# Patient Record
Sex: MALE | Race: WHITE | Employment: OTHER | URBAN - METROPOLITAN AREA
[De-identification: names, ages, dates, MRNs, and addresses within clinical notes are randomized per-mention and may not be internally consistent; named-entity substitution may affect disease eponyms.]

---

## 2017-02-28 RX ORDER — ATORVASTATIN CALCIUM 20 MG/1
TABLET, FILM COATED ORAL
Qty: 30 TABLET | Refills: 3 | Status: SHIPPED | OUTPATIENT
Start: 2017-02-28 | End: 2017-07-18 | Stop reason: SDUPTHER

## 2017-03-01 DIAGNOSIS — I10 UNSPECIFIED ESSENTIAL HYPERTENSION: ICD-10-CM

## 2017-03-01 RX ORDER — QUINAPRIL 5 1/1
TABLET ORAL
Qty: 90 TABLET | Refills: 1 | Status: SHIPPED | OUTPATIENT
Start: 2017-03-01 | End: 2017-08-05 | Stop reason: SDUPTHER

## 2017-03-01 RX ORDER — TAMSULOSIN HYDROCHLORIDE 0.4 MG/1
CAPSULE ORAL
Qty: 90 CAPSULE | Refills: 1 | Status: SHIPPED | OUTPATIENT
Start: 2017-03-01 | End: 2017-08-05 | Stop reason: SDUPTHER

## 2017-03-02 ENCOUNTER — PATIENT MESSAGE (OUTPATIENT)
Dept: INTERNAL MEDICINE CLINIC | Age: 72
End: 2017-03-02

## 2017-03-02 DIAGNOSIS — I10 ESSENTIAL HYPERTENSION: ICD-10-CM

## 2017-03-02 DIAGNOSIS — E11.9 TYPE 2 DIABETES MELLITUS WITHOUT COMPLICATION, WITHOUT LONG-TERM CURRENT USE OF INSULIN (HCC): Primary | ICD-10-CM

## 2017-03-02 DIAGNOSIS — E78.5 HYPERLIPIDEMIA, UNSPECIFIED HYPERLIPIDEMIA TYPE: ICD-10-CM

## 2017-03-06 ENCOUNTER — TELEPHONE (OUTPATIENT)
Dept: INTERNAL MEDICINE CLINIC | Age: 72
End: 2017-03-06

## 2017-03-07 DIAGNOSIS — E78.5 HYPERLIPIDEMIA, UNSPECIFIED HYPERLIPIDEMIA TYPE: ICD-10-CM

## 2017-03-07 DIAGNOSIS — E11.9 TYPE 2 DIABETES MELLITUS WITHOUT COMPLICATION, WITHOUT LONG-TERM CURRENT USE OF INSULIN (HCC): ICD-10-CM

## 2017-03-07 DIAGNOSIS — I10 ESSENTIAL HYPERTENSION: ICD-10-CM

## 2017-03-07 LAB
A/G RATIO: 2.2 (ref 1.1–2.2)
ALBUMIN SERPL-MCNC: 4.7 G/DL (ref 3.4–5)
ALP BLD-CCNC: 40 U/L (ref 40–129)
ALT SERPL-CCNC: 38 U/L (ref 10–40)
ANION GAP SERPL CALCULATED.3IONS-SCNC: 13 MMOL/L (ref 3–16)
AST SERPL-CCNC: 25 U/L (ref 15–37)
BILIRUB SERPL-MCNC: 0.6 MG/DL (ref 0–1)
BUN BLDV-MCNC: 18 MG/DL (ref 7–20)
CALCIUM SERPL-MCNC: 10.6 MG/DL (ref 8.3–10.6)
CHLORIDE BLD-SCNC: 103 MMOL/L (ref 99–110)
CHOLESTEROL, TOTAL: 122 MG/DL (ref 0–199)
CO2: 27 MMOL/L (ref 21–32)
CREAT SERPL-MCNC: 0.7 MG/DL (ref 0.8–1.3)
CREATININE URINE: 115 MG/DL (ref 39–259)
GFR AFRICAN AMERICAN: >60
GFR NON-AFRICAN AMERICAN: >60
GLOBULIN: 2.1 G/DL
GLUCOSE BLD-MCNC: 167 MG/DL (ref 70–99)
HDLC SERPL-MCNC: 42 MG/DL (ref 40–60)
LDL CHOLESTEROL CALCULATED: 39 MG/DL
MICROALBUMIN UR-MCNC: 4.2 MG/DL
MICROALBUMIN/CREAT UR-RTO: 36.5 MG/G (ref 0–30)
POTASSIUM SERPL-SCNC: 4.7 MMOL/L (ref 3.5–5.1)
SODIUM BLD-SCNC: 143 MMOL/L (ref 136–145)
TOTAL PROTEIN: 6.8 G/DL (ref 6.4–8.2)
TRIGL SERPL-MCNC: 204 MG/DL (ref 0–150)
VLDLC SERPL CALC-MCNC: 41 MG/DL

## 2017-03-08 LAB
ESTIMATED AVERAGE GLUCOSE: 142.7 MG/DL
HBA1C MFR BLD: 6.6 %

## 2017-03-10 ENCOUNTER — OFFICE VISIT (OUTPATIENT)
Dept: INTERNAL MEDICINE CLINIC | Age: 72
End: 2017-03-10

## 2017-03-10 VITALS
OXYGEN SATURATION: 96 % | SYSTOLIC BLOOD PRESSURE: 122 MMHG | BODY MASS INDEX: 23.21 KG/M2 | TEMPERATURE: 98.1 F | HEART RATE: 78 BPM | DIASTOLIC BLOOD PRESSURE: 74 MMHG | WEIGHT: 144.4 LBS | HEIGHT: 66 IN

## 2017-03-10 DIAGNOSIS — E78.5 HYPERLIPIDEMIA, UNSPECIFIED HYPERLIPIDEMIA TYPE: ICD-10-CM

## 2017-03-10 DIAGNOSIS — I10 ESSENTIAL HYPERTENSION: ICD-10-CM

## 2017-03-10 DIAGNOSIS — E11.9 TYPE 2 DIABETES MELLITUS WITHOUT COMPLICATION, WITHOUT LONG-TERM CURRENT USE OF INSULIN (HCC): Primary | ICD-10-CM

## 2017-03-10 DIAGNOSIS — N40.1 BENIGN PROSTATIC HYPERTROPHY (BPH) WITH WEAK URINARY STREAM: ICD-10-CM

## 2017-03-10 DIAGNOSIS — R39.12 BENIGN PROSTATIC HYPERTROPHY (BPH) WITH WEAK URINARY STREAM: ICD-10-CM

## 2017-03-10 DIAGNOSIS — E55.9 VITAMIN D DEFICIENCY: ICD-10-CM

## 2017-03-10 PROCEDURE — 99214 OFFICE O/P EST MOD 30 MIN: CPT | Performed by: INTERNAL MEDICINE

## 2017-03-10 ASSESSMENT — ENCOUNTER SYMPTOMS
SORE THROAT: 0
BLOOD IN STOOL: 0
ABDOMINAL DISTENTION: 0
ABDOMINAL PAIN: 0
VOMITING: 0
SHORTNESS OF BREATH: 0
WHEEZING: 0
CHEST TIGHTNESS: 0
NAUSEA: 0
RHINORRHEA: 0
CONSTIPATION: 0
COUGH: 0
SINUS PRESSURE: 0
DIARRHEA: 0
TROUBLE SWALLOWING: 0

## 2017-03-14 ENCOUNTER — NURSE ONLY (OUTPATIENT)
Dept: INTERNAL MEDICINE CLINIC | Age: 72
End: 2017-03-14

## 2017-03-14 DIAGNOSIS — Z23 NEED FOR PROPHYLACTIC VACCINATION AGAINST STREPTOCOCCUS PNEUMONIAE (PNEUMOCOCCUS): Primary | ICD-10-CM

## 2017-03-14 PROCEDURE — G0009 ADMIN PNEUMOCOCCAL VACCINE: HCPCS | Performed by: INTERNAL MEDICINE

## 2017-03-14 PROCEDURE — 90732 PPSV23 VACC 2 YRS+ SUBQ/IM: CPT | Performed by: INTERNAL MEDICINE

## 2017-03-22 ENCOUNTER — OFFICE VISIT (OUTPATIENT)
Dept: DERMATOLOGY | Age: 72
End: 2017-03-22

## 2017-03-22 DIAGNOSIS — L82.1 SEBORRHEIC KERATOSES: ICD-10-CM

## 2017-03-22 DIAGNOSIS — L57.0 AK (ACTINIC KERATOSIS): Primary | ICD-10-CM

## 2017-03-22 DIAGNOSIS — L82.0 SEBORRHEIC KERATOSES, INFLAMED: ICD-10-CM

## 2017-03-22 PROCEDURE — 99201 PR OFFICE OUTPATIENT NEW 10 MINUTES: CPT | Performed by: DERMATOLOGY

## 2017-03-22 PROCEDURE — 17110 DESTRUCTION B9 LES UP TO 14: CPT | Performed by: DERMATOLOGY

## 2017-03-22 PROCEDURE — 17000 DESTRUCT PREMALG LESION: CPT | Performed by: DERMATOLOGY

## 2017-06-02 ENCOUNTER — PATIENT MESSAGE (OUTPATIENT)
Dept: INTERNAL MEDICINE CLINIC | Age: 72
End: 2017-06-02

## 2017-06-02 DIAGNOSIS — N40.1 BENIGN PROSTATIC HYPERTROPHY (BPH) WITH WEAK URINARY STREAM: ICD-10-CM

## 2017-06-02 DIAGNOSIS — E11.9 TYPE 2 DIABETES MELLITUS WITHOUT COMPLICATION, WITHOUT LONG-TERM CURRENT USE OF INSULIN (HCC): Primary | ICD-10-CM

## 2017-06-02 DIAGNOSIS — E78.5 HYPERLIPIDEMIA, UNSPECIFIED HYPERLIPIDEMIA TYPE: ICD-10-CM

## 2017-06-02 DIAGNOSIS — R39.12 BENIGN PROSTATIC HYPERTROPHY (BPH) WITH WEAK URINARY STREAM: ICD-10-CM

## 2017-06-02 DIAGNOSIS — I10 ESSENTIAL HYPERTENSION: ICD-10-CM

## 2017-06-07 DIAGNOSIS — N40.1 BENIGN PROSTATIC HYPERTROPHY (BPH) WITH WEAK URINARY STREAM: ICD-10-CM

## 2017-06-07 DIAGNOSIS — I10 ESSENTIAL HYPERTENSION: ICD-10-CM

## 2017-06-07 DIAGNOSIS — R39.12 BENIGN PROSTATIC HYPERTROPHY (BPH) WITH WEAK URINARY STREAM: ICD-10-CM

## 2017-06-07 DIAGNOSIS — E11.9 TYPE 2 DIABETES MELLITUS WITHOUT COMPLICATION, WITHOUT LONG-TERM CURRENT USE OF INSULIN (HCC): ICD-10-CM

## 2017-06-07 DIAGNOSIS — E78.5 HYPERLIPIDEMIA, UNSPECIFIED HYPERLIPIDEMIA TYPE: ICD-10-CM

## 2017-06-07 LAB
A/G RATIO: 2.3 (ref 1.1–2.2)
ALBUMIN SERPL-MCNC: 4.9 G/DL (ref 3.4–5)
ALP BLD-CCNC: 42 U/L (ref 40–129)
ALT SERPL-CCNC: 30 U/L (ref 10–40)
ANION GAP SERPL CALCULATED.3IONS-SCNC: 18 MMOL/L (ref 3–16)
AST SERPL-CCNC: 26 U/L (ref 15–37)
BILIRUB SERPL-MCNC: 0.6 MG/DL (ref 0–1)
BUN BLDV-MCNC: 18 MG/DL (ref 7–20)
CALCIUM SERPL-MCNC: 10.1 MG/DL (ref 8.3–10.6)
CHLORIDE BLD-SCNC: 98 MMOL/L (ref 99–110)
CHOLESTEROL, TOTAL: 117 MG/DL (ref 0–199)
CO2: 26 MMOL/L (ref 21–32)
CREAT SERPL-MCNC: 0.7 MG/DL (ref 0.8–1.3)
GFR AFRICAN AMERICAN: >60
GFR NON-AFRICAN AMERICAN: >60
GLOBULIN: 2.1 G/DL
GLUCOSE BLD-MCNC: 129 MG/DL (ref 70–99)
HDLC SERPL-MCNC: 41 MG/DL (ref 40–60)
LDL CHOLESTEROL CALCULATED: 44 MG/DL
POTASSIUM SERPL-SCNC: 5 MMOL/L (ref 3.5–5.1)
SODIUM BLD-SCNC: 142 MMOL/L (ref 136–145)
TOTAL PROTEIN: 7 G/DL (ref 6.4–8.2)
TRIGL SERPL-MCNC: 160 MG/DL (ref 0–150)
VLDLC SERPL CALC-MCNC: 32 MG/DL

## 2017-06-08 LAB
ESTIMATED AVERAGE GLUCOSE: 139.9 MG/DL
HBA1C MFR BLD: 6.5 %
PROSTATE SPECIFIC ANTIGEN: 0.63 NG/ML (ref 0–4)

## 2017-06-09 ENCOUNTER — OFFICE VISIT (OUTPATIENT)
Dept: INTERNAL MEDICINE CLINIC | Age: 72
End: 2017-06-09

## 2017-06-09 VITALS
WEIGHT: 144.2 LBS | DIASTOLIC BLOOD PRESSURE: 62 MMHG | RESPIRATION RATE: 12 BRPM | OXYGEN SATURATION: 95 % | SYSTOLIC BLOOD PRESSURE: 110 MMHG | HEIGHT: 66 IN | HEART RATE: 86 BPM | BODY MASS INDEX: 23.18 KG/M2 | TEMPERATURE: 98 F

## 2017-06-09 DIAGNOSIS — E55.9 VITAMIN D DEFICIENCY: ICD-10-CM

## 2017-06-09 DIAGNOSIS — E78.5 HYPERLIPIDEMIA, UNSPECIFIED HYPERLIPIDEMIA TYPE: ICD-10-CM

## 2017-06-09 DIAGNOSIS — I10 ESSENTIAL HYPERTENSION: ICD-10-CM

## 2017-06-09 DIAGNOSIS — R39.12 BENIGN PROSTATIC HYPERTROPHY (BPH) WITH WEAK URINARY STREAM: ICD-10-CM

## 2017-06-09 DIAGNOSIS — E11.9 TYPE 2 DIABETES MELLITUS WITHOUT COMPLICATION, WITHOUT LONG-TERM CURRENT USE OF INSULIN (HCC): Primary | ICD-10-CM

## 2017-06-09 DIAGNOSIS — N40.1 BENIGN PROSTATIC HYPERTROPHY (BPH) WITH WEAK URINARY STREAM: ICD-10-CM

## 2017-06-09 PROCEDURE — 99214 OFFICE O/P EST MOD 30 MIN: CPT | Performed by: INTERNAL MEDICINE

## 2017-06-11 ASSESSMENT — ENCOUNTER SYMPTOMS
RHINORRHEA: 0
DIARRHEA: 0
WHEEZING: 0
ABDOMINAL PAIN: 0
VOMITING: 0
SHORTNESS OF BREATH: 0
COUGH: 0
CHEST TIGHTNESS: 0
CONSTIPATION: 0
NAUSEA: 0
SORE THROAT: 0

## 2017-07-18 RX ORDER — ATORVASTATIN CALCIUM 20 MG/1
20 TABLET, FILM COATED ORAL DAILY
Qty: 90 TABLET | Refills: 1 | Status: SHIPPED | OUTPATIENT
Start: 2017-07-18 | End: 2018-01-12 | Stop reason: SDUPTHER

## 2017-08-05 DIAGNOSIS — I10 UNSPECIFIED ESSENTIAL HYPERTENSION: ICD-10-CM

## 2017-08-07 RX ORDER — TAMSULOSIN HYDROCHLORIDE 0.4 MG/1
CAPSULE ORAL
Qty: 90 CAPSULE | Refills: 1 | Status: SHIPPED | OUTPATIENT
Start: 2017-08-07 | End: 2018-01-17 | Stop reason: SDUPTHER

## 2017-08-07 RX ORDER — QUINAPRIL 5 1/1
TABLET ORAL
Qty: 90 TABLET | Refills: 1 | Status: SHIPPED | OUTPATIENT
Start: 2017-08-07 | End: 2018-01-17 | Stop reason: SDUPTHER

## 2017-08-17 LAB — DIABETIC RETINOPATHY: NORMAL

## 2017-08-27 DIAGNOSIS — E11.9 TYPE 2 DIABETES MELLITUS, CONTROLLED (HCC): ICD-10-CM

## 2017-08-28 RX ORDER — CANAGLIFLOZIN 300 MG/1
TABLET, FILM COATED ORAL
Qty: 90 TABLET | Refills: 0 | Status: SHIPPED | OUTPATIENT
Start: 2017-08-28 | End: 2017-11-10 | Stop reason: SDUPTHER

## 2017-09-13 DIAGNOSIS — E11.9 TYPE 2 DIABETES MELLITUS, CONTROLLED (HCC): ICD-10-CM

## 2017-09-13 RX ORDER — SITAGLIPTIN AND METFORMIN HYDROCHLORIDE 1000; 50 MG/1; MG/1
TABLET, FILM COATED, EXTENDED RELEASE ORAL
Qty: 180 TABLET | Refills: 2 | Status: SHIPPED | OUTPATIENT
Start: 2017-09-13 | End: 2018-05-05 | Stop reason: SDUPTHER

## 2017-10-26 ENCOUNTER — OFFICE VISIT (OUTPATIENT)
Dept: INTERNAL MEDICINE CLINIC | Age: 72
End: 2017-10-26

## 2017-10-26 VITALS
HEART RATE: 82 BPM | HEIGHT: 66 IN | RESPIRATION RATE: 16 BRPM | WEIGHT: 140 LBS | BODY MASS INDEX: 22.5 KG/M2 | SYSTOLIC BLOOD PRESSURE: 118 MMHG | TEMPERATURE: 98.5 F | OXYGEN SATURATION: 96 % | DIASTOLIC BLOOD PRESSURE: 62 MMHG

## 2017-10-26 DIAGNOSIS — E11.9 TYPE 2 DIABETES MELLITUS WITHOUT COMPLICATION, WITHOUT LONG-TERM CURRENT USE OF INSULIN (HCC): Primary | ICD-10-CM

## 2017-10-26 DIAGNOSIS — R39.12 BENIGN PROSTATIC HYPERPLASIA WITH WEAK URINARY STREAM: ICD-10-CM

## 2017-10-26 DIAGNOSIS — I10 ESSENTIAL HYPERTENSION: ICD-10-CM

## 2017-10-26 DIAGNOSIS — E78.2 MIXED HYPERLIPIDEMIA: ICD-10-CM

## 2017-10-26 DIAGNOSIS — E11.9 TYPE 2 DIABETES MELLITUS WITHOUT COMPLICATION, WITHOUT LONG-TERM CURRENT USE OF INSULIN (HCC): ICD-10-CM

## 2017-10-26 DIAGNOSIS — E55.9 VITAMIN D DEFICIENCY: ICD-10-CM

## 2017-10-26 DIAGNOSIS — N40.1 BENIGN PROSTATIC HYPERPLASIA WITH WEAK URINARY STREAM: ICD-10-CM

## 2017-10-26 LAB — HBA1C MFR BLD: 6.4 %

## 2017-10-26 PROCEDURE — 83036 HEMOGLOBIN GLYCOSYLATED A1C: CPT | Performed by: INTERNAL MEDICINE

## 2017-10-26 PROCEDURE — 99214 OFFICE O/P EST MOD 30 MIN: CPT | Performed by: INTERNAL MEDICINE

## 2017-10-26 RX ORDER — PHENOL 1.4 %
1 AEROSOL, SPRAY (ML) MUCOUS MEMBRANE DAILY
COMMUNITY
Start: 2017-10-26 | End: 2022-01-01 | Stop reason: ALTCHOICE

## 2017-10-26 ASSESSMENT — ENCOUNTER SYMPTOMS
CHEST TIGHTNESS: 0
VOMITING: 0
RHINORRHEA: 0
SHORTNESS OF BREATH: 0
NAUSEA: 0
CONSTIPATION: 0
ABDOMINAL PAIN: 0
WHEEZING: 0
COUGH: 0
DIARRHEA: 0
SORE THROAT: 0

## 2017-10-26 NOTE — PROGRESS NOTES
Shanon Howard   YOB: 1945    Date of Visit:  10/26/2017    Chief Complaint   Patient presents with    Diabetes    Hypertension    Hyperlipidemia       HPI  Pt has Type 2 Diabetes. Pt monitors his blood sugars fasting and pre-dinner. Fasting blood sugar averages 145 and pre-dinner averages 108. Pt decreases carbohydrates. Pt walks 2 miles 3-4 times per week.      Pt has hypertension. Pt doesn't monitor his BP. Pt states he doesn't add salt.      Pt has Hyperlipidemia. Pt takes Atorvastatin. Pt denies side effects. Pt decreases fat and cholesterol in his diet.     Pt has vitamin D deficiency. Pt takes vitamin D 2000 IU daily.     Pt has BPH. Pt takes Flomax. Pt denies side effects. Pt states he has wakes up once a night to urinate. Pt states his stream is not as strong as it used to be. Pt denies dribbling. Pt states he had a flu vaccine done on 9/12/17. Review of Systems   Constitutional: Negative for activity change, chills, fatigue and fever. HENT: Negative for congestion, postnasal drip, rhinorrhea and sore throat. Eyes: Negative for visual disturbance. Respiratory: Negative for cough, chest tightness, shortness of breath and wheezing. Cardiovascular: Negative for chest pain, palpitations and leg swelling. Gastrointestinal: Negative for abdominal pain, constipation, diarrhea, nausea and vomiting. Genitourinary: Negative for decreased urine volume, difficulty urinating, dysuria, frequency, hematuria and urgency. Musculoskeletal: Negative for arthralgias and myalgias. Skin: Negative for wound. Neurological: Negative for dizziness, syncope, light-headedness, numbness and headaches. Psychiatric/Behavioral: Negative for dysphoric mood and sleep disturbance. The patient is not nervous/anxious.         No Known Allergies  Outpatient Prescriptions Marked as Taking for the 10/26/17 encounter (Office Visit) with Ken Chilsd MD   Medication Sig Dispense Refill   TouchIN2 TechnologiesRUST XR  MG TB24 per extended release tablet TAKE 1 TABLET TWICE A  tablet 2    INVOKANA 300 MG TABS tablet TAKE 1 TABLET BY MOUTH EVERY MORNING BEFORE BREAKFAST 90 tablet 0    tamsulosin (FLOMAX) 0.4 MG capsule TAKE 1 CAPSULE DAILY 90 capsule 1    quinapril (ACCUPRIL) 5 MG tablet TAKE 1 TABLET DAILY 90 tablet 1    atorvastatin (LIPITOR) 20 MG tablet Take 1 tablet by mouth daily 90 tablet 1    ONE TOUCH ULTRASOFT LANCETS MISC 1 each by Does not apply route daily 100 each 3    glucose blood VI test strips (ASCENSIA AUTODISC VI;ONE TOUCH ULTRA TEST VI) strip 1 each by In Vitro route 4 times daily As needed. Dx Code: ICD-10-CM: E11.9 100 each 3    glucose blood VI test strips (ONE TOUCH ULTRA TEST) strip 1 each by In Vitro route daily 100 each 3    vitamin B-12 (CYANOCOBALAMIN) 1000 MCG tablet Take 1,000 mcg by mouth 2 times daily      Cholecalciferol (VITAMIN D) 2000 UNITS CAPS capsule Take by mouth daily      Omega-3 Fatty Acids (OMEGA 3 PO) Take 2 capsules by mouth daily              Vitals:    10/26/17 0908 10/26/17 0916   BP: (!) 100/56 118/62   Site: Left Arm Right Arm   Position: Sitting Sitting   Cuff Size: Medium Adult Medium Adult   Pulse: 82    Resp: 16    Temp: 98.5 °F (36.9 °C)    TempSrc: Oral    SpO2: 96%    Weight: 140 lb (63.5 kg)    Height: 5' 6\" (1.676 m)      Body mass index is 22.6 kg/m². Physical Exam   Constitutional: He appears well-developed and well-nourished. No distress. Elderly WM   HENT:   Mouth/Throat: Oropharynx is clear and moist and mucous membranes are normal.   Eyes: Conjunctivae, EOM and lids are normal. Pupils are equal, round, and reactive to light. Neck: Neck supple. Carotid bruit is not present. No thyromegaly present. Cardiovascular: Normal rate, regular rhythm, S1 normal, S2 normal, normal heart sounds and normal pulses. Exam reveals no gallop and no friction rub. No murmur heard.   Pulmonary/Chest: Effort normal and breath sounds normal. No Metabolic Panel; Future    3. Mixed hyperlipidemia  -LDL at goal  -Continue same medications  -Low fat, low cholesterol diet  -Regular aerobic exercise  - Lipid Panel; Future fasting  - Comprehensive Metabolic Panel; Future    4. Vitamin D deficiency  -stable  -Continue same medications    5.  Benign prostatic hyperplasia with weak urinary stream  -stable  -Continue same medications       Return in about 6 months (around 4/26/2018) for annual physical exam.

## 2017-10-26 NOTE — TELEPHONE ENCOUNTER
Call [facility] Pharmacist. Dispense test strips for qty 100 for a 90 day supply with 3 refills as prescribed. Pt doesn't test his blood sugar 3 times daily any more.

## 2017-10-31 DIAGNOSIS — E11.9 TYPE 2 DIABETES MELLITUS WITHOUT COMPLICATION, WITHOUT LONG-TERM CURRENT USE OF INSULIN (HCC): ICD-10-CM

## 2017-10-31 DIAGNOSIS — E78.2 MIXED HYPERLIPIDEMIA: ICD-10-CM

## 2017-10-31 DIAGNOSIS — I10 ESSENTIAL HYPERTENSION: ICD-10-CM

## 2017-10-31 LAB
A/G RATIO: 2.2 (ref 1.1–2.2)
ALBUMIN SERPL-MCNC: 5 G/DL (ref 3.4–5)
ALP BLD-CCNC: 40 U/L (ref 40–129)
ALT SERPL-CCNC: 37 U/L (ref 10–40)
ANION GAP SERPL CALCULATED.3IONS-SCNC: 15 MMOL/L (ref 3–16)
AST SERPL-CCNC: 24 U/L (ref 15–37)
BILIRUB SERPL-MCNC: 0.7 MG/DL (ref 0–1)
BUN BLDV-MCNC: 20 MG/DL (ref 7–20)
CALCIUM SERPL-MCNC: 10.2 MG/DL (ref 8.3–10.6)
CHLORIDE BLD-SCNC: 98 MMOL/L (ref 99–110)
CHOLESTEROL, TOTAL: 125 MG/DL (ref 0–199)
CO2: 28 MMOL/L (ref 21–32)
CREAT SERPL-MCNC: 0.7 MG/DL (ref 0.8–1.3)
GFR AFRICAN AMERICAN: >60
GFR NON-AFRICAN AMERICAN: >60
GLOBULIN: 2.3 G/DL
GLUCOSE BLD-MCNC: 143 MG/DL (ref 70–99)
HDLC SERPL-MCNC: 41 MG/DL (ref 40–60)
LDL CHOLESTEROL CALCULATED: 46 MG/DL
POTASSIUM SERPL-SCNC: 4.7 MMOL/L (ref 3.5–5.1)
SODIUM BLD-SCNC: 141 MMOL/L (ref 136–145)
TOTAL PROTEIN: 7.3 G/DL (ref 6.4–8.2)
TRIGL SERPL-MCNC: 188 MG/DL (ref 0–150)
VLDLC SERPL CALC-MCNC: 38 MG/DL

## 2017-11-10 DIAGNOSIS — E11.9 TYPE 2 DIABETES MELLITUS, CONTROLLED (HCC): ICD-10-CM

## 2017-11-10 NOTE — TELEPHONE ENCOUNTER
From: Marjory Boast  Sent: 11/9/2017 7:30 PM EST  Subject: Medication Renewal Request    Mena Branch.  Lidia Orr would like a refill of the following medications:  INVOKANA 300 MG TABS tablet Yohan Rangel MD]    Preferred pharmacy: [facility] home delivery fax [phone]    Comment:  90 days with refill

## 2018-01-15 RX ORDER — ATORVASTATIN CALCIUM 20 MG/1
TABLET, FILM COATED ORAL
Qty: 90 TABLET | Refills: 1 | Status: SHIPPED | OUTPATIENT
Start: 2018-01-15 | End: 2018-07-12 | Stop reason: SDUPTHER

## 2018-01-17 DIAGNOSIS — I10 ESSENTIAL HYPERTENSION: ICD-10-CM

## 2018-01-17 RX ORDER — QUINAPRIL 5 1/1
TABLET ORAL
Qty: 90 TABLET | Refills: 1 | Status: SHIPPED | OUTPATIENT
Start: 2018-01-17 | End: 2018-08-03 | Stop reason: SDUPTHER

## 2018-01-17 RX ORDER — TAMSULOSIN HYDROCHLORIDE 0.4 MG/1
CAPSULE ORAL
Qty: 90 CAPSULE | Refills: 1 | Status: SHIPPED | OUTPATIENT
Start: 2018-01-17 | End: 2018-08-03 | Stop reason: SDUPTHER

## 2018-04-24 ENCOUNTER — OFFICE VISIT (OUTPATIENT)
Dept: INTERNAL MEDICINE CLINIC | Age: 73
End: 2018-04-24

## 2018-04-24 VITALS
HEART RATE: 81 BPM | DIASTOLIC BLOOD PRESSURE: 70 MMHG | SYSTOLIC BLOOD PRESSURE: 110 MMHG | HEIGHT: 66 IN | RESPIRATION RATE: 14 BRPM | OXYGEN SATURATION: 96 % | BODY MASS INDEX: 22.69 KG/M2 | TEMPERATURE: 98.2 F | WEIGHT: 141.2 LBS

## 2018-04-24 DIAGNOSIS — R39.12 BENIGN PROSTATIC HYPERPLASIA WITH WEAK URINARY STREAM: ICD-10-CM

## 2018-04-24 DIAGNOSIS — N40.1 BENIGN PROSTATIC HYPERPLASIA WITH WEAK URINARY STREAM: ICD-10-CM

## 2018-04-24 DIAGNOSIS — Z00.00 ANNUAL PHYSICAL EXAM: ICD-10-CM

## 2018-04-24 DIAGNOSIS — Z23 NEED FOR SHINGLES VACCINE: ICD-10-CM

## 2018-04-24 DIAGNOSIS — I10 ESSENTIAL HYPERTENSION: ICD-10-CM

## 2018-04-24 DIAGNOSIS — E11.9 TYPE 2 DIABETES MELLITUS WITHOUT COMPLICATION, WITHOUT LONG-TERM CURRENT USE OF INSULIN (HCC): ICD-10-CM

## 2018-04-24 DIAGNOSIS — Z13.6 SCREENING FOR ISCHEMIC HEART DISEASE: ICD-10-CM

## 2018-04-24 DIAGNOSIS — E11.638: ICD-10-CM

## 2018-04-24 DIAGNOSIS — E55.9 VITAMIN D DEFICIENCY: ICD-10-CM

## 2018-04-24 DIAGNOSIS — Z00.00 ANNUAL PHYSICAL EXAM: Primary | ICD-10-CM

## 2018-04-24 DIAGNOSIS — E78.2 MIXED HYPERLIPIDEMIA: ICD-10-CM

## 2018-04-24 DIAGNOSIS — Z12.11 COLON CANCER SCREENING: ICD-10-CM

## 2018-04-24 LAB
A/G RATIO: 2.4 (ref 1.1–2.2)
ALBUMIN SERPL-MCNC: 5.3 G/DL (ref 3.4–5)
ALP BLD-CCNC: 43 U/L (ref 40–129)
ALT SERPL-CCNC: 38 U/L (ref 10–40)
ANION GAP SERPL CALCULATED.3IONS-SCNC: 18 MMOL/L (ref 3–16)
AST SERPL-CCNC: 30 U/L (ref 15–37)
BASOPHILS ABSOLUTE: 0 K/UL (ref 0–0.2)
BASOPHILS RELATIVE PERCENT: 0.3 %
BILIRUB SERPL-MCNC: 0.9 MG/DL (ref 0–1)
BILIRUBIN, POC: ABNORMAL
BLOOD URINE, POC: ABNORMAL
BUN BLDV-MCNC: 20 MG/DL (ref 7–20)
CALCIUM SERPL-MCNC: 10.1 MG/DL (ref 8.3–10.6)
CHLORIDE BLD-SCNC: 99 MMOL/L (ref 99–110)
CHOLESTEROL, TOTAL: 145 MG/DL (ref 0–199)
CLARITY, POC: CLEAR
CO2: 25 MMOL/L (ref 21–32)
COLOR, POC: YELLOW
CREAT SERPL-MCNC: 0.6 MG/DL (ref 0.8–1.3)
CREATININE URINE: 62.1 MG/DL (ref 39–259)
EOSINOPHILS ABSOLUTE: 0.3 K/UL (ref 0–0.6)
EOSINOPHILS RELATIVE PERCENT: 3.3 %
GFR AFRICAN AMERICAN: >60
GFR NON-AFRICAN AMERICAN: >60
GLOBULIN: 2.2 G/DL
GLUCOSE BLD-MCNC: 158 MG/DL (ref 70–99)
GLUCOSE URINE, POC: 500
HBA1C MFR BLD: 6.6 %
HCT VFR BLD CALC: 48.1 % (ref 40.5–52.5)
HDLC SERPL-MCNC: 47 MG/DL (ref 40–60)
HEMOGLOBIN: 16.3 G/DL (ref 13.5–17.5)
KETONES, POC: 15
LDL CHOLESTEROL CALCULATED: 52 MG/DL
LEUKOCYTE EST, POC: ABNORMAL
LYMPHOCYTES ABSOLUTE: 1.7 K/UL (ref 1–5.1)
LYMPHOCYTES RELATIVE PERCENT: 20.9 %
MCH RBC QN AUTO: 32.8 PG (ref 26–34)
MCHC RBC AUTO-ENTMCNC: 33.9 G/DL (ref 31–36)
MCV RBC AUTO: 96.8 FL (ref 80–100)
MICROALBUMIN UR-MCNC: <1.2 MG/DL
MICROALBUMIN/CREAT UR-RTO: NORMAL MG/G (ref 0–30)
MONOCYTES ABSOLUTE: 0.6 K/UL (ref 0–1.3)
MONOCYTES RELATIVE PERCENT: 7 %
NEUTROPHILS ABSOLUTE: 5.5 K/UL (ref 1.7–7.7)
NEUTROPHILS RELATIVE PERCENT: 68.5 %
NITRITE, POC: ABNORMAL
PDW BLD-RTO: 14.2 % (ref 12.4–15.4)
PH, POC: 5.5
PLATELET # BLD: 162 K/UL (ref 135–450)
PMV BLD AUTO: 8.3 FL (ref 5–10.5)
POTASSIUM SERPL-SCNC: 4.6 MMOL/L (ref 3.5–5.1)
PROSTATE SPECIFIC ANTIGEN: 0.77 NG/ML (ref 0–4)
PROTEIN, POC: ABNORMAL
RBC # BLD: 4.97 M/UL (ref 4.2–5.9)
SODIUM BLD-SCNC: 142 MMOL/L (ref 136–145)
SPECIFIC GRAVITY, POC: 1.01
TOTAL PROTEIN: 7.5 G/DL (ref 6.4–8.2)
TRIGL SERPL-MCNC: 229 MG/DL (ref 0–150)
UROBILINOGEN, POC: 0.2
VITAMIN D 25-HYDROXY: 40.6 NG/ML
VLDLC SERPL CALC-MCNC: 46 MG/DL
WBC # BLD: 8 K/UL (ref 4–11)

## 2018-04-24 PROCEDURE — 82270 OCCULT BLOOD FECES: CPT | Performed by: INTERNAL MEDICINE

## 2018-04-24 PROCEDURE — 83036 HEMOGLOBIN GLYCOSYLATED A1C: CPT | Performed by: INTERNAL MEDICINE

## 2018-04-24 PROCEDURE — 81002 URINALYSIS NONAUTO W/O SCOPE: CPT | Performed by: INTERNAL MEDICINE

## 2018-04-24 PROCEDURE — 99397 PER PM REEVAL EST PAT 65+ YR: CPT | Performed by: INTERNAL MEDICINE

## 2018-04-24 PROCEDURE — 93000 ELECTROCARDIOGRAM COMPLETE: CPT | Performed by: INTERNAL MEDICINE

## 2018-04-24 ASSESSMENT — PATIENT HEALTH QUESTIONNAIRE - PHQ9
SUM OF ALL RESPONSES TO PHQ9 QUESTIONS 1 & 2: 0
2. FEELING DOWN, DEPRESSED OR HOPELESS: 0
SUM OF ALL RESPONSES TO PHQ QUESTIONS 1-9: 0
1. LITTLE INTEREST OR PLEASURE IN DOING THINGS: 0

## 2018-04-24 ASSESSMENT — ENCOUNTER SYMPTOMS
WHEEZING: 0
ABDOMINAL DISTENTION: 0
ANAL BLEEDING: 0
VOICE CHANGE: 0
SORE THROAT: 0
PHOTOPHOBIA: 0
BLOOD IN STOOL: 0
FACIAL SWELLING: 0
EYE DISCHARGE: 0
DIARRHEA: 0
NAUSEA: 0
COUGH: 0
CHEST TIGHTNESS: 0
VOMITING: 0
APNEA: 0
TROUBLE SWALLOWING: 0
SINUS PRESSURE: 0
RHINORRHEA: 0
CONSTIPATION: 0
BACK PAIN: 0
CHOKING: 0
EYE REDNESS: 0
EYE ITCHING: 0
ABDOMINAL PAIN: 0
EYE PAIN: 0
RECTAL PAIN: 0
SHORTNESS OF BREATH: 0

## 2018-05-05 DIAGNOSIS — E11.9 TYPE 2 DIABETES MELLITUS, CONTROLLED (HCC): ICD-10-CM

## 2018-05-07 RX ORDER — SITAGLIPTIN AND METFORMIN HYDROCHLORIDE 1000; 50 MG/1; MG/1
TABLET, FILM COATED, EXTENDED RELEASE ORAL
Qty: 180 TABLET | Refills: 2 | Status: SHIPPED | OUTPATIENT
Start: 2018-05-07 | End: 2019-02-18 | Stop reason: SDUPTHER

## 2018-07-12 DIAGNOSIS — E78.2 MIXED HYPERLIPIDEMIA: Primary | ICD-10-CM

## 2018-07-13 RX ORDER — ATORVASTATIN CALCIUM 20 MG/1
TABLET, FILM COATED ORAL
Qty: 90 TABLET | Refills: 0 | Status: SHIPPED | OUTPATIENT
Start: 2018-07-13 | End: 2018-10-11 | Stop reason: SDUPTHER

## 2018-08-03 DIAGNOSIS — I10 ESSENTIAL HYPERTENSION: ICD-10-CM

## 2018-08-03 RX ORDER — QUINAPRIL 5 1/1
TABLET ORAL
Qty: 90 TABLET | Refills: 0 | Status: SHIPPED | OUTPATIENT
Start: 2018-08-03 | End: 2018-11-03 | Stop reason: SDUPTHER

## 2018-08-03 RX ORDER — TAMSULOSIN HYDROCHLORIDE 0.4 MG/1
CAPSULE ORAL
Qty: 90 CAPSULE | Refills: 0 | Status: SHIPPED | OUTPATIENT
Start: 2018-08-03 | End: 2018-11-03 | Stop reason: SDUPTHER

## 2018-08-09 DIAGNOSIS — E11.9 TYPE 2 DIABETES MELLITUS, CONTROLLED (HCC): ICD-10-CM

## 2018-08-09 RX ORDER — CANAGLIFLOZIN 300 MG/1
TABLET, FILM COATED ORAL
Qty: 90 TABLET | Refills: 1 | Status: SHIPPED | OUTPATIENT
Start: 2018-08-09 | End: 2019-01-07 | Stop reason: CLARIF

## 2018-08-09 NOTE — TELEPHONE ENCOUNTER
Medication:   Requested Prescriptions     Pending Prescriptions Disp Refills    INVOKANA 300 MG TABS tablet [Pharmacy Med Name: Jose Daniel Lee TAB 300MG] 90 tablet 2     Sig: TAKE 1 TABLET EVERY MORNINGBEFORE BREAKFAST       Last Filled:      Patient Phone Number: [phone] (home) [phone] (work)    Last appt: 4/24/2018   Next appt: 10/29/2018    Last Labs DM:   Lab Results   Component Value Date    LABA1C 6.6 04/24/2018       Last OARRS: No flowsheet data found.     Preferred Pharmacy:       [address], [address] [place] [address] [address] [facility]  Phone: [phone] Fax: [phone]

## 2018-08-15 ENCOUNTER — TELEPHONE (OUTPATIENT)
Dept: INTERNAL MEDICINE CLINIC | Age: 73
End: 2018-08-15

## 2018-08-15 NOTE — TELEPHONE ENCOUNTER
Pt called in stating that he is going to be traveling on a cruise next month for a few weeks to [place], [place], & [place]. He was uncertain on if he needed to get the Hep B vaccination before his trip.   Please contact pt back at home number, [phone]

## 2018-10-11 DIAGNOSIS — E78.2 MIXED HYPERLIPIDEMIA: ICD-10-CM

## 2018-10-11 RX ORDER — ATORVASTATIN CALCIUM 20 MG/1
TABLET, FILM COATED ORAL
Qty: 90 TABLET | Refills: 0 | Status: SHIPPED | OUTPATIENT
Start: 2018-10-11 | End: 2019-01-08 | Stop reason: SDUPTHER

## 2018-11-02 ENCOUNTER — CLINICAL DOCUMENTATION (OUTPATIENT)
Dept: INTERNAL MEDICINE CLINIC | Age: 73
End: 2018-11-02

## 2018-11-02 ENCOUNTER — OFFICE VISIT (OUTPATIENT)
Dept: INTERNAL MEDICINE CLINIC | Age: 73
End: 2018-11-02
Payer: MEDICARE

## 2018-11-02 VITALS
HEIGHT: 66 IN | WEIGHT: 139.4 LBS | DIASTOLIC BLOOD PRESSURE: 64 MMHG | SYSTOLIC BLOOD PRESSURE: 134 MMHG | RESPIRATION RATE: 20 BRPM | TEMPERATURE: 97.9 F | BODY MASS INDEX: 22.4 KG/M2

## 2018-11-02 DIAGNOSIS — E11.9 TYPE 2 DIABETES MELLITUS WITHOUT COMPLICATION, WITHOUT LONG-TERM CURRENT USE OF INSULIN (HCC): Primary | ICD-10-CM

## 2018-11-02 DIAGNOSIS — E78.2 MIXED HYPERLIPIDEMIA: ICD-10-CM

## 2018-11-02 DIAGNOSIS — R39.12 BENIGN PROSTATIC HYPERPLASIA WITH WEAK URINARY STREAM: ICD-10-CM

## 2018-11-02 DIAGNOSIS — H61.22 IMPACTED CERUMEN OF LEFT EAR: ICD-10-CM

## 2018-11-02 DIAGNOSIS — E55.9 VITAMIN D DEFICIENCY: ICD-10-CM

## 2018-11-02 DIAGNOSIS — I10 ESSENTIAL HYPERTENSION: ICD-10-CM

## 2018-11-02 DIAGNOSIS — N40.1 BENIGN PROSTATIC HYPERPLASIA WITH WEAK URINARY STREAM: ICD-10-CM

## 2018-11-02 LAB — HBA1C MFR BLD: 6.8 %

## 2018-11-02 PROCEDURE — 83036 HEMOGLOBIN GLYCOSYLATED A1C: CPT | Performed by: INTERNAL MEDICINE

## 2018-11-02 PROCEDURE — 99214 OFFICE O/P EST MOD 30 MIN: CPT | Performed by: INTERNAL MEDICINE

## 2018-11-02 ASSESSMENT — ENCOUNTER SYMPTOMS
DIARRHEA: 0
COUGH: 0
CONSTIPATION: 0
CHEST TIGHTNESS: 0
ABDOMINAL PAIN: 0
SHORTNESS OF BREATH: 0
SORE THROAT: 0
WHEEZING: 0
NAUSEA: 0
VOMITING: 0
RHINORRHEA: 0

## 2018-11-02 NOTE — PROGRESS NOTES
Genitourinary: Negative for dysuria, frequency and hematuria. Musculoskeletal: Negative for arthralgias and myalgias. Skin: Negative for wound. Neurological: Negative for dizziness, syncope, light-headedness, numbness and headaches. Psychiatric/Behavioral: Negative for dysphoric mood and sleep disturbance. The patient is not nervous/anxious. No Known Allergies  Outpatient Prescriptions Marked as Taking for the 11/2/18 encounter (Office Visit) with Ricki Vicente MD   Medication Sig Dispense Refill    atorvastatin (LIPITOR) 20 MG tablet TAKE 1 TABLET BY MOUTH ONE TIME A DAY  90 tablet 0    INVOKANA 300 MG TABS tablet TAKE 1 TABLET EVERY MORNINGBEFORE BREAKFAST 90 tablet 1    quinapril (ACCUPRIL) 5 MG tablet TAKE 1 TABLET DAILY 90 tablet 0    tamsulosin (FLOMAX) 0.4 MG capsule TAKE 1 CAPSULE DAILY 90 capsule 0    JANUMET XR  MG TB24 per extended release tablet TAKE 1 TABLET TWICE A  tablet 2    glucose blood VI test strips (ONE TOUCH ULTRA TEST) strip 1 each by In Vitro route daily 100 each 3    Multiple Vitamins-Minerals (MULTIVITAMIN ADULTS 50+) TABS Take 1 tablet by mouth daily      ONE TOUCH ULTRASOFT LANCETS MISC 1 each by Does not apply route daily 100 each 3    glucose blood VI test strips (ASCENSIA AUTODISC VI;ONE TOUCH ULTRA TEST VI) strip 1 each by In Vitro route 4 times daily As needed. Dx Code: ICD-10-CM: E11.9 100 each 3    vitamin B-12 (CYANOCOBALAMIN) 1000 MCG tablet Take 1,000 mcg by mouth 2 times daily      Cholecalciferol (VITAMIN D) 2000 UNITS CAPS capsule Take by mouth daily      Omega-3 Fatty Acids (OMEGA 3 PO) Take 2 capsules by mouth daily            Vitals:    11/02/18 0913   BP: 134/64   Site: Right Upper Arm   Position: Sitting   Cuff Size: Medium Adult   Resp: 20   Temp: 97.9 °F (36.6 °C)   TempSrc: Oral   Weight: 139 lb 6.4 oz (63.2 kg)   Height: 5' 6\" (1.676 m)     Body mass index is 22.5 kg/m².      Physical Exam   Constitutional: He appears stream  -stable  -Continue same medication    6.  Impacted cerumen of left ear  -ear wax removed from left ear      Return in about 6 months (around 4/24/2019) for annual physical exam.

## 2018-11-03 DIAGNOSIS — I10 ESSENTIAL HYPERTENSION: ICD-10-CM

## 2018-11-06 DIAGNOSIS — I10 ESSENTIAL HYPERTENSION: ICD-10-CM

## 2018-11-06 DIAGNOSIS — E55.9 VITAMIN D DEFICIENCY: ICD-10-CM

## 2018-11-06 DIAGNOSIS — E11.9 TYPE 2 DIABETES MELLITUS WITHOUT COMPLICATION, WITHOUT LONG-TERM CURRENT USE OF INSULIN (HCC): ICD-10-CM

## 2018-11-06 DIAGNOSIS — E78.2 MIXED HYPERLIPIDEMIA: ICD-10-CM

## 2018-11-06 LAB
A/G RATIO: 2.3 (ref 1.1–2.2)
ALBUMIN SERPL-MCNC: 5 G/DL (ref 3.4–5)
ALP BLD-CCNC: 54 U/L (ref 40–129)
ALT SERPL-CCNC: 47 U/L (ref 10–40)
ANION GAP SERPL CALCULATED.3IONS-SCNC: 19 MMOL/L (ref 3–16)
AST SERPL-CCNC: 31 U/L (ref 15–37)
BILIRUB SERPL-MCNC: 0.7 MG/DL (ref 0–1)
BUN BLDV-MCNC: 22 MG/DL (ref 7–20)
CALCIUM SERPL-MCNC: 10.2 MG/DL (ref 8.3–10.6)
CHLORIDE BLD-SCNC: 98 MMOL/L (ref 99–110)
CHOLESTEROL, TOTAL: 174 MG/DL (ref 0–199)
CO2: 24 MMOL/L (ref 21–32)
CREAT SERPL-MCNC: 0.7 MG/DL (ref 0.8–1.3)
GFR AFRICAN AMERICAN: >60
GFR NON-AFRICAN AMERICAN: >60
GLOBULIN: 2.2 G/DL
GLUCOSE BLD-MCNC: 144 MG/DL (ref 70–99)
HDLC SERPL-MCNC: 37 MG/DL (ref 40–60)
LDL CHOLESTEROL CALCULATED: ABNORMAL MG/DL
LDL CHOLESTEROL DIRECT: 99 MG/DL
POTASSIUM SERPL-SCNC: 4.6 MMOL/L (ref 3.5–5.1)
SODIUM BLD-SCNC: 141 MMOL/L (ref 136–145)
TOTAL PROTEIN: 7.2 G/DL (ref 6.4–8.2)
TRIGL SERPL-MCNC: 354 MG/DL (ref 0–150)
VITAMIN D 25-HYDROXY: 40 NG/ML
VLDLC SERPL CALC-MCNC: ABNORMAL MG/DL

## 2018-11-06 RX ORDER — QUINAPRIL 5 1/1
TABLET ORAL
Qty: 90 TABLET | Refills: 1 | Status: SHIPPED | OUTPATIENT
Start: 2018-11-06 | End: 2019-09-12 | Stop reason: SDUPTHER

## 2018-11-06 RX ORDER — TAMSULOSIN HYDROCHLORIDE 0.4 MG/1
CAPSULE ORAL
Qty: 90 CAPSULE | Refills: 1 | Status: SHIPPED | OUTPATIENT
Start: 2018-11-06 | End: 2019-09-12

## 2019-01-02 ENCOUNTER — TELEPHONE (OUTPATIENT)
Dept: INTERNAL MEDICINE CLINIC | Age: 74
End: 2019-01-02

## 2019-01-08 DIAGNOSIS — E78.2 MIXED HYPERLIPIDEMIA: ICD-10-CM

## 2019-01-09 RX ORDER — ATORVASTATIN CALCIUM 20 MG/1
TABLET, FILM COATED ORAL
Qty: 90 TABLET | Refills: 0 | Status: SHIPPED | OUTPATIENT
Start: 2019-01-09 | End: 2019-04-18 | Stop reason: SDUPTHER

## 2019-02-18 DIAGNOSIS — E11.9 TYPE 2 DIABETES MELLITUS, CONTROLLED (HCC): ICD-10-CM

## 2019-02-19 RX ORDER — SITAGLIPTIN AND METFORMIN HYDROCHLORIDE 1000; 50 MG/1; MG/1
TABLET, FILM COATED, EXTENDED RELEASE ORAL
Qty: 180 TABLET | Refills: 1 | Status: SHIPPED | OUTPATIENT
Start: 2019-02-19 | End: 2019-09-12 | Stop reason: SDUPTHER

## 2019-04-04 ENCOUNTER — OFFICE VISIT (OUTPATIENT)
Dept: ENT CLINIC | Age: 74
End: 2019-04-04
Payer: MEDICARE

## 2019-04-04 VITALS — SYSTOLIC BLOOD PRESSURE: 120 MMHG | DIASTOLIC BLOOD PRESSURE: 68 MMHG | OXYGEN SATURATION: 96 % | HEART RATE: 93 BPM

## 2019-04-04 DIAGNOSIS — J30.9 ALLERGIC SINUSITIS: ICD-10-CM

## 2019-04-04 DIAGNOSIS — H90.5 HEARING LOSS, SENSORINEURAL, UNILATERAL: ICD-10-CM

## 2019-04-04 DIAGNOSIS — J30.9 ALLERGIC SINUSITIS: Primary | ICD-10-CM

## 2019-04-04 PROCEDURE — 99203 OFFICE O/P NEW LOW 30 MIN: CPT | Performed by: OTOLARYNGOLOGY

## 2019-04-04 RX ORDER — MONTELUKAST SODIUM 10 MG/1
TABLET ORAL
Qty: 90 TABLET | Refills: 1 | Status: SHIPPED | OUTPATIENT
Start: 2019-04-04 | End: 2019-09-18 | Stop reason: SDUPTHER

## 2019-04-04 RX ORDER — MONTELUKAST SODIUM 10 MG/1
10 TABLET ORAL NIGHTLY
Qty: 15 TABLET | Refills: 1 | Status: SHIPPED | OUTPATIENT
Start: 2019-04-04 | End: 2019-04-04 | Stop reason: SDUPTHER

## 2019-04-04 ASSESSMENT — ENCOUNTER SYMPTOMS
FACIAL SWELLING: 0
SINUS PAIN: 0
VOICE CHANGE: 0
TROUBLE SWALLOWING: 0
ALLERGIC/IMMUNOLOGIC NEGATIVE: 1
SORE THROAT: 0
EYES NEGATIVE: 1
RHINORRHEA: 0
RESPIRATORY NEGATIVE: 1
SINUS PRESSURE: 0

## 2019-04-04 NOTE — PROGRESS NOTES
SUBJECTIVE:    Chief Complaint   Patient presents with    Ear Problem     4 to 5 months ago went to PCP had left ear flushed. Since then has had a lot of problems with popping and noises in the ear. Sim Rivera is a 75 y.o. male    4 months ago, the patient noted stuffiness in his left ear. It is now persisted and cerumen had been removed. He denies any pain. He's had some tinnitus in the left ear associated with some decrease in hearing. Some of his symptoms of been rather progressive. He notices no vertigo. He is on no medications likely to produce similar problems he does have a son who has Ménière's disease. He denies noise exposure or trauma. He is a diabetic. Occasion orally and seems to be under reasonable good control. No fevers been noted.       Past Medical History:   Diagnosis Date    Allergic rhinitis     History of gout 9/19/2015    History of thrombocytopenia 9/19/2015    Hypercalcemia     Hyperlipidemia     Hypertension     Lung nodule     Proteinuria     Type II or unspecified type diabetes mellitus without mention of complication, not stated as uncontrolled     Urinary disorder     Vitamin D deficiency 9/19/2015      Past Surgical History:   Procedure Laterality Date    CATARACT REMOVAL WITH IMPLANT Bilateral 2011    COLONOSCOPY  3/29/2011    repeat in 5 yrs: Jaylene Sellers MD    COLONOSCOPY  03/14/2016    repeat in 7-8 years: Jaylene Sellers MD    ELBOW FRACTURE SURGERY Left age 5   [facility] FINGER TRIGGER RELEASE Right 2007    index    TONSILLECTOMY AND ADENOIDECTOMY  age 2   [facility] VASECTOMY  25      Family History   Problem Relation Age of Onset    Diabetes Mother     High Blood Pressure Mother     Dementia Mother     Cancer Neg Hx     Hearing Loss Neg Hx     Stroke Neg Hx     Heart Disease Neg Hx       Social History     Tobacco Use    Smoking status: Never Smoker    Smokeless tobacco: Never Used    Tobacco comment: never used tobbaco   Substance Use Topics    Alcohol use: Yes     Types: 2 Glasses of wine per week     Comment: drink alcohol very occasionally. Review of Systems:  Review of Systems   Constitutional: Negative. Negative for fever. HENT: Positive for ear pain, hearing loss and tinnitus. Negative for congestion, dental problem, drooling, ear discharge, facial swelling, mouth sores, nosebleeds, postnasal drip, rhinorrhea, sinus pressure, sinus pain, sneezing, sore throat, trouble swallowing and voice change. Eyes: Negative. Respiratory: Negative. Cardiovascular: Negative. Endocrine: Negative. Skin: Negative. Allergic/Immunologic: Negative. Neurological: Negative. Negative for dizziness and light-headedness. Hematological: Negative. Psychiatric/Behavioral: Negative. OBJECTIVE:  /68   Pulse 93   SpO2 96%   Physical Exam   Constitutional: He is oriented to person, place, and time. He appears well-developed and well-nourished. HENT:   Head: Normocephalic and atraumatic. Mouth/Throat: Oropharynx is clear and moist. No oropharyngeal exudate. Indirect laryngoscopy is unremarkable. Ear examination reveals essentially normal-appearing tympanic membranes. The nasal evaluation does show some edema consistent with allergy. No evidence of purulence is noted and there is no evidence of obstructing lesion. 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 Conjunctivae and EOM are normal.   Neck: Normal range of motion. Neck supple. No tracheal deviation present. No thyromegaly present. Cardiovascular: Normal rate and regular rhythm. Pulmonary/Chest: Effort normal.   Lymphadenopathy:     He has no cervical adenopathy. Neurological: He is alert and oriented to person, place, and time. Skin: Skin is warm and dry. Psychiatric: He has a normal mood and affect.  His behavior is normal. Judgment and thought content normal.        ASSESSMENT:    Findings seemed most consistent with eustachian tube dysfunction. PLAN:     Since he is diabetic, we will delay the use of steroid. We will try a course of Singulair. I will have an audiogram performed to further evaluate the problem.     Paul Yang MD

## 2019-04-15 DIAGNOSIS — E11.9 TYPE 2 DIABETES MELLITUS WITHOUT COMPLICATION, WITHOUT LONG-TERM CURRENT USE OF INSULIN (HCC): Primary | ICD-10-CM

## 2019-04-15 NOTE — TELEPHONE ENCOUNTER
Patient he has lost some of his medication dapagliflozin (FARXIGA) 10 MG tablet   Will need 9 pills called into pharm patient needing these called  in today  -  Pharm  Number [phone] -patient cell phone  [phone]

## 2019-04-15 NOTE — TELEPHONE ENCOUNTER
Medication:   Requested Prescriptions     Pending Prescriptions Disp Refills    dapagliflozin (FARXIGA) 10 MG tablet 30 tablet 3     Sig: Take 1 tablet by mouth every morning       Last Filled:  1/7/2019    Patient Phone Number: [phone] (home)     Last appt: 11/2/2018   Next appt: 5/6/2019    Last BMP:   Lab Results   Component Value Date     11/06/2018    K 4.6 11/06/2018    CL 98 11/06/2018    CO2 24 11/06/2018    ANIONGAP 19 11/06/2018    GLUCOSE 144 11/06/2018    BUN 22 11/06/2018    CREATININE 0.7 11/06/2018    LABGLOM >60 11/06/2018    LABGLOM 79 05/31/2016    GFRAA >60 11/06/2018    CALCIUM 10.2 11/06/2018      Last CMP:   Lab Results   Component Value Date     11/06/2018    K 4.6 11/06/2018    CL 98 11/06/2018    CO2 24 11/06/2018    ANIONGAP 19 11/06/2018    GLUCOSE 144 11/06/2018    BUN 22 11/06/2018    CREATININE 0.7 11/06/2018    LABGLOM >60 11/06/2018    LABGLOM 79 05/31/2016    GFRAA >60 11/06/2018    PROT 7.2 11/06/2018    LABALBU 5.0 11/06/2018    AGRATIO 2.3 11/06/2018    BILITOT 0.7 11/06/2018    ALKPHOS 54 11/06/2018    ALT 47 11/06/2018    AST 31 11/06/2018    GLOB 2.2 11/06/2018     Last Renal Function:   Lab Results   Component Value Date     11/06/2018    K 4.6 11/06/2018    CL 98 11/06/2018    CO2 24 11/06/2018    GLUCOSE 144 11/06/2018    BUN 22 11/06/2018    CREATININE 0.7 11/06/2018    PHOS 2.8 12/23/2016    LABALBU 5.0 11/06/2018    CALCIUM 10.2 11/06/2018    GFRAA >60 11/06/2018       Last OARRS: No flowsheet data found.     Preferred Pharmacy:   [facility] [address] [place] [address], [facility] [address] - P [phone] - F [phone]  [address] [place], [place] [address] [address]  Phone: [phone] Fax: [phone] [address], [address] [phone] Sally Phoenix [phone]  [address] [facility]  [address] [facility]  Phone: [phone] Fax: [phone]    [address] [place] [address] [address] [facility] - [address] [facility]  [address]  Phone: [phone] Fax: [phone]

## 2019-04-18 DIAGNOSIS — E78.2 MIXED HYPERLIPIDEMIA: ICD-10-CM

## 2019-04-18 RX ORDER — ATORVASTATIN CALCIUM 20 MG/1
TABLET, FILM COATED ORAL
Qty: 90 TABLET | Refills: 0 | Status: SHIPPED | OUTPATIENT
Start: 2019-04-18 | End: 2019-09-12 | Stop reason: SDUPTHER

## 2019-04-22 ENCOUNTER — TELEPHONE (OUTPATIENT)
Dept: PHARMACY | Facility: CLINIC | Age: 74
End: 2019-04-22

## 2019-04-22 DIAGNOSIS — E78.2 MIXED HYPERLIPIDEMIA: ICD-10-CM

## 2019-04-22 RX ORDER — ATORVASTATIN CALCIUM 20 MG/1
TABLET, FILM COATED ORAL
Qty: 90 TABLET | Refills: 0 | Status: SHIPPED | OUTPATIENT
Start: 2019-04-22 | End: 2019-05-06 | Stop reason: SDUPTHER

## 2019-04-22 NOTE — TELEPHONE ENCOUNTER
Medication:   Requested Prescriptions     Pending Prescriptions Disp Refills    atorvastatin (LIPITOR) 20 MG tablet [Pharmacy Med Name: Atorvastatin Calcium Oral Tablet 20 MG] 90 tablet 0     Sig: TAKE 1 TABLET BY MOUTH ONE TIME A DAY       Last Filled:      Patient Phone Number: [phone] (home)     Last appt: 4/24/2018 11-  Next appt: Visit date not found   05-    Last Lipid:   Lab Results   Component Value Date    CHOL 174 11/06/2018    TRIG 354 11/06/2018    HDL 37 11/06/2018    LDLCALC see below 11/06/2018       Last OARRS: No flowsheet data found.     Preferred Pharmacy:   [facility] [address] [place] [address], [facility] [address] - P [phone] - F [phone]  [address] [place] [address]  Phone: [phone] Fax: [phone] [address] [facility], [address] [place] [phone] [facility] [phone]  [facility] [address]  Phone: [phone] Fax: [phone]    [facility] [address] [place] [address] [phone] [facility] [phone]  [address] [place] [address]  Phone: [phone] Fax: [phone]

## 2019-04-23 ENCOUNTER — PROCEDURE VISIT (OUTPATIENT)
Dept: AUDIOLOGY | Age: 74
End: 2019-04-23
Payer: MEDICARE

## 2019-04-23 DIAGNOSIS — H90.3 SENSORINEURAL HEARING LOSS (SNHL) OF BOTH EARS: Primary | ICD-10-CM

## 2019-04-23 PROCEDURE — 92550 TYMPANOMETRY & REFLEX THRESH: CPT | Performed by: AUDIOLOGIST

## 2019-04-23 PROCEDURE — 92557 COMPREHENSIVE HEARING TEST: CPT | Performed by: AUDIOLOGIST

## 2019-04-24 ENCOUNTER — TELEPHONE (OUTPATIENT)
Dept: ENT CLINIC | Age: 74
End: 2019-04-24

## 2019-05-06 ENCOUNTER — OFFICE VISIT (OUTPATIENT)
Dept: INTERNAL MEDICINE CLINIC | Age: 74
End: 2019-05-06
Payer: MEDICARE

## 2019-05-06 VITALS
DIASTOLIC BLOOD PRESSURE: 60 MMHG | TEMPERATURE: 97.9 F | WEIGHT: 141.6 LBS | SYSTOLIC BLOOD PRESSURE: 114 MMHG | HEART RATE: 81 BPM | RESPIRATION RATE: 14 BRPM | OXYGEN SATURATION: 96 % | BODY MASS INDEX: 22.76 KG/M2 | HEIGHT: 66 IN

## 2019-05-06 DIAGNOSIS — E11.9 TYPE 2 DIABETES MELLITUS WITHOUT COMPLICATION, WITHOUT LONG-TERM CURRENT USE OF INSULIN (HCC): ICD-10-CM

## 2019-05-06 DIAGNOSIS — E78.2 MIXED HYPERLIPIDEMIA: ICD-10-CM

## 2019-05-06 DIAGNOSIS — E55.9 VITAMIN D DEFICIENCY: ICD-10-CM

## 2019-05-06 DIAGNOSIS — R39.12 BENIGN PROSTATIC HYPERPLASIA WITH WEAK URINARY STREAM: ICD-10-CM

## 2019-05-06 DIAGNOSIS — N40.1 BENIGN PROSTATIC HYPERPLASIA WITH WEAK URINARY STREAM: ICD-10-CM

## 2019-05-06 DIAGNOSIS — I10 ESSENTIAL HYPERTENSION: ICD-10-CM

## 2019-05-06 DIAGNOSIS — Z00.00 ANNUAL PHYSICAL EXAM: ICD-10-CM

## 2019-05-06 DIAGNOSIS — Z00.00 ANNUAL PHYSICAL EXAM: Primary | ICD-10-CM

## 2019-05-06 LAB
A/G RATIO: 2.1 (ref 1.1–2.2)
ALBUMIN SERPL-MCNC: 4.8 G/DL (ref 3.4–5)
ALP BLD-CCNC: 51 U/L (ref 40–129)
ALT SERPL-CCNC: 41 U/L (ref 10–40)
ANION GAP SERPL CALCULATED.3IONS-SCNC: 15 MMOL/L (ref 3–16)
AST SERPL-CCNC: 30 U/L (ref 15–37)
BASOPHILS ABSOLUTE: 0 K/UL (ref 0–0.2)
BASOPHILS RELATIVE PERCENT: 0.6 %
BILIRUB SERPL-MCNC: 0.4 MG/DL (ref 0–1)
BILIRUBIN URINE: NEGATIVE
BLOOD, URINE: NEGATIVE
BUN BLDV-MCNC: 17 MG/DL (ref 7–20)
CALCIUM SERPL-MCNC: 10.6 MG/DL (ref 8.3–10.6)
CHLORIDE BLD-SCNC: 100 MMOL/L (ref 99–110)
CHOLESTEROL, TOTAL: 119 MG/DL (ref 0–199)
CLARITY: CLEAR
CO2: 27 MMOL/L (ref 21–32)
COLOR: YELLOW
CREAT SERPL-MCNC: 0.9 MG/DL (ref 0.8–1.3)
EOSINOPHILS ABSOLUTE: 0.2 K/UL (ref 0–0.6)
EOSINOPHILS RELATIVE PERCENT: 3.9 %
GFR AFRICAN AMERICAN: >60
GFR NON-AFRICAN AMERICAN: >60
GLOBULIN: 2.3 G/DL
GLUCOSE BLD-MCNC: 161 MG/DL (ref 70–99)
GLUCOSE URINE: >=1000 MG/DL
HBA1C MFR BLD: 6.8 %
HCT VFR BLD CALC: 47.1 % (ref 40.5–52.5)
HDLC SERPL-MCNC: 37 MG/DL (ref 40–60)
HEMOGLOBIN: 15.5 G/DL (ref 13.5–17.5)
KETONES, URINE: NEGATIVE MG/DL
LDL CHOLESTEROL CALCULATED: 41 MG/DL
LEUKOCYTE ESTERASE, URINE: NEGATIVE
LYMPHOCYTES ABSOLUTE: 1.3 K/UL (ref 1–5.1)
LYMPHOCYTES RELATIVE PERCENT: 23.4 %
MCH RBC QN AUTO: 32.6 PG (ref 26–34)
MCHC RBC AUTO-ENTMCNC: 32.8 G/DL (ref 31–36)
MCV RBC AUTO: 99.2 FL (ref 80–100)
MICROSCOPIC EXAMINATION: ABNORMAL
MONOCYTES ABSOLUTE: 0.4 K/UL (ref 0–1.3)
MONOCYTES RELATIVE PERCENT: 6.7 %
NEUTROPHILS ABSOLUTE: 3.7 K/UL (ref 1.7–7.7)
NEUTROPHILS RELATIVE PERCENT: 65.4 %
NITRITE, URINE: NEGATIVE
PDW BLD-RTO: 14.5 % (ref 12.4–15.4)
PH UA: 6 (ref 5–8)
PLATELET # BLD: 142 K/UL (ref 135–450)
PMV BLD AUTO: 9 FL (ref 5–10.5)
POTASSIUM SERPL-SCNC: 4.8 MMOL/L (ref 3.5–5.1)
PROSTATE SPECIFIC ANTIGEN: 0.65 NG/ML (ref 0–4)
PROTEIN UA: NEGATIVE MG/DL
RBC # BLD: 4.75 M/UL (ref 4.2–5.9)
SODIUM BLD-SCNC: 142 MMOL/L (ref 136–145)
SPECIFIC GRAVITY UA: >1.03 (ref 1–1.03)
TOTAL PROTEIN: 7.1 G/DL (ref 6.4–8.2)
TRIGL SERPL-MCNC: 207 MG/DL (ref 0–150)
URINE TYPE: ABNORMAL
UROBILINOGEN, URINE: 0.2 E.U./DL
VITAMIN D 25-HYDROXY: 46.9 NG/ML
VLDLC SERPL CALC-MCNC: 41 MG/DL
WBC # BLD: 5.6 K/UL (ref 4–11)

## 2019-05-06 PROCEDURE — 81003 URINALYSIS AUTO W/O SCOPE: CPT | Performed by: INTERNAL MEDICINE

## 2019-05-06 PROCEDURE — 99397 PER PM REEVAL EST PAT 65+ YR: CPT | Performed by: INTERNAL MEDICINE

## 2019-05-06 PROCEDURE — 83036 HEMOGLOBIN GLYCOSYLATED A1C: CPT | Performed by: INTERNAL MEDICINE

## 2019-05-06 ASSESSMENT — ENCOUNTER SYMPTOMS
RECTAL PAIN: 0
CHOKING: 0
BACK PAIN: 1
DIARRHEA: 0
NAUSEA: 0
APNEA: 0
CONSTIPATION: 0
PHOTOPHOBIA: 0
EYE DISCHARGE: 0
TROUBLE SWALLOWING: 0
ABDOMINAL DISTENTION: 0
SHORTNESS OF BREATH: 0
SINUS PRESSURE: 0
CHEST TIGHTNESS: 0
VOMITING: 0
EYE PAIN: 0
ABDOMINAL PAIN: 0
EYE REDNESS: 0
RHINORRHEA: 0
COUGH: 0
EYE ITCHING: 0
BLOOD IN STOOL: 0
WHEEZING: 0
ANAL BLEEDING: 0
FACIAL SWELLING: 0
VOICE CHANGE: 0
SORE THROAT: 0

## 2019-05-06 ASSESSMENT — PATIENT HEALTH QUESTIONNAIRE - PHQ9
SUM OF ALL RESPONSES TO PHQ9 QUESTIONS 1 & 2: 0
1. LITTLE INTEREST OR PLEASURE IN DOING THINGS: 0
SUM OF ALL RESPONSES TO PHQ QUESTIONS 1-9: 0
2. FEELING DOWN, DEPRESSED OR HOPELESS: 0
SUM OF ALL RESPONSES TO PHQ QUESTIONS 1-9: 0

## 2019-05-06 NOTE — PATIENT INSTRUCTIONS
diabetes, heart disease, and high blood pressure. · Get at least 30 minutes of exercise on most days of the week. Walking is a good choice. You also may want to do other activities, such as running, swimming, cycling, or playing tennis or team sports. · Do not smoke. Smoking can make health problems worse. If you need help quitting, talk to your doctor about stop-smoking programs and medicines. These can increase your chances of quitting for good. · Protect your skin from too much sun. When you're outdoors from 10 a.m. to 4 p.m., stay in the shade or cover up with clothing and a hat with a wide brim. Wear sunglasses that block UV rays. Even when it's cloudy, put broad-spectrum sunscreen (SPF 30 or higher) on any exposed skin. · See a dentist one or two times a year for checkups and to have your teeth cleaned. · Wear a seat belt in the car. · Limit alcohol to 2 drinks a day for men and 1 drink a day for women. Too much alcohol can cause health problems. Follow your doctor's advice about when to have certain tests. These tests can spot problems early. For men and women  · Cholesterol. Your doctor will tell you how often to have this done based on your overall health and other things that can increase your risk for heart attack and stroke. · Blood pressure. Have your blood pressure checked during a routine doctor visit. Your doctor will tell you how often to check your blood pressure based on your age, your blood pressure results, and other factors. · Diabetes. Ask your doctor whether you should have tests for diabetes. · Vision. Experts recommend that you have yearly exams for glaucoma and other age-related eye problems. · Hearing. Tell your doctor if you notice any change in your hearing. You can have tests to find out how well you hear. · Colon cancer tests. Keep having colon cancer tests as your doctor recommends. You can have one of several types of tests. · Heart attack and stroke risk.  At least every 4 to 6 years, you should have your risk for heart attack and stroke assessed. Your doctor uses factors such as your age, blood pressure, cholesterol, and whether you smoke or have diabetes to show what your risk for a heart attack or stroke is over the next 10 years. · Osteoporosis. Talk to your doctor about whether you should have a bone density test to find out whether you have thinning bones. Also ask your doctor about whether you should take calcium and vitamin D supplements. For women  · Pap test and pelvic exam. You may no longer need a Pap test. Talk with your doctor about whether to stop or continue to have Pap tests. · Breast exam and mammogram. Ask how often you should have a mammogram, which is an X-ray of your breasts. A mammogram can spot breast cancer before it can be felt and when it is easiest to treat. · Thyroid disease. Talk to your doctor about whether to have your thyroid checked as part of a regular physical exam. Women have an increased chance of a thyroid problem. For men  · Prostate exam. Talk to your doctor about whether you should have a blood test (called a PSA test) for prostate cancer. Experts disagree on whether men should have this test. Some experts recommend that you discuss the benefits and risks of the test with your doctor. · Abdominal aortic aneurysm. Ask your doctor whether you should have a test to check for an aneurysm. You may need a test if you ever smoked or if your parent, brother, sister, or child has had an aneurysm. When should you call for help? Watch closely for changes in your health, and be sure to contact your doctor if you have any problems or symptoms that concern you. Where can you learn more? Go to https://[employer]. org and sign in to your [employer] account. Enter D811 in the [facility] box to learn more about \"Well Visit, Over 65: Care Instructions. \"     If you do not have an account, please click on the \"Sign Up Now\" link. Current as of: March 28, 2018  Content Version: 11.9  © 6026-6146 [employer]. Care instructions adapted under license by [facility] ([facility]). If you have questions about a medical condition or this instruction, always ask your healthcare professional. [address] any warranty or liability for your use of this information.

## 2019-05-06 NOTE — PROGRESS NOTES
[facility]   YOB: 1945    Date of Visit:  5/6/2019    Chief Complaint   Patient presents with    Annual Exam    Diabetes    Hypertension    Hyperlipidemia    Benign Prostatic Hypertrophy       HPI  Pt presents for annual physical exam.    Diabetes Mellitus Type 2:   Current Medications: Farxiga 10mg po q day and Janumet XR 5-1000mg po bid  Diet: Patient decreases carbohydrates  Home blood sugar records: patient tests 1 time(s) per week fasting and it averages 155  Any episodes of hypoglycemia? no  Eye exam current (within one year): yes on 8/23/18  Daily Aspirin? No per advice of Hematologist      Hypertension:    CurrentMedications: Quinapril 5mg po q day  Home blood pressure monitoring: No.    Patient decreases salt   Antihypertensive medication side effects: no medication side effects noted.       Hyperlipidemia:  Current medication: Atorvastatin 20mg nightly  Patient denies side effects  Diet: Patient states he doesn't eat a lot of junk foods or fatty foods     Current exercise: walks and plays golf for exercise     BPH:  Pt takes Flomax 0.4mg po q day  Pt states his urinary stream is not what is used to be. Pt denies dribbling. Pt wakes up once a night to urinate.     Vitamin D deficiency:  Pt takes Vitamin D 2,000 IU once daily    Review of Systems   Constitutional: Negative for activity change, appetite change, chills, diaphoresis, fatigue, fever and unexpected weight change. HENT: Positive for hearing loss (both ears, pt has seen ENT recently). Negative for congestion, ear discharge, ear pain, facial swelling, mouth sores, nosebleeds, postnasal drip, rhinorrhea, sinus pressure, sneezing, sore throat, tinnitus, trouble swallowing and voice change. Eyes: Negative for photophobia, pain, discharge, redness, itching and visual disturbance. Respiratory: Negative for apnea, cough, choking, chest tightness, shortness of breath and wheezing.     Cardiovascular: Negative for chest pain, palpitations and leg swelling. Gastrointestinal: Negative for abdominal distention, abdominal pain, anal bleeding, blood in stool, constipation, diarrhea, nausea, rectal pain and vomiting. Endocrine: Negative for cold intolerance and heat intolerance. Genitourinary: Negative for decreased urine volume, difficulty urinating, discharge, dysuria, flank pain, frequency, genital sores, hematuria, penile pain, penile swelling, scrotal swelling, testicular pain and urgency. Weak stream   Musculoskeletal: Positive for back pain (lower back pain with playing golf but pt states is tolerable and he doesn't take anything). Negative for arthralgias, gait problem, joint swelling, myalgias, neck pain and neck stiffness. Skin: Negative for rash and wound. Neurological: Negative for dizziness, tremors, seizures, syncope, facial asymmetry, speech difficulty, weakness, light-headedness, numbness and headaches. Hematological: Negative for adenopathy. Bruises/bleeds easily (bruises easily). Psychiatric/Behavioral: Negative for decreased concentration, dysphoric mood and sleep disturbance. The patient is not nervous/anxious.         Past Medical History:   Diagnosis Date    Allergic rhinitis     Benign prostatic hyperplasia with weak urinary stream 12/10/2015     Updating Deprecated Diagnoses    History of gout 9/19/2015    History of thrombocytopenia 9/19/2015    Hypercalcemia     Hyperlipidemia     Hypertension     Lung nodule     Proteinuria     Type II or unspecified type diabetes mellitus without mention of complication, not stated as uncontrolled     Urinary disorder     Vitamin D deficiency 9/19/2015       Past Surgical History:   Procedure Laterality Date    CATARACT REMOVAL WITH IMPLANT Bilateral 2011    COLONOSCOPY  3/29/2011    repeat in 5 yrs: Samara Dalton MD    COLONOSCOPY  03/14/2016    repeat in 7-8 years: Samara Dalton MD    ELBOW FRACTURE SURGERY Left age 5   Gavin Veliz FINGER TRIGGER RELEASE Right submandibular adenopathy present. He has no cervical adenopathy. Right: No inguinal adenopathy present. Left: No inguinal adenopathy present. Neurological: He is alert and oriented to person, place, and time. He has normal strength and normal reflexes. No cranial nerve deficit. Gait normal. He displays no Babinski's sign on the right side. He displays no Babinski's sign on the left side. Bilateral foot monofilament exam normal   Skin: Skin is warm, dry and intact. No bruising and no rash noted. No erythema. No foot ulcers     Psychiatric: He has a normal mood and affect. His speech is normal.   Nursing note reviewed. Lab Review   No visits with results within 6 Month(s) from this visit. Latest known visit with results is:   Orders Only on 11/06/2018   Component Date Value    Sodium 11/06/2018 141     Potassium 11/06/2018 4.6     Chloride 11/06/2018 98*    CO2 11/06/2018 24     Anion Gap 11/06/2018 19*    Glucose 11/06/2018 144*    BUN 11/06/2018 22*    CREATININE 11/06/2018 0.7*    GFR Non- 11/06/2018 >60     GFR  11/06/2018 >60     Calcium 11/06/2018 10.2     Total Protein 11/06/2018 7.2     Alb 11/06/2018 5.0     Albumin/Globulin Ratio 11/06/2018 2.3*    Total Bilirubin 11/06/2018 0.7     Alkaline Phosphatase 11/06/2018 54     ALT 11/06/2018 47*    AST 11/06/2018 31     Globulin 11/06/2018 2.2     Cholesterol, Total 11/06/2018 174     Triglycerides 11/06/2018 354*    HDL 11/06/2018 37*    LDL Calculated 11/06/2018 see below     VLDL Cholesterol Calcula* 11/06/2018 see below     Vit D, 25-Hydroxy 11/06/2018 40.0     LDL Direct 11/06/2018 99        Results for POC orders placed in visit on 05/06/19   POCT glycosylated hemoglobin (Hb A1C)   Result Value Ref Range    Hemoglobin A1C 6.8 %         Assessment/Plan     1. Annual physical exam  - Comprehensive Metabolic Panel; Future  - Lipid Panel;  Future  - CBC Auto Differential; Future  - immunizations up to date  - Regular aerobic exercise  - Colonoscopy done on 3/14/16    2. Type 2 diabetes mellitus without complication, without long-term current use of insulin (HCC)  - Hemoglobin A1c of 6.8% shows diabetes is controlled  -Continue same medications  -Continue dapagliflozin (FARXIGA) 10 MG tablet; Take 1 tablet by mouth every morning  Dispense: 90 tablet; Refill: 1  -Limit carbohydrates to 45 grams with meals and 15 grams with snacks  -monitor blood sugars  -Regular aerobic exercise  - POCT glycosylated hemoglobin (Hb A1C)  -  DIABETES FOOT EXAM    3. Essential hypertension  -stable  -Continue same medications  -Low sodium diet  -Regular aerobic exercise  -URINALYSIS    4. Mixed hyperlipidemia  -Continue same medications  -Low fat, low cholesterol diet  -Regular aerobic exercise  -Lipid Panel; Future    5. Vitamin D deficiency  -Continue same medications  - Vitamin D 25 Hydroxy; Future    6. Benign prostatic hyperplasia with weak urinary stream  -stable  -Continue same medications  -PSA, Prostatic Specific Antigen; Future      Discussed medications with patient, who voiced understanding of their use and indications. All questions answered. Quality & Risk Score Accuracy    Visit Dx:  E11.9 - Type 2 diabetes mellitus without complication, without long-term current use of insulin (HCC)  Assessment and plan:  Stable based upon symptoms and exam. Continue current treatment plan and follow up at least yearly. Visit Dx:  E11.9 - Type 2 diabetes mellitus without complication, without long-term current use of insulin (HCC)  Assessment and plan:  Stable based upon symptoms and exam. Continue current treatment plan and follow up at least yearly. Last edited 05/06/19 08:40 EDT by Angeline Guadarrama MD           Return in about 6 months (around 11/6/2019) for diabetes, hypertension, hyperlipidemia, BPH, and vitamin D deficiency.

## 2019-05-25 DIAGNOSIS — R39.12 BENIGN PROSTATIC HYPERPLASIA WITH WEAK URINARY STREAM: Primary | ICD-10-CM

## 2019-05-25 DIAGNOSIS — I10 ESSENTIAL HYPERTENSION: ICD-10-CM

## 2019-05-25 DIAGNOSIS — N40.1 BENIGN PROSTATIC HYPERPLASIA WITH WEAK URINARY STREAM: Primary | ICD-10-CM

## 2019-05-28 RX ORDER — QUINAPRIL 5 1/1
TABLET ORAL
Qty: 90 TABLET | Refills: 1 | Status: SHIPPED | OUTPATIENT
Start: 2019-05-28 | End: 2019-11-25 | Stop reason: SDUPTHER

## 2019-05-28 RX ORDER — TAMSULOSIN HYDROCHLORIDE 0.4 MG/1
CAPSULE ORAL
Qty: 90 CAPSULE | Refills: 1 | Status: SHIPPED | OUTPATIENT
Start: 2019-05-28 | End: 2019-11-25 | Stop reason: SDUPTHER

## 2019-09-06 DIAGNOSIS — E11.9 TYPE 2 DIABETES MELLITUS, CONTROLLED (HCC): ICD-10-CM

## 2019-09-06 DIAGNOSIS — E78.2 MIXED HYPERLIPIDEMIA: ICD-10-CM

## 2019-09-06 NOTE — TELEPHONE ENCOUNTER
Identified care gap per [employer]: Quinapril Tab 5mg, Janumet XR Tab 50-1000mg, Atorvastatin Tab 20mg   Per records, appears 90-day supply last filled 2019   Per records, appears 90-day supply last filled 2019   Per records, appears 90-day supply last filled 2019     Per Cindy Harada: [address]: [phone]    Medication: Quinapril Tab 5mg  Last 3 fill dates: 2019, 2019, 2019  Day supply: 90, 90, 90  Refills remainin  Directions: TAKE 1 TABLET DAILY  Insurance billed: [employer]  Prescribing Provider: Hannah Coe MD  Pharmacy:     Medication: Janumet XR Tab 50-1000mg  Last 3 fill dates: 2018, 2009, 2019  Day supply: 90, 90, 90  Refills remainin  Directions: TAKE 1 TABLET TWICE A DAY  Insurance billed: [employer]  Prescribing Provider: Hannah Coe MD    Per [facility]: [phone]    Medication: Atorvastatin Tab 20mg  Last 3 fill dates: 2019, 2019, 2019  Day supply: 90, 90, 90  Refills remainin  Directions: TAKE 1 TABLET TWICE A DAY  Insurance billed:   Prescribing Provider: Hannah Coe MD    Patient does not have any refills remaining     Attempting to reach patient to see patient would like to fill at [address] [place] [address] Drive with the rest of his medication.  Left message asking for return call to toll free [phone] option 7.    patient has an upcoming appointment on 2019    [address] [place] [address], toll free: [phone], option 7

## 2019-09-14 DIAGNOSIS — E11.9 TYPE 2 DIABETES MELLITUS, CONTROLLED (HCC): ICD-10-CM

## 2019-09-14 RX ORDER — ATORVASTATIN CALCIUM 20 MG/1
20 TABLET, FILM COATED ORAL DAILY
Qty: 90 TABLET | Refills: 1 | Status: SHIPPED | OUTPATIENT
Start: 2019-09-14 | End: 2020-03-04 | Stop reason: SDUPTHER

## 2019-09-17 RX ORDER — SITAGLIPTIN AND METFORMIN HYDROCHLORIDE 1000; 50 MG/1; MG/1
TABLET, FILM COATED, EXTENDED RELEASE ORAL
Qty: 180 TABLET | Refills: 1 | Status: SHIPPED | OUTPATIENT
Start: 2019-09-17 | End: 2020-06-22

## 2019-09-18 DIAGNOSIS — J30.9 ALLERGIC SINUSITIS: ICD-10-CM

## 2019-09-18 RX ORDER — MONTELUKAST SODIUM 10 MG/1
TABLET ORAL
Qty: 90 TABLET | Refills: 0 | Status: SHIPPED | OUTPATIENT
Start: 2019-09-18 | End: 2019-12-15 | Stop reason: SDUPTHER

## 2019-10-15 DIAGNOSIS — E78.2 MIXED HYPERLIPIDEMIA: ICD-10-CM

## 2019-10-15 RX ORDER — ATORVASTATIN CALCIUM 20 MG/1
20 TABLET, FILM COATED ORAL NIGHTLY
Qty: 90 TABLET | Refills: 0 | Status: SHIPPED | OUTPATIENT
Start: 2019-10-15 | End: 2019-12-30 | Stop reason: SDUPTHER

## 2019-11-07 ENCOUNTER — OFFICE VISIT (OUTPATIENT)
Dept: PRIMARY CARE CLINIC | Age: 74
End: 2019-11-07
Payer: MEDICARE

## 2019-11-07 VITALS
SYSTOLIC BLOOD PRESSURE: 100 MMHG | HEIGHT: 66 IN | HEART RATE: 80 BPM | WEIGHT: 141 LBS | BODY MASS INDEX: 22.66 KG/M2 | OXYGEN SATURATION: 98 % | DIASTOLIC BLOOD PRESSURE: 60 MMHG

## 2019-11-07 DIAGNOSIS — I10 ESSENTIAL HYPERTENSION: ICD-10-CM

## 2019-11-07 DIAGNOSIS — R39.12 BENIGN PROSTATIC HYPERPLASIA WITH WEAK URINARY STREAM: ICD-10-CM

## 2019-11-07 DIAGNOSIS — E55.9 VITAMIN D DEFICIENCY: ICD-10-CM

## 2019-11-07 DIAGNOSIS — E11.9 TYPE 2 DIABETES MELLITUS WITHOUT COMPLICATION, WITHOUT LONG-TERM CURRENT USE OF INSULIN (HCC): ICD-10-CM

## 2019-11-07 DIAGNOSIS — N40.1 BENIGN PROSTATIC HYPERPLASIA WITH WEAK URINARY STREAM: ICD-10-CM

## 2019-11-07 DIAGNOSIS — E11.9 TYPE 2 DIABETES MELLITUS WITHOUT COMPLICATION, WITHOUT LONG-TERM CURRENT USE OF INSULIN (HCC): Primary | ICD-10-CM

## 2019-11-07 DIAGNOSIS — E78.2 MIXED HYPERLIPIDEMIA: ICD-10-CM

## 2019-11-07 LAB
A/G RATIO: 2.3 (ref 1.1–2.2)
ALBUMIN SERPL-MCNC: 5 G/DL (ref 3.4–5)
ALP BLD-CCNC: 47 U/L (ref 40–129)
ALT SERPL-CCNC: 41 U/L (ref 10–40)
ANION GAP SERPL CALCULATED.3IONS-SCNC: 19 MMOL/L (ref 3–16)
AST SERPL-CCNC: 30 U/L (ref 15–37)
BILIRUB SERPL-MCNC: 0.7 MG/DL (ref 0–1)
BUN BLDV-MCNC: 17 MG/DL (ref 7–20)
CALCIUM SERPL-MCNC: 10.7 MG/DL (ref 8.3–10.6)
CHLORIDE BLD-SCNC: 98 MMOL/L (ref 99–110)
CHOLESTEROL, TOTAL: 119 MG/DL (ref 0–199)
CO2: 25 MMOL/L (ref 21–32)
CREAT SERPL-MCNC: 0.7 MG/DL (ref 0.8–1.3)
GFR AFRICAN AMERICAN: >60
GFR NON-AFRICAN AMERICAN: >60
GLOBULIN: 2.2 G/DL
GLUCOSE BLD-MCNC: 131 MG/DL (ref 70–99)
HBA1C MFR BLD: 7 %
HDLC SERPL-MCNC: 40 MG/DL (ref 40–60)
LDL CHOLESTEROL CALCULATED: 43 MG/DL
POTASSIUM SERPL-SCNC: 4.4 MMOL/L (ref 3.5–5.1)
SODIUM BLD-SCNC: 142 MMOL/L (ref 136–145)
TOTAL PROTEIN: 7.2 G/DL (ref 6.4–8.2)
TRIGL SERPL-MCNC: 182 MG/DL (ref 0–150)
VLDLC SERPL CALC-MCNC: 36 MG/DL

## 2019-11-07 PROCEDURE — 83036 HEMOGLOBIN GLYCOSYLATED A1C: CPT | Performed by: INTERNAL MEDICINE

## 2019-11-07 PROCEDURE — 99214 OFFICE O/P EST MOD 30 MIN: CPT | Performed by: INTERNAL MEDICINE

## 2019-11-07 ASSESSMENT — ENCOUNTER SYMPTOMS
NAUSEA: 0
SORE THROAT: 0
SHORTNESS OF BREATH: 0
CHEST TIGHTNESS: 0
WHEEZING: 0
VOMITING: 0
CONSTIPATION: 0
ABDOMINAL PAIN: 0
RHINORRHEA: 0
COUGH: 0
DIARRHEA: 0

## 2019-11-15 DIAGNOSIS — E11.9 TYPE 2 DIABETES MELLITUS WITHOUT COMPLICATION, WITHOUT LONG-TERM CURRENT USE OF INSULIN (HCC): ICD-10-CM

## 2019-11-17 RX ORDER — DAPAGLIFLOZIN 10 MG/1
TABLET, FILM COATED ORAL
Qty: 90 TABLET | Refills: 1 | Status: SHIPPED | OUTPATIENT
Start: 2019-11-17 | End: 2020-05-04

## 2019-11-25 DIAGNOSIS — I10 ESSENTIAL HYPERTENSION: ICD-10-CM

## 2019-11-25 DIAGNOSIS — R39.12 BENIGN PROSTATIC HYPERPLASIA WITH WEAK URINARY STREAM: ICD-10-CM

## 2019-11-25 DIAGNOSIS — N40.1 BENIGN PROSTATIC HYPERPLASIA WITH WEAK URINARY STREAM: ICD-10-CM

## 2019-11-25 RX ORDER — TAMSULOSIN HYDROCHLORIDE 0.4 MG/1
CAPSULE ORAL
Qty: 90 CAPSULE | Refills: 1 | Status: SHIPPED | OUTPATIENT
Start: 2019-11-25 | End: 2020-04-23

## 2019-11-25 RX ORDER — QUINAPRIL 5 1/1
TABLET ORAL
Qty: 90 TABLET | Refills: 1 | Status: SHIPPED | OUTPATIENT
Start: 2019-11-25 | End: 2020-04-23

## 2019-12-15 DIAGNOSIS — J30.9 ALLERGIC SINUSITIS: ICD-10-CM

## 2019-12-16 RX ORDER — MONTELUKAST SODIUM 10 MG/1
TABLET ORAL
Qty: 90 TABLET | Refills: 0 | Status: SHIPPED | OUTPATIENT
Start: 2019-12-16 | End: 2020-03-13

## 2019-12-30 ENCOUNTER — OFFICE VISIT (OUTPATIENT)
Dept: PRIMARY CARE CLINIC | Age: 74
End: 2019-12-30
Payer: MEDICARE

## 2019-12-30 VITALS
BODY MASS INDEX: 22.85 KG/M2 | TEMPERATURE: 98.5 F | DIASTOLIC BLOOD PRESSURE: 60 MMHG | HEART RATE: 75 BPM | OXYGEN SATURATION: 97 % | RESPIRATION RATE: 14 BRPM | SYSTOLIC BLOOD PRESSURE: 126 MMHG | WEIGHT: 142.2 LBS | HEIGHT: 66 IN

## 2019-12-30 DIAGNOSIS — Z00.00 ROUTINE GENERAL MEDICAL EXAMINATION AT A HEALTH CARE FACILITY: Primary | ICD-10-CM

## 2019-12-30 PROCEDURE — G0438 PPPS, INITIAL VISIT: HCPCS | Performed by: INTERNAL MEDICINE

## 2019-12-30 ASSESSMENT — LIFESTYLE VARIABLES
HAS A RELATIVE, FRIEND, DOCTOR, OR ANOTHER HEALTH PROFESSIONAL EXPRESSED CONCERN ABOUT YOUR DRINKING OR SUGGESTED YOU CUT DOWN: 0
AUDIT-C TOTAL SCORE: 2
HOW OFTEN DO YOU HAVE A DRINK CONTAINING ALCOHOL: 2
HOW OFTEN DURING THE LAST YEAR HAVE YOU FOUND THAT YOU WERE NOT ABLE TO STOP DRINKING ONCE YOU HAD STARTED: 0
HOW OFTEN DURING THE LAST YEAR HAVE YOU BEEN UNABLE TO REMEMBER WHAT HAPPENED THE NIGHT BEFORE BECAUSE YOU HAD BEEN DRINKING: 0
HOW MANY STANDARD DRINKS CONTAINING ALCOHOL DO YOU HAVE ON A TYPICAL DAY: 0
HOW OFTEN DO YOU HAVE SIX OR MORE DRINKS ON ONE OCCASION: 0
HOW OFTEN DURING THE LAST YEAR HAVE YOU NEEDED AN ALCOHOLIC DRINK FIRST THING IN THE MORNING TO GET YOURSELF GOING AFTER A NIGHT OF HEAVY DRINKING: 0
HOW OFTEN DURING THE LAST YEAR HAVE YOU FAILED TO DO WHAT WAS NORMALLY EXPECTED FROM YOU BECAUSE OF DRINKING: 0
HAVE YOU OR SOMEONE ELSE BEEN INJURED AS A RESULT OF YOUR DRINKING: 0
AUDIT TOTAL SCORE: 2
HOW OFTEN DURING THE LAST YEAR HAVE YOU HAD A FEELING OF GUILT OR REMORSE AFTER DRINKING: 0

## 2019-12-30 ASSESSMENT — PATIENT HEALTH QUESTIONNAIRE - PHQ9
SUM OF ALL RESPONSES TO PHQ QUESTIONS 1-9: 0
SUM OF ALL RESPONSES TO PHQ QUESTIONS 1-9: 0

## 2020-03-04 RX ORDER — ATORVASTATIN CALCIUM 20 MG/1
20 TABLET, FILM COATED ORAL DAILY
Qty: 90 TABLET | Refills: 1 | Status: SHIPPED | OUTPATIENT
Start: 2020-03-04 | End: 2020-11-03

## 2020-03-04 NOTE — TELEPHONE ENCOUNTER
Medication:   Requested Prescriptions     Pending Prescriptions Disp Refills    atorvastatin (LIPITOR) 20 MG tablet 90 tablet 1     Sig: Take 1 tablet by mouth daily       Last Filled:      Patient Phone Number: [phone] (home)     Last appt: 12/30/2019   Next appt: 5/7/2020    Last Lipid:   Lab Results   Component Value Date    CHOL 119 11/07/2019    TRIG 182 11/07/2019    HDL 40 11/07/2019    LDLCALC 43 11/07/2019       Last OARRS: No flowsheet data found.     Preferred Pharmacy:   Love Rubinstein [address] [place] [address] 468 - P [phone] - F [phone]  [facility] [facility] [address]  Phone: [phone] Fax: [phone] [address] , [address] [phone] Ney Caller [phone]  [address] [address]  Phone: [phone] Fax: [phone]    [facility] [address] [facility], [address] [address] [phone] Ney Caller [phone]  [address] [place] [address]  Phone: [phone] Fax: [phone]    [facility] [address], [address] [facility] [phone] - F [phone]  [address] [place] [address]  Phone: [phone] Fax: [phone]

## 2020-03-06 ENCOUNTER — TELEPHONE (OUTPATIENT)
Dept: PRIMARY CARE CLINIC | Age: 75
End: 2020-03-06

## 2020-03-12 RX ORDER — ATORVASTATIN CALCIUM 20 MG/1
TABLET, FILM COATED ORAL
Qty: 90 TABLET | Refills: 0 | OUTPATIENT
Start: 2020-03-12

## 2020-03-12 NOTE — TELEPHONE ENCOUNTER
Medication:   Requested Prescriptions     Pending Prescriptions Disp Refills    atorvastatin (LIPITOR) 20 MG tablet [Pharmacy Med Name: Atorvastatin Calcium Oral Tablet 20 MG] 90 tablet 0     Sig: TAKE 1 TABLET BY MOUTH AT BEDTIME     Last Filled:    Last appt: 4/24/2018   Next appt: Visit date not found    Last Lipid:   Lab Results   Component Value Date    CHOL 119 11/07/2019    TRIG 182 11/07/2019    HDL 40 11/07/2019    LDLCALC 43 11/07/2019

## 2020-03-13 RX ORDER — MONTELUKAST SODIUM 10 MG/1
TABLET ORAL
Qty: 90 TABLET | Refills: 0 | Status: SHIPPED | OUTPATIENT
Start: 2020-03-13 | End: 2020-06-08

## 2020-04-23 RX ORDER — QUINAPRIL 5 1/1
TABLET ORAL
Qty: 90 TABLET | Refills: 0 | Status: SHIPPED | OUTPATIENT
Start: 2020-04-23 | End: 2020-08-10

## 2020-04-23 RX ORDER — TAMSULOSIN HYDROCHLORIDE 0.4 MG/1
CAPSULE ORAL
Qty: 90 CAPSULE | Refills: 0 | Status: SHIPPED | OUTPATIENT
Start: 2020-04-23 | End: 2020-08-10

## 2020-04-23 NOTE — TELEPHONE ENCOUNTER
[phone] Maia Meigs [phone]  [address]  Phone: [phone] Fax: [phone]    [address] Luke Arreola, [address] [facility] [address]  [address] [address] [place] [address]  Phone: [phone] Fax: [phone]    [facility] [address], [address] [phone] - F [phone]  [address]  [facility] [address]  Phone: [phone] Fax: [phone]

## 2020-05-04 RX ORDER — DAPAGLIFLOZIN 10 MG/1
TABLET, FILM COATED ORAL
Qty: 90 TABLET | Refills: 0 | Status: SHIPPED | OUTPATIENT
Start: 2020-05-04 | End: 2020-06-22 | Stop reason: ALTCHOICE

## 2020-06-06 RX ORDER — MONTELUKAST SODIUM 10 MG/1
TABLET ORAL
Qty: 90 TABLET | Refills: 0 | Status: CANCELLED | OUTPATIENT
Start: 2020-06-06

## 2020-06-08 RX ORDER — MONTELUKAST SODIUM 10 MG/1
TABLET ORAL
Qty: 90 TABLET | Refills: 0 | Status: SHIPPED | OUTPATIENT
Start: 2020-06-08 | End: 2020-09-11 | Stop reason: SDUPTHER

## 2020-06-16 ENCOUNTER — TELEPHONE (OUTPATIENT)
Dept: PRIMARY CARE CLINIC | Age: 75
End: 2020-06-16

## 2020-06-16 NOTE — TELEPHONE ENCOUNTER
Pt is wanting to have an order for his routine blood work. He wants to get his blood work done before his VV appointment on 6/22/20.     LOV 12/30/19

## 2020-06-17 NOTE — TELEPHONE ENCOUNTER
Call patient. His lab orders are ready and will be at the . He needs to fast 8-10 hours for the labs.

## 2020-06-18 DIAGNOSIS — N40.1 BENIGN PROSTATIC HYPERPLASIA WITH WEAK URINARY STREAM: ICD-10-CM

## 2020-06-18 DIAGNOSIS — I10 ESSENTIAL HYPERTENSION: ICD-10-CM

## 2020-06-18 DIAGNOSIS — E78.2 MIXED HYPERLIPIDEMIA: ICD-10-CM

## 2020-06-18 DIAGNOSIS — E11.9 TYPE 2 DIABETES MELLITUS WITHOUT COMPLICATION, WITHOUT LONG-TERM CURRENT USE OF INSULIN (HCC): ICD-10-CM

## 2020-06-18 DIAGNOSIS — R39.12 BENIGN PROSTATIC HYPERPLASIA WITH WEAK URINARY STREAM: ICD-10-CM

## 2020-06-18 DIAGNOSIS — E55.9 VITAMIN D DEFICIENCY: ICD-10-CM

## 2020-06-19 LAB
A/G RATIO: 2.3 (ref 1.1–2.2)
ALBUMIN SERPL-MCNC: 4.8 G/DL (ref 3.4–5)
ALP BLD-CCNC: 48 U/L (ref 40–129)
ALT SERPL-CCNC: 36 U/L (ref 10–40)
ANION GAP SERPL CALCULATED.3IONS-SCNC: 15 MMOL/L (ref 3–16)
AST SERPL-CCNC: 33 U/L (ref 15–37)
BILIRUB SERPL-MCNC: 0.7 MG/DL (ref 0–1)
BUN BLDV-MCNC: 15 MG/DL (ref 7–20)
CALCIUM SERPL-MCNC: 10.3 MG/DL (ref 8.3–10.6)
CHLORIDE BLD-SCNC: 99 MMOL/L (ref 99–110)
CHOLESTEROL, TOTAL: 119 MG/DL (ref 0–199)
CO2: 26 MMOL/L (ref 21–32)
CREAT SERPL-MCNC: 0.8 MG/DL (ref 0.8–1.3)
ESTIMATED AVERAGE GLUCOSE: 171.4 MG/DL
GFR AFRICAN AMERICAN: >60
GFR NON-AFRICAN AMERICAN: >60
GLOBULIN: 2.1 G/DL
GLUCOSE BLD-MCNC: 157 MG/DL (ref 70–99)
HBA1C MFR BLD: 7.6 %
HDLC SERPL-MCNC: 40 MG/DL (ref 40–60)
LDL CHOLESTEROL CALCULATED: 45 MG/DL
POTASSIUM SERPL-SCNC: 4.8 MMOL/L (ref 3.5–5.1)
PROSTATE SPECIFIC ANTIGEN: 1.2 NG/ML (ref 0–4)
SODIUM BLD-SCNC: 140 MMOL/L (ref 136–145)
TOTAL PROTEIN: 6.9 G/DL (ref 6.4–8.2)
TRIGL SERPL-MCNC: 169 MG/DL (ref 0–150)
VITAMIN D 25-HYDROXY: 52.6 NG/ML
VLDLC SERPL CALC-MCNC: 34 MG/DL

## 2020-06-22 ENCOUNTER — OFFICE VISIT (OUTPATIENT)
Dept: PRIMARY CARE CLINIC | Age: 75
End: 2020-06-22
Payer: MEDICARE

## 2020-06-22 VITALS
TEMPERATURE: 97.4 F | OXYGEN SATURATION: 96 % | BODY MASS INDEX: 22.79 KG/M2 | DIASTOLIC BLOOD PRESSURE: 82 MMHG | WEIGHT: 141.2 LBS | SYSTOLIC BLOOD PRESSURE: 122 MMHG | HEART RATE: 78 BPM

## 2020-06-22 PROCEDURE — 99214 OFFICE O/P EST MOD 30 MIN: CPT | Performed by: INTERNAL MEDICINE

## 2020-06-22 PROCEDURE — 3051F HG A1C>EQUAL 7.0%<8.0%: CPT | Performed by: INTERNAL MEDICINE

## 2020-06-22 RX ORDER — BLOOD SUGAR DIAGNOSTIC
1 STRIP MISCELLANEOUS DAILY
Qty: 100 STRIP | Refills: 2 | Status: SHIPPED | OUTPATIENT
Start: 2020-06-22

## 2020-06-22 RX ORDER — CANAGLIFLOZIN 300 MG/1
300 TABLET, FILM COATED ORAL
Qty: 30 TABLET | Refills: 5 | Status: SHIPPED | OUTPATIENT
Start: 2020-06-22 | End: 2020-12-31 | Stop reason: ALTCHOICE

## 2020-06-22 RX ORDER — SITAGLIPTIN AND METFORMIN HYDROCHLORIDE 1000; 50 MG/1; MG/1
TABLET, FILM COATED, EXTENDED RELEASE ORAL
Qty: 180 TABLET | Refills: 1 | Status: SHIPPED | OUTPATIENT
Start: 2020-06-22 | End: 2020-11-03

## 2020-06-22 ASSESSMENT — ENCOUNTER SYMPTOMS
RHINORRHEA: 0
NAUSEA: 0
SINUS PRESSURE: 0
DIARRHEA: 0
SINUS PAIN: 0
ABDOMINAL PAIN: 0
CONSTIPATION: 0
CHEST TIGHTNESS: 0
SHORTNESS OF BREATH: 0
COUGH: 0
VOMITING: 0
SORE THROAT: 0
WHEEZING: 0

## 2020-06-22 NOTE — PROGRESS NOTES
06/18/2020 0.7     Alkaline Phosphatase 06/18/2020 48     ALT 06/18/2020 36     AST 06/18/2020 33     Globulin 06/18/2020 2.1          Assessment/Plan     1. Type 2 diabetes mellitus without complication, without long-term current use of insulin (HCC)  - Hemoglobin A1c of 7.6% shows diabetes needs a little better control  -Continue same medications  -Discontinue Farxiga  -start canagliflozin (INVOKANA) 300 MG TABS tablet; Take 1 tablet by mouth every morning (before breakfast)  Dispense: 30 tablet; Refill: 5  -Limit carbohydrates to 45 grams with meals and 15 grams with snacks  -monitor blood sugars   -Regular aerobic exercise  -  DIABETES FOOT EXAM  - blood glucose test strips (ONE TOUCH ULTRA TEST) strip; 1 each by In Vitro route daily  Dispense: 100 each; Refill: 2    2. Essential hypertension  -stable  -Continue same medications  -Low sodium diet  -Regular aerobic exercise    3. Mixed hyperlipidemia  -Continue same medications  -Low fat, low cholesterol diet  -Regular aerobic exercise    4. Benign prostatic hyperplasia with weak urinary stream  -stable  -Continue same medications    5. Vitamin D deficiency  -stable  -Continue same medications      Discussed medications with patient, who voiced understanding of their use and indications. All questions answered. Return in about 3 months (around 9/22/2020) for diabetes.

## 2020-06-22 NOTE — TELEPHONE ENCOUNTER
Medication:   Requested Prescriptions     Pending Prescriptions Disp Refills    ONETOUCH ULTRA strip [Pharmacy Med Name: [address]77 John Serrato TESTST(NEW)100] 300 strip      Sig: TEST ONE TIME DAILY AS DIRECTED       Last Filled:      Patient Phone Number: [phone] (home)     Last appt: 6/22/2020   Next appt: 9/24/2020    Last Labs DM:   Lab Results   Component Value Date    LABA1C 7.6 06/18/2020       Last OARRS: No flowsheet data found.     Preferred Pharmacy:   Leslie Mason [address] [place] [address] 468 - P [phone] - F [phone]  [facility] [facility] [address]  Phone: [phone] Fax: [phone] [address] , [address] [phone] Ivan Craw [phone]  [address] [address]  Phone: [phone] Fax: [phone]    [facility] [address] [facility], [address] [address] [phone] Ivan Craw [phone]  [address] [address]  Phone: [phone] Fax: [phone]    [facility] [address], [address] [facility] [phone]  F [phone]  [address] [place] [address]  Phone: [phone] Fax: [phone]

## 2020-06-29 ENCOUNTER — TELEPHONE (OUTPATIENT)
Dept: PRIMARY CARE CLINIC | Age: 75
End: 2020-06-29

## 2020-06-29 NOTE — TELEPHONE ENCOUNTER
[facility] received a call from:    Name of Caller: Ky Casper    Relationship to patient:self    Organization name: na    Best contact number: [phone]    Reason for call: pt states his ins company will not cover canagliflozin (INVOKANA) 300 MG TABS tablet please advise.

## 2020-07-01 NOTE — TELEPHONE ENCOUNTER
Left a message on pt's voicemail to continue [facility] Care for now. Pt to work on diet and exercise.

## 2020-07-21 ENCOUNTER — TELEPHONE (OUTPATIENT)
Dept: PHARMACY | Facility: CLINIC | Age: 75
End: 2020-07-21

## 2020-07-21 NOTE — TELEPHONE ENCOUNTER
CLINICAL PHARMACY: ADHERENCE REVIEW  Identified care gap per [employer]; fills at [facility]: ACE/ARB, Diabetes and Statin adherence    Last Office Visit: 6/22/20    Patient also appears to be taking: Janumet XR ,  Quinapril 5mg, Atorvastatin 20mg    ASSESSMENT  ACE/ARB ADHERENCE    Per Reconciled Dispense Report   Quinapril last filled on 4/23/20 for a 90 day supply. 0 refills remaining. Billed through unknown    BP Readings from Last 3 Encounters:   06/22/20 122/82   12/30/19 126/60   11/07/19 100/60     CrCl cannot be calculated (Unknown ideal weight.). DIABETES ADHERENCE  PDC: 73%     Janumet last filled on 6/22/20 for a 90 day supply. 1 refills remaining. Billed through [employer]   Component Value Date    LABA1C 7.6 06/18/2020    LABA1C 7.0 11/07/2019    LABA1C 6.8 05/06/2019       STATIN ADHERENCE     Atorvastatin last filled on 7/16/20 for a 90 day supply. 0 refills remaining. Billed through [employer]   Component Value Date    CHOL 119 06/18/2020    TRIG 169 (H) 06/18/2020    HDL 40 06/18/2020    LDLCALC 45 06/18/2020    LDLDIRECT 99 11/06/2018     ALT   Date Value Ref Range Status   06/18/2020 36 10 - 40 U/L Final     AST   Date Value Ref Range Status   06/18/2020 33 15 - 37 U/L Final     The ASCVD Risk score (Milan Castillo, et al., 2013) failed to calculate for the following reasons: The valid total cholesterol range is 130 to 320 mg/dL     PLAN  Reached patient to review. Patient stated that he is not having any issues with his medication and just filled his prescription last Thursday for a 90ds. CLINICAL PHARMACY CONSULT: MED RECONCILIATION/REVIEW ADDENDUM    For Pharmacy Admin Tracking Only    PHSO: Yes  Total # of Interventions Recommended: 3  - New Order #: 0 New Medication Order Reason(s):  Adherence  - Maintenance Safety Lab Monitoring #: 1  - New Therapy Lab Monitoring #: 0  Recommended intervention potential cost savings: 0  Total Interventions Accepted: 0  Time Spent (min): Davin Spencer

## 2020-08-10 RX ORDER — TAMSULOSIN HYDROCHLORIDE 0.4 MG/1
CAPSULE ORAL
Qty: 90 CAPSULE | Refills: 1 | Status: SHIPPED | OUTPATIENT
Start: 2020-08-10 | End: 2021-02-01

## 2020-08-10 RX ORDER — QUINAPRIL 5 1/1
TABLET ORAL
Qty: 90 TABLET | Refills: 1 | Status: SHIPPED | OUTPATIENT
Start: 2020-08-10 | End: 2021-02-01

## 2020-09-11 RX ORDER — MONTELUKAST SODIUM 10 MG/1
TABLET ORAL
Qty: 90 TABLET | Refills: 3 | Status: SHIPPED | OUTPATIENT
Start: 2020-09-11 | End: 2021-01-01

## 2020-09-24 ENCOUNTER — OFFICE VISIT (OUTPATIENT)
Dept: PRIMARY CARE CLINIC | Age: 75
End: 2020-09-24
Payer: MEDICARE

## 2020-09-24 VITALS
TEMPERATURE: 97.3 F | RESPIRATION RATE: 16 BRPM | HEART RATE: 64 BPM | OXYGEN SATURATION: 96 % | SYSTOLIC BLOOD PRESSURE: 122 MMHG | DIASTOLIC BLOOD PRESSURE: 78 MMHG | WEIGHT: 136.6 LBS | BODY MASS INDEX: 22.05 KG/M2

## 2020-09-24 DIAGNOSIS — E11.9 TYPE 2 DIABETES MELLITUS WITHOUT COMPLICATION, WITHOUT LONG-TERM CURRENT USE OF INSULIN (HCC): ICD-10-CM

## 2020-09-24 LAB
ANION GAP SERPL CALCULATED.3IONS-SCNC: 14 MMOL/L (ref 3–16)
BUN BLDV-MCNC: 19 MG/DL (ref 7–20)
CALCIUM SERPL-MCNC: 10.2 MG/DL (ref 8.3–10.6)
CHLORIDE BLD-SCNC: 101 MMOL/L (ref 99–110)
CO2: 26 MMOL/L (ref 21–32)
CREAT SERPL-MCNC: 0.6 MG/DL (ref 0.8–1.3)
GFR AFRICAN AMERICAN: >60
GFR NON-AFRICAN AMERICAN: >60
GLUCOSE BLD-MCNC: 154 MG/DL (ref 70–99)
HBA1C MFR BLD: 7 %
POTASSIUM SERPL-SCNC: 5.1 MMOL/L (ref 3.5–5.1)
SODIUM BLD-SCNC: 141 MMOL/L (ref 136–145)

## 2020-09-24 PROCEDURE — 3051F HG A1C>EQUAL 7.0%<8.0%: CPT | Performed by: INTERNAL MEDICINE

## 2020-09-24 PROCEDURE — 99213 OFFICE O/P EST LOW 20 MIN: CPT | Performed by: INTERNAL MEDICINE

## 2020-09-24 PROCEDURE — 83036 HEMOGLOBIN GLYCOSYLATED A1C: CPT | Performed by: INTERNAL MEDICINE

## 2020-09-24 RX ORDER — EMPAGLIFLOZIN 10 MG/1
1 TABLET, FILM COATED ORAL DAILY
Qty: 30 TABLET | Refills: 1 | Status: SHIPPED | OUTPATIENT
Start: 2020-09-24 | End: 2020-11-14

## 2020-09-24 ASSESSMENT — ENCOUNTER SYMPTOMS
ABDOMINAL PAIN: 0
NAUSEA: 0
SHORTNESS OF BREATH: 0
VOMITING: 0

## 2020-09-24 NOTE — PATIENT INSTRUCTIONS
1. Type 2 diabetes mellitus without complication, without long-term current use of insulin (HCC)  - Hemoglobin A1c of 7.0% shows diabetes is controlled and has improved  -Continue same medications  -Discontinue Invokana due to cost  -Start empagliflozin (JARDIANCE) 10 MG tablet; Take 1 tablet by mouth daily  Dispense: 30 tablet; Refill: 1  -Limit carbohydrates to 45 grams with meals and 15 grams with snacks  -monitor blood sugars  -Regular aerobic exercise  -POCT glycosylated hemoglobin (Hb K6U)  -Basic Metabolic Panel;  Future

## 2020-09-24 NOTE — PROGRESS NOTES
Isaak Haque   Date ofBirth:  1945    Date of Visit:  9/24/2020    Chief Complaint   Patient presents with    Diabetes       HPI  Diabetes Mellitus Type 2:   Current Medications: Invokana 300mg po q day and Janumet XR 50-1000mg po bid. Pt states Invokana cost $390 per a 90 day supply and it used to cost $90 per 90 day. Brennen Spring works and is covered but is expensive. Pt states he spoke with Pharmacist at [facility] and the Pharmacist recommended [employer] [place] for a lower cost for patient. Diet: low carbohydrate  Home blood sugar records: patient tests 1 time(s) per week fasting and it averages 150  Any episodes of hypoglycemia? no  Eye exam current (within one year): yes on 9/18/20  Daily Aspirin? No per advice of Hematologist   Current exercise: plays golf 3 days per week, walks in his neighborhood 5-6 days per week          Review of Systems   Constitutional: Negative for fatigue. Eyes: Negative for visual disturbance. Respiratory: Negative for shortness of breath. Cardiovascular: Negative for chest pain and leg swelling. Gastrointestinal: Negative for abdominal pain, nausea and vomiting. Genitourinary: Negative for frequency. Skin: Negative for wound. Neurological: Negative for dizziness, light-headedness, numbness and headaches.        No Known Allergies  Outpatient Medications Marked as Taking for the 9/24/20 encounter (Office Visit) with Yamileth Lopez MD   Medication Sig Dispense Refill    montelukast (SINGULAIR) 10 MG tablet TAKE 1 TABLET BY MOUTH EVERY NIGHT 90 tablet 3    tamsulosin (FLOMAX) 0.4 MG capsule TAKE 1 CAPSULE DAILY 90 capsule 1    quinapril (ACCUPRIL) 5 MG tablet TAKE 1 TABLET DAILY 90 tablet 1    canagliflozin (INVOKANA) 300 MG TABS tablet Take 1 tablet by mouth every morning (before breakfast) 30 tablet 5    JANUMET XR  MG TB24 per extended release tablet TAKE 1 TABLET TWICE A  tablet 1    blood glucose test strips (ONETOUCH ULTRA) strip 1 each by 09/18/2020   Component Date Value    Visual Acuity Distance R* 09/18/2020 20/20     Visual Acuity Distance L* 09/18/2020 20/20     Intraocular Pressure Eye 09/18/2020                      Value:13  13      Diabetic Retinopathy 09/18/2020 NEGATIVE     Cataracts 09/18/2020 NEGATIVE     Glaucoma 09/18/2020 NEGATIVE    Orders Only on 06/18/2020   Component Date Value    Cholesterol, Total 06/18/2020 119     Triglycerides 06/18/2020 169*    HDL 06/18/2020 40     LDL Calculated 06/18/2020 45     VLDL Cholesterol Calcula* 06/18/2020 34     Hemoglobin A1C 06/18/2020 7.6     eAG 06/18/2020 171.4     PSA 06/18/2020 1.20     Vit D, 25-Hydroxy 06/18/2020 52.6     Sodium 06/18/2020 140     Potassium 06/18/2020 4.8     Chloride 06/18/2020 99     CO2 06/18/2020 26     Anion Gap 06/18/2020 15     Glucose 06/18/2020 157*    BUN 06/18/2020 15     CREATININE 06/18/2020 0.8     GFR Non- 06/18/2020 >60     GFR  06/18/2020 >60     Calcium 06/18/2020 10.3     Total Protein 06/18/2020 6.9     Alb 06/18/2020 4.8     Albumin/Globulin Ratio 06/18/2020 2.3*    Total Bilirubin 06/18/2020 0.7     Alkaline Phosphatase 06/18/2020 48     ALT 06/18/2020 36     AST 06/18/2020 33     Globulin 06/18/2020 2.1          Assessment/Plan     1. Type 2 diabetes mellitus without complication, without long-term current use of insulin (HCC)  - Hemoglobin A1c of 7.0% shows diabetes is controlled and has improved  -Continue same medications  -Discontinue Invokana due to cost  -Start empagliflozin (JARDIANCE) 10 MG tablet; Take 1 tablet by mouth daily  Dispense: 30 tablet; Refill: 1  -Limit carbohydrates to 45 grams with meals and 15 grams with snacks  -monitor blood sugars  -Regular aerobic exercise  -POCT glycosylated hemoglobin (Hb Y5H)  -Basic Metabolic Panel; Future      Discussed medications with patient, who voiced understanding of their use and indications.  All questions answered. Return in about 3 months (around 12/30/2020) for Medicare AWV.

## 2020-10-22 ENCOUNTER — TELEPHONE (OUTPATIENT)
Dept: PHARMACY | Facility: CLINIC | Age: 75
End: 2020-10-22

## 2020-10-22 NOTE — TELEPHONE ENCOUNTER
CLINICAL PHARMACY: ADHERENCE REVIEW  Identified care gap per [employer]; fills at [address]: ACE/ARB, Diabetes and Statin adherence    Last Office Visit: 9/24/20    Patient also appears to be taking: Atorvastatin 20mg, Quinapril 5mg, Janumet XR 50-1000MG,     ASSESSMENT  ACE/ARB ADHERENCE    Per Reconciled Dispense Report   Quinapril last filled on 8/10/20 for a 90 day supply. 1 refills remaining. Billed through [employer]     BP Readings from Last 3 Encounters:   09/24/20 122/78   06/22/20 122/82   12/30/19 126/60     CrCl cannot be calculated (Unknown ideal weight.). DIABETES ADHERENCE     Janumet last filled on 9/13/20 for a 90 day supply. 1 refills remaining. Billed through [employer]   Component Value Date    LABA1C 7.0 09/24/2020    LABA1C 7.6 06/18/2020    LABA1C 7.0 11/07/2019       STATIN ADHERENCE  PDC: 79%     Atorvastatin last filled on 7/17/20 for a 90 day supply. 1 refills remaining. Billed through [employer]   Component Value Date    CHOL 119 06/18/2020    TRIG 169 (H) 06/18/2020    HDL 40 06/18/2020    LDLCALC 45 06/18/2020    LDLDIRECT 99 11/06/2018     ALT   Date Value Ref Range Status   06/18/2020 36 10 - 40 U/L Final     AST   Date Value Ref Range Status   06/18/2020 33 15 - 37 U/L Final     The ASCVD Risk score (Kyle Rockwell, et al., 2013) failed to calculate for the following reasons: The valid total cholesterol range is 130 to 320 mg/dL     PLAN  Attempting to reach patient to review.  Left message asking for return call. Patient appears to be adherent and filling a 90ds for each medication. If no updates in the dispense report in 1 week, I will carly to see if he received Atorvastatin      CLINICAL PHARMACY CONSULT: MED RECONCILIATION/REVIEW ADDENDUM    For Pharmacy Admin Tracking Only    PHSO: Yes  Total # of Interventions Recommended: 3  - New Order #: 0 New Medication Order Reason(s):  Adherence  - Maintenance Safety Lab Monitoring #: 1  - New Therapy Lab Monitoring #: 0  Recommended intervention potential cost savings: 0  Total Interventions Accepted: 0  Time Spent (min): Davin Spencer

## 2020-11-03 RX ORDER — SITAGLIPTIN AND METFORMIN HYDROCHLORIDE 1000; 50 MG/1; MG/1
TABLET, FILM COATED, EXTENDED RELEASE ORAL
Qty: 180 TABLET | Refills: 1 | Status: SHIPPED | OUTPATIENT
Start: 2020-11-03 | End: 2021-06-04

## 2020-11-03 RX ORDER — ATORVASTATIN CALCIUM 20 MG/1
TABLET, FILM COATED ORAL
Qty: 90 TABLET | Refills: 1 | Status: SHIPPED | OUTPATIENT
Start: 2020-11-03 | End: 2021-04-01

## 2020-12-24 ASSESSMENT — LIFESTYLE VARIABLES
HOW OFTEN DURING THE LAST YEAR HAVE YOU FOUND THAT YOU WERE NOT ABLE TO STOP DRINKING ONCE YOU HAD STARTED: 0
AUDIT TOTAL SCORE: 1
HOW OFTEN DO YOU HAVE SIX OR MORE DRINKS ON ONE OCCASION: NEVER
HOW OFTEN DURING THE LAST YEAR HAVE YOU HAD A FEELING OF GUILT OR REMORSE AFTER DRINKING: NEVER
HAVE YOU OR SOMEONE ELSE BEEN INJURED AS A RESULT OF YOUR DRINKING: 0
HOW OFTEN DURING THE LAST YEAR HAVE YOU FOUND THAT YOU WERE NOT ABLE TO STOP DRINKING ONCE YOU HAD STARTED: NEVER
AUDIT TOTAL SCORE: 0
HOW OFTEN DO YOU HAVE A DRINK CONTAINING ALCOHOL: 1
HOW OFTEN DO YOU HAVE A DRINK CONTAINING ALCOHOL: MONTHLY OR LESS
HOW MANY STANDARD DRINKS CONTAINING ALCOHOL DO YOU HAVE ON A TYPICAL DAY: ONE OR TWO
HOW OFTEN DURING THE LAST YEAR HAVE YOU NEEDED AN ALCOHOLIC DRINK FIRST THING IN THE MORNING TO GET YOURSELF GOING AFTER A NIGHT OF HEAVY DRINKING: NEVER
HAVE YOU OR SOMEONE ELSE BEEN INJURED AS A RESULT OF YOUR DRINKING: NO
HOW OFTEN DURING THE LAST YEAR HAVE YOU NEEDED AN ALCOHOLIC DRINK FIRST THING IN THE MORNING TO GET YOURSELF GOING AFTER A NIGHT OF HEAVY DRINKING: 0
HOW MANY STANDARD DRINKS CONTAINING ALCOHOL DO YOU HAVE ON A TYPICAL DAY: 0
HOW OFTEN DO YOU HAVE SIX OR MORE DRINKS ON ONE OCCASION: 0
HOW OFTEN DURING THE LAST YEAR HAVE YOU BEEN UNABLE TO REMEMBER WHAT HAPPENED THE NIGHT BEFORE BECAUSE YOU HAD BEEN DRINKING: 0
AUDIT-C TOTAL SCORE: 0
HAS A RELATIVE, FRIEND, DOCTOR, OR ANOTHER HEALTH PROFESSIONAL EXPRESSED CONCERN ABOUT YOUR DRINKING OR SUGGESTED YOU CUT DOWN: 0
HOW OFTEN DURING THE LAST YEAR HAVE YOU BEEN UNABLE TO REMEMBER WHAT HAPPENED THE NIGHT BEFORE BECAUSE YOU HAD BEEN DRINKING: NEVER
HOW OFTEN DURING THE LAST YEAR HAVE YOU FAILED TO DO WHAT WAS NORMALLY EXPECTED FROM YOU BECAUSE OF DRINKING: 0
HOW OFTEN DURING THE LAST YEAR HAVE YOU HAD A FEELING OF GUILT OR REMORSE AFTER DRINKING: 0
AUDIT-C TOTAL SCORE: 1
HOW OFTEN DURING THE LAST YEAR HAVE YOU FAILED TO DO WHAT WAS NORMALLY EXPECTED FROM YOU BECAUSE OF DRINKING: NEVER
HAS A RELATIVE, FRIEND, DOCTOR, OR ANOTHER HEALTH PROFESSIONAL EXPRESSED CONCERN ABOUT YOUR DRINKING OR SUGGESTED YOU CUT DOWN: NO

## 2020-12-24 ASSESSMENT — PATIENT HEALTH QUESTIONNAIRE - PHQ9
SUM OF ALL RESPONSES TO PHQ9 QUESTIONS 1 & 2: 0
SUM OF ALL RESPONSES TO PHQ QUESTIONS 1-9: 0
2. FEELING DOWN, DEPRESSED OR HOPELESS: 0
1. LITTLE INTEREST OR PLEASURE IN DOING THINGS: 0
SUM OF ALL RESPONSES TO PHQ QUESTIONS 1-9: 0
SUM OF ALL RESPONSES TO PHQ QUESTIONS 1-9: 0

## 2020-12-31 ENCOUNTER — OFFICE VISIT (OUTPATIENT)
Dept: PRIMARY CARE CLINIC | Age: 75
End: 2020-12-31
Payer: MEDICARE

## 2020-12-31 VITALS
OXYGEN SATURATION: 97 % | TEMPERATURE: 97.9 F | HEIGHT: 66 IN | HEART RATE: 91 BPM | BODY MASS INDEX: 22.11 KG/M2 | WEIGHT: 137.6 LBS | RESPIRATION RATE: 16 BRPM | DIASTOLIC BLOOD PRESSURE: 64 MMHG | SYSTOLIC BLOOD PRESSURE: 112 MMHG

## 2020-12-31 LAB — HBA1C MFR BLD: 6.6 %

## 2020-12-31 PROCEDURE — G0439 PPPS, SUBSEQ VISIT: HCPCS | Performed by: INTERNAL MEDICINE

## 2020-12-31 PROCEDURE — 83036 HEMOGLOBIN GLYCOSYLATED A1C: CPT | Performed by: INTERNAL MEDICINE

## 2020-12-31 NOTE — PROGRESS NOTES
 Stroke Neg Hx     Heart Disease Neg Hx        CareTeam (Including outside providers/suppliers regularly involved in providing care):   Patient Care Team:  Bigg Benitez MD as PCP - General (Internal Medicine)  Bigg Benitez MD as PCP - [facility] Provider  Talita Lyle MD as Consulting Physician (Gastroenterology)  Adele Santiago MD as Consulting Physician (Ophthalmology)  Juan Diego Carrasquillo MD as Consulting Physician (Orthopedic Surgery)  Irene Fajardo MD as Surgeon (Podiatry)  Trung Diaz MD as Consulting Physician (Internal Medicine)  Kayley Pavno MD as Consulting Physician (Hematology and Oncology)  Aydin Rene MD as Consulting Physician (Otolaryngology)    Wt Readings from Last 3 Encounters:   12/31/20 137 lb 9.6 oz (62.4 kg)   09/24/20 136 lb 9.6 oz (62 kg)   06/22/20 141 lb 3.2 oz (64 kg)     Vitals:    12/31/20 1437   BP: 112/64   Pulse: 91   Resp: 16   Temp: 97.9 °F (36.6 °C)   SpO2: 97%   Weight: 137 lb 9.6 oz (62.4 kg)   Height: 5' 6\" (1.676 m)     Body mass index is 22.21 kg/m². Based upon direct observation of the patient, evaluation of cognition reveals recent and remote memory intact.     General Appearance: alert and oriented to person, place and time, well-developed and well-nourished, in no acute distress  Head: normocephalic and atraumatic  Eyes: pupils equal, round, and reactive to light, extraocular eye movements intact, conjunctivae normal  ENT: Left ear with excessive wax, right tympanic membrane, external ear and ear canal normal  Neck: neck supple and non tender without mass, no thyromegaly or thyroid nodules, no cervical lymphadenopathy   Pulmonary/Chest: clear to auscultation bilaterally- no wheezes, rales or rhonchi, normal air movement, no respiratory distress  Cardiovascular: normal rate, regular rhythm, normal S1 and S2, no murmurs and no carotid bruits  Abdomen: soft, non-tender, non-distended, normal bowel sounds, no masses or organomegaly  Extremities: no edema  Musculoskeletal: normal range of motion, no joint swelling, deformity or tenderness  Neurologic: no cranial nerve deficit, gait and coordination normal and speech normal    Patient's complete Health Risk Assessment and screening values have been reviewed and are found in Flowsheets. The following problems were reviewed today and where indicated follow up appointments were made and/or referrals ordered. Positive Risk Factor Screenings with Interventions:            General Health and ACP:  General  In general, how would you say your health is?: Very Good  In the past 7 days, have you experienced any of the following? New or Increased Pain, New or Increased Fatigue, Loneliness, Social Isolation, Stress or Anger?: None of These  Do you get the social and emotional support that you need?: Yes  Do you have a Living Will?: Yes  Advance Directives     Power of  Living Will ACP-Advance Directive ACP-Power of     Not on File [place] on 12/09/16 Filed [address] [place] [address] Risk Interventions:  · no health risk identified     Hearing/Vision:  No exam data present  Hearing/Vision  Do you or your family notice any trouble with your hearing?: (!) Yes  Do you have difficulty driving, watching TV, or doing any of your daily activities because of your eyesight?: No  Have you had an eye exam within the past year?: Yes  Hearing/Vision Interventions:  · Hearing concerns:  patient declines any further evaluation/treatment for hearing issues Patient has been seen by ENT.  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      Personalized Preventive Plan   Current Health Maintenance Status  Immunization History   Administered Date(s) Administered    Influenza Vaccine, unspecified formulation 09/30/2016    Influenza, High Dose (Fluzone 65 yrs and older) 09/14/2015, 09/12/2017, 10/12/2017, 09/10/2018, 09/27/2019, 08/25/2020    Pneumococcal Conjugate 13-valent ([id number]) 03/03/2016    Pneumococcal Polysaccharide ([id number]) 03/14/2017    Tdap (Boostrix, Adacel) 03/03/2016    Zoster Recombinant (Shingrix) 04/24/2018, 07/19/2018        Health Maintenance   Topic Date Due    Hepatitis C screen  1945    Annual Wellness Visit (AWV)  05/29/2019    Lipid screen  06/18/2021    Potassium monitoring  09/24/2021    Creatinine monitoring  09/24/2021    DTaP/Tdap/Td vaccine (2 - Td) 03/03/2026    Flu vaccine  Completed    Shingles Vaccine  Completed    Pneumococcal 65+ years Vaccine  Completed    Hepatitis A vaccine  Aged Out    Hib vaccine  Aged Out    Meningococcal (ACWY) vaccine  Aged Out     Recommendations for [employer] Due: see orders and patient instructions/AVS.  . Recommended screening schedule for the next 5-10 years is provided to the patient in written form: see Patient Instructions/AVS.    Lab Review     Results for POC orders placed in visit on 12/31/20   POCT glycosylated hemoglobin (Hb A1C)   Result Value Ref Range    Hemoglobin A1C 6.6 %       No visits with results within 2 Month(s) from this visit. Latest known visit with results is:   Orders Only on 09/24/2020   Component Date Value    Sodium 09/24/2020 141     Potassium 09/24/2020 5.1     Chloride 09/24/2020 101     CO2 09/24/2020 26     Anion Gap 09/24/2020 14     Glucose 09/24/2020 154*    BUN 09/24/2020 19     CREATININE 09/24/2020 0.6*    GFR Non- 09/24/2020 >60     GFR  09/24/2020 >60     Calcium 09/24/2020 10.2          Dave Douglas was seen today for [employer] [place]. Diagnoses and all orders for this visit:    1. Routine general medical examination at a health care facility  -Medicare AWV done    2.  Type 2 diabetes mellitus without complication, without long-term current use of insulin ([facility])  -Hemoglobin A1c of 6.6% shows diabetes is well controlled  -Continue same medications  -Limit carbohydrates to 45 grams with meals and 15 grams with snacks  -monitor blood sugars  -Regular aerobic exercise  - Comprehensive Metabolic Panel; Future  - POCT glycosylated hemoglobin (Hb A1C)    3. Essential hypertension  -stable  -Continue same medications  -Low sodium diet  -Regular aerobic exercise  -Comprehensive Metabolic Panel; Future    4. Mixed hyperlipidemia  -Low fat, low cholesterol diet  -Regular aerobic exercise  - Comprehensive Metabolic Panel; Future  - Lipid Panel; Future    5. Benign prostatic hyperplasia with weak urinary stream  -stable  -Continue same medications  -[facility]    6. Vitamin D deficiency  -stable  -Continue same medications    7. Excessive cerumen in left ear canal  -Ear wax removed      Return in 6 months (on 6/30/2021) for diabetes, hypertension, hyperlipidemia, BPH, and vitamin D deficiency.

## 2021-01-01 ENCOUNTER — PATIENT MESSAGE (OUTPATIENT)
Dept: PRIMARY CARE CLINIC | Age: 76
End: 2021-01-01

## 2021-01-01 ENCOUNTER — OFFICE VISIT (OUTPATIENT)
Dept: ENT CLINIC | Age: 76
End: 2021-01-01
Payer: MEDICARE

## 2021-01-01 ENCOUNTER — TELEPHONE (OUTPATIENT)
Dept: PRIMARY CARE CLINIC | Age: 76
End: 2021-01-01

## 2021-01-01 ENCOUNTER — OFFICE VISIT (OUTPATIENT)
Dept: PRIMARY CARE CLINIC | Age: 76
End: 2021-01-01
Payer: MEDICARE

## 2021-01-01 ENCOUNTER — HOSPITAL ENCOUNTER (OUTPATIENT)
Dept: GENERAL RADIOLOGY | Age: 76
Discharge: HOME | End: 2021-12-21
Payer: MEDICARE

## 2021-01-01 VITALS
DIASTOLIC BLOOD PRESSURE: 70 MMHG | HEART RATE: 75 BPM | WEIGHT: 134.4 LBS | BODY MASS INDEX: 21.09 KG/M2 | TEMPERATURE: 98.5 F | RESPIRATION RATE: 16 BRPM | HEIGHT: 67 IN | OXYGEN SATURATION: 97 % | SYSTOLIC BLOOD PRESSURE: 128 MMHG

## 2021-01-01 VITALS
TEMPERATURE: 96.9 F | OXYGEN SATURATION: 97 % | RESPIRATION RATE: 16 BRPM | BODY MASS INDEX: 20.86 KG/M2 | SYSTOLIC BLOOD PRESSURE: 120 MMHG | WEIGHT: 134 LBS | HEART RATE: 69 BPM | DIASTOLIC BLOOD PRESSURE: 66 MMHG

## 2021-01-01 VITALS
DIASTOLIC BLOOD PRESSURE: 66 MMHG | HEART RATE: 101 BPM | OXYGEN SATURATION: 99 % | BODY MASS INDEX: 20.15 KG/M2 | RESPIRATION RATE: 18 BRPM | WEIGHT: 129.4 LBS | TEMPERATURE: 97.4 F | SYSTOLIC BLOOD PRESSURE: 114 MMHG

## 2021-01-01 VITALS — HEART RATE: 106 BPM | SYSTOLIC BLOOD PRESSURE: 122 MMHG | TEMPERATURE: 97.3 F | DIASTOLIC BLOOD PRESSURE: 75 MMHG

## 2021-01-01 DIAGNOSIS — E78.2 MIXED HYPERLIPIDEMIA: ICD-10-CM

## 2021-01-01 DIAGNOSIS — H69.83 DYSFUNCTION OF BOTH EUSTACHIAN TUBES: ICD-10-CM

## 2021-01-01 DIAGNOSIS — R79.0 LOW MAGNESIUM LEVEL: ICD-10-CM

## 2021-01-01 DIAGNOSIS — E11.9 TYPE 2 DIABETES MELLITUS WITHOUT COMPLICATION, WITHOUT LONG-TERM CURRENT USE OF INSULIN (HCC): Primary | ICD-10-CM

## 2021-01-01 DIAGNOSIS — R53.83 FATIGUE, UNSPECIFIED TYPE: ICD-10-CM

## 2021-01-01 DIAGNOSIS — I10 ESSENTIAL HYPERTENSION: ICD-10-CM

## 2021-01-01 DIAGNOSIS — R16.0 LIVER MASS: Primary | ICD-10-CM
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
## 2021-01-01 DIAGNOSIS — R53.1 GENERALIZED WEAKNESS: ICD-10-CM

## 2021-01-01 DIAGNOSIS — R06.02 SHORTNESS OF BREATH: ICD-10-CM

## 2021-01-01 DIAGNOSIS — E55.9 VITAMIN D DEFICIENCY: ICD-10-CM

## 2021-01-01 DIAGNOSIS — R26.89 BALANCE PROBLEMS: ICD-10-CM

## 2021-01-01 DIAGNOSIS — E11.9 TYPE 2 DIABETES MELLITUS WITHOUT COMPLICATION, WITHOUT LONG-TERM CURRENT USE OF INSULIN (HCC): ICD-10-CM

## 2021-01-01 DIAGNOSIS — R22.1 MASS OF RIGHT SIDE OF NECK: Primary | ICD-10-CM

## 2021-01-01 DIAGNOSIS — Z11.59 NEED FOR HEPATITIS C SCREENING TEST: ICD-10-CM

## 2021-01-01 DIAGNOSIS — K11.8 PAROTID MASS: ICD-10-CM

## 2021-01-01 DIAGNOSIS — R59.0 ENLARGED LYMPH NODE IN NECK: ICD-10-CM

## 2021-01-01 DIAGNOSIS — R63.4 WEIGHT LOSS: ICD-10-CM

## 2021-01-01 DIAGNOSIS — E83.52 HYPERCALCEMIA: ICD-10-CM

## 2021-01-01 DIAGNOSIS — R35.0 URINARY FREQUENCY: ICD-10-CM

## 2021-01-01 DIAGNOSIS — R63.0 APPETITE LOSS: ICD-10-CM

## 2021-01-01 DIAGNOSIS — R80.9 MICROALBUMINURIA: ICD-10-CM

## 2021-01-01 DIAGNOSIS — R19.7 DIARRHEA, UNSPECIFIED TYPE: ICD-10-CM

## 2021-01-01 DIAGNOSIS — N40.1 BENIGN PROSTATIC HYPERPLASIA WITH WEAK URINARY STREAM: ICD-10-CM

## 2021-01-01 DIAGNOSIS — L85.3 DRY SKIN: Primary | ICD-10-CM

## 2021-01-01 DIAGNOSIS — R06.02 SHORTNESS OF BREATH: Primary | ICD-10-CM

## 2021-01-01 DIAGNOSIS — Z91.81 AT HIGH RISK FOR FALLS: ICD-10-CM

## 2021-01-01 DIAGNOSIS — R39.12 BENIGN PROSTATIC HYPERPLASIA WITH WEAK URINARY STREAM: ICD-10-CM

## 2021-01-01 LAB
A/G RATIO: 1.7 (ref 1.1–2.2)
ALBUMIN SERPL-MCNC: 4.5 G/DL (ref 3.4–5)
ALP BLD-CCNC: 100 U/L (ref 40–129)
ALT SERPL-CCNC: 24 U/L (ref 10–40)
ANION GAP SERPL CALCULATED.3IONS-SCNC: 18 MMOL/L (ref 3–16)
AST SERPL-CCNC: 50 U/L (ref 15–37)
BASOPHILS ABSOLUTE: 0 K/UL (ref 0–0.2)
BASOPHILS RELATIVE PERCENT: 0.2 %
BILIRUB SERPL-MCNC: 0.9 MG/DL (ref 0–1)
BILIRUBIN, POC: NORMAL
BLOOD URINE, POC: NORMAL
BUN BLDV-MCNC: 25 MG/DL (ref 7–20)
C-REACTIVE PROTEIN: 60.6 MG/L (ref 0–5.1)
CALCIUM SERPL-MCNC: 12.9 MG/DL (ref 8.3–10.6)
CHLORIDE BLD-SCNC: 97 MMOL/L (ref 99–110)
CHOLESTEROL, TOTAL: 120 MG/DL (ref 0–199)
CLARITY, POC: CLEAR
CO2: 27 MMOL/L (ref 21–32)
COLOR, POC: YELLOW
CREAT SERPL-MCNC: 0.8 MG/DL (ref 0.8–1.3)
CREATININE URINE: 77.4 MG/DL (ref 39–259)
EOSINOPHILS ABSOLUTE: 0.1 K/UL (ref 0–0.6)
EOSINOPHILS RELATIVE PERCENT: 1.4 %
ESTIMATED AVERAGE GLUCOSE: NORMAL
GFR AFRICAN AMERICAN: >60
GFR NON-AFRICAN AMERICAN: >60
GLUCOSE BLD-MCNC: 162 MG/DL (ref 70–99)
GLUCOSE URINE, POC: 500
HBA1C MFR BLD: 7 %
HBA1C MFR BLD: 7.1 %
HCT VFR BLD CALC: 45.1 % (ref 40.5–52.5)
HDLC SERPL-MCNC: 34 MG/DL (ref 40–60)
HEMOGLOBIN: 14.2 G/DL (ref 13.5–17.5)
HEPATITIS C ANTIBODY INTERPRETATION: NORMAL
KETONES, POC: 15
LDL CHOLESTEROL CALCULATED: 41 MG/DL
LEUKOCYTE EST, POC: NORMAL
LYMPHOCYTES ABSOLUTE: 1.1 K/UL (ref 1–5.1)
LYMPHOCYTES RELATIVE PERCENT: 14.7 %
MAGNESIUM: 1.2 MG/DL (ref 1.8–2.4)
MAGNESIUM: 1.4 MG/DL (ref 1.8–2.4)
MCH RBC QN AUTO: 30.2 PG (ref 26–34)
MCHC RBC AUTO-ENTMCNC: 31.4 G/DL (ref 31–36)
MCV RBC AUTO: 96.2 FL (ref 80–100)
MICROALBUMIN UR-MCNC: 3 MG/DL
MICROALBUMIN/CREAT UR-RTO: 38.8 MG/G (ref 0–30)
MONOCYTES ABSOLUTE: 0.5 K/UL (ref 0–1.3)
MONOCYTES RELATIVE PERCENT: 6.4 %
NEUTROPHILS ABSOLUTE: 6 K/UL (ref 1.7–7.7)
NEUTROPHILS RELATIVE PERCENT: 77.3 %
NITRITE, POC: NORMAL
PDW BLD-RTO: 17.2 % (ref 12.4–15.4)
PH, POC: 5
PLATELET # BLD: 159 K/UL (ref 135–450)
PMV BLD AUTO: 8.4 FL (ref 5–10.5)
POTASSIUM SERPL-SCNC: 4.8 MMOL/L (ref 3.5–5.1)
PROTEIN, POC: NORMAL
RBC # BLD: 4.68 M/UL (ref 4.2–5.9)
SEDIMENTATION RATE, ERYTHROCYTE: 23 MM/HR (ref 0–20)
SODIUM BLD-SCNC: 142 MMOL/L (ref 136–145)
SPECIFIC GRAVITY, POC: >=1.03
TOTAL CK: 31 U/L (ref 39–308)
TOTAL PROTEIN: 7.1 G/DL (ref 6.4–8.2)
TRIGL SERPL-MCNC: 225 MG/DL (ref 0–150)
TSH SERPL DL<=0.05 MIU/L-ACNC: 3.8 UIU/ML (ref 0.27–4.2)
UROBILINOGEN, POC: 0.2
VITAMIN B-12: >2000 PG/ML (ref 211–911)
VITAMIN D 25-HYDROXY: 39.8 NG/ML
VLDLC SERPL CALC-MCNC: 45 MG/DL
WBC # BLD: 7.8 K/UL (ref 4–11)

## 2021-01-01 PROCEDURE — 81002 URINALYSIS NONAUTO W/O SCOPE: CPT | Performed by: INTERNAL MEDICINE

## 2021-01-01 PROCEDURE — 71046 X-RAY EXAM CHEST 2 VIEWS: CPT

## 2021-01-01 PROCEDURE — 83036 HEMOGLOBIN GLYCOSYLATED A1C: CPT | Performed by: INTERNAL MEDICINE

## 2021-01-01 PROCEDURE — 99214 OFFICE O/P EST MOD 30 MIN: CPT | Performed by: INTERNAL MEDICINE

## 2021-01-01 PROCEDURE — 99214 OFFICE O/P EST MOD 30 MIN: CPT | Performed by: STUDENT IN AN ORGANIZED HEALTH CARE EDUCATION/TRAINING PROGRAM

## 2021-01-01 PROCEDURE — 3051F HG A1C>EQUAL 7.0%<8.0%: CPT | Performed by: INTERNAL MEDICINE

## 2021-01-01 RX ORDER — MAGNESIUM OXIDE 400 MG/1
400 TABLET ORAL DAILY
COMMUNITY
Start: 2021-01-01 | End: 2022-01-01

## 2021-01-01 RX ORDER — ATORVASTATIN CALCIUM 20 MG/1
TABLET, FILM COATED ORAL
Qty: 90 TABLET | Refills: 1 | Status: SHIPPED | OUTPATIENT
Start: 2021-01-01 | End: 2022-01-01 | Stop reason: ALTCHOICE

## 2021-01-01 RX ORDER — EMPAGLIFLOZIN 10 MG/1
1 TABLET, FILM COATED ORAL DAILY
Qty: 90 TABLET | Refills: 1 | Status: SHIPPED | OUTPATIENT
Start: 2021-01-01 | End: 2022-01-01

## 2021-01-01 RX ORDER — AMOXICILLIN AND CLAVULANATE POTASSIUM 875; 125 MG/1; MG/1
1 TABLET, FILM COATED ORAL 2 TIMES DAILY
Qty: 20 TABLET | Refills: 0 | Status: SHIPPED | OUTPATIENT
Start: 2021-01-01 | End: 2021-01-01

## 2021-01-01 ASSESSMENT — LIFESTYLE VARIABLES
AUDIT-C TOTAL SCORE: 0
HOW OFTEN DURING THE LAST YEAR HAVE YOU BEEN UNABLE TO REMEMBER WHAT HAPPENED THE NIGHT BEFORE BECAUSE YOU HAD BEEN DRINKING: NEVER
HOW MANY STANDARD DRINKS CONTAINING ALCOHOL DO YOU HAVE ON A TYPICAL DAY: ONE OR TWO
HAVE YOU OR SOMEONE ELSE BEEN INJURED AS A RESULT OF YOUR DRINKING: 0
HOW OFTEN DURING THE LAST YEAR HAVE YOU FOUND THAT YOU WERE NOT ABLE TO STOP DRINKING ONCE YOU HAD STARTED: NEVER
HAVE YOU OR SOMEONE ELSE BEEN INJURED AS A RESULT OF YOUR DRINKING: NO
HOW OFTEN DURING THE LAST YEAR HAVE YOU BEEN UNABLE TO REMEMBER WHAT HAPPENED THE NIGHT BEFORE BECAUSE YOU HAD BEEN DRINKING: 0
HOW OFTEN DURING THE LAST YEAR HAVE YOU NEEDED AN ALCOHOLIC DRINK FIRST THING IN THE MORNING TO GET YOURSELF GOING AFTER A NIGHT OF HEAVY DRINKING: 0
HOW OFTEN DURING THE LAST YEAR HAVE YOU NEEDED AN ALCOHOLIC DRINK FIRST THING IN THE MORNING TO GET YOURSELF GOING AFTER A NIGHT OF HEAVY DRINKING: NEVER
HAS A RELATIVE, FRIEND, DOCTOR, OR ANOTHER HEALTH PROFESSIONAL EXPRESSED CONCERN ABOUT YOUR DRINKING OR SUGGESTED YOU CUT DOWN: 0
HOW OFTEN DURING THE LAST YEAR HAVE YOU FAILED TO DO WHAT WAS NORMALLY EXPECTED FROM YOU BECAUSE OF DRINKING: 0
HOW OFTEN DO YOU HAVE A DRINK CONTAINING ALCOHOL: MONTHLY OR LESS
HOW OFTEN DO YOU HAVE SIX OR MORE DRINKS ON ONE OCCASION: NEVER
HOW OFTEN DURING THE LAST YEAR HAVE YOU HAD A FEELING OF GUILT OR REMORSE AFTER DRINKING: NEVER
HOW OFTEN DO YOU HAVE SIX OR MORE DRINKS ON ONE OCCASION: 0
HOW MANY STANDARD DRINKS CONTAINING ALCOHOL DO YOU HAVE ON A TYPICAL DAY: 0
HOW OFTEN DURING THE LAST YEAR HAVE YOU HAD A FEELING OF GUILT OR REMORSE AFTER DRINKING: 0
AUDIT-C TOTAL SCORE: 1
HOW OFTEN DURING THE LAST YEAR HAVE YOU FAILED TO DO WHAT WAS NORMALLY EXPECTED FROM YOU BECAUSE OF DRINKING: NEVER
AUDIT TOTAL SCORE: 1
HAS A RELATIVE, FRIEND, DOCTOR, OR ANOTHER HEALTH PROFESSIONAL EXPRESSED CONCERN ABOUT YOUR DRINKING OR SUGGESTED YOU CUT DOWN: NO
HOW OFTEN DURING THE LAST YEAR HAVE YOU FOUND THAT YOU WERE NOT ABLE TO STOP DRINKING ONCE YOU HAD STARTED: 0
AUDIT TOTAL SCORE: 0
HOW OFTEN DO YOU HAVE A DRINK CONTAINING ALCOHOL: 1

## 2021-01-01 ASSESSMENT — ENCOUNTER SYMPTOMS
SHORTNESS OF BREATH: 0
CONSTIPATION: 0
CHEST TIGHTNESS: 0
NAUSEA: 0
DIARRHEA: 0
BLOOD IN STOOL: 0
VOMITING: 0
ABDOMINAL DISTENTION: 0
SORE THROAT: 0
CHEST TIGHTNESS: 0
ABDOMINAL PAIN: 0
SORE THROAT: 0
RHINORRHEA: 0
SHORTNESS OF BREATH: 1
CONSTIPATION: 0
COUGH: 0
WHEEZING: 0
NAUSEA: 0
ABDOMINAL PAIN: 0
WHEEZING: 0
DIARRHEA: 1
RHINORRHEA: 0
COUGH: 0
VOMITING: 0

## 2021-01-01 ASSESSMENT — PATIENT HEALTH QUESTIONNAIRE - PHQ9
1. LITTLE INTEREST OR PLEASURE IN DOING THINGS: 0
2. FEELING DOWN, DEPRESSED OR HOPELESS: 0
SUM OF ALL RESPONSES TO PHQ QUESTIONS 1-9: 0
SUM OF ALL RESPONSES TO PHQ9 QUESTIONS 1 & 2: 0

## 2021-01-04 DIAGNOSIS — I10 ESSENTIAL HYPERTENSION: ICD-10-CM

## 2021-01-04 DIAGNOSIS — E11.9 TYPE 2 DIABETES MELLITUS WITHOUT COMPLICATION, WITHOUT LONG-TERM CURRENT USE OF INSULIN (HCC): ICD-10-CM

## 2021-01-04 DIAGNOSIS — E78.2 MIXED HYPERLIPIDEMIA: ICD-10-CM

## 2021-01-04 LAB
A/G RATIO: 2.2 (ref 1.1–2.2)
ALBUMIN SERPL-MCNC: 5.1 G/DL (ref 3.4–5)
ALP BLD-CCNC: 50 U/L (ref 40–129)
ALT SERPL-CCNC: 45 U/L (ref 10–40)
ANION GAP SERPL CALCULATED.3IONS-SCNC: 12 MMOL/L (ref 3–16)
AST SERPL-CCNC: 31 U/L (ref 15–37)
BILIRUB SERPL-MCNC: 0.6 MG/DL (ref 0–1)
BUN BLDV-MCNC: 19 MG/DL (ref 7–20)
CALCIUM SERPL-MCNC: 11 MG/DL (ref 8.3–10.6)
CHLORIDE BLD-SCNC: 99 MMOL/L (ref 99–110)
CHOLESTEROL, TOTAL: 135 MG/DL (ref 0–199)
CO2: 29 MMOL/L (ref 21–32)
CREAT SERPL-MCNC: 0.6 MG/DL (ref 0.8–1.3)
GFR AFRICAN AMERICAN: >60
GFR NON-AFRICAN AMERICAN: >60
GLOBULIN: 2.3 G/DL
GLUCOSE BLD-MCNC: 129 MG/DL (ref 70–99)
HDLC SERPL-MCNC: 42 MG/DL (ref 40–60)
LDL CHOLESTEROL CALCULATED: 47 MG/DL
POTASSIUM SERPL-SCNC: 4.2 MMOL/L (ref 3.5–5.1)
SODIUM BLD-SCNC: 140 MMOL/L (ref 136–145)
TOTAL PROTEIN: 7.4 G/DL (ref 6.4–8.2)
TRIGL SERPL-MCNC: 232 MG/DL (ref 0–150)
VLDLC SERPL CALC-MCNC: 46 MG/DL

## 2021-01-11 DIAGNOSIS — E11.9 TYPE 2 DIABETES MELLITUS WITHOUT COMPLICATION, WITHOUT LONG-TERM CURRENT USE OF INSULIN (HCC): Primary | ICD-10-CM

## 2021-01-11 NOTE — TELEPHONE ENCOUNTER
Medication:   Requested Prescriptions     Pending Prescriptions Disp Refills    Blood Glucose Monitoring Suppl (ONE TOUCH ULTRA 2) w/Device KIT 1 kit 0     Si kit by Does not apply route daily     Last Filled:     Last appt: 2020   Next appt: 2021    Last OARRS: No flowsheet data found.

## 2021-01-12 RX ORDER — LANCETS 30 GAUGE
EACH MISCELLANEOUS
Qty: 100 EACH | Refills: 3 | Status: SHIPPED | OUTPATIENT
Start: 2021-01-12

## 2021-01-12 RX ORDER — BLOOD-GLUCOSE METER
1 EACH MISCELLANEOUS DAILY
Qty: 1 KIT | Refills: 0 | Status: SHIPPED | OUTPATIENT
Start: 2021-01-12

## 2021-01-12 RX ORDER — GLUCOSAMINE HCL/CHONDROITIN SU 500-400 MG
CAPSULE ORAL
Qty: 100 STRIP | Refills: 3 | Status: SHIPPED | OUTPATIENT
Start: 2021-01-12 | End: 2022-01-01

## 2021-01-29 DIAGNOSIS — R39.12 BENIGN PROSTATIC HYPERPLASIA WITH WEAK URINARY STREAM: ICD-10-CM

## 2021-01-29 DIAGNOSIS — I10 ESSENTIAL HYPERTENSION: ICD-10-CM

## 2021-01-29 DIAGNOSIS — N40.1 BENIGN PROSTATIC HYPERPLASIA WITH WEAK URINARY STREAM: ICD-10-CM

## 2021-02-01 RX ORDER — TAMSULOSIN HYDROCHLORIDE 0.4 MG/1
CAPSULE ORAL
Qty: 90 CAPSULE | Refills: 1 | Status: SHIPPED | OUTPATIENT
Start: 2021-02-01 | End: 2021-07-23

## 2021-02-01 RX ORDER — QUINAPRIL 5 1/1
TABLET ORAL
Qty: 90 TABLET | Refills: 1 | Status: SHIPPED | OUTPATIENT
Start: 2021-02-01 | End: 2021-07-23

## 2021-02-01 NOTE — TELEPHONE ENCOUNTER
[address], [address] [phone] - F [phone]  [address]  [address]  Phone: [phone] Fax: [phone]    [facility] [address] Linda Cifuentes, [address] [address] 3 - P [phone] [facility] [phone]  [address] [place] [address]  Phone: [phone] Fax: [phone]     Mame Ferreira, [address] [facility] [phone] - f [phone]  [address] [place] [address]  Phone: [phone] Fax: [phone]

## 2021-02-07 DIAGNOSIS — E11.9 TYPE 2 DIABETES MELLITUS WITHOUT COMPLICATION, WITHOUT LONG-TERM CURRENT USE OF INSULIN (HCC): ICD-10-CM

## 2021-02-08 RX ORDER — EMPAGLIFLOZIN 10 MG/1
1 TABLET, FILM COATED ORAL DAILY
Qty: 90 TABLET | Refills: 1 | Status: SHIPPED | OUTPATIENT
Start: 2021-02-08 | End: 2021-07-20

## 2021-02-08 NOTE — TELEPHONE ENCOUNTER
Medication:   Requested Prescriptions     Pending Prescriptions Disp Refills    JARDIANCE 10 MG tablet [Pharmacy Med Name: Ayad Benavidez 10MG TABLETS] 90 tablet 0     Sig: TAKE 1 TABLET BY MOUTH DAILY     Last Filled:  11.14.20  Last appt: 12/31/2020   Next appt: 6/28/2021    Last Labs DM:   Lab Results   Component Value Date    LABA1C 6.6 12/31/2020

## 2021-04-01 DIAGNOSIS — E78.2 MIXED HYPERLIPIDEMIA: ICD-10-CM

## 2021-04-01 RX ORDER — ATORVASTATIN CALCIUM 20 MG/1
TABLET, FILM COATED ORAL
Qty: 90 TABLET | Refills: 1 | Status: SHIPPED | OUTPATIENT
Start: 2021-04-01 | End: 2021-01-01

## 2021-06-28 ENCOUNTER — OFFICE VISIT (OUTPATIENT)
Dept: PRIMARY CARE CLINIC | Age: 76
End: 2021-06-28
Payer: MEDICARE

## 2021-06-28 VITALS
DIASTOLIC BLOOD PRESSURE: 66 MMHG | WEIGHT: 138 LBS | RESPIRATION RATE: 16 BRPM | BODY MASS INDEX: 22.27 KG/M2 | TEMPERATURE: 97 F | OXYGEN SATURATION: 98 % | SYSTOLIC BLOOD PRESSURE: 118 MMHG | HEART RATE: 101 BPM

## 2021-06-28 DIAGNOSIS — N40.1 BENIGN PROSTATIC HYPERPLASIA WITH WEAK URINARY STREAM: ICD-10-CM

## 2021-06-28 DIAGNOSIS — R39.12 BENIGN PROSTATIC HYPERPLASIA WITH WEAK URINARY STREAM: ICD-10-CM

## 2021-06-28 DIAGNOSIS — E83.52 HYPERCALCEMIA: ICD-10-CM

## 2021-06-28 DIAGNOSIS — E55.9 VITAMIN D DEFICIENCY: ICD-10-CM

## 2021-06-28 DIAGNOSIS — I10 ESSENTIAL HYPERTENSION: ICD-10-CM

## 2021-06-28 DIAGNOSIS — E78.2 MIXED HYPERLIPIDEMIA: ICD-10-CM

## 2021-06-28 DIAGNOSIS — E11.9 TYPE 2 DIABETES MELLITUS WITHOUT COMPLICATION, WITHOUT LONG-TERM CURRENT USE OF INSULIN (HCC): ICD-10-CM

## 2021-06-28 DIAGNOSIS — Z86.2 HISTORY OF THROMBOCYTOPENIA: ICD-10-CM

## 2021-06-28 DIAGNOSIS — R07.81 RIB PAIN ON RIGHT SIDE: ICD-10-CM

## 2021-06-28 DIAGNOSIS — E11.9 TYPE 2 DIABETES MELLITUS WITHOUT COMPLICATION, WITHOUT LONG-TERM CURRENT USE OF INSULIN (HCC): Primary | ICD-10-CM

## 2021-06-28 DIAGNOSIS — R06.02 SHORTNESS OF BREATH: ICD-10-CM

## 2021-06-28 PROBLEM — D68.9 COAGULATION DEFECT (HCC): Status: RESOLVED | Noted: 2021-06-28 | Resolved: 2021-06-28

## 2021-06-28 PROBLEM — D68.9 COAGULATION DEFECT (HCC): Status: ACTIVE | Noted: 2021-06-28

## 2021-06-28 LAB
A/G RATIO: 2.2 (ref 1.1–2.2)
ALBUMIN SERPL-MCNC: 4.9 G/DL (ref 3.4–5)
ALP BLD-CCNC: 57 U/L (ref 40–129)
ALT SERPL-CCNC: 32 U/L (ref 10–40)
ANION GAP SERPL CALCULATED.3IONS-SCNC: 14 MMOL/L (ref 3–16)
AST SERPL-CCNC: 27 U/L (ref 15–37)
BASOPHILS ABSOLUTE: 0 K/UL (ref 0–0.2)
BASOPHILS RELATIVE PERCENT: 0.4 %
BILIRUB SERPL-MCNC: 0.5 MG/DL (ref 0–1)
BILIRUBIN, POC: NORMAL
BLOOD URINE, POC: NORMAL
BUN BLDV-MCNC: 17 MG/DL (ref 7–20)
CALCIUM SERPL-MCNC: 10.1 MG/DL (ref 8.3–10.6)
CHLORIDE BLD-SCNC: 98 MMOL/L (ref 99–110)
CHOLESTEROL, TOTAL: 111 MG/DL (ref 0–199)
CLARITY, POC: CLEAR
CO2: 26 MMOL/L (ref 21–32)
COLOR, POC: YELLOW
CREAT SERPL-MCNC: 0.8 MG/DL (ref 0.8–1.3)
CREATININE URINE: 48.2 MG/DL (ref 39–259)
EOSINOPHILS ABSOLUTE: 0.2 K/UL (ref 0–0.6)
EOSINOPHILS RELATIVE PERCENT: 3.5 %
GFR AFRICAN AMERICAN: >60
GFR NON-AFRICAN AMERICAN: >60
GLOBULIN: 2.2 G/DL
GLUCOSE BLD-MCNC: 141 MG/DL (ref 70–99)
GLUCOSE URINE, POC: NORMAL
HBA1C MFR BLD: 7.1 %
HCT VFR BLD CALC: 45.7 % (ref 40.5–52.5)
HDLC SERPL-MCNC: 36 MG/DL (ref 40–60)
HEMOGLOBIN: 15.5 G/DL (ref 13.5–17.5)
KETONES, POC: NORMAL
LDL CHOLESTEROL CALCULATED: 40 MG/DL
LEUKOCYTE EST, POC: NORMAL
LYMPHOCYTES ABSOLUTE: 1.6 K/UL (ref 1–5.1)
LYMPHOCYTES RELATIVE PERCENT: 23.8 %
MCH RBC QN AUTO: 32.6 PG (ref 26–34)
MCHC RBC AUTO-ENTMCNC: 33.9 G/DL (ref 31–36)
MCV RBC AUTO: 96.2 FL (ref 80–100)
MICROALBUMIN UR-MCNC: 2.4 MG/DL
MICROALBUMIN/CREAT UR-RTO: 49.8 MG/G (ref 0–30)
MONOCYTES ABSOLUTE: 0.3 K/UL (ref 0–1.3)
MONOCYTES RELATIVE PERCENT: 4.8 %
NEUTROPHILS ABSOLUTE: 4.5 K/UL (ref 1.7–7.7)
NEUTROPHILS RELATIVE PERCENT: 67.5 %
NITRITE, POC: NORMAL
PDW BLD-RTO: 14.4 % (ref 12.4–15.4)
PH, POC: 7
PLATELET # BLD: 177 K/UL (ref 135–450)
PMV BLD AUTO: 8.1 FL (ref 5–10.5)
POTASSIUM SERPL-SCNC: 4.6 MMOL/L (ref 3.5–5.1)
PROSTATE SPECIFIC ANTIGEN: 1.07 NG/ML (ref 0–4)
PROTEIN, POC: NORMAL
RBC # BLD: 4.75 M/UL (ref 4.2–5.9)
SODIUM BLD-SCNC: 138 MMOL/L (ref 136–145)
SPECIFIC GRAVITY, POC: 1.01
TOTAL PROTEIN: 7.1 G/DL (ref 6.4–8.2)
TRIGL SERPL-MCNC: 176 MG/DL (ref 0–150)
UROBILINOGEN, POC: 0.2
VITAMIN D 25-HYDROXY: 52.2 NG/ML
VLDLC SERPL CALC-MCNC: 35 MG/DL
WBC # BLD: 6.6 K/UL (ref 4–11)

## 2021-06-28 PROCEDURE — 3051F HG A1C>EQUAL 7.0%<8.0%: CPT | Performed by: INTERNAL MEDICINE

## 2021-06-28 PROCEDURE — 99214 OFFICE O/P EST MOD 30 MIN: CPT | Performed by: INTERNAL MEDICINE

## 2021-06-28 PROCEDURE — 81002 URINALYSIS NONAUTO W/O SCOPE: CPT | Performed by: INTERNAL MEDICINE

## 2021-06-28 PROCEDURE — 83036 HEMOGLOBIN GLYCOSYLATED A1C: CPT | Performed by: INTERNAL MEDICINE

## 2021-06-28 SDOH — ECONOMIC STABILITY: FOOD INSECURITY: WITHIN THE PAST 12 MONTHS, THE FOOD YOU BOUGHT JUST DIDN'T LAST AND YOU DIDN'T HAVE MONEY TO GET MORE.: NEVER TRUE

## 2021-06-28 SDOH — ECONOMIC STABILITY: FOOD INSECURITY: WITHIN THE PAST 12 MONTHS, YOU WORRIED THAT YOUR FOOD WOULD RUN OUT BEFORE YOU GOT MONEY TO BUY MORE.: NEVER TRUE

## 2021-06-28 ASSESSMENT — PATIENT HEALTH QUESTIONNAIRE - PHQ9
SUM OF ALL RESPONSES TO PHQ QUESTIONS 1-9: 0
SUM OF ALL RESPONSES TO PHQ QUESTIONS 1-9: 0
1. LITTLE INTEREST OR PLEASURE IN DOING THINGS: 0
SUM OF ALL RESPONSES TO PHQ QUESTIONS 1-9: 0
2. FEELING DOWN, DEPRESSED OR HOPELESS: 0
SUM OF ALL RESPONSES TO PHQ9 QUESTIONS 1 & 2: 0

## 2021-06-28 ASSESSMENT — ENCOUNTER SYMPTOMS
VOMITING: 0
SHORTNESS OF BREATH: 1
COUGH: 0
CONSTIPATION: 0
SORE THROAT: 0
WHEEZING: 0
CHEST TIGHTNESS: 0
DIARRHEA: 0
NAUSEA: 0
ABDOMINAL PAIN: 0
RHINORRHEA: 0

## 2021-06-28 ASSESSMENT — SOCIAL DETERMINANTS OF HEALTH (SDOH): HOW HARD IS IT FOR YOU TO PAY FOR THE VERY BASICS LIKE FOOD, HOUSING, MEDICAL CARE, AND HEATING?: NOT HARD AT ALL

## 2021-06-28 NOTE — PATIENT INSTRUCTIONS
-Continue same medications  -Limit carbohydrates to 45 grams with meals and 15 grams with snacks  -Low sodium diet  -Low fat, low cholesterol diet  -monitor blood sugars  -goal for blood sugar fasting or pre-meal  is   -goal for blood sugar 2 hours after a meal is less than 180  -goal for blood sugar at bedtime is less than 150  -Regular aerobic exercise

## 2021-06-28 NOTE — PROGRESS NOTES
for abdominal pain, constipation, diarrhea, nausea and vomiting. Genitourinary: Negative for decreased urine volume, difficulty urinating, dysuria, frequency, hematuria and urgency. Weak stream   Musculoskeletal: Negative for arthralgias and myalgias. Skin: Negative for wound. Neurological: Negative for dizziness, syncope, light-headedness, numbness and headaches. Psychiatric/Behavioral: Negative for dysphoric mood and sleep disturbance. The patient is not nervous/anxious.         No Known Allergies  Outpatient Medications Marked as Taking for the 6/28/21 encounter (Office Visit) with Tanja Adams MD   Medication Sig Dispense Refill    JANUMET XR  MG TB24 per extended release tablet TAKE 1 TABLET TWICE A  tablet 0    atorvastatin (LIPITOR) 20 MG tablet TAKE 1 TABLET DAILY 90 tablet 1    empagliflozin (JARDIANCE) 10 MG tablet Take 1 tablet by mouth daily 90 tablet 1    quinapril (ACCUPRIL) 5 MG tablet TAKE 1 TABLET DAILY 90 tablet 1    tamsulosin (FLOMAX) 0.4 MG capsule TAKE 1 CAPSULE DAILY 90 capsule 1    Blood Glucose Monitoring Suppl (ONE TOUCH ULTRA 2) w/Device KIT 1 kit by Does not apply route daily 1 kit 0    blood glucose monitor strips Test once daily 100 strip 3    Lancets MISC Use to test blood sugar once daily 100 each 3    montelukast (SINGULAIR) 10 MG tablet TAKE 1 TABLET BY MOUTH EVERY NIGHT 90 tablet 3    blood glucose test strips (ONETOUCH ULTRA) strip 1 each by Does not apply route daily 100 strip 2    SITagliptin-metFORMIN (JANUMET XR)  MG TB24 per extended release tablet Take 1 tablet by mouth 2 times daily 180 tablet 1    Multiple Vitamins-Minerals (MULTIVITAMIN ADULTS 50+) TABS Take 1 tablet by mouth daily      ONE TOUCH ULTRASOFT LANCETS MISC 1 each by Does not apply route daily 100 each 3    vitamin B-12 (CYANOCOBALAMIN) 1000 MCG tablet Take 1,000 mcg by mouth 2 times daily      Cholecalciferol (VITAMIN D) 2000 UNITS CAPS capsule Take by mouth daily      Omega-3 Fatty Acids (OMEGA 3 PO) Take 2 capsules by mouth daily            Vitals:    06/28/21 0759   BP: 118/66   Pulse: 101   Resp: 16   Temp: 97 °F (36.1 °C)   SpO2: 98%   Weight: 138 lb (62.6 kg)     Body mass index is 22.27 kg/m². Physical Exam  Nursing note reviewed. Constitutional:       General: He is not in acute distress. Appearance: Normal appearance. He is well-developed. Eyes:      General: Lids are normal.      Extraocular Movements: Extraocular movements intact. Conjunctiva/sclera: Conjunctivae normal.      Pupils: Pupils are equal, round, and reactive to light. Neck:      Thyroid: No thyromegaly. Vascular: No carotid bruit. Cardiovascular:      Rate and Rhythm: Normal rate and regular rhythm. Pulses: Normal pulses. Heart sounds: Normal heart sounds, S1 normal and S2 normal. No murmur heard. No friction rub. No gallop. Pulmonary:      Effort: Pulmonary effort is normal. No respiratory distress. Breath sounds: Normal breath sounds. No wheezing, rhonchi or rales. Chest:      Chest wall: No tenderness. Abdominal:      General: Bowel sounds are normal. There is no distension. Palpations: Abdomen is soft. Tenderness: There is no abdominal tenderness. Musculoskeletal:      Cervical back: Neck supple. Right lower leg: No edema. Left lower leg: No edema. Lymphadenopathy:      Head:      Right side of head: No submandibular adenopathy. Left side of head: No submandibular adenopathy. Skin:     Comments: No foot ulcers, left great toenail partially absent     Neurological:      Mental Status: He is alert.       Comments: Bilateral foot monofilament exam normal   Psychiatric:         Mood and Affect: Mood normal.           Results for POC orders placed in visit on 06/28/21   POCT glycosylated hemoglobin (Hb A1C)   Result Value Ref Range    Hemoglobin A1C 7.1 %   POCT Urinalysis no Micro   Result Value Ref Range Color, UA yellow     Clarity, UA clear     Glucose, UA POC neg     Bilirubin, UA neg     Ketones, UA neg     Spec Grav, UA 1.015     Blood, UA POC neg     pH, UA 7.0     Protein, UA POC neg     Urobilinogen, UA 0.2     Leukocytes, UA neg     Nitrite, UA neg      Lab Review   Orders Only on 01/04/2021   Component Date Value    Cholesterol, Total 01/04/2021 135     Triglycerides 01/04/2021 232*    HDL 01/04/2021 42     LDL Calculated 01/04/2021 47     VLDL Cholesterol Calcula* 01/04/2021 46     Sodium 01/04/2021 140     Potassium 01/04/2021 4.2     Chloride 01/04/2021 99     CO2 01/04/2021 29     Anion Gap 01/04/2021 12     Glucose 01/04/2021 129*    BUN 01/04/2021 19     CREATININE 01/04/2021 0.6*    GFR Non- 01/04/2021 >60     GFR  01/04/2021 >60     Calcium 01/04/2021 11.0*    Total Protein 01/04/2021 7.4     Albumin 01/04/2021 5.1*    Albumin/Globulin Ratio 01/04/2021 2.2     Total Bilirubin 01/04/2021 0.6     Alkaline Phosphatase 01/04/2021 50     ALT 01/04/2021 45*    AST 01/04/2021 31     Globulin 01/04/2021 2.3    Office Visit on 12/31/2020   Component Date Value    Hemoglobin A1C 12/31/2020 6.6          Assessment/Plan     1. Type 2 diabetes mellitus without complication, without long-term current use of insulin ([facility])  - Hemoglobin A1c of 7.1% shows diabetes is controlled  -Continue same medications  -Limit carbohydrates to 45 grams with meals and 15 grams with snacks  -monitor blood sugars  -goal for blood sugar fasting or pre-meal  is   -goal for blood sugar 2 hours after a meal is less than 180  -goal for blood sugar at bedtime is less than 150  -Regular aerobic exercise  - POCT glycosylated hemoglobin (Hb A1C)  - Comprehensive Metabolic Panel; Future  -  DIABETES FOOT EXAM  - Microalbumin / Creatinine Urine Ratio    2.  Essential hypertension  -stable  -Continue same medications  -Low sodium diet  -Regular aerobic exercise  - Comprehensive Metabolic Panel; Future  - POCT Urinalysis no Micro    3. Mixed hyperlipidemia  -most recent triglycerides are high  -Continue same medications  -Low fat, low cholesterol diet  -Regular aerobic exercise  - Comprehensive Metabolic Panel; Future  - Lipid Panel; Future    4. Benign prostatic hyperplasia with weak urinary stream  -stable  -Continue same medications  - PSA, Prostatic Specific Antigen; Future    5. Vitamin D deficiency  -stable  -Continue same medications  - Vitamin D 25 Hydroxy; Future    6. History of thrombocytopenia  - CBC Auto Differential; Future    7. Hypercalcemia  - history of hypercalcemia  - Comprehensive Metabolic Panel; Future    8. Rib pain on right side  - episode for 2 days and better  - XR RIBS RIGHT INCLUDE CHEST (MIN 3 VIEWS); Future    9. Shortness of breath  - episode for 2 days and better  - XR RIBS RIGHT INCLUDE CHEST (MIN 3 VIEWS); Future      Discussed medications with patient, who voiced understanding of their use and indications. All questions answered. Return in about 3 months (around 9/28/2021) for diabetes, hypertension, hyperlipidemia, BPH, vitamin D deficiency.

## 2021-06-29 ENCOUNTER — HOSPITAL ENCOUNTER (OUTPATIENT)
Age: 76
Discharge: HOME | End: 2021-06-29
Payer: MEDICARE

## 2021-06-29 ENCOUNTER — HOSPITAL ENCOUNTER (OUTPATIENT)
Dept: GENERAL RADIOLOGY | Age: 76
Discharge: HOME | End: 2021-06-29
Payer: MEDICARE

## 2021-06-29 DIAGNOSIS — R07.81 RIB PAIN ON RIGHT SIDE: ICD-10-CM

## 2021-06-29 DIAGNOSIS — R06.02 SHORTNESS OF BREATH: ICD-10-CM

## 2021-06-29 PROCEDURE — 71101 X-RAY EXAM UNILAT RIBS/CHEST: CPT

## 2021-07-20 DIAGNOSIS — E11.9 TYPE 2 DIABETES MELLITUS WITHOUT COMPLICATION, WITHOUT LONG-TERM CURRENT USE OF INSULIN (HCC): ICD-10-CM

## 2021-07-20 RX ORDER — EMPAGLIFLOZIN 10 MG/1
1 TABLET, FILM COATED ORAL DAILY
Qty: 90 TABLET | Refills: 1 | Status: SHIPPED | OUTPATIENT
Start: 2021-07-20 | End: 2021-01-01 | Stop reason: SDUPTHER

## 2021-07-20 NOTE — TELEPHONE ENCOUNTER
Medication:   Requested Prescriptions     Pending Prescriptions Disp Refills    JARDIANCE 10 MG tablet [Pharmacy Med Name: Shannon Ayala 10MG TABLETS] 90 tablet 1     Sig: TAKE 1 TABLET BY MOUTH DAILY     Last Filled:  02/08/21    Last appt: 6/28/2021   Next appt: 9/28/2021    Last OARRS: No flowsheet data found.

## 2021-07-23 DIAGNOSIS — R39.12 BENIGN PROSTATIC HYPERPLASIA WITH WEAK URINARY STREAM: ICD-10-CM

## 2021-07-23 DIAGNOSIS — N40.1 BENIGN PROSTATIC HYPERPLASIA WITH WEAK URINARY STREAM: ICD-10-CM

## 2021-07-23 DIAGNOSIS — I10 ESSENTIAL HYPERTENSION: ICD-10-CM

## 2021-07-23 RX ORDER — TAMSULOSIN HYDROCHLORIDE 0.4 MG/1
CAPSULE ORAL
Qty: 90 CAPSULE | Refills: 1 | Status: SHIPPED | OUTPATIENT
Start: 2021-07-23 | End: 2022-01-01 | Stop reason: SDUPTHER

## 2021-07-23 RX ORDER — QUINAPRIL 5 1/1
TABLET ORAL
Qty: 90 TABLET | Refills: 1 | Status: SHIPPED | OUTPATIENT
Start: 2021-07-23 | End: 2022-01-01 | Stop reason: SDUPTHER

## 2021-07-23 NOTE — TELEPHONE ENCOUNTER
Medication:   Requested Prescriptions     Pending Prescriptions Disp Refills    quinapril (ACCUPRIL) 5 MG tablet [Pharmacy Med Name: QUINAPRIL TAB 5MG] 90 tablet 1     Sig: TAKE 1 TABLET DAILY    tamsulosin (FLOMAX) 0.4 MG capsule [Pharmacy Med Name: TAMSULOSIN CAP 0.4MG] 90 capsule 1     Sig: TAKE 1 CAPSULE DAILY     Last Filled:  02/01/21     Last appt: Visit date not found   Next appt: Visit date not found    Last OARRS: No flowsheet data found.

## 2021-09-10 NOTE — TELEPHONE ENCOUNTER
From: Kunal   To: Bigg Benitez MD  Sent: 9/10/2021 10:26 AM EDT  Subject: Non-Urgent Medical Question    Received high dose Flu vaccine today 9-. 18 [facility]

## 2021-09-28 PROBLEM — R80.9 MICROALBUMINURIA: Status: ACTIVE | Noted: 2021-01-01

## 2021-09-28 PROBLEM — Z11.59 NEED FOR HEPATITIS C SCREENING TEST: Status: ACTIVE | Noted: 2021-01-01

## 2021-09-28 NOTE — PROGRESS NOTES
Nicci Mckeon   Date ofBirth:  1945    Date of Visit:  9/28/2021    Chief Complaint   Patient presents with    Hypertension    Diabetes    Hyperlipidemia    Benign Prostatic Hypertrophy    Other     Vitamin D def. HPI  Diabetes Mellitus Type 2:   Current Medications: Jardiance 10mg po q day and Janumet XR 50-1000mg po bid. Diet: low carbohydrate  Home blood sugar records: patient tests 3 times per week fasting and it averages 165. Patient states on his machine his blood sugar can be 20 points difference in 5 minutes. Patient states it reads higher at home. Any episodes of hypoglycemia? no  Eye exam current (within one year): no patient states diabetic eye exam is scheduled for 9/30/21  Current exercise: walks 10 miles per week and plays golf 2 times per week     Hypertension:    CurrentMedications: Quinapril 5mg po q day  Home blood pressure monitoring: no  Diet: low salt  Current exercise: walks 10 miles per week and plays golf 2 times per week     Hyperlipidemia:  Current medication: Atorvastatin 20mg po qhs  Patient denies side effects  Diet: low fat, low cholesterol     BPH:  Current Medication: Flomax 0.4mg po q day  Patient has weak stream. Patient has nocturia once nightly. Patient denies dribbling.     Vitamin D deficiency:  Current Medication:  Vitamin D 2,000 IU po q day    Review of Systems   Constitutional: Negative for activity change, chills, fatigue and fever. HENT: Negative for congestion, postnasal drip, rhinorrhea and sore throat. Eyes: Negative for visual disturbance. Respiratory: Negative for cough, chest tightness, shortness of breath and wheezing. Cardiovascular: Negative for chest pain, palpitations and leg swelling. Gastrointestinal: Negative for abdominal pain, constipation, diarrhea, nausea and vomiting. Genitourinary: Negative for dysuria, frequency and hematuria. Musculoskeletal: Negative for arthralgias and myalgias.    Skin: Negative for wound.   Neurological: Negative for dizziness, syncope, light-headedness, numbness and headaches. Psychiatric/Behavioral: Negative for dysphoric mood and sleep disturbance. The patient is not nervous/anxious. No Known Allergies  Outpatient Medications Marked as Taking for the 9/28/21 encounter (Office Visit) with Clarisa Santiago MD   Medication Sig Dispense Refill    JANUMET XR  MG TB24 per extended release tablet TAKE 1 TABLET TWICE A  tablet 1    quinapril (ACCUPRIL) 5 MG tablet TAKE 1 TABLET DAILY 90 tablet 1    tamsulosin (FLOMAX) 0.4 MG capsule TAKE 1 CAPSULE DAILY 90 capsule 1    JARDIANCE 10 MG tablet TAKE 1 TABLET BY MOUTH DAILY 90 tablet 1    atorvastatin (LIPITOR) 20 MG tablet TAKE 1 TABLET DAILY 90 tablet 1    Blood Glucose Monitoring Suppl (ONE TOUCH ULTRA 2) w/Device KIT 1 kit by Does not apply route daily 1 kit 0    blood glucose monitor strips Test once daily 100 strip 3    Lancets MISC Use to test blood sugar once daily 100 each 3    blood glucose test strips (ONETOUCH ULTRA) strip 1 each by Does not apply route daily 100 strip 2    SITagliptin-metFORMIN (JANUMET XR)  MG TB24 per extended release tablet Take 1 tablet by mouth 2 times daily 180 tablet 1    Multiple Vitamins-Minerals (MULTIVITAMIN ADULTS 50+) TABS Take 1 tablet by mouth daily      ONE TOUCH ULTRASOFT LANCETS MISC 1 each by Does not apply route daily 100 each 3    vitamin B-12 (CYANOCOBALAMIN) 1000 MCG tablet Take 1,000 mcg by mouth 2 times daily      Cholecalciferol (VITAMIN D) 2000 UNITS CAPS capsule Take by mouth daily      Omega-3 Fatty Acids (OMEGA 3 PO) Take 2 capsules by mouth daily            Vitals:    09/28/21 0819   BP: 128/70   Site: Right Upper Arm   Position: Sitting   Cuff Size: Medium Adult   Pulse: 75   Resp: 16   Temp: 98.5 °F (36.9 °C)   TempSrc: Oral   SpO2: 97%   Weight: 134 lb 6.4 oz (61 kg)   Height: 5' 7.2\" (1.707 m)     Body mass index is 20.92 kg/m².      Physical  GFR Non- 06/28/2021 >60     GFR  06/28/2021 >60     Calcium 06/28/2021 10.1     Total Protein 06/28/2021 7.1     Albumin 06/28/2021 4.9     Albumin/Globulin Ratio 06/28/2021 2.2     Total Bilirubin 06/28/2021 0.5     Alkaline Phosphatase 06/28/2021 57     ALT 06/28/2021 32     AST 06/28/2021 27     Globulin 06/28/2021 2.2     WBC 06/28/2021 6.6     RBC 06/28/2021 4.75     Hemoglobin 06/28/2021 15.5     Hematocrit 06/28/2021 45.7     MCV 06/28/2021 96.2     MCH 06/28/2021 32.6     MCHC 06/28/2021 33.9     RDW 06/28/2021 14.4     Platelets 30/25/6767 177     MPV 06/28/2021 8.1     Neutrophils % 06/28/2021 67.5     Lymphocytes % 06/28/2021 23.8     Monocytes % 06/28/2021 4.8     Eosinophils % 06/28/2021 3.5     Basophils % 06/28/2021 0.4     Neutrophils Absolute 06/28/2021 4.5     Lymphocytes Absolute 06/28/2021 1.6     Monocytes Absolute 06/28/2021 0.3     Eosinophils Absolute 06/28/2021 0.2     Basophils Absolute 06/28/2021 0.0    Office Visit on 06/28/2021   Component Date Value    Hemoglobin A1C 06/28/2021 7.1     Microalbumin, Random Uri* 06/28/2021 2.40*    Creatinine, Ur 06/28/2021 48.2     Microalbumin Creatinine * 06/28/2021 49.8*    Color, UA 06/28/2021 yellow     Clarity, UA 06/28/2021 clear     Glucose, UA POC 06/28/2021 neg     Bilirubin, UA 06/28/2021 neg     Ketones, UA 06/28/2021 neg     Spec Grav, UA 06/28/2021 1.015     Blood, UA POC 06/28/2021 neg     pH, UA 06/28/2021 7.0     Protein, UA POC 06/28/2021 neg     Urobilinogen, UA 06/28/2021 0.2     Leukocytes, UA 06/28/2021 neg     Nitrite, UA 06/28/2021 neg          Assessment/Plan     1.  Type 2 diabetes mellitus without complication, without long-term current use of insulin ([facility])  -Hemoglobin A1c of 7.1% shows diabetes is controlled  - POCT glycosylated hemoglobin (Hb A1C)  -Continue same medications  -Limit carbohydrates to 45 grams with meals and 15 grams with snacks  -monitor blood sugars  -goal for blood sugar fasting or pre-meal  is   -goal for blood sugar 2 hours after a meal is less than 180  -goal for blood sugar at bedtime is less than 150  -Regular aerobic exercise  -Diabetic eye exam is scheduled for 9/30/21    2. Essential hypertension  -stable  -Continue same medications  -Low sodium diet  -Regular aerobic exercise    3. Mixed hyperlipidemia  -stable  -Continue same medications  -Low fat, low cholesterol diet  -Regular aerobic exercise    4. Benign prostatic hyperplasia with weak urinary stream  -stable  -Continue same medications  -continue care per Urology    5. Vitamin D deficiency  -stable  -Continue same medications    6. Microalbuminuria  -Urine microalbumin is a little elevated  -Continue Quinapril for blood pressure which also treats microalbuminuria  -continue diabetes control        Discussed medications with patient, who voiced understanding of their use and indications. All questions answered. Return in about 14 weeks (around 1/4/2022) for Medicare AW.

## 2021-09-28 NOTE — PATIENT INSTRUCTIONS
-Continue same medications  -Low sodium diet  -Low fat, low cholesterol diet  -Limit carbohydrates to 45 grams with meals and 15 grams with snacks  -monitor blood sugars  -goal for blood sugar fasting or pre-meal  is   -goal for blood sugar 2 hours after a meal is less than 180  -goal for blood sugar at bedtime is less than 150  -Regular aerobic exercise

## 2021-10-18 NOTE — TELEPHONE ENCOUNTER
Medication:   Requested Prescriptions     Pending Prescriptions Disp Refills    atorvastatin (LIPITOR) 20 MG tablet [Pharmacy Med Name: ATORVASTATIN TAB 20MG] 90 tablet 1     Sig: TAKE 1 TABLET DAILY     Last Filled: 4.1.21    Last appt: 9/28/2021   Next appt: 1/3/2022    Last OARRS: No flowsheet data found.

## 2021-11-22 NOTE — TELEPHONE ENCOUNTER
Pt says that he has dry skin rash on the side of his face. He has tried lotions but they are not making his skin any better.     LOV 9/28/21

## 2021-12-21 PROBLEM — R22.1 NECK MASS: Status: ACTIVE | Noted: 2021-01-01

## 2021-12-21 PROBLEM — R63.4 WEIGHT LOSS: Status: ACTIVE | Noted: 2021-01-01

## 2021-12-21 PROBLEM — R26.89 BALANCE PROBLEMS: Status: ACTIVE | Noted: 2021-01-01
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
## 2021-12-21 PROBLEM — R53.83 FATIGUE: Status: ACTIVE | Noted: 2021-01-01

## 2021-12-21 PROBLEM — R53.1 GENERALIZED WEAKNESS: Status: ACTIVE | Noted: 2021-01-01

## 2021-12-21 PROBLEM — R63.0 APPETITE LOSS: Status: ACTIVE | Noted: 2021-01-01

## 2021-12-21 NOTE — PATIENT INSTRUCTIONS
1. Shortness of breath  - XR CHEST (2 VW); Future  - CBC Auto Differential; Future    2. Generalized weakness  - CBC Auto Differential; Future  - Magnesium; Future  - Sedimentation Rate; Future  - C-Reactive Protein; Future  - CK; Future  - TSH without Reflex; Future    3. Fatigue, unspecified type  - CBC Auto Differential; Future  - TSH without Reflex; Future  - Vitamin B12; Future    4. Weight loss  - CBC Auto Differential; Future  - TSH without Reflex; Future    5. Appetite loss  - CBC Auto Differential; Future    6. Balance problems  - Vitamin B12; Future    7. Fall, initial encounter  - 2 recent falls    8. Enlarged lymph node in neck  - CT SOFT TISSUE NECK W CONTRAST; Future  - [address], Nicki Linares DO, Otolaryngology, [place]    9. Urinary frequency  - POCT Urinalysis no Micro    10.  Diarrhea, unspecified type  - [facility] - Vanna Kingston MD, Gastroenterology, [address] [place] [address]

## 2021-12-21 NOTE — PROGRESS NOTES
Julio César Romo   Date ofBirth:  1945    Date of Visit:  12/21/2021    Chief Complaint   Patient presents with    Weight Loss     since last visit noticed he is losing weight     Fatigue     been going on about 2 months        HPI  Patient states for 2 months of things progressively getting worse. Patient states he started with weakness, shortness of breath, lack of pep, balance off and has fallen 2 times     Patient states he had his 3rd Booster shot in October and feels like the symptoms started after the Booster but haven't gone away but have accumulated more problems. 1st fall 3 weeks ago he got up getting ready to put on clothes and started falling backwards and hit back back against his dresser. Patient states 2nd fall 1 weeks ago up early and went to his kitchen to get orange juice and went to sit on couch and wasn't lined up with couch and fell onto end table. Patient denies chest pain. Patient states it takes energy just to talk    Patient states this morning he woke up with a lump on his neck. Patient states his appetite is no good. Patient states he has lost weight. Patient denies cough. Patient states he has had diarrhea off and on with watery stools for 2 months    Patient states he urinates often. Patient states he urinates 1-2 times at night and quite often during the day;    Patient had labs done for comprehensive metabolic panel, lipid panel, vitamin D, and hepatitis c antibody this morning. Patient drinks a lot of water. Blood sugar 144 this morning and has been 168-200    Wt Readings from Last 3 Encounters:   12/28/21 134 lb (60.8 kg)   12/21/21 129 lb 6.4 oz (58.7 kg)   09/28/21 134 lb 6.4 oz (61 kg)         Review of Systems   Constitutional: Positive for appetite change, fatigue and unexpected weight change. Negative for chills and fever. HENT: Negative for congestion, postnasal drip, rhinorrhea and sore throat.     Eyes: Negative for visual disturbance. Respiratory: Positive for shortness of breath. Negative for cough, chest tightness and wheezing. Cardiovascular: Negative for chest pain, palpitations and leg swelling. Gastrointestinal: Positive for diarrhea. Negative for abdominal distention, abdominal pain, blood in stool, constipation, nausea and vomiting. Genitourinary: Positive for frequency. Negative for dysuria and hematuria. Musculoskeletal: Negative for arthralgias and myalgias. Skin: Negative for wound. Neurological: Negative for dizziness, syncope, light-headedness, numbness and headaches. Hematological: Positive for adenopathy. Psychiatric/Behavioral: Negative for dysphoric mood and sleep disturbance. The patient is not nervous/anxious.         No Known Allergies  Outpatient Medications Marked as Taking for the 12/21/21 encounter (Office Visit) with Brittanie Corado MD   Medication Sig Dispense Refill    atorvastatin (LIPITOR) 20 MG tablet TAKE 1 TABLET DAILY 90 tablet 1    JANUMET XR  MG TB24 per extended release tablet TAKE 1 TABLET TWICE A  tablet 1    quinapril (ACCUPRIL) 5 MG tablet TAKE 1 TABLET DAILY 90 tablet 1    tamsulosin (FLOMAX) 0.4 MG capsule TAKE 1 CAPSULE DAILY 90 capsule 1    [DISCONTINUED] JARDIANCE 10 MG tablet TAKE 1 TABLET BY MOUTH DAILY 90 tablet 1    Blood Glucose Monitoring Suppl (ONE TOUCH ULTRA 2) w/Device KIT 1 kit by Does not apply route daily 1 kit 0    blood glucose monitor strips Test once daily 100 strip 3    Lancets MISC Use to test blood sugar once daily 100 each 3    blood glucose test strips (ONETOUCH ULTRA) strip 1 each by Does not apply route daily 100 strip 2    Multiple Vitamins-Minerals (MULTIVITAMIN ADULTS 50+) TABS Take 1 tablet by mouth daily      ONE TOUCH ULTRASOFT LANCETS MISC 1 each by Does not apply route daily 100 each 3    vitamin B-12 (CYANOCOBALAMIN) 1000 MCG tablet Take 1,000 mcg by mouth 2 times daily      Cholecalciferol (VITAMIN D) 2000 UNITS CAPS capsule Take by mouth daily      Omega-3 Fatty Acids (OMEGA 3 PO) Take 2 capsules by mouth daily            Vitals:    12/21/21 1119   BP: 114/66   Site: Right Upper Arm   Position: Sitting   Cuff Size: Medium Adult   Pulse: 101   Resp: 18   Temp: 97.4 °F (36.3 °C)   TempSrc: Infrared   SpO2: 99%   Weight: 129 lb 6.4 oz (58.7 kg)     Body mass index is 20.15 kg/m². Physical Exam  Nursing note reviewed. Constitutional:       General: He is not in acute distress. Appearance: Normal appearance. He is well-developed. HENT:      Mouth/Throat:      Pharynx: Oropharynx is clear. Eyes:      General: Lids are normal.      Extraocular Movements: Extraocular movements intact. Conjunctiva/sclera: Conjunctivae normal.      Pupils: Pupils are equal, round, and reactive to light. Neck:      Thyroid: No thyromegaly. Vascular: No carotid bruit. Cardiovascular:      Rate and Rhythm: Normal rate and regular rhythm. Heart sounds: Normal heart sounds, S1 normal and S2 normal. No murmur heard. No friction rub. No gallop. Pulmonary:      Effort: Pulmonary effort is normal. No respiratory distress. Breath sounds: Normal breath sounds. No wheezing, rhonchi or rales. Abdominal:      General: Bowel sounds are normal. There is no distension. Palpations: Abdomen is soft. Tenderness: There is no abdominal tenderness. There is no guarding or rebound. Musculoskeletal:      Cervical back: Neck supple. Right lower leg: No edema. Left lower leg: No edema. Lymphadenopathy:      Head:      Right side of head: No submandibular adenopathy. Left side of head: No submandibular adenopathy. Cervical: Cervical adenopathy (enlarged lymph nodes superior right neck below ear, tender) present. Neurological:      Mental Status: He is alert.    Psychiatric:         Mood and Affect: Mood normal.           Results for POC orders placed in visit on 12/21/21   POCT Urinalysis no Micro   Result Value Ref Range    Color, UA yellow     Clarity, UA clear     Glucose, UA      Bilirubin, UA neg     Ketones, UA 15     Spec Grav, UA >=1.030     Blood, UA POC trace     pH, UA 5.0     Protein, UA POC neg     Urobilinogen, UA 0.2     Leukocytes, UA neg     Nitrite, UA neg      Lab Review   Orders Only on 12/21/2021   Component Date Value    Hep C Ab Interp 12/21/2021 Non-reactive     Vit D, 25-Hydroxy 12/21/2021 39.8     Microalbumin, Random Uri* 12/21/2021 3.00*    Creatinine, Ur 12/21/2021 77.4     Microalbumin Creatinine * 12/21/2021 38.8*    Cholesterol, Total 12/21/2021 120     Triglycerides 12/21/2021 225*    HDL 12/21/2021 34*    LDL Calculated 12/21/2021 41     VLDL Cholesterol Calcula* 12/21/2021 45     Sodium 12/21/2021 142     Potassium 12/21/2021 4.8     Chloride 12/21/2021 97*    CO2 12/21/2021 27     Anion Gap 12/21/2021 18*    Glucose 12/21/2021 162*    BUN 12/21/2021 25*    CREATININE 12/21/2021 0.8     GFR Non- 12/21/2021 >60     GFR  12/21/2021 >60     Calcium 12/21/2021 12.9*    Total Protein 12/21/2021 7.1     Albumin 12/21/2021 4.5     Albumin/Globulin Ratio 12/21/2021 1.7     Total Bilirubin 12/21/2021 0.9     Alkaline Phosphatase 12/21/2021 100     ALT 12/21/2021 24     AST 12/21/2021 50*   Orders Only on 10/07/2021   Component Date Value    Visual Acuity Distance R* 10/07/2021 20/20     Visual Acuity Distance L* 10/07/2021 20/20     Intraocular Pressure Eye 10/07/2021                      Value:21  17      Diabetic Retinopathy 10/07/2021 NEGATIVE     Cataracts 10/07/2021 NEGATIVE     Glaucoma 10/07/2021 NEGATIVE    Office Visit on 09/28/2021   Component Date Value    Hemoglobin A1C 09/28/2021 7.1    Orders Only on 06/28/2021   Component Date Value    PSA 06/28/2021 1.07     Vit D, 25-Hydroxy 06/28/2021 52.2     Cholesterol, Total 06/28/2021 111     Triglycerides 06/28/2021 176*    HDL 06/28/2021 36* Assessment/Plan     1. Shortness of breath  - XR CHEST (2 VW); Future  - CBC Auto Differential; Future    2. Generalized weakness  - CBC Auto Differential; Future  - Magnesium; Future  - Sedimentation Rate; Future  - C-Reactive Protein; Future  - CK; Future  - TSH without Reflex; Future    3. Fatigue, unspecified type  - CBC Auto Differential; Future  - TSH without Reflex; Future  - Vitamin B12; Future  -multivitamin once daily    4. Weight loss  - CBC Auto Differential; Future  - TSH without Reflex; Future  - Boost or Ensure 2 times daily    5. Appetite loss  - CBC Auto Differential; Future  -Boost or Ensure 2 times daily    6. Balance problems  - Vitamin B12; Future    7. Fall, initial encounter  - 2 recent falls    8. Enlarged lymph node in neck  - CT SOFT TISSUE NECK W CONTRAST; Future  - [facility]    9. Urinary frequency  - POCT Urinalysis no Micro    10. Diarrhea, unspecified type  -off and on for 2 months  - Referral to  Bassam Carrasco MD, Gastroenterology, [facility]      Discussed medications with patient, who voiced understanding of their use and indications. All questions answered. Return in about 1 week (around 12/28/2021) for results.

## 2021-12-21 NOTE — PROGRESS NOTES
[address] VISIT      Patient Name: [address]ene [address] Record Number:  [id number]  Primary Care Physician:  Margareth Miranda MD    ChiefComplaint     Chief Complaint   Patient presents with    Other     patient states he has a swollen area the right side of his jawline near right ear, it is tender to touch, feels like his right ear clogged       History of Present Illness     Mila Rivera is an 67 y.o. male previously seen for eustachian tube dysfunction by Dr. Jalyn Alvarado, last seen April 2019. Interval History:   Swelling near area of right angle of jaw, started this morning. Tender to touch. Both ears feel plugged up for past past few hours. Hx of T2 DM. Have blood work today and noted to have hypercalcemia. He has had issues with hypercalcemia in the past but today was his highest reading so far. Has appointment with dermatology in February.   No history of head or neck skin cancer    Past Medical History     Past Medical History:   Diagnosis Date    Allergic rhinitis     Benign prostatic hyperplasia with weak urinary stream 12/10/2015     Updating Deprecated Diagnoses    History of gout 9/19/2015    History of thrombocytopenia 9/19/2015    Hypercalcemia     Hyperlipidemia     Hypertension     Lung nodule     Proteinuria     Type II or unspecified type diabetes mellitus without mention of complication, not stated as uncontrolled     Urinary disorder     Vitamin D deficiency 9/19/2015       Past Surgical History     Past Surgical History:   Procedure Laterality Date    CATARACT REMOVAL WITH IMPLANT Bilateral 2011    COLONOSCOPY  3/29/2011    repeat in 5 yrs: Katheryn Santiago MD    COLONOSCOPY  03/14/2016    repeat in 7-8 years: Katheryn Santiago MD    ELBOW FRACTURE SURGERY Left age 5   [facility] FINGER TRIGGER RELEASE Right 2007    index    TONSILLECTOMY AND ADENOIDECTOMY  age 1    [address]       Family History     Family History Problem Relation Age of Onset    Diabetes Mother     High Blood Pressure Mother     Dementia Mother     Cancer Neg Hx     Hearing Loss Neg Hx     Stroke Neg Hx     Heart Disease Neg Hx        Social History     Social History     Tobacco Use    Smoking status: Never Smoker    Smokeless tobacco: Never Used    Tobacco comment: never used tobbaco   Vaping Use    Vaping Use: Never used   Substance Use Topics    Alcohol use: Yes     Types: 2 Glasses of wine per week     Comment: drink alcohol very occasionally.     Drug use: No        Allergies     No Known Allergies    Medications     Current Outpatient Medications   Medication Sig Dispense Refill    amoxicillin-clavulanate (AUGMENTIN) 875-125 MG per tablet Take 1 tablet by mouth 2 times daily for 10 days 20 tablet 0    atorvastatin (LIPITOR) 20 MG tablet TAKE 1 TABLET DAILY 90 tablet 1    JANUMET XR  MG TB24 per extended release tablet TAKE 1 TABLET TWICE A  tablet 1    quinapril (ACCUPRIL) 5 MG tablet TAKE 1 TABLET DAILY 90 tablet 1    tamsulosin (FLOMAX) 0.4 MG capsule TAKE 1 CAPSULE DAILY 90 capsule 1    JARDIANCE 10 MG tablet TAKE 1 TABLET BY MOUTH DAILY 90 tablet 1    Blood Glucose Monitoring Suppl (ONE TOUCH ULTRA 2) w/Device KIT 1 kit by Does not apply route daily 1 kit 0    blood glucose monitor strips Test once daily 100 strip 3    Lancets MISC Use to test blood sugar once daily 100 each 3    blood glucose test strips (ONETOUCH ULTRA) strip 1 each by Does not apply route daily 100 strip 2    Multiple Vitamins-Minerals (MULTIVITAMIN ADULTS 50+) TABS Take 1 tablet by mouth daily      ONE TOUCH ULTRASOFT LANCETS MISC 1 each by Does not apply route daily 100 each 3    vitamin B-12 (CYANOCOBALAMIN) 1000 MCG tablet Take 1,000 mcg by mouth 2 times daily      Cholecalciferol (VITAMIN D) 2000 UNITS CAPS capsule Take by mouth daily      Omega-3 Fatty Acids (OMEGA 3 PO) Take 2 capsules by mouth daily        No current facility-administered medications for this visit. Review of Systems     REVIEW OF SYSTEMS  The following systems were reviewed and revealed the following in addition to any already discussed in the HPI:    CONSTITUTIONAL: no weight loss, no fever, no night sweats, no chills  EYES: no vision changes, no blurry vision  EARS: no hearing loss, no otalgia  NOSE: no epistaxis, no rhinorrhea  THROAT: No voice changes, no sore throat, no dysphagia    PhysicalExam     Vitals:    12/21/21 1540   BP: 122/75   Site: Left Upper Arm   Position: Sitting   Cuff Size: Medium Adult   Pulse: 106   Temp: 97.3 °F (36.3 °C)   TempSrc: Temporal       PHYSICAL EXAM  /75 (Site: Left Upper Arm, Position: Sitting, Cuff Size: Medium Adult)   Pulse 106   Temp 97.3 °F (36.3 °C) (Temporal)     GENERAL: No acute distress, alert and oriented, no hoarseness  EYES: EOMI, Anti-icteric  NOSE: On anterior rhinoscopy there is no epistaxis, nasal mucosa moist and normal appearing, no purulent drainage. EARS: Normal external appearance; on portable otomicroscopy:     -Ad: External auditory canal without stenosis, tympanic membrane clear, no middle ear effusions or retractions     -As: External auditory canal without stenosis, tympanic membrane clear, no middle ear effusions or retractions  FACE: HB 1/6 bilaterally, symmetric appearing, sensation equal bilaterally  ORAL CAVITY: No masses or lesions visualized or palpated, uvula is midline, moist mucous membranes, absent tonsils, dentition without evidence of major decay  NECK: 2 cm soft neck mass just posterior to the angle of the jaw on the right. Moderately tender to palpation.   Normal range of motion, no thyromegaly, trachea is midline, no palpable lymphadenopathy or neck masses, no crepitus  CHEST: Normal respiratory effort, breathing comfortably, no retractions  SKIN: No rashes, normal appearing skin, no evidence of skin lesions/tumors  NEURO: Cranial Nerves 2, 3, 4, 5, 6, 7, 11, 12 intact bilaterally     I have performed a head and neck physical exam personally or was physically present during the key or critical portions of the service. Data/Imaging Review     Component Ref Range & Units 12/21/21 0807 6/28/21 0858 1/4/21 0820 9/24/20 1333 6/18/20 0738 11/7/19 1015 5/6/19 0854   Sodium 136 - 145 mmol/L 142  138  140  141  140  142  142    Potassium 3.5 - 5.1 mmol/L 4.8  4.6  4.2  5.1  4.8  4.4  4.8    Chloride 99 - 110 mmol/L 97 Low   98 Low   99  101  99  98 Low   100    CO2 21 - 32 mmol/L 27  26  29  26  26  25  27    Anion Gap 3 - 16 18 High   14  12  14  15  19 High   15    Glucose 70 - 99 mg/dL 162 High   141 High   129 High   154 High   157 High   131 High   161 High     BUN 7 - 20 mg/dL 25 High   17  19  19  15  17  17    CREATININE 0.8 - 1.3 mg/dL 0.8  0.8  0.6 Low   0.6 Low   0.8  0.7 Low   0.9    GFR Non-African American >60 >60  >60 CM  >60 CM  >60 CM  >60 CM  >60 CM  >60 CM    Comment: >60 mL/min/1.73m2 EGFR, calc. for ages 25 and older using the   MDRD formula (not corrected for weight), is valid for stable   renal function. GFR  >60 >60  >60 CM  >60 CM  >60 CM  >60 CM  >60 CM  >60 CM    Comment: Chronic Kidney Disease: less than 60 ml/min/1.73 sq. m.         Kidney Failure: less than 15 ml/min/1.73 sq.m. Results valid for patients 18 years and older.     Calcium 8.3 - 10.6 mg/dL 12.9 High Panic   10.1  11.0 High   10.2  10.3  10.7 High   10.6    Total Protein 6.4 - 8.2 g/dL 7.1  7.1  7.4   6.9  7.2  7.1    Albumin 3.4 - 5.0 g/dL 4.5  4.9  5.1 High    4.8  5.0  4.8    Albumin/Globulin Ratio 1.1 - 2.2 1.7  2.2  2.2   2.3 High   2.3 High   2.1    Total Bilirubin 0.0 - 1.0 mg/dL 0.9  0.5  0.6   0.7  0.7  0.4    Alkaline Phosphatase 40 - 129 U/L 100  57  50   48  47  51    ALT 10 - 40 U/L 24  32  45 High    36  41 High   41 High     AST 15 - 37 U/L 50 High             Component Ref Range & Units 12/21/21 2103 6/28/21 5832 6/18/20 9403 5/6/19 0854 11/6/18 6338 4/24/18 0928 12/23/16 0843   Vit D, 25-Hydroxy >=30 ng/mL 39.8  52.2 CM  52.6 CM  46.9 CM  40.0 CM  40.6 CM  37.0 CM    Comment: <=20 ng/mL. ........... Reydon Crumble Deficient   21-29 ng/mL. ......... Reydon Crumble Insufficient   >=30 ng/mL         Assessment and Plan     1. Mass of right side of neck  -Mass seems localized to the tail of the right parotid gland. May be developing parotid abscess versus parotid neoplasm. Favor infectious etiology given acuity. -CT scan neck with contrast  -We will treat empirically with Augmentin  -Sialagogues, warm compresses, posterior anterior parotid massage    2. Hypercalcemia  - PTH, INTACT; Future    3. Dysfunction of both eustachian tubes  -We will consider starting on Flonase at follow-up visit if not improved      Follow-Up     Return in about 2 weeks (around 1/4/2022) for after imaging. Dede Dsouza 45  Department of Otolaryngology/Head and Neck Surgery  12/21/21    Medical Decision Making: The following items were considered in medical decision making:  Independent review of images  Review / order clinical lab tests  Review / order radiology tests  Decision to obtain old records      This note was generated completely or in part utilizing Dragon dictation speech recognition software. Occasionally, words are mistranscribed and despite editing, the text may contain inaccuracies due to incorrect word recognition. If further clarification is needed please contact the office at [phone].

## 2021-12-28 NOTE — PROGRESS NOTES
Colt Samuel   Date ofBirth:  1945    Date of Visit:  12/28/2021    Chief Complaint   Patient presents with    Results       HPI  Low magnesium. Patient is taking magnesium 400mg once daily. Calcium 8.9 on 12/25/21 patient was given magnesium and fluids. Hypercalcemia  Hypomagnesemia  Parotid mass  Liver mass  Glucerna 1 shake per day  Eating some meals. Wt Readings from Last 3 Encounters:   12/28/21 134 lb (60.8 kg)   12/21/21 129 lb 6.4 oz (58.7 kg)   09/28/21 134 lb 6.4 oz (61 kg)       Review of Systems   Constitutional: Positive for appetite change, fatigue and unexpected weight change. Negative for chills and fever. HENT: Negative for congestion, postnasal drip, rhinorrhea and sore throat. Eyes: Negative for visual disturbance. Respiratory: Positive for shortness of breath. Negative for cough, chest tightness and wheezing. Cardiovascular: Negative for chest pain, palpitations and leg swelling. Gastrointestinal: Positive for diarrhea. Negative for abdominal distention, abdominal pain, blood in stool, constipation, nausea and vomiting. Genitourinary: Positive for frequency. Negative for dysuria and hematuria. Musculoskeletal: Negative for arthralgias and myalgias. Skin: Negative for wound. Neurological: Negative for dizziness, syncope, light-headedness, numbness and headaches. Hematological: Positive for adenopathy. Psychiatric/Behavioral: Negative for dysphoric mood and sleep disturbance. The patient is not nervous/anxious.         No Known Allergies  Outpatient Medications Marked as Taking for the 12/28/21 encounter (Office Visit) with Juan Luis Haynes MD   Medication Sig Dispense Refill    magnesium oxide (MAG-OX) 400 MG tablet Take 400 mg by mouth daily      empagliflozin (JARDIANCE) 10 MG tablet Take 1 tablet by mouth daily 90 tablet 1    amoxicillin-clavulanate (AUGMENTIN) 875-125 MG per tablet Take 1 tablet by mouth 2 times daily for 10 days 20 tablet 0    atorvastatin (LIPITOR) 20 MG tablet TAKE 1 TABLET DAILY 90 tablet 1    JANUMET XR  MG TB24 per extended release tablet TAKE 1 TABLET TWICE A  tablet 1    quinapril (ACCUPRIL) 5 MG tablet TAKE 1 TABLET DAILY 90 tablet 1    tamsulosin (FLOMAX) 0.4 MG capsule TAKE 1 CAPSULE DAILY 90 capsule 1    Blood Glucose Monitoring Suppl (ONE TOUCH ULTRA 2) w/Device KIT 1 kit by Does not apply route daily 1 kit 0    blood glucose monitor strips Test once daily 100 strip 3    Lancets MISC Use to test blood sugar once daily 100 each 3    blood glucose test strips (ONETOUCH ULTRA) strip 1 each by Does not apply route daily 100 strip 2    Multiple Vitamins-Minerals (MULTIVITAMIN ADULTS 50+) TABS Take 1 tablet by mouth daily      ONE TOUCH ULTRASOFT LANCETS MISC 1 each by Does not apply route daily 100 each 3    vitamin B-12 (CYANOCOBALAMIN) 1000 MCG tablet Take 1,000 mcg by mouth 2 times daily      Cholecalciferol (VITAMIN D) 2000 UNITS CAPS capsule Take by mouth daily      Omega-3 Fatty Acids (OMEGA 3 PO) Take 2 capsules by mouth daily            Vitals:    12/28/21 1128   BP: 120/66   Pulse: 69   Resp: 16   Temp: 96.9 °F (36.1 °C)   SpO2: 97%   Weight: 134 lb (60.8 kg)     Body mass index is 20.86 kg/m². Physical Exam  Constitutional:       General: He is not in acute distress. Appearance: Normal appearance. He is well-developed. Eyes:      General: Lids are normal.      Extraocular Movements: Extraocular movements intact. Conjunctiva/sclera: Conjunctivae normal.      Pupils: Pupils are equal, round, and reactive to light. Neck:      Thyroid: No thyromegaly. Vascular: No carotid bruit. Cardiovascular:      Rate and Rhythm: Normal rate and regular rhythm. Heart sounds: Normal heart sounds, S1 normal and S2 normal. No murmur heard. No friction rub. No gallop. Pulmonary:      Effort: Pulmonary effort is normal.      Breath sounds: Normal breath sounds.  No wheezing, rhonchi or rales. Abdominal:      General: Bowel sounds are normal. There is no distension. Palpations: Abdomen is soft. Tenderness: There is no abdominal tenderness. Musculoskeletal:      Cervical back: Neck supple. Right lower leg: No edema. Left lower leg: No edema. Lymphadenopathy:      Head:      Right side of head: No submandibular adenopathy. Left side of head: No submandibular adenopathy. Psychiatric:         Mood and Affect: Mood normal.           No results found for this visit on 12/28/21.   Lab Review   Orders Only on 12/21/2021   Component Date Value    Vitamin B-12 12/21/2021 >2000*    WBC 12/21/2021 7.8     RBC 12/21/2021 4.68     Hemoglobin 12/21/2021 14.2     Hematocrit 12/21/2021 45.1     MCV 12/21/2021 96.2     MCH 12/21/2021 30.2     MCHC 12/21/2021 31.4     RDW 12/21/2021 17.2*    Platelets 94/12/9797 159     MPV 12/21/2021 8.4     Neutrophils % 12/21/2021 77.3     Lymphocytes % 12/21/2021 14.7     Monocytes % 12/21/2021 6.4     Eosinophils % 12/21/2021 1.4     Basophils % 12/21/2021 0.2     Neutrophils Absolute 12/21/2021 6.0     Lymphocytes Absolute 12/21/2021 1.1     Monocytes Absolute 12/21/2021 0.5     Eosinophils Absolute 12/21/2021 0.1     Basophils Absolute 12/21/2021 0.0     Magnesium 12/21/2021 1.20*    Sed Rate 12/21/2021 23*    CRP 12/21/2021 60.6*    Total CK 12/21/2021 31*    TSH 12/21/2021 3.80    Office Visit on 12/21/2021   Component Date Value    Color, UA 12/21/2021 yellow     Clarity, UA 12/21/2021 clear     Glucose, UA POC 12/21/2021 500     Bilirubin, UA 12/21/2021 neg     Ketones, UA 12/21/2021 15     Spec Grav, UA 12/21/2021 >=1.030     Blood, UA POC 12/21/2021 trace     pH, UA 12/21/2021 5.0     Protein, UA POC 12/21/2021 neg     Urobilinogen, UA 12/21/2021 0.2     Leukocytes, UA 12/21/2021 neg     Nitrite, UA 12/21/2021 neg    Orders Only on 12/21/2021   Component Date Value    Hep C Ab Interp 12/21/2021 Non-reactive     Vit D, 25-Hydroxy 12/21/2021 39.8     Microalbumin, Random Uri* 12/21/2021 3.00*    Creatinine, Ur 12/21/2021 77.4     Microalbumin Creatinine * 12/21/2021 38.8*    Cholesterol, Total 12/21/2021 120     Triglycerides 12/21/2021 225*    HDL 12/21/2021 34*    LDL Calculated 12/21/2021 41     VLDL Cholesterol Calcula* 12/21/2021 45     Sodium 12/21/2021 142     Potassium 12/21/2021 4.8     Chloride 12/21/2021 97*    CO2 12/21/2021 27     Anion Gap 12/21/2021 18*    Glucose 12/21/2021 162*    BUN 12/21/2021 25*    CREATININE 12/21/2021 0.8     GFR Non- 12/21/2021 >60     GFR  12/21/2021 >60     Calcium 12/21/2021 12.9*    Total Protein 12/21/2021 7.1     Albumin 12/21/2021 4.5     Albumin/Globulin Ratio 12/21/2021 1.7     Total Bilirubin 12/21/2021 0.9     Alkaline Phosphatase 12/21/2021 100     ALT 12/21/2021 24     AST 12/21/2021 50*   Orders Only on 10/07/2021   Component Date Value    Visual Acuity Distance R* 10/07/2021 20/20     Visual Acuity Distance L* 10/07/2021 20/20     Intraocular Pressure Eye 10/07/2021                      Value:21  17      Diabetic Retinopathy 10/07/2021 NEGATIVE     Cataracts 10/07/2021 NEGATIVE     Glaucoma 10/07/2021 NEGATIVE    Office Visit on 09/28/2021   Component Date Value    Hemoglobin A1C 09/28/2021 7.1    Orders Only on 06/28/2021   Component Date Value    PSA 06/28/2021 1.07     Vit D, 25-Hydroxy 06/28/2021 52.2     Cholesterol, Total 06/28/2021 111     Triglycerides 06/28/2021 176*    HDL 06/28/2021 36*    LDL Calculated 06/28/2021 40     VLDL Cholesterol Calcula* 06/28/2021 35     Sodium 06/28/2021 138     Potassium 06/28/2021 4.6     Chloride 06/28/2021 98*    CO2 06/28/2021 26     Anion Gap 06/28/2021 14     Glucose 06/28/2021 141*    BUN 06/28/2021 17     CREATININE 06/28/2021 0.8     GFR Non- 06/28/2021 >60     GFR  06/28/2021 >60  Calcium 06/28/2021 10.1     Total Protein 06/28/2021 7.1     Albumin 06/28/2021 4.9     Albumin/Globulin Ratio 06/28/2021 2.2     Total Bilirubin 06/28/2021 0.5     Alkaline Phosphatase 06/28/2021 57     ALT 06/28/2021 32     AST 06/28/2021 27     Globulin 06/28/2021 2.2     WBC 06/28/2021 6.6     RBC 06/28/2021 4.75     Hemoglobin 06/28/2021 15.5     Hematocrit 06/28/2021 45.7     MCV 06/28/2021 96.2     MCH 06/28/2021 32.6     MCHC 06/28/2021 33.9     RDW 06/28/2021 14.4     Platelets 61/06/1888 177     MPV 06/28/2021 8.1     Neutrophils % 06/28/2021 67.5     Lymphocytes % 06/28/2021 23.8     Monocytes % 06/28/2021 4.8     Eosinophils % 06/28/2021 3.5     Basophils % 06/28/2021 0.4     Neutrophils Absolute 06/28/2021 4.5     Lymphocytes Absolute 06/28/2021 1.6     Monocytes Absolute 06/28/2021 0.3     Eosinophils Absolute 06/28/2021 0.2     Basophils Absolute 06/28/2021 0.0    Office Visit on 06/28/2021   Component Date Value    Hemoglobin A1C 06/28/2021 7.1     Microalbumin, Random Uri* 06/28/2021 2.40*    Creatinine, Ur 06/28/2021 48.2     Microalbumin Creatinine * 06/28/2021 49.8*    Color, UA 06/28/2021 yellow     Clarity, UA 06/28/2021 clear     Glucose, UA POC 06/28/2021 neg     Bilirubin, UA 06/28/2021 neg     Ketones, UA 06/28/2021 neg     Spec Grav, UA 06/28/2021 1.015     Blood, UA POC 06/28/2021 neg     pH, UA 06/28/2021 7.0     Protein, UA POC 06/28/2021 neg     Urobilinogen, UA 06/28/2021 0.2     Leukocytes, UA 06/28/2021 neg     Nitrite, UA 06/28/2021 neg          Assessment/Plan     1. Liver mass  - AFL - Jag Palumbo MD, Oncology, [place]    2. Low magnesium level  - Magnesium; Future    3. Hypercalcemia  -improved    4. Parotid mass  -follow up with ENT    5. Type 2 diabetes mellitus without complication, without long-term current use of insulin (HCC)    - empagliflozin (JARDIANCE) 10 MG tablet;  Take 1 tablet by mouth daily  Dispense: 90 tablet; Refill: 1    6. At high risk for falls  On the basis of positive falls risk screening, assessment and plan is as follows: patient declines any further evaluation/treatment for increased falls risk. Discussed medications with patient, who voiced understanding of their use and indications. All questions answered. Return in about 1 week (around 1/4/2022) for Medicare AWV as previously scheduled .

## 2021-12-30 PROBLEM — R19.7 DIARRHEA: Status: ACTIVE | Noted: 2021-01-01

## 2021-12-30 PROBLEM — R35.0 URINARY FREQUENCY: Status: ACTIVE | Noted: 2021-01-01

## 2021-12-30 PROBLEM — R59.0 ENLARGED LYMPH NODE IN NECK: Status: ACTIVE | Noted: 2021-01-01

## 2022-01-01 ENCOUNTER — HOSPITAL ENCOUNTER (OUTPATIENT)
Dept: CARDIAC REHAB | Age: 77
Setting detail: THERAPIES SERIES
Discharge: HOME | End: 2022-05-20
Payer: MEDICARE

## 2022-01-01 ENCOUNTER — ANESTHESIA EVENT (OUTPATIENT)
Dept: OPERATING ROOM | Age: 77
DRG: 820 | End: 2022-01-01
Payer: MEDICARE

## 2022-01-01 ENCOUNTER — OFFICE VISIT (OUTPATIENT)
Dept: PRIMARY CARE CLINIC | Age: 77
End: 2022-01-01
Payer: MEDICARE

## 2022-01-01 ENCOUNTER — HOSPITAL ENCOUNTER (OUTPATIENT)
Dept: CARDIAC REHAB | Age: 77
Setting detail: THERAPIES SERIES
Discharge: HOME | End: 2022-05-27
Payer: MEDICARE

## 2022-01-01 ENCOUNTER — APPOINTMENT (OUTPATIENT)
Dept: CT IMAGING | Age: 77
DRG: 820 | End: 2022-01-01
Payer: MEDICARE

## 2022-01-01 ENCOUNTER — TELEPHONE (OUTPATIENT)
Dept: PRIMARY CARE CLINIC | Age: 77
End: 2022-01-01

## 2022-01-01 ENCOUNTER — HOSPITAL ENCOUNTER (OUTPATIENT)
Dept: CARDIAC REHAB | Age: 77
Setting detail: THERAPIES SERIES
Discharge: HOME | End: 2022-05-25
Payer: MEDICARE

## 2022-01-01 ENCOUNTER — TELEPHONE (OUTPATIENT)
Dept: PULMONOLOGY | Age: 77
End: 2022-01-01

## 2022-01-01 ENCOUNTER — HOSPITAL ENCOUNTER (OUTPATIENT)
Dept: PULMONOLOGY | Age: 77
Discharge: HOME | End: 2022-04-01
Payer: MEDICARE

## 2022-01-01 ENCOUNTER — APPOINTMENT (OUTPATIENT)
Dept: GENERAL RADIOLOGY | Age: 77
DRG: 177 | End: 2022-01-01
Attending: INTERNAL MEDICINE
Payer: MEDICARE

## 2022-01-01 ENCOUNTER — OFFICE VISIT (OUTPATIENT)
Dept: ENDOCRINOLOGY | Age: 77
End: 2022-01-01
Payer: MEDICARE

## 2022-01-01 ENCOUNTER — HOSPITAL ENCOUNTER (OUTPATIENT)
Dept: CARDIAC REHAB | Age: 77
Setting detail: THERAPIES SERIES
Discharge: HOME | End: 2022-06-22
Payer: MEDICARE

## 2022-01-01 ENCOUNTER — CARE COORDINATION (OUTPATIENT)
Dept: CASE MANAGEMENT | Age: 77
End: 2022-01-01

## 2022-01-01 ENCOUNTER — PATIENT MESSAGE (OUTPATIENT)
Dept: PRIMARY CARE CLINIC | Age: 77
End: 2022-01-01

## 2022-01-01 ENCOUNTER — ANESTHESIA (OUTPATIENT)
Dept: OPERATING ROOM | Age: 77
DRG: 820 | End: 2022-01-01
Payer: MEDICARE

## 2022-01-01 ENCOUNTER — OFFICE VISIT (OUTPATIENT)
Dept: PULMONOLOGY | Age: 77
End: 2022-01-01
Payer: MEDICARE

## 2022-01-01 ENCOUNTER — HOSPITAL ENCOUNTER (OUTPATIENT)
Dept: NON INVASIVE DIAGNOSTICS | Age: 77
Discharge: HOME | End: 2022-01-11
Payer: MEDICARE

## 2022-01-01 ENCOUNTER — TELEPHONE (OUTPATIENT)
Dept: ENDOCRINOLOGY | Age: 77
End: 2022-01-01

## 2022-01-01 ENCOUNTER — OFFICE VISIT (OUTPATIENT)
Dept: ENT CLINIC | Age: 77
End: 2022-01-01
Payer: MEDICARE

## 2022-01-01 ENCOUNTER — APPOINTMENT (OUTPATIENT)
Dept: GENERAL RADIOLOGY | Age: 77
DRG: 820 | End: 2022-01-01
Payer: MEDICARE

## 2022-01-01 ENCOUNTER — PATIENT MESSAGE (OUTPATIENT)
Dept: ENDOCRINOLOGY | Age: 77
End: 2022-01-01

## 2022-01-01 ENCOUNTER — HOSPITAL ENCOUNTER (OUTPATIENT)
Dept: CARDIAC REHAB | Age: 77
Setting detail: THERAPIES SERIES
Discharge: HOME | End: 2022-06-29
Payer: MEDICARE

## 2022-01-01 ENCOUNTER — HOSPITAL ENCOUNTER (OUTPATIENT)
Dept: CARDIAC REHAB | Age: 77
Discharge: HOME | End: 2022-07-06

## 2022-01-01 ENCOUNTER — HOSPITAL ENCOUNTER (OUTPATIENT)
Dept: CARDIAC REHAB | Age: 77
Setting detail: THERAPIES SERIES
Discharge: HOME | End: 2022-06-24
Payer: MEDICARE

## 2022-01-01 ENCOUNTER — HOSPITAL ENCOUNTER (OUTPATIENT)
Dept: CT IMAGING | Age: 77
Discharge: HOME | End: 2022-03-25
Payer: MEDICARE

## 2022-01-01 ENCOUNTER — HOSPITAL ENCOUNTER (OUTPATIENT)
Dept: CARDIAC REHAB | Age: 77
Setting detail: THERAPIES SERIES
Discharge: HOME | End: 2022-05-18
Payer: MEDICARE

## 2022-01-01 ENCOUNTER — HOSPITAL ENCOUNTER (OUTPATIENT)
Dept: CARDIAC REHAB | Age: 77
Setting detail: THERAPIES SERIES
Discharge: HOME | End: 2022-06-03
Payer: MEDICARE

## 2022-01-01 ENCOUNTER — HOSPITAL ENCOUNTER (OUTPATIENT)
Dept: CARDIAC REHAB | Age: 77
Setting detail: THERAPIES SERIES
Discharge: HOME | End: 2022-05-23
Payer: MEDICARE

## 2022-01-01 ENCOUNTER — HOSPITAL ENCOUNTER (OUTPATIENT)
Dept: CARDIAC REHAB | Age: 77
Setting detail: THERAPIES SERIES
Discharge: HOME | End: 2022-06-08
Payer: MEDICARE

## 2022-01-01 ENCOUNTER — HOSPITAL ENCOUNTER (INPATIENT)
Age: 77
LOS: 22 days | Discharge: HOME HEALTH | DRG: 820 | End: 2022-07-26
Attending: EMERGENCY MEDICINE | Admitting: INTERNAL MEDICINE
Payer: MEDICARE

## 2022-01-01 ENCOUNTER — HOSPITAL ENCOUNTER (OUTPATIENT)
Dept: CT IMAGING | Age: 77
Discharge: HOME | End: 2022-03-02
Payer: MEDICARE

## 2022-01-01 ENCOUNTER — HOSPITAL ENCOUNTER (OUTPATIENT)
Dept: CARDIAC REHAB | Age: 77
Setting detail: THERAPIES SERIES
Discharge: HOME | End: 2022-06-01
Payer: MEDICARE

## 2022-01-01 ENCOUNTER — HOSPITAL ENCOUNTER (OUTPATIENT)
Dept: CARDIAC REHAB | Age: 77
Discharge: HOME | End: 2022-06-20

## 2022-01-01 ENCOUNTER — HOSPITAL ENCOUNTER (OUTPATIENT)
Dept: CARDIAC REHAB | Age: 77
Setting detail: THERAPIES SERIES
Discharge: HOME | End: 2022-06-27
Payer: MEDICARE

## 2022-01-01 ENCOUNTER — HOSPITAL ENCOUNTER (OUTPATIENT)
Dept: INTERVENTIONAL RADIOLOGY/VASCULAR | Age: 77
Discharge: HOME | End: 2022-01-11
Payer: MEDICARE

## 2022-01-01 ENCOUNTER — APPOINTMENT (OUTPATIENT)
Dept: CT IMAGING | Age: 77
DRG: 177 | End: 2022-01-01
Attending: INTERNAL MEDICINE
Payer: MEDICARE

## 2022-01-01 ENCOUNTER — HOSPITAL ENCOUNTER (OUTPATIENT)
Dept: CARDIAC REHAB | Age: 77
Setting detail: THERAPIES SERIES
Discharge: HOME | End: 2022-06-06
Payer: MEDICARE

## 2022-01-01 ENCOUNTER — APPOINTMENT (OUTPATIENT)
Dept: MRI IMAGING | Age: 77
DRG: 820 | End: 2022-01-01
Payer: MEDICARE

## 2022-01-01 ENCOUNTER — HOSPITAL ENCOUNTER (OUTPATIENT)
Dept: CARDIAC REHAB | Age: 77
Setting detail: THERAPIES SERIES
Discharge: HOME | End: 2022-06-13
Payer: MEDICARE

## 2022-01-01 ENCOUNTER — HOSPITAL ENCOUNTER (OUTPATIENT)
Dept: CARDIAC REHAB | Age: 77
Setting detail: THERAPIES SERIES
Discharge: HOME | End: 2022-06-10
Payer: MEDICARE

## 2022-01-01 ENCOUNTER — HOSPITAL ENCOUNTER (OUTPATIENT)
Age: 77
Setting detail: OUTPATIENT SURGERY
Discharge: HOME | End: 2022-03-25
Attending: INTERNAL MEDICINE | Admitting: INTERNAL MEDICINE
Payer: MEDICARE

## 2022-01-01 ENCOUNTER — HOSPITAL ENCOUNTER (INPATIENT)
Age: 77
LOS: 19 days | DRG: 177 | End: 2022-08-28
Attending: INTERNAL MEDICINE | Admitting: INTERNAL MEDICINE
Payer: MEDICARE

## 2022-01-01 ENCOUNTER — HOSPITAL ENCOUNTER (OUTPATIENT)
Dept: CARDIAC REHAB | Age: 77
Setting detail: THERAPIES SERIES
Discharge: HOME | End: 2022-05-12
Payer: MEDICARE

## 2022-01-01 ENCOUNTER — OFFICE VISIT (OUTPATIENT)
Dept: RHEUMATOLOGY | Age: 77
End: 2022-01-01
Payer: MEDICARE

## 2022-01-01 ENCOUNTER — APPOINTMENT (OUTPATIENT)
Dept: VASCULAR LAB | Age: 77
DRG: 177 | End: 2022-01-01
Attending: INTERNAL MEDICINE
Payer: MEDICARE

## 2022-01-01 ENCOUNTER — HOSPITAL ENCOUNTER (OUTPATIENT)
Dept: CARDIAC REHAB | Age: 77
Setting detail: THERAPIES SERIES
Discharge: HOME | End: 2022-06-17
Payer: MEDICARE

## 2022-01-01 VITALS
TEMPERATURE: 98 F | RESPIRATION RATE: 14 BRPM | HEIGHT: 67 IN | DIASTOLIC BLOOD PRESSURE: 76 MMHG | OXYGEN SATURATION: 90 % | WEIGHT: 119 LBS | HEART RATE: 106 BPM | BODY MASS INDEX: 18.68 KG/M2 | SYSTOLIC BLOOD PRESSURE: 125 MMHG

## 2022-01-01 VITALS
HEART RATE: 97 BPM | TEMPERATURE: 97.3 F | BODY MASS INDEX: 18.62 KG/M2 | HEIGHT: 67 IN | WEIGHT: 118.6 LBS | DIASTOLIC BLOOD PRESSURE: 74 MMHG | SYSTOLIC BLOOD PRESSURE: 104 MMHG | OXYGEN SATURATION: 93 % | RESPIRATION RATE: 14 BRPM

## 2022-01-01 VITALS
OXYGEN SATURATION: 96 % | HEIGHT: 67 IN | WEIGHT: 128.6 LBS | SYSTOLIC BLOOD PRESSURE: 110 MMHG | HEART RATE: 90 BPM | BODY MASS INDEX: 20.18 KG/M2 | RESPIRATION RATE: 16 BRPM | TEMPERATURE: 95.6 F | DIASTOLIC BLOOD PRESSURE: 68 MMHG

## 2022-01-01 VITALS
DIASTOLIC BLOOD PRESSURE: 46 MMHG | HEART RATE: 80 BPM | WEIGHT: 115.96 LBS | BODY MASS INDEX: 18.64 KG/M2 | TEMPERATURE: 95.8 F | OXYGEN SATURATION: 100 % | HEIGHT: 66 IN | SYSTOLIC BLOOD PRESSURE: 82 MMHG | RESPIRATION RATE: 14 BRPM

## 2022-01-01 VITALS
OXYGEN SATURATION: 100 % | HEART RATE: 86 BPM | HEIGHT: 67 IN | SYSTOLIC BLOOD PRESSURE: 114 MMHG | WEIGHT: 119 LBS | DIASTOLIC BLOOD PRESSURE: 59 MMHG | TEMPERATURE: 98.1 F | RESPIRATION RATE: 20 BRPM | BODY MASS INDEX: 18.68 KG/M2

## 2022-01-01 VITALS
DIASTOLIC BLOOD PRESSURE: 70 MMHG | SYSTOLIC BLOOD PRESSURE: 117 MMHG | HEART RATE: 110 BPM | WEIGHT: 117 LBS | HEIGHT: 67 IN | RESPIRATION RATE: 18 BRPM | TEMPERATURE: 97.9 F | OXYGEN SATURATION: 83 % | BODY MASS INDEX: 18.36 KG/M2

## 2022-01-01 VITALS
OXYGEN SATURATION: 98 % | SYSTOLIC BLOOD PRESSURE: 117 MMHG | WEIGHT: 121.2 LBS | RESPIRATION RATE: 16 BRPM | DIASTOLIC BLOOD PRESSURE: 72 MMHG | HEART RATE: 60 BPM | TEMPERATURE: 97.5 F | BODY MASS INDEX: 18.98 KG/M2

## 2022-01-01 VITALS
WEIGHT: 121.6 LBS | OXYGEN SATURATION: 98 % | DIASTOLIC BLOOD PRESSURE: 68 MMHG | SYSTOLIC BLOOD PRESSURE: 110 MMHG | TEMPERATURE: 94.4 F | BODY MASS INDEX: 19.05 KG/M2 | RESPIRATION RATE: 16 BRPM | HEART RATE: 58 BPM

## 2022-01-01 VITALS — DIASTOLIC BLOOD PRESSURE: 66 MMHG | SYSTOLIC BLOOD PRESSURE: 117 MMHG | HEART RATE: 128 BPM

## 2022-01-01 VITALS
OXYGEN SATURATION: 91 % | RESPIRATION RATE: 16 BRPM | HEIGHT: 67 IN | WEIGHT: 124 LBS | DIASTOLIC BLOOD PRESSURE: 53 MMHG | TEMPERATURE: 96.5 F | HEART RATE: 98 BPM | SYSTOLIC BLOOD PRESSURE: 108 MMHG | BODY MASS INDEX: 19.46 KG/M2

## 2022-01-01 VITALS — OXYGEN SATURATION: 91 %

## 2022-01-01 VITALS
DIASTOLIC BLOOD PRESSURE: 76 MMHG | SYSTOLIC BLOOD PRESSURE: 110 MMHG | BODY MASS INDEX: 18.52 KG/M2 | WEIGHT: 118 LBS | HEIGHT: 67 IN

## 2022-01-01 VITALS
RESPIRATION RATE: 18 BRPM | BODY MASS INDEX: 18.74 KG/M2 | HEIGHT: 66 IN | HEART RATE: 92 BPM | TEMPERATURE: 97.7 F | WEIGHT: 116.62 LBS | SYSTOLIC BLOOD PRESSURE: 108 MMHG | OXYGEN SATURATION: 95 % | DIASTOLIC BLOOD PRESSURE: 76 MMHG

## 2022-01-01 VITALS — HEART RATE: 100 BPM | DIASTOLIC BLOOD PRESSURE: 65 MMHG | SYSTOLIC BLOOD PRESSURE: 122 MMHG | TEMPERATURE: 97.6 F

## 2022-01-01 VITALS
DIASTOLIC BLOOD PRESSURE: 65 MMHG | SYSTOLIC BLOOD PRESSURE: 128 MMHG | WEIGHT: 122 LBS | TEMPERATURE: 96.5 F | HEIGHT: 67 IN | OXYGEN SATURATION: 95 % | HEART RATE: 133 BPM | BODY MASS INDEX: 19.15 KG/M2

## 2022-01-01 DIAGNOSIS — R16.0 LIVER MASS: ICD-10-CM

## 2022-01-01 DIAGNOSIS — C85.19 B-CELL LYMPHOMA OF SOLID ORGAN EXCLUDING SPLEEN, UNSPECIFIED B-CELL LYMPHOMA TYPE (HCC): ICD-10-CM

## 2022-01-01 DIAGNOSIS — K11.3 PAROTID ABSCESS: ICD-10-CM

## 2022-01-01 DIAGNOSIS — R79.0 LOW MAGNESIUM LEVEL: ICD-10-CM

## 2022-01-01 DIAGNOSIS — E78.2 MIXED HYPERLIPIDEMIA: ICD-10-CM

## 2022-01-01 DIAGNOSIS — E78.49 OTHER HYPERLIPIDEMIA: ICD-10-CM

## 2022-01-01 DIAGNOSIS — E55.9 VITAMIN D DEFICIENCY: ICD-10-CM

## 2022-01-01 DIAGNOSIS — R89.4 SEROLOGIC ABNORMALITY: Primary | ICD-10-CM

## 2022-01-01 DIAGNOSIS — J84.9 ILD (INTERSTITIAL LUNG DISEASE) (HCC): ICD-10-CM

## 2022-01-01 DIAGNOSIS — E11.9 TYPE 2 DIABETES MELLITUS WITHOUT COMPLICATION, WITHOUT LONG-TERM CURRENT USE OF INSULIN (HCC): ICD-10-CM

## 2022-01-01 DIAGNOSIS — Z51.11 ENCOUNTER FOR ANTINEOPLASTIC CHEMOTHERAPY: ICD-10-CM

## 2022-01-01 DIAGNOSIS — I10 ESSENTIAL HYPERTENSION: ICD-10-CM

## 2022-01-01 DIAGNOSIS — H91.90 PERCEIVED HEARING LOSS: ICD-10-CM

## 2022-01-01 DIAGNOSIS — K11.8 PAROTID MASS: ICD-10-CM

## 2022-01-01 DIAGNOSIS — R39.12 BENIGN PROSTATIC HYPERPLASIA WITH WEAK URINARY STREAM: Primary | ICD-10-CM

## 2022-01-01 DIAGNOSIS — Z91.81 AT HIGH RISK FOR FALLS: ICD-10-CM

## 2022-01-01 DIAGNOSIS — R39.12 BENIGN PROSTATIC HYPERPLASIA WITH WEAK URINARY STREAM: ICD-10-CM

## 2022-01-01 DIAGNOSIS — H53.2 DOUBLE VISION: ICD-10-CM

## 2022-01-01 DIAGNOSIS — J84.89 NSIP (NONSPECIFIC INTERSTITIAL PNEUMONIA) (HCC): Primary | ICD-10-CM

## 2022-01-01 DIAGNOSIS — R04.0 EPISTAXIS: ICD-10-CM

## 2022-01-01 DIAGNOSIS — N40.1 BENIGN PROSTATIC HYPERPLASIA WITH WEAK URINARY STREAM: Primary | ICD-10-CM

## 2022-01-01 DIAGNOSIS — C85.90 NON-HODGKIN'S LYMPHOMA, UNSPECIFIED BODY REGION, UNSPECIFIED NON-HODGKIN LYMPHOMA TYPE (HCC): ICD-10-CM

## 2022-01-01 DIAGNOSIS — R30.9 PAIN WITH URINATION: ICD-10-CM

## 2022-01-01 DIAGNOSIS — E11.9 TYPE 2 DIABETES MELLITUS WITHOUT COMPLICATION, WITHOUT LONG-TERM CURRENT USE OF INSULIN (HCC): Primary | ICD-10-CM

## 2022-01-01 DIAGNOSIS — J84.9 ILD (INTERSTITIAL LUNG DISEASE) (HCC): Primary | ICD-10-CM

## 2022-01-01 DIAGNOSIS — N40.1 BENIGN PROSTATIC HYPERPLASIA WITH WEAK URINARY STREAM: ICD-10-CM

## 2022-01-01 DIAGNOSIS — I10 ESSENTIAL HYPERTENSION: Primary | ICD-10-CM

## 2022-01-01 DIAGNOSIS — J30.9 ALLERGIC RHINITIS, UNSPECIFIED SEASONALITY, UNSPECIFIED TRIGGER: ICD-10-CM

## 2022-01-01 DIAGNOSIS — C83.30 DIFFUSE LARGE B-CELL LYMPHOMA, UNSPECIFIED BODY REGION (HCC): Primary | ICD-10-CM

## 2022-01-01 DIAGNOSIS — C83.30 DIFFUSE LARGE B-CELL LYMPHOMA, UNSPECIFIED BODY REGION (HCC): ICD-10-CM

## 2022-01-01 DIAGNOSIS — R06.02 SHORTNESS OF BREATH: ICD-10-CM

## 2022-01-01 DIAGNOSIS — T14.8XXA SKIN ABRASION: ICD-10-CM

## 2022-01-01 DIAGNOSIS — R06.09 DYSPNEA ON EXERTION: ICD-10-CM

## 2022-01-01 DIAGNOSIS — J34.89 NASAL MUCOSA DRY: ICD-10-CM

## 2022-01-01 DIAGNOSIS — R47.01 APHASIA: ICD-10-CM

## 2022-01-01 DIAGNOSIS — R35.0 URINARY FREQUENCY: ICD-10-CM

## 2022-01-01 DIAGNOSIS — C85.10 B-CELL LYMPHOMA, UNSPECIFIED B-CELL LYMPHOMA TYPE, UNSPECIFIED BODY REGION (HCC): ICD-10-CM

## 2022-01-01 DIAGNOSIS — K59.00 CONSTIPATION, UNSPECIFIED CONSTIPATION TYPE: ICD-10-CM

## 2022-01-01 DIAGNOSIS — H69.83 DYSFUNCTION OF BOTH EUSTACHIAN TUBES: ICD-10-CM

## 2022-01-01 DIAGNOSIS — R90.0 INTRACRANIAL MASS: ICD-10-CM

## 2022-01-01 DIAGNOSIS — R09.02 HYPOXEMIA: ICD-10-CM

## 2022-01-01 DIAGNOSIS — R76.8 POSITIVE SM/RNP ANTIBODY: Primary | ICD-10-CM

## 2022-01-01 DIAGNOSIS — Z91.048 OTHER NONMEDICINAL SUBSTANCE ALLERGY STATUS: ICD-10-CM

## 2022-01-01 DIAGNOSIS — H69.83 DYSFUNCTION OF BOTH EUSTACHIAN TUBES: Primary | ICD-10-CM

## 2022-01-01 DIAGNOSIS — R39.15 URINARY URGENCY: ICD-10-CM

## 2022-01-01 DIAGNOSIS — G93.89 BRAIN MASS: Primary | ICD-10-CM

## 2022-01-01 DIAGNOSIS — R42 DIZZINESS: ICD-10-CM

## 2022-01-01 DIAGNOSIS — J30.0 VASOMOTOR RHINITIS: Primary | ICD-10-CM

## 2022-01-01 DIAGNOSIS — Z00.00 ROUTINE GENERAL MEDICAL EXAMINATION AT A HEALTH CARE FACILITY: Primary | ICD-10-CM

## 2022-01-01 LAB
(1,3)-BETA-D-GLUCAN (FUNGITELL) INTERPRETATION: POSITIVE
(1,3)-BETA-D-GLUCAN (FUNGITELL): >500 PG/ML
A/G RATIO: 1.7 (ref 1.1–2.2)
A/G RATIO: 2.1 (ref 1.1–2.2)
ABO/RH: NORMAL
ACANTHOCYTES: ABNORMAL
ALBUMIN SERPL-MCNC: 2.8 G/DL (ref 3.4–5)
ALBUMIN SERPL-MCNC: 2.9 G/DL (ref 3.4–5)
ALBUMIN SERPL-MCNC: 3 G/DL (ref 3.4–5)
ALBUMIN SERPL-MCNC: 3.1 G/DL (ref 3.4–5)
ALBUMIN SERPL-MCNC: 3.2 G/DL (ref 3.4–5)
ALBUMIN SERPL-MCNC: 3.4 G/DL (ref 3.4–5)
ALBUMIN SERPL-MCNC: 3.5 G/DL (ref 3.4–5)
ALBUMIN SERPL-MCNC: 3.6 G/DL (ref 3.4–5)
ALBUMIN SERPL-MCNC: 3.7 G/DL (ref 3.4–5)
ALBUMIN SERPL-MCNC: 3.8 G/DL (ref 3.4–5)
ALBUMIN SERPL-MCNC: 3.9 G/DL (ref 3.4–5)
ALBUMIN SERPL-MCNC: 4.1 G/DL (ref 3.4–5)
ALLERGEN BEEF: <0.1 KU/L
ALLERGEN PHOMA BETAE: <0.1 KU/L
ALLERGEN PORK: <0.1 KU/L
ALLERGEN SEE NOTE: NORMAL
ALLERGEN, FEATHER MIX: NEGATIVE KU/L
ALP BLD-CCNC: 100 U/L (ref 40–129)
ALP BLD-CCNC: 105 U/L (ref 40–129)
ALP BLD-CCNC: 106 U/L (ref 40–129)
ALP BLD-CCNC: 114 U/L (ref 40–129)
ALP BLD-CCNC: 125 U/L (ref 40–129)
ALP BLD-CCNC: 131 U/L (ref 40–129)
ALP BLD-CCNC: 133 U/L (ref 40–129)
ALP BLD-CCNC: 137 U/L (ref 40–129)
ALP BLD-CCNC: 137 U/L (ref 40–129)
ALP BLD-CCNC: 140 U/L (ref 40–129)
ALP BLD-CCNC: 146 U/L (ref 40–129)
ALP BLD-CCNC: 147 U/L (ref 40–129)
ALP BLD-CCNC: 151 U/L (ref 40–129)
ALP BLD-CCNC: 166 U/L (ref 40–129)
ALP BLD-CCNC: 218 U/L (ref 40–129)
ALP BLD-CCNC: 246 U/L (ref 40–129)
ALP BLD-CCNC: 267 U/L (ref 40–129)
ALP BLD-CCNC: 86 U/L (ref 40–129)
ALT SERPL-CCNC: 123 U/L (ref 10–40)
ALT SERPL-CCNC: 131 U/L (ref 10–40)
ALT SERPL-CCNC: 134 U/L (ref 10–40)
ALT SERPL-CCNC: 154 U/L (ref 10–40)
ALT SERPL-CCNC: 17 U/L (ref 10–40)
ALT SERPL-CCNC: 20 U/L (ref 10–40)
ALT SERPL-CCNC: 20 U/L (ref 10–40)
ALT SERPL-CCNC: 23 U/L (ref 10–40)
ALT SERPL-CCNC: 24 U/L (ref 10–40)
ALT SERPL-CCNC: 25 U/L (ref 10–40)
ALT SERPL-CCNC: 26 U/L (ref 10–40)
ALT SERPL-CCNC: 30 U/L (ref 10–40)
ALT SERPL-CCNC: 33 U/L (ref 10–40)
ALT SERPL-CCNC: 33 U/L (ref 10–40)
ALT SERPL-CCNC: 42 U/L (ref 10–40)
ALT SERPL-CCNC: 44 U/L (ref 10–40)
ALT SERPL-CCNC: 84 U/L (ref 10–40)
ALT SERPL-CCNC: 93 U/L (ref 10–40)
ANION GAP SERPL CALCULATED.3IONS-SCNC: 10 MMOL/L (ref 3–16)
ANION GAP SERPL CALCULATED.3IONS-SCNC: 11 MMOL/L (ref 3–16)
ANION GAP SERPL CALCULATED.3IONS-SCNC: 12 MMOL/L (ref 3–16)
ANION GAP SERPL CALCULATED.3IONS-SCNC: 14 MMOL/L (ref 3–16)
ANION GAP SERPL CALCULATED.3IONS-SCNC: 15 MMOL/L (ref 3–16)
ANION GAP SERPL CALCULATED.3IONS-SCNC: 15 MMOL/L (ref 3–16)
ANION GAP SERPL CALCULATED.3IONS-SCNC: 16 MMOL/L (ref 3–16)
ANION GAP SERPL CALCULATED.3IONS-SCNC: 19 MMOL/L (ref 3–16)
ANION GAP SERPL CALCULATED.3IONS-SCNC: 5 MMOL/L (ref 3–16)
ANION GAP SERPL CALCULATED.3IONS-SCNC: 6 MMOL/L (ref 3–16)
ANION GAP SERPL CALCULATED.3IONS-SCNC: 7 MMOL/L (ref 3–16)
ANION GAP SERPL CALCULATED.3IONS-SCNC: 7 MMOL/L (ref 3–16)
ANION GAP SERPL CALCULATED.3IONS-SCNC: 8 MMOL/L (ref 3–16)
ANION GAP SERPL CALCULATED.3IONS-SCNC: 9 MMOL/L (ref 3–16)
ANISOCYTOSIS: ABNORMAL
ANISOCYTOSIS: ABNORMAL
ANTI-CENTROMERE B IGG: <0.2 AI (ref 0–0.9)
ANTI-CHROMATIN IGG: <0.2 AI (ref 0–0.9)
ANTI-DSDNA IGG: <1 IU/ML (ref 0–9)
ANTI-JO1 IGG: <0.2 AI (ref 0–0.9)
ANTI-NUCLEAR ANTIBODY (ANA): POSITIVE
ANTI-RIBOSOMAL P IGG: <0.2 AI (ref 0–0.9)
ANTI-RNP IGG: 5.2 AI (ref 0–0.9)
ANTI-SCL70 IGG: <0.2 AI (ref 0–0.9)
ANTI-SMITH IGG: <0.2 AI (ref 0–0.9)
ANTI-SMRNP IGG: <0.2 AI (ref 0–0.9)
ANTI-SS-A IGG: <0.2 AI (ref 0–0.9)
ANTI-SS-B IGG: <0.2 AI (ref 0–0.9)
ANTIBODY SCREEN: NORMAL
ANTINUCLEAR AB INTERPRETIVE COMMENT: NORMAL
ANTINUCLEAR ANTIBODY, HEP-2, IGG: NORMAL
APPEARANCE BAL (LAVAGE): ABNORMAL
APPEARANCE BAL (LAVAGE): CLEAR
APTT: 22.1 SEC (ref 23–34.3)
APTT: 22.3 SEC (ref 23–34.3)
APTT: 22.5 SEC (ref 23–34.3)
APTT: 23 SEC (ref 23–34.3)
APTT: 23.1 SEC (ref 23–34.3)
APTT: 23.2 SEC (ref 23–34.3)
APTT: 24.3 SEC (ref 23–34.3)
APTT: 24.8 SEC (ref 23–34.3)
APTT: 27.8 SEC (ref 23–34.3)
APTT: 28 SEC (ref 23–34.3)
APTT: 30.1 SEC (ref 23–34.3)
APTT: 33.8 SEC (ref 23–34.3)
APTT: 36 SEC (ref 23–34.3)
APTT: 38.8 SEC (ref 23–34.3)
APTT: 40.2 SEC (ref 23–34.3)
ASPERGILLUS FLAVUS ANTIBODIES: NORMAL
ASPERGILLUS FUMIGATUS #1: NORMAL
ASPERGILLUS FUMIGATUS #2: NORMAL
ASPERGILLUS FUMIGATUS #3: NORMAL
ASPERGILLUS FUMIGATUS #6: NORMAL
ASPERGILLUS GALACTO AG: NEGATIVE
ASPERGILLUS GALACTO AG: POSITIVE
ASPERGILLUS GALACTO INDEX: 0.09
ASPERGILLUS GALACTO INDEX: 1.33
AST SERPL-CCNC: 23 U/L (ref 15–37)
AST SERPL-CCNC: 24 U/L (ref 15–37)
AST SERPL-CCNC: 24 U/L (ref 15–37)
AST SERPL-CCNC: 26 U/L (ref 15–37)
AST SERPL-CCNC: 27 U/L (ref 15–37)
AST SERPL-CCNC: 28 U/L (ref 15–37)
AST SERPL-CCNC: 33 U/L (ref 15–37)
AST SERPL-CCNC: 40 U/L (ref 15–37)
AST SERPL-CCNC: 42 U/L (ref 15–37)
AST SERPL-CCNC: 45 U/L (ref 15–37)
AST SERPL-CCNC: 47 U/L (ref 15–37)
AST SERPL-CCNC: 50 U/L (ref 15–37)
AST SERPL-CCNC: 51 U/L (ref 15–37)
AST SERPL-CCNC: 52 U/L (ref 15–37)
AST SERPL-CCNC: 52 U/L (ref 15–37)
AST SERPL-CCNC: 82 U/L (ref 15–37)
AUREOBASIDIUM PULLULANS: NORMAL
BACTERIA: ABNORMAL /HPF
BACTERIA: ABNORMAL /HPF
BASE EXCESS ARTERIAL: 1.5 MMOL/L (ref -3–3)
BASE EXCESS ARTERIAL: 9 (ref -3–3)
BASE EXCESS VENOUS: 11 (ref -3–3)
BASOPHILS ABSOLUTE: 0 K/UL (ref 0–0.2)
BASOPHILS ABSOLUTE: 0.1 K/UL (ref 0–0.2)
BASOPHILS RELATIVE PERCENT: 0 %
BASOPHILS RELATIVE PERCENT: 0.1 %
BASOPHILS RELATIVE PERCENT: 0.2 %
BASOPHILS RELATIVE PERCENT: 0.3 %
BASOPHILS RELATIVE PERCENT: 0.4 %
BASOPHILS RELATIVE PERCENT: 0.4 %
BASOPHILS RELATIVE PERCENT: 0.5 %
BASOPHILS RELATIVE PERCENT: 0.5 %
BASOPHILS RELATIVE PERCENT: 0.6 %
BASOPHILS RELATIVE PERCENT: 0.7 %
BASOPHILS RELATIVE PERCENT: 0.9 %
BASOPHILS RELATIVE PERCENT: 1.1 %
BILIRUB SERPL-MCNC: 0.3 MG/DL (ref 0–1)
BILIRUB SERPL-MCNC: 0.4 MG/DL (ref 0–1)
BILIRUB SERPL-MCNC: 0.4 MG/DL (ref 0–1)
BILIRUB SERPL-MCNC: 0.5 MG/DL (ref 0–1)
BILIRUB SERPL-MCNC: 0.5 MG/DL (ref 0–1)
BILIRUB SERPL-MCNC: <0.2 MG/DL (ref 0–1)
BILIRUB SERPL-MCNC: <0.2 MG/DL (ref 0–1)
BILIRUBIN DIRECT: <0.2 MG/DL (ref 0–0.3)
BILIRUBIN URINE: NEGATIVE
BILIRUBIN, INDIRECT: ABNORMAL MG/DL (ref 0–1)
BILIRUBIN, POC: NORMAL
BLOOD BANK DISPENSE STATUS: NORMAL
BLOOD BANK PRODUCT CODE: NORMAL
BLOOD URINE, POC: NORMAL
BLOOD, URINE: NEGATIVE
BPU ID: NORMAL
BUN BLDV-MCNC: 10 MG/DL (ref 7–20)
BUN BLDV-MCNC: 10 MG/DL (ref 7–20)
BUN BLDV-MCNC: 11 MG/DL (ref 7–20)
BUN BLDV-MCNC: 12 MG/DL (ref 7–20)
BUN BLDV-MCNC: 13 MG/DL (ref 7–20)
BUN BLDV-MCNC: 14 MG/DL (ref 7–20)
BUN BLDV-MCNC: 14 MG/DL (ref 7–20)
BUN BLDV-MCNC: 15 MG/DL (ref 7–20)
BUN BLDV-MCNC: 15 MG/DL (ref 7–20)
BUN BLDV-MCNC: 16 MG/DL (ref 7–20)
BUN BLDV-MCNC: 17 MG/DL (ref 7–20)
BUN BLDV-MCNC: 17 MG/DL (ref 7–20)
BUN BLDV-MCNC: 18 MG/DL (ref 7–20)
BUN BLDV-MCNC: 19 MG/DL (ref 7–20)
BUN BLDV-MCNC: 20 MG/DL (ref 7–20)
BUN BLDV-MCNC: 21 MG/DL (ref 7–20)
BUN BLDV-MCNC: 22 MG/DL (ref 7–20)
BUN BLDV-MCNC: 22 MG/DL (ref 7–20)
BUN BLDV-MCNC: 23 MG/DL (ref 7–20)
BUN BLDV-MCNC: 24 MG/DL (ref 7–20)
BUN BLDV-MCNC: 25 MG/DL (ref 7–20)
BUN BLDV-MCNC: 26 MG/DL (ref 7–20)
BUN BLDV-MCNC: 26 MG/DL (ref 7–20)
BUN BLDV-MCNC: 27 MG/DL (ref 7–20)
BUN BLDV-MCNC: 27 MG/DL (ref 7–20)
BUN BLDV-MCNC: 28 MG/DL (ref 7–20)
BUN BLDV-MCNC: 30 MG/DL (ref 7–20)
BUN BLDV-MCNC: 40 MG/DL (ref 7–20)
C-PEPTIDE: 1.6 NG/ML (ref 1.1–4.4)
CALCIUM IONIZED: 1.23 MMOL/L (ref 1.12–1.32)
CALCIUM SERPL-MCNC: 10.1 MG/DL (ref 8.3–10.6)
CALCIUM SERPL-MCNC: 10.6 MG/DL (ref 8.3–10.6)
CALCIUM SERPL-MCNC: 8.1 MG/DL (ref 8.3–10.6)
CALCIUM SERPL-MCNC: 8.1 MG/DL (ref 8.3–10.6)
CALCIUM SERPL-MCNC: 8.2 MG/DL (ref 8.3–10.6)
CALCIUM SERPL-MCNC: 8.3 MG/DL (ref 8.3–10.6)
CALCIUM SERPL-MCNC: 8.5 MG/DL (ref 8.3–10.6)
CALCIUM SERPL-MCNC: 8.6 MG/DL (ref 8.3–10.6)
CALCIUM SERPL-MCNC: 8.6 MG/DL (ref 8.3–10.6)
CALCIUM SERPL-MCNC: 8.7 MG/DL (ref 8.3–10.6)
CALCIUM SERPL-MCNC: 8.7 MG/DL (ref 8.3–10.6)
CALCIUM SERPL-MCNC: 8.8 MG/DL (ref 8.3–10.6)
CALCIUM SERPL-MCNC: 8.9 MG/DL (ref 8.3–10.6)
CALCIUM SERPL-MCNC: 9 MG/DL (ref 8.3–10.6)
CALCIUM SERPL-MCNC: 9.1 MG/DL (ref 8.3–10.6)
CALCIUM SERPL-MCNC: 9.2 MG/DL (ref 8.3–10.6)
CALCIUM SERPL-MCNC: 9.3 MG/DL (ref 8.3–10.6)
CALCIUM SERPL-MCNC: 9.4 MG/DL (ref 8.3–10.6)
CALCIUM SERPL-MCNC: 9.5 MG/DL (ref 8.3–10.6)
CALCIUM SERPL-MCNC: 9.5 MG/DL (ref 8.3–10.6)
CALCIUM SERPL-MCNC: 9.6 MG/DL (ref 8.3–10.6)
CALCIUM SERPL-MCNC: 9.6 MG/DL (ref 8.3–10.6)
CALCIUM SERPL-MCNC: 9.7 MG/DL (ref 8.3–10.6)
CALCIUM SERPL-MCNC: 9.9 MG/DL (ref 8.3–10.6)
CARBOXYHEMOGLOBIN ARTERIAL: 1.7 % (ref 0–1.5)
CHLORIDE BLD-SCNC: 100 MMOL/L (ref 99–110)
CHLORIDE BLD-SCNC: 100 MMOL/L (ref 99–110)
CHLORIDE BLD-SCNC: 101 MMOL/L (ref 99–110)
CHLORIDE BLD-SCNC: 102 MMOL/L (ref 99–110)
CHLORIDE BLD-SCNC: 103 MMOL/L (ref 99–110)
CHLORIDE BLD-SCNC: 103 MMOL/L (ref 99–110)
CHLORIDE BLD-SCNC: 104 MMOL/L (ref 99–110)
CHLORIDE BLD-SCNC: 108 MMOL/L (ref 99–110)
CHLORIDE BLD-SCNC: 92 MMOL/L (ref 99–110)
CHLORIDE BLD-SCNC: 93 MMOL/L (ref 99–110)
CHLORIDE BLD-SCNC: 93 MMOL/L (ref 99–110)
CHLORIDE BLD-SCNC: 94 MMOL/L (ref 99–110)
CHLORIDE BLD-SCNC: 95 MMOL/L (ref 99–110)
CHLORIDE BLD-SCNC: 96 MMOL/L (ref 99–110)
CHLORIDE BLD-SCNC: 96 MMOL/L (ref 99–110)
CHLORIDE BLD-SCNC: 97 MMOL/L (ref 99–110)
CHLORIDE BLD-SCNC: 98 MMOL/L (ref 99–110)
CHLORIDE BLD-SCNC: 99 MMOL/L (ref 99–110)
CHOLESTEROL, TOTAL: 164 MG/DL (ref 0–199)
CHOLESTEROL, TOTAL: 178 MG/DL (ref 0–199)
CLARITY, POC: CLEAR
CLARITY: CLEAR
CLOT EVALUATION BAL: ABNORMAL
CLOT EVALUATION BAL: ABNORMAL
CO2: 23 MMOL/L (ref 21–32)
CO2: 25 MMOL/L (ref 21–32)
CO2: 25 MMOL/L (ref 21–32)
CO2: 26 MMOL/L (ref 21–32)
CO2: 27 MMOL/L (ref 21–32)
CO2: 28 MMOL/L (ref 21–32)
CO2: 29 MMOL/L (ref 21–32)
CO2: 30 MMOL/L (ref 21–32)
CO2: 31 MMOL/L (ref 21–32)
CO2: 31 MMOL/L (ref 21–32)
CO2: 32 MMOL/L (ref 21–32)
CO2: 32 MMOL/L (ref 21–32)
CO2: 33 MMOL/L (ref 21–32)
CO2: 35 MMOL/L (ref 21–32)
CO2: 36 MMOL/L (ref 21–32)
CO2: 37 MMOL/L (ref 21–32)
CO2: 38 MMOL/L (ref 21–32)
CO2: 38 MMOL/L (ref 21–32)
CO2: 39 MMOL/L (ref 21–32)
COLOR LAVAGE: COLORLESS
COLOR LAVAGE: COLORLESS
COLOR, POC: YELLOW
COLOR: YELLOW
CREAT SERPL-MCNC: 0.5 MG/DL (ref 0.8–1.3)
CREAT SERPL-MCNC: 0.6 MG/DL (ref 0.8–1.3)
CREAT SERPL-MCNC: 0.8 MG/DL (ref 0.8–1.3)
CREAT SERPL-MCNC: <0.5 MG/DL (ref 0.8–1.3)
CREATININE URINE: 29.6 MG/DL (ref 39–259)
CULTURE, RESPIRATORY: NORMAL
CULTURE, RESPIRATORY: NORMAL
DESCRIPTION BLOOD BANK: NORMAL
EKG ATRIAL RATE: 113 BPM
EKG ATRIAL RATE: 118 BPM
EKG ATRIAL RATE: 119 BPM
EKG ATRIAL RATE: 129 BPM
EKG ATRIAL RATE: 132 BPM
EKG ATRIAL RATE: 140 BPM
EKG ATRIAL RATE: 82 BPM
EKG ATRIAL RATE: 82 BPM
EKG DIAGNOSIS: NORMAL
EKG P AXIS: -1 DEGREES
EKG P AXIS: -4 DEGREES
EKG P AXIS: 13 DEGREES
EKG P AXIS: 16 DEGREES
EKG P AXIS: 17 DEGREES
EKG P AXIS: 21 DEGREES
EKG P AXIS: 21 DEGREES
EKG P AXIS: 25 DEGREES
EKG P-R INTERVAL: 128 MS
EKG P-R INTERVAL: 132 MS
EKG P-R INTERVAL: 134 MS
EKG P-R INTERVAL: 138 MS
EKG P-R INTERVAL: 138 MS
EKG P-R INTERVAL: 140 MS
EKG P-R INTERVAL: 142 MS
EKG P-R INTERVAL: 150 MS
EKG Q-T INTERVAL: 266 MS
EKG Q-T INTERVAL: 280 MS
EKG Q-T INTERVAL: 302 MS
EKG Q-T INTERVAL: 306 MS
EKG Q-T INTERVAL: 330 MS
EKG Q-T INTERVAL: 332 MS
EKG Q-T INTERVAL: 358 MS
EKG Q-T INTERVAL: 372 MS
EKG QRS DURATION: 68 MS
EKG QRS DURATION: 76 MS
EKG QRS DURATION: 78 MS
EKG QRS DURATION: 84 MS
EKG QRS DURATION: 84 MS
EKG QTC CALCULATION (BAZETT): 406 MS
EKG QTC CALCULATION (BAZETT): 414 MS
EKG QTC CALCULATION (BAZETT): 418 MS
EKG QTC CALCULATION (BAZETT): 424 MS
EKG QTC CALCULATION (BAZETT): 434 MS
EKG QTC CALCULATION (BAZETT): 448 MS
EKG QTC CALCULATION (BAZETT): 455 MS
EKG QTC CALCULATION (BAZETT): 462 MS
EKG R AXIS: -12 DEGREES
EKG R AXIS: -2 DEGREES
EKG R AXIS: -3 DEGREES
EKG R AXIS: -7 DEGREES
EKG R AXIS: -7 DEGREES
EKG R AXIS: -9 DEGREES
EKG R AXIS: 12 DEGREES
EKG R AXIS: 2 DEGREES
EKG T AXIS: -14 DEGREES
EKG T AXIS: -16 DEGREES
EKG T AXIS: -6 DEGREES
EKG T AXIS: 31 DEGREES
EKG T AXIS: 5 DEGREES
EKG T AXIS: 71 DEGREES
EKG T AXIS: 79 DEGREES
EKG T AXIS: 81 DEGREES
EKG VENTRICULAR RATE: 113 BPM
EKG VENTRICULAR RATE: 118 BPM
EKG VENTRICULAR RATE: 119 BPM
EKG VENTRICULAR RATE: 129 BPM
EKG VENTRICULAR RATE: 132 BPM
EKG VENTRICULAR RATE: 140 BPM
EKG VENTRICULAR RATE: 82 BPM
EKG VENTRICULAR RATE: 82 BPM
EOSINOPHILS ABSOLUTE: 0 K/UL (ref 0–0.6)
EOSINOPHILS ABSOLUTE: 0.1 K/UL (ref 0–0.6)
EOSINOPHILS RELATIVE PERCENT: 0 %
EOSINOPHILS RELATIVE PERCENT: 0.1 %
EOSINOPHILS RELATIVE PERCENT: 0.2 %
EOSINOPHILS RELATIVE PERCENT: 0.4 %
EOSINOPHILS RELATIVE PERCENT: 0.4 %
EOSINOPHILS RELATIVE PERCENT: 1 %
EOSINOPHILS RELATIVE PERCENT: 1.1 %
EOSINOPHILS RELATIVE PERCENT: 1.1 %
EOSINOPHILS RELATIVE PERCENT: 1.2 %
EOSINOPHILS RELATIVE PERCENT: 1.3 %
EOSINOPHILS RELATIVE PERCENT: 1.3 %
EOSINOPHILS RELATIVE PERCENT: 1.4 %
EOSINOPHILS RELATIVE PERCENT: 1.4 %
EOSINOPHILS RELATIVE PERCENT: 1.7 %
EOSINOPHILS RELATIVE PERCENT: 1.9 %
EOSINOPHILS RELATIVE PERCENT: 2.2 %
ESTIMATED AVERAGE GLUCOSE: 185.8 MG/DL
FINAL REPORT: NORMAL
GFR AFRICAN AMERICAN: >60
GFR NON-AFRICAN AMERICAN: >60
GLUCOSE BLD-MCNC: 106 MG/DL (ref 70–99)
GLUCOSE BLD-MCNC: 110 MG/DL (ref 70–99)
GLUCOSE BLD-MCNC: 110 MG/DL (ref 70–99)
GLUCOSE BLD-MCNC: 115 MG/DL (ref 70–99)
GLUCOSE BLD-MCNC: 119 MG/DL (ref 70–99)
GLUCOSE BLD-MCNC: 121 MG/DL (ref 70–99)
GLUCOSE BLD-MCNC: 121 MG/DL (ref 70–99)
GLUCOSE BLD-MCNC: 129 MG/DL (ref 70–99)
GLUCOSE BLD-MCNC: 130 MG/DL (ref 70–99)
GLUCOSE BLD-MCNC: 130 MG/DL (ref 70–99)
GLUCOSE BLD-MCNC: 133 MG/DL (ref 70–99)
GLUCOSE BLD-MCNC: 133 MG/DL (ref 70–99)
GLUCOSE BLD-MCNC: 136 MG/DL (ref 70–99)
GLUCOSE BLD-MCNC: 138 MG/DL (ref 70–99)
GLUCOSE BLD-MCNC: 139 MG/DL (ref 70–99)
GLUCOSE BLD-MCNC: 141 MG/DL (ref 70–99)
GLUCOSE BLD-MCNC: 142 MG/DL (ref 70–99)
GLUCOSE BLD-MCNC: 144 MG/DL (ref 70–99)
GLUCOSE BLD-MCNC: 145 MG/DL (ref 70–99)
GLUCOSE BLD-MCNC: 146 MG/DL (ref 70–99)
GLUCOSE BLD-MCNC: 148 MG/DL (ref 70–99)
GLUCOSE BLD-MCNC: 150 MG/DL (ref 70–99)
GLUCOSE BLD-MCNC: 153 MG/DL (ref 70–99)
GLUCOSE BLD-MCNC: 159 MG/DL (ref 70–99)
GLUCOSE BLD-MCNC: 161 MG/DL (ref 70–99)
GLUCOSE BLD-MCNC: 162 MG/DL (ref 70–99)
GLUCOSE BLD-MCNC: 165 MG/DL (ref 70–99)
GLUCOSE BLD-MCNC: 167 MG/DL (ref 70–99)
GLUCOSE BLD-MCNC: 169 MG/DL (ref 70–99)
GLUCOSE BLD-MCNC: 170 MG/DL (ref 70–99)
GLUCOSE BLD-MCNC: 170 MG/DL (ref 70–99)
GLUCOSE BLD-MCNC: 171 MG/DL (ref 70–99)
GLUCOSE BLD-MCNC: 171 MG/DL (ref 70–99)
GLUCOSE BLD-MCNC: 173 MG/DL (ref 70–99)
GLUCOSE BLD-MCNC: 173 MG/DL (ref 70–99)
GLUCOSE BLD-MCNC: 174 MG/DL (ref 70–99)
GLUCOSE BLD-MCNC: 174 MG/DL (ref 70–99)
GLUCOSE BLD-MCNC: 175 MG/DL (ref 70–99)
GLUCOSE BLD-MCNC: 176 MG/DL (ref 70–99)
GLUCOSE BLD-MCNC: 176 MG/DL (ref 70–99)
GLUCOSE BLD-MCNC: 177 MG/DL (ref 70–99)
GLUCOSE BLD-MCNC: 177 MG/DL (ref 70–99)
GLUCOSE BLD-MCNC: 179 MG/DL (ref 70–99)
GLUCOSE BLD-MCNC: 182 MG/DL (ref 70–99)
GLUCOSE BLD-MCNC: 187 MG/DL (ref 70–99)
GLUCOSE BLD-MCNC: 187 MG/DL (ref 70–99)
GLUCOSE BLD-MCNC: 188 MG/DL (ref 70–99)
GLUCOSE BLD-MCNC: 190 MG/DL (ref 70–99)
GLUCOSE BLD-MCNC: 191 MG/DL (ref 70–99)
GLUCOSE BLD-MCNC: 193 MG/DL (ref 70–99)
GLUCOSE BLD-MCNC: 194 MG/DL (ref 70–99)
GLUCOSE BLD-MCNC: 197 MG/DL (ref 70–99)
GLUCOSE BLD-MCNC: 197 MG/DL (ref 70–99)
GLUCOSE BLD-MCNC: 198 MG/DL (ref 70–99)
GLUCOSE BLD-MCNC: 198 MG/DL (ref 70–99)
GLUCOSE BLD-MCNC: 199 MG/DL (ref 70–99)
GLUCOSE BLD-MCNC: 199 MG/DL (ref 70–99)
GLUCOSE BLD-MCNC: 200 MG/DL (ref 70–99)
GLUCOSE BLD-MCNC: 202 MG/DL (ref 70–99)
GLUCOSE BLD-MCNC: 204 MG/DL (ref 70–99)
GLUCOSE BLD-MCNC: 205 MG/DL (ref 70–99)
GLUCOSE BLD-MCNC: 206 MG/DL (ref 70–99)
GLUCOSE BLD-MCNC: 208 MG/DL (ref 70–99)
GLUCOSE BLD-MCNC: 209 MG/DL (ref 70–99)
GLUCOSE BLD-MCNC: 209 MG/DL (ref 70–99)
GLUCOSE BLD-MCNC: 211 MG/DL (ref 70–99)
GLUCOSE BLD-MCNC: 213 MG/DL (ref 70–99)
GLUCOSE BLD-MCNC: 213 MG/DL (ref 70–99)
GLUCOSE BLD-MCNC: 216 MG/DL (ref 70–99)
GLUCOSE BLD-MCNC: 217 MG/DL (ref 70–99)
GLUCOSE BLD-MCNC: 218 MG/DL (ref 70–99)
GLUCOSE BLD-MCNC: 222 MG/DL (ref 70–99)
GLUCOSE BLD-MCNC: 224 MG/DL (ref 70–99)
GLUCOSE BLD-MCNC: 225 MG/DL (ref 70–99)
GLUCOSE BLD-MCNC: 225 MG/DL (ref 70–99)
GLUCOSE BLD-MCNC: 228 MG/DL (ref 70–99)
GLUCOSE BLD-MCNC: 230 MG/DL (ref 70–99)
GLUCOSE BLD-MCNC: 231 MG/DL (ref 70–99)
GLUCOSE BLD-MCNC: 232 MG/DL (ref 70–99)
GLUCOSE BLD-MCNC: 233 MG/DL (ref 70–99)
GLUCOSE BLD-MCNC: 233 MG/DL (ref 70–99)
GLUCOSE BLD-MCNC: 239 MG/DL (ref 70–99)
GLUCOSE BLD-MCNC: 241 MG/DL (ref 70–99)
GLUCOSE BLD-MCNC: 242 MG/DL (ref 70–99)
GLUCOSE BLD-MCNC: 242 MG/DL (ref 70–99)
GLUCOSE BLD-MCNC: 244 MG/DL (ref 70–99)
GLUCOSE BLD-MCNC: 246 MG/DL (ref 70–99)
GLUCOSE BLD-MCNC: 248 MG/DL (ref 70–99)
GLUCOSE BLD-MCNC: 250 MG/DL (ref 70–99)
GLUCOSE BLD-MCNC: 251 MG/DL (ref 70–99)
GLUCOSE BLD-MCNC: 252 MG/DL (ref 70–99)
GLUCOSE BLD-MCNC: 253 MG/DL (ref 70–99)
GLUCOSE BLD-MCNC: 254 MG/DL (ref 70–99)
GLUCOSE BLD-MCNC: 255 MG/DL (ref 70–99)
GLUCOSE BLD-MCNC: 255 MG/DL (ref 70–99)
GLUCOSE BLD-MCNC: 257 MG/DL (ref 70–99)
GLUCOSE BLD-MCNC: 259 MG/DL (ref 70–99)
GLUCOSE BLD-MCNC: 261 MG/DL (ref 70–99)
GLUCOSE BLD-MCNC: 263 MG/DL (ref 70–99)
GLUCOSE BLD-MCNC: 266 MG/DL (ref 70–99)
GLUCOSE BLD-MCNC: 272 MG/DL (ref 70–99)
GLUCOSE BLD-MCNC: 273 MG/DL (ref 70–99)
GLUCOSE BLD-MCNC: 274 MG/DL (ref 70–99)
GLUCOSE BLD-MCNC: 275 MG/DL (ref 70–99)
GLUCOSE BLD-MCNC: 277 MG/DL (ref 70–99)
GLUCOSE BLD-MCNC: 277 MG/DL (ref 70–99)
GLUCOSE BLD-MCNC: 279 MG/DL (ref 70–99)
GLUCOSE BLD-MCNC: 279 MG/DL (ref 70–99)
GLUCOSE BLD-MCNC: 280 MG/DL (ref 70–99)
GLUCOSE BLD-MCNC: 281 MG/DL (ref 70–99)
GLUCOSE BLD-MCNC: 281 MG/DL (ref 70–99)
GLUCOSE BLD-MCNC: 282 MG/DL (ref 70–99)
GLUCOSE BLD-MCNC: 283 MG/DL (ref 70–99)
GLUCOSE BLD-MCNC: 285 MG/DL (ref 70–99)
GLUCOSE BLD-MCNC: 288 MG/DL (ref 70–99)
GLUCOSE BLD-MCNC: 289 MG/DL (ref 70–99)
GLUCOSE BLD-MCNC: 289 MG/DL (ref 70–99)
GLUCOSE BLD-MCNC: 290 MG/DL (ref 70–99)
GLUCOSE BLD-MCNC: 290 MG/DL (ref 70–99)
GLUCOSE BLD-MCNC: 291 MG/DL (ref 70–99)
GLUCOSE BLD-MCNC: 292 MG/DL (ref 70–99)
GLUCOSE BLD-MCNC: 293 MG/DL (ref 70–99)
GLUCOSE BLD-MCNC: 293 MG/DL (ref 70–99)
GLUCOSE BLD-MCNC: 294 MG/DL (ref 70–99)
GLUCOSE BLD-MCNC: 294 MG/DL (ref 70–99)
GLUCOSE BLD-MCNC: 295 MG/DL (ref 70–99)
GLUCOSE BLD-MCNC: 295 MG/DL (ref 70–99)
GLUCOSE BLD-MCNC: 296 MG/DL (ref 70–99)
GLUCOSE BLD-MCNC: 297 MG/DL (ref 70–99)
GLUCOSE BLD-MCNC: 298 MG/DL (ref 70–99)
GLUCOSE BLD-MCNC: 301 MG/DL (ref 70–99)
GLUCOSE BLD-MCNC: 302 MG/DL (ref 70–99)
GLUCOSE BLD-MCNC: 304 MG/DL (ref 70–99)
GLUCOSE BLD-MCNC: 304 MG/DL (ref 70–99)
GLUCOSE BLD-MCNC: 305 MG/DL (ref 70–99)
GLUCOSE BLD-MCNC: 305 MG/DL (ref 70–99)
GLUCOSE BLD-MCNC: 308 MG/DL (ref 70–99)
GLUCOSE BLD-MCNC: 314 MG/DL (ref 70–99)
GLUCOSE BLD-MCNC: 314 MG/DL (ref 70–99)
GLUCOSE BLD-MCNC: 318 MG/DL (ref 70–99)
GLUCOSE BLD-MCNC: 318 MG/DL (ref 70–99)
GLUCOSE BLD-MCNC: 324 MG/DL (ref 70–99)
GLUCOSE BLD-MCNC: 325 MG/DL (ref 70–99)
GLUCOSE BLD-MCNC: 325 MG/DL (ref 70–99)
GLUCOSE BLD-MCNC: 328 MG/DL (ref 70–99)
GLUCOSE BLD-MCNC: 332 MG/DL (ref 70–99)
GLUCOSE BLD-MCNC: 333 MG/DL (ref 70–99)
GLUCOSE BLD-MCNC: 336 MG/DL (ref 70–99)
GLUCOSE BLD-MCNC: 337 MG/DL (ref 70–99)
GLUCOSE BLD-MCNC: 338 MG/DL (ref 70–99)
GLUCOSE BLD-MCNC: 339 MG/DL (ref 70–99)
GLUCOSE BLD-MCNC: 341 MG/DL (ref 70–99)
GLUCOSE BLD-MCNC: 341 MG/DL (ref 70–99)
GLUCOSE BLD-MCNC: 344 MG/DL (ref 70–99)
GLUCOSE BLD-MCNC: 346 MG/DL (ref 70–99)
GLUCOSE BLD-MCNC: 347 MG/DL (ref 70–99)
GLUCOSE BLD-MCNC: 352 MG/DL (ref 70–99)
GLUCOSE BLD-MCNC: 356 MG/DL (ref 70–99)
GLUCOSE BLD-MCNC: 358 MG/DL (ref 70–99)
GLUCOSE BLD-MCNC: 361 MG/DL (ref 70–99)
GLUCOSE BLD-MCNC: 365 MG/DL (ref 70–99)
GLUCOSE BLD-MCNC: 365 MG/DL (ref 70–99)
GLUCOSE BLD-MCNC: 366 MG/DL (ref 70–99)
GLUCOSE BLD-MCNC: 367 MG/DL (ref 70–99)
GLUCOSE BLD-MCNC: 379 MG/DL (ref 70–99)
GLUCOSE BLD-MCNC: 380 MG/DL (ref 70–99)
GLUCOSE BLD-MCNC: 381 MG/DL (ref 70–99)
GLUCOSE BLD-MCNC: 381 MG/DL (ref 70–99)
GLUCOSE BLD-MCNC: 396 MG/DL (ref 70–99)
GLUCOSE BLD-MCNC: 399 MG/DL (ref 70–99)
GLUCOSE BLD-MCNC: 402 MG/DL (ref 70–99)
GLUCOSE BLD-MCNC: 403 MG/DL (ref 70–99)
GLUCOSE BLD-MCNC: 407 MG/DL (ref 70–99)
GLUCOSE BLD-MCNC: 408 MG/DL (ref 70–99)
GLUCOSE BLD-MCNC: 408 MG/DL (ref 70–99)
GLUCOSE BLD-MCNC: 418 MG/DL (ref 70–99)
GLUCOSE BLD-MCNC: 421 MG/DL (ref 70–99)
GLUCOSE BLD-MCNC: 421 MG/DL (ref 70–99)
GLUCOSE BLD-MCNC: 430 MG/DL (ref 70–99)
GLUCOSE BLD-MCNC: 432 MG/DL (ref 70–99)
GLUCOSE BLD-MCNC: 453 MG/DL (ref 70–99)
GLUCOSE BLD-MCNC: 483 MG/DL (ref 70–99)
GLUCOSE BLD-MCNC: 485 MG/DL (ref 70–99)
GLUCOSE BLD-MCNC: 486 MG/DL (ref 70–99)
GLUCOSE BLD-MCNC: 55 MG/DL (ref 70–99)
GLUCOSE BLD-MCNC: 56 MG/DL (ref 70–99)
GLUCOSE BLD-MCNC: 63 MG/DL (ref 70–99)
GLUCOSE BLD-MCNC: 67 MG/DL (ref 70–99)
GLUCOSE BLD-MCNC: 70 MG/DL (ref 70–99)
GLUCOSE BLD-MCNC: 74 MG/DL (ref 70–99)
GLUCOSE BLD-MCNC: 75 MG/DL (ref 70–99)
GLUCOSE BLD-MCNC: 77 MG/DL (ref 70–99)
GLUCOSE BLD-MCNC: 79 MG/DL (ref 70–99)
GLUCOSE BLD-MCNC: 80 MG/DL (ref 70–99)
GLUCOSE BLD-MCNC: 83 MG/DL (ref 70–99)
GLUCOSE BLD-MCNC: 86 MG/DL (ref 70–99)
GLUCOSE BLD-MCNC: 94 MG/DL (ref 70–99)
GLUCOSE BLD-MCNC: 97 MG/DL (ref 70–99)
GLUCOSE BLD-MCNC: 97 MG/DL (ref 70–99)
GLUCOSE URINE, POC: NORMAL
GLUCOSE URINE: 250 MG/DL
GLUCOSE URINE: 500 MG/DL
GLUCOSE URINE: >=1000 MG/DL
GLUCOSE URINE: NEGATIVE MG/DL
GRAM STAIN RESULT: NORMAL
GRAM STAIN RESULT: NORMAL
HBA1C MFR BLD: 6.5 %
HBA1C MFR BLD: 8.1 %
HCO3 ARTERIAL: 27 MMOL/L (ref 21–29)
HCO3 ARTERIAL: 33.9 MMOL/L (ref 21–29)
HCO3 VENOUS: 35.4 MMOL/L (ref 23–29)
HCT VFR BLD CALC: 23.3 % (ref 40.5–52.5)
HCT VFR BLD CALC: 24.6 % (ref 40.5–52.5)
HCT VFR BLD CALC: 25.5 % (ref 40.5–52.5)
HCT VFR BLD CALC: 25.5 % (ref 40.5–52.5)
HCT VFR BLD CALC: 26.2 % (ref 40.5–52.5)
HCT VFR BLD CALC: 26.4 % (ref 40.5–52.5)
HCT VFR BLD CALC: 26.5 % (ref 40.5–52.5)
HCT VFR BLD CALC: 26.8 % (ref 40.5–52.5)
HCT VFR BLD CALC: 26.8 % (ref 40.5–52.5)
HCT VFR BLD CALC: 27 % (ref 40.5–52.5)
HCT VFR BLD CALC: 27.2 % (ref 40.5–52.5)
HCT VFR BLD CALC: 27.5 % (ref 40.5–52.5)
HCT VFR BLD CALC: 27.7 % (ref 40.5–52.5)
HCT VFR BLD CALC: 28.4 % (ref 40.5–52.5)
HCT VFR BLD CALC: 28.8 % (ref 40.5–52.5)
HCT VFR BLD CALC: 28.8 % (ref 40.5–52.5)
HCT VFR BLD CALC: 28.9 % (ref 40.5–52.5)
HCT VFR BLD CALC: 29.5 % (ref 40.5–52.5)
HCT VFR BLD CALC: 29.8 % (ref 40.5–52.5)
HCT VFR BLD CALC: 30 % (ref 40.5–52.5)
HCT VFR BLD CALC: 30.5 % (ref 40.5–52.5)
HCT VFR BLD CALC: 31.2 % (ref 40.5–52.5)
HCT VFR BLD CALC: 31.3 % (ref 40.5–52.5)
HCT VFR BLD CALC: 32.1 % (ref 40.5–52.5)
HCT VFR BLD CALC: 32.3 % (ref 40.5–52.5)
HCT VFR BLD CALC: 32.6 % (ref 40.5–52.5)
HCT VFR BLD CALC: 32.8 % (ref 40.5–52.5)
HCT VFR BLD CALC: 32.8 % (ref 40.5–52.5)
HCT VFR BLD CALC: 33 % (ref 40.5–52.5)
HCT VFR BLD CALC: 33.1 % (ref 40.5–52.5)
HCT VFR BLD CALC: 33.1 % (ref 40.5–52.5)
HCT VFR BLD CALC: 33.6 % (ref 40.5–52.5)
HCT VFR BLD CALC: 34 % (ref 40.5–52.5)
HCT VFR BLD CALC: 34.3 % (ref 40.5–52.5)
HCT VFR BLD CALC: 34.6 % (ref 40.5–52.5)
HCT VFR BLD CALC: 34.9 % (ref 40.5–52.5)
HCT VFR BLD CALC: 35.7 % (ref 40.5–52.5)
HCT VFR BLD CALC: 35.8 % (ref 40.5–52.5)
HCT VFR BLD CALC: 35.9 % (ref 40.5–52.5)
HCT VFR BLD CALC: 37.5 % (ref 40.5–52.5)
HCT VFR BLD CALC: 39.3 % (ref 40.5–52.5)
HCT VFR BLD CALC: 41.2 % (ref 40.5–52.5)
HCT VFR BLD CALC: 44.4 % (ref 40.5–52.5)
HDLC SERPL-MCNC: 50 MG/DL (ref 40–60)
HDLC SERPL-MCNC: 60 MG/DL (ref 40–60)
HEMOGLOBIN, ART, EXTENDED: 10.2 G/DL
HEMOGLOBIN: 10 G/DL (ref 13.5–17.5)
HEMOGLOBIN: 10.1 G/DL (ref 13.5–17.5)
HEMOGLOBIN: 10.2 G/DL (ref 13.5–17.5)
HEMOGLOBIN: 10.3 G/DL (ref 13.5–17.5)
HEMOGLOBIN: 10.4 G/DL (ref 13.5–17.5)
HEMOGLOBIN: 10.4 G/DL (ref 13.5–17.5)
HEMOGLOBIN: 10.7 G/DL (ref 13.5–17.5)
HEMOGLOBIN: 10.8 G/DL (ref 13.5–17.5)
HEMOGLOBIN: 10.8 G/DL (ref 13.5–17.5)
HEMOGLOBIN: 10.9 G/DL (ref 13.5–17.5)
HEMOGLOBIN: 10.9 G/DL (ref 13.5–17.5)
HEMOGLOBIN: 11 G/DL (ref 13.5–17.5)
HEMOGLOBIN: 11.1 G/DL (ref 13.5–17.5)
HEMOGLOBIN: 11.1 G/DL (ref 13.5–17.5)
HEMOGLOBIN: 11.2 G/DL (ref 13.5–17.5)
HEMOGLOBIN: 11.3 G/DL (ref 13.5–17.5)
HEMOGLOBIN: 11.4 G/DL (ref 13.5–17.5)
HEMOGLOBIN: 11.5 G/DL (ref 13.5–17.5)
HEMOGLOBIN: 11.6 G/DL (ref 13.5–17.5)
HEMOGLOBIN: 11.7 G/DL (ref 13.5–17.5)
HEMOGLOBIN: 11.8 G/DL (ref 13.5–17.5)
HEMOGLOBIN: 11.9 G/DL (ref 13.5–17.5)
HEMOGLOBIN: 12.3 G/DL (ref 13.5–17.5)
HEMOGLOBIN: 12.7 G/DL (ref 13.5–17.5)
HEMOGLOBIN: 13.4 G/DL (ref 13.5–17.5)
HEMOGLOBIN: 14.7 G/DL (ref 13.5–17.5)
HEMOGLOBIN: 7.5 G/DL (ref 13.5–17.5)
HEMOGLOBIN: 7.9 G/DL (ref 13.5–17.5)
HEMOGLOBIN: 7.9 G/DL (ref 13.5–17.5)
HEMOGLOBIN: 8 G/DL (ref 13.5–17.5)
HEMOGLOBIN: 8.1 G/DL (ref 13.5–17.5)
HEMOGLOBIN: 8.5 G/DL (ref 13.5–17.5)
HEMOGLOBIN: 8.6 G/DL (ref 13.5–17.5)
HEMOGLOBIN: 8.7 G/DL (ref 13.5–17.5)
HEMOGLOBIN: 8.8 G/DL (ref 13.5–17.5)
HEMOGLOBIN: 8.8 G/DL (ref 13.5–17.5)
HEMOGLOBIN: 9 G/DL (ref 13.5–17.5)
HEMOGLOBIN: 9.1 G/DL (ref 13.5–17.5)
HEMOGLOBIN: 9.1 G/DL (ref 13.5–17.5)
HEMOGLOBIN: 9.3 G/DL (ref 13.5–17.5)
HEMOGLOBIN: 9.4 G/DL (ref 13.5–17.5)
HEMOGLOBIN: 9.5 G/DL (ref 13.5–17.5)
HEMOGLOBIN: 9.6 G/DL (ref 13.5–17.5)
HYPOCHROMIA: ABNORMAL
INR BLD: 1.02 (ref 0.87–1.14)
INR BLD: 1.02 (ref 0.87–1.14)
INR BLD: 1.03 (ref 0.87–1.14)
INR BLD: 1.04 (ref 0.87–1.14)
INR BLD: 1.05 (ref 0.87–1.14)
INR BLD: 1.05 (ref 0.87–1.14)
INR BLD: 1.07 (ref 0.87–1.14)
INR BLD: 1.08 (ref 0.87–1.14)
INR BLD: 1.09 (ref 0.87–1.14)
INR BLD: 1.1 (ref 0.87–1.14)
INR BLD: 1.13 (ref 0.87–1.14)
INR BLD: 1.13 (ref 0.87–1.14)
INR BLD: 1.14 (ref 0.87–1.14)
INR BLD: 1.19 (ref 0.87–1.14)
INR BLD: 1.5 (ref 0.86–1.14)
KETONES, POC: NORMAL
KETONES, URINE: 15 MG/DL
KETONES, URINE: NEGATIVE MG/DL
LACTATE DEHYDROGENASE: 231 U/L (ref 100–190)
LACTATE DEHYDROGENASE: 243 U/L (ref 100–190)
LACTATE DEHYDROGENASE: 245 U/L (ref 100–190)
LACTATE DEHYDROGENASE: 254 U/L (ref 100–190)
LACTATE DEHYDROGENASE: 262 U/L (ref 100–190)
LACTATE DEHYDROGENASE: 264 U/L (ref 100–190)
LACTATE DEHYDROGENASE: 335 U/L (ref 100–190)
LACTATE DEHYDROGENASE: 384 U/L (ref 100–190)
LACTATE DEHYDROGENASE: 390 U/L (ref 100–190)
LACTATE DEHYDROGENASE: 410 U/L (ref 100–190)
LACTATE DEHYDROGENASE: 421 U/L (ref 100–190)
LACTATE DEHYDROGENASE: 433 U/L (ref 100–190)
LACTATE DEHYDROGENASE: 446 U/L (ref 100–190)
LACTATE DEHYDROGENASE: 451 U/L (ref 100–190)
LACTATE DEHYDROGENASE: 484 U/L (ref 100–190)
LACTATE DEHYDROGENASE: 490 U/L (ref 100–190)
LACTATE: 1.38 MMOL/L (ref 0.4–2)
LACTIC ACID: 1.1 MMOL/L (ref 0.4–2)
LDL CHOLESTEROL CALCULATED: 66 MG/DL
LDL CHOLESTEROL CALCULATED: 91 MG/DL
LEUKOCYTE EST, POC: NORMAL
LEUKOCYTE ESTERASE, URINE: NEGATIVE
LV EF: 50 %
LV EF: 63 %
LVEF MODALITY: NORMAL
LVEF MODALITY: NORMAL
LYMPHOCYTES ABSOLUTE: 0 K/UL (ref 1–5.1)
LYMPHOCYTES ABSOLUTE: 0.1 K/UL (ref 1–5.1)
LYMPHOCYTES ABSOLUTE: 0.2 K/UL (ref 1–5.1)
LYMPHOCYTES ABSOLUTE: 0.3 K/UL (ref 1–5.1)
LYMPHOCYTES ABSOLUTE: 0.4 K/UL (ref 1–5.1)
LYMPHOCYTES ABSOLUTE: 0.5 K/UL (ref 1–5.1)
LYMPHOCYTES ABSOLUTE: 0.7 K/UL (ref 1–5.1)
LYMPHOCYTES RELATIVE PERCENT: 0.4 %
LYMPHOCYTES RELATIVE PERCENT: 0.5 %
LYMPHOCYTES RELATIVE PERCENT: 0.5 %
LYMPHOCYTES RELATIVE PERCENT: 0.7 %
LYMPHOCYTES RELATIVE PERCENT: 0.9 %
LYMPHOCYTES RELATIVE PERCENT: 1.1 %
LYMPHOCYTES RELATIVE PERCENT: 1.1 %
LYMPHOCYTES RELATIVE PERCENT: 1.6 %
LYMPHOCYTES RELATIVE PERCENT: 1.7 %
LYMPHOCYTES RELATIVE PERCENT: 1.7 %
LYMPHOCYTES RELATIVE PERCENT: 1.8 %
LYMPHOCYTES RELATIVE PERCENT: 1.8 %
LYMPHOCYTES RELATIVE PERCENT: 1.9 %
LYMPHOCYTES RELATIVE PERCENT: 1.9 %
LYMPHOCYTES RELATIVE PERCENT: 12.3 %
LYMPHOCYTES RELATIVE PERCENT: 2 %
LYMPHOCYTES RELATIVE PERCENT: 2 %
LYMPHOCYTES RELATIVE PERCENT: 2.1 %
LYMPHOCYTES RELATIVE PERCENT: 2.2 %
LYMPHOCYTES RELATIVE PERCENT: 2.2 %
LYMPHOCYTES RELATIVE PERCENT: 2.7 %
LYMPHOCYTES RELATIVE PERCENT: 3 %
LYMPHOCYTES RELATIVE PERCENT: 3 %
LYMPHOCYTES RELATIVE PERCENT: 3.8 %
LYMPHOCYTES RELATIVE PERCENT: 3.8 %
LYMPHOCYTES RELATIVE PERCENT: 4.1 %
LYMPHOCYTES RELATIVE PERCENT: 4.3 %
LYMPHOCYTES RELATIVE PERCENT: 4.3 %
LYMPHOCYTES RELATIVE PERCENT: 4.6 %
LYMPHOCYTES RELATIVE PERCENT: 4.9 %
LYMPHOCYTES RELATIVE PERCENT: 4.9 %
LYMPHOCYTES RELATIVE PERCENT: 5.2 %
LYMPHOCYTES RELATIVE PERCENT: 6.3 %
LYMPHOCYTES RELATIVE PERCENT: 6.6 %
LYMPHOCYTES RELATIVE PERCENT: 7 %
LYMPHOCYTES RELATIVE PERCENT: 7.6 %
LYMPHOCYTES RELATIVE PERCENT: 8 %
LYMPHOCYTES RELATIVE PERCENT: 8.1 %
LYMPHOCYTES, BAL: 11 % (ref 5–10)
LYMPHOCYTES, BAL: 15 % (ref 5–10)
Lab: NORMAL
Lab: NORMAL
MACROCYTES: ABNORMAL
MACROPHAGES, BAL: 36 % (ref 90–95)
MAGNESIUM: 1.4 MG/DL (ref 1.8–2.4)
MAGNESIUM: 1.5 MG/DL (ref 1.8–2.4)
MAGNESIUM: 1.6 MG/DL (ref 1.8–2.4)
MAGNESIUM: 1.7 MG/DL (ref 1.8–2.4)
MAGNESIUM: 1.8 MG/DL (ref 1.8–2.4)
MAGNESIUM: 1.8 MG/DL (ref 1.8–2.4)
MAGNESIUM: 1.9 MG/DL (ref 1.8–2.4)
MAGNESIUM: 1.9 MG/DL (ref 1.8–2.4)
MCH RBC QN AUTO: 29.6 PG (ref 26–34)
MCH RBC QN AUTO: 29.7 PG (ref 26–34)
MCH RBC QN AUTO: 30.3 PG (ref 26–34)
MCH RBC QN AUTO: 30.5 PG (ref 26–34)
MCH RBC QN AUTO: 30.6 PG (ref 26–34)
MCH RBC QN AUTO: 30.8 PG (ref 26–34)
MCH RBC QN AUTO: 30.8 PG (ref 26–34)
MCH RBC QN AUTO: 30.9 PG (ref 26–34)
MCH RBC QN AUTO: 31 PG (ref 26–34)
MCH RBC QN AUTO: 31.1 PG (ref 26–34)
MCH RBC QN AUTO: 31.2 PG (ref 26–34)
MCH RBC QN AUTO: 31.3 PG (ref 26–34)
MCH RBC QN AUTO: 31.5 PG (ref 26–34)
MCH RBC QN AUTO: 31.6 PG (ref 26–34)
MCH RBC QN AUTO: 31.9 PG (ref 26–34)
MCH RBC QN AUTO: 32 PG (ref 26–34)
MCH RBC QN AUTO: 32 PG (ref 26–34)
MCH RBC QN AUTO: 32.3 PG (ref 26–34)
MCH RBC QN AUTO: 32.4 PG (ref 26–34)
MCH RBC QN AUTO: 32.6 PG (ref 26–34)
MCH RBC QN AUTO: 32.7 PG (ref 26–34)
MCH RBC QN AUTO: 32.8 PG (ref 26–34)
MCH RBC QN AUTO: 32.9 PG (ref 26–34)
MCH RBC QN AUTO: 32.9 PG (ref 26–34)
MCH RBC QN AUTO: 33 PG (ref 26–34)
MCH RBC QN AUTO: 33 PG (ref 26–34)
MCH RBC QN AUTO: 33.1 PG (ref 26–34)
MCH RBC QN AUTO: 33.2 PG (ref 26–34)
MCH RBC QN AUTO: 33.4 PG (ref 26–34)
MCH RBC QN AUTO: 33.5 PG (ref 26–34)
MCH RBC QN AUTO: 33.9 PG (ref 26–34)
MCHC RBC AUTO-ENTMCNC: 29.7 G/DL (ref 31–36)
MCHC RBC AUTO-ENTMCNC: 31 G/DL (ref 31–36)
MCHC RBC AUTO-ENTMCNC: 31.8 G/DL (ref 31–36)
MCHC RBC AUTO-ENTMCNC: 31.9 G/DL (ref 31–36)
MCHC RBC AUTO-ENTMCNC: 31.9 G/DL (ref 31–36)
MCHC RBC AUTO-ENTMCNC: 32.2 G/DL (ref 31–36)
MCHC RBC AUTO-ENTMCNC: 32.2 G/DL (ref 31–36)
MCHC RBC AUTO-ENTMCNC: 32.3 G/DL (ref 31–36)
MCHC RBC AUTO-ENTMCNC: 32.4 G/DL (ref 31–36)
MCHC RBC AUTO-ENTMCNC: 32.4 G/DL (ref 31–36)
MCHC RBC AUTO-ENTMCNC: 32.5 G/DL (ref 31–36)
MCHC RBC AUTO-ENTMCNC: 32.6 G/DL (ref 31–36)
MCHC RBC AUTO-ENTMCNC: 32.6 G/DL (ref 31–36)
MCHC RBC AUTO-ENTMCNC: 32.7 G/DL (ref 31–36)
MCHC RBC AUTO-ENTMCNC: 32.9 G/DL (ref 31–36)
MCHC RBC AUTO-ENTMCNC: 33 G/DL (ref 31–36)
MCHC RBC AUTO-ENTMCNC: 33.1 G/DL (ref 31–36)
MCHC RBC AUTO-ENTMCNC: 33.2 G/DL (ref 31–36)
MCHC RBC AUTO-ENTMCNC: 33.3 G/DL (ref 31–36)
MCHC RBC AUTO-ENTMCNC: 33.3 G/DL (ref 31–36)
MCHC RBC AUTO-ENTMCNC: 33.4 G/DL (ref 31–36)
MCHC RBC AUTO-ENTMCNC: 33.5 G/DL (ref 31–36)
MCHC RBC AUTO-ENTMCNC: 33.5 G/DL (ref 31–36)
MCHC RBC AUTO-ENTMCNC: 33.6 G/DL (ref 31–36)
MCHC RBC AUTO-ENTMCNC: 33.6 G/DL (ref 31–36)
MCHC RBC AUTO-ENTMCNC: 33.7 G/DL (ref 31–36)
MCHC RBC AUTO-ENTMCNC: 33.8 G/DL (ref 31–36)
MCHC RBC AUTO-ENTMCNC: 34 G/DL (ref 31–36)
MCHC RBC AUTO-ENTMCNC: 34.3 G/DL (ref 31–36)
MCHC RBC AUTO-ENTMCNC: 34.3 G/DL (ref 31–36)
MCHC RBC AUTO-ENTMCNC: 34.6 G/DL (ref 31–36)
MCV RBC AUTO: 100 FL (ref 80–100)
MCV RBC AUTO: 100.2 FL (ref 80–100)
MCV RBC AUTO: 101 FL (ref 80–100)
MCV RBC AUTO: 101.1 FL (ref 80–100)
MCV RBC AUTO: 101.1 FL (ref 80–100)
MCV RBC AUTO: 101.2 FL (ref 80–100)
MCV RBC AUTO: 94.1 FL (ref 80–100)
MCV RBC AUTO: 94.7 FL (ref 80–100)
MCV RBC AUTO: 94.8 FL (ref 80–100)
MCV RBC AUTO: 95 FL (ref 80–100)
MCV RBC AUTO: 95.1 FL (ref 80–100)
MCV RBC AUTO: 95.1 FL (ref 80–100)
MCV RBC AUTO: 95.4 FL (ref 80–100)
MCV RBC AUTO: 95.8 FL (ref 80–100)
MCV RBC AUTO: 95.8 FL (ref 80–100)
MCV RBC AUTO: 95.9 FL (ref 80–100)
MCV RBC AUTO: 95.9 FL (ref 80–100)
MCV RBC AUTO: 96.3 FL (ref 80–100)
MCV RBC AUTO: 96.4 FL (ref 80–100)
MCV RBC AUTO: 96.6 FL (ref 80–100)
MCV RBC AUTO: 96.7 FL (ref 80–100)
MCV RBC AUTO: 96.8 FL (ref 80–100)
MCV RBC AUTO: 96.8 FL (ref 80–100)
MCV RBC AUTO: 97 FL (ref 80–100)
MCV RBC AUTO: 97 FL (ref 80–100)
MCV RBC AUTO: 97.2 FL (ref 80–100)
MCV RBC AUTO: 97.2 FL (ref 80–100)
MCV RBC AUTO: 97.8 FL (ref 80–100)
MCV RBC AUTO: 97.8 FL (ref 80–100)
MCV RBC AUTO: 97.9 FL (ref 80–100)
MCV RBC AUTO: 98 FL (ref 80–100)
MCV RBC AUTO: 98 FL (ref 80–100)
MCV RBC AUTO: 98.3 FL (ref 80–100)
MCV RBC AUTO: 98.3 FL (ref 80–100)
MCV RBC AUTO: 98.5 FL (ref 80–100)
MCV RBC AUTO: 98.7 FL (ref 80–100)
MCV RBC AUTO: 98.9 FL (ref 80–100)
MCV RBC AUTO: 99.2 FL (ref 80–100)
MCV RBC AUTO: 99.2 FL (ref 80–100)
MCV RBC AUTO: 99.6 FL (ref 80–100)
MCV RBC AUTO: 99.7 FL (ref 80–100)
MCV RBC AUTO: 99.7 FL (ref 80–100)
MCV RBC AUTO: 99.9 FL (ref 80–100)
METHEMOGLOBIN ARTERIAL: 0.4 % (ref 0–1.4)
MICROALBUMIN UR-MCNC: 4.7 MG/DL
MICROALBUMIN/CREAT UR-RTO: 158.8 MG/G (ref 0–30)
MICROPOLYSPORA FAENI: NORMAL
MICROSCOPIC EXAMINATION: YES
MONOCYTES ABSOLUTE: 0 K/UL (ref 0–1.3)
MONOCYTES ABSOLUTE: 0.1 K/UL (ref 0–1.3)
MONOCYTES ABSOLUTE: 0.2 K/UL (ref 0–1.3)
MONOCYTES ABSOLUTE: 0.3 K/UL (ref 0–1.3)
MONOCYTES ABSOLUTE: 0.4 K/UL (ref 0–1.3)
MONOCYTES ABSOLUTE: 0.5 K/UL (ref 0–1.3)
MONOCYTES ABSOLUTE: 0.6 K/UL (ref 0–1.3)
MONOCYTES RELATIVE PERCENT: 0.4 %
MONOCYTES RELATIVE PERCENT: 1.7 %
MONOCYTES RELATIVE PERCENT: 2 %
MONOCYTES RELATIVE PERCENT: 2.2 %
MONOCYTES RELATIVE PERCENT: 3 %
MONOCYTES RELATIVE PERCENT: 3.3 %
MONOCYTES RELATIVE PERCENT: 3.5 %
MONOCYTES RELATIVE PERCENT: 3.6 %
MONOCYTES RELATIVE PERCENT: 3.7 %
MONOCYTES RELATIVE PERCENT: 3.7 %
MONOCYTES RELATIVE PERCENT: 3.9 %
MONOCYTES RELATIVE PERCENT: 4 %
MONOCYTES RELATIVE PERCENT: 4 %
MONOCYTES RELATIVE PERCENT: 4.4 %
MONOCYTES RELATIVE PERCENT: 4.4 %
MONOCYTES RELATIVE PERCENT: 4.5 %
MONOCYTES RELATIVE PERCENT: 4.7 %
MONOCYTES RELATIVE PERCENT: 4.8 %
MONOCYTES RELATIVE PERCENT: 4.8 %
MONOCYTES RELATIVE PERCENT: 4.9 %
MONOCYTES RELATIVE PERCENT: 5 %
MONOCYTES RELATIVE PERCENT: 5 %
MONOCYTES RELATIVE PERCENT: 5.4 %
MONOCYTES RELATIVE PERCENT: 5.7 %
MONOCYTES RELATIVE PERCENT: 5.7 %
MONOCYTES RELATIVE PERCENT: 6.3 %
MONOCYTES RELATIVE PERCENT: 6.4 %
MONOCYTES RELATIVE PERCENT: 6.4 %
MONOCYTES RELATIVE PERCENT: 6.8 %
MONOCYTES RELATIVE PERCENT: 6.9 %
MONOCYTES RELATIVE PERCENT: 7.8 %
MONOCYTES RELATIVE PERCENT: 8 %
MONOCYTES, BAL: 24 %
MONONUCLEAR UNIDENTIFIED CELLS FLUID BAL: 5 %
MRSA SCREEN RT-PCR: NORMAL
MUCUS: ABNORMAL /LPF
MYELOCYTE PERCENT: 3 %
NEUTROPHILS ABSOLUTE: 10.5 K/UL (ref 1.7–7.7)
NEUTROPHILS ABSOLUTE: 11.4 K/UL (ref 1.7–7.7)
NEUTROPHILS ABSOLUTE: 4.1 K/UL (ref 1.7–7.7)
NEUTROPHILS ABSOLUTE: 4.2 K/UL (ref 1.7–7.7)
NEUTROPHILS ABSOLUTE: 4.2 K/UL (ref 1.7–7.7)
NEUTROPHILS ABSOLUTE: 4.4 K/UL (ref 1.7–7.7)
NEUTROPHILS ABSOLUTE: 4.5 K/UL (ref 1.7–7.7)
NEUTROPHILS ABSOLUTE: 4.6 K/UL (ref 1.7–7.7)
NEUTROPHILS ABSOLUTE: 4.6 K/UL (ref 1.7–7.7)
NEUTROPHILS ABSOLUTE: 4.7 K/UL (ref 1.7–7.7)
NEUTROPHILS ABSOLUTE: 4.8 K/UL (ref 1.7–7.7)
NEUTROPHILS ABSOLUTE: 4.9 K/UL (ref 1.7–7.7)
NEUTROPHILS ABSOLUTE: 4.9 K/UL (ref 1.7–7.7)
NEUTROPHILS ABSOLUTE: 5 K/UL (ref 1.7–7.7)
NEUTROPHILS ABSOLUTE: 5.4 K/UL (ref 1.7–7.7)
NEUTROPHILS ABSOLUTE: 5.5 K/UL (ref 1.7–7.7)
NEUTROPHILS ABSOLUTE: 5.6 K/UL (ref 1.7–7.7)
NEUTROPHILS ABSOLUTE: 5.6 K/UL (ref 1.7–7.7)
NEUTROPHILS ABSOLUTE: 5.7 K/UL (ref 1.7–7.7)
NEUTROPHILS ABSOLUTE: 6.2 K/UL (ref 1.7–7.7)
NEUTROPHILS ABSOLUTE: 6.5 K/UL (ref 1.7–7.7)
NEUTROPHILS ABSOLUTE: 6.6 K/UL (ref 1.7–7.7)
NEUTROPHILS ABSOLUTE: 6.8 K/UL (ref 1.7–7.7)
NEUTROPHILS ABSOLUTE: 6.9 K/UL (ref 1.7–7.7)
NEUTROPHILS ABSOLUTE: 7 K/UL (ref 1.7–7.7)
NEUTROPHILS ABSOLUTE: 7.2 K/UL (ref 1.7–7.7)
NEUTROPHILS ABSOLUTE: 7.5 K/UL (ref 1.7–7.7)
NEUTROPHILS ABSOLUTE: 8 K/UL (ref 1.7–7.7)
NEUTROPHILS ABSOLUTE: 8.3 K/UL (ref 1.7–7.7)
NEUTROPHILS ABSOLUTE: 8.5 K/UL (ref 1.7–7.7)
NEUTROPHILS ABSOLUTE: 8.5 K/UL (ref 1.7–7.7)
NEUTROPHILS RELATIVE PERCENT: 79.1 %
NEUTROPHILS RELATIVE PERCENT: 82.3 %
NEUTROPHILS RELATIVE PERCENT: 83.5 %
NEUTROPHILS RELATIVE PERCENT: 83.5 %
NEUTROPHILS RELATIVE PERCENT: 84 %
NEUTROPHILS RELATIVE PERCENT: 85.5 %
NEUTROPHILS RELATIVE PERCENT: 85.6 %
NEUTROPHILS RELATIVE PERCENT: 85.7 %
NEUTROPHILS RELATIVE PERCENT: 86.5 %
NEUTROPHILS RELATIVE PERCENT: 88.1 %
NEUTROPHILS RELATIVE PERCENT: 89.3 %
NEUTROPHILS RELATIVE PERCENT: 89.4 %
NEUTROPHILS RELATIVE PERCENT: 89.8 %
NEUTROPHILS RELATIVE PERCENT: 90 %
NEUTROPHILS RELATIVE PERCENT: 90 %
NEUTROPHILS RELATIVE PERCENT: 90.2 %
NEUTROPHILS RELATIVE PERCENT: 91.6 %
NEUTROPHILS RELATIVE PERCENT: 92.3 %
NEUTROPHILS RELATIVE PERCENT: 92.7 %
NEUTROPHILS RELATIVE PERCENT: 93.1 %
NEUTROPHILS RELATIVE PERCENT: 93.1 %
NEUTROPHILS RELATIVE PERCENT: 93.2 %
NEUTROPHILS RELATIVE PERCENT: 93.5 %
NEUTROPHILS RELATIVE PERCENT: 93.5 %
NEUTROPHILS RELATIVE PERCENT: 93.6 %
NEUTROPHILS RELATIVE PERCENT: 93.7 %
NEUTROPHILS RELATIVE PERCENT: 94.1 %
NEUTROPHILS RELATIVE PERCENT: 94.2 %
NEUTROPHILS RELATIVE PERCENT: 94.3 %
NEUTROPHILS RELATIVE PERCENT: 94.5 %
NEUTROPHILS RELATIVE PERCENT: 94.7 %
NEUTROPHILS RELATIVE PERCENT: 95.3 %
NEUTROPHILS RELATIVE PERCENT: 96.5 %
NEUTROPHILS RELATIVE PERCENT: 97 %
NEUTROPHILS RELATIVE PERCENT: 97.1 %
NEUTROPHILS RELATIVE PERCENT: 98.2 %
NITRITE, POC: NORMAL
NITRITE, URINE: NEGATIVE
NITRITE, URINE: POSITIVE
NUMBER OF CELLS COUNTED BAL (LAVAGE): 100
O2 SAT, ARTERIAL: 98 % (ref 93–100)
O2 SAT, ARTERIAL: 98 % (ref 93–100)
O2 SAT, VEN: 65 %
OVALOCYTES: ABNORMAL
OVALOCYTES: ABNORMAL
PATH REVIEW BAL (LAVAGE): YES
PCO2 ARTERIAL: 42.7 MMHG (ref 35–45)
PCO2 ARTERIAL: 53 MM HG (ref 35–45)
PCO2, VEN: 56 MM HG (ref 40–50)
PDW BLD-RTO: 16 % (ref 12.4–15.4)
PDW BLD-RTO: 16.2 % (ref 12.4–15.4)
PDW BLD-RTO: 16.2 % (ref 12.4–15.4)
PDW BLD-RTO: 16.3 % (ref 12.4–15.4)
PDW BLD-RTO: 16.3 % (ref 12.4–15.4)
PDW BLD-RTO: 16.4 % (ref 12.4–15.4)
PDW BLD-RTO: 16.5 % (ref 12.4–15.4)
PDW BLD-RTO: 16.6 % (ref 12.4–15.4)
PDW BLD-RTO: 16.7 % (ref 12.4–15.4)
PDW BLD-RTO: 16.8 % (ref 12.4–15.4)
PDW BLD-RTO: 16.9 % (ref 12.4–15.4)
PDW BLD-RTO: 17 % (ref 12.4–15.4)
PDW BLD-RTO: 17 % (ref 12.4–15.4)
PDW BLD-RTO: 17.1 % (ref 12.4–15.4)
PDW BLD-RTO: 17.2 % (ref 12.4–15.4)
PDW BLD-RTO: 17.3 % (ref 12.4–15.4)
PDW BLD-RTO: 17.6 % (ref 12.4–15.4)
PDW BLD-RTO: 17.8 % (ref 12.4–15.4)
PDW BLD-RTO: 17.9 % (ref 12.4–15.4)
PDW BLD-RTO: 17.9 % (ref 12.4–15.4)
PDW BLD-RTO: 18 % (ref 12.4–15.4)
PDW BLD-RTO: 18 % (ref 12.4–15.4)
PDW BLD-RTO: 18.4 % (ref 12.4–15.4)
PERFORMED ON: ABNORMAL
PERFORMED ON: NORMAL
PH ARTERIAL: 7.4 (ref 7.35–7.45)
PH ARTERIAL: 7.41 (ref 7.35–7.45)
PH UA: 6 (ref 5–8)
PH UA: 6 (ref 5–8)
PH UA: 6.5 (ref 5–8)
PH UA: 6.5 (ref 5–8)
PH VENOUS: 7.41 (ref 7.35–7.45)
PH VENOUS: 7.42 (ref 7.35–7.45)
PH, POC: 6
PHOSPHORUS: 1.2 MG/DL (ref 2.5–4.9)
PHOSPHORUS: 1.3 MG/DL (ref 2.5–4.9)
PHOSPHORUS: 1.4 MG/DL (ref 2.5–4.9)
PHOSPHORUS: 1.5 MG/DL (ref 2.5–4.9)
PHOSPHORUS: 1.6 MG/DL (ref 2.5–4.9)
PHOSPHORUS: 1.7 MG/DL (ref 2.5–4.9)
PHOSPHORUS: 1.9 MG/DL (ref 2.5–4.9)
PHOSPHORUS: 2 MG/DL (ref 2.5–4.9)
PHOSPHORUS: 2 MG/DL (ref 2.5–4.9)
PHOSPHORUS: 2.1 MG/DL (ref 2.5–4.9)
PHOSPHORUS: 2.2 MG/DL (ref 2.5–4.9)
PHOSPHORUS: 2.3 MG/DL (ref 2.5–4.9)
PHOSPHORUS: 2.4 MG/DL (ref 2.5–4.9)
PHOSPHORUS: 2.4 MG/DL (ref 2.5–4.9)
PHOSPHORUS: 2.5 MG/DL (ref 2.5–4.9)
PHOSPHORUS: 2.5 MG/DL (ref 2.5–4.9)
PHOSPHORUS: 2.6 MG/DL (ref 2.5–4.9)
PHOSPHORUS: 2.7 MG/DL (ref 2.5–4.9)
PHOSPHORUS: 2.7 MG/DL (ref 2.5–4.9)
PHOSPHORUS: 2.8 MG/DL (ref 2.5–4.9)
PHOSPHORUS: 2.8 MG/DL (ref 2.5–4.9)
PHOSPHORUS: 2.9 MG/DL (ref 2.5–4.9)
PHOSPHORUS: 2.9 MG/DL (ref 2.5–4.9)
PHOSPHORUS: 3 MG/DL (ref 2.5–4.9)
PHOSPHORUS: 3 MG/DL (ref 2.5–4.9)
PHOSPHORUS: 3.3 MG/DL (ref 2.5–4.9)
PIGEON SERUM ABS: NORMAL
PLATELET # BLD: 100 K/UL (ref 135–450)
PLATELET # BLD: 101 K/UL (ref 135–450)
PLATELET # BLD: 102 K/UL (ref 135–450)
PLATELET # BLD: 105 K/UL (ref 135–450)
PLATELET # BLD: 106 K/UL (ref 135–450)
PLATELET # BLD: 109 K/UL (ref 135–450)
PLATELET # BLD: 110 K/UL (ref 135–450)
PLATELET # BLD: 117 K/UL (ref 135–450)
PLATELET # BLD: 118 K/UL (ref 135–450)
PLATELET # BLD: 119 K/UL (ref 135–450)
PLATELET # BLD: 122 K/UL (ref 135–450)
PLATELET # BLD: 128 K/UL (ref 135–450)
PLATELET # BLD: 129 K/UL (ref 135–450)
PLATELET # BLD: 138 K/UL (ref 135–450)
PLATELET # BLD: 140 K/UL (ref 135–450)
PLATELET # BLD: 140 K/UL (ref 135–450)
PLATELET # BLD: 145 K/UL (ref 135–450)
PLATELET # BLD: 181 K/UL (ref 135–450)
PLATELET # BLD: 203 K/UL (ref 135–450)
PLATELET # BLD: 69 K/UL (ref 135–450)
PLATELET # BLD: 71 K/UL (ref 135–450)
PLATELET # BLD: 73 K/UL (ref 135–450)
PLATELET # BLD: 74 K/UL (ref 135–450)
PLATELET # BLD: 75 K/UL (ref 135–450)
PLATELET # BLD: 79 K/UL (ref 135–450)
PLATELET # BLD: 79 K/UL (ref 135–450)
PLATELET # BLD: 80 K/UL (ref 135–450)
PLATELET # BLD: 81 K/UL (ref 135–450)
PLATELET # BLD: 84 K/UL (ref 135–450)
PLATELET # BLD: 84 K/UL (ref 135–450)
PLATELET # BLD: 86 K/UL (ref 135–450)
PLATELET # BLD: 86 K/UL (ref 135–450)
PLATELET # BLD: 87 K/UL (ref 135–450)
PLATELET # BLD: 88 K/UL (ref 135–450)
PLATELET # BLD: 88 K/UL (ref 135–450)
PLATELET # BLD: 91 K/UL (ref 135–450)
PLATELET # BLD: 92 K/UL (ref 135–450)
PLATELET # BLD: 92 K/UL (ref 135–450)
PLATELET # BLD: 94 K/UL (ref 135–450)
PLATELET # BLD: 95 K/UL (ref 135–450)
PLATELET # BLD: 98 K/UL (ref 135–450)
PLATELET SLIDE REVIEW: ABNORMAL
PMV BLD AUTO: 7.1 FL (ref 5–10.5)
PMV BLD AUTO: 7.5 FL (ref 5–10.5)
PMV BLD AUTO: 7.6 FL (ref 5–10.5)
PMV BLD AUTO: 7.7 FL (ref 5–10.5)
PMV BLD AUTO: 7.8 FL (ref 5–10.5)
PMV BLD AUTO: 7.9 FL (ref 5–10.5)
PMV BLD AUTO: 8 FL (ref 5–10.5)
PMV BLD AUTO: 8.1 FL (ref 5–10.5)
PMV BLD AUTO: 8.3 FL (ref 5–10.5)
PMV BLD AUTO: 8.6 FL (ref 5–10.5)
PO2 ARTERIAL: 108 MM HG (ref 75–108)
PO2 ARTERIAL: 91.4 MMHG (ref 75–108)
PO2, VEN: 35 MM HG
POC CREATININE: 0.5 MG/DL (ref 0.8–1.3)
POC SAMPLE TYPE: ABNORMAL
POIKILOCYTES: ABNORMAL
POLYCHROMASIA: ABNORMAL
POLYCHROMASIA: ABNORMAL
POTASSIUM REFLEX MAGNESIUM: 4 MMOL/L (ref 3.5–5.1)
POTASSIUM SERPL-SCNC: 3.5 MMOL/L (ref 3.5–5.1)
POTASSIUM SERPL-SCNC: 3.5 MMOL/L (ref 3.5–5.1)
POTASSIUM SERPL-SCNC: 3.6 MMOL/L (ref 3.5–5.1)
POTASSIUM SERPL-SCNC: 3.7 MMOL/L (ref 3.5–5.1)
POTASSIUM SERPL-SCNC: 3.8 MMOL/L (ref 3.5–5.1)
POTASSIUM SERPL-SCNC: 3.9 MMOL/L (ref 3.5–5.1)
POTASSIUM SERPL-SCNC: 4 MMOL/L (ref 3.5–5.1)
POTASSIUM SERPL-SCNC: 4.1 MMOL/L (ref 3.5–5.1)
POTASSIUM SERPL-SCNC: 4.2 MMOL/L (ref 3.5–5.1)
POTASSIUM SERPL-SCNC: 4.3 MMOL/L (ref 3.5–5.1)
POTASSIUM SERPL-SCNC: 4.4 MMOL/L (ref 3.5–5.1)
POTASSIUM SERPL-SCNC: 4.5 MMOL/L (ref 3.5–5.1)
POTASSIUM SERPL-SCNC: 4.5 MMOL/L (ref 3.5–5.1)
POTASSIUM SERPL-SCNC: 4.6 MMOL/L (ref 3.5–5.1)
POTASSIUM SERPL-SCNC: 4.6 MMOL/L (ref 3.5–5.1)
POTASSIUM SERPL-SCNC: 4.7 MMOL/L (ref 3.5–5.1)
POTASSIUM SERPL-SCNC: 5.2 MMOL/L (ref 3.5–5.1)
PRELIMINARY: NORMAL
PRO-BNP: 196 PG/ML (ref 0–449)
PRO-BNP: 960 PG/ML (ref 0–449)
PROCALCITONIN: 0.21 NG/ML (ref 0–0.15)
PROCALCITONIN: 0.8 NG/ML (ref 0–0.15)
PROTEIN UA: 30 MG/DL
PROTEIN UA: 30 MG/DL
PROTEIN UA: ABNORMAL MG/DL
PROTEIN UA: NEGATIVE MG/DL
PROTEIN, POC: NORMAL
PROTHROMBIN TIME: 13.3 SEC (ref 11.7–14.5)
PROTHROMBIN TIME: 13.4 SEC (ref 11.7–14.5)
PROTHROMBIN TIME: 13.4 SEC (ref 11.7–14.5)
PROTHROMBIN TIME: 13.5 SEC (ref 11.7–14.5)
PROTHROMBIN TIME: 13.6 SEC (ref 11.7–14.5)
PROTHROMBIN TIME: 13.6 SEC (ref 11.7–14.5)
PROTHROMBIN TIME: 13.8 SEC (ref 11.7–14.5)
PROTHROMBIN TIME: 13.9 SEC (ref 11.7–14.5)
PROTHROMBIN TIME: 14 SEC (ref 11.7–14.5)
PROTHROMBIN TIME: 14.2 SEC (ref 11.7–14.5)
PROTHROMBIN TIME: 14.4 SEC (ref 11.7–14.5)
PROTHROMBIN TIME: 14.5 SEC (ref 11.7–14.5)
PROTHROMBIN TIME: 14.5 SEC (ref 11.7–14.5)
PROTHROMBIN TIME: 15 SEC (ref 11.7–14.5)
RBC # BLD: 2.42 M/UL (ref 4.2–5.9)
RBC # BLD: 2.59 M/UL (ref 4.2–5.9)
RBC # BLD: 2.65 M/UL (ref 4.2–5.9)
RBC # BLD: 2.66 M/UL (ref 4.2–5.9)
RBC # BLD: 2.66 M/UL (ref 4.2–5.9)
RBC # BLD: 2.76 M/UL (ref 4.2–5.9)
RBC # BLD: 2.79 M/UL (ref 4.2–5.9)
RBC # BLD: 2.8 M/UL (ref 4.2–5.9)
RBC # BLD: 2.81 M/UL (ref 4.2–5.9)
RBC # BLD: 2.81 M/UL (ref 4.2–5.9)
RBC # BLD: 2.82 M/UL (ref 4.2–5.9)
RBC # BLD: 2.85 M/UL (ref 4.2–5.9)
RBC # BLD: 2.86 M/UL (ref 4.2–5.9)
RBC # BLD: 2.96 M/UL (ref 4.2–5.9)
RBC # BLD: 2.99 M/UL (ref 4.2–5.9)
RBC # BLD: 3 M/UL (ref 4.2–5.9)
RBC # BLD: 3.01 M/UL (ref 4.2–5.9)
RBC # BLD: 3.05 M/UL (ref 4.2–5.9)
RBC # BLD: 3.05 M/UL (ref 4.2–5.9)
RBC # BLD: 3.09 M/UL (ref 4.2–5.9)
RBC # BLD: 3.11 M/UL (ref 4.2–5.9)
RBC # BLD: 3.17 M/UL (ref 4.2–5.9)
RBC # BLD: 3.2 M/UL (ref 4.2–5.9)
RBC # BLD: 3.25 M/UL (ref 4.2–5.9)
RBC # BLD: 3.29 M/UL (ref 4.2–5.9)
RBC # BLD: 3.31 M/UL (ref 4.2–5.9)
RBC # BLD: 3.31 M/UL (ref 4.2–5.9)
RBC # BLD: 3.32 M/UL (ref 4.2–5.9)
RBC # BLD: 3.34 M/UL (ref 4.2–5.9)
RBC # BLD: 3.38 M/UL (ref 4.2–5.9)
RBC # BLD: 3.39 M/UL (ref 4.2–5.9)
RBC # BLD: 3.4 M/UL (ref 4.2–5.9)
RBC # BLD: 3.41 M/UL (ref 4.2–5.9)
RBC # BLD: 3.44 M/UL (ref 4.2–5.9)
RBC # BLD: 3.46 M/UL (ref 4.2–5.9)
RBC # BLD: 3.47 M/UL (ref 4.2–5.9)
RBC # BLD: 3.55 M/UL (ref 4.2–5.9)
RBC # BLD: 3.58 M/UL (ref 4.2–5.9)
RBC # BLD: 3.64 M/UL (ref 4.2–5.9)
RBC # BLD: 3.7 M/UL (ref 4.2–5.9)
RBC # BLD: 4.07 M/UL (ref 4.2–5.9)
RBC # BLD: 4.18 M/UL (ref 4.2–5.9)
RBC # BLD: 4.51 M/UL (ref 4.2–5.9)
RBC UA: ABNORMAL /HPF (ref 0–4)
RBC UA: ABNORMAL /HPF (ref 0–4)
RBC UA: NORMAL /HPF (ref 0–4)
RBC UA: NORMAL /HPF (ref 0–4)
RBC, BAL: 543 /CUMM
RBC, BAL: <1000 /CUMM
REPORT: NORMAL
RESPIRATORY PANEL PCR: NORMAL
RHEUMATOID FACTOR: <10 IU/ML
SACCHAROMONOSPORA VIRIDIS: NORMAL
SCHISTOCYTES: ABNORMAL
SEDIMENTATION RATE, ERYTHROCYTE: 13 MM/HR (ref 0–20)
SEGMENTED NEUTROPHILS, BAL: 48 % (ref 5–10)
SEGMENTED NEUTROPHILS, BAL: 61 % (ref 5–10)
SLIDE REVIEW: ABNORMAL
SLIDE REVIEW: ABNORMAL
SMUDGE CELLS: PRESENT
SODIUM BLD-SCNC: 129 MMOL/L (ref 136–145)
SODIUM BLD-SCNC: 130 MMOL/L (ref 136–145)
SODIUM BLD-SCNC: 131 MMOL/L (ref 136–145)
SODIUM BLD-SCNC: 132 MMOL/L (ref 136–145)
SODIUM BLD-SCNC: 133 MMOL/L (ref 136–145)
SODIUM BLD-SCNC: 134 MMOL/L (ref 136–145)
SODIUM BLD-SCNC: 135 MMOL/L (ref 136–145)
SODIUM BLD-SCNC: 136 MMOL/L (ref 136–145)
SODIUM BLD-SCNC: 137 MMOL/L (ref 136–145)
SODIUM BLD-SCNC: 138 MMOL/L (ref 136–145)
SODIUM BLD-SCNC: 139 MMOL/L (ref 136–145)
SODIUM BLD-SCNC: 139 MMOL/L (ref 136–145)
SODIUM BLD-SCNC: 140 MMOL/L (ref 136–145)
SODIUM BLD-SCNC: 141 MMOL/L (ref 136–145)
SODIUM BLD-SCNC: 141 MMOL/L (ref 136–145)
SODIUM BLD-SCNC: 142 MMOL/L (ref 136–145)
SODIUM BLD-SCNC: 143 MMOL/L (ref 136–145)
SODIUM BLD-SCNC: 144 MMOL/L (ref 136–145)
SODIUM BLD-SCNC: 145 MMOL/L (ref 136–145)
SODIUM BLD-SCNC: 145 MMOL/L (ref 136–145)
SODIUM BLD-SCNC: 147 MMOL/L (ref 136–145)
SODIUM BLD-SCNC: 150 MMOL/L (ref 136–145)
SPECIFIC GRAVITY UA: 1.02 (ref 1–1.03)
SPECIFIC GRAVITY, POC: 1.01
TCO2 ARTERIAL: 28 MMOL/L
TCO2 ARTERIAL: 36 MMOL/L
TCO2 CALC VENOUS: 37 MMOL/L
TEAR DROP CELLS: ABNORMAL
THERMOACTINOMYCES CANDIDUS AB: NORMAL
THERMOACTINOMYCES VULGARIS #1: NORMAL
THIS TEST SENT TO: NORMAL
THIS TEST SENT TO: NORMAL
TOTAL PROTEIN: 4.5 G/DL (ref 6.4–8.2)
TOTAL PROTEIN: 4.5 G/DL (ref 6.4–8.2)
TOTAL PROTEIN: 4.7 G/DL (ref 6.4–8.2)
TOTAL PROTEIN: 4.9 G/DL (ref 6.4–8.2)
TOTAL PROTEIN: 5 G/DL (ref 6.4–8.2)
TOTAL PROTEIN: 5.2 G/DL (ref 6.4–8.2)
TOTAL PROTEIN: 5.3 G/DL (ref 6.4–8.2)
TOTAL PROTEIN: 5.3 G/DL (ref 6.4–8.2)
TOTAL PROTEIN: 5.5 G/DL (ref 6.4–8.2)
TOTAL PROTEIN: 5.7 G/DL (ref 6.4–8.2)
TOTAL PROTEIN: 6.1 G/DL (ref 6.4–8.2)
TOTAL PROTEIN: 6.2 G/DL (ref 6.4–8.2)
TRIGL SERPL-MCNC: 133 MG/DL (ref 0–150)
TRIGL SERPL-MCNC: 238 MG/DL (ref 0–150)
TROPONIN: 0.02 NG/ML
URIC ACID, SERUM: 1.5 MG/DL (ref 3.5–7.2)
URIC ACID, SERUM: 1.7 MG/DL (ref 3.5–7.2)
URIC ACID, SERUM: 1.9 MG/DL (ref 3.5–7.2)
URIC ACID, SERUM: 2 MG/DL (ref 3.5–7.2)
URIC ACID, SERUM: 2.1 MG/DL (ref 3.5–7.2)
URIC ACID, SERUM: 2.4 MG/DL (ref 3.5–7.2)
URIC ACID, SERUM: 3.3 MG/DL (ref 3.5–7.2)
URIC ACID, SERUM: 3.3 MG/DL (ref 3.5–7.2)
URIC ACID, SERUM: 3.4 MG/DL (ref 3.5–7.2)
URIC ACID, SERUM: 3.5 MG/DL (ref 3.5–7.2)
URIC ACID, SERUM: 3.6 MG/DL (ref 3.5–7.2)
URIC ACID, SERUM: 3.7 MG/DL (ref 3.5–7.2)
URIC ACID, SERUM: 3.8 MG/DL (ref 3.5–7.2)
URIC ACID, SERUM: 3.9 MG/DL (ref 3.5–7.2)
URIC ACID, SERUM: 5.5 MG/DL (ref 3.5–7.2)
URIC ACID, SERUM: 5.7 MG/DL (ref 3.5–7.2)
URINE CULTURE, ROUTINE: NORMAL
URINE CULTURE, ROUTINE: NORMAL
URINE TYPE: ABNORMAL
UROBILINOGEN, POC: 0.2
UROBILINOGEN, URINE: 0.2 E.U./DL
UROBILINOGEN, URINE: >=8 E.U./DL
VITAMIN D 25-HYDROXY: 43.1 NG/ML
VLDLC SERPL CALC-MCNC: 27 MG/DL
VLDLC SERPL CALC-MCNC: 48 MG/DL
WBC # BLD: 11.3 K/UL (ref 4–11)
WBC # BLD: 12.1 K/UL (ref 4–11)
WBC # BLD: 4.5 K/UL (ref 4–11)
WBC # BLD: 4.6 K/UL (ref 4–11)
WBC # BLD: 4.6 K/UL (ref 4–11)
WBC # BLD: 4.7 K/UL (ref 4–11)
WBC # BLD: 4.7 K/UL (ref 4–11)
WBC # BLD: 4.8 K/UL (ref 4–11)
WBC # BLD: 4.8 K/UL (ref 4–11)
WBC # BLD: 5 K/UL (ref 4–11)
WBC # BLD: 5 K/UL (ref 4–11)
WBC # BLD: 5.1 K/UL (ref 4–11)
WBC # BLD: 5.3 K/UL (ref 4–11)
WBC # BLD: 5.4 K/UL (ref 4–11)
WBC # BLD: 5.5 K/UL (ref 4–11)
WBC # BLD: 5.7 K/UL (ref 4–11)
WBC # BLD: 5.8 K/UL (ref 4–11)
WBC # BLD: 5.9 K/UL (ref 4–11)
WBC # BLD: 6 K/UL (ref 4–11)
WBC # BLD: 6 K/UL (ref 4–11)
WBC # BLD: 6.1 K/UL (ref 4–11)
WBC # BLD: 6.1 K/UL (ref 4–11)
WBC # BLD: 6.4 K/UL (ref 4–11)
WBC # BLD: 6.7 K/UL (ref 4–11)
WBC # BLD: 6.8 K/UL (ref 4–11)
WBC # BLD: 6.9 K/UL (ref 4–11)
WBC # BLD: 7 K/UL (ref 4–11)
WBC # BLD: 7.1 K/UL (ref 4–11)
WBC # BLD: 7.2 K/UL (ref 4–11)
WBC # BLD: 7.3 K/UL (ref 4–11)
WBC # BLD: 7.5 K/UL (ref 4–11)
WBC # BLD: 7.7 K/UL (ref 4–11)
WBC # BLD: 8 K/UL (ref 4–11)
WBC # BLD: 8.3 K/UL (ref 4–11)
WBC # BLD: 8.8 K/UL (ref 4–11)
WBC # BLD: 9 K/UL (ref 4–11)
WBC # BLD: 9.5 K/UL (ref 4–11)
WBC UA: ABNORMAL /HPF (ref 0–5)
WBC UA: ABNORMAL /HPF (ref 0–5)
WBC UA: NORMAL /HPF (ref 0–5)
WBC UA: NORMAL /HPF (ref 0–5)
WBC/EPI CELLS BAL: 105 /CUMM
WBC/EPI CELLS BAL: 283 /CUMM

## 2022-01-01 PROCEDURE — 2580000003 HC RX 258: Performed by: NURSE PRACTITIONER

## 2022-01-01 PROCEDURE — 97110 THERAPEUTIC EXERCISES: CPT

## 2022-01-01 PROCEDURE — 77412 RADIATION TX DELIVERY LVL 3: CPT

## 2022-01-01 PROCEDURE — 6370000000 HC RX 637 (ALT 250 FOR IP): Performed by: EMERGENCY MEDICINE

## 2022-01-01 PROCEDURE — 84145 PROCALCITONIN (PCT): CPT

## 2022-01-01 PROCEDURE — 84484 ASSAY OF TROPONIN QUANT: CPT

## 2022-01-01 PROCEDURE — 87252 VIRUS INOCULATION TISSUE: CPT

## 2022-01-01 PROCEDURE — 94761 N-INVAS EAR/PLS OXIMETRY MLT: CPT

## 2022-01-01 PROCEDURE — 6360000002 HC RX W HCPCS: Performed by: NURSE PRACTITIONER

## 2022-01-01 PROCEDURE — 6360000002 HC RX W HCPCS: Performed by: STUDENT IN AN ORGANIZED HEALTH CARE EDUCATION/TRAINING PROGRAM

## 2022-01-01 PROCEDURE — 93010 ELECTROCARDIOGRAM REPORT: CPT | Performed by: INTERNAL MEDICINE

## 2022-01-01 PROCEDURE — 88360 TUMOR IMMUNOHISTOCHEM/MANUAL: CPT

## 2022-01-01 PROCEDURE — G0239 OTH RESP PROC, GROUP: HCPCS

## 2022-01-01 PROCEDURE — 6370000000 HC RX 637 (ALT 250 FOR IP): Performed by: STUDENT IN AN ORGANIZED HEALTH CARE EDUCATION/TRAINING PROGRAM

## 2022-01-01 PROCEDURE — 2000000000 HC ICU R&B

## 2022-01-01 PROCEDURE — 84100 ASSAY OF PHOSPHORUS: CPT

## 2022-01-01 PROCEDURE — 97530 THERAPEUTIC ACTIVITIES: CPT

## 2022-01-01 PROCEDURE — C1894 INTRO/SHEATH, NON-LASER: HCPCS

## 2022-01-01 PROCEDURE — 6370000000 HC RX 637 (ALT 250 FOR IP): Performed by: NURSE PRACTITIONER

## 2022-01-01 PROCEDURE — C9113 INJ PANTOPRAZOLE SODIUM, VIA: HCPCS

## 2022-01-01 PROCEDURE — 6360000002 HC RX W HCPCS

## 2022-01-01 PROCEDURE — 83735 ASSAY OF MAGNESIUM: CPT

## 2022-01-01 PROCEDURE — 31624 DX BRONCHOSCOPE/LAVAGE: CPT | Performed by: INTERNAL MEDICINE

## 2022-01-01 PROCEDURE — 6360000002 HC RX W HCPCS: Performed by: INTERNAL MEDICINE

## 2022-01-01 PROCEDURE — 36415 COLL VENOUS BLD VENIPUNCTURE: CPT

## 2022-01-01 PROCEDURE — 36591 DRAW BLOOD OFF VENOUS DEVICE: CPT

## 2022-01-01 PROCEDURE — 6360000002 HC RX W HCPCS: Performed by: EMERGENCY MEDICINE

## 2022-01-01 PROCEDURE — 85027 COMPLETE CBC AUTOMATED: CPT

## 2022-01-01 PROCEDURE — 97535 SELF CARE MNGMENT TRAINING: CPT

## 2022-01-01 PROCEDURE — 80048 BASIC METABOLIC PNL TOTAL CA: CPT

## 2022-01-01 PROCEDURE — G0439 PPPS, SUBSEQ VISIT: HCPCS | Performed by: INTERNAL MEDICINE

## 2022-01-01 PROCEDURE — 6370000000 HC RX 637 (ALT 250 FOR IP): Performed by: PHYSICIAN ASSISTANT

## 2022-01-01 PROCEDURE — 80076 HEPATIC FUNCTION PANEL: CPT

## 2022-01-01 PROCEDURE — 2580000003 HC RX 258: Performed by: PHYSICIAN ASSISTANT

## 2022-01-01 PROCEDURE — 99233 SBSQ HOSP IP/OBS HIGH 50: CPT | Performed by: INTERNAL MEDICINE

## 2022-01-01 PROCEDURE — C9113 INJ PANTOPRAZOLE SODIUM, VIA: HCPCS | Performed by: NURSE PRACTITIONER

## 2022-01-01 PROCEDURE — 99232 SBSQ HOSP IP/OBS MODERATE 35: CPT | Performed by: NURSE PRACTITIONER

## 2022-01-01 PROCEDURE — 85025 COMPLETE CBC W/AUTO DIFF WBC: CPT

## 2022-01-01 PROCEDURE — 00B73ZX EXCISION OF CEREBRAL HEMISPHERE, PERCUTANEOUS APPROACH, DIAGNOSTIC: ICD-10-PCS | Performed by: NEUROLOGICAL SURGERY

## 2022-01-01 PROCEDURE — 83036 HEMOGLOBIN GLYCOSYLATED A1C: CPT | Performed by: INTERNAL MEDICINE

## 2022-01-01 PROCEDURE — 2060000000 HC ICU INTERMEDIATE R&B

## 2022-01-01 PROCEDURE — 92611 MOTION FLUOROSCOPY/SWALLOW: CPT

## 2022-01-01 PROCEDURE — 71260 CT THORAX DX C+: CPT

## 2022-01-01 PROCEDURE — 85610 PROTHROMBIN TIME: CPT

## 2022-01-01 PROCEDURE — 84550 ASSAY OF BLOOD/URIC ACID: CPT

## 2022-01-01 PROCEDURE — 87651 STREP A DNA AMP PROBE: CPT

## 2022-01-01 PROCEDURE — 6360000002 HC RX W HCPCS: Performed by: PHYSICIAN ASSISTANT

## 2022-01-01 PROCEDURE — 83880 ASSAY OF NATRIURETIC PEPTIDE: CPT

## 2022-01-01 PROCEDURE — 87486 CHLMYD PNEUM DNA AMP PROBE: CPT

## 2022-01-01 PROCEDURE — 89050 BODY FLUID CELL COUNT: CPT

## 2022-01-01 PROCEDURE — 71046 X-RAY EXAM CHEST 2 VIEWS: CPT

## 2022-01-01 PROCEDURE — 70491 CT SOFT TISSUE NECK W/DYE: CPT

## 2022-01-01 PROCEDURE — 2500000003 HC RX 250 WO HCPCS: Performed by: NURSE PRACTITIONER

## 2022-01-01 PROCEDURE — 88342 IMHCHEM/IMCYTCHM 1ST ANTB: CPT

## 2022-01-01 PROCEDURE — 1111F DSCHRG MED/CURRENT MED MERGE: CPT | Performed by: INTERNAL MEDICINE

## 2022-01-01 PROCEDURE — 71045 X-RAY EXAM CHEST 1 VIEW: CPT

## 2022-01-01 PROCEDURE — 77386 HC NTSTY MODUL RAD TX DLVR CPLX: CPT

## 2022-01-01 PROCEDURE — 87102 FUNGUS ISOLATION CULTURE: CPT

## 2022-01-01 PROCEDURE — 97116 GAIT TRAINING THERAPY: CPT

## 2022-01-01 PROCEDURE — 2700000000 HC OXYGEN THERAPY PER DAY

## 2022-01-01 PROCEDURE — 85730 THROMBOPLASTIN TIME PARTIAL: CPT

## 2022-01-01 PROCEDURE — 87633 RESP VIRUS 12-25 TARGETS: CPT

## 2022-01-01 PROCEDURE — 88341 IMHCHEM/IMCYTCHM EA ADD ANTB: CPT

## 2022-01-01 PROCEDURE — 3044F HG A1C LEVEL LT 7.0%: CPT | Performed by: INTERNAL MEDICINE

## 2022-01-01 PROCEDURE — 97112 NEUROMUSCULAR REEDUCATION: CPT

## 2022-01-01 PROCEDURE — 2580000003 HC RX 258: Performed by: INTERNAL MEDICINE

## 2022-01-01 PROCEDURE — 81001 URINALYSIS AUTO W/SCOPE: CPT

## 2022-01-01 PROCEDURE — 2709999900 HC NON-CHARGEABLE SUPPLY: Performed by: NEUROLOGICAL SURGERY

## 2022-01-01 PROCEDURE — 6360000004 HC RX CONTRAST MEDICATION: Performed by: PHYSICIAN ASSISTANT

## 2022-01-01 PROCEDURE — 93306 TTE W/DOPPLER COMPLETE: CPT

## 2022-01-01 PROCEDURE — 92526 ORAL FUNCTION THERAPY: CPT

## 2022-01-01 PROCEDURE — 94760 N-INVAS EAR/PLS OXIMETRY 1: CPT

## 2022-01-01 PROCEDURE — 86850 RBC ANTIBODY SCREEN: CPT

## 2022-01-01 PROCEDURE — 6370000000 HC RX 637 (ALT 250 FOR IP): Performed by: INTERNAL MEDICINE

## 2022-01-01 PROCEDURE — 3700000001 HC ADD 15 MINUTES (ANESTHESIA): Performed by: NEUROLOGICAL SURGERY

## 2022-01-01 PROCEDURE — 99205 OFFICE O/P NEW HI 60 MIN: CPT | Performed by: INTERNAL MEDICINE

## 2022-01-01 PROCEDURE — 2580000003 HC RX 258: Performed by: STUDENT IN AN ORGANIZED HEALTH CARE EDUCATION/TRAINING PROGRAM

## 2022-01-01 PROCEDURE — 83605 ASSAY OF LACTIC ACID: CPT

## 2022-01-01 PROCEDURE — 77336 RADIATION PHYSICS CONSULT: CPT

## 2022-01-01 PROCEDURE — 87641 MR-STAPH DNA AMP PROBE: CPT

## 2022-01-01 PROCEDURE — C1788 PORT, INDWELLING, IMP: HCPCS

## 2022-01-01 PROCEDURE — 99223 1ST HOSP IP/OBS HIGH 75: CPT | Performed by: INTERNAL MEDICINE

## 2022-01-01 PROCEDURE — 83615 LACTATE (LD) (LDH) ENZYME: CPT

## 2022-01-01 PROCEDURE — 77301 RADIOTHERAPY DOSE PLAN IMRT: CPT

## 2022-01-01 PROCEDURE — 99213 OFFICE O/P EST LOW 20 MIN: CPT | Performed by: INTERNAL MEDICINE

## 2022-01-01 PROCEDURE — 77338 DESIGN MLC DEVICE FOR IMRT: CPT

## 2022-01-01 PROCEDURE — 99232 SBSQ HOSP IP/OBS MODERATE 35: CPT | Performed by: INTERNAL MEDICINE

## 2022-01-01 PROCEDURE — 6360000002 HC RX W HCPCS: Performed by: RADIOLOGY

## 2022-01-01 PROCEDURE — 99204 OFFICE O/P NEW MOD 45 MIN: CPT | Performed by: INTERNAL MEDICINE

## 2022-01-01 PROCEDURE — 87040 BLOOD CULTURE FOR BACTERIA: CPT

## 2022-01-01 PROCEDURE — 36592 COLLECT BLOOD FROM PICC: CPT

## 2022-01-01 PROCEDURE — 94729 DIFFUSING CAPACITY: CPT

## 2022-01-01 PROCEDURE — 76937 US GUIDE VASCULAR ACCESS: CPT

## 2022-01-01 PROCEDURE — 92610 EVALUATE SWALLOWING FUNCTION: CPT

## 2022-01-01 PROCEDURE — 6360000004 HC RX CONTRAST MEDICATION: Performed by: NURSE PRACTITIONER

## 2022-01-01 PROCEDURE — 87070 CULTURE OTHR SPECIMN AEROBIC: CPT

## 2022-01-01 PROCEDURE — 99214 OFFICE O/P EST MOD 30 MIN: CPT | Performed by: INTERNAL MEDICINE

## 2022-01-01 PROCEDURE — C1713 ANCHOR/SCREW BN/BN,TIS/BN: HCPCS | Performed by: NEUROLOGICAL SURGERY

## 2022-01-01 PROCEDURE — 97166 OT EVAL MOD COMPLEX 45 MIN: CPT

## 2022-01-01 PROCEDURE — 87081 CULTURE SCREEN ONLY: CPT

## 2022-01-01 PROCEDURE — 97162 PT EVAL MOD COMPLEX 30 MIN: CPT

## 2022-01-01 PROCEDURE — 94150 VITAL CAPACITY TEST: CPT

## 2022-01-01 PROCEDURE — 3600000004 HC SURGERY LEVEL 4 BASE: Performed by: NEUROLOGICAL SURGERY

## 2022-01-01 PROCEDURE — 87015 SPECIMEN INFECT AGNT CONCNTJ: CPT

## 2022-01-01 PROCEDURE — 70450 CT HEAD/BRAIN W/O DYE: CPT

## 2022-01-01 PROCEDURE — 87449 NOS EACH ORGANISM AG IA: CPT

## 2022-01-01 PROCEDURE — 87254 VIRUS INOCULATION SHELL VIA: CPT

## 2022-01-01 PROCEDURE — 87086 URINE CULTURE/COLONY COUNT: CPT

## 2022-01-01 PROCEDURE — 82330 ASSAY OF CALCIUM: CPT

## 2022-01-01 PROCEDURE — 7100000001 HC PACU RECOVERY - ADDTL 15 MIN: Performed by: INTERNAL MEDICINE

## 2022-01-01 PROCEDURE — 95819 EEG AWAKE AND ASLEEP: CPT

## 2022-01-01 PROCEDURE — 99221 1ST HOSP IP/OBS SF/LOW 40: CPT | Performed by: NURSE PRACTITIONER

## 2022-01-01 PROCEDURE — 87541 LEGION PNEUMO DNA AMP PROB: CPT

## 2022-01-01 PROCEDURE — 7100000000 HC PACU RECOVERY - FIRST 15 MIN: Performed by: INTERNAL MEDICINE

## 2022-01-01 PROCEDURE — 86901 BLOOD TYPING SEROLOGIC RH(D): CPT

## 2022-01-01 PROCEDURE — 93005 ELECTROCARDIOGRAM TRACING: CPT | Performed by: INTERNAL MEDICINE

## 2022-01-01 PROCEDURE — 87798 DETECT AGENT NOS DNA AMP: CPT

## 2022-01-01 PROCEDURE — A4217 STERILE WATER/SALINE, 500 ML: HCPCS | Performed by: NEUROLOGICAL SURGERY

## 2022-01-01 PROCEDURE — 82803 BLOOD GASES ANY COMBINATION: CPT

## 2022-01-01 PROCEDURE — 7100000010 HC PHASE II RECOVERY - FIRST 15 MIN: Performed by: INTERNAL MEDICINE

## 2022-01-01 PROCEDURE — 7100000001 HC PACU RECOVERY - ADDTL 15 MIN: Performed by: NEUROLOGICAL SURGERY

## 2022-01-01 PROCEDURE — 87116 MYCOBACTERIA CULTURE: CPT

## 2022-01-01 PROCEDURE — D0001ZZ BEAM RADIATION OF BRAIN USING PHOTONS 1 - 10 MEV: ICD-10-PCS | Performed by: STUDENT IN AN ORGANIZED HEALTH CARE EDUCATION/TRAINING PROGRAM

## 2022-01-01 PROCEDURE — 93005 ELECTROCARDIOGRAM TRACING: CPT | Performed by: PHYSICIAN ASSISTANT

## 2022-01-01 PROCEDURE — 31622 DX BRONCHOSCOPE/WASH: CPT

## 2022-01-01 PROCEDURE — 0B9C8ZX DRAINAGE OF RIGHT UPPER LUNG LOBE, VIA NATURAL OR ARTIFICIAL OPENING ENDOSCOPIC, DIAGNOSTIC: ICD-10-PCS | Performed by: INTERNAL MEDICINE

## 2022-01-01 PROCEDURE — 2580000003 HC RX 258: Performed by: EMERGENCY MEDICINE

## 2022-01-01 PROCEDURE — 2580000003 HC RX 258

## 2022-01-01 PROCEDURE — 3609010800 HC BRONCHOSCOPY ALVEOLAR LAVAGE: Performed by: INTERNAL MEDICINE

## 2022-01-01 PROCEDURE — 1200000000 HC SEMI PRIVATE

## 2022-01-01 PROCEDURE — 93356 MYOCRD STRAIN IMG SPCKL TRCK: CPT

## 2022-01-01 PROCEDURE — 93971 EXTREMITY STUDY: CPT

## 2022-01-01 PROCEDURE — 36561 INSERT TUNNELED CV CATH: CPT

## 2022-01-01 PROCEDURE — 77001 FLUOROGUIDE FOR VEIN DEVICE: CPT

## 2022-01-01 PROCEDURE — 87206 SMEAR FLUORESCENT/ACID STAI: CPT

## 2022-01-01 PROCEDURE — 2580000003 HC RX 258: Performed by: NURSE ANESTHETIST, CERTIFIED REGISTERED

## 2022-01-01 PROCEDURE — 6360000004 HC RX CONTRAST MEDICATION: Performed by: INTERNAL MEDICINE

## 2022-01-01 PROCEDURE — 87581 M.PNEUMON DNA AMP PROBE: CPT

## 2022-01-01 PROCEDURE — 93005 ELECTROCARDIOGRAM TRACING: CPT | Performed by: NURSE PRACTITIONER

## 2022-01-01 PROCEDURE — A9576 INJ PROHANCE MULTIPACK: HCPCS | Performed by: EMERGENCY MEDICINE

## 2022-01-01 PROCEDURE — 99285 EMERGENCY DEPT VISIT HI MDM: CPT

## 2022-01-01 PROCEDURE — 88312 SPECIAL STAINS GROUP 1: CPT

## 2022-01-01 PROCEDURE — 99214 OFFICE O/P EST MOD 30 MIN: CPT | Performed by: STUDENT IN AN ORGANIZED HEALTH CARE EDUCATION/TRAINING PROGRAM

## 2022-01-01 PROCEDURE — 81002 URINALYSIS NONAUTO W/O SCOPE: CPT | Performed by: INTERNAL MEDICINE

## 2022-01-01 PROCEDURE — 87103 BLOOD FUNGUS CULTURE: CPT

## 2022-01-01 PROCEDURE — 88305 TISSUE EXAM BY PATHOLOGIST: CPT

## 2022-01-01 PROCEDURE — 6360000002 HC RX W HCPCS: Performed by: NURSE ANESTHETIST, CERTIFIED REGISTERED

## 2022-01-01 PROCEDURE — 1123F ACP DISCUSS/DSCN MKR DOCD: CPT | Performed by: STUDENT IN AN ORGANIZED HEALTH CARE EDUCATION/TRAINING PROGRAM

## 2022-01-01 PROCEDURE — 2500000003 HC RX 250 WO HCPCS: Performed by: NURSE ANESTHETIST, CERTIFIED REGISTERED

## 2022-01-01 PROCEDURE — 2720000010 HC SURG SUPPLY STERILE: Performed by: NEUROLOGICAL SURGERY

## 2022-01-01 PROCEDURE — 6370000000 HC RX 637 (ALT 250 FOR IP)

## 2022-01-01 PROCEDURE — 3600000014 HC SURGERY LEVEL 4 ADDTL 15MIN: Performed by: NEUROLOGICAL SURGERY

## 2022-01-01 PROCEDURE — 7100000000 HC PACU RECOVERY - FIRST 15 MIN: Performed by: NEUROLOGICAL SURGERY

## 2022-01-01 PROCEDURE — 99152 MOD SED SAME PHYS/QHP 5/>YRS: CPT

## 2022-01-01 PROCEDURE — 87305 ASPERGILLUS AG IA: CPT

## 2022-01-01 PROCEDURE — 73030 X-RAY EXAM OF SHOULDER: CPT

## 2022-01-01 PROCEDURE — 70553 MRI BRAIN STEM W/O & W/DYE: CPT

## 2022-01-01 PROCEDURE — 88307 TISSUE EXAM BY PATHOLOGIST: CPT

## 2022-01-01 PROCEDURE — 7100000011 HC PHASE II RECOVERY - ADDTL 15 MIN: Performed by: INTERNAL MEDICINE

## 2022-01-01 PROCEDURE — A4217 STERILE WATER/SALINE, 500 ML: HCPCS | Performed by: PHYSICIAN ASSISTANT

## 2022-01-01 PROCEDURE — 93005 ELECTROCARDIOGRAM TRACING: CPT | Performed by: EMERGENCY MEDICINE

## 2022-01-01 PROCEDURE — 36430 TRANSFUSION BLD/BLD COMPNT: CPT

## 2022-01-01 PROCEDURE — 77300 RADIATION THERAPY DOSE PLAN: CPT

## 2022-01-01 PROCEDURE — 87106 FUNGI IDENTIFICATION YEAST: CPT

## 2022-01-01 PROCEDURE — 82565 ASSAY OF CREATININE: CPT

## 2022-01-01 PROCEDURE — 87205 SMEAR GRAM STAIN: CPT

## 2022-01-01 PROCEDURE — 99222 1ST HOSP IP/OBS MODERATE 55: CPT | Performed by: INTERNAL MEDICINE

## 2022-01-01 PROCEDURE — 94060 EVALUATION OF WHEEZING: CPT

## 2022-01-01 PROCEDURE — 99152 MOD SED SAME PHYS/QHP 5/>YRS: CPT | Performed by: INTERNAL MEDICINE

## 2022-01-01 PROCEDURE — 2580000003 HC RX 258: Performed by: NEUROLOGICAL SURGERY

## 2022-01-01 PROCEDURE — 96375 TX/PRO/DX INJ NEW DRUG ADDON: CPT

## 2022-01-01 PROCEDURE — 88112 CYTOPATH CELL ENHANCE TECH: CPT

## 2022-01-01 PROCEDURE — P9035 PLATELET PHERES LEUKOREDUCED: HCPCS

## 2022-01-01 PROCEDURE — 96365 THER/PROPH/DIAG IV INF INIT: CPT

## 2022-01-01 PROCEDURE — 87253 VIRUS INOCULATE TISSUE ADDL: CPT

## 2022-01-01 PROCEDURE — 86900 BLOOD TYPING SEROLOGIC ABO: CPT

## 2022-01-01 PROCEDURE — 94726 PLETHYSMOGRAPHY LUNG VOLUMES: CPT

## 2022-01-01 PROCEDURE — 6370000000 HC RX 637 (ALT 250 FOR IP): Performed by: NURSE ANESTHETIST, CERTIFIED REGISTERED

## 2022-01-01 PROCEDURE — 1123F ACP DISCUSS/DSCN MKR DOCD: CPT | Performed by: INTERNAL MEDICINE

## 2022-01-01 PROCEDURE — 0202U NFCT DS 22 TRGT SARS-COV-2: CPT

## 2022-01-01 PROCEDURE — 94625 PHY/QHP OP PULM RHB W/O MNTR: CPT

## 2022-01-01 PROCEDURE — 6360000002 HC RX W HCPCS: Performed by: ANESTHESIOLOGY

## 2022-01-01 PROCEDURE — 74230 X-RAY XM SWLNG FUNCJ C+: CPT

## 2022-01-01 PROCEDURE — 94664 DEMO&/EVAL PT USE INHALER: CPT

## 2022-01-01 PROCEDURE — 89051 BODY FLUID CELL COUNT: CPT

## 2022-01-01 PROCEDURE — 2709999900 HC NON-CHARGEABLE SUPPLY: Performed by: INTERNAL MEDICINE

## 2022-01-01 PROCEDURE — 3700000000 HC ANESTHESIA ATTENDED CARE: Performed by: NEUROLOGICAL SURGERY

## 2022-01-01 PROCEDURE — 2500000003 HC RX 250 WO HCPCS: Performed by: INTERNAL MEDICINE

## 2022-01-01 PROCEDURE — 77334 RADIATION TREATMENT AID(S): CPT

## 2022-01-01 PROCEDURE — 88331 PATH CONSLTJ SURG 1 BLK 1SPC: CPT

## 2022-01-01 PROCEDURE — 6360000004 HC RX CONTRAST MEDICATION: Performed by: EMERGENCY MEDICINE

## 2022-01-01 PROCEDURE — 2500000003 HC RX 250 WO HCPCS: Performed by: NEUROLOGICAL SURGERY

## 2022-01-01 PROCEDURE — 82947 ASSAY GLUCOSE BLOOD QUANT: CPT

## 2022-01-01 PROCEDURE — 2500000003 HC RX 250 WO HCPCS

## 2022-01-01 DEVICE — LOW PROFILE BURR HOLE COVER, W/TAB, 14MM
Type: IMPLANTABLE DEVICE | Site: BRAIN | Status: FUNCTIONAL
Brand: UNIVERSAL NEURO 2

## 2022-01-01 DEVICE — SCREW UN3 SLFTP 1.5X4MM: Type: IMPLANTABLE DEVICE | Site: BRAIN | Status: FUNCTIONAL

## 2022-01-01 RX ORDER — POLYETHYLENE GLYCOL 3350 17 G/17G
17 POWDER, FOR SOLUTION ORAL DAILY
COMMUNITY
Start: 2022-01-01 | End: 2022-01-01

## 2022-01-01 RX ORDER — BLOOD-GLUCOSE TRANSMITTER
EACH MISCELLANEOUS
Qty: 1 EACH | Refills: 3 | Status: SHIPPED | OUTPATIENT
Start: 2022-01-01

## 2022-01-01 RX ORDER — DEXAMETHASONE 4 MG/1
4 TABLET ORAL EVERY 8 HOURS SCHEDULED
Status: DISCONTINUED | OUTPATIENT
Start: 2022-01-01 | End: 2022-01-01

## 2022-01-01 RX ORDER — SODIUM CHLORIDE 9 MG/ML
INJECTION, SOLUTION INTRAVENOUS PRN
Status: DISCONTINUED | OUTPATIENT
Start: 2022-01-01 | End: 2022-01-01 | Stop reason: HOSPADM

## 2022-01-01 RX ORDER — PANTOPRAZOLE SODIUM 40 MG/1
40 TABLET, DELAYED RELEASE ORAL
Status: DISCONTINUED | OUTPATIENT
Start: 2022-01-01 | End: 2022-01-01

## 2022-01-01 RX ORDER — ONDANSETRON 2 MG/ML
4 INJECTION INTRAMUSCULAR; INTRAVENOUS
Status: DISCONTINUED | OUTPATIENT
Start: 2022-01-01 | End: 2022-01-01 | Stop reason: HOSPADM

## 2022-01-01 RX ORDER — LANOLIN ALCOHOL/MO/W.PET/CERES
1000 CREAM (GRAM) TOPICAL 2 TIMES DAILY
Status: DISCONTINUED | OUTPATIENT
Start: 2022-01-01 | End: 2022-01-01

## 2022-01-01 RX ORDER — VALACYCLOVIR HYDROCHLORIDE 500 MG/1
500 TABLET, FILM COATED ORAL 2 TIMES DAILY
Status: DISCONTINUED | OUTPATIENT
Start: 2022-01-01 | End: 2022-01-01

## 2022-01-01 RX ORDER — INSULIN LISPRO 100 [IU]/ML
0-12 INJECTION, SOLUTION INTRAVENOUS; SUBCUTANEOUS
Status: DISCONTINUED | OUTPATIENT
Start: 2022-01-01 | End: 2022-01-01

## 2022-01-01 RX ORDER — DOCUSATE SODIUM 100 MG/1
100 CAPSULE, LIQUID FILLED ORAL 2 TIMES DAILY
Status: DISCONTINUED | OUTPATIENT
Start: 2022-01-01 | End: 2022-01-01

## 2022-01-01 RX ORDER — ENOXAPARIN SODIUM 100 MG/ML
30 INJECTION SUBCUTANEOUS DAILY
Status: DISCONTINUED | OUTPATIENT
Start: 2022-01-01 | End: 2022-01-01

## 2022-01-01 RX ORDER — MAGNESIUM SULFATE IN WATER 40 MG/ML
4000 INJECTION, SOLUTION INTRAVENOUS PRN
Status: DISCONTINUED | OUTPATIENT
Start: 2022-01-01 | End: 2022-01-01 | Stop reason: HOSPADM

## 2022-01-01 RX ORDER — MIDAZOLAM HYDROCHLORIDE 1 MG/ML
INJECTION INTRAMUSCULAR; INTRAVENOUS PRN
Status: DISCONTINUED | OUTPATIENT
Start: 2022-01-01 | End: 2022-01-01 | Stop reason: ALTCHOICE

## 2022-01-01 RX ORDER — 0.9 % SODIUM CHLORIDE 0.9 %
1000 INTRAVENOUS SOLUTION INTRAVENOUS ONCE
Status: COMPLETED | OUTPATIENT
Start: 2022-01-01 | End: 2022-01-01

## 2022-01-01 RX ORDER — DEXTROSE MONOHYDRATE 100 MG/ML
INJECTION, SOLUTION INTRAVENOUS CONTINUOUS PRN
Status: DISCONTINUED | OUTPATIENT
Start: 2022-01-01 | End: 2022-01-01 | Stop reason: HOSPADM

## 2022-01-01 RX ORDER — TAMSULOSIN HYDROCHLORIDE 0.4 MG/1
CAPSULE ORAL
Qty: 90 CAPSULE | Refills: 1 | Status: SHIPPED | OUTPATIENT
Start: 2022-01-01 | End: 2022-01-01 | Stop reason: SDUPTHER

## 2022-01-01 RX ORDER — DEXAMETHASONE SODIUM PHOSPHATE 4 MG/ML
4 INJECTION, SOLUTION INTRA-ARTICULAR; INTRALESIONAL; INTRAMUSCULAR; INTRAVENOUS; SOFT TISSUE EVERY 6 HOURS
Status: DISCONTINUED | OUTPATIENT
Start: 2022-01-01 | End: 2022-01-01 | Stop reason: SDUPTHER

## 2022-01-01 RX ORDER — CASTOR OIL AND BALSAM, PERU 788; 87 MG/G; MG/G
OINTMENT TOPICAL 2 TIMES DAILY
Status: DISCONTINUED | OUTPATIENT
Start: 2022-01-01 | End: 2022-01-01

## 2022-01-01 RX ORDER — POTASSIUM CHLORIDE 29.8 MG/ML
20 INJECTION INTRAVENOUS PRN
Status: DISCONTINUED | OUTPATIENT
Start: 2022-01-01 | End: 2022-01-01 | Stop reason: HOSPADM

## 2022-01-01 RX ORDER — EMPAGLIFLOZIN 25 MG/1
1 TABLET, FILM COATED ORAL DAILY
Qty: 30 TABLET | Refills: 2 | Status: SHIPPED | OUTPATIENT
Start: 2022-01-01 | End: 2022-01-01 | Stop reason: SDUPTHER

## 2022-01-01 RX ORDER — FENTANYL CITRATE 50 UG/ML
50 INJECTION, SOLUTION INTRAMUSCULAR; INTRAVENOUS ONCE
Status: COMPLETED | OUTPATIENT
Start: 2022-01-01 | End: 2022-01-01

## 2022-01-01 RX ORDER — OXYCODONE HYDROCHLORIDE 5 MG/1
5 TABLET ORAL EVERY 4 HOURS PRN
Status: DISCONTINUED | OUTPATIENT
Start: 2022-01-01 | End: 2022-01-01 | Stop reason: HOSPADM

## 2022-01-01 RX ORDER — SODIUM CHLORIDE 9 MG/ML
INJECTION, SOLUTION INTRAVENOUS CONTINUOUS
Status: DISCONTINUED | OUTPATIENT
Start: 2022-01-01 | End: 2022-01-01

## 2022-01-01 RX ORDER — DOCUSATE SODIUM 100 MG/1
100 CAPSULE, LIQUID FILLED ORAL DAILY
Status: DISCONTINUED | OUTPATIENT
Start: 2022-01-01 | End: 2022-01-01 | Stop reason: HOSPADM

## 2022-01-01 RX ORDER — TAMSULOSIN HYDROCHLORIDE 0.4 MG/1
1 CAPSULE ORAL DAILY
Status: DISCONTINUED | OUTPATIENT
Start: 2022-01-01 | End: 2022-01-01 | Stop reason: SDUPTHER

## 2022-01-01 RX ORDER — TAMSULOSIN HYDROCHLORIDE 0.4 MG/1
CAPSULE ORAL
Qty: 90 CAPSULE | Refills: 1 | Status: SHIPPED | OUTPATIENT
Start: 2022-01-01

## 2022-01-01 RX ORDER — MIDAZOLAM HYDROCHLORIDE 1 MG/ML
INJECTION INTRAMUSCULAR; INTRAVENOUS
Status: DISPENSED
Start: 2022-01-01 | End: 2022-01-01

## 2022-01-01 RX ORDER — MORPHINE SULFATE 2 MG/ML
2 INJECTION, SOLUTION INTRAMUSCULAR; INTRAVENOUS
Status: DISCONTINUED | OUTPATIENT
Start: 2022-01-01 | End: 2022-01-01 | Stop reason: HOSPADM

## 2022-01-01 RX ORDER — VORICONAZOLE 200 MG/1
200 TABLET, FILM COATED ORAL EVERY 12 HOURS SCHEDULED
Status: DISCONTINUED | OUTPATIENT
Start: 2022-01-01 | End: 2022-01-01

## 2022-01-01 RX ORDER — SODIUM CHLORIDE AND POTASSIUM CHLORIDE .9; .15 G/100ML; G/100ML
SOLUTION INTRAVENOUS CONTINUOUS
Status: DISCONTINUED | OUTPATIENT
Start: 2022-01-01 | End: 2022-01-01 | Stop reason: HOSPADM

## 2022-01-01 RX ORDER — INSULIN LISPRO 100 [IU]/ML
0-9 INJECTION, SOLUTION INTRAVENOUS; SUBCUTANEOUS NIGHTLY
Status: DISCONTINUED | OUTPATIENT
Start: 2022-01-01 | End: 2022-01-01 | Stop reason: HOSPADM

## 2022-01-01 RX ORDER — ATORVASTATIN CALCIUM 20 MG/1
TABLET, FILM COATED ORAL
Qty: 90 TABLET | Refills: 1 | Status: SHIPPED | OUTPATIENT
Start: 2022-01-01 | End: 2022-01-01 | Stop reason: ALTCHOICE

## 2022-01-01 RX ORDER — LEVETIRACETAM 5 MG/ML
500 INJECTION INTRAVASCULAR EVERY 12 HOURS
Status: DISCONTINUED | OUTPATIENT
Start: 2022-01-01 | End: 2022-01-01

## 2022-01-01 RX ORDER — ENOXAPARIN SODIUM 100 MG/ML
40 INJECTION SUBCUTANEOUS EVERY 24 HOURS
Status: DISCONTINUED | OUTPATIENT
Start: 2022-01-01 | End: 2022-01-01 | Stop reason: HOSPADM

## 2022-01-01 RX ORDER — METHYLPREDNISOLONE SODIUM SUCCINATE 125 MG/2ML
60 INJECTION, POWDER, LYOPHILIZED, FOR SOLUTION INTRAMUSCULAR; INTRAVENOUS DAILY
Status: DISCONTINUED | OUTPATIENT
Start: 2022-01-01 | End: 2022-01-01

## 2022-01-01 RX ORDER — IPRATROPIUM BROMIDE 42 UG/1
2 SPRAY, METERED NASAL 4 TIMES DAILY
Qty: 1 EACH | Refills: 3 | Status: SHIPPED | OUTPATIENT
Start: 2022-01-01

## 2022-01-01 RX ORDER — LABETALOL HYDROCHLORIDE 5 MG/ML
10 INJECTION, SOLUTION INTRAVENOUS
Status: DISCONTINUED | OUTPATIENT
Start: 2022-01-01 | End: 2022-01-01 | Stop reason: HOSPADM

## 2022-01-01 RX ORDER — DEXAMETHASONE 2 MG/1
2 TABLET ORAL EVERY 12 HOURS SCHEDULED
Qty: 20 TABLET | Refills: 0 | Status: SHIPPED | OUTPATIENT
Start: 2022-01-01 | End: 2022-01-01

## 2022-01-01 RX ORDER — INSULIN LISPRO 100 [IU]/ML
0-6 INJECTION, SOLUTION INTRAVENOUS; SUBCUTANEOUS NIGHTLY
Status: DISCONTINUED | OUTPATIENT
Start: 2022-01-01 | End: 2022-01-01

## 2022-01-01 RX ORDER — LEVETIRACETAM 500 MG/1
500 TABLET ORAL 2 TIMES DAILY
Status: DISCONTINUED | OUTPATIENT
Start: 2022-01-01 | End: 2022-01-01 | Stop reason: HOSPADM

## 2022-01-01 RX ORDER — LEVETIRACETAM 500 MG/1
500 TABLET ORAL 2 TIMES DAILY
Status: DISCONTINUED | OUTPATIENT
Start: 2022-01-01 | End: 2022-01-01

## 2022-01-01 RX ORDER — INSULIN LISPRO 100 [IU]/ML
0-8 INJECTION, SOLUTION INTRAVENOUS; SUBCUTANEOUS
Status: DISCONTINUED | OUTPATIENT
Start: 2022-01-01 | End: 2022-01-01

## 2022-01-01 RX ORDER — SITAGLIPTIN AND METFORMIN HYDROCHLORIDE 1000; 50 MG/1; MG/1
TABLET, FILM COATED, EXTENDED RELEASE ORAL
Qty: 180 TABLET | Refills: 1 | Status: ON HOLD
Start: 2022-01-01 | End: 2022-01-01

## 2022-01-01 RX ORDER — ONDANSETRON HYDROCHLORIDE 8 MG/1
TABLET, FILM COATED ORAL
COMMUNITY
Start: 2022-01-01

## 2022-01-01 RX ORDER — MAGNESIUM OXIDE 400 MG/1
400 TABLET ORAL DAILY
Qty: 30 TABLET | Refills: 1 | Status: SHIPPED | OUTPATIENT
Start: 2022-01-01 | End: 2022-01-01 | Stop reason: SDUPTHER

## 2022-01-01 RX ORDER — PROPOFOL 10 MG/ML
INJECTION, EMULSION INTRAVENOUS PRN
Status: DISCONTINUED | OUTPATIENT
Start: 2022-01-01 | End: 2022-01-01 | Stop reason: SDUPTHER

## 2022-01-01 RX ORDER — HYDRALAZINE HYDROCHLORIDE 20 MG/ML
10 INJECTION INTRAMUSCULAR; INTRAVENOUS
Status: DISCONTINUED | OUTPATIENT
Start: 2022-01-01 | End: 2022-01-01 | Stop reason: HOSPADM

## 2022-01-01 RX ORDER — PEN NEEDLE, DIABETIC 31 GX5/16"
1 NEEDLE, DISPOSABLE MISCELLANEOUS 4 TIMES DAILY
Qty: 100 EACH | Refills: 1 | Status: SHIPPED | OUTPATIENT
Start: 2022-01-01

## 2022-01-01 RX ORDER — LIDOCAINE HYDROCHLORIDE 20 MG/ML
INJECTION, SOLUTION EPIDURAL; INFILTRATION; INTRACAUDAL; PERINEURAL PRN
Status: DISCONTINUED | OUTPATIENT
Start: 2022-01-01 | End: 2022-01-01 | Stop reason: ALTCHOICE

## 2022-01-01 RX ORDER — TAMSULOSIN HYDROCHLORIDE 0.4 MG/1
0.8 CAPSULE ORAL DAILY
Status: DISCONTINUED | OUTPATIENT
Start: 2022-01-01 | End: 2022-01-01 | Stop reason: HOSPADM

## 2022-01-01 RX ORDER — ALBUTEROL SULFATE 2.5 MG/3ML
2.5 SOLUTION RESPIRATORY (INHALATION) ONCE
Status: COMPLETED | OUTPATIENT
Start: 2022-01-01 | End: 2022-01-01

## 2022-01-01 RX ORDER — ALBUTEROL SULFATE 90 UG/1
AEROSOL, METERED RESPIRATORY (INHALATION) PRN
Status: DISCONTINUED | OUTPATIENT
Start: 2022-01-01 | End: 2022-01-01 | Stop reason: SDUPTHER

## 2022-01-01 RX ORDER — PROCHLORPERAZINE MALEATE 10 MG
10 TABLET ORAL EVERY 6 HOURS PRN
Status: DISCONTINUED | OUTPATIENT
Start: 2022-01-01 | End: 2022-01-01

## 2022-01-01 RX ORDER — LIDOCAINE HYDROCHLORIDE 20 MG/ML
INJECTION, SOLUTION INTRAVENOUS PRN
Status: DISCONTINUED | OUTPATIENT
Start: 2022-01-01 | End: 2022-01-01 | Stop reason: SDUPTHER

## 2022-01-01 RX ORDER — DEXAMETHASONE SODIUM PHOSPHATE 4 MG/ML
10 INJECTION, SOLUTION INTRA-ARTICULAR; INTRALESIONAL; INTRAMUSCULAR; INTRAVENOUS; SOFT TISSUE EVERY 6 HOURS
Status: DISCONTINUED | OUTPATIENT
Start: 2022-01-01 | End: 2022-01-01

## 2022-01-01 RX ORDER — MIDAZOLAM HYDROCHLORIDE 1 MG/ML
2 INJECTION INTRAMUSCULAR; INTRAVENOUS ONCE
Status: COMPLETED | OUTPATIENT
Start: 2022-01-01 | End: 2022-01-01

## 2022-01-01 RX ORDER — DEXTROSE AND SODIUM CHLORIDE 5; .9 G/100ML; G/100ML
INJECTION, SOLUTION INTRAVENOUS CONTINUOUS
Status: DISCONTINUED | OUTPATIENT
Start: 2022-01-01 | End: 2022-01-01 | Stop reason: HOSPADM

## 2022-01-01 RX ORDER — BLOOD-GLUCOSE,RECEIVER,CONT
EACH MISCELLANEOUS
Qty: 1 EACH | Refills: 3 | Status: SHIPPED | OUTPATIENT
Start: 2022-01-01

## 2022-01-01 RX ORDER — HEPARIN SODIUM (PORCINE) LOCK FLUSH IV SOLN 100 UNIT/ML 100 UNIT/ML
500 SOLUTION INTRAVENOUS ONCE
Status: COMPLETED | OUTPATIENT
Start: 2022-01-01 | End: 2022-01-01

## 2022-01-01 RX ORDER — EMPAGLIFLOZIN 25 MG/1
TABLET, FILM COATED ORAL
Qty: 90 TABLET | Refills: 0 | Status: SHIPPED | OUTPATIENT
Start: 2022-01-01

## 2022-01-01 RX ORDER — ACETAMINOPHEN 325 MG/1
650 TABLET ORAL EVERY 4 HOURS PRN
Status: DISCONTINUED | OUTPATIENT
Start: 2022-01-01 | End: 2022-01-01 | Stop reason: HOSPADM

## 2022-01-01 RX ORDER — ASPIRIN 81 MG/1
81 TABLET ORAL DAILY
Status: DISCONTINUED | OUTPATIENT
Start: 2022-01-01 | End: 2022-01-01

## 2022-01-01 RX ORDER — POTASSIUM CHLORIDE 29.8 MG/ML
20 INJECTION INTRAVENOUS CONTINUOUS PRN
Status: DISCONTINUED | OUTPATIENT
Start: 2022-01-01 | End: 2022-01-01

## 2022-01-01 RX ORDER — PREDNISONE 10 MG/1
10 TABLET ORAL DAILY
Status: COMPLETED | OUTPATIENT
Start: 2022-01-01 | End: 2022-01-01

## 2022-01-01 RX ORDER — METHOCARBAMOL 750 MG/1
750 TABLET, FILM COATED ORAL EVERY 8 HOURS PRN
Status: DISCONTINUED | OUTPATIENT
Start: 2022-01-01 | End: 2022-01-01 | Stop reason: HOSPADM

## 2022-01-01 RX ORDER — PHENYLEPHRINE HYDROCHLORIDE 10 MG/ML
INJECTION INTRAVENOUS PRN
Status: DISCONTINUED | OUTPATIENT
Start: 2022-01-01 | End: 2022-01-01 | Stop reason: SDUPTHER

## 2022-01-01 RX ORDER — LANOLIN ALCOHOL/MO/W.PET/CERES
CREAM (GRAM) TOPICAL 2 TIMES DAILY
Status: DISCONTINUED | OUTPATIENT
Start: 2022-01-01 | End: 2022-01-01 | Stop reason: HOSPADM

## 2022-01-01 RX ORDER — ONDANSETRON 2 MG/ML
4 INJECTION INTRAMUSCULAR; INTRAVENOUS EVERY 6 HOURS PRN
Status: DISCONTINUED | OUTPATIENT
Start: 2022-01-01 | End: 2022-01-01 | Stop reason: SDUPTHER

## 2022-01-01 RX ORDER — DOCUSATE SODIUM 100 MG/1
100 CAPSULE, LIQUID FILLED ORAL 2 TIMES DAILY
COMMUNITY
Start: 2022-01-01

## 2022-01-01 RX ORDER — FLUTICASONE PROPIONATE 50 MCG
1 SPRAY, SUSPENSION (ML) NASAL DAILY
Status: DISCONTINUED | OUTPATIENT
Start: 2022-01-01 | End: 2022-01-01 | Stop reason: HOSPADM

## 2022-01-01 RX ORDER — BLOOD SUGAR DIAGNOSTIC
STRIP MISCELLANEOUS
Qty: 100 STRIP | Refills: 3 | Status: SHIPPED | OUTPATIENT
Start: 2022-01-01

## 2022-01-01 RX ORDER — IPRATROPIUM BROMIDE 42 UG/1
2 SPRAY, METERED NASAL 4 TIMES DAILY
Status: DISCONTINUED | OUTPATIENT
Start: 2022-01-01 | End: 2022-01-01 | Stop reason: HOSPADM

## 2022-01-01 RX ORDER — SODIUM CHLORIDE 0.9 % (FLUSH) 0.9 %
5-40 SYRINGE (ML) INJECTION PRN
Status: DISCONTINUED | OUTPATIENT
Start: 2022-01-01 | End: 2022-01-01 | Stop reason: HOSPADM

## 2022-01-01 RX ORDER — DEXTROSE MONOHYDRATE 50 MG/ML
100 INJECTION, SOLUTION INTRAVENOUS PRN
Status: DISCONTINUED | OUTPATIENT
Start: 2022-01-01 | End: 2022-01-01 | Stop reason: HOSPADM

## 2022-01-01 RX ORDER — ATROPINE SULFATE 10 MG/ML
1 SOLUTION/ DROPS OPHTHALMIC EVERY 4 HOURS PRN
Status: DISCONTINUED | OUTPATIENT
Start: 2022-01-01 | End: 2022-01-01 | Stop reason: HOSPADM

## 2022-01-01 RX ORDER — EMPAGLIFLOZIN 10 MG/1
TABLET, FILM COATED ORAL
Qty: 90 TABLET | Refills: 1 | Status: SHIPPED | OUTPATIENT
Start: 2022-01-01 | End: 2022-01-01 | Stop reason: DRUGHIGH

## 2022-01-01 RX ORDER — MORPHINE SULFATE 4 MG/ML
4 INJECTION, SOLUTION INTRAMUSCULAR; INTRAVENOUS
Status: DISCONTINUED | OUTPATIENT
Start: 2022-01-01 | End: 2022-01-01 | Stop reason: HOSPADM

## 2022-01-01 RX ORDER — DIAZEPAM 5 MG/1
5 TABLET ORAL EVERY 6 HOURS PRN
Status: DISCONTINUED | OUTPATIENT
Start: 2022-01-01 | End: 2022-01-01

## 2022-01-01 RX ORDER — SODIUM CHLORIDE 0.9 % (FLUSH) 0.9 %
5-40 SYRINGE (ML) INJECTION PRN
Status: DISCONTINUED | OUTPATIENT
Start: 2022-01-01 | End: 2022-01-01

## 2022-01-01 RX ORDER — LEVETIRACETAM 500 MG/5ML
INJECTION, SOLUTION, CONCENTRATE INTRAVENOUS PRN
Status: DISCONTINUED | OUTPATIENT
Start: 2022-01-01 | End: 2022-01-01 | Stop reason: SDUPTHER

## 2022-01-01 RX ORDER — SITAGLIPTIN AND METFORMIN HYDROCHLORIDE 1000; 50 MG/1; MG/1
TABLET, FILM COATED, EXTENDED RELEASE ORAL
Qty: 180 TABLET | Refills: 1 | Status: SHIPPED | OUTPATIENT
Start: 2022-01-01 | End: 2022-01-01 | Stop reason: SDUPTHER

## 2022-01-01 RX ORDER — SODIUM CHLORIDE 9 MG/ML
500 INJECTION, SOLUTION INTRAVENOUS CONTINUOUS PRN
Status: DISCONTINUED | OUTPATIENT
Start: 2022-01-01 | End: 2022-01-01 | Stop reason: HOSPADM

## 2022-01-01 RX ORDER — MAGNESIUM HYDROXIDE 1200 MG/15ML
LIQUID ORAL CONTINUOUS PRN
Status: DISCONTINUED | OUTPATIENT
Start: 2022-01-01 | End: 2022-01-01 | Stop reason: HOSPADM

## 2022-01-01 RX ORDER — DAPSONE 100 MG/1
100 TABLET ORAL DAILY
Status: DISCONTINUED | OUTPATIENT
Start: 2022-01-01 | End: 2022-01-01

## 2022-01-01 RX ORDER — MAGNESIUM OXIDE 400 MG/1
400 TABLET ORAL DAILY
Qty: 30 TABLET | Refills: 1 | Status: SHIPPED | OUTPATIENT
Start: 2022-01-01

## 2022-01-01 RX ORDER — INSULIN LISPRO 100 [IU]/ML
INJECTION, SOLUTION INTRAVENOUS; SUBCUTANEOUS
Qty: 5 PEN | Refills: 1 | Status: SHIPPED | OUTPATIENT
Start: 2022-01-01

## 2022-01-01 RX ORDER — TAMSULOSIN HYDROCHLORIDE 0.4 MG/1
0.8 CAPSULE ORAL DAILY
Status: ON HOLD | COMMUNITY
Start: 2022-01-01 | End: 2022-01-01

## 2022-01-01 RX ORDER — POTASSIUM CHLORIDE 20 MEQ/1
20 TABLET, EXTENDED RELEASE ORAL
Status: DISCONTINUED | OUTPATIENT
Start: 2022-01-01 | End: 2022-01-01

## 2022-01-01 RX ORDER — PREDNISONE 20 MG/1
TABLET ORAL
Status: ON HOLD | COMMUNITY
Start: 2022-01-01 | End: 2022-01-01 | Stop reason: HOSPADM

## 2022-01-01 RX ORDER — METHYLPREDNISOLONE SODIUM SUCCINATE 125 MG/2ML
60 INJECTION, POWDER, LYOPHILIZED, FOR SOLUTION INTRAMUSCULAR; INTRAVENOUS EVERY 12 HOURS
Status: DISCONTINUED | OUTPATIENT
Start: 2022-01-01 | End: 2022-01-01

## 2022-01-01 RX ORDER — DEXAMETHASONE SODIUM PHOSPHATE 4 MG/ML
4 INJECTION, SOLUTION INTRA-ARTICULAR; INTRALESIONAL; INTRAMUSCULAR; INTRAVENOUS; SOFT TISSUE EVERY 6 HOURS
Status: COMPLETED | OUTPATIENT
Start: 2022-01-01 | End: 2022-01-01

## 2022-01-01 RX ORDER — FLUTICASONE PROPIONATE 50 MCG
1 SPRAY, SUSPENSION (ML) NASAL DAILY
Status: DISCONTINUED | OUTPATIENT
Start: 2022-01-01 | End: 2022-01-01

## 2022-01-01 RX ORDER — ROCURONIUM BROMIDE 10 MG/ML
INJECTION, SOLUTION INTRAVENOUS PRN
Status: DISCONTINUED | OUTPATIENT
Start: 2022-01-01 | End: 2022-01-01 | Stop reason: SDUPTHER

## 2022-01-01 RX ORDER — CEFAZOLIN SODIUM 1 G/3ML
INJECTION, POWDER, FOR SOLUTION INTRAMUSCULAR; INTRAVENOUS PRN
Status: DISCONTINUED | OUTPATIENT
Start: 2022-01-01 | End: 2022-01-01 | Stop reason: SDUPTHER

## 2022-01-01 RX ORDER — OXYCODONE HYDROCHLORIDE 5 MG/1
5 TABLET ORAL
Status: DISCONTINUED | OUTPATIENT
Start: 2022-01-01 | End: 2022-01-01 | Stop reason: HOSPADM

## 2022-01-01 RX ORDER — ASPIRIN 81 MG/1
81 TABLET ORAL DAILY
COMMUNITY

## 2022-01-01 RX ORDER — SODIUM CHLORIDE 9 MG/ML
INJECTION, SOLUTION INTRAVENOUS PRN
Status: DISCONTINUED | OUTPATIENT
Start: 2022-01-01 | End: 2022-01-01

## 2022-01-01 RX ORDER — FENTANYL CITRATE 50 UG/ML
INJECTION, SOLUTION INTRAMUSCULAR; INTRAVENOUS PRN
Status: DISCONTINUED | OUTPATIENT
Start: 2022-01-01 | End: 2022-01-01 | Stop reason: SDUPTHER

## 2022-01-01 RX ORDER — PROCHLORPERAZINE EDISYLATE 5 MG/ML
5 INJECTION INTRAMUSCULAR; INTRAVENOUS
Status: DISCONTINUED | OUTPATIENT
Start: 2022-01-01 | End: 2022-01-01 | Stop reason: HOSPADM

## 2022-01-01 RX ORDER — SITAGLIPTIN AND METFORMIN HYDROCHLORIDE 1000; 50 MG/1; MG/1
TABLET, FILM COATED, EXTENDED RELEASE ORAL
Qty: 180 TABLET | Refills: 1 | OUTPATIENT
Start: 2022-01-01

## 2022-01-01 RX ORDER — ONDANSETRON 2 MG/ML
INJECTION INTRAMUSCULAR; INTRAVENOUS PRN
Status: DISCONTINUED | OUTPATIENT
Start: 2022-01-01 | End: 2022-01-01 | Stop reason: SDUPTHER

## 2022-01-01 RX ORDER — FUROSEMIDE 10 MG/ML
40 INJECTION INTRAMUSCULAR; INTRAVENOUS ONCE
Status: COMPLETED | OUTPATIENT
Start: 2022-01-01 | End: 2022-01-01

## 2022-01-01 RX ORDER — PANTOPRAZOLE SODIUM 40 MG/10ML
40 INJECTION, POWDER, LYOPHILIZED, FOR SOLUTION INTRAVENOUS DAILY
Status: DISCONTINUED | OUTPATIENT
Start: 2022-01-01 | End: 2022-01-01 | Stop reason: SDUPTHER

## 2022-01-01 RX ORDER — FENTANYL CITRATE 50 UG/ML
INJECTION, SOLUTION INTRAMUSCULAR; INTRAVENOUS PRN
Status: DISCONTINUED | OUTPATIENT
Start: 2022-01-01 | End: 2022-01-01 | Stop reason: ALTCHOICE

## 2022-01-01 RX ORDER — HEPARIN SODIUM (PORCINE) LOCK FLUSH IV SOLN 100 UNIT/ML 100 UNIT/ML
100 SOLUTION INTRAVENOUS PRN
Status: DISCONTINUED | OUTPATIENT
Start: 2022-01-01 | End: 2022-01-01 | Stop reason: HOSPADM

## 2022-01-01 RX ORDER — DEXAMETHASONE SODIUM PHOSPHATE 4 MG/ML
4 INJECTION, SOLUTION INTRA-ARTICULAR; INTRALESIONAL; INTRAMUSCULAR; INTRAVENOUS; SOFT TISSUE EVERY 6 HOURS
Status: DISCONTINUED | OUTPATIENT
Start: 2022-01-01 | End: 2022-01-01

## 2022-01-01 RX ORDER — ONDANSETRON 2 MG/ML
4 INJECTION INTRAMUSCULAR; INTRAVENOUS EVERY 6 HOURS PRN
Status: DISCONTINUED | OUTPATIENT
Start: 2022-01-01 | End: 2022-01-01 | Stop reason: HOSPADM

## 2022-01-01 RX ORDER — LIDOCAINE HYDROCHLORIDE 10 MG/ML
INJECTION, SOLUTION INFILTRATION; PERINEURAL
Status: DISCONTINUED
Start: 2022-01-01 | End: 2022-01-01 | Stop reason: HOSPADM

## 2022-01-01 RX ORDER — PANTOPRAZOLE SODIUM 40 MG/1
40 TABLET, DELAYED RELEASE ORAL
Status: COMPLETED | OUTPATIENT
Start: 2022-01-01 | End: 2022-01-01

## 2022-01-01 RX ORDER — SODIUM CHLORIDE 0.9 % (FLUSH) 0.9 %
5-40 SYRINGE (ML) INJECTION EVERY 12 HOURS SCHEDULED
Status: DISCONTINUED | OUTPATIENT
Start: 2022-01-01 | End: 2022-01-01 | Stop reason: HOSPADM

## 2022-01-01 RX ORDER — EMPAGLIFLOZIN 25 MG/1
TABLET, FILM COATED ORAL
Qty: 90 TABLET | Refills: 0 | OUTPATIENT
Start: 2022-01-01

## 2022-01-01 RX ORDER — ONDANSETRON 4 MG/1
4 TABLET, ORALLY DISINTEGRATING ORAL EVERY 8 HOURS PRN
Status: DISCONTINUED | OUTPATIENT
Start: 2022-01-01 | End: 2022-01-01 | Stop reason: HOSPADM

## 2022-01-01 RX ORDER — FLUTICASONE PROPIONATE 50 MCG
SPRAY, SUSPENSION (ML) NASAL
Qty: 48 G | Refills: 3 | Status: SHIPPED | OUTPATIENT
Start: 2022-01-01

## 2022-01-01 RX ORDER — ONDANSETRON 4 MG/1
4 TABLET, ORALLY DISINTEGRATING ORAL EVERY 8 HOURS PRN
Qty: 30 TABLET | Refills: 0 | Status: SHIPPED | OUTPATIENT
Start: 2022-01-01

## 2022-01-01 RX ORDER — DEXAMETHASONE 4 MG/1
4 TABLET ORAL EVERY 6 HOURS
Qty: 120 TABLET | Refills: 0 | Status: SHIPPED | OUTPATIENT
Start: 2022-01-01 | End: 2022-01-01 | Stop reason: HOSPADM

## 2022-01-01 RX ORDER — LEVETIRACETAM 500 MG/1
500 TABLET ORAL 2 TIMES DAILY
Qty: 60 TABLET | Refills: 3 | Status: SHIPPED | OUTPATIENT
Start: 2022-01-01

## 2022-01-01 RX ORDER — 0.9 % SODIUM CHLORIDE 0.9 %
500 INTRAVENOUS SOLUTION INTRAVENOUS ONCE
Status: COMPLETED | OUTPATIENT
Start: 2022-01-01 | End: 2022-01-01

## 2022-01-01 RX ORDER — ALLOPURINOL 300 MG/1
TABLET ORAL
COMMUNITY
Start: 2022-01-01 | End: 2022-01-01

## 2022-01-01 RX ORDER — INSULIN LISPRO 100 [IU]/ML
0-16 INJECTION, SOLUTION INTRAVENOUS; SUBCUTANEOUS
Status: DISCONTINUED | OUTPATIENT
Start: 2022-01-01 | End: 2022-01-01

## 2022-01-01 RX ORDER — EMPAGLIFLOZIN 10 MG/1
1 TABLET, FILM COATED ORAL DAILY
Qty: 90 TABLET | Refills: 1 | OUTPATIENT
Start: 2022-01-01

## 2022-01-01 RX ORDER — BUPIVACAINE HYDROCHLORIDE AND EPINEPHRINE 5; 5 MG/ML; UG/ML
INJECTION, SOLUTION EPIDURAL; INTRACAUDAL; PERINEURAL PRN
Status: DISCONTINUED | OUTPATIENT
Start: 2022-01-01 | End: 2022-01-01 | Stop reason: HOSPADM

## 2022-01-01 RX ORDER — PANTOPRAZOLE SODIUM 40 MG/10ML
40 INJECTION, POWDER, LYOPHILIZED, FOR SOLUTION INTRAVENOUS DAILY
Status: DISCONTINUED | OUTPATIENT
Start: 2022-01-01 | End: 2022-01-01 | Stop reason: HOSPADM

## 2022-01-01 RX ORDER — LEVETIRACETAM 500 MG/1
500 TABLET ORAL 2 TIMES DAILY
Qty: 60 TABLET | Refills: 3 | Status: SHIPPED | OUTPATIENT
Start: 2022-01-01 | End: 2022-01-01 | Stop reason: HOSPADM

## 2022-01-01 RX ORDER — DEXAMETHASONE 4 MG/1
2 TABLET ORAL EVERY 12 HOURS SCHEDULED
Status: DISCONTINUED | OUTPATIENT
Start: 2022-01-01 | End: 2022-01-01 | Stop reason: HOSPADM

## 2022-01-01 RX ORDER — FLUTICASONE PROPIONATE 50 MCG
1 SPRAY, SUSPENSION (ML) NASAL DAILY
Qty: 32 G | Refills: 1 | Status: SHIPPED | OUTPATIENT
Start: 2022-01-01 | End: 2022-01-01

## 2022-01-01 RX ORDER — DEXAMETHASONE 4 MG/1
2 TABLET ORAL DAILY
Status: DISCONTINUED | OUTPATIENT
Start: 2022-01-01 | End: 2022-01-01

## 2022-01-01 RX ORDER — BACITRACIN ZINC AND POLYMYXIN B SULFATE 500; 1000 [USP'U]/G; [USP'U]/G
OINTMENT TOPICAL ONCE
Status: COMPLETED | OUTPATIENT
Start: 2022-01-01 | End: 2022-01-01

## 2022-01-01 RX ORDER — LORATADINE 10 MG/1
10 TABLET ORAL DAILY
COMMUNITY
Start: 2022-01-01 | End: 2022-01-01

## 2022-01-01 RX ORDER — PROCHLORPERAZINE MALEATE 10 MG
TABLET ORAL
COMMUNITY
Start: 2022-01-01

## 2022-01-01 RX ORDER — PREDNISONE 20 MG/1
20 TABLET ORAL DAILY
Status: COMPLETED | OUTPATIENT
Start: 2022-01-01 | End: 2022-01-01

## 2022-01-01 RX ORDER — SODIUM CHLORIDE 9 MG/ML
INJECTION, SOLUTION INTRAVENOUS CONTINUOUS PRN
Status: DISCONTINUED | OUTPATIENT
Start: 2022-01-01 | End: 2022-01-01 | Stop reason: SDUPTHER

## 2022-01-01 RX ORDER — IPRATROPIUM BROMIDE 42 UG/1
2 SPRAY, METERED NASAL 4 TIMES DAILY
Status: DISCONTINUED | OUTPATIENT
Start: 2022-01-01 | End: 2022-01-01

## 2022-01-01 RX ORDER — ECHINACEA PURPUREA EXTRACT 125 MG
2 TABLET ORAL 4 TIMES DAILY
Qty: 1 EACH | Refills: 3 | Status: SHIPPED | OUTPATIENT
Start: 2022-01-01

## 2022-01-01 RX ORDER — DEXAMETHASONE 4 MG/1
4 TABLET ORAL EVERY 6 HOURS SCHEDULED
Status: DISCONTINUED | OUTPATIENT
Start: 2022-01-01 | End: 2022-01-01

## 2022-01-01 RX ORDER — HEPARIN SODIUM 5000 [USP'U]/ML
5000 INJECTION, SOLUTION INTRAVENOUS; SUBCUTANEOUS EVERY 8 HOURS SCHEDULED
Status: DISCONTINUED | OUTPATIENT
Start: 2022-01-01 | End: 2022-01-01

## 2022-01-01 RX ORDER — DEXAMETHASONE 4 MG/1
4 TABLET ORAL EVERY 12 HOURS SCHEDULED
Status: DISCONTINUED | OUTPATIENT
Start: 2022-01-01 | End: 2022-01-01

## 2022-01-01 RX ORDER — TAMSULOSIN HYDROCHLORIDE 0.4 MG/1
0.4 CAPSULE ORAL DAILY
Status: DISCONTINUED | OUTPATIENT
Start: 2022-01-01 | End: 2022-01-01

## 2022-01-01 RX ORDER — DEXAMETHASONE SODIUM PHOSPHATE 4 MG/ML
INJECTION, SOLUTION INTRA-ARTICULAR; INTRALESIONAL; INTRAMUSCULAR; INTRAVENOUS; SOFT TISSUE PRN
Status: DISCONTINUED | OUTPATIENT
Start: 2022-01-01 | End: 2022-01-01 | Stop reason: SDUPTHER

## 2022-01-01 RX ORDER — INSULIN LISPRO 100 [IU]/ML
0-4 INJECTION, SOLUTION INTRAVENOUS; SUBCUTANEOUS NIGHTLY
Status: DISCONTINUED | OUTPATIENT
Start: 2022-01-01 | End: 2022-01-01

## 2022-01-01 RX ORDER — BLOOD-GLUCOSE SENSOR
EACH MISCELLANEOUS
Qty: 9 EACH | Refills: 3 | Status: SHIPPED | OUTPATIENT
Start: 2022-01-01

## 2022-01-01 RX ORDER — PROMETHAZINE HYDROCHLORIDE 25 MG/1
12.5 TABLET ORAL EVERY 6 HOURS PRN
Status: DISCONTINUED | OUTPATIENT
Start: 2022-01-01 | End: 2022-01-01 | Stop reason: SDUPTHER

## 2022-01-01 RX ORDER — SODIUM CHLORIDE 9 MG/ML
25 INJECTION, SOLUTION INTRAVENOUS PRN
Status: DISCONTINUED | OUTPATIENT
Start: 2022-01-01 | End: 2022-01-01 | Stop reason: HOSPADM

## 2022-01-01 RX ORDER — QUINAPRIL 5 1/1
TABLET ORAL
Qty: 90 TABLET | Refills: 1 | Status: SHIPPED | OUTPATIENT
Start: 2022-01-01 | End: 2022-01-01 | Stop reason: ALTCHOICE

## 2022-01-01 RX ORDER — BACITRACIN ZINC AND POLYMYXIN B SULFATE 500; 1000 [USP'U]/G; [USP'U]/G
OINTMENT TOPICAL 2 TIMES DAILY
Status: DISCONTINUED | OUTPATIENT
Start: 2022-01-01 | End: 2022-01-01

## 2022-01-01 RX ORDER — FENTANYL CITRATE 50 UG/ML
INJECTION, SOLUTION INTRAMUSCULAR; INTRAVENOUS
Status: DISPENSED
Start: 2022-01-01 | End: 2022-01-01

## 2022-01-01 RX ORDER — ONDANSETRON HYDROCHLORIDE 8 MG/1
8 TABLET, FILM COATED ORAL EVERY 8 HOURS PRN
Status: DISCONTINUED | OUTPATIENT
Start: 2022-01-01 | End: 2022-01-01 | Stop reason: HOSPADM

## 2022-01-01 RX ORDER — INSULIN LISPRO 100 [IU]/ML
0-18 INJECTION, SOLUTION INTRAVENOUS; SUBCUTANEOUS
Status: DISCONTINUED | OUTPATIENT
Start: 2022-01-01 | End: 2022-01-01 | Stop reason: HOSPADM

## 2022-01-01 RX ORDER — SODIUM CHLORIDE 0.9 % (FLUSH) 0.9 %
5-40 SYRINGE (ML) INJECTION EVERY 12 HOURS SCHEDULED
Status: DISCONTINUED | OUTPATIENT
Start: 2022-01-01 | End: 2022-01-01

## 2022-01-01 RX ORDER — MAGNESIUM SULFATE IN WATER 40 MG/ML
4000 INJECTION, SOLUTION INTRAVENOUS PRN
Status: DISCONTINUED | OUTPATIENT
Start: 2022-01-01 | End: 2022-01-01

## 2022-01-01 RX ORDER — SITAGLIPTIN AND METFORMIN HYDROCHLORIDE 1000; 50 MG/1; MG/1
TABLET, FILM COATED, EXTENDED RELEASE ORAL
Qty: 180 TABLET | Refills: 1 | Status: SHIPPED | OUTPATIENT
Start: 2022-01-01

## 2022-01-01 RX ORDER — ENOXAPARIN SODIUM 100 MG/ML
40 INJECTION SUBCUTANEOUS DAILY
Status: DISCONTINUED | OUTPATIENT
Start: 2022-01-01 | End: 2022-01-01

## 2022-01-01 RX ORDER — BACITRACIN ZINC AND POLYMYXIN B SULFATE 500; 1000 [USP'U]/G; [USP'U]/G
OINTMENT TOPICAL
Qty: 1 EACH | Refills: 1 | Status: SHIPPED | OUTPATIENT
Start: 2022-01-01

## 2022-01-01 RX ADMIN — CEFEPIME 2000 MG: 2 INJECTION, POWDER, FOR SOLUTION INTRAVENOUS at 15:41

## 2022-01-01 RX ADMIN — IPRATROPIUM BROMIDE 2 SPRAY: 42 SPRAY, METERED NASAL at 10:12

## 2022-01-01 RX ADMIN — DEXAMETHASONE 4 MG: 4 TABLET ORAL at 11:41

## 2022-01-01 RX ADMIN — IPRATROPIUM BROMIDE 2 SPRAY: 42 SPRAY, METERED NASAL at 17:20

## 2022-01-01 RX ADMIN — FLUTICASONE PROPIONATE 1 SPRAY: 50 SPRAY, METERED NASAL at 08:25

## 2022-01-01 RX ADMIN — DEXAMETHASONE 4 MG: 4 TABLET ORAL at 11:43

## 2022-01-01 RX ADMIN — DEXAMETHASONE 4 MG: 4 TABLET ORAL at 05:55

## 2022-01-01 RX ADMIN — SODIUM CHLORIDE, PRESERVATIVE FREE 10 ML: 5 INJECTION INTRAVENOUS at 09:24

## 2022-01-01 RX ADMIN — SODIUM CHLORIDE: 9 INJECTION, SOLUTION INTRAVENOUS at 09:24

## 2022-01-01 RX ADMIN — VALACYCLOVIR HYDROCHLORIDE 500 MG: 500 TABLET, FILM COATED ORAL at 20:12

## 2022-01-01 RX ADMIN — DEXTROSE MONOHYDRATE 200 MG: 50 INJECTION, SOLUTION INTRAVENOUS at 09:40

## 2022-01-01 RX ADMIN — SODIUM CHLORIDE, PRESERVATIVE FREE 10 ML: 5 INJECTION INTRAVENOUS at 08:49

## 2022-01-01 RX ADMIN — TAMSULOSIN HYDROCHLORIDE 0.4 MG: 0.4 CAPSULE ORAL at 08:54

## 2022-01-01 RX ADMIN — SALINE NASAL SPRAY 2 SPRAY: 1.5 SOLUTION NASAL at 08:44

## 2022-01-01 RX ADMIN — CEFEPIME 2000 MG: 2 INJECTION, POWDER, FOR SOLUTION INTRAVENOUS at 23:49

## 2022-01-01 RX ADMIN — TAMSULOSIN HYDROCHLORIDE 0.8 MG: 0.4 CAPSULE ORAL at 07:56

## 2022-01-01 RX ADMIN — SODIUM CHLORIDE 1000 ML: 9 INJECTION, SOLUTION INTRAVENOUS at 12:03

## 2022-01-01 RX ADMIN — ALBUTEROL SULFATE 4 PUFF: 90 AEROSOL, METERED RESPIRATORY (INHALATION) at 18:35

## 2022-01-01 RX ADMIN — SODIUM CHLORIDE, PRESERVATIVE FREE 10 ML: 5 INJECTION INTRAVENOUS at 08:32

## 2022-01-01 RX ADMIN — DIBASIC SODIUM PHOSPHATE, MONOBASIC POTASSIUM PHOSPHATE AND MONOBASIC SODIUM PHOSPHATE 2 TABLET: 852; 155; 130 TABLET ORAL at 21:20

## 2022-01-01 RX ADMIN — SODIUM CHLORIDE, PRESERVATIVE FREE 10 ML: 5 INJECTION INTRAVENOUS at 20:59

## 2022-01-01 RX ADMIN — VORICONAZOLE 200 MG: 200 TABLET ORAL at 20:40

## 2022-01-01 RX ADMIN — FLUTICASONE PROPIONATE 1 SPRAY: 50 SPRAY, METERED NASAL at 09:13

## 2022-01-01 RX ADMIN — HEPARIN SODIUM 5000 UNITS: 5000 INJECTION INTRAVENOUS; SUBCUTANEOUS at 05:16

## 2022-01-01 RX ADMIN — MORPHINE SULFATE 4 MG: 4 INJECTION INTRAVENOUS at 00:05

## 2022-01-01 RX ADMIN — LEVETIRACETAM 500 MG: 500 TABLET, FILM COATED ORAL at 07:55

## 2022-01-01 RX ADMIN — INSULIN LISPRO 12 UNITS: 100 INJECTION, SOLUTION INTRAVENOUS; SUBCUTANEOUS at 08:04

## 2022-01-01 RX ADMIN — LEVETIRACETAM 500 MG: 500 TABLET, FILM COATED ORAL at 08:27

## 2022-01-01 RX ADMIN — BACITRACIN ZINC AND POLYMYXIN B SULFATE: 500; 10000 OINTMENT TOPICAL at 08:59

## 2022-01-01 RX ADMIN — IPRATROPIUM BROMIDE 2 SPRAY: 42 SPRAY, METERED NASAL at 12:44

## 2022-01-01 RX ADMIN — MORPHINE SULFATE 2 MG: 2 INJECTION, SOLUTION INTRAMUSCULAR; INTRAVENOUS at 12:13

## 2022-01-01 RX ADMIN — SODIUM CHLORIDE, PRESERVATIVE FREE 10 ML: 5 INJECTION INTRAVENOUS at 09:50

## 2022-01-01 RX ADMIN — IPRATROPIUM BROMIDE 2 SPRAY: 42 SPRAY, METERED NASAL at 20:15

## 2022-01-01 RX ADMIN — DEXAMETHASONE 4 MG: 4 TABLET ORAL at 08:36

## 2022-01-01 RX ADMIN — DOCUSATE SODIUM 100 MG: 100 CAPSULE, LIQUID FILLED ORAL at 09:24

## 2022-01-01 RX ADMIN — SODIUM CHLORIDE, PRESERVATIVE FREE 10 ML: 5 INJECTION INTRAVENOUS at 21:29

## 2022-01-01 RX ADMIN — VALACYCLOVIR HYDROCHLORIDE 500 MG: 500 TABLET, FILM COATED ORAL at 22:05

## 2022-01-01 RX ADMIN — SODIUM CHLORIDE, PRESERVATIVE FREE 10 ML: 5 INJECTION INTRAVENOUS at 08:25

## 2022-01-01 RX ADMIN — METHYLPREDNISOLONE SODIUM SUCCINATE 60 MG: 125 INJECTION, POWDER, FOR SOLUTION INTRAMUSCULAR; INTRAVENOUS at 08:58

## 2022-01-01 RX ADMIN — INSULIN LISPRO 5 UNITS: 100 INJECTION, SOLUTION INTRAVENOUS; SUBCUTANEOUS at 21:50

## 2022-01-01 RX ADMIN — INSULIN LISPRO 5 UNITS: 100 INJECTION, SOLUTION INTRAVENOUS; SUBCUTANEOUS at 20:21

## 2022-01-01 RX ADMIN — POTASSIUM CHLORIDE 20 MEQ: 20 TABLET, EXTENDED RELEASE ORAL at 08:49

## 2022-01-01 RX ADMIN — INSULIN LISPRO 4 UNITS: 100 INJECTION, SOLUTION INTRAVENOUS; SUBCUTANEOUS at 12:01

## 2022-01-01 RX ADMIN — LEVETIRACETAM 500 MG: 500 TABLET, FILM COATED ORAL at 08:49

## 2022-01-01 RX ADMIN — IPRATROPIUM BROMIDE 2 SPRAY: 42 SPRAY, METERED NASAL at 18:12

## 2022-01-01 RX ADMIN — SALINE NASAL SPRAY 2 SPRAY: 1.5 SOLUTION NASAL at 20:22

## 2022-01-01 RX ADMIN — INSULIN LISPRO 8 UNITS: 100 INJECTION, SOLUTION INTRAVENOUS; SUBCUTANEOUS at 17:25

## 2022-01-01 RX ADMIN — INSULIN LISPRO 6 UNITS: 100 INJECTION, SOLUTION INTRAVENOUS; SUBCUTANEOUS at 19:43

## 2022-01-01 RX ADMIN — PANTOPRAZOLE SODIUM 40 MG: 40 INJECTION, POWDER, FOR SOLUTION INTRAVENOUS at 07:41

## 2022-01-01 RX ADMIN — DIBASIC SODIUM PHOSPHATE, MONOBASIC POTASSIUM PHOSPHATE AND MONOBASIC SODIUM PHOSPHATE 2 TABLET: 852; 155; 130 TABLET ORAL at 21:06

## 2022-01-01 RX ADMIN — INSULIN LISPRO 6 UNITS: 100 INJECTION, SOLUTION INTRAVENOUS; SUBCUTANEOUS at 17:20

## 2022-01-01 RX ADMIN — INSULIN LISPRO 8 UNITS: 100 INJECTION, SOLUTION INTRAVENOUS; SUBCUTANEOUS at 11:59

## 2022-01-01 RX ADMIN — CYANOCOBALAMIN TAB 1000 MCG 1000 MCG: 1000 TAB at 20:30

## 2022-01-01 RX ADMIN — SODIUM CHLORIDE, PRESERVATIVE FREE 10 ML: 5 INJECTION INTRAVENOUS at 08:16

## 2022-01-01 RX ADMIN — CYANOCOBALAMIN TAB 1000 MCG 1000 MCG: 1000 TAB at 07:51

## 2022-01-01 RX ADMIN — DEXAMETHASONE 4 MG: 4 TABLET ORAL at 17:58

## 2022-01-01 RX ADMIN — INSULIN LISPRO 6 UNITS: 100 INJECTION, SOLUTION INTRAVENOUS; SUBCUTANEOUS at 16:45

## 2022-01-01 RX ADMIN — Medication: at 08:53

## 2022-01-01 RX ADMIN — INSULIN LISPRO 12 UNITS: 100 INJECTION, SOLUTION INTRAVENOUS; SUBCUTANEOUS at 12:42

## 2022-01-01 RX ADMIN — HEPARIN SODIUM 5000 UNITS: 5000 INJECTION INTRAVENOUS; SUBCUTANEOUS at 15:21

## 2022-01-01 RX ADMIN — DEXAMETHASONE SODIUM PHOSPHATE 4 MG: 4 INJECTION, SOLUTION INTRAMUSCULAR; INTRAVENOUS at 17:00

## 2022-01-01 RX ADMIN — SODIUM CHLORIDE 15 ML: 900 IRRIGANT IRRIGATION at 08:33

## 2022-01-01 RX ADMIN — INSULIN LISPRO 6 UNITS: 100 INJECTION, SOLUTION INTRAVENOUS; SUBCUTANEOUS at 12:24

## 2022-01-01 RX ADMIN — SODIUM CHLORIDE, PRESERVATIVE FREE 10 ML: 5 INJECTION INTRAVENOUS at 21:00

## 2022-01-01 RX ADMIN — FLUTICASONE PROPIONATE 1 SPRAY: 50 SPRAY, METERED NASAL at 08:09

## 2022-01-01 RX ADMIN — ATROPINE SULFATE 1 DROP: 10 SOLUTION/ DROPS OPHTHALMIC at 15:04

## 2022-01-01 RX ADMIN — SODIUM CHLORIDE, PRESERVATIVE FREE 10 ML: 5 INJECTION INTRAVENOUS at 08:05

## 2022-01-01 RX ADMIN — ENOXAPARIN SODIUM 40 MG: 100 INJECTION SUBCUTANEOUS at 18:15

## 2022-01-01 RX ADMIN — INSULIN LISPRO 6 UNITS: 100 INJECTION, SOLUTION INTRAVENOUS; SUBCUTANEOUS at 11:40

## 2022-01-01 RX ADMIN — DEXAMETHASONE SODIUM PHOSPHATE 4 MG: 4 INJECTION, SOLUTION INTRAMUSCULAR; INTRAVENOUS at 21:04

## 2022-01-01 RX ADMIN — DIBASIC SODIUM PHOSPHATE, MONOBASIC POTASSIUM PHOSPHATE AND MONOBASIC SODIUM PHOSPHATE 2 TABLET: 852; 155; 130 TABLET ORAL at 15:35

## 2022-01-01 RX ADMIN — SALINE NASAL SPRAY 2 SPRAY: 1.5 SOLUTION NASAL at 14:00

## 2022-01-01 RX ADMIN — BACITRACIN ZINC AND POLYMYXIN B SULFATE: 500; 10000 OINTMENT TOPICAL at 20:21

## 2022-01-01 RX ADMIN — CEFAZOLIN 2000 MG: 2 INJECTION, POWDER, FOR SOLUTION INTRAMUSCULAR; INTRAVENOUS at 18:11

## 2022-01-01 RX ADMIN — DEXAMETHASONE 4 MG: 4 TABLET ORAL at 18:37

## 2022-01-01 RX ADMIN — VALACYCLOVIR HYDROCHLORIDE 500 MG: 500 TABLET, FILM COATED ORAL at 20:30

## 2022-01-01 RX ADMIN — DOCUSATE SODIUM 100 MG: 100 CAPSULE, LIQUID FILLED ORAL at 08:00

## 2022-01-01 RX ADMIN — DEXAMETHASONE 4 MG: 4 TABLET ORAL at 21:00

## 2022-01-01 RX ADMIN — LEVETIRACETAM 500 MG: 500 TABLET, FILM COATED ORAL at 20:36

## 2022-01-01 RX ADMIN — ACETAMINOPHEN 650 MG: 325 TABLET, FILM COATED ORAL at 15:27

## 2022-01-01 RX ADMIN — IPRATROPIUM BROMIDE 2 SPRAY: 42 SPRAY, METERED NASAL at 12:06

## 2022-01-01 RX ADMIN — INSULIN LISPRO 4 UNITS: 100 INJECTION, SOLUTION INTRAVENOUS; SUBCUTANEOUS at 08:44

## 2022-01-01 RX ADMIN — ENOXAPARIN SODIUM 40 MG: 100 INJECTION SUBCUTANEOUS at 18:09

## 2022-01-01 RX ADMIN — IPRATROPIUM BROMIDE 2 SPRAY: 42 SPRAY, METERED NASAL at 14:00

## 2022-01-01 RX ADMIN — CYANOCOBALAMIN TAB 1000 MCG 1000 MCG: 1000 TAB at 19:57

## 2022-01-01 RX ADMIN — IPRATROPIUM BROMIDE 2 SPRAY: 42 SPRAY, METERED NASAL at 17:19

## 2022-01-01 RX ADMIN — DIBASIC SODIUM PHOSPHATE, MONOBASIC POTASSIUM PHOSPHATE AND MONOBASIC SODIUM PHOSPHATE 2 TABLET: 852; 155; 130 TABLET ORAL at 14:20

## 2022-01-01 RX ADMIN — SODIUM CHLORIDE, PRESERVATIVE FREE 10 ML: 5 INJECTION INTRAVENOUS at 08:15

## 2022-01-01 RX ADMIN — PREDNISONE 20 MG: 20 TABLET ORAL at 07:51

## 2022-01-01 RX ADMIN — DEXAMETHASONE 2 MG: 4 TABLET ORAL at 08:53

## 2022-01-01 RX ADMIN — INSULIN LISPRO 8 UNITS: 100 INJECTION, SOLUTION INTRAVENOUS; SUBCUTANEOUS at 14:40

## 2022-01-01 RX ADMIN — DOCUSATE SODIUM 100 MG: 100 CAPSULE, LIQUID FILLED ORAL at 19:57

## 2022-01-01 RX ADMIN — DEXAMETHASONE 4 MG: 4 TABLET ORAL at 12:00

## 2022-01-01 RX ADMIN — LEVETIRACETAM 500 MG: 500 TABLET, FILM COATED ORAL at 21:29

## 2022-01-01 RX ADMIN — LEVETIRACETAM 500 MG: 500 TABLET, FILM COATED ORAL at 08:41

## 2022-01-01 RX ADMIN — LEVETIRACETAM 500 MG: 500 TABLET, FILM COATED ORAL at 08:02

## 2022-01-01 RX ADMIN — SALINE NASAL SPRAY 2 SPRAY: 1.5 SOLUTION NASAL at 20:36

## 2022-01-01 RX ADMIN — LEVETIRACETAM 500 MG: 500 TABLET, FILM COATED ORAL at 08:58

## 2022-01-01 RX ADMIN — LEVETIRACETAM 500 MG: 100 INJECTION, SOLUTION, CONCENTRATE INTRAVENOUS at 22:33

## 2022-01-01 RX ADMIN — IPRATROPIUM BROMIDE 2 SPRAY: 42 SPRAY, METERED NASAL at 09:37

## 2022-01-01 RX ADMIN — DIBASIC SODIUM PHOSPHATE, MONOBASIC POTASSIUM PHOSPHATE AND MONOBASIC SODIUM PHOSPHATE 2 TABLET: 852; 155; 130 TABLET ORAL at 16:00

## 2022-01-01 RX ADMIN — FUROSEMIDE 40 MG: 10 INJECTION, SOLUTION INTRAMUSCULAR; INTRAVENOUS at 07:59

## 2022-01-01 RX ADMIN — IPRATROPIUM BROMIDE 2 SPRAY: 42 SPRAY, METERED NASAL at 17:01

## 2022-01-01 RX ADMIN — INSULIN LISPRO 6 UNITS: 100 INJECTION, SOLUTION INTRAVENOUS; SUBCUTANEOUS at 10:00

## 2022-01-01 RX ADMIN — IPRATROPIUM BROMIDE 2 SPRAY: 42 SPRAY, METERED NASAL at 10:33

## 2022-01-01 RX ADMIN — VORICONAZOLE 200 MG: 200 TABLET ORAL at 21:30

## 2022-01-01 RX ADMIN — DEXAMETHASONE SODIUM PHOSPHATE 4 MG: 4 INJECTION, SOLUTION INTRAMUSCULAR; INTRAVENOUS at 16:28

## 2022-01-01 RX ADMIN — PANTOPRAZOLE SODIUM 40 MG: 40 INJECTION, POWDER, FOR SOLUTION INTRAVENOUS at 09:28

## 2022-01-01 RX ADMIN — SODIUM PHOSPHATE, MONOBASIC, MONOHYDRATE 30 MMOL: 276; 142 INJECTION, SOLUTION INTRAVENOUS at 12:08

## 2022-01-01 RX ADMIN — SODIUM CHLORIDE, PRESERVATIVE FREE 10 ML: 5 INJECTION INTRAVENOUS at 08:04

## 2022-01-01 RX ADMIN — INSULIN LISPRO 2 UNITS: 100 INJECTION, SOLUTION INTRAVENOUS; SUBCUTANEOUS at 16:59

## 2022-01-01 RX ADMIN — CYANOCOBALAMIN TAB 1000 MCG 1000 MCG: 1000 TAB at 08:54

## 2022-01-01 RX ADMIN — INSULIN LISPRO 2 UNITS: 100 INJECTION, SOLUTION INTRAVENOUS; SUBCUTANEOUS at 08:01

## 2022-01-01 RX ADMIN — SODIUM CHLORIDE, PRESERVATIVE FREE 10 ML: 5 INJECTION INTRAVENOUS at 21:10

## 2022-01-01 RX ADMIN — GADOTERIDOL 12 ML: 279.3 INJECTION, SOLUTION INTRAVENOUS at 10:18

## 2022-01-01 RX ADMIN — LEVETIRACETAM 500 MG: 100 INJECTION, SOLUTION, CONCENTRATE INTRAVENOUS at 09:46

## 2022-01-01 RX ADMIN — ENOXAPARIN SODIUM 40 MG: 100 INJECTION SUBCUTANEOUS at 18:33

## 2022-01-01 RX ADMIN — LEVETIRACETAM 500 MG: 100 INJECTION, SOLUTION, CONCENTRATE INTRAVENOUS at 11:34

## 2022-01-01 RX ADMIN — ENOXAPARIN SODIUM 40 MG: 100 INJECTION SUBCUTANEOUS at 17:01

## 2022-01-01 RX ADMIN — CEFEPIME 2000 MG: 2 INJECTION, POWDER, FOR SOLUTION INTRAVENOUS at 09:55

## 2022-01-01 RX ADMIN — Medication: at 20:39

## 2022-01-01 RX ADMIN — Medication: at 23:42

## 2022-01-01 RX ADMIN — TAMSULOSIN HYDROCHLORIDE 0.4 MG: 0.4 CAPSULE ORAL at 07:51

## 2022-01-01 RX ADMIN — ENOXAPARIN SODIUM 40 MG: 100 INJECTION SUBCUTANEOUS at 18:03

## 2022-01-01 RX ADMIN — TAMSULOSIN HYDROCHLORIDE 0.4 MG: 0.4 CAPSULE ORAL at 08:57

## 2022-01-01 RX ADMIN — VALACYCLOVIR HYDROCHLORIDE 500 MG: 500 TABLET, FILM COATED ORAL at 08:41

## 2022-01-01 RX ADMIN — DEXAMETHASONE 4 MG: 4 TABLET ORAL at 06:41

## 2022-01-01 RX ADMIN — IPRATROPIUM BROMIDE 2 SPRAY: 42 SPRAY, METERED NASAL at 13:46

## 2022-01-01 RX ADMIN — ENOXAPARIN SODIUM 40 MG: 100 INJECTION SUBCUTANEOUS at 18:19

## 2022-01-01 RX ADMIN — DIBASIC SODIUM PHOSPHATE, MONOBASIC POTASSIUM PHOSPHATE AND MONOBASIC SODIUM PHOSPHATE 2 TABLET: 852; 155; 130 TABLET ORAL at 08:25

## 2022-01-01 RX ADMIN — TAMSULOSIN HYDROCHLORIDE 0.8 MG: 0.4 CAPSULE ORAL at 08:06

## 2022-01-01 RX ADMIN — DEXTROSE AND SODIUM CHLORIDE: 5; 900 INJECTION, SOLUTION INTRAVENOUS at 13:30

## 2022-01-01 RX ADMIN — DOCUSATE SODIUM 100 MG: 100 CAPSULE, LIQUID FILLED ORAL at 10:17

## 2022-01-01 RX ADMIN — INSULIN LISPRO 2 UNITS: 100 INJECTION, SOLUTION INTRAVENOUS; SUBCUTANEOUS at 12:05

## 2022-01-01 RX ADMIN — IPRATROPIUM BROMIDE 2 SPRAY: 42 SPRAY, METERED NASAL at 16:40

## 2022-01-01 RX ADMIN — IPRATROPIUM BROMIDE 2 SPRAY: 42 SPRAY, METERED NASAL at 16:54

## 2022-01-01 RX ADMIN — IPRATROPIUM BROMIDE 2 SPRAY: 42 SPRAY, METERED NASAL at 09:46

## 2022-01-01 RX ADMIN — PIPERACILLIN AND TAZOBACTAM 3375 MG: 3; .375 INJECTION, POWDER, FOR SOLUTION INTRAVENOUS at 17:43

## 2022-01-01 RX ADMIN — FLUTICASONE PROPIONATE 1 SPRAY: 50 SPRAY, METERED NASAL at 08:32

## 2022-01-01 RX ADMIN — VALACYCLOVIR HYDROCHLORIDE 500 MG: 500 TABLET, FILM COATED ORAL at 08:49

## 2022-01-01 RX ADMIN — BACITRACIN ZINC AND POLYMYXIN B SULFATE: 500; 10000 OINTMENT TOPICAL at 22:04

## 2022-01-01 RX ADMIN — FLUTICASONE PROPIONATE 1 SPRAY: 50 SPRAY, METERED NASAL at 08:50

## 2022-01-01 RX ADMIN — HYDROMORPHONE HYDROCHLORIDE 0.5 MG: 1 INJECTION, SOLUTION INTRAMUSCULAR; INTRAVENOUS; SUBCUTANEOUS at 21:01

## 2022-01-01 RX ADMIN — FLUTICASONE PROPIONATE 1 SPRAY: 50 SPRAY, METERED NASAL at 08:43

## 2022-01-01 RX ADMIN — INSULIN LISPRO 9 UNITS: 100 INJECTION, SOLUTION INTRAVENOUS; SUBCUTANEOUS at 16:55

## 2022-01-01 RX ADMIN — SODIUM CHLORIDE, PRESERVATIVE FREE 10 ML: 5 INJECTION INTRAVENOUS at 21:31

## 2022-01-01 RX ADMIN — DEXTROSE MONOHYDRATE 200 MG: 50 INJECTION, SOLUTION INTRAVENOUS at 12:07

## 2022-01-01 RX ADMIN — LEVETIRACETAM 500 MG: 100 INJECTION, SOLUTION, CONCENTRATE INTRAVENOUS at 21:20

## 2022-01-01 RX ADMIN — IPRATROPIUM BROMIDE 2 SPRAY: 42 SPRAY, METERED NASAL at 21:30

## 2022-01-01 RX ADMIN — ENOXAPARIN SODIUM 30 MG: 100 INJECTION SUBCUTANEOUS at 17:02

## 2022-01-01 RX ADMIN — TAMSULOSIN HYDROCHLORIDE 0.4 MG: 0.4 CAPSULE ORAL at 08:32

## 2022-01-01 RX ADMIN — FLUTICASONE PROPIONATE 1 SPRAY: 50 SPRAY, METERED NASAL at 08:20

## 2022-01-01 RX ADMIN — INSULIN LISPRO 2 UNITS: 100 INJECTION, SOLUTION INTRAVENOUS; SUBCUTANEOUS at 23:52

## 2022-01-01 RX ADMIN — DOCUSATE SODIUM 100 MG: 100 CAPSULE, LIQUID FILLED ORAL at 08:42

## 2022-01-01 RX ADMIN — VORICONAZOLE 200 MG: 200 TABLET ORAL at 20:56

## 2022-01-01 RX ADMIN — LEVETIRACETAM 500 MG: 500 TABLET, FILM COATED ORAL at 08:29

## 2022-01-01 RX ADMIN — MIDAZOLAM 2 MG: 1 INJECTION INTRAMUSCULAR; INTRAVENOUS at 17:46

## 2022-01-01 RX ADMIN — IPRATROPIUM BROMIDE 2 SPRAY: 42 SPRAY, METERED NASAL at 17:12

## 2022-01-01 RX ADMIN — PANTOPRAZOLE SODIUM 40 MG: 40 TABLET, DELAYED RELEASE ORAL at 10:17

## 2022-01-01 RX ADMIN — METHYLPREDNISOLONE SODIUM SUCCINATE 60 MG: 125 INJECTION, POWDER, FOR SOLUTION INTRAMUSCULAR; INTRAVENOUS at 10:17

## 2022-01-01 RX ADMIN — PANTOPRAZOLE SODIUM 40 MG: 40 INJECTION, POWDER, FOR SOLUTION INTRAVENOUS at 08:06

## 2022-01-01 RX ADMIN — Medication: at 21:06

## 2022-01-01 RX ADMIN — VORICONAZOLE 200 MG: 200 TABLET ORAL at 08:49

## 2022-01-01 RX ADMIN — IPRATROPIUM BROMIDE 2 SPRAY: 42 SPRAY, METERED NASAL at 20:59

## 2022-01-01 RX ADMIN — INSULIN LISPRO 4 UNITS: 100 INJECTION, SOLUTION INTRAVENOUS; SUBCUTANEOUS at 21:32

## 2022-01-01 RX ADMIN — INSULIN LISPRO 3 UNITS: 100 INJECTION, SOLUTION INTRAVENOUS; SUBCUTANEOUS at 21:36

## 2022-01-01 RX ADMIN — Medication: at 08:06

## 2022-01-01 RX ADMIN — IPRATROPIUM BROMIDE 2 SPRAY: 42 SPRAY, METERED NASAL at 18:03

## 2022-01-01 RX ADMIN — VORICONAZOLE 200 MG: 200 TABLET ORAL at 08:23

## 2022-01-01 RX ADMIN — LEVETIRACETAM 500 MG: 500 TABLET, FILM COATED ORAL at 21:28

## 2022-01-01 RX ADMIN — ROCURONIUM BROMIDE 25 MG: 10 INJECTION INTRAVENOUS at 18:58

## 2022-01-01 RX ADMIN — VALACYCLOVIR HYDROCHLORIDE 500 MG: 500 TABLET, FILM COATED ORAL at 21:06

## 2022-01-01 RX ADMIN — INSULIN LISPRO 6 UNITS: 100 INJECTION, SOLUTION INTRAVENOUS; SUBCUTANEOUS at 12:14

## 2022-01-01 RX ADMIN — LEVETIRACETAM 1000 MG: 100 INJECTION, SOLUTION INTRAVENOUS at 18:54

## 2022-01-01 RX ADMIN — PANTOPRAZOLE SODIUM 40 MG: 40 INJECTION, POWDER, FOR SOLUTION INTRAVENOUS at 09:21

## 2022-01-01 RX ADMIN — DEXAMETHASONE SODIUM PHOSPHATE 4 MG: 4 INJECTION, SOLUTION INTRAMUSCULAR; INTRAVENOUS at 20:48

## 2022-01-01 RX ADMIN — FLUTICASONE PROPIONATE 1 SPRAY: 50 SPRAY, METERED NASAL at 09:28

## 2022-01-01 RX ADMIN — VORICONAZOLE 200 MG: 200 TABLET ORAL at 21:06

## 2022-01-01 RX ADMIN — ENOXAPARIN SODIUM 30 MG: 100 INJECTION SUBCUTANEOUS at 17:25

## 2022-01-01 RX ADMIN — DEXAMETHASONE 2 MG: 4 TABLET ORAL at 09:32

## 2022-01-01 RX ADMIN — DOCUSATE SODIUM 100 MG: 100 CAPSULE, LIQUID FILLED ORAL at 08:31

## 2022-01-01 RX ADMIN — SODIUM CHLORIDE: 9 INJECTION, SOLUTION INTRAVENOUS at 18:08

## 2022-01-01 RX ADMIN — ENOXAPARIN SODIUM 30 MG: 100 INJECTION SUBCUTANEOUS at 18:54

## 2022-01-01 RX ADMIN — INSULIN LISPRO 3 UNITS: 100 INJECTION, SOLUTION INTRAVENOUS; SUBCUTANEOUS at 07:49

## 2022-01-01 RX ADMIN — FLUTICASONE PROPIONATE 1 SPRAY: 50 SPRAY, METERED NASAL at 08:24

## 2022-01-01 RX ADMIN — SODIUM CHLORIDE, PRESERVATIVE FREE 10 ML: 5 INJECTION INTRAVENOUS at 21:21

## 2022-01-01 RX ADMIN — INSULIN LISPRO 6 UNITS: 100 INJECTION, SOLUTION INTRAVENOUS; SUBCUTANEOUS at 11:25

## 2022-01-01 RX ADMIN — SODIUM CHLORIDE, PRESERVATIVE FREE 10 ML: 5 INJECTION INTRAVENOUS at 20:08

## 2022-01-01 RX ADMIN — CEFEPIME 2000 MG: 2 INJECTION, POWDER, FOR SOLUTION INTRAVENOUS at 15:39

## 2022-01-01 RX ADMIN — IPRATROPIUM BROMIDE 2 SPRAY: 42 SPRAY, METERED NASAL at 16:35

## 2022-01-01 RX ADMIN — IPRATROPIUM BROMIDE 2 SPRAY: 42 SPRAY, METERED NASAL at 11:24

## 2022-01-01 RX ADMIN — DEXAMETHASONE 4 MG: 4 TABLET ORAL at 05:34

## 2022-01-01 RX ADMIN — ONDANSETRON 4 MG: 2 INJECTION INTRAMUSCULAR; INTRAVENOUS at 19:23

## 2022-01-01 RX ADMIN — SALINE NASAL SPRAY 2 SPRAY: 1.5 SOLUTION NASAL at 09:30

## 2022-01-01 RX ADMIN — SODIUM CHLORIDE, PRESERVATIVE FREE 10 ML: 5 INJECTION INTRAVENOUS at 09:36

## 2022-01-01 RX ADMIN — BACITRACIN ZINC AND POLYMYXIN B SULFATE: 500; 10000 OINTMENT TOPICAL at 21:30

## 2022-01-01 RX ADMIN — INSULIN LISPRO 4 UNITS: 100 INJECTION, SOLUTION INTRAVENOUS; SUBCUTANEOUS at 08:30

## 2022-01-01 RX ADMIN — INSULIN GLARGINE 30 UNITS: 100 INJECTION, SOLUTION SUBCUTANEOUS at 21:05

## 2022-01-01 RX ADMIN — HEPARIN SODIUM 5000 UNITS: 5000 INJECTION INTRAVENOUS; SUBCUTANEOUS at 15:38

## 2022-01-01 RX ADMIN — ENOXAPARIN SODIUM 40 MG: 100 INJECTION SUBCUTANEOUS at 17:28

## 2022-01-01 RX ADMIN — POTASSIUM CHLORIDE 20 MEQ: 20 TABLET, EXTENDED RELEASE ORAL at 08:23

## 2022-01-01 RX ADMIN — FLUTICASONE PROPIONATE 1 SPRAY: 50 SPRAY, METERED NASAL at 07:52

## 2022-01-01 RX ADMIN — IPRATROPIUM BROMIDE 2 SPRAY: 42 SPRAY, METERED NASAL at 18:10

## 2022-01-01 RX ADMIN — CEFEPIME 2000 MG: 2 INJECTION, POWDER, FOR SOLUTION INTRAVENOUS at 07:59

## 2022-01-01 RX ADMIN — INSULIN LISPRO 4 UNITS: 100 INJECTION, SOLUTION INTRAVENOUS; SUBCUTANEOUS at 08:26

## 2022-01-01 RX ADMIN — SODIUM CHLORIDE, PRESERVATIVE FREE 10 ML: 5 INJECTION INTRAVENOUS at 20:53

## 2022-01-01 RX ADMIN — DEXAMETHASONE SODIUM PHOSPHATE 4 MG: 4 INJECTION, SOLUTION INTRAMUSCULAR; INTRAVENOUS at 09:15

## 2022-01-01 RX ADMIN — TAMSULOSIN HYDROCHLORIDE 0.4 MG: 0.4 CAPSULE ORAL at 08:17

## 2022-01-01 RX ADMIN — TAMSULOSIN HYDROCHLORIDE 0.8 MG: 0.4 CAPSULE ORAL at 08:36

## 2022-01-01 RX ADMIN — INSULIN GLARGINE 10 UNITS: 100 INJECTION, SOLUTION SUBCUTANEOUS at 21:21

## 2022-01-01 RX ADMIN — Medication: at 10:05

## 2022-01-01 RX ADMIN — DEXAMETHASONE SODIUM PHOSPHATE 4 MG: 4 INJECTION, SOLUTION INTRAMUSCULAR; INTRAVENOUS at 17:01

## 2022-01-01 RX ADMIN — HEPARIN 100 UNITS: 100 SYRINGE at 15:44

## 2022-01-01 RX ADMIN — DOCUSATE SODIUM 100 MG: 100 CAPSULE, LIQUID FILLED ORAL at 08:05

## 2022-01-01 RX ADMIN — PANTOPRAZOLE SODIUM 40 MG: 40 INJECTION, POWDER, FOR SOLUTION INTRAVENOUS at 09:50

## 2022-01-01 RX ADMIN — VALACYCLOVIR HYDROCHLORIDE 500 MG: 500 TABLET, FILM COATED ORAL at 20:33

## 2022-01-01 RX ADMIN — TAMSULOSIN HYDROCHLORIDE 0.8 MG: 0.4 CAPSULE ORAL at 09:19

## 2022-01-01 RX ADMIN — CEFEPIME 2000 MG: 2 INJECTION, POWDER, FOR SOLUTION INTRAVENOUS at 00:30

## 2022-01-01 RX ADMIN — LEVETIRACETAM 500 MG: 100 INJECTION, SOLUTION, CONCENTRATE INTRAVENOUS at 09:18

## 2022-01-01 RX ADMIN — CEFEPIME 2000 MG: 2 INJECTION, POWDER, FOR SOLUTION INTRAVENOUS at 23:58

## 2022-01-01 RX ADMIN — Medication: at 07:45

## 2022-01-01 RX ADMIN — INSULIN LISPRO 4 UNITS: 100 INJECTION, SOLUTION INTRAVENOUS; SUBCUTANEOUS at 07:56

## 2022-01-01 RX ADMIN — VORICONAZOLE 200 MG: 200 TABLET ORAL at 08:41

## 2022-01-01 RX ADMIN — INSULIN LISPRO 2 UNITS: 100 INJECTION, SOLUTION INTRAVENOUS; SUBCUTANEOUS at 08:36

## 2022-01-01 RX ADMIN — CYANOCOBALAMIN TAB 1000 MCG 1000 MCG: 1000 TAB at 08:00

## 2022-01-01 RX ADMIN — SALINE NASAL SPRAY 2 SPRAY: 1.5 SOLUTION NASAL at 11:15

## 2022-01-01 RX ADMIN — VORICONAZOLE 200 MG: 200 TABLET ORAL at 08:28

## 2022-01-01 RX ADMIN — METHYLPREDNISOLONE SODIUM SUCCINATE 60 MG: 125 INJECTION, POWDER, FOR SOLUTION INTRAMUSCULAR; INTRAVENOUS at 11:33

## 2022-01-01 RX ADMIN — DIBASIC SODIUM PHOSPHATE, MONOBASIC POTASSIUM PHOSPHATE AND MONOBASIC SODIUM PHOSPHATE 2 TABLET: 852; 155; 130 TABLET ORAL at 08:17

## 2022-01-01 RX ADMIN — LEVETIRACETAM 500 MG: 100 INJECTION, SOLUTION, CONCENTRATE INTRAVENOUS at 10:30

## 2022-01-01 RX ADMIN — LEVETIRACETAM 500 MG: 500 TABLET, FILM COATED ORAL at 20:17

## 2022-01-01 RX ADMIN — DEXAMETHASONE 4 MG: 4 TABLET ORAL at 05:40

## 2022-01-01 RX ADMIN — SODIUM CHLORIDE: 9 INJECTION, SOLUTION INTRAVENOUS at 17:51

## 2022-01-01 RX ADMIN — TAMSULOSIN HYDROCHLORIDE 0.8 MG: 0.4 CAPSULE ORAL at 09:02

## 2022-01-01 RX ADMIN — INSULIN LISPRO 15 UNITS: 100 INJECTION, SOLUTION INTRAVENOUS; SUBCUTANEOUS at 11:40

## 2022-01-01 RX ADMIN — INSULIN LISPRO 15 UNITS: 100 INJECTION, SOLUTION INTRAVENOUS; SUBCUTANEOUS at 17:15

## 2022-01-01 RX ADMIN — LEVETIRACETAM 500 MG: 500 TABLET, FILM COATED ORAL at 09:27

## 2022-01-01 RX ADMIN — SALINE NASAL SPRAY 2 SPRAY: 1.5 SOLUTION NASAL at 11:26

## 2022-01-01 RX ADMIN — SODIUM CHLORIDE: 9 INJECTION, SOLUTION INTRAVENOUS at 18:09

## 2022-01-01 RX ADMIN — SODIUM CHLORIDE, PRESERVATIVE FREE 10 ML: 5 INJECTION INTRAVENOUS at 22:33

## 2022-01-01 RX ADMIN — PHENYLEPHRINE HYDROCHLORIDE 100 MCG: 10 INJECTION INTRAVENOUS at 19:23

## 2022-01-01 RX ADMIN — SODIUM CHLORIDE, PRESERVATIVE FREE 10 ML: 5 INJECTION INTRAVENOUS at 20:52

## 2022-01-01 RX ADMIN — IPRATROPIUM BROMIDE 2 SPRAY: 42 SPRAY, METERED NASAL at 09:25

## 2022-01-01 RX ADMIN — Medication: at 12:00

## 2022-01-01 RX ADMIN — LEVETIRACETAM 500 MG: 500 TABLET, FILM COATED ORAL at 09:28

## 2022-01-01 RX ADMIN — VORICONAZOLE 200 MG: 200 TABLET ORAL at 20:18

## 2022-01-01 RX ADMIN — METHYLPREDNISOLONE SODIUM SUCCINATE 60 MG: 125 INJECTION, POWDER, FOR SOLUTION INTRAMUSCULAR; INTRAVENOUS at 23:34

## 2022-01-01 RX ADMIN — SALINE NASAL SPRAY 2 SPRAY: 1.5 SOLUTION NASAL at 09:00

## 2022-01-01 RX ADMIN — VORICONAZOLE 200 MG: 200 TABLET ORAL at 20:28

## 2022-01-01 RX ADMIN — VORICONAZOLE 200 MG: 200 TABLET ORAL at 20:20

## 2022-01-01 RX ADMIN — IPRATROPIUM BROMIDE 2 SPRAY: 42 SPRAY, METERED NASAL at 15:16

## 2022-01-01 RX ADMIN — LEVETIRACETAM 500 MG: 100 INJECTION, SOLUTION, CONCENTRATE INTRAVENOUS at 21:06

## 2022-01-01 RX ADMIN — POTASSIUM CHLORIDE 20 MEQ: 20 TABLET, EXTENDED RELEASE ORAL at 08:33

## 2022-01-01 RX ADMIN — INSULIN LISPRO 6 UNITS: 100 INJECTION, SOLUTION INTRAVENOUS; SUBCUTANEOUS at 07:30

## 2022-01-01 RX ADMIN — SODIUM PHOSPHATE, MONOBASIC, MONOHYDRATE 30 MMOL: 276; 142 INJECTION, SOLUTION INTRAVENOUS at 06:28

## 2022-01-01 RX ADMIN — BACITRACIN ZINC AND POLYMYXIN B SULFATE: 500; 10000 OINTMENT TOPICAL at 19:57

## 2022-01-01 RX ADMIN — DEXAMETHASONE SODIUM PHOSPHATE 4 MG: 4 INJECTION, SOLUTION INTRAMUSCULAR; INTRAVENOUS at 17:29

## 2022-01-01 RX ADMIN — BACITRACIN ZINC AND POLYMYXIN B SULFATE: 500; 10000 OINTMENT TOPICAL at 21:00

## 2022-01-01 RX ADMIN — Medication: at 19:56

## 2022-01-01 RX ADMIN — TAMSULOSIN HYDROCHLORIDE 0.8 MG: 0.4 CAPSULE ORAL at 09:32

## 2022-01-01 RX ADMIN — INSULIN LISPRO 8 UNITS: 100 INJECTION, SOLUTION INTRAVENOUS; SUBCUTANEOUS at 08:07

## 2022-01-01 RX ADMIN — Medication: at 09:38

## 2022-01-01 RX ADMIN — INSULIN LISPRO 1 UNITS: 100 INJECTION, SOLUTION INTRAVENOUS; SUBCUTANEOUS at 21:58

## 2022-01-01 RX ADMIN — LEVETIRACETAM 500 MG: 500 TABLET, FILM COATED ORAL at 08:17

## 2022-01-01 RX ADMIN — IPRATROPIUM BROMIDE 2 SPRAY: 42 SPRAY, METERED NASAL at 09:21

## 2022-01-01 RX ADMIN — SALINE NASAL SPRAY 2 SPRAY: 1.5 SOLUTION NASAL at 21:31

## 2022-01-01 RX ADMIN — MAGNESIUM SULFATE HEPTAHYDRATE 4000 MG: 40 INJECTION, SOLUTION INTRAVENOUS at 06:36

## 2022-01-01 RX ADMIN — SODIUM PHOSPHATE, MONOBASIC, MONOHYDRATE 30 MMOL: 276; 142 INJECTION, SOLUTION INTRAVENOUS at 10:10

## 2022-01-01 RX ADMIN — CEFEPIME 2000 MG: 2 INJECTION, POWDER, FOR SOLUTION INTRAVENOUS at 17:02

## 2022-01-01 RX ADMIN — SODIUM CHLORIDE, PRESERVATIVE FREE 10 ML: 5 INJECTION INTRAVENOUS at 09:46

## 2022-01-01 RX ADMIN — LEVETIRACETAM 500 MG: 500 TABLET, FILM COATED ORAL at 21:16

## 2022-01-01 RX ADMIN — ATROPINE SULFATE 1 DROP: 10 SOLUTION/ DROPS OPHTHALMIC at 09:58

## 2022-01-01 RX ADMIN — VALACYCLOVIR HYDROCHLORIDE 500 MG: 500 TABLET, FILM COATED ORAL at 08:23

## 2022-01-01 RX ADMIN — DEXAMETHASONE 4 MG: 4 TABLET ORAL at 17:23

## 2022-01-01 RX ADMIN — Medication: at 21:17

## 2022-01-01 RX ADMIN — HEPARIN SODIUM 5000 UNITS: 5000 INJECTION INTRAVENOUS; SUBCUTANEOUS at 05:40

## 2022-01-01 RX ADMIN — INSULIN LISPRO 4 UNITS: 100 INJECTION, SOLUTION INTRAVENOUS; SUBCUTANEOUS at 20:46

## 2022-01-01 RX ADMIN — SODIUM CHLORIDE, PRESERVATIVE FREE 10 ML: 5 INJECTION INTRAVENOUS at 21:18

## 2022-01-01 RX ADMIN — VORICONAZOLE 200 MG: 200 TABLET ORAL at 08:17

## 2022-01-01 RX ADMIN — IOPAMIDOL 80 ML: 755 INJECTION, SOLUTION INTRAVENOUS at 11:36

## 2022-01-01 RX ADMIN — LEVETIRACETAM 500 MG: 500 TABLET, FILM COATED ORAL at 19:45

## 2022-01-01 RX ADMIN — SALINE NASAL SPRAY 2 SPRAY: 1.5 SOLUTION NASAL at 21:30

## 2022-01-01 RX ADMIN — FLUTICASONE PROPIONATE 1 SPRAY: 50 SPRAY, METERED NASAL at 09:22

## 2022-01-01 RX ADMIN — DEXAMETHASONE SODIUM PHOSPHATE 4 MG: 4 INJECTION, SOLUTION INTRAMUSCULAR; INTRAVENOUS at 03:51

## 2022-01-01 RX ADMIN — IPRATROPIUM BROMIDE 2 SPRAY: 42 SPRAY, METERED NASAL at 20:21

## 2022-01-01 RX ADMIN — INSULIN LISPRO 4 UNITS: 100 INJECTION, SOLUTION INTRAVENOUS; SUBCUTANEOUS at 11:34

## 2022-01-01 RX ADMIN — INSULIN LISPRO 5 UNITS: 100 INJECTION, SOLUTION INTRAVENOUS; SUBCUTANEOUS at 23:51

## 2022-01-01 RX ADMIN — INSULIN LISPRO 4 UNITS: 100 INJECTION, SOLUTION INTRAVENOUS; SUBCUTANEOUS at 18:46

## 2022-01-01 RX ADMIN — DIBASIC SODIUM PHOSPHATE, MONOBASIC POTASSIUM PHOSPHATE AND MONOBASIC SODIUM PHOSPHATE 2 TABLET: 852; 155; 130 TABLET ORAL at 14:40

## 2022-01-01 RX ADMIN — BACITRACIN ZINC AND POLYMYXIN B SULFATE: 500; 10000 OINTMENT TOPICAL at 08:49

## 2022-01-01 RX ADMIN — INSULIN LISPRO 4 UNITS: 100 INJECTION, SOLUTION INTRAVENOUS; SUBCUTANEOUS at 12:09

## 2022-01-01 RX ADMIN — POTASSIUM CHLORIDE AND SODIUM CHLORIDE: 900; 150 INJECTION, SOLUTION INTRAVENOUS at 07:05

## 2022-01-01 RX ADMIN — CYANOCOBALAMIN TAB 1000 MCG 1000 MCG: 1000 TAB at 08:58

## 2022-01-01 RX ADMIN — SODIUM CHLORIDE: 9 INJECTION, SOLUTION INTRAVENOUS at 18:11

## 2022-01-01 RX ADMIN — SODIUM CHLORIDE, PRESERVATIVE FREE 10 ML: 5 INJECTION INTRAVENOUS at 09:21

## 2022-01-01 RX ADMIN — DEXAMETHASONE SODIUM PHOSPHATE 4 MG: 4 INJECTION, SOLUTION INTRAMUSCULAR; INTRAVENOUS at 03:38

## 2022-01-01 RX ADMIN — TAMSULOSIN HYDROCHLORIDE 0.8 MG: 0.4 CAPSULE ORAL at 09:57

## 2022-01-01 RX ADMIN — PIPERACILLIN AND TAZOBACTAM 3375 MG: 3; .375 INJECTION, POWDER, FOR SOLUTION INTRAVENOUS at 06:44

## 2022-01-01 RX ADMIN — DEXAMETHASONE SODIUM PHOSPHATE 4 MG: 4 INJECTION, SOLUTION INTRAMUSCULAR; INTRAVENOUS at 04:29

## 2022-01-01 RX ADMIN — Medication 500 UNITS: at 12:15

## 2022-01-01 RX ADMIN — IPRATROPIUM BROMIDE 2 SPRAY: 42 SPRAY, METERED NASAL at 08:15

## 2022-01-01 RX ADMIN — CYANOCOBALAMIN TAB 1000 MCG 1000 MCG: 1000 TAB at 21:25

## 2022-01-01 RX ADMIN — INSULIN LISPRO 8 UNITS: 100 INJECTION, SOLUTION INTRAVENOUS; SUBCUTANEOUS at 16:51

## 2022-01-01 RX ADMIN — IPRATROPIUM BROMIDE 2 SPRAY: 42 SPRAY, METERED NASAL at 13:07

## 2022-01-01 RX ADMIN — IPRATROPIUM BROMIDE 2 SPRAY: 42 SPRAY, METERED NASAL at 12:26

## 2022-01-01 RX ADMIN — INSULIN LISPRO 4 UNITS: 100 INJECTION, SOLUTION INTRAVENOUS; SUBCUTANEOUS at 20:11

## 2022-01-01 RX ADMIN — INSULIN LISPRO 12 UNITS: 100 INJECTION, SOLUTION INTRAVENOUS; SUBCUTANEOUS at 11:37

## 2022-01-01 RX ADMIN — ASPIRIN 81 MG: 81 TABLET, COATED ORAL at 19:12

## 2022-01-01 RX ADMIN — IPRATROPIUM BROMIDE 2 SPRAY: 42 SPRAY, METERED NASAL at 12:14

## 2022-01-01 RX ADMIN — INSULIN GLARGINE 30 UNITS: 100 INJECTION, SOLUTION SUBCUTANEOUS at 21:01

## 2022-01-01 RX ADMIN — POTASSIUM CHLORIDE 20 MEQ: 20 TABLET, EXTENDED RELEASE ORAL at 08:31

## 2022-01-01 RX ADMIN — DOCUSATE SODIUM 100 MG: 100 CAPSULE, LIQUID FILLED ORAL at 08:03

## 2022-01-01 RX ADMIN — INSULIN GLARGINE 10 UNITS: 100 INJECTION, SOLUTION SUBCUTANEOUS at 21:32

## 2022-01-01 RX ADMIN — PHENYLEPHRINE HYDROCHLORIDE 100 MCG: 10 INJECTION INTRAVENOUS at 19:21

## 2022-01-01 RX ADMIN — DOCUSATE SODIUM 100 MG: 100 CAPSULE, LIQUID FILLED ORAL at 21:06

## 2022-01-01 RX ADMIN — IPRATROPIUM BROMIDE 2 SPRAY: 42 SPRAY, METERED NASAL at 09:52

## 2022-01-01 RX ADMIN — SODIUM CHLORIDE, PRESERVATIVE FREE 10 ML: 5 INJECTION INTRAVENOUS at 09:03

## 2022-01-01 RX ADMIN — VALACYCLOVIR HYDROCHLORIDE 500 MG: 500 TABLET, FILM COATED ORAL at 08:29

## 2022-01-01 RX ADMIN — SODIUM CHLORIDE, PRESERVATIVE FREE 10 ML: 5 INJECTION INTRAVENOUS at 08:44

## 2022-01-01 RX ADMIN — DEXAMETHASONE 4 MG: 4 TABLET ORAL at 04:11

## 2022-01-01 RX ADMIN — DOCUSATE SODIUM 100 MG: 100 CAPSULE, LIQUID FILLED ORAL at 21:31

## 2022-01-01 RX ADMIN — INSULIN LISPRO 4 UNITS: 100 INJECTION, SOLUTION INTRAVENOUS; SUBCUTANEOUS at 14:34

## 2022-01-01 RX ADMIN — INSULIN LISPRO 6 UNITS: 100 INJECTION, SOLUTION INTRAVENOUS; SUBCUTANEOUS at 18:11

## 2022-01-01 RX ADMIN — DIBASIC SODIUM PHOSPHATE, MONOBASIC POTASSIUM PHOSPHATE AND MONOBASIC SODIUM PHOSPHATE 2 TABLET: 852; 155; 130 TABLET ORAL at 09:47

## 2022-01-01 RX ADMIN — CEFEPIME 2000 MG: 2 INJECTION, POWDER, FOR SOLUTION INTRAVENOUS at 08:02

## 2022-01-01 RX ADMIN — INSULIN LISPRO 4 UNITS: 100 INJECTION, SOLUTION INTRAVENOUS; SUBCUTANEOUS at 20:04

## 2022-01-01 RX ADMIN — DIBASIC SODIUM PHOSPHATE, MONOBASIC POTASSIUM PHOSPHATE AND MONOBASIC SODIUM PHOSPHATE 2 TABLET: 852; 155; 130 TABLET ORAL at 20:30

## 2022-01-01 RX ADMIN — MORPHINE SULFATE 4 MG: 4 INJECTION INTRAVENOUS at 21:35

## 2022-01-01 RX ADMIN — DEXAMETHASONE SODIUM PHOSPHATE 4 MG: 4 INJECTION, SOLUTION INTRAMUSCULAR; INTRAVENOUS at 09:19

## 2022-01-01 RX ADMIN — INSULIN LISPRO 10 UNITS: 100 INJECTION, SOLUTION INTRAVENOUS; SUBCUTANEOUS at 09:09

## 2022-01-01 RX ADMIN — MORPHINE SULFATE 4 MG: 4 INJECTION INTRAVENOUS at 16:45

## 2022-01-01 RX ADMIN — POTASSIUM CHLORIDE 20 MEQ: 20 TABLET, EXTENDED RELEASE ORAL at 09:30

## 2022-01-01 RX ADMIN — BACITRACIN ZINC AND POLYMYXIN B SULFATE: 500; 10000 OINTMENT TOPICAL at 07:51

## 2022-01-01 RX ADMIN — SODIUM CHLORIDE 500 MG: 9 INJECTION, SOLUTION INTRAVENOUS at 21:42

## 2022-01-01 RX ADMIN — INSULIN LISPRO 6 UNITS: 100 INJECTION, SOLUTION INTRAVENOUS; SUBCUTANEOUS at 08:54

## 2022-01-01 RX ADMIN — ENOXAPARIN SODIUM 30 MG: 100 INJECTION SUBCUTANEOUS at 19:13

## 2022-01-01 RX ADMIN — PROPOFOL 50 MG: 10 INJECTION, EMULSION INTRAVENOUS at 18:24

## 2022-01-01 RX ADMIN — TAMSULOSIN HYDROCHLORIDE 0.4 MG: 0.4 CAPSULE ORAL at 08:00

## 2022-01-01 RX ADMIN — LEVETIRACETAM 500 MG: 500 TABLET, FILM COATED ORAL at 07:56

## 2022-01-01 RX ADMIN — BACITRACIN ZINC AND POLYMYXIN B SULFATE: 500; 10000 OINTMENT TOPICAL at 20:34

## 2022-01-01 RX ADMIN — DEXAMETHASONE 4 MG: 4 TABLET ORAL at 15:15

## 2022-01-01 RX ADMIN — ENOXAPARIN SODIUM 40 MG: 100 INJECTION SUBCUTANEOUS at 18:30

## 2022-01-01 RX ADMIN — LEVETIRACETAM 500 MG: 500 TABLET, FILM COATED ORAL at 20:18

## 2022-01-01 RX ADMIN — FLUTICASONE PROPIONATE 1 SPRAY: 50 SPRAY, METERED NASAL at 09:44

## 2022-01-01 RX ADMIN — BACITRACIN ZINC AND POLYMYXIN B SULFATE: 500; 10000 OINTMENT TOPICAL at 08:30

## 2022-01-01 RX ADMIN — IPRATROPIUM BROMIDE 2 SPRAY: 42 SPRAY, METERED NASAL at 08:37

## 2022-01-01 RX ADMIN — INSULIN GLARGINE 10 UNITS: 100 INJECTION, SOLUTION SUBCUTANEOUS at 20:35

## 2022-01-01 RX ADMIN — SALINE NASAL SPRAY 2 SPRAY: 1.5 SOLUTION NASAL at 11:35

## 2022-01-01 RX ADMIN — CEFEPIME 2000 MG: 2 INJECTION, POWDER, FOR SOLUTION INTRAVENOUS at 00:54

## 2022-01-01 RX ADMIN — INSULIN LISPRO 5 UNITS: 100 INJECTION, SOLUTION INTRAVENOUS; SUBCUTANEOUS at 20:44

## 2022-01-01 RX ADMIN — FLUTICASONE PROPIONATE 1 SPRAY: 50 SPRAY, METERED NASAL at 09:11

## 2022-01-01 RX ADMIN — DEXAMETHASONE SODIUM PHOSPHATE 10 MG: 4 INJECTION, SOLUTION INTRAMUSCULAR; INTRAVENOUS at 20:01

## 2022-01-01 RX ADMIN — PANTOPRAZOLE SODIUM 40 MG: 40 TABLET, DELAYED RELEASE ORAL at 06:11

## 2022-01-01 RX ADMIN — SALINE NASAL SPRAY 2 SPRAY: 1.5 SOLUTION NASAL at 17:23

## 2022-01-01 RX ADMIN — IPRATROPIUM BROMIDE 2 SPRAY: 42 SPRAY, METERED NASAL at 21:00

## 2022-01-01 RX ADMIN — INSULIN LISPRO 9 UNITS: 100 INJECTION, SOLUTION INTRAVENOUS; SUBCUTANEOUS at 11:55

## 2022-01-01 RX ADMIN — Medication: at 08:56

## 2022-01-01 RX ADMIN — CEFEPIME 2000 MG: 2 INJECTION, POWDER, FOR SOLUTION INTRAVENOUS at 23:34

## 2022-01-01 RX ADMIN — IPRATROPIUM BROMIDE 2 SPRAY: 42 SPRAY, METERED NASAL at 17:15

## 2022-01-01 RX ADMIN — DEXAMETHASONE SODIUM PHOSPHATE 4 MG: 4 INJECTION, SOLUTION INTRAMUSCULAR; INTRAVENOUS at 03:29

## 2022-01-01 RX ADMIN — HEPARIN SODIUM 5000 UNITS: 5000 INJECTION INTRAVENOUS; SUBCUTANEOUS at 14:35

## 2022-01-01 RX ADMIN — Medication: at 09:17

## 2022-01-01 RX ADMIN — IPRATROPIUM BROMIDE 2 SPRAY: 42 SPRAY, METERED NASAL at 08:08

## 2022-01-01 RX ADMIN — POTASSIUM CHLORIDE 20 MEQ: 20 TABLET, EXTENDED RELEASE ORAL at 08:27

## 2022-01-01 RX ADMIN — DEXAMETHASONE 4 MG: 4 TABLET ORAL at 21:29

## 2022-01-01 RX ADMIN — LEVETIRACETAM 500 MG: 100 INJECTION, SOLUTION, CONCENTRATE INTRAVENOUS at 21:01

## 2022-01-01 RX ADMIN — INSULIN LISPRO 6 UNITS: 100 INJECTION, SOLUTION INTRAVENOUS; SUBCUTANEOUS at 16:59

## 2022-01-01 RX ADMIN — VORICONAZOLE 200 MG: 200 TABLET ORAL at 21:25

## 2022-01-01 RX ADMIN — PANTOPRAZOLE SODIUM 40 MG: 40 TABLET, DELAYED RELEASE ORAL at 07:52

## 2022-01-01 RX ADMIN — DEXAMETHASONE SODIUM PHOSPHATE 4 MG: 4 INJECTION, SOLUTION INTRAMUSCULAR; INTRAVENOUS at 18:50

## 2022-01-01 RX ADMIN — INSULIN LISPRO 4 UNITS: 100 INJECTION, SOLUTION INTRAVENOUS; SUBCUTANEOUS at 20:30

## 2022-01-01 RX ADMIN — BACITRACIN ZINC AND POLYMYXIN B SULFATE: 500; 10000 OINTMENT TOPICAL at 09:31

## 2022-01-01 RX ADMIN — SODIUM CHLORIDE, PRESERVATIVE FREE 10 ML: 5 INJECTION INTRAVENOUS at 20:30

## 2022-01-01 RX ADMIN — SODIUM CHLORIDE, PRESERVATIVE FREE 10 ML: 5 INJECTION INTRAVENOUS at 08:50

## 2022-01-01 RX ADMIN — TAMSULOSIN HYDROCHLORIDE 0.4 MG: 0.4 CAPSULE ORAL at 08:58

## 2022-01-01 RX ADMIN — METHYLPREDNISOLONE SODIUM SUCCINATE 60 MG: 125 INJECTION, POWDER, FOR SOLUTION INTRAMUSCULAR; INTRAVENOUS at 22:04

## 2022-01-01 RX ADMIN — LEVETIRACETAM 500 MG: 500 TABLET, FILM COATED ORAL at 08:32

## 2022-01-01 RX ADMIN — SODIUM CHLORIDE, PRESERVATIVE FREE 10 ML: 5 INJECTION INTRAVENOUS at 07:45

## 2022-01-01 RX ADMIN — INSULIN LISPRO 4 UNITS: 100 INJECTION, SOLUTION INTRAVENOUS; SUBCUTANEOUS at 17:46

## 2022-01-01 RX ADMIN — LEVETIRACETAM 500 MG: 500 TABLET, FILM COATED ORAL at 21:31

## 2022-01-01 RX ADMIN — SODIUM PHOSPHATE, MONOBASIC, MONOHYDRATE 30 MMOL: 276; 142 INJECTION, SOLUTION INTRAVENOUS at 09:04

## 2022-01-01 RX ADMIN — LEVETIRACETAM 500 MG: 100 INJECTION, SOLUTION, CONCENTRATE INTRAVENOUS at 21:22

## 2022-01-01 RX ADMIN — INSULIN LISPRO 6 UNITS: 100 INJECTION, SOLUTION INTRAVENOUS; SUBCUTANEOUS at 09:28

## 2022-01-01 RX ADMIN — TAMSULOSIN HYDROCHLORIDE 0.4 MG: 0.4 CAPSULE ORAL at 09:30

## 2022-01-01 RX ADMIN — DIBASIC SODIUM PHOSPHATE, MONOBASIC POTASSIUM PHOSPHATE AND MONOBASIC SODIUM PHOSPHATE 2 TABLET: 852; 155; 130 TABLET ORAL at 08:57

## 2022-01-01 RX ADMIN — INSULIN LISPRO 16 UNITS: 100 INJECTION, SOLUTION INTRAVENOUS; SUBCUTANEOUS at 15:43

## 2022-01-01 RX ADMIN — IPRATROPIUM BROMIDE 2 SPRAY: 42 SPRAY, METERED NASAL at 20:17

## 2022-01-01 RX ADMIN — IPRATROPIUM BROMIDE 2 SPRAY: 42 SPRAY, METERED NASAL at 20:45

## 2022-01-01 RX ADMIN — LEVETIRACETAM 500 MG: 500 TABLET, FILM COATED ORAL at 08:30

## 2022-01-01 RX ADMIN — SODIUM PHOSPHATE, MONOBASIC, MONOHYDRATE 30 MMOL: 276; 142 INJECTION, SOLUTION INTRAVENOUS at 05:44

## 2022-01-01 RX ADMIN — BACITRACIN ZINC AND POLYMYXIN B SULFATE: 500; 10000 OINTMENT TOPICAL at 08:43

## 2022-01-01 RX ADMIN — INSULIN LISPRO 3 UNITS: 100 INJECTION, SOLUTION INTRAVENOUS; SUBCUTANEOUS at 11:12

## 2022-01-01 RX ADMIN — DAPSONE 100 MG: 100 TABLET ORAL at 08:58

## 2022-01-01 RX ADMIN — IPRATROPIUM BROMIDE 2 SPRAY: 42 SPRAY, METERED NASAL at 18:09

## 2022-01-01 RX ADMIN — INSULIN LISPRO 2 UNITS: 100 INJECTION, SOLUTION INTRAVENOUS; SUBCUTANEOUS at 22:30

## 2022-01-01 RX ADMIN — INSULIN LISPRO 9 UNITS: 100 INJECTION, SOLUTION INTRAVENOUS; SUBCUTANEOUS at 11:29

## 2022-01-01 RX ADMIN — TAMSULOSIN HYDROCHLORIDE 0.8 MG: 0.4 CAPSULE ORAL at 07:41

## 2022-01-01 RX ADMIN — MAGNESIUM SULFATE HEPTAHYDRATE 4000 MG: 40 INJECTION, SOLUTION INTRAVENOUS at 04:55

## 2022-01-01 RX ADMIN — ENOXAPARIN SODIUM 40 MG: 100 INJECTION SUBCUTANEOUS at 17:25

## 2022-01-01 RX ADMIN — FLUTICASONE PROPIONATE 1 SPRAY: 50 SPRAY, METERED NASAL at 08:07

## 2022-01-01 RX ADMIN — DEXAMETHASONE SODIUM PHOSPHATE 4 MG: 4 INJECTION, SOLUTION INTRAMUSCULAR; INTRAVENOUS at 09:59

## 2022-01-01 RX ADMIN — DOCUSATE SODIUM 100 MG: 100 CAPSULE, LIQUID FILLED ORAL at 08:36

## 2022-01-01 RX ADMIN — DEXAMETHASONE SODIUM PHOSPHATE 4 MG: 4 INJECTION, SOLUTION INTRAMUSCULAR; INTRAVENOUS at 08:06

## 2022-01-01 RX ADMIN — DEXAMETHASONE 4 MG: 4 TABLET ORAL at 20:59

## 2022-01-01 RX ADMIN — DOCUSATE SODIUM 100 MG: 100 CAPSULE, LIQUID FILLED ORAL at 08:53

## 2022-01-01 RX ADMIN — INSULIN LISPRO 2 UNITS: 100 INJECTION, SOLUTION INTRAVENOUS; SUBCUTANEOUS at 08:06

## 2022-01-01 RX ADMIN — SALINE NASAL SPRAY 2 SPRAY: 1.5 SOLUTION NASAL at 21:28

## 2022-01-01 RX ADMIN — VORICONAZOLE 200 MG: 200 TABLET ORAL at 07:51

## 2022-01-01 RX ADMIN — SODIUM CHLORIDE, PRESERVATIVE FREE 10 ML: 5 INJECTION INTRAVENOUS at 08:43

## 2022-01-01 RX ADMIN — VORICONAZOLE 200 MG: 200 TABLET ORAL at 08:31

## 2022-01-01 RX ADMIN — SODIUM CHLORIDE: 9 INJECTION, SOLUTION INTRAVENOUS at 10:25

## 2022-01-01 RX ADMIN — ENOXAPARIN SODIUM 40 MG: 100 INJECTION SUBCUTANEOUS at 18:58

## 2022-01-01 RX ADMIN — DIBASIC SODIUM PHOSPHATE, MONOBASIC POTASSIUM PHOSPHATE AND MONOBASIC SODIUM PHOSPHATE 2 TABLET: 852; 155; 130 TABLET ORAL at 10:07

## 2022-01-01 RX ADMIN — IPRATROPIUM BROMIDE 2 SPRAY: 42 SPRAY, METERED NASAL at 17:16

## 2022-01-01 RX ADMIN — TAMSULOSIN HYDROCHLORIDE 0.8 MG: 0.4 CAPSULE ORAL at 09:21

## 2022-01-01 RX ADMIN — ENOXAPARIN SODIUM 40 MG: 100 INJECTION SUBCUTANEOUS at 17:20

## 2022-01-01 RX ADMIN — FLUTICASONE PROPIONATE 1 SPRAY: 50 SPRAY, METERED NASAL at 08:41

## 2022-01-01 RX ADMIN — DEXTROSE MONOHYDRATE 100 MG: 50 INJECTION, SOLUTION INTRAVENOUS at 11:07

## 2022-01-01 RX ADMIN — TAMSULOSIN HYDROCHLORIDE 0.8 MG: 0.4 CAPSULE ORAL at 09:27

## 2022-01-01 RX ADMIN — Medication: at 08:17

## 2022-01-01 RX ADMIN — Medication: at 08:37

## 2022-01-01 RX ADMIN — DIBASIC SODIUM PHOSPHATE, MONOBASIC POTASSIUM PHOSPHATE AND MONOBASIC SODIUM PHOSPHATE 2 TABLET: 852; 155; 130 TABLET ORAL at 08:49

## 2022-01-01 RX ADMIN — TAMSULOSIN HYDROCHLORIDE 0.4 MG: 0.4 CAPSULE ORAL at 07:55

## 2022-01-01 RX ADMIN — Medication: at 20:11

## 2022-01-01 RX ADMIN — DEXAMETHASONE 4 MG: 4 TABLET ORAL at 00:11

## 2022-01-01 RX ADMIN — SODIUM CHLORIDE 15 ML: 900 IRRIGANT IRRIGATION at 11:34

## 2022-01-01 RX ADMIN — MORPHINE SULFATE 4 MG: 4 INJECTION INTRAVENOUS at 12:36

## 2022-01-01 RX ADMIN — INSULIN LISPRO 18 UNITS: 100 INJECTION, SOLUTION INTRAVENOUS; SUBCUTANEOUS at 16:29

## 2022-01-01 RX ADMIN — BACITRACIN ZINC AND POLYMYXIN B SULFATE: 500; 10000 OINTMENT TOPICAL at 11:43

## 2022-01-01 RX ADMIN — LEVETIRACETAM 500 MG: 500 TABLET, FILM COATED ORAL at 21:25

## 2022-01-01 RX ADMIN — FLUTICASONE PROPIONATE 1 SPRAY: 50 SPRAY, METERED NASAL at 21:30

## 2022-01-01 RX ADMIN — SODIUM CHLORIDE: 9 INJECTION, SOLUTION INTRAVENOUS at 22:00

## 2022-01-01 RX ADMIN — INSULIN LISPRO 8 UNITS: 100 INJECTION, SOLUTION INTRAVENOUS; SUBCUTANEOUS at 11:45

## 2022-01-01 RX ADMIN — BACITRACIN ZINC AND POLYMYXIN B SULFATE: 500; 10000 OINTMENT TOPICAL at 09:16

## 2022-01-01 RX ADMIN — INSULIN LISPRO 6 UNITS: 100 INJECTION, SOLUTION INTRAVENOUS; SUBCUTANEOUS at 17:05

## 2022-01-01 RX ADMIN — INSULIN LISPRO 3 UNITS: 100 INJECTION, SOLUTION INTRAVENOUS; SUBCUTANEOUS at 11:38

## 2022-01-01 RX ADMIN — INSULIN LISPRO 9 UNITS: 100 INJECTION, SOLUTION INTRAVENOUS; SUBCUTANEOUS at 21:04

## 2022-01-01 RX ADMIN — FLUTICASONE PROPIONATE 1 SPRAY: 50 SPRAY, METERED NASAL at 08:10

## 2022-01-01 RX ADMIN — VALACYCLOVIR HYDROCHLORIDE 500 MG: 500 TABLET, FILM COATED ORAL at 08:58

## 2022-01-01 RX ADMIN — SODIUM CHLORIDE, PRESERVATIVE FREE 10 ML: 5 INJECTION INTRAVENOUS at 20:21

## 2022-01-01 RX ADMIN — PANTOPRAZOLE SODIUM 40 MG: 40 INJECTION, POWDER, FOR SOLUTION INTRAVENOUS at 09:24

## 2022-01-01 RX ADMIN — DIBASIC SODIUM PHOSPHATE, MONOBASIC POTASSIUM PHOSPHATE AND MONOBASIC SODIUM PHOSPHATE 2 TABLET: 852; 155; 130 TABLET ORAL at 20:48

## 2022-01-01 RX ADMIN — CEFEPIME 2000 MG: 2 INJECTION, POWDER, FOR SOLUTION INTRAVENOUS at 08:15

## 2022-01-01 RX ADMIN — INSULIN LISPRO 16 UNITS: 100 INJECTION, SOLUTION INTRAVENOUS; SUBCUTANEOUS at 11:13

## 2022-01-01 RX ADMIN — IPRATROPIUM BROMIDE 2 SPRAY: 42 SPRAY, METERED NASAL at 21:32

## 2022-01-01 RX ADMIN — POTASSIUM CHLORIDE 20 MEQ: 20 TABLET, EXTENDED RELEASE ORAL at 08:17

## 2022-01-01 RX ADMIN — INSULIN LISPRO 3 UNITS: 100 INJECTION, SOLUTION INTRAVENOUS; SUBCUTANEOUS at 20:19

## 2022-01-01 RX ADMIN — MAGNESIUM SULFATE HEPTAHYDRATE 4000 MG: 40 INJECTION, SOLUTION INTRAVENOUS at 06:27

## 2022-01-01 RX ADMIN — ENOXAPARIN SODIUM 40 MG: 100 INJECTION SUBCUTANEOUS at 17:58

## 2022-01-01 RX ADMIN — FLUTICASONE PROPIONATE 1 SPRAY: 50 SPRAY, METERED NASAL at 09:38

## 2022-01-01 RX ADMIN — SODIUM CHLORIDE, PRESERVATIVE FREE 10 ML: 5 INJECTION INTRAVENOUS at 08:54

## 2022-01-01 RX ADMIN — VALACYCLOVIR HYDROCHLORIDE 500 MG: 500 TABLET, FILM COATED ORAL at 08:57

## 2022-01-01 RX ADMIN — IPRATROPIUM BROMIDE 2 SPRAY: 42 SPRAY, METERED NASAL at 08:01

## 2022-01-01 RX ADMIN — HEPARIN SODIUM 5000 UNITS: 5000 INJECTION INTRAVENOUS; SUBCUTANEOUS at 20:57

## 2022-01-01 RX ADMIN — ACETAMINOPHEN 325MG 650 MG: 325 TABLET ORAL at 07:55

## 2022-01-01 RX ADMIN — SALINE NASAL SPRAY 2 SPRAY: 1.5 SOLUTION NASAL at 20:29

## 2022-01-01 RX ADMIN — INSULIN GLARGINE 10 UNITS: 100 INJECTION, SOLUTION SUBCUTANEOUS at 20:21

## 2022-01-01 RX ADMIN — TAMSULOSIN HYDROCHLORIDE 0.4 MG: 0.4 CAPSULE ORAL at 08:30

## 2022-01-01 RX ADMIN — SODIUM CHLORIDE 15 ML: 900 IRRIGANT IRRIGATION at 21:27

## 2022-01-01 RX ADMIN — LEVETIRACETAM 500 MG: 500 TABLET, FILM COATED ORAL at 19:55

## 2022-01-01 RX ADMIN — LEVETIRACETAM 500 MG: 100 INJECTION, SOLUTION, CONCENTRATE INTRAVENOUS at 21:18

## 2022-01-01 RX ADMIN — TAMSULOSIN HYDROCHLORIDE 0.8 MG: 0.4 CAPSULE ORAL at 09:50

## 2022-01-01 RX ADMIN — PANTOPRAZOLE SODIUM 40 MG: 40 INJECTION, POWDER, FOR SOLUTION INTRAVENOUS at 08:10

## 2022-01-01 RX ADMIN — LEVETIRACETAM 500 MG: 500 TABLET, FILM COATED ORAL at 07:51

## 2022-01-01 RX ADMIN — SALINE NASAL SPRAY 2 SPRAY: 1.5 SOLUTION NASAL at 08:18

## 2022-01-01 RX ADMIN — CEFAZOLIN 2000 MG: 2 INJECTION, POWDER, FOR SOLUTION INTRAMUSCULAR; INTRAVENOUS at 02:10

## 2022-01-01 RX ADMIN — DEXAMETHASONE 4 MG: 4 TABLET ORAL at 06:37

## 2022-01-01 RX ADMIN — DEXAMETHASONE SODIUM PHOSPHATE 4 MG: 4 INJECTION, SOLUTION INTRAMUSCULAR; INTRAVENOUS at 15:59

## 2022-01-01 RX ADMIN — SALINE NASAL SPRAY 2 SPRAY: 1.5 SOLUTION NASAL at 15:10

## 2022-01-01 RX ADMIN — DOCUSATE SODIUM 100 MG: 100 CAPSULE, LIQUID FILLED ORAL at 20:28

## 2022-01-01 RX ADMIN — CEFAZOLIN 2000 MG: 2 INJECTION, POWDER, FOR SOLUTION INTRAMUSCULAR; INTRAVENOUS at 01:41

## 2022-01-01 RX ADMIN — IPRATROPIUM BROMIDE 2 SPRAY: 42 SPRAY, METERED NASAL at 17:06

## 2022-01-01 RX ADMIN — SODIUM CHLORIDE, PRESERVATIVE FREE 10 ML: 5 INJECTION INTRAVENOUS at 23:42

## 2022-01-01 RX ADMIN — DIBASIC SODIUM PHOSPHATE, MONOBASIC POTASSIUM PHOSPHATE AND MONOBASIC SODIUM PHOSPHATE 2 TABLET: 852; 155; 130 TABLET ORAL at 08:22

## 2022-01-01 RX ADMIN — ENOXAPARIN SODIUM 40 MG: 100 INJECTION SUBCUTANEOUS at 18:24

## 2022-01-01 RX ADMIN — DEXTROSE MONOHYDRATE 100 MG: 50 INJECTION, SOLUTION INTRAVENOUS at 09:58

## 2022-01-01 RX ADMIN — DEXAMETHASONE SODIUM PHOSPHATE 4 MG: 4 INJECTION, SOLUTION INTRAMUSCULAR; INTRAVENOUS at 21:17

## 2022-01-01 RX ADMIN — FENTANYL CITRATE 50 MCG: 50 INJECTION, SOLUTION INTRAMUSCULAR; INTRAVENOUS at 18:23

## 2022-01-01 RX ADMIN — Medication: at 09:36

## 2022-01-01 RX ADMIN — INSULIN GLARGINE 30 UNITS: 100 INJECTION, SOLUTION SUBCUTANEOUS at 20:44

## 2022-01-01 RX ADMIN — SODIUM CHLORIDE, PRESERVATIVE FREE 10 ML: 5 INJECTION INTRAVENOUS at 09:39

## 2022-01-01 RX ADMIN — IPRATROPIUM BROMIDE 2 SPRAY: 42 SPRAY, METERED NASAL at 09:58

## 2022-01-01 RX ADMIN — IPRATROPIUM BROMIDE 2 SPRAY: 42 SPRAY, METERED NASAL at 20:51

## 2022-01-01 RX ADMIN — LEVETIRACETAM 500 MG: 100 INJECTION, SOLUTION, CONCENTRATE INTRAVENOUS at 00:12

## 2022-01-01 RX ADMIN — INSULIN LISPRO 3 UNITS: 100 INJECTION, SOLUTION INTRAVENOUS; SUBCUTANEOUS at 11:43

## 2022-01-01 RX ADMIN — DOCUSATE SODIUM 100 MG: 100 CAPSULE, LIQUID FILLED ORAL at 21:30

## 2022-01-01 RX ADMIN — SODIUM CHLORIDE, PRESERVATIVE FREE 10 ML: 5 INJECTION INTRAVENOUS at 22:05

## 2022-01-01 RX ADMIN — LEVETIRACETAM 500 MG: 500 TABLET, FILM COATED ORAL at 21:30

## 2022-01-01 RX ADMIN — TAMSULOSIN HYDROCHLORIDE 0.8 MG: 0.4 CAPSULE ORAL at 10:14

## 2022-01-01 RX ADMIN — CEFEPIME 2000 MG: 2 INJECTION, POWDER, FOR SOLUTION INTRAVENOUS at 08:41

## 2022-01-01 RX ADMIN — CYANOCOBALAMIN TAB 1000 MCG 1000 MCG: 1000 TAB at 10:17

## 2022-01-01 RX ADMIN — VORICONAZOLE 200 MG: 200 TABLET ORAL at 20:30

## 2022-01-01 RX ADMIN — VALACYCLOVIR HYDROCHLORIDE 500 MG: 500 TABLET, FILM COATED ORAL at 07:55

## 2022-01-01 RX ADMIN — IOPAMIDOL 75 ML: 755 INJECTION, SOLUTION INTRAVENOUS at 15:43

## 2022-01-01 RX ADMIN — IPRATROPIUM BROMIDE 2 SPRAY: 42 SPRAY, METERED NASAL at 08:52

## 2022-01-01 RX ADMIN — PANTOPRAZOLE SODIUM 40 MG: 40 INJECTION, POWDER, FOR SOLUTION INTRAVENOUS at 09:03

## 2022-01-01 RX ADMIN — VORICONAZOLE 200 MG: 200 TABLET ORAL at 08:32

## 2022-01-01 RX ADMIN — INSULIN LISPRO 4 UNITS: 100 INJECTION, SOLUTION INTRAVENOUS; SUBCUTANEOUS at 08:15

## 2022-01-01 RX ADMIN — FLUTICASONE PROPIONATE 1 SPRAY: 50 SPRAY, METERED NASAL at 10:34

## 2022-01-01 RX ADMIN — INSULIN LISPRO 6 UNITS: 100 INJECTION, SOLUTION INTRAVENOUS; SUBCUTANEOUS at 21:30

## 2022-01-01 RX ADMIN — DEXAMETHASONE SODIUM PHOSPHATE 4 MG: 4 INJECTION, SOLUTION INTRAMUSCULAR; INTRAVENOUS at 21:19

## 2022-01-01 RX ADMIN — PANTOPRAZOLE SODIUM 40 MG: 40 INJECTION, POWDER, FOR SOLUTION INTRAVENOUS at 10:14

## 2022-01-01 RX ADMIN — INSULIN LISPRO 12 UNITS: 100 INJECTION, SOLUTION INTRAVENOUS; SUBCUTANEOUS at 16:44

## 2022-01-01 RX ADMIN — INSULIN LISPRO 18 UNITS: 100 INJECTION, SOLUTION INTRAVENOUS; SUBCUTANEOUS at 16:44

## 2022-01-01 RX ADMIN — PANTOPRAZOLE SODIUM 40 MG: 40 INJECTION, POWDER, FOR SOLUTION INTRAVENOUS at 09:44

## 2022-01-01 RX ADMIN — Medication: at 09:45

## 2022-01-01 RX ADMIN — BACITRACIN ZINC AND POLYMYXIN B SULFATE: 500; 10000 OINTMENT TOPICAL at 08:20

## 2022-01-01 RX ADMIN — DEXAMETHASONE SODIUM PHOSPHATE 4 MG: 4 INJECTION, SOLUTION INTRAMUSCULAR; INTRAVENOUS at 02:59

## 2022-01-01 RX ADMIN — IPRATROPIUM BROMIDE 2 SPRAY: 42 SPRAY, METERED NASAL at 09:33

## 2022-01-01 RX ADMIN — FLUTICASONE PROPIONATE 1 SPRAY: 50 SPRAY, METERED NASAL at 08:52

## 2022-01-01 RX ADMIN — IPRATROPIUM BROMIDE 2 SPRAY: 42 SPRAY, METERED NASAL at 16:53

## 2022-01-01 RX ADMIN — PANTOPRAZOLE SODIUM 40 MG: 40 TABLET, DELAYED RELEASE ORAL at 05:49

## 2022-01-01 RX ADMIN — LEVETIRACETAM 500 MG: 100 INJECTION, SOLUTION, CONCENTRATE INTRAVENOUS at 08:11

## 2022-01-01 RX ADMIN — Medication: at 20:40

## 2022-01-01 RX ADMIN — PIPERACILLIN AND TAZOBACTAM 4500 MG: 4; .5 INJECTION, POWDER, FOR SOLUTION INTRAVENOUS at 09:35

## 2022-01-01 RX ADMIN — IPRATROPIUM BROMIDE 2 SPRAY: 42 SPRAY, METERED NASAL at 13:00

## 2022-01-01 RX ADMIN — FLUTICASONE PROPIONATE 1 SPRAY: 50 SPRAY, METERED NASAL at 08:42

## 2022-01-01 RX ADMIN — LEVETIRACETAM 500 MG: 500 TABLET, FILM COATED ORAL at 20:28

## 2022-01-01 RX ADMIN — DIBASIC SODIUM PHOSPHATE, MONOBASIC POTASSIUM PHOSPHATE AND MONOBASIC SODIUM PHOSPHATE 2 TABLET: 852; 155; 130 TABLET ORAL at 15:55

## 2022-01-01 RX ADMIN — DOCUSATE SODIUM 100 MG: 100 CAPSULE, LIQUID FILLED ORAL at 21:25

## 2022-01-01 RX ADMIN — PANTOPRAZOLE SODIUM 40 MG: 40 INJECTION, POWDER, FOR SOLUTION INTRAVENOUS at 08:14

## 2022-01-01 RX ADMIN — SODIUM CHLORIDE, PRESERVATIVE FREE 10 ML: 5 INJECTION INTRAVENOUS at 19:47

## 2022-01-01 RX ADMIN — Medication: at 08:43

## 2022-01-01 RX ADMIN — VALACYCLOVIR HYDROCHLORIDE 500 MG: 500 TABLET, FILM COATED ORAL at 20:39

## 2022-01-01 RX ADMIN — VALACYCLOVIR HYDROCHLORIDE 500 MG: 500 TABLET, FILM COATED ORAL at 08:32

## 2022-01-01 RX ADMIN — DIBASIC SODIUM PHOSPHATE, MONOBASIC POTASSIUM PHOSPHATE AND MONOBASIC SODIUM PHOSPHATE 2 TABLET: 852; 155; 130 TABLET ORAL at 07:55

## 2022-01-01 RX ADMIN — IOPAMIDOL 75 ML: 755 INJECTION, SOLUTION INTRAVENOUS at 10:46

## 2022-01-01 RX ADMIN — INSULIN LISPRO 12 UNITS: 100 INJECTION, SOLUTION INTRAVENOUS; SUBCUTANEOUS at 11:41

## 2022-01-01 RX ADMIN — SALINE NASAL SPRAY 2 SPRAY: 1.5 SOLUTION NASAL at 09:12

## 2022-01-01 RX ADMIN — DEXAMETHASONE 4 MG: 4 TABLET ORAL at 00:17

## 2022-01-01 RX ADMIN — INSULIN GLARGINE 5 UNITS: 100 INJECTION, SOLUTION SUBCUTANEOUS at 20:28

## 2022-01-01 RX ADMIN — DAPSONE 100 MG: 100 TABLET ORAL at 07:51

## 2022-01-01 RX ADMIN — CYANOCOBALAMIN TAB 1000 MCG 1000 MCG: 1000 TAB at 21:30

## 2022-01-01 RX ADMIN — LEVETIRACETAM 500 MG: 500 TABLET, FILM COATED ORAL at 07:41

## 2022-01-01 RX ADMIN — IPRATROPIUM BROMIDE 2 SPRAY: 42 SPRAY, METERED NASAL at 08:42

## 2022-01-01 RX ADMIN — SODIUM CHLORIDE, PRESERVATIVE FREE 10 ML: 5 INJECTION INTRAVENOUS at 21:09

## 2022-01-01 RX ADMIN — LEVETIRACETAM 500 MG: 500 TABLET, FILM COATED ORAL at 20:30

## 2022-01-01 RX ADMIN — FENTANYL CITRATE 50 MCG: 50 INJECTION, SOLUTION INTRAMUSCULAR; INTRAVENOUS at 17:47

## 2022-01-01 RX ADMIN — CEFAZOLIN 2000 MG: 2 INJECTION, POWDER, FOR SOLUTION INTRAMUSCULAR; INTRAVENOUS at 09:37

## 2022-01-01 RX ADMIN — SALINE NASAL SPRAY 2 SPRAY: 1.5 SOLUTION NASAL at 08:25

## 2022-01-01 RX ADMIN — DEXAMETHASONE 4 MG: 4 TABLET ORAL at 20:36

## 2022-01-01 RX ADMIN — VALACYCLOVIR HYDROCHLORIDE 500 MG: 500 TABLET, FILM COATED ORAL at 20:21

## 2022-01-01 RX ADMIN — PANTOPRAZOLE SODIUM 40 MG: 40 TABLET, DELAYED RELEASE ORAL at 06:22

## 2022-01-01 RX ADMIN — ALBUTEROL SULFATE 2.5 MG: 2.5 SOLUTION RESPIRATORY (INHALATION) at 14:09

## 2022-01-01 RX ADMIN — SODIUM CHLORIDE, PRESERVATIVE FREE 10 ML: 5 INJECTION INTRAVENOUS at 09:23

## 2022-01-01 RX ADMIN — IPRATROPIUM BROMIDE 2 SPRAY: 42 SPRAY, METERED NASAL at 09:28

## 2022-01-01 RX ADMIN — Medication: at 20:37

## 2022-01-01 RX ADMIN — SODIUM CHLORIDE, PRESERVATIVE FREE 10 ML: 5 INJECTION INTRAVENOUS at 08:17

## 2022-01-01 RX ADMIN — FLUTICASONE PROPIONATE 1 SPRAY: 50 SPRAY, METERED NASAL at 09:32

## 2022-01-01 RX ADMIN — INSULIN LISPRO 10 UNITS: 100 INJECTION, SOLUTION INTRAVENOUS; SUBCUTANEOUS at 17:26

## 2022-01-01 RX ADMIN — CEFEPIME 2000 MG: 2 INJECTION, POWDER, FOR SOLUTION INTRAVENOUS at 15:37

## 2022-01-01 RX ADMIN — LIDOCAINE HYDROCHLORIDE 50 MG: 20 INJECTION, SOLUTION INTRAVENOUS at 18:23

## 2022-01-01 RX ADMIN — CEFAZOLIN SODIUM 2000 MG: 1 POWDER, FOR SOLUTION INTRAMUSCULAR; INTRAVENOUS at 18:25

## 2022-01-01 RX ADMIN — SODIUM CHLORIDE, PRESERVATIVE FREE 10 ML: 5 INJECTION INTRAVENOUS at 08:36

## 2022-01-01 RX ADMIN — IOPAMIDOL 75 ML: 755 INJECTION, SOLUTION INTRAVENOUS at 20:46

## 2022-01-01 RX ADMIN — CYANOCOBALAMIN TAB 1000 MCG 1000 MCG: 1000 TAB at 21:31

## 2022-01-01 RX ADMIN — DEXAMETHASONE 2 MG: 4 TABLET ORAL at 09:42

## 2022-01-01 RX ADMIN — SODIUM CHLORIDE, PRESERVATIVE FREE 10 ML: 5 INJECTION INTRAVENOUS at 19:57

## 2022-01-01 RX ADMIN — DOCUSATE SODIUM 100 MG: 100 CAPSULE, LIQUID FILLED ORAL at 09:28

## 2022-01-01 RX ADMIN — LEVETIRACETAM 500 MG: 500 TABLET, FILM COATED ORAL at 20:59

## 2022-01-01 RX ADMIN — HEPARIN SODIUM 5000 UNITS: 5000 INJECTION INTRAVENOUS; SUBCUTANEOUS at 06:41

## 2022-01-01 RX ADMIN — SUGAMMADEX 200 MG: 100 INJECTION, SOLUTION INTRAVENOUS at 20:01

## 2022-01-01 RX ADMIN — TAMSULOSIN HYDROCHLORIDE 0.8 MG: 0.4 CAPSULE ORAL at 09:43

## 2022-01-01 RX ADMIN — MORPHINE SULFATE 4 MG: 4 INJECTION INTRAVENOUS at 06:16

## 2022-01-01 RX ADMIN — SALINE NASAL SPRAY 2 SPRAY: 1.5 SOLUTION NASAL at 17:11

## 2022-01-01 RX ADMIN — VALACYCLOVIR HYDROCHLORIDE 500 MG: 500 TABLET, FILM COATED ORAL at 20:55

## 2022-01-01 RX ADMIN — INSULIN LISPRO 2 UNITS: 100 INJECTION, SOLUTION INTRAVENOUS; SUBCUTANEOUS at 12:51

## 2022-01-01 RX ADMIN — SALINE NASAL SPRAY 2 SPRAY: 1.5 SOLUTION NASAL at 17:22

## 2022-01-01 RX ADMIN — VORICONAZOLE 200 MG: 200 TABLET ORAL at 08:57

## 2022-01-01 RX ADMIN — IPRATROPIUM BROMIDE 2 SPRAY: 42 SPRAY, METERED NASAL at 08:19

## 2022-01-01 RX ADMIN — DIBASIC SODIUM PHOSPHATE, MONOBASIC POTASSIUM PHOSPHATE AND MONOBASIC SODIUM PHOSPHATE 2 TABLET: 852; 155; 130 TABLET ORAL at 20:33

## 2022-01-01 RX ADMIN — SODIUM CHLORIDE, PRESERVATIVE FREE 10 ML: 5 INJECTION INTRAVENOUS at 08:06

## 2022-01-01 RX ADMIN — IPRATROPIUM BROMIDE 2 SPRAY: 42 SPRAY, METERED NASAL at 11:39

## 2022-01-01 RX ADMIN — TAMSULOSIN HYDROCHLORIDE 0.8 MG: 0.4 CAPSULE ORAL at 08:08

## 2022-01-01 RX ADMIN — VALACYCLOVIR HYDROCHLORIDE 500 MG: 500 TABLET, FILM COATED ORAL at 09:30

## 2022-01-01 RX ADMIN — VALACYCLOVIR HYDROCHLORIDE 500 MG: 500 TABLET, FILM COATED ORAL at 21:30

## 2022-01-01 RX ADMIN — BACITRACIN ZINC AND POLYMYXIN B SULFATE: 500; 10000 OINTMENT TOPICAL at 08:53

## 2022-01-01 RX ADMIN — LEVETIRACETAM 500 MG: 100 INJECTION, SOLUTION, CONCENTRATE INTRAVENOUS at 08:07

## 2022-01-01 RX ADMIN — ENOXAPARIN SODIUM 40 MG: 100 INJECTION SUBCUTANEOUS at 17:40

## 2022-01-01 RX ADMIN — SALINE NASAL SPRAY 2 SPRAY: 1.5 SOLUTION NASAL at 08:24

## 2022-01-01 RX ADMIN — LEVETIRACETAM 500 MG: 500 TABLET, FILM COATED ORAL at 20:51

## 2022-01-01 RX ADMIN — BACITRACIN ZINC AND POLYMYXIN B SULFATE: 500; 10000 OINTMENT TOPICAL at 20:59

## 2022-01-01 RX ADMIN — DOCUSATE SODIUM 100 MG: 100 CAPSULE, LIQUID FILLED ORAL at 07:41

## 2022-01-01 RX ADMIN — TAMSULOSIN HYDROCHLORIDE 0.4 MG: 0.4 CAPSULE ORAL at 08:29

## 2022-01-01 RX ADMIN — SODIUM CHLORIDE, PRESERVATIVE FREE 10 ML: 5 INJECTION INTRAVENOUS at 20:17

## 2022-01-01 RX ADMIN — TAMSULOSIN HYDROCHLORIDE 0.8 MG: 0.4 CAPSULE ORAL at 08:02

## 2022-01-01 RX ADMIN — CEFEPIME 2000 MG: 2 INJECTION, POWDER, FOR SOLUTION INTRAVENOUS at 23:50

## 2022-01-01 RX ADMIN — ACETAMINOPHEN 325MG 650 MG: 325 TABLET ORAL at 23:34

## 2022-01-01 RX ADMIN — INSULIN LISPRO 6 UNITS: 100 INJECTION, SOLUTION INTRAVENOUS; SUBCUTANEOUS at 10:12

## 2022-01-01 RX ADMIN — VORICONAZOLE 200 MG: 200 TABLET ORAL at 22:04

## 2022-01-01 RX ADMIN — SODIUM CHLORIDE, PRESERVATIVE FREE 10 ML: 5 INJECTION INTRAVENOUS at 21:28

## 2022-01-01 RX ADMIN — INSULIN LISPRO 8 UNITS: 100 INJECTION, SOLUTION INTRAVENOUS; SUBCUTANEOUS at 08:24

## 2022-01-01 RX ADMIN — DOCUSATE SODIUM 100 MG: 100 CAPSULE, LIQUID FILLED ORAL at 08:23

## 2022-01-01 RX ADMIN — SODIUM CHLORIDE: 9 INJECTION, SOLUTION INTRAVENOUS at 04:38

## 2022-01-01 RX ADMIN — DIBASIC SODIUM PHOSPHATE, MONOBASIC POTASSIUM PHOSPHATE AND MONOBASIC SODIUM PHOSPHATE 2 TABLET: 852; 155; 130 TABLET ORAL at 20:39

## 2022-01-01 RX ADMIN — DEXAMETHASONE SODIUM PHOSPHATE 4 MG: 4 INJECTION, SOLUTION INTRAMUSCULAR; INTRAVENOUS at 21:23

## 2022-01-01 RX ADMIN — TAMSULOSIN HYDROCHLORIDE 0.4 MG: 0.4 CAPSULE ORAL at 08:49

## 2022-01-01 RX ADMIN — LEVETIRACETAM 500 MG: 500 TABLET, FILM COATED ORAL at 08:05

## 2022-01-01 RX ADMIN — DIBASIC SODIUM PHOSPHATE, MONOBASIC POTASSIUM PHOSPHATE AND MONOBASIC SODIUM PHOSPHATE 2 TABLET: 852; 155; 130 TABLET ORAL at 08:32

## 2022-01-01 RX ADMIN — MORPHINE SULFATE 2 MG: 2 INJECTION, SOLUTION INTRAMUSCULAR; INTRAVENOUS at 09:56

## 2022-01-01 RX ADMIN — SALINE NASAL SPRAY 2 SPRAY: 1.5 SOLUTION NASAL at 14:38

## 2022-01-01 RX ADMIN — DIBASIC SODIUM PHOSPHATE, MONOBASIC POTASSIUM PHOSPHATE AND MONOBASIC SODIUM PHOSPHATE 2 TABLET: 852; 155; 130 TABLET ORAL at 20:28

## 2022-01-01 RX ADMIN — LEVETIRACETAM 500 MG: 500 TABLET, FILM COATED ORAL at 22:04

## 2022-01-01 RX ADMIN — DEXAMETHASONE 4 MG: 4 TABLET ORAL at 14:15

## 2022-01-01 RX ADMIN — IPRATROPIUM BROMIDE 2 SPRAY: 42 SPRAY, METERED NASAL at 11:59

## 2022-01-01 RX ADMIN — MORPHINE SULFATE 4 MG: 4 INJECTION INTRAVENOUS at 14:14

## 2022-01-01 RX ADMIN — LEVETIRACETAM 500 MG: 100 INJECTION, SOLUTION, CONCENTRATE INTRAVENOUS at 09:58

## 2022-01-01 RX ADMIN — PANTOPRAZOLE SODIUM 40 MG: 40 TABLET, DELAYED RELEASE ORAL at 07:07

## 2022-01-01 RX ADMIN — INSULIN LISPRO 12 UNITS: 100 INJECTION, SOLUTION INTRAVENOUS; SUBCUTANEOUS at 16:25

## 2022-01-01 RX ADMIN — INSULIN LISPRO 18 UNITS: 100 INJECTION, SOLUTION INTRAVENOUS; SUBCUTANEOUS at 16:37

## 2022-01-01 RX ADMIN — INSULIN LISPRO 9 UNITS: 100 INJECTION, SOLUTION INTRAVENOUS; SUBCUTANEOUS at 07:31

## 2022-01-01 RX ADMIN — INSULIN GLARGINE 10 UNITS: 100 INJECTION, SOLUTION SUBCUTANEOUS at 20:48

## 2022-01-01 RX ADMIN — Medication: at 20:43

## 2022-01-01 RX ADMIN — INSULIN LISPRO 6 UNITS: 100 INJECTION, SOLUTION INTRAVENOUS; SUBCUTANEOUS at 17:12

## 2022-01-01 RX ADMIN — PANTOPRAZOLE SODIUM 40 MG: 40 TABLET, DELAYED RELEASE ORAL at 07:56

## 2022-01-01 RX ADMIN — DAPSONE 100 MG: 100 TABLET ORAL at 08:29

## 2022-01-01 RX ADMIN — SODIUM CHLORIDE: 9 INJECTION, SOLUTION INTRAVENOUS at 09:57

## 2022-01-01 RX ADMIN — INSULIN LISPRO 12 UNITS: 100 INJECTION, SOLUTION INTRAVENOUS; SUBCUTANEOUS at 11:33

## 2022-01-01 RX ADMIN — TAMSULOSIN HYDROCHLORIDE 0.8 MG: 0.4 CAPSULE ORAL at 08:15

## 2022-01-01 RX ADMIN — DEXAMETHASONE 2 MG: 4 TABLET ORAL at 19:12

## 2022-01-01 RX ADMIN — TAMSULOSIN HYDROCHLORIDE 0.4 MG: 0.4 CAPSULE ORAL at 19:12

## 2022-01-01 RX ADMIN — INSULIN LISPRO 8 UNITS: 100 INJECTION, SOLUTION INTRAVENOUS; SUBCUTANEOUS at 16:08

## 2022-01-01 RX ADMIN — DEXAMETHASONE 4 MG: 4 TABLET ORAL at 18:03

## 2022-01-01 RX ADMIN — SODIUM CHLORIDE: 9 INJECTION, SOLUTION INTRAVENOUS at 04:30

## 2022-01-01 RX ADMIN — VALACYCLOVIR HYDROCHLORIDE 500 MG: 500 TABLET, FILM COATED ORAL at 07:51

## 2022-01-01 RX ADMIN — IPRATROPIUM BROMIDE 2 SPRAY: 42 SPRAY, METERED NASAL at 22:12

## 2022-01-01 RX ADMIN — HEPARIN SODIUM 5000 UNITS: 5000 INJECTION INTRAVENOUS; SUBCUTANEOUS at 21:23

## 2022-01-01 RX ADMIN — SODIUM CHLORIDE, PRESERVATIVE FREE 10 ML: 5 INJECTION INTRAVENOUS at 08:29

## 2022-01-01 RX ADMIN — BACITRACIN ZINC AND POLYMYXIN B SULFATE: 500; 10000 OINTMENT TOPICAL at 20:29

## 2022-01-01 RX ADMIN — INSULIN LISPRO 16 UNITS: 100 INJECTION, SOLUTION INTRAVENOUS; SUBCUTANEOUS at 17:03

## 2022-01-01 RX ADMIN — TAMSULOSIN HYDROCHLORIDE 0.4 MG: 0.4 CAPSULE ORAL at 08:23

## 2022-01-01 RX ADMIN — INSULIN LISPRO 3 UNITS: 100 INJECTION, SOLUTION INTRAVENOUS; SUBCUTANEOUS at 11:30

## 2022-01-01 RX ADMIN — CEFEPIME 2000 MG: 2 INJECTION, POWDER, FOR SOLUTION INTRAVENOUS at 16:16

## 2022-01-01 RX ADMIN — DEXAMETHASONE SODIUM PHOSPHATE 4 MG: 4 INJECTION, SOLUTION INTRAMUSCULAR; INTRAVENOUS at 09:03

## 2022-01-01 RX ADMIN — SALINE NASAL SPRAY 2 SPRAY: 1.5 SOLUTION NASAL at 08:28

## 2022-01-01 RX ADMIN — INSULIN LISPRO 3 UNITS: 100 INJECTION, SOLUTION INTRAVENOUS; SUBCUTANEOUS at 21:00

## 2022-01-01 RX ADMIN — CYANOCOBALAMIN TAB 1000 MCG 1000 MCG: 1000 TAB at 08:29

## 2022-01-01 RX ADMIN — MORPHINE SULFATE 4 MG: 4 INJECTION INTRAVENOUS at 15:04

## 2022-01-01 RX ADMIN — INSULIN LISPRO 6 UNITS: 100 INJECTION, SOLUTION INTRAVENOUS; SUBCUTANEOUS at 11:54

## 2022-01-01 RX ADMIN — INSULIN LISPRO 8 UNITS: 100 INJECTION, SOLUTION INTRAVENOUS; SUBCUTANEOUS at 13:00

## 2022-01-01 RX ADMIN — LEVETIRACETAM 1000 MG: 100 INJECTION, SOLUTION, CONCENTRATE INTRAVENOUS at 09:18

## 2022-01-01 RX ADMIN — DOCUSATE SODIUM 100 MG: 100 CAPSULE, LIQUID FILLED ORAL at 07:56

## 2022-01-01 RX ADMIN — LEVETIRACETAM 500 MG: 100 INJECTION, SOLUTION, CONCENTRATE INTRAVENOUS at 09:08

## 2022-01-01 RX ADMIN — FLUTICASONE PROPIONATE 1 SPRAY: 50 SPRAY, METERED NASAL at 08:01

## 2022-01-01 RX ADMIN — Medication: at 21:31

## 2022-01-01 RX ADMIN — IPRATROPIUM BROMIDE 2 SPRAY: 42 SPRAY, METERED NASAL at 08:07

## 2022-01-01 RX ADMIN — BACITRACIN ZINC AND POLYMYXIN B SULFATE: 500; 10000 OINTMENT TOPICAL at 21:32

## 2022-01-01 RX ADMIN — PANTOPRAZOLE SODIUM 40 MG: 40 INJECTION, POWDER, FOR SOLUTION INTRAVENOUS at 09:32

## 2022-01-01 RX ADMIN — SODIUM CHLORIDE, PRESERVATIVE FREE 10 ML: 5 INJECTION INTRAVENOUS at 20:40

## 2022-01-01 RX ADMIN — INSULIN LISPRO 8 UNITS: 100 INJECTION, SOLUTION INTRAVENOUS; SUBCUTANEOUS at 16:52

## 2022-01-01 RX ADMIN — PIPERACILLIN AND TAZOBACTAM 3375 MG: 3; .375 INJECTION, POWDER, FOR SOLUTION INTRAVENOUS at 07:08

## 2022-01-01 RX ADMIN — CEFEPIME 2000 MG: 2 INJECTION, POWDER, FOR SOLUTION INTRAVENOUS at 16:26

## 2022-01-01 RX ADMIN — SODIUM CHLORIDE, PRESERVATIVE FREE 10 ML: 5 INJECTION INTRAVENOUS at 09:20

## 2022-01-01 RX ADMIN — LEVETIRACETAM 500 MG: 500 TABLET, FILM COATED ORAL at 09:42

## 2022-01-01 RX ADMIN — Medication: at 20:59

## 2022-01-01 RX ADMIN — LEVETIRACETAM 500 MG: 500 TABLET, FILM COATED ORAL at 20:12

## 2022-01-01 RX ADMIN — DEXAMETHASONE 4 MG: 4 TABLET ORAL at 13:29

## 2022-01-01 RX ADMIN — TAMSULOSIN HYDROCHLORIDE 0.4 MG: 0.4 CAPSULE ORAL at 08:05

## 2022-01-01 RX ADMIN — ENOXAPARIN SODIUM 30 MG: 100 INJECTION SUBCUTANEOUS at 17:31

## 2022-01-01 RX ADMIN — MORPHINE SULFATE 2 MG: 2 INJECTION, SOLUTION INTRAMUSCULAR; INTRAVENOUS at 07:33

## 2022-01-01 RX ADMIN — SODIUM CHLORIDE, PRESERVATIVE FREE 10 ML: 5 INJECTION INTRAVENOUS at 10:26

## 2022-01-01 RX ADMIN — SODIUM CHLORIDE 500 MG: 9 INJECTION, SOLUTION INTRAVENOUS at 09:12

## 2022-01-01 RX ADMIN — INSULIN LISPRO 12 UNITS: 100 INJECTION, SOLUTION INTRAVENOUS; SUBCUTANEOUS at 16:54

## 2022-01-01 RX ADMIN — DEXAMETHASONE 4 MG: 4 TABLET ORAL at 07:01

## 2022-01-01 RX ADMIN — LEVETIRACETAM 500 MG: 500 TABLET, FILM COATED ORAL at 08:06

## 2022-01-01 RX ADMIN — ENOXAPARIN SODIUM 30 MG: 100 INJECTION SUBCUTANEOUS at 17:47

## 2022-01-01 RX ADMIN — SODIUM PHOSPHATE, MONOBASIC, MONOHYDRATE 30 MMOL: 276; 142 INJECTION, SOLUTION INTRAVENOUS at 07:07

## 2022-01-01 RX ADMIN — SODIUM PHOSPHATE, MONOBASIC, MONOHYDRATE 20 MMOL: 276; 142 INJECTION, SOLUTION INTRAVENOUS at 12:39

## 2022-01-01 RX ADMIN — DEXAMETHASONE SODIUM PHOSPHATE 4 MG: 4 INJECTION, SOLUTION INTRAMUSCULAR; INTRAVENOUS at 09:21

## 2022-01-01 RX ADMIN — SODIUM CHLORIDE: 9 INJECTION, SOLUTION INTRAVENOUS at 15:32

## 2022-01-01 RX ADMIN — INSULIN LISPRO 16 UNITS: 100 INJECTION, SOLUTION INTRAVENOUS; SUBCUTANEOUS at 16:40

## 2022-01-01 RX ADMIN — CEFAZOLIN SODIUM 1000 MG: 1 POWDER, FOR SOLUTION INTRAMUSCULAR; INTRAVENOUS at 18:37

## 2022-01-01 RX ADMIN — INSULIN LISPRO 8 UNITS: 100 INJECTION, SOLUTION INTRAVENOUS; SUBCUTANEOUS at 12:15

## 2022-01-01 RX ADMIN — LEVETIRACETAM 500 MG: 500 TABLET, FILM COATED ORAL at 09:24

## 2022-01-01 RX ADMIN — INSULIN LISPRO 6 UNITS: 100 INJECTION, SOLUTION INTRAVENOUS; SUBCUTANEOUS at 08:43

## 2022-01-01 RX ADMIN — FLUTICASONE PROPIONATE 1 SPRAY: 50 SPRAY, METERED NASAL at 09:12

## 2022-01-01 RX ADMIN — MORPHINE SULFATE 4 MG: 4 INJECTION INTRAVENOUS at 03:13

## 2022-01-01 RX ADMIN — LEVETIRACETAM 500 MG: 500 TABLET, FILM COATED ORAL at 08:23

## 2022-01-01 RX ADMIN — PREDNISONE 10 MG: 10 TABLET ORAL at 08:05

## 2022-01-01 RX ADMIN — TAMSULOSIN HYDROCHLORIDE 0.8 MG: 0.4 CAPSULE ORAL at 09:24

## 2022-01-01 RX ADMIN — IPRATROPIUM BROMIDE 2 SPRAY: 42 SPRAY, METERED NASAL at 11:34

## 2022-01-01 RX ADMIN — LEVETIRACETAM 500 MG: 100 INJECTION, SOLUTION, CONCENTRATE INTRAVENOUS at 12:06

## 2022-01-01 RX ADMIN — SODIUM CHLORIDE, PRESERVATIVE FREE 10 ML: 5 INJECTION INTRAVENOUS at 10:22

## 2022-01-01 RX ADMIN — DIBASIC SODIUM PHOSPHATE, MONOBASIC POTASSIUM PHOSPHATE AND MONOBASIC SODIUM PHOSPHATE 2 TABLET: 852; 155; 130 TABLET ORAL at 14:31

## 2022-01-01 RX ADMIN — POTASSIUM CHLORIDE 20 MEQ: 20 TABLET, EXTENDED RELEASE ORAL at 08:58

## 2022-01-01 RX ADMIN — DEXAMETHASONE 4 MG: 4 TABLET ORAL at 23:56

## 2022-01-01 RX ADMIN — INSULIN LISPRO 1 UNITS: 100 INJECTION, SOLUTION INTRAVENOUS; SUBCUTANEOUS at 20:48

## 2022-01-01 RX ADMIN — IPRATROPIUM BROMIDE 2 SPRAY: 42 SPRAY, METERED NASAL at 16:45

## 2022-01-01 RX ADMIN — ENOXAPARIN SODIUM 30 MG: 100 INJECTION SUBCUTANEOUS at 18:15

## 2022-01-01 RX ADMIN — FLUTICASONE PROPIONATE 1 SPRAY: 50 SPRAY, METERED NASAL at 09:58

## 2022-01-01 RX ADMIN — LEVETIRACETAM 500 MG: 500 TABLET, FILM COATED ORAL at 10:17

## 2022-01-01 RX ADMIN — LEVETIRACETAM 500 MG: 500 TABLET, FILM COATED ORAL at 08:36

## 2022-01-01 RX ADMIN — DOCUSATE SODIUM 100 MG: 100 CAPSULE, LIQUID FILLED ORAL at 09:26

## 2022-01-01 RX ADMIN — INSULIN LISPRO 4 UNITS: 100 INJECTION, SOLUTION INTRAVENOUS; SUBCUTANEOUS at 20:07

## 2022-01-01 RX ADMIN — DIBASIC SODIUM PHOSPHATE, MONOBASIC POTASSIUM PHOSPHATE AND MONOBASIC SODIUM PHOSPHATE 2 TABLET: 852; 155; 130 TABLET ORAL at 20:21

## 2022-01-01 RX ADMIN — BACITRACIN ZINC AND POLYMYXIN B SULFATE: 500; 10000 OINTMENT TOPICAL at 08:19

## 2022-01-01 RX ADMIN — IPRATROPIUM BROMIDE 2 SPRAY: 42 SPRAY, METERED NASAL at 09:11

## 2022-01-01 RX ADMIN — TAMSULOSIN HYDROCHLORIDE 0.4 MG: 0.4 CAPSULE ORAL at 10:17

## 2022-01-01 RX ADMIN — METHYLPREDNISOLONE SODIUM SUCCINATE 60 MG: 125 INJECTION, POWDER, FOR SOLUTION INTRAMUSCULAR; INTRAVENOUS at 10:14

## 2022-01-01 RX ADMIN — INSULIN LISPRO 6 UNITS: 100 INJECTION, SOLUTION INTRAVENOUS; SUBCUTANEOUS at 07:41

## 2022-01-01 RX ADMIN — IPRATROPIUM BROMIDE 2 SPRAY: 42 SPRAY, METERED NASAL at 17:29

## 2022-01-01 RX ADMIN — DEXAMETHASONE SODIUM PHOSPHATE 4 MG: 4 INJECTION, SOLUTION INTRAMUSCULAR; INTRAVENOUS at 10:14

## 2022-01-01 RX ADMIN — INSULIN LISPRO 2 UNITS: 100 INJECTION, SOLUTION INTRAVENOUS; SUBCUTANEOUS at 17:05

## 2022-01-01 RX ADMIN — SODIUM CHLORIDE, PRESERVATIVE FREE 10 ML: 5 INJECTION INTRAVENOUS at 20:15

## 2022-01-01 RX ADMIN — INSULIN LISPRO 7 UNITS: 100 INJECTION, SOLUTION INTRAVENOUS; SUBCUTANEOUS at 21:01

## 2022-01-01 RX ADMIN — MORPHINE SULFATE 2 MG: 2 INJECTION, SOLUTION INTRAMUSCULAR; INTRAVENOUS at 21:35

## 2022-01-01 RX ADMIN — INSULIN LISPRO 2 UNITS: 100 INJECTION, SOLUTION INTRAVENOUS; SUBCUTANEOUS at 21:18

## 2022-01-01 RX ADMIN — PANTOPRAZOLE SODIUM 40 MG: 40 TABLET, DELAYED RELEASE ORAL at 05:40

## 2022-01-01 RX ADMIN — INSULIN LISPRO 3 UNITS: 100 INJECTION, SOLUTION INTRAVENOUS; SUBCUTANEOUS at 20:48

## 2022-01-01 RX ADMIN — IPRATROPIUM BROMIDE 2 SPRAY: 42 SPRAY, METERED NASAL at 21:14

## 2022-01-01 RX ADMIN — PHENYLEPHRINE HYDROCHLORIDE 40 MCG/MIN: 10 INJECTION, SOLUTION INTRAMUSCULAR; INTRAVENOUS; SUBCUTANEOUS at 18:40

## 2022-01-01 RX ADMIN — INSULIN GLARGINE 10 UNITS: 100 INJECTION, SOLUTION SUBCUTANEOUS at 21:31

## 2022-01-01 RX ADMIN — DEXAMETHASONE 4 MG: 4 TABLET ORAL at 14:30

## 2022-01-01 RX ADMIN — HEPARIN SODIUM 5000 UNITS: 5000 INJECTION INTRAVENOUS; SUBCUTANEOUS at 14:15

## 2022-01-01 RX ADMIN — DOCUSATE SODIUM 100 MG: 100 CAPSULE, LIQUID FILLED ORAL at 20:18

## 2022-01-01 RX ADMIN — DEXAMETHASONE 4 MG: 4 TABLET ORAL at 17:19

## 2022-01-01 RX ADMIN — LEVETIRACETAM 500 MG: 500 TABLET, FILM COATED ORAL at 08:00

## 2022-01-01 RX ADMIN — PANTOPRAZOLE SODIUM 40 MG: 40 INJECTION, POWDER, FOR SOLUTION INTRAVENOUS at 09:59

## 2022-01-01 RX ADMIN — SODIUM CHLORIDE: 9 INJECTION, SOLUTION INTRAVENOUS at 18:56

## 2022-01-01 RX ADMIN — INSULIN LISPRO 1 UNITS: 100 INJECTION, SOLUTION INTRAVENOUS; SUBCUTANEOUS at 19:57

## 2022-01-01 RX ADMIN — ENOXAPARIN SODIUM 40 MG: 100 INJECTION SUBCUTANEOUS at 18:12

## 2022-01-01 RX ADMIN — LEVETIRACETAM 500 MG: 500 TABLET, FILM COATED ORAL at 09:30

## 2022-01-01 RX ADMIN — LEVETIRACETAM 500 MG: 500 TABLET, FILM COATED ORAL at 20:39

## 2022-01-01 RX ADMIN — PROPOFOL 50 MG: 10 INJECTION, EMULSION INTRAVENOUS at 18:23

## 2022-01-01 RX ADMIN — FENTANYL CITRATE 50 MCG: 50 INJECTION, SOLUTION INTRAMUSCULAR; INTRAVENOUS at 18:37

## 2022-01-01 RX ADMIN — SODIUM CHLORIDE, PRESERVATIVE FREE 10 ML: 5 INJECTION INTRAVENOUS at 07:59

## 2022-01-01 RX ADMIN — VALACYCLOVIR HYDROCHLORIDE 500 MG: 500 TABLET, FILM COATED ORAL at 21:20

## 2022-01-01 RX ADMIN — INSULIN LISPRO 6 UNITS: 100 INJECTION, SOLUTION INTRAVENOUS; SUBCUTANEOUS at 20:10

## 2022-01-01 RX ADMIN — TAMSULOSIN HYDROCHLORIDE 0.4 MG: 0.4 CAPSULE ORAL at 08:41

## 2022-01-01 RX ADMIN — INSULIN LISPRO 6 UNITS: 100 INJECTION, SOLUTION INTRAVENOUS; SUBCUTANEOUS at 19:59

## 2022-01-01 RX ADMIN — CEFAZOLIN 2000 MG: 2 INJECTION, POWDER, FOR SOLUTION INTRAMUSCULAR; INTRAVENOUS at 10:04

## 2022-01-01 RX ADMIN — SALINE NASAL SPRAY 2 SPRAY: 1.5 SOLUTION NASAL at 16:35

## 2022-01-01 RX ADMIN — IPRATROPIUM BROMIDE 2 SPRAY: 42 SPRAY, METERED NASAL at 09:12

## 2022-01-01 RX ADMIN — SODIUM CHLORIDE 500 ML: 900 INJECTION, SOLUTION INTRAVENOUS at 14:23

## 2022-01-01 RX ADMIN — SALINE NASAL SPRAY 2 SPRAY: 1.5 SOLUTION NASAL at 07:52

## 2022-01-01 RX ADMIN — SALINE NASAL SPRAY 2 SPRAY: 1.5 SOLUTION NASAL at 13:00

## 2022-01-01 RX ADMIN — POTASSIUM CHLORIDE 20 MEQ: 20 TABLET, EXTENDED RELEASE ORAL at 08:41

## 2022-01-01 RX ADMIN — Medication: at 21:11

## 2022-01-01 RX ADMIN — CEFEPIME 2000 MG: 2 INJECTION, POWDER, FOR SOLUTION INTRAVENOUS at 08:49

## 2022-01-01 RX ADMIN — POTASSIUM CHLORIDE 20 MEQ: 20 TABLET, EXTENDED RELEASE ORAL at 07:55

## 2022-01-01 RX ADMIN — SODIUM CHLORIDE: 9 INJECTION, SOLUTION INTRAVENOUS at 09:17

## 2022-01-01 RX ADMIN — SODIUM CHLORIDE, PRESERVATIVE FREE 10 ML: 5 INJECTION INTRAVENOUS at 20:18

## 2022-01-01 RX ADMIN — PANTOPRAZOLE SODIUM 40 MG: 40 INJECTION, POWDER, FOR SOLUTION INTRAVENOUS at 09:29

## 2022-01-01 RX ADMIN — LEVETIRACETAM 500 MG: 500 TABLET, FILM COATED ORAL at 21:06

## 2022-01-01 RX ADMIN — DEXAMETHASONE 4 MG: 4 TABLET ORAL at 06:40

## 2022-01-01 RX ADMIN — Medication 100 UNITS: at 11:38

## 2022-01-01 RX ADMIN — DIBASIC SODIUM PHOSPHATE, MONOBASIC POTASSIUM PHOSPHATE AND MONOBASIC SODIUM PHOSPHATE 2 TABLET: 852; 155; 130 TABLET ORAL at 20:12

## 2022-01-01 RX ADMIN — ENOXAPARIN SODIUM 40 MG: 100 INJECTION SUBCUTANEOUS at 18:37

## 2022-01-01 RX ADMIN — DIBASIC SODIUM PHOSPHATE, MONOBASIC POTASSIUM PHOSPHATE AND MONOBASIC SODIUM PHOSPHATE 2 TABLET: 852; 155; 130 TABLET ORAL at 15:48

## 2022-01-01 RX ADMIN — PANTOPRAZOLE SODIUM 40 MG: 40 INJECTION, POWDER, FOR SOLUTION INTRAVENOUS at 08:36

## 2022-01-01 RX ADMIN — LEVETIRACETAM 500 MG: 500 TABLET, FILM COATED ORAL at 20:34

## 2022-01-01 RX ADMIN — Medication: at 21:21

## 2022-01-01 RX ADMIN — SODIUM CHLORIDE 15 ML: 900 IRRIGANT IRRIGATION at 06:16

## 2022-01-01 RX ADMIN — SODIUM PHOSPHATE, MONOBASIC, MONOHYDRATE 30 MMOL: 276; 142 INJECTION, SOLUTION INTRAVENOUS at 09:11

## 2022-01-01 RX ADMIN — IPRATROPIUM BROMIDE 2 SPRAY: 42 SPRAY, METERED NASAL at 12:39

## 2022-01-01 RX ADMIN — LEVETIRACETAM 500 MG: 500 TABLET, FILM COATED ORAL at 08:53

## 2022-01-01 RX ADMIN — DEXAMETHASONE 4 MG: 4 TABLET ORAL at 21:09

## 2022-01-01 RX ADMIN — TAMSULOSIN HYDROCHLORIDE 0.4 MG: 0.4 CAPSULE ORAL at 08:27

## 2022-01-01 RX ADMIN — CEFAZOLIN 2000 MG: 2 INJECTION, POWDER, FOR SOLUTION INTRAMUSCULAR; INTRAVENOUS at 11:13

## 2022-01-01 RX ADMIN — PANTOPRAZOLE SODIUM 40 MG: 40 INJECTION, POWDER, FOR SOLUTION INTRAVENOUS at 09:19

## 2022-01-01 RX ADMIN — SALINE NASAL SPRAY 2 SPRAY: 1.5 SOLUTION NASAL at 16:14

## 2022-01-01 RX ADMIN — INSULIN LISPRO 4 UNITS: 100 INJECTION, SOLUTION INTRAVENOUS; SUBCUTANEOUS at 17:00

## 2022-01-01 RX ADMIN — INSULIN LISPRO 6 UNITS: 100 INJECTION, SOLUTION INTRAVENOUS; SUBCUTANEOUS at 09:21

## 2022-01-01 RX ADMIN — FLUTICASONE PROPIONATE 1 SPRAY: 50 SPRAY, METERED NASAL at 09:37

## 2022-01-01 RX ADMIN — CEFEPIME 2000 MG: 2 INJECTION, POWDER, FOR SOLUTION INTRAVENOUS at 15:59

## 2022-01-01 RX ADMIN — DEXAMETHASONE 4 MG: 4 TABLET ORAL at 08:48

## 2022-01-01 RX ADMIN — LEVETIRACETAM 500 MG: 500 TABLET, FILM COATED ORAL at 19:57

## 2022-01-01 RX ADMIN — VORICONAZOLE 200 MG: 200 TABLET ORAL at 07:55

## 2022-01-01 RX ADMIN — PIPERACILLIN AND TAZOBACTAM 3375 MG: 3; .375 INJECTION, POWDER, FOR SOLUTION INTRAVENOUS at 00:11

## 2022-01-01 RX ADMIN — INSULIN LISPRO 9 UNITS: 100 INJECTION, SOLUTION INTRAVENOUS; SUBCUTANEOUS at 16:56

## 2022-01-01 RX ADMIN — DEXAMETHASONE SODIUM PHOSPHATE 4 MG: 4 INJECTION, SOLUTION INTRAMUSCULAR; INTRAVENOUS at 08:14

## 2022-01-01 RX ADMIN — LEVETIRACETAM 500 MG: 500 TABLET, FILM COATED ORAL at 20:58

## 2022-01-01 RX ADMIN — LEVETIRACETAM 500 MG: 500 TABLET, FILM COATED ORAL at 22:05

## 2022-01-01 RX ADMIN — INSULIN LISPRO 4 UNITS: 100 INJECTION, SOLUTION INTRAVENOUS; SUBCUTANEOUS at 11:26

## 2022-01-01 RX ADMIN — INSULIN LISPRO 16 UNITS: 100 INJECTION, SOLUTION INTRAVENOUS; SUBCUTANEOUS at 16:36

## 2022-01-01 RX ADMIN — CYANOCOBALAMIN TAB 1000 MCG 1000 MCG: 1000 TAB at 22:04

## 2022-01-01 RX ADMIN — DEXAMETHASONE SODIUM PHOSPHATE 4 MG: 4 INJECTION, SOLUTION INTRAMUSCULAR; INTRAVENOUS at 08:47

## 2022-01-01 RX ADMIN — SODIUM CHLORIDE, PRESERVATIVE FREE 10 ML: 5 INJECTION INTRAVENOUS at 07:56

## 2022-01-01 RX ADMIN — ENOXAPARIN SODIUM 30 MG: 100 INJECTION SUBCUTANEOUS at 18:16

## 2022-01-01 RX ADMIN — INSULIN LISPRO 8 UNITS: 100 INJECTION, SOLUTION INTRAVENOUS; SUBCUTANEOUS at 11:17

## 2022-01-01 RX ADMIN — INSULIN GLARGINE 10 UNITS: 100 INJECTION, SOLUTION SUBCUTANEOUS at 21:00

## 2022-01-01 RX ADMIN — DEXAMETHASONE 2 MG: 4 TABLET ORAL at 19:45

## 2022-01-01 RX ADMIN — VORICONAZOLE 200 MG: 200 TABLET ORAL at 09:30

## 2022-01-01 RX ADMIN — FLUTICASONE PROPIONATE 1 SPRAY: 50 SPRAY, METERED NASAL at 10:12

## 2022-01-01 RX ADMIN — IPRATROPIUM BROMIDE 2 SPRAY: 42 SPRAY, METERED NASAL at 16:58

## 2022-01-01 RX ADMIN — VALACYCLOVIR HYDROCHLORIDE 500 MG: 500 TABLET, FILM COATED ORAL at 08:27

## 2022-01-01 RX ADMIN — VORICONAZOLE 200 MG: 200 TABLET ORAL at 20:33

## 2022-01-01 RX ADMIN — Medication: at 21:01

## 2022-01-01 RX ADMIN — CEFAZOLIN 2000 MG: 2 INJECTION, POWDER, FOR SOLUTION INTRAMUSCULAR; INTRAVENOUS at 17:18

## 2022-01-01 RX ADMIN — PIPERACILLIN AND TAZOBACTAM 3375 MG: 3; .375 INJECTION, POWDER, FOR SOLUTION INTRAVENOUS at 23:42

## 2022-01-01 RX ADMIN — DOCUSATE SODIUM 100 MG: 100 CAPSULE, LIQUID FILLED ORAL at 20:21

## 2022-01-01 RX ADMIN — DEXAMETHASONE 4 MG: 4 TABLET ORAL at 23:30

## 2022-01-01 RX ADMIN — LEVETIRACETAM 500 MG: 500 TABLET, FILM COATED ORAL at 21:09

## 2022-01-01 RX ADMIN — DEXAMETHASONE SODIUM PHOSPHATE 4 MG: 4 INJECTION, SOLUTION INTRAMUSCULAR; INTRAVENOUS at 16:53

## 2022-01-01 RX ADMIN — SODIUM CHLORIDE, PRESERVATIVE FREE 10 ML: 5 INJECTION INTRAVENOUS at 20:20

## 2022-01-01 RX ADMIN — ROCURONIUM BROMIDE 50 MG: 10 INJECTION INTRAVENOUS at 18:24

## 2022-01-01 RX ADMIN — MORPHINE SULFATE 4 MG: 4 INJECTION INTRAVENOUS at 19:27

## 2022-01-01 RX ADMIN — DEXAMETHASONE SODIUM PHOSPHATE 4 MG: 4 INJECTION, SOLUTION INTRAMUSCULAR; INTRAVENOUS at 02:08

## 2022-01-01 RX ADMIN — DOCUSATE SODIUM 100 MG: 100 CAPSULE, LIQUID FILLED ORAL at 08:58

## 2022-01-01 RX ADMIN — INSULIN LISPRO 8 UNITS: 100 INJECTION, SOLUTION INTRAVENOUS; SUBCUTANEOUS at 17:07

## 2022-01-01 RX ADMIN — VORICONAZOLE 200 MG: 200 TABLET ORAL at 08:58

## 2022-01-01 RX ADMIN — SODIUM CHLORIDE, PRESERVATIVE FREE 10 ML: 5 INJECTION INTRAVENOUS at 20:34

## 2022-01-01 RX ADMIN — IPRATROPIUM BROMIDE 2 SPRAY: 42 SPRAY, METERED NASAL at 21:11

## 2022-01-01 RX ADMIN — CYANOCOBALAMIN TAB 1000 MCG 1000 MCG: 1000 TAB at 08:05

## 2022-01-01 RX ADMIN — SODIUM CHLORIDE: 9 INJECTION, SOLUTION INTRAVENOUS at 11:52

## 2022-01-01 RX ADMIN — VALACYCLOVIR HYDROCHLORIDE 500 MG: 500 TABLET, FILM COATED ORAL at 21:25

## 2022-01-01 RX ADMIN — VORICONAZOLE 200 MG: 200 TABLET ORAL at 20:12

## 2022-01-01 RX ADMIN — LEVETIRACETAM 500 MG: 500 TABLET, FILM COATED ORAL at 20:21

## 2022-01-01 RX ADMIN — DEXAMETHASONE 4 MG: 4 TABLET ORAL at 06:22

## 2022-01-01 RX ADMIN — DIBASIC SODIUM PHOSPHATE, MONOBASIC POTASSIUM PHOSPHATE AND MONOBASIC SODIUM PHOSPHATE 2 TABLET: 852; 155; 130 TABLET ORAL at 08:31

## 2022-01-01 RX ADMIN — SALINE NASAL SPRAY 2 SPRAY: 1.5 SOLUTION NASAL at 08:30

## 2022-01-01 RX ADMIN — INSULIN LISPRO 12 UNITS: 100 INJECTION, SOLUTION INTRAVENOUS; SUBCUTANEOUS at 12:54

## 2022-01-01 RX ADMIN — DEXAMETHASONE 4 MG: 4 TABLET ORAL at 12:39

## 2022-01-01 RX ADMIN — ENOXAPARIN SODIUM 30 MG: 100 INJECTION SUBCUTANEOUS at 16:07

## 2022-01-01 RX ADMIN — SODIUM CHLORIDE 500 MG: 9 INJECTION, SOLUTION INTRAVENOUS at 09:36

## 2022-01-01 RX ADMIN — METHYLPREDNISOLONE SODIUM SUCCINATE 60 MG: 125 INJECTION, POWDER, FOR SOLUTION INTRAMUSCULAR; INTRAVENOUS at 11:12

## 2022-01-01 RX ADMIN — FLUTICASONE PROPIONATE 1 SPRAY: 50 SPRAY, METERED NASAL at 09:52

## 2022-01-01 RX ADMIN — Medication: at 08:33

## 2022-01-01 RX ADMIN — DEXAMETHASONE 4 MG: 4 TABLET ORAL at 23:34

## 2022-01-01 RX ADMIN — INSULIN LISPRO 8 UNITS: 100 INJECTION, SOLUTION INTRAVENOUS; SUBCUTANEOUS at 12:08

## 2022-01-01 RX ADMIN — ENOXAPARIN SODIUM 40 MG: 100 INJECTION SUBCUTANEOUS at 18:11

## 2022-01-01 RX ADMIN — DEXAMETHASONE SODIUM PHOSPHATE 4 MG: 4 INJECTION, SOLUTION INTRAMUSCULAR; INTRAVENOUS at 22:32

## 2022-01-01 RX ADMIN — SODIUM CHLORIDE, PRESERVATIVE FREE 10 ML: 5 INJECTION INTRAVENOUS at 10:00

## 2022-01-01 RX ADMIN — SALINE NASAL SPRAY 2 SPRAY: 1.5 SOLUTION NASAL at 08:11

## 2022-01-01 RX ADMIN — Medication: at 20:58

## 2022-01-01 RX ADMIN — FLUTICASONE PROPIONATE 1 SPRAY: 50 SPRAY, METERED NASAL at 08:59

## 2022-01-01 RX ADMIN — Medication: at 08:49

## 2022-01-01 RX ADMIN — INSULIN GLARGINE 10 UNITS: 100 INJECTION, SOLUTION SUBCUTANEOUS at 20:11

## 2022-01-01 RX ADMIN — MAGNESIUM SULFATE HEPTAHYDRATE 4000 MG: 40 INJECTION, SOLUTION INTRAVENOUS at 05:01

## 2022-01-01 RX ADMIN — HEPARIN SODIUM 5000 UNITS: 5000 INJECTION INTRAVENOUS; SUBCUTANEOUS at 22:05

## 2022-01-01 RX ADMIN — LEVETIRACETAM 500 MG: 100 INJECTION, SOLUTION, CONCENTRATE INTRAVENOUS at 21:53

## 2022-01-01 RX ADMIN — DIBASIC SODIUM PHOSPHATE, MONOBASIC POTASSIUM PHOSPHATE AND MONOBASIC SODIUM PHOSPHATE 2 TABLET: 852; 155; 130 TABLET ORAL at 20:18

## 2022-01-01 RX ADMIN — VORICONAZOLE 200 MG: 200 TABLET ORAL at 08:05

## 2022-01-01 RX ADMIN — DEXAMETHASONE 4 MG: 4 TABLET ORAL at 11:25

## 2022-01-01 RX ADMIN — ASPIRIN 81 MG: 81 TABLET, COATED ORAL at 08:53

## 2022-01-01 RX ADMIN — PIPERACILLIN AND TAZOBACTAM 3375 MG: 3; .375 INJECTION, POWDER, FOR SOLUTION INTRAVENOUS at 14:44

## 2022-01-01 RX ADMIN — IPRATROPIUM BROMIDE 2 SPRAY: 42 SPRAY, METERED NASAL at 11:15

## 2022-01-01 RX ADMIN — LEVETIRACETAM 500 MG: 500 TABLET, FILM COATED ORAL at 08:57

## 2022-01-01 RX ADMIN — BACITRACIN ZINC AND POLYMYXIN B SULFATE: 500; 10000 OINTMENT TOPICAL at 08:28

## 2022-01-01 RX ADMIN — SODIUM CHLORIDE, PRESERVATIVE FREE 10 ML: 5 INJECTION INTRAVENOUS at 09:32

## 2022-01-01 RX ADMIN — SODIUM CHLORIDE, PRESERVATIVE FREE 10 ML: 5 INJECTION INTRAVENOUS at 08:57

## 2022-01-01 RX ADMIN — BACITRACIN ZINC AND POLYMYXIN B SULFATE: 500; 10000 OINTMENT TOPICAL at 09:03

## 2022-01-01 RX ADMIN — ENOXAPARIN SODIUM 40 MG: 100 INJECTION SUBCUTANEOUS at 17:19

## 2022-01-01 RX ADMIN — FLUTICASONE PROPIONATE 1 SPRAY: 50 SPRAY, METERED NASAL at 08:39

## 2022-01-01 RX ADMIN — INSULIN LISPRO 4 UNITS: 100 INJECTION, SOLUTION INTRAVENOUS; SUBCUTANEOUS at 12:00

## 2022-01-01 RX ADMIN — IPRATROPIUM BROMIDE 2 SPRAY: 42 SPRAY, METERED NASAL at 11:41

## 2022-01-01 RX ADMIN — SALINE NASAL SPRAY 2 SPRAY: 1.5 SOLUTION NASAL at 17:31

## 2022-01-01 RX ADMIN — VALACYCLOVIR HYDROCHLORIDE 500 MG: 500 TABLET, FILM COATED ORAL at 08:17

## 2022-01-01 RX ADMIN — VALACYCLOVIR HYDROCHLORIDE 500 MG: 500 TABLET, FILM COATED ORAL at 20:28

## 2022-01-01 RX ADMIN — IPRATROPIUM BROMIDE 2 SPRAY: 42 SPRAY, METERED NASAL at 00:12

## 2022-01-01 RX ADMIN — ACETAMINOPHEN 325MG 650 MG: 325 TABLET ORAL at 18:08

## 2022-01-01 RX ADMIN — ENOXAPARIN SODIUM 40 MG: 100 INJECTION SUBCUTANEOUS at 18:44

## 2022-01-01 RX ADMIN — DIBASIC SODIUM PHOSPHATE, MONOBASIC POTASSIUM PHOSPHATE AND MONOBASIC SODIUM PHOSPHATE 2 TABLET: 852; 155; 130 TABLET ORAL at 09:49

## 2022-01-01 RX ADMIN — INSULIN LISPRO 8 UNITS: 100 INJECTION, SOLUTION INTRAVENOUS; SUBCUTANEOUS at 07:53

## 2022-01-01 RX ADMIN — DOCUSATE SODIUM 100 MG: 100 CAPSULE, LIQUID FILLED ORAL at 08:56

## 2022-01-01 RX ADMIN — DOCUSATE SODIUM 100 MG: 100 CAPSULE, LIQUID FILLED ORAL at 09:32

## 2022-01-01 RX ADMIN — PANTOPRAZOLE SODIUM 40 MG: 40 INJECTION, POWDER, FOR SOLUTION INTRAVENOUS at 07:56

## 2022-01-01 RX ADMIN — LEVETIRACETAM 500 MG: 500 TABLET, FILM COATED ORAL at 09:32

## 2022-01-01 RX ADMIN — IPRATROPIUM BROMIDE 2 SPRAY: 42 SPRAY, METERED NASAL at 13:29

## 2022-01-01 RX ADMIN — INSULIN LISPRO 16 UNITS: 100 INJECTION, SOLUTION INTRAVENOUS; SUBCUTANEOUS at 15:32

## 2022-01-01 RX ADMIN — LEVETIRACETAM 500 MG: 500 TABLET, FILM COATED ORAL at 20:50

## 2022-01-01 RX ADMIN — INSULIN GLARGINE 5 UNITS: 100 INJECTION, SOLUTION SUBCUTANEOUS at 10:06

## 2022-01-01 RX ADMIN — INSULIN LISPRO 4 UNITS: 100 INJECTION, SOLUTION INTRAVENOUS; SUBCUTANEOUS at 07:09

## 2022-01-01 RX ADMIN — INSULIN LISPRO 4 UNITS: 100 INJECTION, SOLUTION INTRAVENOUS; SUBCUTANEOUS at 12:38

## 2022-01-01 RX ADMIN — INSULIN LISPRO 4 UNITS: 100 INJECTION, SOLUTION INTRAVENOUS; SUBCUTANEOUS at 20:34

## 2022-01-01 RX ADMIN — SALINE NASAL SPRAY 2 SPRAY: 1.5 SOLUTION NASAL at 16:26

## 2022-01-01 RX ADMIN — CEFEPIME 2000 MG: 2 INJECTION, POWDER, FOR SOLUTION INTRAVENOUS at 07:07

## 2022-01-01 RX ADMIN — DOCUSATE SODIUM 100 MG: 100 CAPSULE, LIQUID FILLED ORAL at 08:49

## 2022-01-01 RX ADMIN — INSULIN LISPRO 12 UNITS: 100 INJECTION, SOLUTION INTRAVENOUS; SUBCUTANEOUS at 11:43

## 2022-01-01 RX ADMIN — SODIUM CHLORIDE, PRESERVATIVE FREE 10 ML: 5 INJECTION INTRAVENOUS at 09:28

## 2022-01-01 RX ADMIN — LEVETIRACETAM 500 MG: 100 INJECTION, SOLUTION, CONCENTRATE INTRAVENOUS at 09:59

## 2022-01-01 RX ADMIN — INSULIN LISPRO 16 UNITS: 100 INJECTION, SOLUTION INTRAVENOUS; SUBCUTANEOUS at 16:38

## 2022-01-01 RX ADMIN — IPRATROPIUM BROMIDE 2 SPRAY: 42 SPRAY, METERED NASAL at 14:35

## 2022-01-01 RX ADMIN — DEXAMETHASONE 4 MG: 4 TABLET ORAL at 21:16

## 2022-01-01 RX ADMIN — SODIUM CHLORIDE 15 ML: 900 IRRIGANT IRRIGATION at 11:13

## 2022-01-01 RX ADMIN — BACITRACIN ZINC AND POLYMYXIN B SULFATE: 500; 10000 OINTMENT TOPICAL at 21:26

## 2022-01-01 RX ADMIN — DOCUSATE SODIUM 100 MG: 100 CAPSULE, LIQUID FILLED ORAL at 07:51

## 2022-01-01 RX ADMIN — FLUTICASONE PROPIONATE 1 SPRAY: 50 SPRAY, METERED NASAL at 08:45

## 2022-01-01 RX ADMIN — DEXAMETHASONE SODIUM PHOSPHATE 4 MG: 4 INJECTION, SOLUTION INTRAMUSCULAR; INTRAVENOUS at 03:27

## 2022-01-01 RX ADMIN — IPRATROPIUM BROMIDE 2 SPRAY: 42 SPRAY, METERED NASAL at 21:29

## 2022-01-01 RX ADMIN — INSULIN LISPRO 4 UNITS: 100 INJECTION, SOLUTION INTRAVENOUS; SUBCUTANEOUS at 22:08

## 2022-01-01 RX ADMIN — DOCUSATE SODIUM 100 MG: 100 CAPSULE, LIQUID FILLED ORAL at 09:42

## 2022-01-01 RX ADMIN — SALINE NASAL SPRAY 2 SPRAY: 1.5 SOLUTION NASAL at 15:44

## 2022-01-01 RX ADMIN — VALACYCLOVIR HYDROCHLORIDE 500 MG: 500 TABLET, FILM COATED ORAL at 08:31

## 2022-01-01 RX ADMIN — SALINE NASAL SPRAY 2 SPRAY: 1.5 SOLUTION NASAL at 22:05

## 2022-01-01 RX ADMIN — SODIUM CHLORIDE, PRESERVATIVE FREE 10 ML: 5 INJECTION INTRAVENOUS at 20:46

## 2022-01-01 RX ADMIN — DOCUSATE SODIUM 100 MG: 100 CAPSULE, LIQUID FILLED ORAL at 22:04

## 2022-01-01 RX ADMIN — IPRATROPIUM BROMIDE 2 SPRAY: 42 SPRAY, METERED NASAL at 21:18

## 2022-01-01 RX ADMIN — POTASSIUM CHLORIDE AND SODIUM CHLORIDE: 900; 150 INJECTION, SOLUTION INTRAVENOUS at 11:07

## 2022-01-01 RX ADMIN — PANTOPRAZOLE SODIUM 40 MG: 40 INJECTION, POWDER, FOR SOLUTION INTRAVENOUS at 08:49

## 2022-01-01 RX ADMIN — IPRATROPIUM BROMIDE 2 SPRAY: 42 SPRAY, METERED NASAL at 20:38

## 2022-01-01 RX ADMIN — SODIUM CHLORIDE, PRESERVATIVE FREE 10 ML: 5 INJECTION INTRAVENOUS at 21:06

## 2022-01-01 RX ADMIN — DIBASIC SODIUM PHOSPHATE, MONOBASIC POTASSIUM PHOSPHATE AND MONOBASIC SODIUM PHOSPHATE 2 TABLET: 852; 155; 130 TABLET ORAL at 14:54

## 2022-01-01 RX ADMIN — HEPARIN SODIUM 5000 UNITS: 5000 INJECTION INTRAVENOUS; SUBCUTANEOUS at 21:54

## 2022-01-01 RX ADMIN — ENOXAPARIN SODIUM 30 MG: 100 INJECTION SUBCUTANEOUS at 18:19

## 2022-01-01 RX ADMIN — IPRATROPIUM BROMIDE 2 SPRAY: 42 SPRAY, METERED NASAL at 21:04

## 2022-01-01 RX ADMIN — VALACYCLOVIR HYDROCHLORIDE 500 MG: 500 TABLET, FILM COATED ORAL at 08:05

## 2022-01-01 RX ADMIN — SALINE NASAL SPRAY 2 SPRAY: 1.5 SOLUTION NASAL at 20:41

## 2022-01-01 RX ADMIN — INSULIN LISPRO 8 UNITS: 100 INJECTION, SOLUTION INTRAVENOUS; SUBCUTANEOUS at 16:46

## 2022-01-01 RX ADMIN — IOHEXOL 50 ML: 240 INJECTION, SOLUTION INTRATHECAL; INTRAVASCULAR; INTRAVENOUS; ORAL at 10:46

## 2022-01-01 RX ADMIN — LEVETIRACETAM 500 MG: 500 TABLET, FILM COATED ORAL at 21:20

## 2022-01-01 RX ADMIN — VALACYCLOVIR HYDROCHLORIDE 500 MG: 500 TABLET, FILM COATED ORAL at 20:18

## 2022-01-01 RX ADMIN — LEVETIRACETAM 500 MG: 100 INJECTION, SOLUTION, CONCENTRATE INTRAVENOUS at 19:56

## 2022-01-01 RX ADMIN — ENOXAPARIN SODIUM 40 MG: 100 INJECTION SUBCUTANEOUS at 18:04

## 2022-01-01 RX ADMIN — TAMSULOSIN HYDROCHLORIDE 0.8 MG: 0.4 CAPSULE ORAL at 08:48

## 2022-01-01 ASSESSMENT — ENCOUNTER SYMPTOMS
SHORTNESS OF BREATH: 1
CONSTIPATION: 0
RHINORRHEA: 1
CHEST TIGHTNESS: 0
RHINORRHEA: 0
SHORTNESS OF BREATH: 1
ABDOMINAL PAIN: 0
ABDOMINAL DISTENTION: 0
ABDOMINAL PAIN: 0
SHORTNESS OF BREATH: 1
WHEEZING: 0
NAUSEA: 0
DIARRHEA: 0
WHEEZING: 0
SHORTNESS OF BREATH: 1
DIARRHEA: 0
ABDOMINAL DISTENTION: 0
WHEEZING: 0
ABDOMINAL DISTENTION: 0
COLOR CHANGE: 0
DIARRHEA: 0
RHINORRHEA: 0
NAUSEA: 0
ABDOMINAL PAIN: 0
ABDOMINAL DISTENTION: 0
SORE THROAT: 0
CHEST TIGHTNESS: 0
APNEA: 0
CHEST TIGHTNESS: 0
WHEEZING: 0
SHORTNESS OF BREATH: 1
DIARRHEA: 1
CHEST TIGHTNESS: 0
VOMITING: 0
CHEST TIGHTNESS: 0
COUGH: 1
VOMITING: 0
CONSTIPATION: 1
ABDOMINAL PAIN: 0
DIARRHEA: 0
SINUS PAIN: 0
ABDOMINAL PAIN: 0
ABDOMINAL PAIN: 0
CONSTIPATION: 1
WHEEZING: 0
COUGH: 1
COUGH: 0
SHORTNESS OF BREATH: 0
COUGH: 0
WHEEZING: 0
WHEEZING: 0
SORE THROAT: 0
APNEA: 0
ABDOMINAL PAIN: 0
COLOR CHANGE: 0
ABDOMINAL PAIN: 0
SHORTNESS OF BREATH: 1
SHORTNESS OF BREATH: 1
NAUSEA: 0
GASTROINTESTINAL NEGATIVE: 1
SHORTNESS OF BREATH: 1
COUGH: 0
COUGH: 0
WHEEZING: 0
APNEA: 0
SHORTNESS OF BREATH: 1
WHEEZING: 0
GASTROINTESTINAL NEGATIVE: 1
WHEEZING: 0
ABDOMINAL PAIN: 0
SHORTNESS OF BREATH: 1
SHORTNESS OF BREATH: 1
BLOOD IN STOOL: 0
COUGH: 1
ABDOMINAL PAIN: 0
COUGH: 0
SHORTNESS OF BREATH: 1
SHORTNESS OF BREATH: 0
ABDOMINAL DISTENTION: 0
ABDOMINAL DISTENTION: 0
SORE THROAT: 0
SHORTNESS OF BREATH: 1
CHEST TIGHTNESS: 0
APNEA: 0
RESPIRATORY NEGATIVE: 1
ABDOMINAL PAIN: 1
CHEST TIGHTNESS: 0
NAUSEA: 0
VOMITING: 0
WHEEZING: 0
SHORTNESS OF BREATH: 1
SHORTNESS OF BREATH: 0
SHORTNESS OF BREATH: 1
SINUS PRESSURE: 0
ABDOMINAL DISTENTION: 0
APNEA: 0
WHEEZING: 0
NAUSEA: 0
BLOOD IN STOOL: 0

## 2022-01-01 ASSESSMENT — PAIN SCALES - GENERAL
PAINLEVEL_OUTOF10: 0
PAINLEVEL_OUTOF10: 1
PAINLEVEL_OUTOF10: 0
PAINLEVEL_OUTOF10: 3
PAINLEVEL_OUTOF10: 1
PAINLEVEL_OUTOF10: 0
PAINLEVEL_OUTOF10: 2
PAINLEVEL_OUTOF10: 0
PAINLEVEL_OUTOF10: 7
PAINLEVEL_OUTOF10: 0
PAINLEVEL_OUTOF10: 0
PAINLEVEL_OUTOF10: 3
PAINLEVEL_OUTOF10: 0

## 2022-01-01 ASSESSMENT — PAIN SCALES - WONG BAKER
WONGBAKER_NUMERICALRESPONSE: 0
WONGBAKER_NUMERICALRESPONSE: 8
WONGBAKER_NUMERICALRESPONSE: 0

## 2022-01-01 ASSESSMENT — PAIN - FUNCTIONAL ASSESSMENT
PAIN_FUNCTIONAL_ASSESSMENT: ACTIVITIES ARE NOT PREVENTED
PAIN_FUNCTIONAL_ASSESSMENT: FACES
PAIN_FUNCTIONAL_ASSESSMENT: ACTIVITIES ARE NOT PREVENTED
PAIN_FUNCTIONAL_ASSESSMENT: ACTIVITIES ARE NOT PREVENTED
PAIN_FUNCTIONAL_ASSESSMENT: 0-10
PAIN_FUNCTIONAL_ASSESSMENT: INTOLERABLE, UNABLE TO DO ANY ACTIVE OR PASSIVE ACTIVITIES

## 2022-01-01 ASSESSMENT — PAIN DESCRIPTION - LOCATION
LOCATION: CHEST
LOCATION: HEAD

## 2022-01-01 ASSESSMENT — PAIN DESCRIPTION - ONSET: ONSET: ON-GOING

## 2022-01-01 ASSESSMENT — PAIN DESCRIPTION - ORIENTATION
ORIENTATION: MID;UPPER
ORIENTATION: MID
ORIENTATION: MID;LEFT

## 2022-01-01 ASSESSMENT — PAIN DESCRIPTION - PAIN TYPE
TYPE: SURGICAL PAIN
TYPE: ACUTE PAIN;SURGICAL PAIN
TYPE: ACUTE PAIN;SURGICAL PAIN

## 2022-01-01 ASSESSMENT — PAIN DESCRIPTION - DESCRIPTORS
DESCRIPTORS: ACHING
DESCRIPTORS: PATIENT UNABLE TO DESCRIBE

## 2022-01-01 ASSESSMENT — PULMONARY FUNCTION TESTS
PIF_VALUE: 1

## 2022-01-01 ASSESSMENT — PAIN DESCRIPTION - FREQUENCY: FREQUENCY: INTERMITTENT

## 2022-01-01 NOTE — PROGRESS NOTES
Jamila Stoddard   Date ofBirth:  1945    Date of Visit:  12/28/2021    Chief Complaint   Patient presents with    Results       HPI  Low magnesium. Patient is taking magnesium 400mg once daily. Calcium 8.9 on 12/25/21 patient was given magnesium and fluids. Hypercalcemia  Hypomagnesemia  Parotid mass  Liver mass  Glucerna 1 shake per day  Eating some meals. Wt Readings from Last 3 Encounters:   12/28/21 134 lb (60.8 kg)   12/21/21 129 lb 6.4 oz (58.7 kg)   09/28/21 134 lb 6.4 oz (61 kg)       Review of Systems   Constitutional: Positive for appetite change, fatigue and unexpected weight change. Negative for chills and fever. HENT: Negative for congestion, postnasal drip, rhinorrhea and sore throat. Eyes: Negative for visual disturbance. Respiratory: Positive for shortness of breath. Negative for cough, chest tightness and wheezing. Cardiovascular: Negative for chest pain, palpitations and leg swelling. Gastrointestinal: Positive for diarrhea. Negative for abdominal distention, abdominal pain, blood in stool, constipation, nausea and vomiting. Genitourinary: Positive for frequency. Negative for dysuria and hematuria. Musculoskeletal: Negative for arthralgias and myalgias. Skin: Negative for wound. Neurological: Negative for dizziness, syncope, light-headedness, numbness and headaches. Hematological: Positive for adenopathy. Psychiatric/Behavioral: Negative for dysphoric mood and sleep disturbance. The patient is not nervous/anxious.         No Known Allergies  Outpatient Medications Marked as Taking for the 12/28/21 encounter (Office Visit) with Francia Martin MD   Medication Sig Dispense Refill    magnesium oxide (MAG-OX) 400 MG tablet Take 400 mg by mouth daily      empagliflozin (JARDIANCE) 10 MG tablet Take 1 tablet by mouth daily 90 tablet 1    amoxicillin-clavulanate (AUGMENTIN) 875-125 MG per tablet Take 1 tablet by mouth 2 times daily for 10 days 20 tablet 0    atorvastatin (LIPITOR) 20 MG tablet TAKE 1 TABLET DAILY 90 tablet 1    JANUMET XR  MG TB24 per extended release tablet TAKE 1 TABLET TWICE A  tablet 1    quinapril (ACCUPRIL) 5 MG tablet TAKE 1 TABLET DAILY 90 tablet 1    tamsulosin (FLOMAX) 0.4 MG capsule TAKE 1 CAPSULE DAILY 90 capsule 1    Blood Glucose Monitoring Suppl (ONE TOUCH ULTRA 2) w/Device KIT 1 kit by Does not apply route daily 1 kit 0    blood glucose monitor strips Test once daily 100 strip 3    Lancets MISC Use to test blood sugar once daily 100 each 3    blood glucose test strips (ONETOUCH ULTRA) strip 1 each by Does not apply route daily 100 strip 2    Multiple Vitamins-Minerals (MULTIVITAMIN ADULTS 50+) TABS Take 1 tablet by mouth daily      ONE TOUCH ULTRASOFT LANCETS MISC 1 each by Does not apply route daily 100 each 3    vitamin B-12 (CYANOCOBALAMIN) 1000 MCG tablet Take 1,000 mcg by mouth 2 times daily      Cholecalciferol (VITAMIN D) 2000 UNITS CAPS capsule Take by mouth daily      Omega-3 Fatty Acids (OMEGA 3 PO) Take 2 capsules by mouth daily            Vitals:    12/28/21 1128   BP: 120/66   Pulse: 69   Resp: 16   Temp: 96.9 °F (36.1 °C)   SpO2: 97%   Weight: 134 lb (60.8 kg)     Body mass index is 20.86 kg/m². Physical Exam  Constitutional:       General: He is not in acute distress. Appearance: Normal appearance. He is well-developed. Eyes:      General: Lids are normal.      Extraocular Movements: Extraocular movements intact. Conjunctiva/sclera: Conjunctivae normal.      Pupils: Pupils are equal, round, and reactive to light. Neck:      Thyroid: No thyromegaly. Vascular: No carotid bruit. Cardiovascular:      Rate and Rhythm: Normal rate and regular rhythm. Heart sounds: Normal heart sounds, S1 normal and S2 normal. No murmur heard. No friction rub. No gallop. Pulmonary:      Effort: Pulmonary effort is normal.      Breath sounds: Normal breath sounds.  No wheezing, rhonchi or rales. Abdominal:      General: Bowel sounds are normal. There is no distension. Palpations: Abdomen is soft. Tenderness: There is no abdominal tenderness. Musculoskeletal:      Cervical back: Neck supple. Right lower leg: No edema. Left lower leg: No edema. Lymphadenopathy:      Head:      Right side of head: No submandibular adenopathy. Left side of head: No submandibular adenopathy. Psychiatric:         Mood and Affect: Mood normal.           No results found for this visit on 12/28/21.   Lab Review   Orders Only on 12/21/2021   Component Date Value    Vitamin B-12 12/21/2021 >2000*    WBC 12/21/2021 7.8     RBC 12/21/2021 4.68     Hemoglobin 12/21/2021 14.2     Hematocrit 12/21/2021 45.1     MCV 12/21/2021 96.2     MCH 12/21/2021 30.2     MCHC 12/21/2021 31.4     RDW 12/21/2021 17.2*    Platelets 16/65/1178 159     MPV 12/21/2021 8.4     Neutrophils % 12/21/2021 77.3     Lymphocytes % 12/21/2021 14.7     Monocytes % 12/21/2021 6.4     Eosinophils % 12/21/2021 1.4     Basophils % 12/21/2021 0.2     Neutrophils Absolute 12/21/2021 6.0     Lymphocytes Absolute 12/21/2021 1.1     Monocytes Absolute 12/21/2021 0.5     Eosinophils Absolute 12/21/2021 0.1     Basophils Absolute 12/21/2021 0.0     Magnesium 12/21/2021 1.20*    Sed Rate 12/21/2021 23*    CRP 12/21/2021 60.6*    Total CK 12/21/2021 31*    TSH 12/21/2021 3.80    Office Visit on 12/21/2021   Component Date Value    Color, UA 12/21/2021 yellow     Clarity, UA 12/21/2021 clear     Glucose, UA POC 12/21/2021 500     Bilirubin, UA 12/21/2021 neg     Ketones, UA 12/21/2021 15     Spec Grav, UA 12/21/2021 >=1.030     Blood, UA POC 12/21/2021 trace     pH, UA 12/21/2021 5.0     Protein, UA POC 12/21/2021 neg     Urobilinogen, UA 12/21/2021 0.2     Leukocytes, UA 12/21/2021 neg     Nitrite, UA 12/21/2021 neg    Orders Only on 12/21/2021   Component Date Value    Hep C Ab Interp  Calcium 06/28/2021 10.1     Total Protein 06/28/2021 7.1     Albumin 06/28/2021 4.9     Albumin/Globulin Ratio 06/28/2021 2.2     Total Bilirubin 06/28/2021 0.5     Alkaline Phosphatase 06/28/2021 57     ALT 06/28/2021 32     AST 06/28/2021 27     Globulin 06/28/2021 2.2     WBC 06/28/2021 6.6     RBC 06/28/2021 4.75     Hemoglobin 06/28/2021 15.5     Hematocrit 06/28/2021 45.7     MCV 06/28/2021 96.2     MCH 06/28/2021 32.6     MCHC 06/28/2021 33.9     RDW 06/28/2021 14.4     Platelets 48/58/6842 177     MPV 06/28/2021 8.1     Neutrophils % 06/28/2021 67.5     Lymphocytes % 06/28/2021 23.8     Monocytes % 06/28/2021 4.8     Eosinophils % 06/28/2021 3.5     Basophils % 06/28/2021 0.4     Neutrophils Absolute 06/28/2021 4.5     Lymphocytes Absolute 06/28/2021 1.6     Monocytes Absolute 06/28/2021 0.3     Eosinophils Absolute 06/28/2021 0.2     Basophils Absolute 06/28/2021 0.0    Office Visit on 06/28/2021   Component Date Value    Hemoglobin A1C 06/28/2021 7.1     Microalbumin, Random Uri* 06/28/2021 2.40*    Creatinine, Ur 06/28/2021 48.2     Microalbumin Creatinine * 06/28/2021 49.8*    Color, UA 06/28/2021 yellow     Clarity, UA 06/28/2021 clear     Glucose, UA POC 06/28/2021 neg     Bilirubin, UA 06/28/2021 neg     Ketones, UA 06/28/2021 neg     Spec Grav, UA 06/28/2021 1.015     Blood, UA POC 06/28/2021 neg     pH, UA 06/28/2021 7.0     Protein, UA POC 06/28/2021 neg     Urobilinogen, UA 06/28/2021 0.2     Leukocytes, UA 06/28/2021 neg     Nitrite, UA 06/28/2021 neg          Assessment/Plan     1. Liver mass  - AFL - Kirstie Ba MD, Oncology, [place]    2. Low magnesium level  -Continue same medications  - Magnesium; Future    3. Hypercalcemia  -improved    4. Parotid mass  -follow up with ENT    5. Type 2 diabetes mellitus without complication, without long-term current use of insulin (HCC)    - empagliflozin (JARDIANCE) 10 MG tablet;  Take 1 tablet by mouth daily  Dispense: 90 tablet; Refill: 1    6. At high risk for falls  On the basis of positive falls risk screening, assessment and plan is as follows: patient declines any further evaluation/treatment for increased falls risk. Discussed medications with patient, who voiced understanding of their use and indications. All questions answered. Return in about 1 week (around 1/4/2022) for Medicare AWV as previously scheduled .

## 2022-01-03 NOTE — PROGRESS NOTES
Medicare Annual Wellness Visit  Name: Waqar Hedrick Date: 1/3/2022      MRN: [id number] Sex: Male   Age: 67 y.o. Ethnicity: Non- / Non    : 1945 Race: White (non-)      Mandy Self is here for Medicare AWV    Screenings for behavioral, psychosocial and functional/safety risks, and cognitive dysfunction are all negative except as indicated below. These results, as well as other patient data from the [address] [facility] [address] form, are documented in Flowsheets linked to this Encounter. Patient has a liver mass. Biopsy results shows aggressive B-Cell lymphoma. Patient has already been referred to Oncology and has appointment with Dr. Lavell Lawson on 22. Patient has lost 5 lbs since last week. Patient denies nausea and vomiting. Patient states he has a wave of abdominal cramp from the left upper abdomen that goes across for a few seconds. Patient has a parotid mass and states it went down with aspiration of the mass. Patient has Type 2 diabetes. Patient takes Jardiance 10mg po q day and Janumet XR 50-1000mg po bid. Patient states he is not eating anything due to recent liver mass/cancer. Patient states his fasting blood sugar was 163 this morning. Patient states his fasting blood sugar was lower while in the hospital off meds. Patient states at home blood sugar is up and down. Patient has hypertension. Patient takes Quinapril 5mg po q day. Patient decreases salt. Patient has hyperlipidemia. Patient takes Atorvastatin 20mg po nightly. Patient states he is not eating much of anything right now. Patient has BPH. Patient takes Flomax 0.4mg po q day. Patient has weak stream and nocturia once nightly. Patient denies dribbling.     Patient has vitamin D deficiency. Patient takes Vitamin D 2,000 IU po q day. Patient has low magnesium. Patient takes magnesium 400mg q day.       No Known Allergies      Prior to Visit Medications    Medication Sig Taking?  Authorizing Provider   magnesium oxide (MAG-OX) 400 MG tablet Take 1 tablet by mouth daily Yes Nadia Cooper MD   magnesium oxide (MAG-OX) 400 MG tablet Take 400 mg by mouth daily Yes Historical Provider, MD   empagliflozin (JARDIANCE) 10 MG tablet Take 1 tablet by mouth daily Yes Nadia Cooper MD   atorvastatin (LIPITOR) 20 MG tablet TAKE 1 TABLET DAILY Yes Nadia Cooper MD   JANUMET XR  MG TB24 per extended release tablet TAKE 1 TABLET TWICE A DAY Yes Nadia oCoper MD   quinapril (ACCUPRIL) 5 MG tablet TAKE 1 TABLET DAILY Yes Nadia Cooper MD   tamsulosin (FLOMAX) 0.4 MG capsule TAKE 1 CAPSULE DAILY Yes Nadia Cooper MD   Blood Glucose Monitoring Suppl (ONE TOUCH ULTRA 2) w/Device KIT 1 kit by Does not apply route daily Yes Nadia Cooper MD   blood glucose monitor strips Test once daily Yes Nadia Cooper MD   Lancets MISC Use to test blood sugar once daily Yes Nadia Cooper MD   blood glucose test strips (ONETOUCH ULTRA) strip 1 each by Does not apply route daily Yes Nadia Cooper MD   ONE TOUCH ULTRASOFT LANCETS MISC 1 each by Does not apply route daily Yes Nadia Cooper MD   vitamin B-12 (CYANOCOBALAMIN) 1000 MCG tablet Take 1,000 mcg by mouth 2 times daily Yes Historical Provider, MD   Cholecalciferol (VITAMIN D) 2000 UNITS CAPS capsule Take by mouth daily Yes Historical Provider, MD   Omega-3 Fatty Acids (OMEGA 3 PO) Take 2 capsules by mouth daily  Yes Historical Provider, MD   fluticasone (FLONASE) 50 MCG/ACT nasal spray SPRAY ONCE IN EACH NOSTRIL EVERY DAY  Lara Adair DO         Past Medical History:   Diagnosis Date    Allergic rhinitis     Benign prostatic hyperplasia with weak urinary stream 12/10/2015     Updating Deprecated Diagnoses    History of gout 9/19/2015    History of thrombocytopenia 9/19/2015    Hypercalcemia     Hyperlipidemia     Hypertension     Lung nodule     Proteinuria     Type II or unspecified type diabetes mellitus without mention of complication, not stated as uncontrolled     Urinary disorder     Vitamin D deficiency 9/19/2015       Past Surgical History:   Procedure Laterality Date    CATARACT REMOVAL WITH IMPLANT Bilateral 2011    COLONOSCOPY  3/29/2011    repeat in 5 yrs: Robyn Little MD    COLONOSCOPY  03/14/2016    repeat in 7-8 years: Robyn Little MD    ELBOW FRACTURE SURGERY Left age 5   Cherie Snowman FINGER TRIGGER RELEASE Right 2007    index    TONSILLECTOMY AND ADENOIDECTOMY  age 1    VASECTOMY  25         Family History   Problem Relation Age of Onset    Diabetes Mother     High Blood Pressure Mother     Dementia Mother     Cancer Neg Hx     Hearing Loss Neg Hx     Stroke Neg Hx     Heart Disease Neg Hx        CareTeam (Including outside providers/suppliers regularly involved in providing care):   Patient Care Team:  Ab Madera MD as PCP - General (Internal Medicine)  Ab Madera MD as [facility]  Reddy Hoang MD as Consulting Physician (Gastroenterology)  Jonathan Devi MD as Consulting Physician (Ophthalmology)  Hunter Dakin, MD as Consulting Physician (Orthopedic Surgery)  Dwaine Molina MD as Surgeon (Podiatry)  Ramona Bright MD as Consulting Physician (Internal Medicine)  Jose Lanza MD as Consulting Physician (Hematology and Oncology)  Kely Sol MD as Consulting Physician (Otolaryngology)    Wt Readings from Last 3 Encounters:   01/03/22 128 lb 9.6 oz (58.3 kg)   12/28/21 134 lb (60.8 kg)   12/21/21 129 lb 6.4 oz (58.7 kg)     Vitals:    01/03/22 0806   BP: 110/68   Pulse: 90   Resp: 16   Temp: 95.6 °F (35.3 °C)   SpO2: 96%   Weight: 128 lb 9.6 oz (58.3 kg)   Height: 5' 7\" (1.702 m)     Body mass index is 20.14 kg/m². Based upon direct observation of the patient, evaluation of cognition reveals recent and remote memory intact.     General Appearance: alert and oriented to person, place and time, well-developed and well-nourished, in no acute distress  Head: normocephalic and atraumatic  Eyes: pupils equal, round, and reactive to light, extraocular eye movements intact, conjunctivae normal  ENT: tympanic membrane, external ear and ear canal normal bilaterally, oropharynx clear and moist with normal mucous membranes  Neck: neck supple and non tender without mass, no thyromegaly or thyroid nodules, no cervical lymphadenopathy   Pulmonary/Chest: clear to auscultation bilaterally- no wheezes, rales or rhonchi, normal air movement, no respiratory distress  Cardiovascular: normal rate, regular rhythm, normal S1 and S2, no murmurs and no carotid bruits  Abdomen: soft, non-tender, non-distended, normal bowel sounds, no masses or organomegaly  Extremities: no edema  Neurologic: gait and coordination normal and speech normal    Patient's complete Health Risk Assessment and screening values have been reviewed and are found in Flowsheets. The following problems were reviewed today and where indicated follow up appointments were made and/or referrals ordered. Positive Risk Factor Screenings with Interventions:     Fall Risk:  2 or more falls in past year?: (!) yes  Fall with injury in past year?: no  Fall Risk Interventions:    · Patient declines any further evaluation/treatment for this issue            General Health and ACP:  General  In general, how would you say your health is?: Fair  In the past 7 days, have you experienced any of the following?  New or Increased Pain, New or Increased Fatigue, Loneliness, Social Isolation, Stress or Anger?: (!) New or Increased Fatigue  Do you get the social and emotional support that you need?: Yes  Do you have a Living Will?: Yes  Advance Directives     Power of  Living Will ACP-Advance Directive ACP-Power of     Not on File [employer] on 12/09/16 Filed [address] [place] [address] Risk Interventions:  · Fatigue: Patient is in process of work up of a liver mass possible cancer    Health Habits/Nutrition:  Health Habits/Nutrition  Do you exercise for at least 20 minutes 2-3 times per week?: (!) No  Have you lost any weight without trying in the past 3 months?: (!) Yes  Do you eat only one meal per day?: No  Have you seen the dentist within the past year?: Yes  Body mass index: 20.14  Health Habits/Nutrition Interventions:  · Inadequate physical activity:  patient is not ready to increase his/her physical activity level at this time  · Nutritional issues:  nutritional supplements (2 times daily) recommended to help prevent further weight loss    Hearing/Vision:  No exam data present  Hearing/Vision  Do you or your family notice any trouble with your hearing that hasn't been managed with hearing aids?: (!) Yes  Do you have difficulty driving, watching TV, or doing any of your daily activities because of your eyesight?: No  Have you had an eye exam within the past year?: Yes  Hearing/Vision Interventions:  · Hearing concerns:  ENT referral provided    Safety:  Safety  Do you have working smoke detectors?: Yes  Have all throw rugs been removed or fastened?: (!) No  Do you have non-slip mats or surfaces in all bathtubs/showers?: Yes  Do all of your stairways have a railing or banister?: Yes  Are your doorways, halls and stairs free of clutter?: Yes  Do you always fasten your seatbelt when you are in a car?: Yes  Safety Interventions:  · Home safety tips provided Patient states he has little rugs in the kitchen that do not slide       Personalized Preventive Plan   Current Health Maintenance Status  Immunization History   Administered Date(s) Administered    COVID-19, Flores Peter, PF, 30mcg/0.3mL 01/27/2021, 02/17/2021, 10/08/2021    Influenza Vaccine, unspecified formulation 09/30/2016    Influenza, High Dose (Fluzone 65 yrs and older) 09/14/2015, 09/12/2017, 10/12/2017, 09/10/2018, 09/27/2019, 08/25/2020, 09/10/2021    Influenza, Quadv, adjuvanted, 65 yrs +, IM, PF (Fluad) 08/25/2020    Pneumococcal Conjugate 13-valent ([id number]) 03/03/2016    Pneumococcal Polysaccharide ([id number]) 03/14/2017    Tdap (Boostrix, Adacel) 03/03/2016    Zoster Recombinant (Shingrix) 04/24/2018, 07/19/2018        Health Maintenance   Topic Date Due    Annual Wellness Visit (AWV)  01/01/2022    COVID-19 Vaccine (4 - Booster for Pfizer series) 04/08/2022    Lipid screen  12/21/2022    Depression Screen  12/27/2022    Potassium monitoring  01/03/2023    Creatinine monitoring  01/03/2023    DTaP/Tdap/Td vaccine (2 - Td or Tdap) 03/03/2026    Flu vaccine  Completed    Shingles Vaccine  Completed    Pneumococcal 65+ yrs at Risk Vaccine  Completed    Hepatitis C screen  Completed    Hepatitis A vaccine  Aged Out    Hib vaccine  Aged Out    Meningococcal (ACWY) vaccine  Aged Out     Recommendations for [employer] Due: see orders and patient instructions/AVS.  .   Recommended screening schedule for the next 5-10 years is provided to the patient in written form: see Patient Instructions/AVS.    Lab Review   Orders Only on 12/28/2021   Component Date Value    Magnesium 12/28/2021 1.40*   Orders Only on 12/21/2021   Component Date Value    Vitamin B-12 12/21/2021 >2000*    WBC 12/21/2021 7.8     RBC 12/21/2021 4.68     Hemoglobin 12/21/2021 14.2     Hematocrit 12/21/2021 45.1     MCV 12/21/2021 96.2     MCH 12/21/2021 30.2     MCHC 12/21/2021 31.4     RDW 12/21/2021 17.2*    Platelets 65/68/6026 159     MPV 12/21/2021 8.4     Neutrophils % 12/21/2021 77.3     Lymphocytes % 12/21/2021 14.7     Monocytes % 12/21/2021 6.4     Eosinophils % 12/21/2021 1.4     Basophils % 12/21/2021 0.2     Neutrophils Absolute 12/21/2021 6.0     Lymphocytes Absolute 12/21/2021 1.1     Monocytes Absolute 12/21/2021 0.5     Eosinophils Absolute 12/21/2021 0.1     Basophils Absolute 12/21/2021 0.0     Magnesium 12/21/2021 1.20*    Sed Rate 12/21/2021 23*    CRP 12/21/2021 60.6*    Total CK 12/21/2021 31*    TSH 12/21/2021 3.80    Office Visit on 12/21/2021   Component Date Value    Color, UA 12/21/2021 yellow     Clarity, UA 12/21/2021 clear     Glucose, UA POC 12/21/2021 500     Bilirubin, UA 12/21/2021 neg     Ketones, UA 12/21/2021 15     Spec Grav, UA 12/21/2021 >=1.030     Blood, UA POC 12/21/2021 trace     pH, UA 12/21/2021 5.0     Protein, UA POC 12/21/2021 neg     Urobilinogen, UA 12/21/2021 0.2     Leukocytes, UA 12/21/2021 neg     Nitrite, UA 12/21/2021 neg    Orders Only on 12/21/2021   Component Date Value    Hep C Ab Interp 12/21/2021 Non-reactive     Vit D, 25-Hydroxy 12/21/2021 39.8     Microalbumin, Random Uri* 12/21/2021 3.00*    Creatinine, Ur 12/21/2021 77.4     Microalbumin Creatinine * 12/21/2021 38.8*    Cholesterol, Total 12/21/2021 120     Triglycerides 12/21/2021 225*    HDL 12/21/2021 34*    LDL Calculated 12/21/2021 41     VLDL Cholesterol Calcula* 12/21/2021 45     Sodium 12/21/2021 142     Potassium 12/21/2021 4.8     Chloride 12/21/2021 97*    CO2 12/21/2021 27     Anion Gap 12/21/2021 18*    Glucose 12/21/2021 162*    BUN 12/21/2021 25*    CREATININE 12/21/2021 0.8     GFR Non- 12/21/2021 >60     GFR  12/21/2021 >60     Calcium 12/21/2021 12.9*    Total Protein 12/21/2021 7.1     Albumin 12/21/2021 4.5     Albumin/Globulin Ratio 12/21/2021 1.7     Total Bilirubin 12/21/2021 0.9     Alkaline Phosphatase 12/21/2021 100     ALT 12/21/2021 24     AST 12/21/2021 50*   Orders Only on 10/07/2021   Component Date Value    Visual Acuity Distance R* 10/07/2021 20/20     Visual Acuity Distance L* 10/07/2021 20/20     Intraocular Pressure Eye 10/07/2021                      Value:21  17      Diabetic Retinopathy 10/07/2021 NEGATIVE     Cataracts 10/07/2021 NEGATIVE     Glaucoma 10/07/2021 NEGATIVE    Office Visit on 09/28/2021   Component Date Value    Hemoglobin A1C 09/28/2021 7.1          Lucas Shone was seen today for medicare aw. Diagnoses and all orders for this visit:    1. Routine general medical examination at a health care facility  -Medicare AWV done    2. B-cell lymphoma of solid organ excluding spleen, unspecified B-cell lymphoma type [facility])  -Patient has been referred to Oncology and has appointment on 1/6/22  - CBC Auto Differential; Future    3. Low magnesium level  - continue magnesium oxide (MAG-OX) 400 MG tablet; Take 1 tablet by mouth daily  Dispense: 30 tablet; Refill: 1  - Magnesium; Future    4. Type 2 diabetes mellitus without complication, without long-term current use of insulin (HCC)  -stable  -Continue same medications  -Limit carbohydrates to 45 grams with meals and 15 grams with snacks  -monitor blood sugars  -goal for blood sugar fasting or pre-meal  is   -goal for blood sugar 2 hours after a meal is less than 180  -goal for blood sugar at bedtime is less than 150    5. Essential hypertension  -stable  -Continue same medications  -Low sodium diet    6. Mixed hyperlipidemia  -Continue same medications  -Low fat, low cholesterol diet    7. Liver mass  - Comprehensive Metabolic Panel; Future    8. Vitamin D deficiency  -stable  -Continue same medications    9. Parotid mass  -decreased in size after aspiration  -follow up with ENT as scheduled    10. Benign prostatic hyperplasia with weak urinary stream  -stable  -Continue same medications    11. Urinary frequency  -better      Return in 2 weeks (on 1/17/2022) for lymphoma, liver mass, low magnesium.

## 2022-01-03 NOTE — PATIENT INSTRUCTIONS
Personalized Preventive Plan for Leonardo Jacinto - 1/3/2022  Medicare offers a range of preventive health benefits. Some of the tests and screenings are paid in full while other may be subject to a deductible, co-insurance, and/or copay. Some of these benefits include a comprehensive review of your medical history including lifestyle, illnesses that may run in your family, and various assessments and screenings as appropriate. After reviewing your medical record and screening and assessments performed today your provider may have ordered immunizations, labs, imaging, and/or referrals for you. A list of these orders (if applicable) as well as your Preventive Care list are included within your After Visit Summary for your review. Other Preventive Recommendations:    · A preventive eye exam performed by an eye specialist is recommended every 1-2 years to screen for glaucoma; cataracts, macular degeneration, and other eye disorders. · A preventive dental visit is recommended every 6 months. · Try to get at least 150 minutes of exercise per week or 10,000 steps per day on a pedometer . · Order or download the FREE \"Exercise & Physical Activity: Your Everyday Guide\" from [employer]. Call 7-638.250.2370 or search [employer] online. · You need 4388-5249 mg of calcium and 1312-9903 IU of vitamin D per day. It is possible to meet your calcium requirement with diet alone, but a vitamin D supplement is usually necessary to meet this goal.  · When exposed to the sun, use a sunscreen that protects against both UVA and UVB radiation with an SPF of 30 or greater. Reapply every 2 to 3 hours or after sweating, drying off with a towel, or swimming. · Always wear a seat belt when traveling in a car. Always wear a helmet when riding a bicycle or motorcycle.   Patient Education        Non-Hodgkin Lymphoma: Care Instructions  Overview  Non-Hodgkin lymphoma (NHL) is a type of cancer that begins in the lymph system. White blood cells called lymphocytes grow abnormally and out of control. The cells can form lumps of tissue called tumors. NHL may occur in a single lymph node, a group of lymph nodes, or an organ. It can spread to almost any part of the body. This can include the liver, bone marrow, and spleen. NHL is not contagious and is not caused by an injury. Treatment for NHL depends on the type and stage of the lymphoma. It is usually treated with medicines called chemotherapy. Your doctor may also give you medicines that work on the body's immune system (immunotherapy). You may also need radiation treatments or a procedure called a stem cell transplant. Your doctor will talk to you about what kind of treatment may be best for you. Follow-up care is a key part of your treatment and safety. Be sure to make and go to all appointments, and call your doctor if you are having problems. It's also a good idea to know your test results and keep a list of the medicines you take. How can you care for yourself at home? Take your medicines exactly as prescribed. Call your doctor if you think you are having a problem with your medicine. Eat healthy food. If you do not feel like eating, try to eat food that has protein and extra calories to keep up your strength and prevent weight loss. Get some physical activity every day, but do not get too tired. Get enough sleep and take time to do things you enjoy. This can help reduce stress. Think about joining a support group. Or discuss your concerns with your doctor, counselor, or other health professional.  If you are vomiting or have diarrhea:  Drink plenty of fluids to prevent dehydration. Choose water and other clear liquids. If you have kidney, heart, or liver disease and have to limit fluids, talk with your doctor before you increase the amount of fluids you drink.   When you are able to eat, try clear soups, mild foods, and liquids until all symptoms are gone for 12 to 48 hours. Other good choices include dry toast, crackers, cooked cereal, and gelatin dessert, such as Jell-O. If you have not already done so, prepare a list of advance directives. Advance directives are instructions to your doctor and family members about what kind of care you want if you become unable to speak or express yourself. When should you call for help? Call 911 anytime you think you may need emergency care. For example, call if:    You passed out (lost consciousness). Call your doctor now or seek immediate medical care if:    You have a fever.     You have abnormal bleeding.     You have new or worse pain.     You think you have an infection.     You have new symptoms, such as a cough, belly pain, vomiting, diarrhea, or a rash.     You have signs of a blood clot, such as:  Pain in your calf, back of the knee, thigh, or groin. Redness and swelling in your leg or groin. Watch closely for changes in your health, and be sure to contact your doctor if:    You are much more tired than usual.     You have swollen glands in your armpits, groin, or neck.     You do not get better as expected. Where can you learn more? Go to https://[email or web address].[employer]. org and sign in to your [employer] account. Enter R663 in the [employer] box to learn more about \"Non-Hodgkin Lymphoma: Care Instructions. \"     If you do not have an account, please click on the \"Sign Up Now\" link. Current as of: September 8, 2021               Content Version: 13.1  © 5568-5224 [employer]. Care instructions adapted under license by [employer]. If you have questions about a medical condition or this instruction, always ask your healthcare professional. [place] [address] any warranty or liability for your use of this information.

## 2022-01-07 NOTE — PROGRESS NOTES
[address] VISIT      Patient Name: [address] Record Number:  [id number]  Primary Care Physician:  Live Marques MD    ChiefComplaint     Chief Complaint   Patient presents with    Other     patient recieved CT of neck 12/21 with mass noted on right side of neck, no pain or difficulty swallowing. History of Present Illness     Cachorro Chandler is an 67 y.o. male previously seen for right neck mass suspected parotid neoplasm versus abscess. Last seen 12/21/2021, started on Augmentin. Interval History: He went to [facility] after his appointment with me on 12/21. Unfortunately he was ultimately diagnosed with aggressive B-cell lymphoma during the hospital visit. He had a IR guided right parotid biopsy aspiration performed. He reports that the pathology results came back benign on this. No longer with pain along his right face. Currently following with oncologist, Caroline Francisco, for lymphoma. Planning on getting a PET scan soon. Feels like he has been hearing in a tunnel off and on for the past 3 years. Little bit worse recently. + seasonal allergies, takes Claritin as needed. States that many years ago he had an MRI with Dr. Twan Goff that showed a bad nerve in one of his ears. Unsure exactly of what the etiology was and I am unable to view these results.     Past Medical History     Past Medical History:   Diagnosis Date    Allergic rhinitis     Benign prostatic hyperplasia with weak urinary stream 12/10/2015     Updating Deprecated Diagnoses    History of gout 9/19/2015    History of thrombocytopenia 9/19/2015    Hypercalcemia     Hyperlipidemia     Hypertension     Lung nodule     Proteinuria     Type II or unspecified type diabetes mellitus without mention of complication, not stated as uncontrolled     Urinary disorder     Vitamin D deficiency 9/19/2015       Past Surgical History     Past Surgical History:   Procedure Laterality Date    CATARACT REMOVAL WITH IMPLANT Bilateral 2011    COLONOSCOPY  3/29/2011    repeat in 5 yrs: Jose Bojorquez MD    COLONOSCOPY  03/14/2016    repeat in 7-8 years: Jose Bojorquez MD    ELBOW FRACTURE SURGERY Left age 5   Prince Mcgarry FINGER TRIGGER RELEASE Right 2007    index    TONSILLECTOMY AND ADENOIDECTOMY  age 2   Prince Mcgarry [address]       Family History     Family History   Problem Relation Age of Onset    Diabetes Mother     High Blood Pressure Mother     Dementia Mother     Cancer Neg Hx     Hearing Loss Neg Hx     Stroke Neg Hx     Heart Disease Neg Hx        Social History     Social History     Tobacco Use    Smoking status: Never Smoker    Smokeless tobacco: Never Used    Tobacco comment: never used tobbaco   Vaping Use    Vaping Use: Never used   Substance Use Topics    Alcohol use: Yes     Types: 2 Glasses of wine per week     Comment: drink alcohol very occasionally.     Drug use: No        Allergies     No Known Allergies    Medications     Current Outpatient Medications   Medication Sig Dispense Refill    fluticasone (FLONASE) 50 MCG/ACT nasal spray 1 spray by Each Nostril route daily 32 g 1    magnesium oxide (MAG-OX) 400 MG tablet Take 1 tablet by mouth daily 30 tablet 1    magnesium oxide (MAG-OX) 400 MG tablet Take 400 mg by mouth daily      empagliflozin (JARDIANCE) 10 MG tablet Take 1 tablet by mouth daily 90 tablet 1    atorvastatin (LIPITOR) 20 MG tablet TAKE 1 TABLET DAILY 90 tablet 1    JANUMET XR  MG TB24 per extended release tablet TAKE 1 TABLET TWICE A  tablet 1    quinapril (ACCUPRIL) 5 MG tablet TAKE 1 TABLET DAILY 90 tablet 1    tamsulosin (FLOMAX) 0.4 MG capsule TAKE 1 CAPSULE DAILY 90 capsule 1    Blood Glucose Monitoring Suppl (ONE TOUCH ULTRA 2) w/Device KIT 1 kit by Does not apply route daily 1 kit 0    blood glucose monitor strips Test once daily 100 strip 3    Lancets MISC Use to test blood sugar once daily 100 each 3    blood glucose test strips (ONETOUCH ULTRA) strip 1 each by Does not apply route daily 100 strip 2    ONE TOUCH ULTRASOFT LANCETS MISC 1 each by Does not apply route daily 100 each 3    vitamin B-12 (CYANOCOBALAMIN) 1000 MCG tablet Take 1,000 mcg by mouth 2 times daily      Cholecalciferol (VITAMIN D) 2000 UNITS CAPS capsule Take by mouth daily      Omega-3 Fatty Acids (OMEGA 3 PO) Take 2 capsules by mouth daily        No current facility-administered medications for this visit. Review of Systems     REVIEW OF SYSTEMS  The following systems were reviewed and revealed the following in addition to any already discussed in the HPI:    CONSTITUTIONAL: no weight loss, no fever, no night sweats, no chills  EYES: no vision changes, no blurry vision  EARS: +hearing loss, no otalgia  NOSE: no epistaxis, no rhinorrhea  THROAT: No voice changes, no sore throat, no dysphagia    PhysicalExam     Vitals:    01/07/22 0830   BP: 122/65   Site: Left Upper Arm   Position: Sitting   Cuff Size: Medium Adult   Pulse: 100   Temp: 97.6 °F (36.4 °C)   TempSrc: Temporal       PHYSICAL EXAM  /65 (Site: Left Upper Arm, Position: Sitting, Cuff Size: Medium Adult)   Pulse 100   Temp 97.6 °F (36.4 °C) (Temporal)     GENERAL: No acute distress, alert and oriented, no hoarseness  EYES: EOMI, Anti-icteric  NOSE: On anterior rhinoscopy there is no epistaxis, nasal mucosa moist and normal appearing, no purulent drainage.    EARS: Normal external appearance; on portable otomicroscopy:     -Ad: External auditory canal without stenosis, tympanic membrane clear, no middle ear effusions or retractions     -As: External auditory canal without stenosis, tympanic membrane clear, no middle ear effusions or retractions  FACE: HB 1/6 bilaterally, symmetric appearing, sensation equal bilaterally  ORAL CAVITY: No masses or lesions visualized or palpated, uvula is midline, moist mucous membranes  NECK: Normal range of motion, no thyromegaly, trachea is midline, right tail the parotid with small subcentimeter nodule without overlying skin changes or tenderness, none, no crepitus  CHEST: Normal respiratory effort, breathing comfortably, no retractions  SKIN: No rashes, normal appearing skin, no evidence of skin lesions/tumors  NEURO: Cranial Nerves 2, 3, 4, 5, 6, 7, 11, 12 intact bilaterally     I have performed a head and neck physical exam personally or was physically present during the key or critical portions of the service. Data/Imaging Review     EXAM: CT NECK WITH IV CONTRAST       INDICATION: Neck mass, nonpulsatile       TECHNIQUE: Axial thin section CT images of the neck were obtained after  100 mL of IOHEXOL 350    MG IODINE/ML INTRAVENOUS SOLUTION administered intravenously . Sagittal and coronal 2D    multiplanar reconstructions were performed at the scanner.         COMPARISON: None available       FINDINGS:       Adequate diagnostic quality.       Nasopharynx: Symmetric with no mass. Normal torus tubarius, fossa of Rosenmuller, and adenoid    tonsillar tissue.        Suprahyoid neck: Normal oropharynx, oral cavity, parapharyngeal space, and retropharyngeal    space. Normal  space, infratemporal fossa, and buccal space. Asymmetrically enhancing    vessels along the pterygoid muscles on the right side likely due to exam technique.        Infrahyoid neck: An open glottis is imaged. Normal larynx, hypopharynx, and supraglottis.        Lymph nodes: No pathologically enlarged lymph nodes.       Salivary Glands: There is a 1.2 x 0.9 cm hypodense lesion with peripheral enhancement in the    right parotid gland (series 7, image 146). Normal bilateral submandibular, and sublingual    glands. No dilated ducts or calculi.       Thyroid: Small subcentimeter right thyroid nodules, likely benign given their size.       Brain: No suspicious brain parenchymal lesions.  Mild prominence of the bifrontal extra-axial    CSF spaces likely related atrophy.       Orbits, paranasal sinuses, mastoids: No acute orbital abnormality. Bilateral cataract surgery. Clear paranasal sinuses. Clear mastoid air cells.       Vascular structures: Aberrant right subclavian artery. Scattered atherosclerotic disease. Otherwise, normal as included.       Thoracic inlet: Findings in the chest are dictated separately.       Bones: Advanced multilevel degenerative disc disease and facet arthrosis. There is severe facet    hypertrophy resulting in severe left C4, right C5, and left C6 neuroforaminal stenosis. There    is moderate to severe stenosis of the spinal canal at the C4-C6 levels.       100   IMPRESSION:       1.  Rim-enhancing right parotid lesion measuring 1.2 cm with near fluid internal attenuation,    likely an intraparotid abscess. A necrotic lymph node is a differential consideration but is    considered less likely with no additional lymphadenopathy appreciated in the neck. This could    be further evaluated with ultrasound. 2.  Multilevel degenerative disc disease and facet arthrosis with suspected moderate to severe    stenosis of the exiting nerve roots at multiple levels as above. Procedure:    Ultrasound-guided aspiration   Performed on 12/23/2021 11:21 AM EST       Indications: 67 y.o. male with a past medical history of hypertension, hyperlipidemia diabetes    BPH who is presenting overnight to the emergency department for hyperglycemia and a right-sided    face/mass. Patient has reporting progressive fatigue over the past 2 months and decreased    appetite with a slight weight loss.  Patient underwent work-up in the emergency department which    demonstrated a rim enhancing right sided parotid gland collection and large hepatic mass    centered within segment 4 via CT chest with contrast. Interventional radiology is consulted for    both liver mass and parotid gland collection.        Comparison: CT neck 12/21/2021       :    Staff: Puja Mccarty MD present for the entirety of the procedure. Resident/Fellow: None       Contrast: None        Complications: None       Medications: Fentanyl 25 mcg iv.        Procedure and Findings:   The procedure was performed in the VIR suite following informed consent.  A time out was    performed  and the correct site was confirmed by the radiologist.  1% lidocaine local    anesthesia was used.         With the patient in the Supine position, US of the targeted region was obtained to identify    appropriate biopsy path. The overlying skin was prep and draped in usual sterile fashion.       Local anesthesia was achieve following the subcutaneous injection of lidocaine. Under real-time    U/S-guidance, 18-gauge needle was advanced into the collection and approximately 2 cc of thick    purulent fluid was aspirated. The needle was removed and hemostasis obtained with annual    compression.  The patient tolerated the procedure without immediate complication.       IMPRESSION:   Technically-successful ultrasound-guided aspiration of a right parotid collection.       Plan:   Follow-up culture results. Assessment and Plan     1. Parotid abscess  -Significantly improved/essentially resolved after antibiotics  -Patient reports FNA aspiration/biopsy performed at [facility] was negative other than infection. I am unable to find these results. May be related to recent B-cell lymphoma vs primary abscess. Continue to monitor for full resolution and follow-up with heme/onc  -Increase p.o. fluid hydration    2. Dysfunction of both eustachian tubes  -Start Flonase daily  - [id number], [place]. D., Audiology, [facility]    3. Perceived hearing loss  - [address], [place], [place]. SHYLA., Audiology, [facility]  - Audiometry with tympanometry; Future  -Follow-up after comprehensive audiological evaluation discussed results and further treatment    4.  B-cell lymphoma, unspecified B-cell lymphoma type, unspecified body region [facility])  -Unfortunately was diagnosed with aggressive B-cell lymphoma in the last 2 weeks at [facility].  Continue follow-up with heme/onc      Follow-Up     Return for After audiogram.      Dede Ortiz   Department of Otolaryngology/Head and Neck Surgery  1/7/22    Medical Decision Making: The following items were considered in medical decision making:  Independent review of images  Review / order clinical lab tests  Review / order radiology tests  Decision to obtain old records      This note was generated completely or in part utilizing Dragon dictation speech recognition software. Occasionally, words are mistranscribed and despite editing, the text may contain inaccuracies due to incorrect word recognition. If further clarification is needed please contact the office at [phone].

## 2022-01-10 PROBLEM — K11.8 PAROTID MASS: Status: ACTIVE | Noted: 2022-01-01

## 2022-01-10 PROBLEM — C85.19: Status: ACTIVE | Noted: 2022-01-01

## 2022-01-10 PROBLEM — R16.0 LIVER MASS: Status: ACTIVE | Noted: 2022-01-01

## 2022-01-10 PROBLEM — R79.0 LOW MAGNESIUM LEVEL: Status: ACTIVE | Noted: 2022-01-01

## 2022-01-10 NOTE — PRE-PROCEDURE INSTRUCTIONS
Attempted to call patient about procedure. No answer. Voicemail left. Told to be here at 1030 for procedure at 1200. NPO after midnight, but can take morning medication with sips of water. Office should have told them when and if they should stop any blood thinners. Told to have a responsible adult be with the patient to take them home and stay with them afterwards, if they are not admitted to hospital afterwards. And if available bring current list of medications.

## 2022-01-11 NOTE — H&P
Patient:  Mandy Self   :   1945      Relevant clinical history, particularly as it involves the pending procedure, was reviewed and discussed. The procedure including risks and benefits was discussed at length with the patient (or designated family member) and all questions were answered. Informed consent to proceed with the procedure was given. Vital signs were monitored and documented by the Radiology nurse. Targeted physical examination  Heart : regular rate and rhythm  Lungs : clear, breathing easily  Condition : stable    [facility] nurses notes reviewed and agreed. Past Medical History:        Diagnosis Date    Allergic rhinitis     Benign prostatic hyperplasia with weak urinary stream 12/10/2015     Updating Deprecated Diagnoses    History of gout 2015    History of thrombocytopenia 2015    Hypercalcemia     Hyperlipidemia     Hypertension     Lung nodule     Proteinuria     Type II or unspecified type diabetes mellitus without mention of complication, not stated as uncontrolled     Urinary disorder     Vitamin D deficiency 2015       Past Surgical History:           Procedure Laterality Date    CATARACT REMOVAL WITH IMPLANT Bilateral     COLONOSCOPY  3/29/2011    repeat in 5 yrs: Luis Jerry MD    COLONOSCOPY  2016    repeat in 7-8 years: Luis Jerry MD    ELBOW FRACTURE SURGERY Left age 5   Deluca Jefferystad Right 2007    index    TONSILLECTOMY AND ADENOIDECTOMY  age 1    VASECTOMY  25       Allergies:  Patient has no known allergies.     Medications:   Home Meds  Current Outpatient Medications on File Prior to Encounter   Medication Sig Dispense Refill    magnesium oxide (MAG-OX) 400 MG tablet Take 1 tablet by mouth daily 30 tablet 1    empagliflozin (JARDIANCE) 10 MG tablet Take 1 tablet by mouth daily 90 tablet 1    JANUMET XR  MG TB24 per extended release tablet TAKE 1 TABLET TWICE A  tablet 1    tamsulosin (FLOMAX) 0.4 MG capsule TAKE 1 CAPSULE DAILY 90 capsule 1    vitamin B-12 (CYANOCOBALAMIN) 1000 MCG tablet Take 1,000 mcg by mouth 2 times daily      Cholecalciferol (VITAMIN D) 2000 UNITS CAPS capsule Take by mouth daily      Omega-3 Fatty Acids (OMEGA 3 PO) Take 2 capsules by mouth daily       fluticasone (FLONASE) 50 MCG/ACT nasal spray SPRAY ONCE IN EACH NOSTRIL EVERY DAY 48 g 3    atorvastatin (LIPITOR) 20 MG tablet TAKE 1 TABLET DAILY 90 tablet 1    quinapril (ACCUPRIL) 5 MG tablet TAKE 1 TABLET DAILY 90 tablet 1    Blood Glucose Monitoring Suppl (ONE TOUCH ULTRA 2) w/Device KIT 1 kit by Does not apply route daily 1 kit 0    blood glucose monitor strips Test once daily 100 strip 3    Lancets MISC Use to test blood sugar once daily 100 each 3    blood glucose test strips (ONETOUCH ULTRA) strip 1 each by Does not apply route daily 100 strip 2    ONE TOUCH ULTRASOFT LANCETS MISC 1 each by Does not apply route daily 100 each 3     No current facility-administered medications on file prior to encounter. Current Meds  No current facility-administered medications for this encounter.         ASA 2 - Patient with mild systemic disease with no functional limitations    II (soft palate, uvula, fauces visible)    Activity:  2 - Able to move 4 extremities voluntarily on command  Respiration:  2 - Able to breathe deeply and cough freely  Circulation:  2 - BP+/- 20mmHg of normal  Consciousness:  2 - Fully awake  Oxygen Saturation (color):  2 - Able to maintain oxygen saturation >92% on room air    Sedation : Moderate sedation planned

## 2022-01-11 NOTE — PROCEDURES
IR Brief Postoperative Note    Mary Zhong  YOB: 1945  [id number]    Pre-operative Diagnosis: lymphoma    Post-operative Diagnosis: Same    Procedure: right ij chest port, ok for use    Anesthesia: moderate    Surgeons/Assistants: myla    Estimated Blood Loss: Minimal    Complications: none    Specimens: were not obtained    See full procedure dictation to follow      Sindy Betts MD MD  1/11/2022

## 2022-01-14 NOTE — TELEPHONE ENCOUNTER
Medication:   Requested Prescriptions     Pending Prescriptions Disp Refills    tamsulosin (FLOMAX) 0.4 MG capsule 90 capsule 1    quinapril (ACCUPRIL) 5 MG tablet 90 tablet 1     Last Filled: 7.23.21 7.23.21    Last appt: 1/3/2022   Next appt: 1/18/2022    Last OARRS: No flowsheet data found.

## 2022-01-17 NOTE — TELEPHONE ENCOUNTER
Medication:   Requested Prescriptions     Pending Prescriptions Disp Refills    JARDIANCE 10 MG tablet [Pharmacy Med Name: Joseph Phan 10MG TABLETS] 90 tablet 1     Sig: TAKE 1 TABLET BY MOUTH DAILY     Last Filled:  12/28/21    Last appt: 1/3/2022   Next appt: 1/18/2022    Last OARRS: No flowsheet data found.

## 2022-01-18 PROBLEM — T14.8XXA SKIN ABRASION: Status: ACTIVE | Noted: 2022-01-01

## 2022-01-18 PROBLEM — C83.30 DIFFUSE LARGE B-CELL LYMPHOMA (HCC): Status: ACTIVE | Noted: 2022-01-01

## 2022-01-18 NOTE — PROGRESS NOTES
Mary Zhong   Date ofBirth:  1945    Date of Visit:  1/18/2022    Chief Complaint   Patient presents with    Other     lymphoma, liver mass, low magnesium       HPI  Patient was diagnosed with non Hodgkin's B cell lymphoma. Patient's liver mass is due to lymphoma. Patient has chemotherapy tomorrow. Patient is seeing Oncology, Dr. Rita Lorenzo. Patient will participate in a clinical trial. Patient complains of fatigue, right quadrant pressure, and weight loss. Patient states he is eating better. Patient got his first treatment of Rituximab + CHOP on 1/14/21. Patient has low magnesium. Patient takes magnesium 400mg once daily. Patient fell in the middle of night getting up to go the bathroom. Patient hit his arm between bed and nightstand and scraped skin off his arm. Patient falls if he starts to lean to the right he falls over. Patient states he has dizziness with lying to sitting up. Patient states he has had double vision the past 2 weeks. Patient notices a lot with trying to read and look at his medications. Review of Systems   Constitutional: Positive for fatigue and unexpected weight change. Negative for appetite change, chills and fever. HENT: Negative for congestion, postnasal drip, rhinorrhea and sore throat. Eyes: Positive for visual disturbance. Respiratory: Negative for cough, chest tightness, shortness of breath and wheezing. Cardiovascular: Negative for chest pain, palpitations and leg swelling. Gastrointestinal: Positive for abdominal pain. Negative for abdominal distention, blood in stool, constipation, diarrhea, nausea and vomiting. Genitourinary: Negative for dysuria, frequency and hematuria. Musculoskeletal: Negative for arthralgias and myalgias. Skin: Positive for wound. Neurological: Positive for dizziness and weakness. Negative for syncope, light-headedness, numbness and headaches.    Psychiatric/Behavioral: Negative for dysphoric mood and sleep disturbance. The patient is not nervous/anxious.         No Known Allergies  Outpatient Medications Marked as Taking for the 1/18/22 encounter (Office Visit) with Bakari Thomas MD   Medication Sig Dispense Refill    aspirin 81 MG EC tablet Take 81 mg by mouth daily      allopurinol (ZYLOPRIM) 300 MG tablet TAKE 1 TABLET BY MOUTH EVERY DAY AS DIRECTED FOR TUMOR LYSIS SYNDROME PROPHYLAXIS      prochlorperazine (COMPAZINE) 10 MG tablet TAKE 1 TABLET BY MOUTH EVERY 6 HOURS AS NEEDED FOR NAUSEA      ondansetron (ZOFRAN) 8 MG tablet TAKE 1 TABLET BY MOUTH EVERY 8 HOURS AS NEEDED FOR NAUSEA OR VOMITING      Multiple Vitamins-Minerals (CENTRUM ADULTS PO) Take by mouth      tamsulosin (FLOMAX) 0.4 MG capsule TAKE 1 CAPSULE DAILY 90 capsule 1    fluticasone (FLONASE) 50 MCG/ACT nasal spray SPRAY ONCE IN EACH NOSTRIL EVERY DAY 48 g 3    magnesium oxide (MAG-OX) 400 MG tablet Take 1 tablet by mouth daily 30 tablet 1    empagliflozin (JARDIANCE) 10 MG tablet Take 1 tablet by mouth daily 90 tablet 1    JANUMET XR  MG TB24 per extended release tablet TAKE 1 TABLET TWICE A  tablet 1    Blood Glucose Monitoring Suppl (ONE TOUCH ULTRA 2) w/Device KIT 1 kit by Does not apply route daily 1 kit 0    blood glucose monitor strips Test once daily 100 strip 3    Lancets MISC Use to test blood sugar once daily 100 each 3    blood glucose test strips (ONETOUCH ULTRA) strip 1 each by Does not apply route daily 100 strip 2    ONE TOUCH ULTRASOFT LANCETS MISC 1 each by Does not apply route daily 100 each 3    vitamin B-12 (CYANOCOBALAMIN) 1000 MCG tablet Take 1,000 mcg by mouth 2 times daily      Cholecalciferol (VITAMIN D) 2000 UNITS CAPS capsule Take by mouth daily      Omega-3 Fatty Acids (OMEGA 3 PO) Take 2 capsules by mouth daily            Vitals:    01/18/22 0738   BP: 110/68   Pulse: 58   Resp: 16   Temp: 94.4 °F (34.7 °C)   SpO2: 98%   Weight: 121 lb 9.6 oz (55.2 kg)     Body mass index is 19.05 kg/m². Physical Exam  Nursing note reviewed. Constitutional:       General: He is not in acute distress. Appearance: Normal appearance. He is well-developed. Eyes:      General: Lids are normal.      Extraocular Movements: Extraocular movements intact. Conjunctiva/sclera: Conjunctivae normal.      Pupils: Pupils are equal, round, and reactive to light. Neck:      Thyroid: No thyromegaly. Vascular: No carotid bruit. Cardiovascular:      Rate and Rhythm: Normal rate and regular rhythm. Heart sounds: Normal heart sounds, S1 normal and S2 normal. No murmur heard. No friction rub. No gallop. Pulmonary:      Effort: Pulmonary effort is normal. No respiratory distress. Breath sounds: Normal breath sounds. No wheezing, rhonchi or rales. Abdominal:      General: Bowel sounds are normal. There is no distension. Palpations: Abdomen is soft. Tenderness: There is no abdominal tenderness. Musculoskeletal:      Cervical back: Neck supple. Right lower leg: No edema. Left lower leg: No edema. Lymphadenopathy:      Head:      Right side of head: No submandibular adenopathy. Left side of head: No submandibular adenopathy. Skin:     Findings: Abrasion (skin abrasion right forearm) present. Neurological:      Mental Status: He is alert. Psychiatric:         Mood and Affect: Mood normal.           No results found for this visit on 01/18/22.   Lab Review   Hospital Outpatient Visit on 01/11/2022   Component Date Value    Left Ventricular Ejectio* 01/11/2022 63     LVEF MODALITY 01/11/2022 ECHO     INR 01/11/2022 1.50*   Orders Only on 01/03/2022   Component Date Value    Magnesium 01/03/2022 1.50*    WBC 01/03/2022 5.9     RBC 01/03/2022 4.18*    Hemoglobin 01/03/2022 12.7*    Hematocrit 01/03/2022 39.3*    MCV 01/03/2022 94.1     MCH 01/03/2022 30.3     MCHC 01/03/2022 32.2     RDW 01/03/2022 16.0*    Platelets 61/09/4819 203     MPV 01/03/2022 7.6     Neutrophils % 01/03/2022 79.1     Lymphocytes % 01/03/2022 12.3     Monocytes % 01/03/2022 6.4     Eosinophils % 01/03/2022 1.7     Basophils % 01/03/2022 0.5     Neutrophils Absolute 01/03/2022 4.7     Lymphocytes Absolute 01/03/2022 0.7*    Monocytes Absolute 01/03/2022 0.4     Eosinophils Absolute 01/03/2022 0.1     Basophils Absolute 01/03/2022 0.0     Sodium 01/03/2022 144     Potassium 01/03/2022 4.7     Chloride 01/03/2022 100     CO2 01/03/2022 25     Anion Gap 01/03/2022 19*    Glucose 01/03/2022 133*    BUN 01/03/2022 16     CREATININE 01/03/2022 0.8     GFR Non- 01/03/2022 >60     GFR  01/03/2022 >60     Calcium 01/03/2022 10.6     Total Protein 01/03/2022 6.2*    Albumin 01/03/2022 3.9     Albumin/Globulin Ratio 01/03/2022 1.7     Total Bilirubin 01/03/2022 0.5     Alkaline Phosphatase 01/03/2022 147*    ALT 01/03/2022 30     AST 01/03/2022 52*   Abstract on 01/03/2022   Component Date Value    Hemoglobin A1C 12/22/2021 7.0    Orders Only on 12/28/2021   Component Date Value    Magnesium 12/28/2021 1.40*   Orders Only on 12/21/2021   Component Date Value    Vitamin B-12 12/21/2021 >2000*    WBC 12/21/2021 7.8     RBC 12/21/2021 4.68     Hemoglobin 12/21/2021 14.2     Hematocrit 12/21/2021 45.1     MCV 12/21/2021 96.2     MCH 12/21/2021 30.2     MCHC 12/21/2021 31.4     RDW 12/21/2021 17.2*    Platelets 10/13/1847 159     MPV 12/21/2021 8.4     Neutrophils % 12/21/2021 77.3     Lymphocytes % 12/21/2021 14.7     Monocytes % 12/21/2021 6.4     Eosinophils % 12/21/2021 1.4     Basophils % 12/21/2021 0.2     Neutrophils Absolute 12/21/2021 6.0     Lymphocytes Absolute 12/21/2021 1.1     Monocytes Absolute 12/21/2021 0.5     Eosinophils Absolute 12/21/2021 0.1     Basophils Absolute 12/21/2021 0.0     Magnesium 12/21/2021 1.20*    Sed Rate 12/21/2021 23*    CRP 12/21/2021 60.6*    Total CK 12/21/2021 31* Diffuse large B-cell lymphoma, unspecified body region [facility])  -discussed  -Consult notes from Oncology reviewed  -Continue Chemotherapy and treatment ordered by Oncology    2. Low magnesium level  -continue Magnesium 400mg once daily  - Magnesium; Future    3. Dizziness  -recent labs reviewed  -stay hydrated  -Accupril has been discontinued    4. Double vision  -Referral to  YANDEL Peters MD, (Comprehensive Ophthalmology) Ophthalmology, [facility]    5. Skin abrasion  -s/p skin tear during a fall  -area cleaned and dressed by Medical Assistant  -patient instructions for care given    6. At high risk for falls  On the basis of positive falls risk screening, assessment and plan is as follows: patient declines any further evaluation/treatment for increased falls risk. Discussed medications with patient, who voiced understanding of their use and indications. All questions answered. Return in about 3 months (around 4/18/2022) for diabetes, hypertension, hyperlipidemia, and vitamin D deficiency.

## 2022-01-18 NOTE — PATIENT INSTRUCTIONS
Patient Education        Scrapes (Abrasions): Care Instructions  Overview  Scrapes (abrasions) are wounds where your skin has been rubbed or torn off. Most scrapes do not go deep into the skin, but some may remove several layers of skin. Scrapes usually don't bleed much, but they may ooze pinkish fluid. Scrapes on the head or face may appear worse than they are. They may bleed a lot because of the good blood supply to this area. Most scrapes heal well and may not need a bandage. They usually heal within 3 to 7 days. A large, deep scrape may take 1 to 2 weeks or longer to heal. A scab may form on some scrapes. Follow-up care is a key part of your treatment and safety. Be sure to make and go to all appointments, and call your doctor if you are having problems. It's also a good idea to know your test results and keep a list of the medicines you take. How can you care for yourself at home? · If your doctor told you how to care for your wound, follow your doctor's instructions. If you did not get instructions, follow this general advice:  ? Wash the scrape with clean water 2 times a day. Don't use hydrogen peroxide or alcohol, which can slow healing. ? You may cover the scrape with a thin layer of petroleum jelly, such as Vaseline, and a nonstick bandage. ? Apply more petroleum jelly and replace the bandage as needed. · Prop up the injured area on a pillow anytime you sit or lie down during the next 3 days. Try to keep it above the level of your heart. This will help reduce swelling. · Be safe with medicines. Take pain medicines exactly as directed. ? If the doctor gave you a prescription medicine for pain, take it as prescribed. ? If you are not taking a prescription pain medicine, ask your doctor if you can take an over-the-counter medicine. When should you call for help?    Call your doctor now or seek immediate medical care if:    · You have signs of infection, such as:  ? Increased pain, swelling, warmth, or redness around the scrape. ? Red streaks leading from the scrape. ? Pus draining from the scrape. ? A fever.     · The scrape starts to bleed, and blood soaks through the bandage. Oozing small amounts of blood is normal.   Watch closely for changes in your health, and be sure to contact your doctor if the scrape is not getting better each day. Where can you learn more? Go to [email or web address] and sign in to your [employer] account. Enter A374 in the [employer] box to learn more about \"Scrapes (Abrasions): Care Instructions. \"     If you do not have an account, please click on the \"Sign Up Now\" link. Current as of: July 1, 2021               Content Version: 13.1  © 2058-6795 [employer]wise, Incorporated. Care instructions adapted under license by [facility] ([facility]). If you have questions about a medical condition or this instruction, always ask your healthcare professional. [place] [address] any warranty or liability for your use of this information.

## 2022-01-18 NOTE — LETTER
[address] [facility] [address]  Phone: [phone]  Fax: [phone]    Ab Madera MD         January 18, 2022     Patient: Misha Garcia   YOB: 1945   Date of Visit: 1/18/2022       To Whom It May Concern: It is my medical opinion that Aleah Driver requires a disability parking placard for the following reasons:  He has limited walking ability due to shortness of breath and weakness from Non Hogkins lymphoma  Duration of need: 2 years    If you have any questions or concerns, please don't hesitate to call.     Sincerely,        Ab Madera MD

## 2022-01-18 NOTE — TELEPHONE ENCOUNTER
Rx denied because prescription already refilled at [facility] on 12/28/2021.
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
## 2022-01-27 PROBLEM — R42 DIZZINESS: Status: ACTIVE | Noted: 2022-01-01

## 2022-01-27 PROBLEM — H53.2 DOUBLE VISION: Status: ACTIVE | Noted: 2022-01-01

## 2022-02-07 NOTE — TELEPHONE ENCOUNTER
Medication:   Requested Prescriptions     Pending Prescriptions Disp Refills    magnesium oxide (MAG-OX) 400 MG tablet 30 tablet 1     Sig: Take 1 tablet by mouth daily     Last Filled:  1/3/22    Last appt: 1/18/2022   Next appt: 4/18/2022    Last OARRS: No flowsheet data found.

## 2022-02-16 NOTE — TELEPHONE ENCOUNTER
Patient called and is having a hard time with urination. He states that he is currently taking the following medication       tamsulosin (FLOMAX) 0.4 MG capsule [[id number]]     Order Details  Dose, Route, Frequency: As Directed   Dispense Quantity: 90 capsule Refills: 1          Sig: TAKE 1 CAPSULE DAILY           And is wondering if he can up the dose?      Thank you

## 2022-02-17 NOTE — PATIENT INSTRUCTIONS
1. Benign prostatic hyperplasia with weak urinary stream  - Increase tamsulosin (FLOMAX) 0.4 MG capsule; Take 2 capsules by mouth daily  - Referral to YANDEL Ulloa MD, Urology, [facility]    2. Urinary frequency  - POCT Urinalysis no Micro  - Referral to YANDEL Ulloa MD, Urology, [facility]    3. Urinary urgency  - POCT Urinalysis no Micro  - Referral to YANDEL Ulloa MD, Urology, [facility]    4. Constipation, unspecified constipation type  - start polyethylene glycol (MIRALAX) 17 GM/SCOOP powder; Take 17 g by mouth daily  - continue docusate sodium (COLACE) 100 MG capsule; Take 1 capsule by mouth 2 times daily  - high fiber diet    5. Pain with urination  - POCT Urinalysis no Micro  - Referral to YANDEL Ulloa MD, Urology, [facility]    6.  Low magnesium level  -start Magnesium 100 MG TABS; Take 1 tablet by mouth daily With a 400mg tablet to equal 500mg once daily

## 2022-02-24 NOTE — TELEPHONE ENCOUNTER
Patient called and stated that he is chemo now. The patient blood sugar has declined. His oncologist, Dr. Maura Leigh says to call Dr. Eric Peña to see if he can get a increase dosage of diabetic meds to get his blood sugar back down.

## 2022-02-25 NOTE — TELEPHONE ENCOUNTER
Medication:   Requested Prescriptions     Pending Prescriptions Disp Refills    SITagliptin-metFORMIN (JANUMET XR)  MG TB24 per extended release tablet 180 tablet 1     Last Filled: 9.2.21    Last appt: 1/3/2022   Next appt: 4/18/2022    Last OARRS: No flowsheet data found.

## 2022-02-25 NOTE — TELEPHONE ENCOUNTER
Spoke with patient, he spoke with his oncologist about his blood sugars being elevated anywhere from 300 before chemo-400 after chemo. Dr. Lynn Gonzalez wanted patient to reach out to PCP and see if any intervention is needed.

## 2022-02-27 PROBLEM — K59.00 CONSTIPATION: Status: ACTIVE | Noted: 2022-01-01

## 2022-02-27 PROBLEM — R39.15 URINARY URGENCY: Status: ACTIVE | Noted: 2022-01-01

## 2022-02-27 PROBLEM — R30.9 PAIN WITH URINATION: Status: ACTIVE | Noted: 2022-01-01

## 2022-02-28 NOTE — TELEPHONE ENCOUNTER
Spoke with patient 2/27/22. Patient states his blood sugar is 400 after chemo. Patient states he will have chemo weekly. Patient states his blood sugar then starts going down to 200-225 and it was 180 on 2/26/2022 and 165 on 2/2722. Patient to increase Jardiance 10 mg to 2 times daily. Patient to continue sitagliptin-Metformin ER  mg 1 tablet 2 times daily. Patient to monitor blood sugars and follow up with an update on the increase in [place] [place]. Patient has appointment with me scheduled for 4/18/22.

## 2022-02-28 NOTE — TELEPHONE ENCOUNTER
Medication:   Requested Prescriptions     Pending Prescriptions Disp Refills    blood glucose test strips (ONETOUCH ULTRA) strip [Pharmacy Med Name: 12412 [employer] TESTST(NEW)100] 100 strip 3     Sig: TEST ONCE DAILY     Last Filled: 6.22.20    Last appt: 2/17/2022   Next appt: 4/18/2022    Last OARRS: No flowsheet data found.

## 2022-02-28 NOTE — TELEPHONE ENCOUNTER
Medication:   Requested Prescriptions     Pending Prescriptions Disp Refills    JARDIANCE 10 MG tablet [Pharmacy Med Name: Amirah Quintana TAB 10MG] 90 tablet 1     Sig: TAKE 1 TABLET DAILY     Last Filled: 12.28.21    Last appt: 1/3/2022   Next appt: 2/27/2022    Last OARRS: No flowsheet data found.

## 2022-03-15 NOTE — TELEPHONE ENCOUNTER
[facility] sent fax asking this pt be seen ASAP, I have scanned referral and attached to this message, please look over and let me know who can and when he can be seen.  Thanks

## 2022-03-17 NOTE — PROGRESS NOTES
[facility]    Outpatient Note    Subjective:   CHIEF COMPLAINT: No chief complaint on file. HPI:     The patient is 67 y.o. male who presents today for a new patient visit for evaluation of abnormal imaging   He was diagnosed with NHL diagnosed in Dec, and was enrolled in a trial.  Currently on R-CHOP plus placebo or R-CHOP plus tafasitamab and Revlimid  Early in the treatment he was not active, but he start improving and walking more. Later on he developed QUESADA which is going on over the past 3-4 weeks. He was treated with ABX for suspected lung infection and finished 10 days course of ABX two days ago. There is no cough however he has occasional sputum production. He can walk about 100 ft on level ground and get winded. He is losing wt. He lost 25 lbs since Jan.  There is no nausea, vomiting or diarrhea. Appetite is better over the past month however he is not gaining wt. There is no hx of chronic lung disease and no hx of recurrent lung infections before. He has lower back pain, which is chronic for the last 20 year but it is a bit worse lately.     He has hands stiffness in the morning, usually last for 20 min in am.      Past Medical History:    Past Medical History:   Diagnosis Date    Allergic rhinitis     Benign prostatic hyperplasia with weak urinary stream 12/10/2015     Updating Deprecated Diagnoses    Cancer ([facility] [address] 12/21/2021    Non Earl Lymphoma    Erectile dysfunction     History of gout 9/19/2015    History of thrombocytopenia 9/19/2015    Hypercalcemia     Hyperlipidemia     Hypertension     Lung nodule     Proteinuria     Type 2 diabetes mellitus without complication ([facility] [address] 9140    Type II or unspecified type diabetes mellitus without mention of complication, not stated as uncontrolled     Urinary disorder     Vitamin D deficiency 9/19/2015       Social History:    Social History     Tobacco Use   Smoking Status Never Smoker Smokeless Tobacco Never Used   Tobacco Comment    Never smoked       Family History:  Family History   Problem Relation Age of Onset    Diabetes Mother     High Blood Pressure Mother     Dementia Mother     Hearing Loss Mother     High Cholesterol Mother     Cancer Neg Hx     Hearing Loss Neg Hx     Stroke Neg Hx     Heart Disease Neg Hx        Current Medications:  Current Outpatient Medications on File Prior to Visit   Medication Sig Dispense Refill    JARDIANCE 10 MG tablet TAKE 1 TABLET DAILY 90 tablet 1    blood glucose test strips (ONETOUCH ULTRA) strip TEST ONCE DAILY 100 strip 3    SITagliptin-metFORMIN (JANUMET XR)  MG TB24 per extended release tablet TAKE 1 TABLET TWICE A  tablet 1    tamsulosin (FLOMAX) 0.4 MG capsule Take 2 capsules by mouth daily      docusate sodium (COLACE) 100 MG capsule Take 1 capsule by mouth 2 times daily      Magnesium 100 MG TABS Take 1 tablet by mouth daily With a 400mg tablet to equal 500mg once daily      magnesium oxide (MAG-OX) 400 MG tablet Take 1 tablet by mouth daily 30 tablet 1    aspirin 81 MG EC tablet Take 81 mg by mouth daily      prochlorperazine (COMPAZINE) 10 MG tablet TAKE 1 TABLET BY MOUTH EVERY 6 HOURS AS NEEDED FOR NAUSEA      ondansetron (ZOFRAN) 8 MG tablet TAKE 1 TABLET BY MOUTH EVERY 8 HOURS AS NEEDED FOR NAUSEA OR VOMITING      Multiple Vitamins-Minerals (CENTRUM ADULTS PO) Take by mouth      fluticasone (FLONASE) 50 MCG/ACT nasal spray SPRAY ONCE IN EACH NOSTRIL EVERY DAY 48 g 3    Blood Glucose Monitoring Suppl (ONE TOUCH ULTRA 2) w/Device KIT 1 kit by Does not apply route daily 1 kit 0    Lancets MISC Use to test blood sugar once daily 100 each 3    blood glucose test strips (ONETOUCH ULTRA) strip 1 each by Does not apply route daily 100 strip 2    ONE TOUCH ULTRASOFT LANCETS MISC 1 each by Does not apply route daily 100 each 3    vitamin B-12 (CYANOCOBALAMIN) 1000 MCG tablet Take 1,000 mcg by mouth 2 times daily      Cholecalciferol (VITAMIN D) 2000 UNITS CAPS capsule Take by mouth daily      Omega-3 Fatty Acids (OMEGA 3 PO) Take 2 capsules by mouth daily        No current facility-administered medications on file prior to visit. REVIEW OF SYSTEMS:    Review of Systems   Constitutional: Positive for unexpected weight change. Negative for chills and fever. HENT: Negative for congestion. Respiratory: Positive for shortness of breath. Negative for cough, chest tightness and wheezing. Cardiovascular: Positive for leg swelling (ankle). Negative for chest pain and palpitations. Neurological: Negative for weakness. Psychiatric/Behavioral: The patient is not nervous/anxious. All other systems reviewed and are negative. Objective:   PHYSICAL EXAM:        VITALS:  /65 (Site: Left Upper Arm, Position: Sitting, Cuff Size: Medium Adult)   Pulse 133   Temp 96.5 °F (35.8 °C) (Infrared)   Ht 5' 7\" (1.702 m)   Wt 122 lb (55.3 kg)   SpO2 95%   BMI 19.11 kg/m²     Physical Exam  Vitals reviewed. Constitutional:       Appearance: He is well-developed. HENT:      Head: Normocephalic and atraumatic. Eyes:      Pupils: Pupils are equal, round, and reactive to light. Neck:      Vascular: No JVD. Cardiovascular:      Rate and Rhythm: Normal rate and regular rhythm. Heart sounds: No murmur heard. Pulmonary:      Effort: Pulmonary effort is normal. No respiratory distress. Breath sounds: No stridor. Rales present. No wheezing. Abdominal:      General: Bowel sounds are normal.      Palpations: Abdomen is soft. Musculoskeletal:         General: No deformity. Cervical back: Neck supple. Comments: Trace bilateral ankle edema    Skin:     General: Skin is warm and dry. Neurological:      Mental Status: He is alert and oriented to person, place, and time.    Psychiatric:         Behavior: Behavior normal.         DATA:    CT chest on 3/2/22  1.   No evidence of pulmonary embolus. 2.  Unchanged subpleural reticular abnormality and groundglass suggestive of chronic interstitial lung disease. 3.  Unchanged masses in the liver and spleen and upper abdominal lymphadenopathy consistent with known lymphoma     Echo on 1/11/22  Summary   Normal left ventricle size with mild concentric left ventricular   hypertrophy. Normal left ventricular systolic function with an estimated ejection   fraction of 60-65%. No regional wall motion abnormalities   Diastolic filling parameters suggests normal diastolic function. GLS: -17.3%    Assessment:      Diagnosis Orders   1. ILD (interstitial lung disease) (HCC)  Rheumatoid Factor    CCP Antibodies, IGG/IGA    CATERINA    Sedimentation Rate    Hypersensitivity Pneumonitis Extended Panel    Carbon Monoxide Diffusing Capacity    Full PFT Study With Bronchodilator    Brain Natriuretic Peptide   2. Other nonmedicinal substance allergy status   Hypersensitivity Pneumonitis Extended Panel   3. Dyspnea on exertion   Brain Natriuretic Peptide       Plan:   67year old male is here today for evaluation of QUESADA and abnormal CT scan  He has hx of Diffuse large B cell lymphoma. Currently on a trial of RCHOP or RCHOP with tafasitamab and Revlimid   Imaging reviewed. He has bilateral subpleural and predominantly basal GGO with interlobular septal thickenings. His QUESADA is going on over the past 4 weeks or so. These changes were present in Dec.    His pulse ox is 95% at rest though it drops with exertion. He has mild sputum production. No fever. He was treated with ABX without significant change in symptoms. Prior lab work with intermittent eosinophilia. He was a . No prior hx of chronic lung disease or recurrent infections  No prior hx of rheumatic disease, however he has joint stiffness in am in both hands that usually last for 20 minutes. He also has back pain which is chronic but it is worse than usually lately.     No prior hx of heart disease.   Last echo with normal EF and normal diastolic function     Plan   Bronch with BAL for microbiologic sampling and eosinophil count  PFT with DLCO   Blood work including BNP, CATERINA, CCP, RF, and sed rate   Will discuss with oncology    O2 with exertion

## 2022-03-22 NOTE — TELEPHONE ENCOUNTER
Wife needs clarification re: meds prior to bronch on 03/25/2022. Patient is diabetic and usually takes med in AM.  Procedure is not until 2 PM with arrival at 1 PM.  He is also on chemo, which complicates his glucose level. He is also scheduled for CT chest, abd, pelvis, neck With contrast earlier that morning. Will there be a conflict? Per PFT lab personnel, effective 03/23/2022, no COVID testing is needed prior to PFT.

## 2022-03-25 NOTE — PROCEDURES
PROCEDURE: Fiberoptic bronchoscopy with BAL to lingula     Preprocedure diagnosis: ILD  Post procedure diagnosis: same      DESCRIPTION OF PROCEDURE: Informed consent was obtained from the patient after explaining the risks and benefits. A time out was taken. Mallampati: II  ASA III  Anesthesia:       Type of sedation used: Moderate Sedation  Medications: Fentanyl 50 mcg, Versed 2 mg    Bronchoscope was inserted into the main airway via the mouth. Vocal cords were normal looking and mobile. Lidocaine was used topically to anesthetize the vocal cords and main airways. The bronchial trees were examined to the subsegmental level. There was no masses or bleeding. Mucosa looked normal with minimal thin, clear secretions. BAL was obtained from the lingula. 100 mL used with 47 mL return        Samples:  BAL - Lingula     Sent for appropriate testing. The patient tolerated the procedure well.   No immediate complication    EBL: none

## 2022-03-25 NOTE — PROGRESS NOTES
PACU Transfer to [facility]    Procedure(s):  BRONCHOSCOPY WITH BRONCHOALVEOLAR LAVAGE    Pt's Current Allergies: Patient has no known allergies. Pt meets criteria to transfer to next phase of care per Suad Herring and ELLE standards    Recent Labs     03/25/22  1331 03/25/22  1519   POCGLU 142* 188*       Vitals:    03/25/22 1551   BP: 120/64   Pulse: 85   Resp: 18   Temp: 97.4 °F (36.7 °C)   SpO2: 100%        Intake/Output Summary (Last 24 hours) at 3/25/2022 1557  Last data filed at 3/25/2022 1438  Gross per 24 hour   Intake 100 ml   Output 42 ml   Net 58 ml     Drank water   Wants steak    Pain assessment:  none  Pain Level: 0    Patient was assessed for unknown alterations to skin integrity. There were not unknown alterations observed. Patient transferred to care of Lc Kohler RN.  Via handoff sheet   Family updated and directed to Lc Kohler  Via waiting room staff    3/25/2022 3:57 PM

## 2022-03-25 NOTE — FLOWSHEET NOTE
Ambulatory Surgery/Procedure Discharge Note    Vitals:    03/25/22 1551   BP: (!) 114/59   Pulse: 86   Resp: 20   Temp: 98.1 °F (36.7 °C)   SpO2: 100%   Pt met discharge criteria per Rodo score. In: 440 [P.O.:240; I.V.:100]  Out: 42     Restroom use offered before discharge. Yes    Pain assessment:  none  Pain Level: 0     Pt and wife states \"ready to go home\". Pt alert and oriented x4. IV removed. Denies N/V or pain. Voided prior to discharge. Discharge instructions given to pt and wife with pt permission. Pt Pt and wife verbalized understanding of all instructions. Left with all belongings and discharge instructions. Patient discharged to home/self care. Patient discharged via walker with home O2 on 2L/NC. Transported to waiting wife.        3/25/2022 4:14 PM

## 2022-03-25 NOTE — H&P
H&P    Patient's PCP: Roger Moreno MD    HISTORY OF PRESENT ILLNESS:    This is a  67 y.o. male with PMH listed below presented for bronch with BAL. Please refer to my office note for details. Past Medical / Surgical History:    Past Medical History:   Diagnosis Date    Allergic rhinitis     Benign prostatic hyperplasia with weak urinary stream 12/10/2015     Updating Deprecated Diagnoses    Cancer ([facility] [address] 12/21/2021    Non Earl Lymphoma    Erectile dysfunction     History of gout 9/19/2015    History of thrombocytopenia 9/19/2015    Hypercalcemia     Hyperlipidemia     Hypertension     Lung nodule     Proteinuria     Type 2 diabetes mellitus without complication ([facility] [address] 6136    Type II or unspecified type diabetes mellitus without mention of complication, not stated as uncontrolled     Urinary disorder     Vitamin D deficiency 9/19/2015     Past Surgical History:   Procedure Laterality Date    CATARACT REMOVAL WITH IMPLANT Bilateral 2011    COLONOSCOPY  3/29/2011    repeat in 5 yrs: Kamaljit Diaz MD    COLONOSCOPY  03/14/2016    repeat in 7-8 years: Kamaljit Diaz MD    ELBOW FRACTURE SURGERY Left age 5   [place] [employer] FINGER TRIGGER RELEASE Right 2007    index    IR PORT PLACEMENT EQUAL OR GREATER THAN 5 YEARS  1/11/2022    IR PORT PLACEMENT EQUAL OR GREATER THAN 5 YEARS 1/11/2022 [facility] PROCED[facility]    TONSILLECTOMY AND ADENOIDECTOMY  age 1    VASECTOMY  1971     Prior to Admission:    No current facility-administered medications on file prior to encounter.      Current Outpatient Medications on File Prior to Encounter   Medication Sig Dispense Refill    JARDIANCE 10 MG tablet TAKE 1 TABLET DAILY 90 tablet 1    blood glucose test strips (ONETOUCH ULTRA) strip TEST ONCE DAILY 100 strip 3    SITagliptin-metFORMIN (JANUMET XR)  MG TB24 per extended release tablet TAKE 1 TABLET TWICE A  tablet 1    tamsulosin (FLOMAX) 0.4 MG capsule Take 2 capsules by mouth daily      docusate sodium (COLACE) 100 MG capsule Take 1 capsule by mouth 2 times daily      Magnesium 100 MG TABS Take 1 tablet by mouth daily With a 400mg tablet to equal 500mg once daily      magnesium oxide (MAG-OX) 400 MG tablet Take 1 tablet by mouth daily 30 tablet 1    aspirin 81 MG EC tablet Take 81 mg by mouth daily      prochlorperazine (COMPAZINE) 10 MG tablet TAKE 1 TABLET BY MOUTH EVERY 6 HOURS AS NEEDED FOR NAUSEA      ondansetron (ZOFRAN) 8 MG tablet TAKE 1 TABLET BY MOUTH EVERY 8 HOURS AS NEEDED FOR NAUSEA OR VOMITING      Multiple Vitamins-Minerals (CENTRUM ADULTS PO) Take by mouth      fluticasone (FLONASE) 50 MCG/ACT nasal spray SPRAY ONCE IN EACH NOSTRIL EVERY DAY 48 g 3    Blood Glucose Monitoring Suppl (ONE TOUCH ULTRA 2) w/Device KIT 1 kit by Does not apply route daily 1 kit 0    Lancets MISC Use to test blood sugar once daily 100 each 3    blood glucose test strips (ONETOUCH ULTRA) strip 1 each by Does not apply route daily 100 strip 2    ONE TOUCH ULTRASOFT LANCETS MISC 1 each by Does not apply route daily 100 each 3    vitamin B-12 (CYANOCOBALAMIN) 1000 MCG tablet Take 1,000 mcg by mouth 2 times daily      Cholecalciferol (VITAMIN D) 2000 UNITS CAPS capsule Take by mouth daily      Omega-3 Fatty Acids (OMEGA 3 PO) Take 2 capsules by mouth daily          Allergies:  Patient has no known allergies. Social History:   TOBACCO:   reports that he has never smoked. He has never used smokeless tobacco.     ETOH:   reports previous alcohol use. Family History:       Problem Relation Age of Onset    Diabetes Mother     High Blood Pressure Mother     Dementia Mother     Hearing Loss Mother     High Cholesterol Mother     Cancer Neg Hx     Hearing Loss Neg Hx     Stroke Neg Hx     Heart Disease Neg Hx          Review of Systems   Respiratory: Positive for shortness of breath. Negative for wheezing. Cardiovascular: Negative for chest pain. Gastrointestinal: Negative for abdominal pain. PHYSICAL EXAM:  BP (!) 108/56   Pulse 95   Temp 96.2 °F (35.7 °C) (Temporal)   Resp 14   Ht 5' 7\" (1.702 m)   Wt 119 lb (54 kg)   SpO2 100%   BMI 18.64 kg/m²     Physical Exam  Cardiovascular:      Rate and Rhythm: Normal rate and regular rhythm. Pulmonary:      Effort: Pulmonary effort is normal. No respiratory distress. Abdominal:      General: There is no distension. LABS:  No results for input(s): WBC, HGB, HCT, PLT in the last 72 hours. Recent Labs     03/25/22  1113   CREATININE 0.5*     No results for input(s): AST, ALT, ALB, BILITOT, ALKPHOS in the last 72 hours. No results for input(s): TROPONINI in the last 72 hours. No results for input(s): BNP in the last 72 hours. No results found for: PHART, EQF3KQF, PO2ART  No results for input(s): INR in the last 72 hours. No results for input(s): NITRITE, COLORU, PHUR, LABCAST, WBCUA, RBCUA, MUCUS, TRICHOMONAS, YEAST, BACTERIA, CLARITYU, SPECGRAV, LEUKOCYTESUR, UROBILINOGEN, BILIRUBINUR, BLOODU, GLUCOSEU, AMORPHOUS in the last 72 hours.     Invalid input(s): Bryon Higgins     Assessment &Plan:    Patient Active Problem List:     Type 2 diabetes mellitus, controlled ([facility]     Essential hypertension     Hyperlipidemia     Vitamin D deficiency     Onychomycosis     History of hypercalcemia     Hand arthritis     History of thrombocytopenia     History of gout     Type 2 diabetes mellitus without complication (HCC)     Benign prostatic hyperplasia with weak urinary stream     Lung nodule     Hypercalcemia     Proteinuria     Mixed hyperlipidemia     Rib pain on right side     Shortness of breath     Microalbuminuria     Generalized weakness     Fatigue     Balance problems     Neck mass     Weight loss     Appetite loss     Enlarged lymph node in neck     Urinary frequency     Diarrhea     B-cell lymphoma of solid organ excluding spleen (HCC)     Low magnesium level     Parotid mass Liver mass     Skin abrasion     Diffuse large B-cell lymphoma (HCC)     Double vision     Dizziness     Urinary urgency     Constipation     Pain with urination     ILD. 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 Thank you Raya Terry MD for the opportunity to be involved in this patients care.  If you have any questions or concerns please feel free to contact me  No Order

## 2022-03-25 NOTE — PROGRESS NOTES
Patient admitted to PACU #[id number] from endoscopy at [phone] s/p BRONCHOSCOPY WITH BRONCHOALVEOLAR LAVAGE. Report received at bedside in PACU per endoscopy nurse. Patient was reported to be hemodynamically stable during procedure with no complications. Patient connected to PACU monitoring equipment. IVF's infusing with site unremarkable. Patient arrived to PACU alert and awake with no complaints of pain but coughing frequently but remains moist and non-productive. Patient states he had a cough periodically prior to procedure along with wearing oxygen at home at 2 liters per nasal canula. No further changes. Will continue to monitor.

## 2022-04-04 NOTE — TELEPHONE ENCOUNTER
Spoke to spouse. PFTs are complete.  She will make an appointment with Rheumatology and call us back for follow up with Dr Parker Granda

## 2022-04-04 NOTE — TELEPHONE ENCOUNTER
----- Message from Barak Billings MD sent at 4/4/2022  9:47 AM EDT -----  Can you please schedule f/u after rheumatology visit and PFT    Thanks

## 2022-04-07 NOTE — PROCEDURES
Pulmonary Function Test:     Indication: ILD    Never smoked     Test comment:     Spirometry data is acceptable and reproducible. Maximum effort given during the test.    Patient had difficulty during each maneuver. Frequent coughing during testing    Pulse ox is 91% on room air    Estimated body mass index is 18.64 kg/m² as calculated from the following:    Height as of 3/25/22: 5' 7\" (1.702 m). Weight as of 3/25/22: 119 lb (54 kg). Spirometry data:    FEV1/FVC: 100. Predicted ratio 72    Pre-Bronchodilator FEV1 1.46L, which is 59% predicted    Post-Bronchodilator FEV1 1.33L, which is 54% predicted    There is -8% reversibility     FVC is 1.46L, which is 42% predicted    Lung Volumes:    TLC (by Plethysmography) is 3.06L, which is 49% predicted    RV is 1.4L which is 59% predicted    Diffusion Capacity:    DLCO is 9.7 which is 37% predicted    Impression:    1. There is no obstruction present    2. There is no significant reversibility with bronchodilator        [Increase in FEV1 => 12% of control and => 200 ml]    3. There is severe restriction with TLC of 49%    4.  There is severe reduction in diffusion capacity    Comment:   PFT findings are consistent with the known diagnosis of ILD

## 2022-04-07 NOTE — TELEPHONE ENCOUNTER
Medication:   Requested Prescriptions     Pending Prescriptions Disp Refills    atorvastatin (LIPITOR) 20 MG tablet [Pharmacy Med Name: ATORVASTATIN TAB 20MG] 90 tablet 1     Sig: TAKE 1 TABLET DAILY     Last Filled:  10/18/2021    Last appt: 1/3/2022  Next appt: 4/18/2022    Last OARRS: No flowsheet data found.

## 2022-04-18 PROBLEM — J30.9 ALLERGIC RHINITIS: Status: ACTIVE | Noted: 2022-01-01

## 2022-04-18 NOTE — PATIENT INSTRUCTIONS
-Continue same medications  -Limit carbohydrates to 45 grams with meals and 15 grams with snacks   -Low sodium diet  -monitor blood sugars  -goal for blood sugar fasting or pre-meal  is   -goal for blood sugar 2 hours after a meal is less than 180  -goal for blood sugar at bedtime is less than 150  -Regular aerobic exercise

## 2022-04-18 NOTE — PROGRESS NOTES
Krissy Taylor   Date ofBirth:  1945    Date of Visit:  4/18/2022    Chief Complaint   Patient presents with    Diabetes    Hypertension    Hyperlipidemia    Other     Vitamin D def. HPI  Patient has Type 2 diabetes. Patient takes Jardiance 10mg once daily and Janumet XR 50-1000mg 2 times daily. Patient monitors his blood sugar 2 times daily fasting and 3 hours after dinner and 1 hour before bedtime. Fasting averages 181 the past 7 days, 197 the past 14 days, and 214 the past 30 days. Postprandial blood sugar 196 the past 14-30 days. Patient takes Prednisone 50mg once daily for 5 days every 21 days as part of his cancer treatment and his blood sugars go high. Patient decreases carbohydrates and tries to eat a lot of protein.     Patient has hypertension. Patient is off blood pressure medication. Patient decreases salt.     Patient has hyperlipidemia. Patient is off Atorvastatin. Patient decreases fat and cholesterol. Patient drinks glucerna shakes.      Patient has BPH. Patient takes Flomax 0.4mg once daily. Patient has weak stream and nocturia once nightly. Patient denies dribbling.     Patient has vitamin D deficiency. Patient takes Vitamin D 2,000 IU once daily. Patient has low magnesium. Patient takes magnesium 650mg once daily. Patient had recent labs done on 3/22/2022 at his oncologist office. 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 Patient sees Oncology, Dr. Vanessa Quinones    Patient states he has appointment with Pulmonary on 4/18/22 for SOB. Patient complains of symptoms of a lot of post nasal drainage, runny nose, and sneezing. Patient is using Flonase nasal spray.       Wt Readings from Last 3 Encounters:   04/18/22 118 lb 9.6 oz (53.8 kg)   03/25/22 119 lb (54 kg)   03/17/22 122 lb (55.3 kg)         Review of Systems   Constitutional: Positive for activity change (can't exert energy to do activity), fatigue and unexpected weight change (weight is fluctuating). Negative for chills and fever. HENT: Positive for postnasal drip, rhinorrhea and sneezing. Negative for congestion, sinus pressure, sinus pain and sore throat. Eyes: Negative for visual disturbance. Respiratory: Positive for cough (dry cough since January, has appointment with Pulmonary 4/18/22) and shortness of breath. Negative for chest tightness and wheezing. Cardiovascular: Negative for chest pain, palpitations and leg swelling. Gastrointestinal: Positive for constipation (sometimes and takes stool softener) and diarrhea (2 days ago and took Immodium AD). Negative for abdominal pain, nausea and vomiting. Genitourinary: Positive for difficulty urinating, frequency (nocturia) and urgency. Negative for dysuria and hematuria. Musculoskeletal: Negative for arthralgias and myalgias. Skin: Negative for wound. Neurological: Positive for light-headedness (if gets up quickly). Negative for dizziness, syncope, numbness and headaches. Psychiatric/Behavioral: Negative for dysphoric mood and sleep disturbance. The patient is not nervous/anxious.         No Known Allergies  Outpatient Medications Marked as Taking for the 4/18/22 encounter (Office Visit) with Henri Baker MD   Medication Sig Dispense Refill    predniSONE (DELTASONE) 20 MG tablet       JARDIANCE 10 MG tablet TAKE 1 TABLET DAILY 90 tablet 1    blood glucose test strips (ONETOUCH ULTRA) strip TEST ONCE DAILY 100 strip 3    SITagliptin-metFORMIN (JANUMET XR)  MG TB24 per extended release tablet TAKE 1 TABLET TWICE A  tablet 1    tamsulosin (FLOMAX) 0.4 MG capsule Take 2 capsules by mouth daily      docusate sodium (COLACE) 100 MG capsule Take 1 capsule by mouth 2 times daily      magnesium oxide (MAG-OX) 400 MG tablet Take 1 tablet by mouth daily (Patient taking differently: Take 650 mg by mouth daily Patient states he takes 650 MG) 30 tablet 1    aspirin 81 MG EC tablet Take 81 mg by mouth daily      prochlorperazine (COMPAZINE) 10 MG tablet TAKE 1 TABLET BY MOUTH EVERY 6 HOURS AS NEEDED FOR NAUSEA      ondansetron (ZOFRAN) 8 MG tablet TAKE 1 TABLET BY MOUTH EVERY 8 HOURS AS NEEDED FOR NAUSEA OR VOMITING      Multiple Vitamins-Minerals (CENTRUM ADULTS PO) Take by mouth      fluticasone (FLONASE) 50 MCG/ACT nasal spray SPRAY ONCE IN EACH NOSTRIL EVERY DAY 48 g 3    Blood Glucose Monitoring Suppl (ONE TOUCH ULTRA 2) w/Device KIT 1 kit by Does not apply route daily 1 kit 0    Lancets MISC Use to test blood sugar once daily 100 each 3    blood glucose test strips (ONETOUCH ULTRA) strip 1 each by Does not apply route daily 100 strip 2    ONE TOUCH ULTRASOFT LANCETS MISC 1 each by Does not apply route daily 100 each 3    vitamin B-12 (CYANOCOBALAMIN) 1000 MCG tablet Take 1,000 mcg by mouth 2 times daily      Cholecalciferol (VITAMIN D) 2000 UNITS CAPS capsule Take by mouth daily      Omega-3 Fatty Acids (OMEGA 3 PO) Take 2 capsules by mouth daily            Vitals:    04/18/22 0920   BP: 104/74   Pulse: 97   Resp: 14   Temp: 97.3 °F (36.3 °C)   SpO2: 93%   Weight: 118 lb 9.6 oz (53.8 kg)   Height: 5' 7\" (1.702 m)     Body mass index is 18.58 kg/m². Physical Exam  Nursing note reviewed. Constitutional:       General: He is not in acute distress. Appearance: Normal appearance. He is well-developed. Eyes:      General: Lids are normal.      Extraocular Movements: Extraocular movements intact. Conjunctiva/sclera: Conjunctivae normal.      Pupils: Pupils are equal, round, and reactive to light. Neck:      Thyroid: No thyromegaly. Vascular: No carotid bruit. Cardiovascular:      Rate and Rhythm: Normal rate and regular rhythm. Heart sounds: Normal heart sounds, S1 normal and S2 normal. No murmur heard. No friction rub. No gallop. Pulmonary:      Effort: Pulmonary effort is normal. No respiratory distress. Breath sounds: Normal breath sounds.  No wheezing, rhonchi or rales. Comments: Using oxygen  Abdominal:      General: Bowel sounds are normal. There is no distension. Palpations: Abdomen is soft. Tenderness: There is no abdominal tenderness. Musculoskeletal:      Cervical back: Neck supple. Right lower leg: No edema. Left lower leg: No edema. Lymphadenopathy:      Head:      Right side of head: No submandibular adenopathy. Left side of head: No submandibular adenopathy. Neurological:      Mental Status: He is alert.    Psychiatric:         Mood and Affect: Mood normal.           Results for POC orders placed in visit on 04/18/22   POCT glycosylated hemoglobin (Hb A1C)   Result Value Ref Range    Hemoglobin A1C 6.5 %     Lab Review   Admission on 03/25/2022, Discharged on 03/25/2022   Component Date Value    POC Glucose 03/25/2022 142*    Performed on 03/25/2022 ACCU-CHEK     POC Glucose 03/25/2022 188*    Performed on 03/25/2022 ACCU-CHEK     CULTURE, RESPIRATORY 03/25/2022 10,000 to 50,000 CFU/mL Normal respiratory jena     Gram Stain Result 03/25/2022 1+ WBC's (Polymorphonuclear) No organisms seen     Color, Lavage 03/25/2022 Colorless     Appearance BAL (Lavage) 03/25/2022 Clear     Clot Evaluation BAL 03/25/2022 see below     WBC/EPI Cells Bal 03/25/2022 283     RBC, BAL 03/25/2022 <1000     Segmented Neutrophils, B* 03/25/2022 61*    Lymphocytes, BAL 03/25/2022 15*    Monocytes, BAL 03/25/2022 24     Number of Cells Counted * 03/25/2022 [id number] [facility] Floor Outpatient Visit on 03/25/2022   Component Date Value    POC Creatinine 03/25/2022 0.5*    GFR Non- 03/25/2022 >60     GFR  03/25/2022 >60     Sample Type 03/25/2022 VITALY     Performed on 03/25/2022 SEE BELOW    Orders Only on 03/17/2022   Component Date Value    A fumigatus #1 03/17/2022 None Detected     A fumigatus #6 03/17/2022 None Detected     A pullulans 03/17/2022 None Detected     Hulen Serum Abs 03/17/2022 None Detected     M faeni 03/17/2022 None Detected     T Vulgaris #1 03/17/2022 See Note     Aspergillus Flavus Antib* 03/17/2022 None Detected     A fumigatus #2 03/17/2022 None Detected     A fumigatus #3 03/17/2022 None Detected     S viridis 03/17/2022 None Detected     Thermoactinomyces Candid* 03/17/2022 None Detected     Phoma betae 03/17/2022 <0.10     Beef 03/17/2022 <0.10     Allergen, Pork 03/17/2022 <0.10     Feather mix 03/17/2022 Negative     ALLERGEN SEE NOTE 03/17/2022 See Note     Sed Rate 03/17/2022 13     Rheumatoid Factor 03/17/2022 <10.0     Pro-BNP 03/17/2022 196     Antinuclear Antibody, He* 03/17/2022 <1:80     Antinuclear AB Interpret* 03/17/2022 See Note     CATERINA 03/17/2022 POSITIVE*    Anti-Centromere B IgG 03/17/2022 <0.2     Anti-Chromatin IgG 03/17/2022 <0.2     Anti-JO1 IgG 03/17/2022 <0.2     Anti-Ribosomal P IgG 03/17/2022 <0.2     Anti-RNP IgG 03/17/2022 5.2*    Anti-Smith IgG 03/17/2022 <0.2     Anti-SmRNP IgG 03/17/2022 <0.2     Anti-dsDNA IgG 03/17/2022 <1     Anti-SCL70 IgG 03/17/2022 <0.2     Anti-SS-A IgG 03/17/2022 <0.2     Anti-SS-B IgG 03/17/2022 <0.2    Office Visit on 02/17/2022   Component Date Value    Color, UA 02/17/2022 yellow     Clarity, UA 02/17/2022 clear     Glucose, UA POC 02/17/2022 neg     Bilirubin, UA 02/17/2022 neg     Ketones, UA 02/17/2022 neg     Spec Grav, UA 02/17/2022 1.010     Blood, UA POC 02/17/2022 neg     pH, UA 02/17/2022 6.0     Protein, UA POC 02/17/2022 neg     Urobilinogen, UA 02/17/2022 0.2     Leukocytes, UA 02/17/2022 neg     Nitrite, UA 02/17/2022 neg    Orders Only on 01/18/2022   Component Date Value    Magnesium 01/18/2022 1[facility]40[facility] Visit on 01/11/2022   Component Date Value    Left Ventricular Ejectio* 01/11/2022 63     LVEF MODALITY 01/11/2022 ECHO     INR 01/11/2022 1.50*   Orders Only on 01/03/2022   Component Date Value    Magnesium 01/03/2022 1.50*    WBC 01/03/2022 5.9  RBC 01/03/2022 4.18*    Hemoglobin 01/03/2022 12.7*    Hematocrit 01/03/2022 39.3*    MCV 01/03/2022 94.1     MCH 01/03/2022 30.3     MCHC 01/03/2022 32.2     RDW 01/03/2022 16.0*    Platelets 83/05/2957 203     MPV 01/03/2022 7.6     Neutrophils % 01/03/2022 79.1     Lymphocytes % 01/03/2022 12.3     Monocytes % 01/03/2022 6.4     Eosinophils % 01/03/2022 1.7     Basophils % 01/03/2022 0.5     Neutrophils Absolute 01/03/2022 4.7     Lymphocytes Absolute 01/03/2022 0.7*    Monocytes Absolute 01/03/2022 0.4     Eosinophils Absolute 01/03/2022 0.1     Basophils Absolute 01/03/2022 0.0     Sodium 01/03/2022 144     Potassium 01/03/2022 4.7     Chloride 01/03/2022 100     CO2 01/03/2022 25     Anion Gap 01/03/2022 19*    Glucose 01/03/2022 133*    BUN 01/03/2022 16     CREATININE 01/03/2022 0.8     GFR Non- 01/03/2022 >60     GFR  01/03/2022 >60     Calcium 01/03/2022 10.6     Total Protein 01/03/2022 6.2*    Albumin 01/03/2022 3.9     Albumin/Globulin Ratio 01/03/2022 1.7     Total Bilirubin 01/03/2022 0.5     Alkaline Phosphatase 01/03/2022 147*    ALT 01/03/2022 30     AST 01/03/2022 52*   Abstract on 01/03/2022   Component Date Value    Hemoglobin A1C 12/22/2021 7.0    Orders Only on 12/28/2021   Component Date Value    Magnesium 12/28/2021 1.40*   Orders Only on 12/21/2021   Component Date Value    Vitamin B-12 12/21/2021 >2000*    WBC 12/21/2021 7.8     RBC 12/21/2021 4.68     Hemoglobin 12/21/2021 14.2     Hematocrit 12/21/2021 45.1     MCV 12/21/2021 96.2     MCH 12/21/2021 30.2     MCHC 12/21/2021 31.4     RDW 12/21/2021 17.2*    Platelets 95/72/9097 159     MPV 12/21/2021 8.4     Neutrophils % 12/21/2021 77.3     Lymphocytes % 12/21/2021 14.7     Monocytes % 12/21/2021 6.4     Eosinophils % 12/21/2021 1.4     Basophils % 12/21/2021 0.2     Neutrophils Absolute 12/21/2021 6.0     Lymphocytes Absolute 12/21/2021 1.1     Monocytes Absolute 12/21/2021 0.5     Eosinophils Absolute 12/21/2021 0.1     Basophils Absolute 12/21/2021 0.0     Magnesium 12/21/2021 1.20*    Sed Rate 12/21/2021 23*    CRP 12/21/2021 60.6*    Total CK 12/21/2021 31*    TSH 12/21/2021 3.80    There may be more visits with results that are not included. Assessment/Plan     1. Type 2 diabetes mellitus without complication, without long-term current use of insulin (HCC)  -Hemoglobin A1c of 6.5% shows diabetes is controlled overall  -Patient does have hyperglycemia while taking prednisone 50mg for 5 days every 3 weeks for his cancer treatment  -Continue  Janumet XR 50-1000mg 2 times daily  -Increase empagliflozin (JARDIANCE) to 25 MG tablet; Take 1 tablet by mouth daily due to hyperglycemia dispense: 30 tablet; Refill: 2  -Limit carbohydrates to 45 grams with meals and 15 grams with snacks  -monitor blood sugars  -goal for blood sugar fasting or pre-meal  is   -goal for blood sugar 2 hours after a meal is less than 180  -goal for blood sugar at bedtime is less than 150  - POCT glycosylated hemoglobin (Hb A1C)    2. Essential hypertension  -Stable off medication  -Low sodium diet    3. Mixed hyperlipidemia  -Off medication  -Low fat, low cholesterol diet  -Lipid Panel; Future    4. Vitamin D deficiency  - stable  -Continue vitamin D 2000 IU once daily  - Vitamin D 25 Hydroxy; Future    5. Benign prostatic hyperplasia with weak urinary stream  -Has symptoms but stable  -Continue Flomax 0.4 mg once daily    6. Low magnesium level  -Stable  -Continue magnesium 650 mg once daily    7. Allergic rhinitis, unspecified seasonality, unspecified trigger  -Start loratadine (CLARITIN) 10 MG tablet; Take 1 tablet by mouth daily  -Continue Flonase nasal spray 2 sprays each nostril once daily      Discussed medications with patient, who voiced understanding of their use and indications. All questions answered.       Return in about 4 weeks (around 5/16/2022) for diabetes and allergic rhinitis.

## 2022-04-20 NOTE — PROGRESS NOTES
[address], MD                                                           [address] [place] [address]                                                              (P) [phone] (F)      Note is transcribed using voice recognition software. Inadvertent computerized transcription errors may be present. Patient identification: Krissy Taylor, keith : ,7 y.o. REASON FOR CONSULTATION:  Patient is being seen at the request of  Lawyer Anel MD / Mando Mello MD for evaluation of positive CATERINA and RNP, and ILD. HISTORY OF PRESENT ILLNESS:  67 y.o. male with recent diagnosis of non-Hodgkin's lymphoma 2021, on chemotherapy   R-CHOP, tafasitamab and Revlimid -Dr. Colletta Clap- oncologist, diabetes, started noticing  rasther abrupt onset of shortness of breath, that got worse rapidly over the last 6 to 8 weeks to the point that he is needing 3 L of oxygen continuously. Prior to onset of lymphoma diagnosis and chemotherapy (2021)-he was fairly active physically, used to play golf 2-3 times a week, and did not have any trouble in doing daily chores and recreational activities. CT chest scan from 2021 revealed subpleural and bibasilar predominant reticular opacities with minimal groundglass opacities. Repeat CT chest from 3/25/2022-looks similar to prior CT chest in December. He was evaluated by pulmonary, underwent bronchial bronchial lavage, negative for infections or malignancy. His chemotherapy regimen contains corticosteroid, rituximab and cyclophosphamide, despite of this his shortness of breath has been getting worse.   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 Rheumatological work-up included CATERINA 1-80, CATERINA cascade RNP 5.2. Rest of sub serologies to CATERINA are negative. PFT 4/1/2022-severe restrictive defect with TLC 49% and DLCO 37% predicted. Pertinent ROS: Weight loss >30 pounds in last 3 months. He denies any rashes, photosensitivity, painful, swollen joints, Raynaud's phenomenon, focal muscle weakness, skin thickening. Shortness of breath is out of proportion to cough. PMH, PSH,Social history , Meds reviewed. FH-no family history of autoimmune rheumatic disorders. PHYSICAL EXAM:    Vitals:    /76   Ht 5' 7\" (1.702 m)   Wt 118 lb (53.5 kg)   BMI 18.48 kg/m²   AA)x3 well nourished, and well groomed, normal judgement. He is using 3 L oxygen continuously, carrying a portable oxygen tank. MKS: Osteoarthritic changes in scattered DIPs, CMC's, knees. Does not have any tender, swollen, inflamed joints in upper or lower extremities, full range of motion in all peripheral joints. Skin: No rashes, no induration or skin thickening or nodules. Nailfold capillaries not dilated. HEENT: Normal lids, lacrimal glands and pupils. No oral or nasal ulcers. Salivary glands reveal no evidence of abnormality. External inspection of the ears and nose within normal limits. Neck: Supple no adenopathy. Chest: Increased effort even at rest, has difficulty completing a sentence. Basilar crackles bilaterally. Heart: S1-S2 regular, no edema.     DATA:   Lab Results   Component Value Date    WBC 5.9 01/03/2022    HGB 12.7 (L) 01/03/2022    HCT 39.3 (L) 01/03/2022    MCV 94.1 01/03/2022     01/03/2022     Lab Results   Component Value Date     01/03/2022    K 4.7 01/03/2022     01/03/2022    CO2 25 01/03/2022    BUN 16 01/03/2022    CREATININE 0.5 (L) 03/25/2022    GLUCOSE 133 (H) 01/03/2022    CALCIUM 10.6 01/03/2022    PROT 6.2 (L) 01/03/2022    LABALBU 3.9 01/03/2022    BILITOT 0.5 01/03/2022    ALKPHOS 147 (H) 01/03/2022    AST 52 (H) same day, performing a medically appropriate history and physical examination, counseling and educating patient about diagnosis, management plan, ordering appropriate testings, prescriptions, communicating findings to other care providers, and documenting clinical information in electronic medical record. I thank you for giving me the opportunity to be involved in [facility] care and I look forward following [place] along with you. If you have any questions or concerns please feel free to contact me at any time.   Rafal Hay MD 04/20/22

## 2022-04-21 NOTE — PROGRESS NOTES
[facility]    Outpatient Note    Subjective:   CHIEF COMPLAINT:   Chief Complaint   Patient presents with    Other    Shortness of Breath     Interval history on 4/21/22  He is here today for regular f/u. No new symptoms. HPI:  3/17/22   The patient is 67 y.o. male who presents today for a new patient visit for evaluation of abnormal imaging   He was diagnosed with NHL diagnosed in Dec, and was enrolled in a trial.  Currently on R-CHOP plus placebo or R-CHOP plus tafasitamab and Revlimid  Early in the treatment he was not active, but he start improving and walking more. Later on he developed QUESADA which is going on over the past 3-4 weeks. He was treated with ABX for suspected lung infection and finished 10 days course of ABX two days ago. There is no cough however he has occasional sputum production. He can walk about 100 ft on level ground and get winded. He is losing wt. He lost 25 lbs since Jan.  There is no nausea, vomiting or diarrhea. Appetite is better over the past month however he is not gaining wt. There is no hx of chronic lung disease and no hx of recurrent lung infections before. He has lower back pain, which is chronic for the last 20 year but it is a bit worse lately.     He has hands stiffness in the morning, usually last for 20 min in am.      Past Medical History:    Past Medical History:   Diagnosis Date    Allergic rhinitis     Benign prostatic hyperplasia with weak urinary stream 12/10/2015     Updating Deprecated Diagnoses    Cancer ([address] 12/21/2021    Non Earl Lymphoma    Erectile dysfunction     History of gout 9/19/2015    History of thrombocytopenia 9/19/2015    Hypercalcemia     Hyperlipidemia     Hypertension     Lung nodule     Proteinuria     Type 2 diabetes mellitus without complication ([address] 5772    Type II or unspecified type diabetes mellitus without mention of complication, not stated as uncontrolled     Urinary disorder     Vitamin D deficiency 9/19/2015       Social History:    Social History     Tobacco Use   Smoking Status Never Smoker   Smokeless Tobacco Never Used   Tobacco Comment    Never smoked       Family History:  Family History   Problem Relation Age of Onset    Diabetes Mother     High Blood Pressure Mother     Dementia Mother     Hearing Loss Mother     High Cholesterol Mother     Cancer Neg Hx     Hearing Loss Neg Hx     Stroke Neg Hx     Heart Disease Neg Hx        Current Medications:  Current Outpatient Medications on File Prior to Visit   Medication Sig Dispense Refill    predniSONE (DELTASONE) 20 MG tablet  (Patient not taking: Reported on 5/4/2022)      loratadine (CLARITIN) 10 MG tablet Take 1 tablet by mouth daily      blood glucose test strips (ONETOUCH ULTRA) strip TEST ONCE DAILY 100 strip 3    tamsulosin (FLOMAX) 0.4 MG capsule Take 2 capsules by mouth daily      docusate sodium (COLACE) 100 MG capsule Take 1 capsule by mouth 2 times daily      magnesium oxide (MAG-OX) 400 MG tablet Take 1 tablet by mouth daily (Patient taking differently: Take 650 mg by mouth daily Patient states he takes 650 MG) 30 tablet 1    aspirin 81 MG EC tablet Take 81 mg by mouth daily      prochlorperazine (COMPAZINE) 10 MG tablet TAKE 1 TABLET BY MOUTH EVERY 6 HOURS AS NEEDED FOR NAUSEA (Patient not taking: Reported on 5/4/2022)      ondansetron (ZOFRAN) 8 MG tablet TAKE 1 TABLET BY MOUTH EVERY 8 HOURS AS NEEDED FOR NAUSEA OR VOMITING      Multiple Vitamins-Minerals (CENTRUM ADULTS PO) Take by mouth      fluticasone (FLONASE) 50 MCG/ACT nasal spray SPRAY ONCE IN EACH NOSTRIL EVERY DAY 48 g 3    Blood Glucose Monitoring Suppl (ONE TOUCH ULTRA 2) w/Device KIT 1 kit by Does not apply route daily 1 kit 0    Lancets MISC Use to test blood sugar once daily 100 each 3    blood glucose test strips (ONETOUCH ULTRA) strip 1 each by Does not apply route daily 100 strip 2    ONE TOUCH ULTRASOFT LANCETS MISC 1 each by Does not apply route daily 100 each 3    vitamin B-12 (CYANOCOBALAMIN) 1000 MCG tablet Take 1,000 mcg by mouth 2 times daily      Cholecalciferol (VITAMIN D) 2000 UNITS CAPS capsule Take by mouth daily      Omega-3 Fatty Acids (OMEGA 3 PO) Take 2 capsules by mouth daily        No current facility-administered medications on file prior to visit. REVIEW OF SYSTEMS:    Review of Systems   Constitutional: Positive for unexpected weight change. Negative for chills and fever. HENT: Negative for congestion. Respiratory: Positive for shortness of breath. Negative for cough, chest tightness and wheezing. Cardiovascular: Positive for leg swelling (ankle). Negative for chest pain and palpitations. Neurological: Negative for weakness. Psychiatric/Behavioral: The patient is not nervous/anxious. All other systems reviewed and are negative. Objective:   PHYSICAL EXAM:        VITALS:  BP (!) 108/53 (Site: Left Upper Arm, Position: Sitting, Cuff Size: Medium Adult)   Pulse 98   Temp 96.5 °F (35.8 °C) (Infrared)   Resp 16   Ht 5' 7\" (1.702 m)   Wt 124 lb (56.2 kg)   SpO2 91% Comment: on 2.5L  BMI 19.42 kg/m²     Physical Exam  Vitals reviewed. Constitutional:       Appearance: He is well-developed. HENT:      Head: Normocephalic and atraumatic. Eyes:      Pupils: Pupils are equal, round, and reactive to light. Neck:      Vascular: No JVD. Cardiovascular:      Rate and Rhythm: Normal rate and regular rhythm. Heart sounds: No murmur heard. Pulmonary:      Effort: Pulmonary effort is normal. No respiratory distress. Breath sounds: No stridor. Rales present. No wheezing. Abdominal:      General: Bowel sounds are normal.      Palpations: Abdomen is soft. Musculoskeletal:         General: No deformity. Cervical back: Neck supple. Comments: Trace bilateral ankle edema    Skin:     General: Skin is warm and dry.    Neurological:      Mental Status: He is alert and oriented to person, place, and time. Psychiatric:         Behavior: Behavior normal.         DATA:    CT chest on 3/2/22  1.   No evidence of pulmonary embolus. 2.  Unchanged subpleural reticular abnormality and groundglass suggestive of chronic interstitial lung disease. 3.  Unchanged masses in the liver and spleen and upper abdominal lymphadenopathy consistent with known lymphoma     Echo on 1/11/22  Summary   Normal left ventricle size with mild concentric left ventricular   hypertrophy. Normal left ventricular systolic function with an estimated ejection   fraction of 60-65%. No regional wall motion abnormalities   Diastolic filling parameters suggests normal diastolic function. GLS: -17.3%    Assessment:      Diagnosis Orders   1. NSIP (nonspecific interstitial pneumonia) ([facility] [address]  [facility]       Plan:   67year old male is here today for evaluation of QUESADA and abnormal CT scan  He has hx of Diffuse large B cell lymphoma. Currently on a trial of RCHOP or RCHOP with tafasitamab and Revlimid   Imaging reviewed. He has bilateral subpleural and predominantly basal GGO with interlobular septal thickenings. His QUESADA is going on over the past 4 weeks or so. These changes were present in Dec.    His pulse ox is 95% at rest though it drops with exertion. He has mild sputum production. No fever. He was treated with ABX without significant change in symptoms. Prior lab work with intermittent eosinophilia. He was a . No prior hx of chronic lung disease or recurrent infections  No prior hx of rheumatic disease,He also has back pain which is chronic   RF is negative. CATERINA is positive with positive anti RNP. Referred to rheumatology and it was felt that there is no underlying rheumatic disease. No prior hx of heart disease. Last echo with normal EF and normal diastolic function     Had bronch on 3/25/22.   BAL with increased segs and lymphocytes. Cultures and diatherix were negative. History, labs and imaging findings are consistent with NSIP. This started before current chemotherapy. It appears stable on imaging. Continue O2 with exertion   Refer to pulmonary rehab   Closely monitor.   F/u in 2 months

## 2022-04-28 NOTE — TELEPHONE ENCOUNTER
From: Eather Litten  To: Dr. Deepa Alcaraz: 4/28/2022 1:54 PM EDT  Subject: referral    Need a referral to Dr. Perez Guillory for my upcoming aggressive steroid treatment by Dr. Deisy Wilkins. I'll need control of my diabetes.   Thanks  Brain Cuff  [phone]

## 2022-05-03 PROBLEM — E11.9 DIABETES MELLITUS TYPE 2, CONTROLLED, WITHOUT COMPLICATIONS (HCC): Status: ACTIVE | Noted: 2022-01-01

## 2022-05-04 NOTE — PROGRESS NOTES
Cali Azul is a 67 y.o. male who is being evaluated for Type 2 diabetes mellitus. Current symptoms/problems include none and show no change. Type 2 diabetes mellitus, uncontrolled, with neuropathy (HCC) [E11.40, E11.65]    Diagnosed with Type 2 diabetes mellitus in 1994. Comorbid conditions: Neuropathy    Current diabetic medications include: Jardiance 25 mg qd, Janumet XR 50/1000 mg bid    Intolerance to diabetes medications: Yes Farxiga ineffective  Invokana too expensive     Weight trend: decreased  Prior visit with dietician: yes  Current diet: on average, 3 meals per day  Current exercise: No    Non Hodkins lymphoma diagnosed in 12/2021  Chemo since 1/2022. Prednisone 100 mg daily for 5 days during chemo. BG is up to 400 when takes prednisone    Current monitoring regimen: home blood tests - 4 times daily for 90 days  Has brought blood glucose log/meter:  Yes  Home blood sugar records: fasting range: 120-160 and postprandial range: 150-300   Any episodes of hypoglycemia? No  Hypoglycemia frequency and time(s):    Does patient have Glucagon emergency kit? No  Does patient have rapid acting carbohydrate? No  Does patient wear a medic alert bracelet or necklace? No      2. Other Hyperlipidemia  No muscle pain     3. Vitamin D deficiency  Has fatigue    4.  Diffuse large B-cell lymphoma  Has fatigue     Past Medical History:   Diagnosis Date    Allergic rhinitis     Benign prostatic hyperplasia with weak urinary stream 12/10/2015     Updating Deprecated Diagnoses    Cancer ([facility] [address] 12/21/2021    Non Earl Lymphoma    Erectile dysfunction     History of gout 9/19/2015    History of thrombocytopenia 9/19/2015    Hypercalcemia     Hyperlipidemia     Hypertension     Lung nodule     Proteinuria     Type 2 diabetes mellitus without complication ([facility] [address] 2901    Type II or unspecified type diabetes mellitus without mention of complication, not stated as uncontrolled     Urinary disorder     Vitamin D deficiency 9/19/2015      Patient Active Problem List   Diagnosis    Type 2 diabetes mellitus, controlled ([facility] [address]    Essential hypertension    Other hyperlipidemia    Vitamin D deficiency    Onychomycosis    History of hypercalcemia    Hand arthritis    History of thrombocytopenia    History of gout    Type 2 diabetes mellitus without complication (HCC)    Benign prostatic hyperplasia with weak urinary stream    Lung nodule    Hypercalcemia    Proteinuria    Mixed hyperlipidemia    Rib pain on right side    Shortness of breath    Microalbuminuria    Generalized weakness    Fatigue    Balance problems    Neck mass    Weight loss    Appetite loss    Enlarged lymph node in neck    Urinary frequency    Diarrhea    B-cell lymphoma of solid organ excluding spleen (HCC)    Low magnesium level    Parotid mass    Liver mass    Skin abrasion    Diffuse large B-cell lymphoma (HCC)    Double vision    Dizziness    Urinary urgency    Constipation    Pain with urination    ILD (interstitial lung disease) (HCC)    Allergic rhinitis    Diabetes mellitus type 2, controlled, without complications (HCC)    Type 2 diabetes mellitus, uncontrolled, with neuropathy ([facility] [address]     Past Surgical History:   Procedure Laterality Date    BRONCHOSCOPY N/A 3/25/2022    BRONCHOSCOPY WITH BRONCHOALVEOLAR LAVAGE performed by Raina Truong MD at [address] [place] [address] 2011    COLONOSCOPY  3/29/2011    repeat in 5 yrs: Odilon Elizabeth MD    COLONOSCOPY  03/14/2016    repeat in 7-8 years: Odilon Elizabeth MD    ELBOW FRACTURE SURGERY Left age 5   Bertell Halim FINGER TRIGGER RELEASE Right 2007    index    IR PORT PLACEMENT EQUAL OR GREATER THAN 5 YEARS  1/11/2022    IR PORT PLACEMENT EQUAL OR GREATER THAN 5 YEARS 1/11/2022 [facility]    TONSILLECTOMY AND ADENOIDECTOMY  age 2    VASECTOMY  25     Social History     Socioeconomic History    Marital status:  Spouse name: Not on file    Number of children: Not on file    Years of education: Not on file    Highest education level: Not on file   Occupational History    Not on file   Tobacco Use    Smoking status: Never Smoker    Smokeless tobacco: Never Used    Tobacco comment: Never smoked   Vaping Use    Vaping Use: Never used   Substance and Sexual Activity    Alcohol use: Not Currently     Alcohol/week: 0.0 standard drinks     Comment: drink alcohol very occasionally.  Drug use: No    Sexual activity: Not Currently     Partners: Female     Comment: Wife   Other Topics Concern    Not on file   Social History Narrative    Not on file     Social Determinants of Health     Financial Resource Strain: Low Risk     Difficulty of Paying Living Expenses: Not hard at all   Food Insecurity: No Food Insecurity    Worried About Running Out of Food in the Last Year: Never true    [address] in the Last Year: Never true   Transportation Needs:     Lack of Transportation (Medical): Not on file    Lack of Transportation (Non-Medical):  Not on file   Physical Activity:     Days of Exercise per Week: Not on file    Minutes of Exercise per Session: Not on file   Stress:     Feeling of Stress : Not on file   Social Connections:     Frequency of Communication with Friends and Family: Not on file    Frequency of Social Gatherings with Friends and Family: Not on file    Attends Mosque Services: Not on file    Active Member of [address] [place] [address] or Organizations: Not on file    Attends Club or Organization Meetings: Not on file    Marital Status: Not on file   Intimate Partner Violence:     Fear of Current or Ex-Partner: Not on file    Emotionally Abused: Not on file    Physically Abused: Not on file    Sexually Abused: Not on file   Housing Stability:     Unable to Pay for Housing in the Last Year: Not on file    Number of [place] in the Last Year: Not on file    Unstable Housing in the Last Year: Not on file     Family History   Problem Relation Age of Onset    Diabetes Mother     High Blood Pressure Mother     Dementia Mother     Hearing Loss Mother     High Cholesterol Mother     Cancer Neg Hx     Hearing Loss Neg Hx     Stroke Neg Hx     Heart Disease Neg Hx      Current Outpatient Medications   Medication Sig Dispense Refill    SITagliptin-metFORMIN (JANUMET XR)  MG TB24 per extended release tablet TAKE 1 TABLET TWICE A  tablet 1    empagliflozin (JARDIANCE) 25 MG tablet Take 1 tablet by mouth daily 90 tablet 1    Continuous Blood Gluc Sensor (DEXCOM G6 SENSOR) MISC Use for glucose monitoring 9 each 3    Continuous Blood Gluc  (DEXCOM G6 ) SHAHANA Use for glucose monitoring 1 each 3    Continuous Blood Gluc Transmit (DEXCOM G6 TRANSMITTER) MISC Use for glucose monitoring 1 each 3    insulin lispro, 1 Unit Dial, (HUMALOG KWIKPEN) 100 UNIT/ML SOPN Up to 5-10 units before meals 3 times a day 5 pen 1    Insulin Pen Needle (B-D UF III MINI PEN NEEDLES) 31G X 5 MM MISC 1 each by Does not apply route 4 times daily 100 each 1    loratadine (CLARITIN) 10 MG tablet Take 1 tablet by mouth daily      blood glucose test strips (ONETOUCH ULTRA) strip TEST ONCE DAILY 100 strip 3    tamsulosin (FLOMAX) 0.4 MG capsule Take 2 capsules by mouth daily      docusate sodium (COLACE) 100 MG capsule Take 1 capsule by mouth 2 times daily      magnesium oxide (MAG-OX) 400 MG tablet Take 1 tablet by mouth daily (Patient taking differently: Take 650 mg by mouth daily Patient states he takes 650 MG) 30 tablet 1    aspirin 81 MG EC tablet Take 81 mg by mouth daily      ondansetron (ZOFRAN) 8 MG tablet TAKE 1 TABLET BY MOUTH EVERY 8 HOURS AS NEEDED FOR NAUSEA OR VOMITING      Multiple Vitamins-Minerals (CENTRUM ADULTS PO) Take by mouth      fluticasone (FLONASE) 50 MCG/ACT nasal spray SPRAY ONCE IN EACH NOSTRIL EVERY DAY 48 g 3    Blood Glucose Monitoring Suppl (ONE TOUCH ULTRA 2) w/Device KIT 1 kit by Does not apply route daily 1 kit 0    Lancets MISC Use to test blood sugar once daily 100 each 3    blood glucose test strips (ONETOUCH ULTRA) strip 1 each by Does not apply route daily 100 strip 2    ONE TOUCH ULTRASOFT LANCETS MISC 1 each by Does not apply route daily 100 each 3    vitamin B-12 (CYANOCOBALAMIN) 1000 MCG tablet Take 1,000 mcg by mouth 2 times daily      Cholecalciferol (VITAMIN D) 2000 UNITS CAPS capsule Take by mouth daily      Omega-3 Fatty Acids (OMEGA 3 PO) Take 2 capsules by mouth daily       predniSONE (DELTASONE) 20 MG tablet  (Patient not taking: Reported on 5/4/2022)      prochlorperazine (COMPAZINE) 10 MG tablet TAKE 1 TABLET BY MOUTH EVERY 6 HOURS AS NEEDED FOR NAUSEA (Patient not taking: Reported on 5/4/2022)       No current facility-administered medications for this visit.      No Known Allergies  Family Status   Relation Name Status    Mother Alonso Marshall (Not Specified)    Neg Hx  (Not Specified)       Lab Review:    Lab Results   Component Value Date    WBC 5.9 01/03/2022    HGB 12.7 01/03/2022    HCT 39.3 01/03/2022    MCV 94.1 01/03/2022     01/03/2022     Lab Results   Component Value Date     01/03/2022    K 4.7 01/03/2022     01/03/2022    CO2 25 01/03/2022    BUN 16 01/03/2022    CREATININE 0.5 03/25/2022    CREATININE 0.8 01/03/2022    GLUCOSE 133 01/03/2022    CALCIUM 10.6 01/03/2022    PROT 6.2 01/03/2022    LABALBU 3.9 01/03/2022    BILITOT 0.5 01/03/2022    ALKPHOS 147 01/03/2022    AST 52 01/03/2022    ALT 30 01/03/2022    LABGLOM >60 03/25/2022    LABGLOM 79 05/31/2016    GFRAA >60 03/25/2022    AGRATIO 1.7 01/03/2022    GLOB 2.2 06/28/2021     Lab Results   Component Value Date    TSH 3.80 12/21/2021     Lab Results   Component Value Date    LABA1C 6.5 04/18/2022     Lab Results   Component Value Date    .4 06/18/2020     Lab Results   Component Value Date    CHOL 164 04/21/2022     Lab Results   Component Value Date    TRIG 238 04/21/2022     Lab Results   Component Value Date    HDL 50 04/21/2022     Lab Results   Component Value Date    LDLCALC 66 04/21/2022     Lab Results   Component Value Date    LABVLDL 48 04/21/2022     Lab Results   Component Value Date    CHOLHDLRATIO 3.0 05/31/2016     Lab Results   Component Value Date    LABMICR 3.00 12/21/2021    LABMICR Not Indicated 05/06/2019     Lab Results   Component Value Date    VITD25 43.1 04/21/2022        Review of Systems:  Constitutional: has fatigue, no fever, no recent weight gain, has recent weight loss, no changes in appetite  Eyes: no eye pain, no change in vision, no eye redness, no eye irritation, no double vision  Ears, nose, throat: has nasal congestion, no sore throat, no earache, has decrease in hearing, no hoarseness, no dry mouth, has sinus problems, no difficulty swallowing, no neck lumps, no dental problems, no mouth sores, no ringing in ears  Pulmonary: has shortness of breath, no wheezing, has dyspnea on exertion, has cough  Cardiovascular: no chest pain, no lower extremity edema, no orthopnea, no intermittent leg claudication, no palpitations  Gastrointestinal: no abdominal pain, no nausea, no vomiting, no diarrhea, no constipation, no dysphagia, no heartburn, no bloating  Genitourinary: no dysuria, no urinary incontinence, no urinary hesitancy, no urinary frequency, no feelings of urinary urgency, no nocturia  Musculoskeletal: no joint swelling, no joint stiffness, no joint pain, no muscle cramps, no muscle pain, no bone pain   Integument/Breast: no skin rashes, no skin lesions, no itching, has dry skin  Neurological: no numbness, no tingling, no weakness, no confusion, no headaches, no dizziness, no fainting, no tremors, no decrease in memory, no balance problems  Psychiatric: no anxiety, no depression, no insomnia  Hematologic/Lymphatic: no tendency for easy bleeding, has swollen lymph nodes, has tendency for easy bruising  Immunology: has seasonal allergies, no frequent infections, no frequent illnesses  Endocrine: has temperature intolerance    /76   Pulse 106   Temp 98 °F (36.7 °C)   Resp 14   Ht 5' 7\" (1.702 m)   Wt 119 lb (54 kg)   SpO2 90%   BMI 18.64 kg/m²    Wt Readings from Last 3 Encounters:   05/04/22 119 lb (54 kg)   04/21/22 124 lb (56.2 kg)   04/20/22 118 lb (53.5 kg)     Body mass index is 18.64 kg/m².       OBJECTIVE:  Constitutional: no acute distress, well appearing and well nourished  Psychiatric: oriented to person, place and time, judgement and insight and normal, recent and remote memory and intact and mood and affect are normal  Skin: skin and subcutaneous tissue is normal without mass, normal turgor  Head and Face: examination of head and face revealed no abnormalities  Eyes: no lid or conjunctival swelling, erythema or discharge, pupils are normal, equal, round, reactive to light  Ears/Nose: external inspection of ears and nose revealed no abnormalities, hearing is grossly normal  Oropharynx/Mouth/Face: lips, tongue and gums are normal with no lesions, the voice quality was normal  Neck: neck is supple and symmetric, with midline trachea and no masses, thyroid is normal  Lymphatics: normal cervical lymph nodes, normal supraclavicular nodes  Pulmonary: no increased work of breathing or signs of respiratory distress, lungs are clear to auscultation  Cardiovascular: normal heart rate and rhythm, normal S1 and S2, no murmurs and pedal pulses and 2+ bilaterally, No edema  Abdomen: abdomen is soft, non-tender with no masses  Musculoskeletal: normal gait and station and exam of the digits and nails are normal  Neurological: normal coordination and normal general cortical function    Visual inspection:  Deformity/amputation: absent  Skin lesions/pre-ulcerative calluses: absent  Edema: right- negative, left- negative    Sensory exam:  Monofilament sensation: normal  (minimum of 5 random plantar locations tested, avoiding callused areas - > 1 area with absence of sensation is + for neuropathy)    Plus at least one of the following:  Pulses: normal  Proprioception: Intact  Vibration (128 Hz): Impaired    ASSESSMENT/PLAN:    1. Type 2 diabetes mellitus, uncontrolled, with neuropathy ([address]  Obtain lab work, then reevaluate. Placed Dexcom sensor for glucose monitoring. Counseled patient about diabetes pathophysiology, basal and prandial insulin requirements, steroid-induced worsening of diabetes management, correction scale with insulin  - SITagliptin-metFORMIN (JANUMET XR)  MG TB24 per extended release tablet; TAKE 1 TABLET TWICE A DAY  Dispense: 180 tablet; Refill: 1  - empagliflozin (JARDIANCE) 25 MG tablet; Take 1 tablet by mouth daily  Dispense: 90 tablet; Refill: 1  -  DIABETES FOOT EXAM  - Comprehensive Metabolic Panel; Future  - C-Peptide; Future  - Hemoglobin A1C; Future  - Lipid Panel; Future  - Microalbumin / Creatinine Urine Ratio; Future  - Continuous Blood Gluc Sensor (DEXCOM G6 SENSOR) MISC; Use for glucose monitoring  Dispense: 9 each; Refill: 3  - Continuous Blood Gluc  (DEXCOM G6 ) SHAHANA; Use for glucose monitoring  Dispense: 1 each; Refill: 3  - Continuous Blood Gluc Transmit (DEXCOM G6 TRANSMITTER) MISC; Use for glucose monitoring  Dispense: 1 each; Refill: 3  - insulin lispro, 1 Unit Dial, (HUMALOG KWIKPEN) 100 UNIT/ML SOPN; Up to 5-10 units before meals 3 times a day  Dispense: 5 pen; Refill: 1  - Insulin Pen Needle (B-D UF III MINI PEN NEEDLES) 31G X 5 MM MISC; 1 each by Does not apply route 4 times daily  Dispense: 100 each; Refill: 1  - [facility] Individual Diabetes Education (Non Care Coord Patient), [facility]    2. Vitamin D deficiency  -25 hydroxy vitamin D  - Comprehensive Metabolic Panel; Future    3.  Diffuse large B-cell lymphoma, unspecified body region [facility])  Follow with hematology oncology  I recommended insulin for hyperglycemia management when on steroids for lymphoma treatment  - Comprehensive Metabolic Panel; Future    4. Other hyperlipidemia  Diet, activity as tolerated  Triglycerides 238  HDL 50  LDL 66  - Comprehensive Metabolic Panel; Future  - Lipid Panel; Future      Reviewed and/or ordered clinical lab results Yes  Reviewed and/or ordered radiology tests Yes  Reviewed and/or ordered other diagnostic tests No  Discussed test results with performing physician No  Independently reviewed image, tracing, or specimen No  Made a decision to obtain old records No  Reviewed and summarized old records Yes   LDL 66  HDL 50  Triglycerides 238  25 hydroxy vitamin D 43.1  Hemoglobin A1c 6.5  Calcium 10.2-11.0-10.1-12.9-10.6, upper limit normal 10.6  Obtained history from other than patient No    Abilio Hare was counseled regarding symptoms of diabetes, hyperlipidemia, vitamin D deficiency diagnosis, course and complications of disease if inadequately treated, side effects of medications, diagnosis, treatment options, and prognosis, risks, benefits, complications, and alternatives of treatment, labs, imaging and other studies and treatment targets and goals, diabetes pathophysiology, basal and prandial insulin requirements, correctional scale when on steroids, CGM. He understands instructions and counseling. These diagnosis were discussed and reviewed with the patient including the advantages of drug therapy. He was counseled at this visit on the following: diabetes complication prevention and foot care. Total time I spent for this encounter gathering history, performing physical exam, coordinating care, counseling, and documenting in the chart - 60 minutes    Return in about 1 month (around 6/4/2022) for diabetes.     Electronically signed by Irving Maloney MD on 5/5/2022 at 1:16 AM

## 2022-05-04 NOTE — TELEPHONE ENCOUNTER
Submitted PA for Insulin Lispro (1 Unit Dial) 100UNIT/ML pen-injectors  Linezolid 600MG tablets  Via CMM STATUS: Approved m 01/01/2022 - 12/31/2022,

## 2022-05-23 NOTE — TELEPHONE ENCOUNTER
LVM per HIPAA. Per 5/17/22 note, states PA needed. PA team left message that his insurance requires DME.  Pt needs to contact there insurance and figure out what DME are used so we can prescribe to DME>

## 2022-05-23 NOTE — TELEPHONE ENCOUNTER
Patient stopped by the [facility] office. He says he has been having trouble with his Dexcom supplies. He does not have a Transmitter to use. Patient would like a call back from the nurse today.

## 2022-05-26 NOTE — PROGRESS NOTES
[address] VISIT      Patient Name: Liza Sood  Medical Record Number:  [id number]  Primary Care Physician:  Franck Wahl MD    ChiefComplaint     Chief Complaint   Patient presents with    Other     sinus drainage       History of Present Illness     Liza Sood is an 67 y.o. male previously seen for parotid abscess, bilateral eustachian tube dysfunction, perceived hearing loss, history of B-cell lymphoma. Interval History:   He was enrolled in a clinical trial for his B-cell lymphoma and is finished chemo last week. This caused issues with fibrosis of the lungs for which he has started on 2 L of O2 via nasal cannula 2 months ago. He has been having some self-limiting nosebleeds and constant anterior watery rhinorrhea. He thinks that the nosebleeds may just be watery rhinorrhea mixed with a small amount of blood. It is quite continuous and worse when he eats. Currently on Flonase and Claritin for allergies. He has not had no issues with his right parotid gland recently. On baby aspirin daily. There is a tentative plan to start on high-dose steroids for his lungs, he follows with his pulmonologist next week for this.       Past Medical History     Past Medical History:   Diagnosis Date    Allergic rhinitis     Benign prostatic hyperplasia with weak urinary stream 12/10/2015     Updating Deprecated Diagnoses    Cancer ([facility] [address] 12/21/2021    Non Earl Lymphoma    Erectile dysfunction     History of gout 9/19/2015    History of thrombocytopenia 9/19/2015    Hypercalcemia     Hyperlipidemia     Hypertension     Lung nodule     Proteinuria     Type 2 diabetes mellitus without complication ([address] 1957    Type II or unspecified type diabetes mellitus without mention of complication, not stated as uncontrolled     Urinary disorder     Vitamin D deficiency 9/19/2015       Past Surgical History     Past Surgical History: Procedure Laterality Date    BRONCHOSCOPY N/A 3/25/2022    BRONCHOSCOPY WITH BRONCHOALVEOLAR LAVAGE performed by Ольга Garcia MD at [address] Bilateral 2011    COLONOSCOPY  3/29/2011    repeat in 5 yrs: Medardo White MD    COLONOSCOPY  03/14/2016    repeat in 7-8 years: Medardo White MD    ELBOW FRACTURE SURGERY Left age 5   Deluca FINGER TRIGGER RELEASE Right 2007    index    IR PORT PLACEMENT EQUAL OR GREATER THAN 5 YEARS  1/11/2022    IR PORT PLACEMENT EQUAL OR GREATER THAN 5 YEARS 1/11/2022 [facility]    TONSILLECTOMY AND ADENOIDECTOMY  age 1    [de-identified]  25       Family History     Family History   Problem Relation Age of Onset    Diabetes Mother     High Blood Pressure Mother     Dementia Mother     Hearing Loss Mother     High Cholesterol Mother     Cancer Neg Hx     Hearing Loss Neg Hx     Stroke Neg Hx     Heart Disease Neg Hx        Social History     Social History     Tobacco Use    Smoking status: Never Smoker    Smokeless tobacco: Never Used    Tobacco comment: Never smoked   Vaping Use    Vaping Use: Never used   Substance Use Topics    Alcohol use: Not Currently     Alcohol/week: 0.0 standard drinks     Comment: drink alcohol very occasionally.  Drug use: No        Allergies     No Known Allergies    Medications     Current Outpatient Medications   Medication Sig Dispense Refill    ipratropium (ATROVENT) 0.06 % nasal spray 2 sprays by Each Nostril route 4 times daily 1 each 3    sodium chloride (OCEAN) 0.65 % nasal spray 2 sprays by Nasal route 4 times daily 1 each 3    bacitracin-polymyxin b (POLYSPORIN) 500-84266 UNIT/GM ointment Apply topically twice daily to inside of both nostrils for 3 weeks then as needed for dry nose.  1 each 1    JARDIANCE 25 MG tablet TAKE 1 TABLET BY MOUTH DAILY 90 tablet 0    SITagliptin-metFORMIN (JANUMET XR)  MG TB24 per extended release tablet TAKE 1 TABLET TWICE A  tablet 1  Continuous Blood Gluc Sensor (DEXCOM G6 SENSOR) MISC Use for glucose monitoring 9 each 3    Continuous Blood Gluc  (DEXCOM G6 ) SHAHANA Use for glucose monitoring 1 each 3    Continuous Blood Gluc Transmit (DEXCOM G6 TRANSMITTER) MISC Use for glucose monitoring 1 each 3    insulin lispro, 1 Unit Dial, (HUMALOG KWIKPEN) 100 UNIT/ML SOPN Up to 5-10 units before meals 3 times a day 5 pen 1    Insulin Pen Needle (B-D UF III MINI PEN NEEDLES) 31G X 5 MM MISC 1 each by Does not apply route 4 times daily 100 each 1    blood glucose test strips (ONETOUCH ULTRA) strip TEST ONCE DAILY 100 strip 3    tamsulosin (FLOMAX) 0.4 MG capsule Take 2 capsules by mouth daily      docusate sodium (COLACE) 100 MG capsule Take 1 capsule by mouth 2 times daily      magnesium oxide (MAG-OX) 400 MG tablet Take 1 tablet by mouth daily 30 tablet 1    aspirin 81 MG EC tablet Take 81 mg by mouth daily      ondansetron (ZOFRAN) 8 MG tablet TAKE 1 TABLET BY MOUTH EVERY 8 HOURS AS NEEDED FOR NAUSEA OR VOMITING      Multiple Vitamins-Minerals (CENTRUM ADULTS PO) Take by mouth      fluticasone (FLONASE) 50 MCG/ACT nasal spray SPRAY ONCE IN EACH NOSTRIL EVERY DAY 48 g 3    Blood Glucose Monitoring Suppl (ONE TOUCH ULTRA 2) w/Device KIT 1 kit by Does not apply route daily 1 kit 0    Lancets MISC Use to test blood sugar once daily 100 each 3    blood glucose test strips (ONETOUCH ULTRA) strip 1 each by Does not apply route daily 100 strip 2    ONE TOUCH ULTRASOFT LANCETS MISC 1 each by Does not apply route daily 100 each 3    vitamin B-12 (CYANOCOBALAMIN) 1000 MCG tablet Take 1,000 mcg by mouth 2 times daily      Cholecalciferol (VITAMIN D) 2000 UNITS CAPS capsule Take by mouth daily      Omega-3 Fatty Acids (OMEGA 3 PO) Take 2 capsules by mouth daily       predniSONE (DELTASONE) 20 MG tablet  (Patient not taking: Reported on 5/4/2022)      prochlorperazine (COMPAZINE) 10 MG tablet TAKE 1 TABLET BY MOUTH EVERY 6 HOURS AS NEEDED FOR NAUSEA (Patient not taking: Reported on 5/4/2022)       No current facility-administered medications for this visit. Review of Systems     REVIEW OF SYSTEM: See HPI above    PhysicalExam     Vitals:    05/26/22 1018   BP: 117/66   Site: Right Upper Arm   Position: Sitting   Cuff Size: Medium Adult   Pulse: (!) 128       PHYSICAL EXAM  /66 (Site: Right Upper Arm, Position: Sitting, Cuff Size: Medium Adult)   Pulse (!) 128     GENERAL: No acute distress, alert and oriented. EYES: EOMI, Anti-icteric. NOSE: On anterior rhinoscopy there is no epistaxis, nasal mucosa very dry with dried bloody scabbing along the bilateral caudal nasal septum, no purulent drainage. EARS: Normal external appearance; on portable otomicroscopy:     -Ad: External auditory canal without stenosis, tympanic membrane clear, no middle ear effusions or retractions     -As: External auditory canal without stenosis, tympanic membrane clear, no middle ear effusions or retractions  FACE: HB 1/6 bilaterally, symmetric appearing, sensation equal bilaterally. No palpable parotid gland abnormalities  ORAL CAVITY: No masses or lesions visualized or palpated, uvula is midline, moist mucous membranes  NECK: Normal range of motion, no thyromegaly, trachea is midline, no palpable lymphadenopathy or neck masses, no crepitus  CHEST: Normal respiratory effort, breathing comfortably, no retractions  SKIN: No rashes, normal appearing skin, no evidence of skin lesions/tumors  NEURO: Cranial Nerves 2, 3, 4, 5, 6, 7, 11, 12 grossly intact bilaterally     I have performed a head and neck physical exam personally or was physically present during the key or critical portions of the service. Assessment and Plan     1. Vasomotor rhinitis  - ipratropium (ATROVENT) 0.06 % nasal spray; 2 sprays by Each Nostril route 4 times daily  Dispense: 1 each; Refill: 3    2. Epistaxis  3.  Nasal mucosa dry  -Likely induced by his supplemental oxygen use.  -Stop Flonase nasal spray as this can precipitate dryness and epistaxis  -Antibiotic ointment inside of both nostrils along the nasal septum twice daily for the next 3 weeks then as needed  -Frequent use of nasal saline sprays or gel multiple times a day  -Consider humidification in the bedroom to help with moisturization  -Avoid nasal trauma including nose picking, harsh nasal blowing, rubbing nose after blowing.  -Epistaxis instructions given in case of future bleeding.  - sodium chloride (OCEAN) 0.65 % nasal spray; 2 sprays by Nasal route 4 times daily  Dispense: 1 each; Refill: 3  - bacitracin-polymyxin b (POLYSPORIN) [id number] UNIT/GM ointment; Apply topically twice daily to inside of both nostrils for 3 weeks then as needed for dry nose. Dispense: 1 each; Refill: 1    4. B-cell lymphoma, unspecified B-cell lymphoma type, unspecified body region [facility])  -Continue follow-up with oncology and pulmonology      Follow-Up     Return if symptoms worsen or fail to improve. Samir [place]stristan   Department of Otolaryngology/Head and Neck Surgery  5/26/22    Medical Decision Making: The following items were considered in medical decision making:  Independent review of images  Review / order clinical lab tests  Review / order radiology tests  Decision to obtain old records      This note was generated completely or in part utilizing Dragon dictation speech recognition software. Occasionally, words are mistranscribed and despite editing, the text may contain inaccuracies due to incorrect word recognition. If further clarification is needed please contact the office at [phone].

## 2022-05-26 NOTE — PATIENT INSTRUCTIONS
DR. Kendall Chao NOSEBLEED INSTRUCTIONS:    - Stop flonase nasal spray  - Obtain nasal saline spray over the counter. Spray nasal saline spray or gel multiple times a day (up to 5-6 times throughout the day) in each nostril to help with nasal mucosal moisturization for the next 3 weeks. You can get this over the counter. You cannot over use this!  - Place antibiotic ointment (I.e. polysporin, triple antiobiotic ointment, bactroban) to inside of both nostrils and along the nasal septum twice daily for next 3 weeks then as needed for moisturization.  - Consider humidification machine in the bedroom to help with nasal moisturization  - Avoid nasal trauma nose picking, harsh nasal blowing, rubbing nose after blowing! If you need to blow your nose blow very gently and do not harshly wipe nose afterwards. - In case of another nosebleed: First spray AFRIN (can obtain over the counter) in affected nostril, then saturate a cottonball with Afrin and place it on the affected side and then placing unrelenting digital pressure for at least 15 minutes. After this take the cottonball allowed and reinspected. If still bleeding please repeat. If Bleeding does not stop after 1 hour or you lose a large amount of blood go to emergency department.

## 2022-05-31 NOTE — PROGRESS NOTES
[facility] Pulmonary and Critical Care    Outpatient Note    Subjective:   CHIEF COMPLAINT:   Chief Complaint   Patient presents with    1 Month Follow-Up    Shortness of Breath     Interval history on 5/31/22  He has no energy. He is losing wt. He finished chemo two weeks ago. He is currently on 3 liters O2. He can walk 200 ft with O2. There is no pain. To me he looks more frail in comparison with last month    Interval history on 4/21/22  He is here today for regular f/u. No new symptoms. HPI:  3/17/22   The patient is 67 y.o. male who presents today for a new patient visit for evaluation of abnormal imaging   He was diagnosed with NHL diagnosed in Dec, and was enrolled in a trial.  Currently on R-CHOP plus placebo or R-CHOP plus tafasitamab and Revlimid  Early in the treatment he was not active, but he start improving and walking more. Later on he developed QUESADA which is going on over the past 3-4 weeks. He was treated with ABX for suspected lung infection and finished 10 days course of ABX two days ago. There is no cough however he has occasional sputum production. He can walk about 100 ft on level ground and get winded. He is losing wt. He lost 25 lbs since Jan.  There is no nausea, vomiting or diarrhea. Appetite is better over the past month however he is not gaining wt. There is no hx of chronic lung disease and no hx of recurrent lung infections before. He has lower back pain, which is chronic for the last 20 year but it is a bit worse lately.     He has hands stiffness in the morning, usually last for 20 min in am.      Past Medical History:    Past Medical History:   Diagnosis Date    Allergic rhinitis     Benign prostatic hyperplasia with weak urinary stream 12/10/2015     Updating Deprecated Diagnoses    Cancer ([facility] [address] 12/21/2021    Non Earl Lymphoma    Erectile dysfunction     History of gout 9/19/2015    History of thrombocytopenia 9/19/2015    Hypercalcemia     Hyperlipidemia     Hypertension     Lung nodule     Proteinuria     Type 2 diabetes mellitus without complication ([address] 4921    Type II or unspecified type diabetes mellitus without mention of complication, not stated as uncontrolled     Urinary disorder     Vitamin D deficiency 9/19/2015       Social History:    Social History     Tobacco Use   Smoking Status Never Smoker   Smokeless Tobacco Never Used   Tobacco Comment    Never smoked       Family History:  Family History   Problem Relation Age of Onset    Diabetes Mother     High Blood Pressure Mother     Dementia Mother     Hearing Loss Mother     High Cholesterol Mother     Cancer Neg Hx     Hearing Loss Neg Hx     Stroke Neg Hx     Heart Disease Neg Hx        Current Medications:  Current Outpatient Medications on File Prior to Visit   Medication Sig Dispense Refill    ipratropium (ATROVENT) 0.06 % nasal spray 2 sprays by Each Nostril route 4 times daily 1 each 3    sodium chloride (OCEAN) 0.65 % nasal spray 2 sprays by Nasal route 4 times daily 1 each 3    bacitracin-polymyxin b (POLYSPORIN) 500-32639 UNIT/GM ointment Apply topically twice daily to inside of both nostrils for 3 weeks then as needed for dry nose.  1 each 1    JARDIANCE 25 MG tablet TAKE 1 TABLET BY MOUTH DAILY 90 tablet 0    SITagliptin-metFORMIN (JANUMET XR)  MG TB24 per extended release tablet TAKE 1 TABLET TWICE A  tablet 1    Continuous Blood Gluc Sensor (DEXCOM G6 SENSOR) MISC Use for glucose monitoring 9 each 3    Continuous Blood Gluc  (DEXCOM G6 ) SHAHANA Use for glucose monitoring 1 each 3    Continuous Blood Gluc Transmit (DEXCOM G6 TRANSMITTER) MISC Use for glucose monitoring 1 each 3    insulin lispro, 1 Unit Dial, (HUMALOG KWIKPEN) 100 UNIT/ML SOPN Up to 5-10 units before meals 3 times a day 5 pen 1    Insulin Pen Needle (B-D UF III MINI PEN NEEDLES) 31G X 5 MM MISC 1 each by Does not apply route 4 times daily 100 each 1    predniSONE (DELTASONE) 20 MG tablet       blood glucose test strips (ONETOUCH ULTRA) strip TEST ONCE DAILY 100 strip 3    tamsulosin (FLOMAX) 0.4 MG capsule Take 2 capsules by mouth daily      docusate sodium (COLACE) 100 MG capsule Take 1 capsule by mouth 2 times daily      magnesium oxide (MAG-OX) 400 MG tablet Take 1 tablet by mouth daily 30 tablet 1    aspirin 81 MG EC tablet Take 81 mg by mouth daily      prochlorperazine (COMPAZINE) 10 MG tablet TAKE 1 TABLET BY MOUTH EVERY 6 HOURS AS NEEDED FOR NAUSEA      ondansetron (ZOFRAN) 8 MG tablet TAKE 1 TABLET BY MOUTH EVERY 8 HOURS AS NEEDED FOR NAUSEA OR VOMITING      Multiple Vitamins-Minerals (CENTRUM ADULTS PO) Take by mouth      fluticasone (FLONASE) 50 MCG/ACT nasal spray SPRAY ONCE IN EACH NOSTRIL EVERY DAY 48 g 3    Blood Glucose Monitoring Suppl (ONE TOUCH ULTRA 2) w/Device KIT 1 kit by Does not apply route daily 1 kit 0    Lancets MISC Use to test blood sugar once daily 100 each 3    blood glucose test strips (ONETOUCH ULTRA) strip 1 each by Does not apply route daily 100 strip 2    ONE TOUCH ULTRASOFT LANCETS MISC 1 each by Does not apply route daily 100 each 3    vitamin B-12 (CYANOCOBALAMIN) 1000 MCG tablet Take 1,000 mcg by mouth 2 times daily      Cholecalciferol (VITAMIN D) 2000 UNITS CAPS capsule Take by mouth daily      Omega-3 Fatty Acids (OMEGA 3 PO) Take 2 capsules by mouth daily        No current facility-administered medications on file prior to visit. REVIEW OF SYSTEMS:    Review of Systems   Constitutional: Positive for unexpected weight change. Negative for chills and fever. HENT: Negative for congestion. Respiratory: Positive for shortness of breath. Negative for cough, chest tightness and wheezing. Cardiovascular: Positive for leg swelling (ankle). Negative for chest pain and palpitations. Neurological: Negative for weakness. Psychiatric/Behavioral: The patient is not nervous/anxious. 67year old male with Diffuse large B cell lymphoma. Recently finished a trial of RCHOP or RCHOP with tafasitamab and Revlimid   F/u imaging is scheduled in July    He had bilateral subpleural and predominantly basal GGO with interlobular septal thickenings. These changes were present even before starting chemo. Clinically he looks frail, losing wt and QUESADA is progressing, however O2 requirement is basically the same. He does fine at rest but sats drop significantly with minimum exertion without O2. (Prior lab work with intermittent eosinophilia. He was a . No prior hx of chronic lung disease or recurrent infections  No prior hx of rheumatic disease,He also has back pain which is chronic   RF is negative. CATERINA is positive with positive anti RNP. Referred to rheumatology and it was felt that there is no underlying rheumatic disease    No prior hx of heart disease. Last echo with normal EF and normal diastolic function   Had bronch on 3/25/22. BAL with increased segs and lymphocytes. Cultures and diatherix were negative)      Current clinical picture is not consistent with progressive ILD. I am afraid that underlying malignancy is progressing. I will see him again to reassess after the next CT scan in July  Meanwhile continue O2 therapy, nutritional diet and try to maintain weight.

## 2022-07-04 PROBLEM — G93.89 BRAIN MASS: Status: ACTIVE | Noted: 2022-01-01

## 2022-07-04 NOTE — PLAN OF CARE
Problem: SLP Adult - Impaired Swallowing  Goal: By Discharge: Advance to least restrictive diet without signs or symptoms of aspiration for planned discharge setting. See evaluation for individualized goals.   Outcome: Progressing     SLP completed evaluation, please refer to note in [address], [address] [facility], [address] [address]

## 2022-07-04 NOTE — CONSULTS
Neurology Consult Note    Reason for Consult: brain mass     Chief complaint: brain mass     Dr Rosalinda Cagle MD asked me to see Emmie Mcginnis in consultation for evaluation of brain mass. History of Present Illness:  Emmie Mcginnis is a 67 y.o. male who presents with fall in the bathroom and hitting his head and found in ER to have suspected metastases of known Non-Hodgkin's Lymphoma. His wife at bedside reports that the past 1 week his speech has been reduced in capacity to only 1 word sentences, slightly reduced left smile, and he has had reduced strength on his left side, all in addition to his chronic generalized weakness and chronic dyspnea with exertion. He denies any headaches or any pain. He denies any change in sensation, change in vision, or change in hearing. He denies any dysphagia. He denies confusion or loss of consciousness, urinary or bowel incontinence, nausea or vomiting, and denies tongue biting.         Current Facility-Administered Medications:     dexamethasone (DECADRON) injection 4 mg, 4 mg, IntraVENous, Q6H, Tammie Felix MD, 4 mg at 07/04/22 0847    levETIRAcetam (KEPPRA) 500 mg in sodium chloride 0.9 % 100 mL IVPB, 500 mg, IntraVENous, Q12H, Tammie Felix MD    fluticasone (FLONASE) 50 MCG/ACT nasal spray 1 spray, 1 spray, Each Nostril, Daily, Tammie Felix MD    insulin lispro (1 Unit Dial) 0-12 Units, 0-12 Units, SubCUTAneous, TID WC, Tammie Felix MD    insulin lispro (1 Unit Dial) 0-6 Units, 0-6 Units, SubCUTAneous, Nightly, Tammie Felix MD    glucose chewable tablet 16 g, 4 tablet, Oral, PRN, Tammie Felix MD    dextrose bolus 10% 125 mL, 125 mL, IntraVENous, PRN **OR** dextrose bolus 10% 250 mL, 250 mL, IntraVENous, PRN, Tammie Felix MD    glucagon (rDNA) injection 1 mg, 1 mg, IntraMUSCular, PRN, Tammie Felix MD    dextrose 5 % solution, 100 mL/hr, IntraVENous, PRN, Tammie Felix MD    ipratropium (ATROVENT) 0.06 % nasal spray 2 spray, 2 spray, Each Nostril, 4x Daily, Buzz Hurt MD    tamsulosin [facility]) capsule 0.8 mg, 0.8 mg, Oral, Daily, Buzz Hurt MD    acetaminophen (TYLENOL) tablet 650 mg, 650 mg, Oral, Q4H PRN, Buzz Hurt MD    ondansetron (ZOFRAN-ODT) disintegrating tablet 4 mg, 4 mg, Oral, Q8H PRN **OR** ondansetron (ZOFRAN) injection 4 mg, 4 mg, IntraVENous, Q6H PRN, Buzz Hurt MD    Medical History:  Past Medical History:   Diagnosis Date    Allergic rhinitis     Benign prostatic hyperplasia with weak urinary stream 12/10/2015     Updating Deprecated Diagnoses    Cancer ([facility] 12/21/2021    Non Earl Lymphoma    Erectile dysfunction     History of gout 9/19/2015    History of thrombocytopenia 9/19/2015    Hypercalcemia     Hyperlipidemia     Hypertension     Lung nodule     Proteinuria     Type 2 diabetes mellitus without complication ([facility] 0654    Type II or unspecified type diabetes mellitus without mention of complication, not stated as uncontrolled     Urinary disorder     Vitamin D deficiency 9/19/2015     Past Surgical History:   Procedure Laterality Date    BRONCHOSCOPY N/A 3/25/2022    BRONCHOSCOPY WITH BRONCHOALVEOLAR LAVAGE performed by Gerrado Tong MD at [address] [place] [address] Bilateral 2011    COLONOSCOPY  3/29/2011    repeat in 5 yrs: Fredis Barrios MD    COLONOSCOPY  03/14/2016    repeat in 7-8 years: Fredis Barrios MD    ELBOW FRACTURE SURGERY Left age 5   [facility] FINGER TRIGGER RELEASE Right 2007    index    IR PORT PLACEMENT EQUAL OR GREATER THAN 5 YEARS  1/11/2022    IR PORT PLACEMENT EQUAL OR GREATER THAN 5 YEARS 1/11/2022 [facility] SPECIAL PROCEDURES    TONSILLECTOMY AND ADENOIDECTOMY  age 1    VASECTOMY  25     Medications Prior to Admission: ipratropium (ATROVENT) 0.06 % nasal spray, 2 sprays by Each Nostril route 4 times daily  sodium chloride (OCEAN) 0.65 % nasal spray, 2 sprays by Nasal route 4 times daily  bacitracin-polymyxin b (POLYSPORIN) 500-99715 UNIT/GM ointment, Apply topically twice daily to inside of both nostrils for 3 weeks then as needed for dry nose.   JARDIANCE 25 MG tablet, TAKE 1 TABLET BY MOUTH DAILY  SITagliptin-metFORMIN (JANUMET XR)  MG TB24 per extended release tablet, TAKE 1 TABLET TWICE A DAY  Continuous Blood Gluc Sensor (DEXCOM G6 SENSOR) MISC, Use for glucose monitoring  Continuous Blood Gluc  (DEXCOM G6 ) SHAHANA, Use for glucose monitoring  Continuous Blood Gluc Transmit (DEXCOM G6 TRANSMITTER) MISC, Use for glucose monitoring  insulin lispro, 1 Unit Dial, (HUMALOG KWIKPEN) 100 UNIT/ML SOPN, Up to 5-10 units before meals 3 times a day  Insulin Pen Needle (B-D UF III MINI PEN NEEDLES) 31G X 5 MM MISC, 1 each by Does not apply route 4 times daily  predniSONE (DELTASONE) 20 MG tablet,   blood glucose test strips (ONETOUCH ULTRA) strip, TEST ONCE DAILY  tamsulosin (FLOMAX) 0.4 MG capsule, Take 2 capsules by mouth daily  docusate sodium (COLACE) 100 MG capsule, Take 1 capsule by mouth 2 times daily  magnesium oxide (MAG-OX) 400 MG tablet, Take 1 tablet by mouth daily  aspirin 81 MG EC tablet, Take 81 mg by mouth daily  prochlorperazine (COMPAZINE) 10 MG tablet, TAKE 1 TABLET BY MOUTH EVERY 6 HOURS AS NEEDED FOR NAUSEA  ondansetron (ZOFRAN) 8 MG tablet, TAKE 1 TABLET BY MOUTH EVERY 8 HOURS AS NEEDED FOR NAUSEA OR VOMITING  Multiple Vitamins-Minerals (CENTRUM ADULTS PO), Take by mouth  fluticasone (FLONASE) 50 MCG/ACT nasal spray, SPRAY ONCE IN EACH NOSTRIL EVERY DAY  Blood Glucose Monitoring Suppl (ONE TOUCH ULTRA 2) w/Device KIT, 1 kit by Does not apply route daily  Lancets MISC, Use to test blood sugar once daily  blood glucose test strips (ONETOUCH ULTRA) strip, 1 each by Does not apply route daily  ONE TOUCH ULTRASOFT LANCETS MISC, 1 each by Does not apply route daily  vitamin B-12 (CYANOCOBALAMIN) 1000 MCG tablet, Take 1,000 mcg by mouth 2 times daily  Cholecalciferol (VITAMIN D) 2000 UNITS CAPS capsule, Take by mouth daily  Omega-3 Fatty Acids (OMEGA 3 PO), Take 2 capsules by mouth daily   No Known Allergies  Family History   Problem Relation Age of Onset    Diabetes Mother     High Blood Pressure Mother     Dementia Mother     Hearing Loss Mother     High Cholesterol Mother     Cancer Neg Hx     Hearing Loss Neg Hx     Stroke Neg Hx     Heart Disease Neg Hx      Social History     Tobacco Use   Smoking Status Never Smoker   Smokeless Tobacco Never Used   Tobacco Comment    Never smoked     Social History     Substance and Sexual Activity   Drug Use No     Social History     Substance and Sexual Activity   Alcohol Use Not Currently    Alcohol/week: 0.0 standard drinks    Comment: drink alcohol very occasionally. ROS:  Constitutional- No weight loss. No fevers. Eyes- No diplopia. No photophobia. Ears/nose/throat- No dysphagia. No Dysarthria  Cardiovascular- No palpitations. No chest pain  Respiratory- No dyspnea. No Cough  Gastrointestinal- No Abdominal pain. No Vomiting. Genitourinary- No incontinence. No urinary retention  Musculoskeletal- No myalgia. No arthralgia  Skin- No rash. No easy bruising. Psychiatric- No depression. No anxiety  Endocrine- No diabetes. No thyroid issues. Hematologic- No bleeding difficulty. No fatigue  Neurologic- No weakness. No Headache. Exam:  Vitals:    07/04/22 1042   BP: 117/73   Pulse:    Resp: 24   Temp:    SpO2: 93%         Constitutional   Vital signs: BP, HR, and RR reviewed   General Alert, no distress, well-nourished  Eyes: fundoscopic exam unable to visualize fundi  Cardiovascular: pulses symmetric in all 4 extremities. No peripheral edema. Dyspneic throughout the examination. Psychiatric: cooperative with examination, no psychotic behavior noted.      Neurologic  Mental status:  orientation to person and place  General fund of knowledge grossly intact  Memory unable to fully assess, short term appears intact  Attention intact as able to attend well to the exam   Language no speech. Comprehension intact; follows simple commands     Cranial nerves:  CN2: Visual Fields full w/o extinction on confrontational testing  CN 3,4,6: extraocular muscles intact  CN5: facial sensation symmetric  CN7:face symmetric  CN8: hearing grossly intact  CN9: palate elevated symmetrically  CN11: trap full strength on shoulder shrug  CN12: tongue midline with protrusion     Strength: No prontator drift. 5/5 strength in RUE and RLE. 1/5 in LUE and 1/5 in LLE. Deep tendon reflexes: 2/4 in all extremities, except 2+/4 in RUE and RLE. Sensory: light touch and vibration is intact in all 4 extremities. No sensory extinction on double simultaneous stimulation     Cerebellar/coordination: finger nose finger normal without ataxia on right, unable to perform on left. Tone: normal in all 4 extremities     Gait: gait was deferred for safety    I reviewed the following laboratory and imaging study reports, and I independently reviewed the following imaging studies.        Labs  Recent Results (from the past 36 hour(s))   CBC with Auto Differential    Collection Time: 07/04/22  8:36 AM   Result Value Ref Range    WBC 7.2 4.0 - 11.0 K/uL    RBC 3.55 (L) 4.20 - 5.90 M/uL    Hemoglobin 11.7 (L) 13.5 - 17.5 g/dL    Hematocrit 35.9 (L) 40.5 - 52.5 %    .1 (H) 80.0 - 100.0 fL    MCH 33.0 26.0 - 34.0 pg    MCHC 32.6 31.0 - 36.0 g/dL    RDW 17.2 (H) 12.4 - 15.4 %    Platelets 259 (L) 552 - 450 K/uL    MPV 7.9 5.0 - 10.5 fL    Neutrophils % 89.4 %    Lymphocytes % 4.1 %    Monocytes % 5.0 %    Eosinophils % 1.3 %    Basophils % 0.2 %    Neutrophils Absolute 6.5 1.7 - 7.7 K/uL    Lymphocytes Absolute 0.3 (L) 1.0 - 5.1 K/uL    Monocytes Absolute 0.4 0.0 - 1.3 K/uL    Eosinophils Absolute 0.1 0.0 - 0.6 K/uL    Basophils Absolute 0.0 0.0 - 0.2 K/uL   Basic Metabolic Panel w/ Reflex to MG    Collection Time: 07/04/22  8:36 AM   Result Value Ref Range    Sodium 140 136 - 145 mmol/L    Potassium reflex Magnesium 4.0 3.5 - 5.1 mmol/L    Chloride 101 99 - 110 mmol/L    CO2 29 21 - 32 mmol/L    Anion Gap 10 3 - 16    Glucose 139 (H) 70 - 99 mg/dL    BUN 25 (H) 7 - 20 mg/dL    CREATININE <0.5 (L) 0.8 - 1.3 mg/dL    GFR Non-African American >60 >60    GFR African American >60 >60    Calcium 9.7 8.3 - 10.6 mg/dL   Protime-INR    Collection Time: 07/04/22  8:36 AM   Result Value Ref Range    Protime 13.5 11.7 - 14.5 sec    INR 1.04 0.87 - 1.14   APTT    Collection Time: 07/04/22  8:36 AM   Result Value Ref Range    aPTT 22.1 (L) 23.0 - 34.3 sec   TYPE AND SCREEN    Collection Time: 07/04/22  8:36 AM   Result Value Ref Range    ABO/Rh O POS     Antibody Screen NEG           Studies:  MRI BRAIN W WO CONTRAST   Final Result   1. Enhancing 4.6 cm mass centered in the right basal ganglia with extensive surrounding edema in the right frontotemporal lobe. Findings highly concerning for malignancy. 2.  Mass results in 9 mm right to left midline shift, subfalcine herniation, and compression of the right thalamus and brainstem. CT Head WO Contrast   Final Result      1. Heterogeneous 3.9 cm masslike lesion centered in the right basal ganglia/frontal lobe with extensive surrounding edema. This is concerning for a primary or metastatic malignancy. Focal subacute intraparenchymal hemorrhage, hemorrhage within the mass,    or infection are differential considerations. Brain MRI with and without contrast and neurosurgery consult recommended. 2.  Extensive mass effect with 9 mm right to left midline shift, subfalcine herniation and suspected partial transtentorial herniation resulting in effacement of the right frontal horn and body, and mass effect on midbrain. The above findings were discussed with Dr. Denise West on 7/4/2022 at 7:25 AM.      XR SHOULDER LEFT (MIN 2 VIEWS)   Final Result   1. No acute osseous abnormality.    2.  Severe AC joint and glenohumeral joint osteoarthrosis. Impression:  1. Brain Mass  2. Cerebral edema  3. Nonhodgkin's lymphoma  4. Left arm and leg weakness  5. Impaired speech  6. Falls frequently     Colt Samuel is a 67 y.o. male who presented with falls and found to have brain mass with cerebral edema; suspected metastases of non hodgkin's lymphoma. Recommendation:  1. Management of brain mass and cerebral edema per neurosurgery, agree with steroids. 2. If any changes in examination, recommend stat repeat CT head. 3. Repeat CT head in the morning. 4. Obtain oncology consultation. 5. Obtain routine EEG. 6. Agree with keppra 500mg 1 tab BID as prophylaxis. 7. The patient should follow up with [facility] BUDDY Salazar, or with me, as an outpatient after discharge. Your medical management. Thank you for allowing my participation in [facility]. Please call if any questions or concerns. Yoli Jacobo D.O. [address] [place] [address] 248 Neuroscience  Ph: [phone]    Total face to face time spent performing history, physical examination, and counseling was at least 80 minutes with 50% of time spent counseling the patient and family.  A copy of this note was provided for the attending: Jannet Landeros MD .

## 2022-07-04 NOTE — H&P
Internal Medicine  PGY 1  History & Physical      CC Fall    History Obtained From:  spouse, patient's wife    HISTORY OF PRESENT ILLNESS:  66 yo male with a PMH of NHL, HTN, HLD, gout, diabetes and BPH came in to the ED accompanied by his wife due to a fall. The pt's wife is providing the history as the pt only nods and is unable to hold a conversation. The pt's wife explains that for the past couple of weeks, the pt's speech has worsened and sometimes not comprehensible. The pt's wife has also noted the pt is leaning more and more towards the left when he walks with a walker although he has been having problems with balance even before that. The wife has also noticed the pt staring blankly without responding to commands, these episodes have occurred around 5 times in the past couple of weeks. This morning, the pt got up from bed to go the bathroom without telling his wife and later she found him on the floor, the wife states that the pt didn't lose any consciousness, but hurt his left shoulder in the process. Pt hit his head during the fall, denies any current headache. The wife explains that the pt has masses in liver and spleen due to NHL and receives chemotherapy for it. In the ED, pt underwent CXR that showed no acute findings, left shoulder XR that was significant only for osteoarthrosis as well as non contrast head CT that showed that showed a mass with surrounding edema causing mass effect.       Past Medical History:        Diagnosis Date    Allergic rhinitis     Benign prostatic hyperplasia with weak urinary stream 12/10/2015     Updating Deprecated Diagnoses    Cancer ([facility] 12/21/2021    Non Earl Lymphoma    Erectile dysfunction     History of gout 9/19/2015    History of thrombocytopenia 9/19/2015    Hypercalcemia     Hyperlipidemia     Hypertension     Lung nodule     Proteinuria     Type 2 diabetes mellitus without complication ([facility] 3401    Type II or unspecified type diabetes mellitus without mention of complication, not stated as uncontrolled     Urinary disorder     Vitamin D deficiency 9/19/2015   ·     Past Surgical History:        Procedure Laterality Date    BRONCHOSCOPY N/A 3/25/2022    BRONCHOSCOPY WITH BRONCHOALVEOLAR LAVAGE performed by Byron Rojas MD at [address] [place] [address] Bilateral 2011    COLONOSCOPY  3/29/2011    repeat in 5 yrs: Tona Martin MD    COLONOSCOPY  03/14/2016    repeat in 7-8 years: Tona Martin MD    ELBOW FRACTURE SURGERY Left age 5   [facility] FINGER TRIGGER RELEASE Right 2007    index    IR PORT PLACEMENT EQUAL OR GREATER THAN 5 YEARS  1/11/2022    IR PORT PLACEMENT EQUAL OR GREATER THAN 5 YEARS 1/11/2022 [facility] SPECIAL PROCEDURES    TONSILLECTOMY AND ADENOIDECTOMY  age 1    VASECTOMY  25   ·     Medications Priorto Admission:    · Medications Prior to Admission: ipratropium (ATROVENT) 0.06 % nasal spray, 2 sprays by Each Nostril route 4 times daily  · sodium chloride (OCEAN) 0.65 % nasal spray, 2 sprays by Nasal route 4 times daily  · bacitracin-polymyxin b (POLYSPORIN) [id number] UNIT/GM ointment, Apply topically twice daily to inside of both nostrils for 3 weeks then as needed for dry nose.   · JARDIANCE 25 MG tablet, TAKE 1 TABLET BY MOUTH DAILY  · SITagliptin-metFORMIN (JANUMET XR)  MG TB24 per extended release tablet, TAKE 1 TABLET TWICE A DAY  · Continuous Blood Gluc Sensor (DEXCOM G6 SENSOR) MISC, Use for glucose monitoring  · Continuous Blood Gluc  (DEXCOM G6 ) SHAHANA, Use for glucose monitoring  · Continuous Blood Gluc Transmit (DEXCOM G6 TRANSMITTER) MISC, Use for glucose monitoring  · insulin lispro, 1 Unit Dial, (HUMALOG KWIKPEN) 100 UNIT/ML SOPN, Up to 5-10 units before meals 3 times a day  · Insulin Pen Needle (B-D UF III MINI PEN NEEDLES) 31G X 5 MM MISC, 1 each by Does not apply route 4 times daily  · predniSONE (DELTASONE) 20 MG tablet,   · blood glucose test strips (ONETOUCH ULTRA) strip, TEST ONCE DAILY  · tamsulosin (FLOMAX) 0.4 MG capsule, Take 2 capsules by mouth daily  · docusate sodium (COLACE) 100 MG capsule, Take 1 capsule by mouth 2 times daily  · magnesium oxide (MAG-OX) 400 MG tablet, Take 1 tablet by mouth daily  · aspirin 81 MG EC tablet, Take 81 mg by mouth daily  · prochlorperazine (COMPAZINE) 10 MG tablet, TAKE 1 TABLET BY MOUTH EVERY 6 HOURS AS NEEDED FOR NAUSEA  · ondansetron (ZOFRAN) 8 MG tablet, TAKE 1 TABLET BY MOUTH EVERY 8 HOURS AS NEEDED FOR NAUSEA OR VOMITING  · Multiple Vitamins-Minerals (CENTRUM ADULTS PO), Take by mouth  · fluticasone (FLONASE) 50 MCG/ACT nasal spray, SPRAY ONCE IN EACH NOSTRIL EVERY DAY  · Blood Glucose Monitoring Suppl (ONE TOUCH ULTRA 2) w/Device KIT, 1 kit by Does not apply route daily  · Lancets MISC, Use to test blood sugar once daily  · blood glucose test strips (ONETOUCH ULTRA) strip, 1 each by Does not apply route daily  · ONE TOUCH ULTRASOFT LANCETS MISC, 1 each by Does not apply route daily  · vitamin B-12 (CYANOCOBALAMIN) 1000 MCG tablet, Take 1,000 mcg by mouth 2 times daily  · Cholecalciferol (VITAMIN D) 2000 UNITS CAPS capsule, Take by mouth daily  · Omega-3 Fatty Acids (OMEGA 3 PO), Take 2 capsules by mouth daily     Allergies:  Patient has no known allergies. Social History:   · TOBACCO:   reports that he has never smoked. He has never used smokeless tobacco.  · ETOH:   reports previous alcohol use. · DRUGS : no illiciit drug use  · Patient currently lives with his wife at home  ·   Family History:       Problem Relation Age of Onset    Diabetes Mother     High Blood Pressure Mother     Dementia Mother     Hearing Loss Mother     High Cholesterol Mother     Cancer Neg Hx     Hearing Loss Neg Hx     Stroke Neg Hx     Heart Disease Neg Hx    ·     Review of Systems negative      Physical Exam  Constitutional:       General: He is not in acute distress. Appearance: He is not diaphoretic.       Comments: cachectic HENT:      Right Ear: External ear normal.      Left Ear: External ear normal.      Nose: No rhinorrhea. Eyes:      Extraocular Movements: Extraocular movements intact. Conjunctiva/sclera: Conjunctivae normal.   Cardiovascular:      Rate and Rhythm: Normal rate and regular rhythm. Heart sounds: Normal heart sounds. Pulmonary:      Effort: Pulmonary effort is normal. No respiratory distress. Comments: Coarse breath sounds BL  Abdominal:      General: Abdomen is flat. There is no distension. Palpations: Abdomen is soft. Tenderness: There is no abdominal tenderness. There is no guarding or rebound. Musculoskeletal:         General: No swelling. Right lower leg: No edema. Left lower leg: No edema. Skin:     General: Skin is warm and dry. Neurological:      Motor: Weakness (left sided) present. Comments: LUE spasticity; LUE, LLE strengths 3/5            Vitals:    07/04/22 1239   BP:    Pulse:    Resp: 20   Temp:    SpO2: 94%       DATA:    Labs:  CBC:   Recent Labs     07/04/22  0836   WBC 7.2   HGB 11.7*   HCT 35.9*   *       BMP:   Recent Labs     07/04/22  0836      K 4.0      CO2 29   BUN 25*   CREATININE <0.5*   GLUCOSE 139*     LFT's: No results for input(s): AST, ALT, ALB, BILITOT, ALKPHOS in the last 72 hours. Troponin: No results for input(s): TROPONINI in the last 72 hours. BNP:No results for input(s): BNP in the last 72 hours. ABGs: No results for input(s): PHART, [id number], PO2ART in the last 72 hours. INR:   Recent Labs     07/04/22  0836   INR 1.04       U/A:No results for input(s): NITRITE, COLORU, PHUR, LABCAST, WBCUA, RBCUA, MUCUS, TRICHOMONAS, YEAST, BACTERIA, CLARITYU, SPECGRAV, LEUKOCYTESUR, UROBILINOGEN, BILIRUBINUR, BLOODU, GLUCOSEU, AMORPHOUS in the last 72 hours. Invalid input(s): KETONESU    MRI BRAIN W WO CONTRAST   Final Result   1.   Enhancing 4.6 cm mass centered in the right basal ganglia with extensive surrounding edema in the right frontotemporal lobe. Findings highly concerning for malignancy. 2.  Mass results in 9 mm right to left midline shift, subfalcine herniation, and compression of the right thalamus and brainstem. CT Head WO Contrast   Final Result      1. Heterogeneous 3.9 cm masslike lesion centered in the right basal ganglia/frontal lobe with extensive surrounding edema. This is concerning for a primary or metastatic malignancy. Focal subacute intraparenchymal hemorrhage, hemorrhage within the mass,    or infection are differential considerations. Brain MRI with and without contrast and neurosurgery consult recommended. 2.  Extensive mass effect with 9 mm right to left midline shift, subfalcine herniation and suspected partial transtentorial herniation resulting in effacement of the right frontal horn and body, and mass effect on midbrain. The above findings were discussed with Dr. Anat Ruiz on 7/4/2022 at 7:25 AM.      XR SHOULDER LEFT (MIN 2 VIEWS)   Final Result   1. No acute osseous abnormality. 2.  Severe AC joint and glenohumeral joint osteoarthrosis. CT HEAD WO CONTRAST    (Results Pending)           ASSESSMENT AND PLAN:    Brain mass  Brain imaging showed mass like lesion with surrounding edema and hemorrhaging within the mass causing mass effect. Imaging concerning for malignancy, Hx of NHL, Hx of pulmonary nodule, adrenal nodule, hepatic and liver masses, coarse breath sounds. 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   Recently finished a trial of RCHOP or RCHOP with tafasitamab and Revlimid      Nonspecific interstitial pneumonia  Pt had coarse breath sounds BL  -follows with Dr. Ashok Cruz  -2L [place] [baseline]    DM 2  -hold diabetic meds  -insulin medium dose sliding scale    BPH  -continue home med        Code Status:Full code  FEN: soft and bite sized  PPX:   DISPO: IP    Anas Radha, MD  7/4/2022,  1:18 PM

## 2022-07-04 NOTE — ED PROVIDER NOTES
[address] [place]          ATTENDING PHYSICIAN NOTE       Date of evaluation: 7/4/2022    Chief Complaint     Shoulder Pain (pt c/o left shoulder pain after fall from toilet. sts that the \"handles on the toilet chair broke while using it causing pt to fall. \". pt sts that he did hit his head in the fall but denies LOC. )      History of Present Illness     Janette Arredondo is a 67 y.o. male with past medical history of non-Hodgkin lymphoma, hyperlipidemia, hypertension, type 2 diabetes, and BPH who presents to the emergency department after a fall in the bathroom around 5 AM this morning. His wife provides the history at bedside as the patient has difficulty verbally communicating at baseline. She says that he got up from bed and went to the bathroom. He has issues with falls due to chronic illness and deconditioning. He sat down on the commode and then slid off when the handles gave way. He says he hit his head but did not lose consciousness. He suffered a skin tear to his left elbow, but currently his only complaint is right lateral neck pain. Review of Systems     Review of Systems    Pertinent positive and negative findings as documented in the HPI, otherwise other systems were reviewed and were negative. Past Medical, Surgical, Family, and Social History     He has a past medical history of Allergic rhinitis, Benign prostatic hyperplasia with weak urinary stream, Cancer ([address] Erectile dysfunction, History of gout, History of thrombocytopenia, Hypercalcemia, Hyperlipidemia, Hypertension, Lung nodule, Proteinuria, Type 2 diabetes mellitus without complication ([address] Type II or unspecified type diabetes mellitus without mention of complication, not stated as uncontrolled, Urinary disorder, and Vitamin D deficiency. He has a past surgical history that includes Finger trigger release (Right, 2007); Tonsillectomy and adenoidectomy (age 2);  Elbow fracture surgery (Left, age 5); Vasectomy (1971); Cataract removal with implant (Bilateral, 2011); Colonoscopy (3/29/2011); Colonoscopy (03/14/2016); IR PORT PLACEMENT > 5 YEARS (1/11/2022); and bronchoscopy (N/A, 3/25/2022). His family history includes Dementia in his mother; Diabetes in his mother; Hearing Loss in his mother; High Blood Pressure in his mother; High Cholesterol in his mother. He reports that he has never smoked. He has never used smokeless tobacco. He reports previous alcohol use. He reports that he does not use drugs. Medications     Previous Medications    ASPIRIN 81 MG EC TABLET    Take 81 mg by mouth daily    BACITRACIN-POLYMYXIN B (POLYSPORIN) 500-03297 UNIT/GM OINTMENT    Apply topically twice daily to inside of both nostrils for 3 weeks then as needed for dry nose.     BLOOD GLUCOSE MONITORING SUPPL (ONE TOUCH ULTRA 2) W/DEVICE KIT    1 kit by Does not apply route daily    BLOOD GLUCOSE TEST STRIPS (ONETOUCH ULTRA) STRIP    1 each by Does not apply route daily    BLOOD GLUCOSE TEST STRIPS (ONETOUCH ULTRA) STRIP    TEST ONCE DAILY    CHOLECALCIFEROL (VITAMIN D) 2000 UNITS CAPS CAPSULE    Take by mouth daily    CONTINUOUS BLOOD GLUC  (DEXCOM G6 ) SHAHANA    Use for glucose monitoring    CONTINUOUS BLOOD GLUC SENSOR (DEXCOM G6 SENSOR) MISC    Use for glucose monitoring    CONTINUOUS BLOOD GLUC TRANSMIT (DEXCOM G6 TRANSMITTER) MISC    Use for glucose monitoring    DOCUSATE SODIUM (COLACE) 100 MG CAPSULE    Take 1 capsule by mouth 2 times daily    FLUTICASONE (FLONASE) 50 MCG/ACT NASAL SPRAY    SPRAY ONCE IN EACH NOSTRIL EVERY DAY    INSULIN LISPRO, 1 UNIT DIAL, (HUMALOG KWIKPEN) 100 UNIT/ML SOPN    Up to 5-10 units before meals 3 times a day    INSULIN PEN NEEDLE (B-D UF III MINI PEN NEEDLES) 31G X 5 MM MISC    1 each by Does not apply route 4 times daily    IPRATROPIUM (ATROVENT) 0.06 % NASAL SPRAY    2 sprays by Each Nostril route 4 times daily    JARDIANCE 25 MG TABLET    TAKE 1 TABLET BY MOUTH DAILY    LANCETS MISC    Use to test blood sugar once daily    MAGNESIUM OXIDE (MAG-OX) 400 MG TABLET    Take 1 tablet by mouth daily    MULTIPLE VITAMINS-MINERALS (CENTRUM ADULTS PO)    Take by mouth    OMEGA-3 FATTY ACIDS (OMEGA 3 PO)    Take 2 capsules by mouth daily     ONDANSETRON (ZOFRAN) 8 MG TABLET    TAKE 1 TABLET BY MOUTH EVERY 8 HOURS AS NEEDED FOR NAUSEA OR VOMITING    ONE TOUCH ULTRASOFT LANCETS MISC    1 each by Does not apply route daily    PREDNISONE (DELTASONE) 20 MG TABLET        PROCHLORPERAZINE (COMPAZINE) 10 MG TABLET    TAKE 1 TABLET BY MOUTH EVERY 6 HOURS AS NEEDED FOR NAUSEA    SITAGLIPTIN-METFORMIN (JANUMET XR)  MG TB24 PER EXTENDED RELEASE TABLET    TAKE 1 TABLET TWICE A DAY    SODIUM CHLORIDE (OCEAN) 0.65 % NASAL SPRAY    2 sprays by Nasal route 4 times daily    TAMSULOSIN (FLOMAX) 0.4 MG CAPSULE    Take 2 capsules by mouth daily    VITAMIN B-12 (CYANOCOBALAMIN) 1000 MCG TABLET    Take 1,000 mcg by mouth 2 times daily       Allergies     He has No Known Allergies. Physical Exam     INITIAL VITALS: BP: (!) 117/53, Temp: 98.2 °F (36.8 °C), Heart Rate: 81, Resp: 16, SpO2: 95 %   Physical Exam    General: Cachectic and chronically ill-appearing. No acute distress. HEENT: NCAT, PERRL, moist mucous membranes  Neck: Trachea midline, neck supple with FROM. Mild right trapezius tenderness to palpation. No midline tenderness, step-off, or deformity. Heart: Regular rate and rhythm. No murmurs, gallops, or rubs appreciated. Lungs: Clear to auscultation in all fields bilaterally. Normal effort. Abd: Nondistended, no signs of trauma. No tenderness to palpation, guarding, or rebound. MSK: No obvious deformities. Range of motion grossly intact. Extremities: No cyanosis or edema. Peripheral pulses intact. Skin: Small skin tear to left elbow. Neuro: Alert and oriented, moves all extremities spontaneously. Left arm drifts down towards mattress before full count of 10.   Strength is normal in the right arm. Strength is symmetrically diminished in bilateral lower extremities. Psych: Mood and affect appropriate. Thought process and content normal.    Diagnostic Results     EKG   Sinus rhythm at 82 bpm with normal axis, normal intervals. No signs of acute ischemia or strain. Overall no significant changes since previous EKG from April 2018. RADIOLOGY:  CT Head WO Contrast   Final Result      1. Heterogeneous 3.9 cm masslike lesion centered in the right basal ganglia/frontal lobe with extensive surrounding edema. This is concerning for a primary or metastatic malignancy. Focal subacute intraparenchymal hemorrhage, hemorrhage within the mass,    or infection are differential considerations. Brain MRI with and without contrast and neurosurgery consult recommended. 2.  Extensive mass effect with 9 mm right to left midline shift, subfalcine herniation and suspected partial transtentorial herniation resulting in effacement of the right frontal horn and body, and mass effect on midbrain. The above findings were discussed with Dr. Abdon Andre on 7/4/2022 at 7:25 AM.      XR SHOULDER LEFT (MIN 2 VIEWS)   Final Result   1. No acute osseous abnormality. 2.  Severe AC joint and glenohumeral joint osteoarthrosis. LABS:   No results found for this visit on 07/04/22. ED BEDSIDE ULTRASOUND:  No results found. RECENT VITALS:  BP: (!) 117/53,Temp: 98.2 °F (36.8 °C), Heart Rate: 81, Resp: 16, SpO2: 95 %     Procedures     None    ED Course     Nursing Notes, Past Medical Hx, Past Surgical Hx, Social Hx,Allergies, and Family Hx were reviewed.          patient was given the following medications:  Orders Placed This Encounter   Medications    bacitracin-polymyxin b (POLYSPORIN) ointment    dexamethasone (DECADRON) injection 10 mg       CONSULTS:  None    MEDICAL DECISIONMAKING / ASSESSMENT / Jan Romie is a 67 y.o. male who presented with mechanical ground-level fall approximately 1 hour prior to arrival in the emergency department. On my initial assessment the patient was ANO x4, GCS 4/5/6, primary survey intact, secondary survey notable only for the skin tear to the left elbow. Patient stated that his tetanus booster was in fact up-to-date and he declined the need for any pain medications. Chest x-ray was obtained and showed no acute findings. Noncon CT head was obtained and showed a 3.9 cm right basal ganglia mass with surrounding edema and 9 mm of midline shift with subfalcine herniation. Ordered dexamethasone 10 mg every 6 hours and place call to neurosurgery. At this time I am going off service will be signing out care to the oncoming physician. The responsibilities will be to follow-up with neurosurgery recommendations and disposition which is likely ICU for neurochecks awaiting neurosurgery plan. Clinical Impression     1. Intracranial mass        Disposition     PATIENT REFERRED TO:  No follow-up provider specified.     DISCHARGE MEDICATIONS:  New Prescriptions    No medications on file       DISPOSITION  admitted          Disha Madrigal MD  07/04/22 4129

## 2022-07-04 NOTE — PROGRESS NOTES
- Speech saw pt and reccemonded dyshpahia diet, diet ordered per our discussion earlier and speech reffereral. She said he did ok but has a weak cough and had some diffuculty towards the end of clearing his throat. PT wife is in the room currently.

## 2022-07-04 NOTE — CONSULTS
Clinical Pharmacy Consult Note    All IVs in NS - Management by Pharmacy    Consult Date(s): 7/4/22  Consulting Provider(s): Dr. Gerianne Peabody / Plan    1)  Neurologic Injury - All IVs in Normal Saline  · Drips will be adjusted to normal saline as appropriate based on compatibility, in an effort to avoid fluid shifts, as D5W is osmotically active.  The following intermittent IV drips / infusions have been adjusted to saline:  o Levetiracetam - already in NS   The following medications must remain in D5W due to incompatibility with normal saline:  o None at this time   Note: Patient has dextrose ordered as part of hypoglycemia treatment protocol.  Total IV fluid delivered to patient over last 24 hrs: TBD   Pharmacist will follow daily to ensure all IVPBs / drips are in NS where possible.       Please call with questions--  Tino SmithD, BCPS  Wireless: [phone]   7/4/2022 9:57 AM

## 2022-07-04 NOTE — CONSULTS
[facility]   Neurosurgery Consultation        Patient: Frutoso Masker  Consultant: Saima Handy MD, PhD        HPI: Frutoso Masker is a 67 y.o. male patient being seen for complaints of fell and hit head in bathroom after grab bar broke. Past Medical History:   Diagnosis Date    Allergic rhinitis     Benign prostatic hyperplasia with weak urinary stream 12/10/2015     Updating Deprecated Diagnoses    Cancer ([facility] 12/21/2021    Non Earl Lymphoma    Erectile dysfunction     History of gout 9/19/2015    History of thrombocytopenia 9/19/2015    Hypercalcemia     Hyperlipidemia     Hypertension     Lung nodule     Proteinuria     Type 2 diabetes mellitus without complication ([facility] 8226    Type II or unspecified type diabetes mellitus without mention of complication, not stated as uncontrolled     Urinary disorder     Vitamin D deficiency 9/19/2015     Past Surgical History:   Procedure Laterality Date    BRONCHOSCOPY N/A 3/25/2022    BRONCHOSCOPY WITH BRONCHOALVEOLAR LAVAGE performed by Sukhi Ambrosio MD at [address] [place] [address] Bilateral 2011    COLONOSCOPY  3/29/2011    repeat in 5 yrs: Jj Stern MD    COLONOSCOPY  03/14/2016    repeat in 7-8 years: Jj Stern MD    ELBOW FRACTURE SURGERY Left age 5   [address] Right 2007    index    IR PORT PLACEMENT EQUAL OR GREATER THAN 5 YEARS  1/11/2022    IR PORT PLACEMENT EQUAL OR GREATER THAN 5 YEARS 1/11/2022 [facility] PROCED[facility]    TONSILLECTOMY AND ADENOIDECTOMY  age 1    VASECTOMY  25     Patient has no known allergies.     Current Facility-Administered Medications:     bacitracin-polymyxin b (POLYSPORIN) ointment, , Topical, Once, Lachelle Shay MD    dexamethasone (DECADRON) injection 10 mg, 10 mg, IntraVENous, Q6H, Lachelle Shay MD    levETIRAcetam (KEPPRA) 1000 mg/100 mL IVPB, 1,000 mg, IntraVENous, Q12H, Kirby Suarez MD    dexamethasone (DECADRON) injection 4 mg, 4 mg, IntraVENous, Mina Pressley MD    Current Outpatient Medications:     ipratropium (ATROVENT) 0.06 % nasal spray, 2 sprays by Each Nostril route 4 times daily, Disp: 1 each, Rfl: 3    sodium chloride (OCEAN) 0.65 % nasal spray, 2 sprays by Nasal route 4 times daily, Disp: 1 each, Rfl: 3    bacitracin-polymyxin b (POLYSPORIN) 500-31232 UNIT/GM ointment, Apply topically twice daily to inside of both nostrils for 3 weeks then as needed for dry nose., Disp: 1 each, Rfl: 1    JARDIANCE 25 MG tablet, TAKE 1 TABLET BY MOUTH DAILY, Disp: 90 tablet, Rfl: 0    SITagliptin-metFORMIN (JANUMET XR)  MG TB24 per extended release tablet, TAKE 1 TABLET TWICE A DAY, Disp: 180 tablet, Rfl: 1    Continuous Blood Gluc Sensor (DEXCOM G6 SENSOR) MISC, Use for glucose monitoring, Disp: 9 each, Rfl: 3    Continuous Blood Gluc  (DEXCOM G6 ) SHAHANA, Use for glucose monitoring, Disp: 1 each, Rfl: 3    Continuous Blood Gluc Transmit (DEXCOM G6 TRANSMITTER) MISC, Use for glucose monitoring, Disp: 1 each, Rfl: 3    insulin lispro, 1 Unit Dial, (HUMALOG KWIKPEN) 100 UNIT/ML SOPN, Up to 5-10 units before meals 3 times a day, Disp: 5 pen, Rfl: 1    Insulin Pen Needle (B-D UF III MINI PEN NEEDLES) 31G X 5 MM MISC, 1 each by Does not apply route 4 times daily, Disp: 100 each, Rfl: 1    predniSONE (DELTASONE) 20 MG tablet, , Disp: , Rfl:     blood glucose test strips (ONETOUCH ULTRA) strip, TEST ONCE DAILY, Disp: 100 strip, Rfl: 3    tamsulosin (FLOMAX) 0.4 MG capsule, Take 2 capsules by mouth daily, Disp: , Rfl:     docusate sodium (COLACE) 100 MG capsule, Take 1 capsule by mouth 2 times daily, Disp: , Rfl:     magnesium oxide (MAG-OX) 400 MG tablet, Take 1 tablet by mouth daily, Disp: 30 tablet, Rfl: 1    aspirin 81 MG EC tablet, Take 81 mg by mouth daily, Disp: , Rfl:     prochlorperazine (COMPAZINE) 10 MG tablet, TAKE 1 TABLET BY MOUTH EVERY 6 HOURS AS NEEDED FOR NAUSEA, Disp: , Rfl:     ondansetron (ZOFRAN) 8 MG tablet, TAKE 1 TABLET BY MOUTH EVERY 8 HOURS AS NEEDED FOR NAUSEA OR VOMITING, Disp: , Rfl:     Multiple Vitamins-Minerals (CENTRUM ADULTS PO), Take by mouth, Disp: , Rfl:     fluticasone (FLONASE) 50 MCG/ACT nasal spray, SPRAY ONCE IN EACH NOSTRIL EVERY DAY, Disp: 48 g, Rfl: 3    Blood Glucose Monitoring Suppl (ONE TOUCH ULTRA 2) w/Device KIT, 1 kit by Does not apply route daily, Disp: 1 kit, Rfl: 0    Lancets MISC, Use to test blood sugar once daily, Disp: 100 each, Rfl: 3    blood glucose test strips (ONETOUCH ULTRA) strip, 1 each by Does not apply route daily, Disp: 100 strip, Rfl: 2    ONE TOUCH ULTRASOFT LANCETS MISC, 1 each by Does not apply route daily, Disp: 100 each, Rfl: 3    vitamin B-12 (CYANOCOBALAMIN) 1000 MCG tablet, Take 1,000 mcg by mouth 2 times daily, Disp: , Rfl:     Cholecalciferol (VITAMIN D) 2000 UNITS CAPS capsule, Take by mouth daily, Disp: , Rfl:     Omega-3 Fatty Acids (OMEGA 3 PO), Take 2 capsules by mouth daily , Disp: , Rfl:   Social History     Socioeconomic History    Marital status:      Spouse name: Not on file    Number of children: Not on file    Years of education: Not on file    Highest education level: Not on file   Occupational History    Not on file   Tobacco Use    Smoking status: Never Smoker    Smokeless tobacco: Never Used    Tobacco comment: Never smoked   Vaping Use    Vaping Use: Never used   Substance and Sexual Activity    Alcohol use: Not Currently     Alcohol/week: 0.0 standard drinks     Comment: drink alcohol very occasionally.     Drug use: No    Sexual activity: Not Currently     Partners: Female     Comment: Wife   Other Topics Concern    Not on file   Social History Narrative    Not on file     Social Determinants of Health     Financial Resource Strain:     Difficulty of Paying Living Expenses: Not on file   Food Insecurity:     Worried About Running Out of Food in the Last Year: Not on file    [address] in the Last Year: Not on file   Transportation Needs:     Lack of Transportation (Medical): Not on file    Lack of Transportation (Non-Medical): Not on file   Physical Activity:     Days of Exercise per Week: Not on file    Minutes of Exercise per Session: Not on file   Stress:     Feeling of Stress : Not on file   Social Connections:     Frequency of Communication with Friends and Family: Not on file    Frequency of Social Gatherings with Friends and Family: Not on file    Attends Yazidi Services: Not on file    Active Member of [address] [place] [address] [employer] or Organizations: Not on file    Attends Club or Organization Meetings: Not on file    Marital Status: Not on file   Intimate Partner Violence:     Fear of Current or Ex-Partner: Not on file    Emotionally Abused: Not on file    Physically Abused: Not on file    Sexually Abused: Not on file   Housing Stability:     Unable to Pay for Housing in the Last Year: Not on file    Number of [place] in the Last Year: Not on file    Unstable Housing in the Last Year: Not on file     Family History   Problem Relation Age of Onset    Diabetes Mother     High Blood Pressure Mother     Dementia Mother     Hearing Loss Mother     High Cholesterol Mother     Cancer Neg Hx     Hearing Loss Neg Hx     Stroke Neg Hx     Heart Disease Neg Hx        ROS: Complete 10 point ROS was obtained with positives in HPI, otherwise negative. Physical Exam:    Temp (24hrs), Av.2 °F (36.8 °C), Min:98.2 °F (36.8 °C), Max:98.2 °F (36.8 °C)      Neuro Exam  The patient is well-appearing and is in no acute distress. Alert and oriented ×4, appropriate, conversant with normal mood and affect. Nonfocal motor exam    Labs:  No results for input(s): WBC, HGB, HCT, PLT in the last 72 hours. No results for input(s): NA, K, CL, CO2, BUN, CREATININE, GLUCOSE, CALCIUM, PHOS, MG in the last 72 hours. No results for input(s): PROTIME, INR, APTT in the last 72 hours.     Imaging Studies:   CT Head:  1.  Heterogeneous 3.9 cm masslike lesion centered in the right basal ganglia/frontal lobe with extensive surrounding edema. This is concerning for a primary or metastatic malignancy. Focal subacute intraparenchymal hemorrhage, hemorrhage within the mass,    or infection are differential considerations. Brain MRI with and without contrast and neurosurgery consult recommended. 2.  Extensive mass effect with 9 mm right to left midline shift, subfalcine herniation and suspected partial transtentorial herniation resulting in effacement of the right frontal horn and body, and mass effect on midbrain. Assessment:  -Right deep frontal/ganglionic mass most consistent with underlying tumor  -admit to floor q4h neuro checks  -keppra 500 BID  -decadron 4 mg q6h  -MRI Brain with and without      Thank you for asking me to see this patient.     Saima Handy MD, PhD  [facility]  [phone] cell

## 2022-07-04 NOTE — PROGRESS NOTES
Speech Language Pathology  Facility/Department: [facility] 5T ORTHO/NEURO   CLINICAL BEDSIDE SWALLOW EVALUATION    NAME: Rogelio Lutz  : 1945  MRN: [id number]    ADMISSION DATE: 2022  ADMITTING DIAGNOSIS: has Type 2 diabetes mellitus, controlled ([address] Essential hypertension; Other hyperlipidemia; Vitamin D deficiency; Onychomycosis; History of hypercalcemia; Hand arthritis; History of thrombocytopenia; History of gout; Type 2 diabetes mellitus without complication ([address] Benign prostatic hyperplasia with weak urinary stream; Lung nodule; Hypercalcemia; Proteinuria; Mixed hyperlipidemia; Rib pain on right side; Shortness of breath; Microalbuminuria; Generalized weakness; Fatigue; Balance problems; Neck mass; Weight loss; Appetite loss; Enlarged lymph node in neck; Urinary frequency; Diarrhea; B-cell lymphoma of solid organ excluding spleen ([address] Low magnesium level; Parotid mass; Liver mass; Skin abrasion; Diffuse large B-cell lymphoma ([address] Double vision; Dizziness; Urinary urgency; Constipation; Pain with urination; ILD (interstitial lung disease) ([address] Allergic rhinitis; Diabetes mellitus type 2, controlled, without complications ([address] Type 2 diabetes mellitus, uncontrolled, with neuropathy ([address] and Brain mass on their problem list.  ONSET DATE: 22    MRI 22:     Impression   1.  Enhancing 4.6 cm mass centered in the right basal ganglia with extensive surrounding edema in the right frontotemporal lobe. Findings highly concerning for malignancy.    2.  Mass results in 9 mm right to left midline shift, subfalcine herniation, and compression of the right thalamus and brainstem.         No chest imaging on this admission    Date of Eval: 2022  Evaluating Therapist: [address], SLP    Current Diet level:  Current Diet : NPO    Primary Complaint  Patient Complaint: none    Pain:  Pain Assessment  Pain Assessment: None - Denies Pain    Reason for Referral  Rogelio Lutz was referred for a bedside swallow evaluation to assess the efficiency of his swallow function, identify signs and symptoms of aspiration and make recommendations regarding safe dietary consistencies, effective compensatory strategies, and safe eating environment. Impression  Dysphagia Diagnosis: Moderate oral stage dysphagia; Suspected needs further assessment  Dysphagia Outcome Severity Scale: Level 4: Mild moderate dysphagia- Intermittent supervision/cueing. One - two diet consistencies restricted   Patient presents with intermittent signs of oropharyngeal dysphagia in the presence of generalized weakness and brain mass. Pt is recommended to complete instrumental swallow study as he is at high risk of aspiration; Pt to implement soft and bite sized diet with thin liquids until swallow study with high caution. Should signs of aspiration appear, make patient NPO until MBS next date. Treatment Plan  Requires SLP Intervention: Yes  [place] of Treatment: [place] [facility] [place] Recommendations: Ongoing speech therapy is recommended during this hospitalization       Recommended Diet and Intervention   Soft and Bite sized, Thin liquids - Make NPO if pt shows signs of dysphagia     Recommended Form of Meds: Meds in puree  Recommendations: Modified barium swallow study; Dysphagia treatment       Compensatory Swallowing Strategies  Compensatory Swallowing Strategies : Small bites/sips; Remain upright for 30-45 minutes after meals;Upright as possible for all oral intake; Alternate solids and liquids    Treatment/Goals  Goal 1: The patient will tolerate instrumental swallowing procedure  Goal 2: The patient will tolerate recommended diet without observed clinical signs of aspiration  Goal 3: Pt / caregiver will verbalize understanding of results /recommendations and plan of care. 7/4: Pt and wife were educated on risk of aspiration with generalized weakness and weak cough.  They are agreeable to plan of care, use of strict aspiration and completion of modified barium swallow study next date. General  Chart Reviewed: Yes  Comments: Pt is a 67year old male with PMHx of non-Hodgkin lymphoma, hyperlipidemia, hypertension, type 2 diabetes, and BPH. He is admitted with falls and found to have found to have brain mass with cerebral edema; suspected metastases of non hodgkin's lymphoma. Subjective: Pt sitting in bed with wife present. RN cleared for swallow assessment. Pt with shallow breathing, very weak voice with effort, and rests in open mouth position. Wife reports pt has not had any difficulty swallowing and ate a hamburger previous date. Pt reports his voice has started to sound like this since he began chemotherapy in March. Behavior/Cognition: Alert; Cooperative  Respiratory Status: O2 via nasual cannula  O2 Device: Nasal cannula  Liters of Oxygen: 2 L  Communication Observation:  (weak vocal quality, effortful speech)  Follows Directions: Simple  Dentition: Adequate  Patient Positioning: Upright in bed  Baseline Vocal Quality: Dysphonic;Aphonic;Weak  Volitional Cough: Weak  Prior Dysphagia History: none    Vision/Hearing  Vision  Vision: Within Functional Limits  Hearing  Hearing: Within functional limits    Oral Motor Deficits   Full set of natural dentition, weak lingual strength, reduced lingual and labial strength. Dysphonia noted, reduced cough strength. Oral Phase Dysfunction  Oral Phase  Oral Phase - Comment: Pt with difficulty biting cracker, slowed mastication with min oral stasis. No anterior loss of liquid or solids though wife reports he did this yesterday and required a straw. Indicators of Pharyngeal Phase Dysfunction   Pharyngeal Phase   Pharyngeal Phase: Pt with multiple swallows for single drink thin liquid. He drank multiple small sips of water from cup without overt s/s of dysphagia.  At conclusion of trials, pt with immediate, very weak coughing episode (suspect unable to clear penetration/aspiration). Prognosis  Individuals consulted  Consulted and agree with results and recommendations: Patient;RN;Family member  Family member consulted: Wife  RN Name: Kishoremaria antonia Lauri Ha  Patient Education: Role of SLP, recommendations, risk of aspiration, plan of care  Patient Education Response: Verbalizes understanding  Safety Devices in place: Yes  Type of devices: All fall risk precautions in place       Therapy Time  SLP Individual Minutes  Time In: 1300  Time Out: 1330  Minutes: 30      Pt's goal:  Get better    Plan:  Continue goals per POC  Recommended diet:  -Soft and bite sized, Thin liquids  -Medications whole with puree  -Modified barium swallow study  -Should patient demonstrate signs of aspiration, make NPO until MBS  -Oral care 2-3x/daily    Total treatment time: 30 min (15 min tx, 15 min eval)  Pt's discharge plan: Unknown  Discharge Plan: To be determined closer to discharge  Discussed with RN, [address] [place] [address] within reach.      Pg # [id number]  This document will serve as a discharge summary if pt discharge before next treatment   session    [address] [place] [address], NUVIA [id number]  7/4/2022 3:05 PM

## 2022-07-05 NOTE — PROGRESS NOTES
4 Eyes Admission Assessment     I agree as the admission nurse that 2 RN's have performed a thorough Head to Toe Skin Assessment on the patient. ALL assessment sites listed below have been assessed on admission. Areas assessed by both nurses:   [x]   Head, Face, and Ears   [x]   Shoulders, Back, and Chest  [x]   Arms, Elbows, and Hands   [x]   Coccyx, Sacrum, and Ischium  [x]   Legs, Feet, and Heels        Does the Patient have Skin Breakdown? Blanchable redness to sacrum, two scabs/dry skin on spine, two skin tears left forearm. Giorgio Prevention initiated:  Yes   Wound Care Orders initiated:  No      [facility] nurse consulted for Pressure Injury (Stage 3,4, Unstageable, DTI, NWPT, and Complex wounds) or Giorgio score 18 or lower:  No, specialty mattress ordered.       Nurse 1 eSignature: Electronically signed by Camilo Ann RN on 7/5/22 at 6:01 PM EDT    **SHARE this note so that the co-signing nurse is able to place an eSignature**    Nurse 2 eSignature: Electronically signed by Lillian Forbes RN on 7/5/22 at 6:08 PM EDT

## 2022-07-05 NOTE — CONSULTS
staring episodes and unresponsiveness.          PAST MEDICAL HISTORY:     Past Medical History:   Diagnosis Date    Allergic rhinitis     Benign prostatic hyperplasia with weak urinary stream 12/10/2015     Updating Deprecated Diagnoses    Cancer ([facility] 12/21/2021    Non Earl Lymphoma    Erectile dysfunction     History of gout 9/19/2015    History of thrombocytopenia 9/19/2015    Hypercalcemia     Hyperlipidemia     Hypertension     Lung nodule     Proteinuria     Type 2 diabetes mellitus without complication ([facility] 5163    Type II or unspecified type diabetes mellitus without mention of complication, not stated as uncontrolled     Urinary disorder     Vitamin D deficiency 9/19/2015       PAST SURGICAL HISTORY:     Past Surgical History:   Procedure Laterality Date    BRONCHOSCOPY N/A 3/25/2022    BRONCHOSCOPY WITH BRONCHOALVEOLAR LAVAGE performed by Lyndsey Grayson MD at [facility] WITH IMPLANT Bilateral 2011    COLONOSCOPY  3/29/2011    repeat in 5 yrs: Baldo Cash MD    COLONOSCOPY  03/14/2016    repeat in 7-8 years: Baldo Cash MD   [address] Left age 5   [address] Right 2007    index    IR PORT PLACEMENT EQUAL OR GREATER THAN 5 YEARS  1/11/2022    IR PORT PLACEMENT EQUAL OR GREATER THAN 5 YEARS 1/11/2022 [facility] PROCED[facility]    TONSILLECTOMY AND ADENOIDECTOMY  age 2   [address] VASECTOMY  25       SOCIAL HISTORY:     Social History     Socioeconomic History    Marital status:      Spouse name: Not on file    Number of children: Not on file    Years of education: Not on file    Highest education level: Not on file   Occupational History    Not on file   Tobacco Use    Smoking status: Never Smoker    Smokeless tobacco: Never Used    Tobacco comment: Never smoked   Vaping Use    Vaping Use: Never used   Substance and Sexual Activity    Alcohol use: Not Currently     Alcohol/week: 0.0 standard drinks     Comment: drink alcohol very occasionally.  Drug use: No    Sexual activity: Not Currently     Partners: Female     Comment: Wife   Other Topics Concern    Not on file   Social History Narrative    Not on file     Social Determinants of Health     Financial Resource Strain:     Difficulty of Paying Living Expenses: Not on file   Food Insecurity:     Worried About Running Out of Food in the Last Year: Not on file    Darby of Food in the Last Year: Not on file   Transportation Needs:     Lack of Transportation (Medical): Not on file    Lack of Transportation (Non-Medical): Not on file   Physical Activity:     Days of Exercise per Week: Not on file    Minutes of Exercise per Session: Not on file   Stress:     Feeling of Stress : Not on file   Social Connections:     Frequency of Communication with Friends and Family: Not on file    Frequency of Social Gatherings with Friends and Family: Not on file    Attends [employer] Services: Not on file    Active Member of [address] [place] [address] or Organizations: Not on file    Attends Club or Organization Meetings: Not on file    Marital Status: Not on file   Intimate Partner Violence:     Fear of Current or Ex-Partner: Not on file    Emotionally Abused: Not on file    Physically Abused: Not on file    Sexually Abused: Not on file   Housing Stability:     Unable to Pay for Housing in the Last Year: Not on file    Number of [place] in the Last Year: Not on file    Unstable Housing in the Last Year: Not on file       FAMILY HISTORY:     Family History   Problem Relation Age of Onset    Diabetes Mother     High Blood Pressure Mother     Dementia Mother     Hearing Loss Mother     High Cholesterol Mother     Cancer Neg Hx     Hearing Loss Neg Hx     Stroke Neg Hx     Heart Disease Neg Hx        ALLERGIES:     Allergies as of 07/04/2022    (No Known Allergies)       MEDICATIONS:     No current facility-administered medications on file prior to encounter.      Current Outpatient Medications on File Prior to Encounter   Medication Sig Dispense Refill    ipratropium (ATROVENT) 0.06 % nasal spray 2 sprays by Each Nostril route 4 times daily 1 each 3    sodium chloride (OCEAN) 0.65 % nasal spray 2 sprays by Nasal route 4 times daily 1 each 3    bacitracin-polymyxin b (POLYSPORIN) [id number] UNIT/GM ointment Apply topically twice daily to inside of both nostrils for 3 weeks then as needed for dry nose.  1 each 1    JARDIANCE 25 MG tablet TAKE 1 TABLET BY MOUTH DAILY 90 tablet 0    SITagliptin-metFORMIN (JANUMET XR)  MG TB24 per extended release tablet TAKE 1 TABLET TWICE A  tablet 1    Continuous Blood Gluc Sensor (DEXCOM G6 SENSOR) MISC Use for glucose monitoring 9 each 3    Continuous Blood Gluc  (DEXCOM G6 ) SHAHANA Use for glucose monitoring 1 each 3    Continuous Blood Gluc Transmit (DEXCOM G6 TRANSMITTER) MISC Use for glucose monitoring 1 each 3    insulin lispro, 1 Unit Dial, (HUMALOG KWIKPEN) 100 UNIT/ML SOPN Up to 5-10 units before meals 3 times a day 5 pen 1    Insulin Pen Needle (B-D UF III MINI PEN NEEDLES) 31G X 5 MM MISC 1 each by Does not apply route 4 times daily 100 each 1    predniSONE (DELTASONE) 20 MG tablet       blood glucose test strips (ONETOUCH ULTRA) strip TEST ONCE DAILY 100 strip 3    tamsulosin (FLOMAX) 0.4 MG capsule Take 2 capsules by mouth daily      docusate sodium (COLACE) 100 MG capsule Take 1 capsule by mouth 2 times daily      magnesium oxide (MAG-OX) 400 MG tablet Take 1 tablet by mouth daily 30 tablet 1    aspirin 81 MG EC tablet Take 81 mg by mouth daily      prochlorperazine (COMPAZINE) 10 MG tablet TAKE 1 TABLET BY MOUTH EVERY 6 HOURS AS NEEDED FOR NAUSEA      ondansetron (ZOFRAN) 8 MG tablet TAKE 1 TABLET BY MOUTH EVERY 8 HOURS AS NEEDED FOR NAUSEA OR VOMITING      Multiple Vitamins-Minerals (CENTRUM ADULTS PO) Take by mouth      fluticasone (FLONASE) 50 MCG/ACT nasal spray SPRAY ONCE IN [facility] NOSTRIL EVERY DAY 48 g 3    Blood Glucose Monitoring Suppl (ONE TOUCH ULTRA 2) w/Device KIT 1 kit by Does not apply route daily 1 kit 0    Lancets MISC Use to test blood sugar once daily 100 each 3    blood glucose test strips (ONETOUCH ULTRA) strip 1 each by Does not apply route daily 100 strip 2    ONE TOUCH ULTRASOFT LANCETS MISC 1 each by Does not apply route daily 100 each 3    vitamin B-12 (CYANOCOBALAMIN) 1000 MCG tablet Take 1,000 mcg by mouth 2 times daily      Cholecalciferol (VITAMIN D) 2000 UNITS CAPS capsule Take by mouth daily      Omega-3 Fatty Acids (OMEGA 3 PO) Take 2 capsules by mouth daily          REVIEW OF SYSTEMS:       Pertinent review of systems as per HPI       PHYSICAL EXAM:       Vitals:    07/05/22 1246   BP: 128/77   Pulse: 66   Resp: 16   Temp: 97.8 °F (36.6 °C)   SpO2:        ECOG:Score 3:  Patient is capable of only limited self-care, confined to bed or chair greater than 50% of waking hours. General appearance: pale appearing, cachetic, and very fatigued. Head: Normocephalic, without obvious abnormality, atraumatic; subtle left facial droop  Neck: Supple, No gross lymphadenopathy   Lungs: Breathing easily on room air   Heart: Regular rate   Abdomen: Benign  Extremities: without cyanosis, clubbing, edema or asymmetry   Skin: No jaundice, purpura or petechiae    Neuorological: CN II-XII grossly intact other than slight left facial weakness.        LABS:     Lab Results   Component Value Date    WBC 7.0 07/05/2022    HGB 11.8 (L) 07/05/2022    HCT 35.8 (L) 07/05/2022    .0 07/05/2022       Lab Results   Component Value Date    GLUCOSE 175 (H) 07/05/2022    BUN 26 (H) 07/05/2022    CREATININE 0.5 (L) 07/05/2022    K 4.3 00/76/5462    BCR NOT APPLICABLE 22/15/9989    PHOS 2.8 12/23/2016       Lab Results   Component Value Date    ALKPHOS 147 (H) 01/03/2022    ALT 30 01/03/2022    AST 52 (H) 01/03/2022    BILITOT 0.5 01/03/2022       No components found for: MG[6M      No components found for: LABURIC[6M    No components found for: LDH[6M    Lab Results   Component Value Date    PROTIME 13.8 07/05/2022       No results found for: IRON, TIBC    No components found for: PSA[24M   IMAGING:     CT HEAD WO CONTRAST    Result Date: 7/5/2022  HEAD CT SCAN WITHOUT CONTRAST  HISTORY: cerebral edema and brain mass COMPARISON:  Head CT dated July 4, 2022 TECHNIQUE: Noncontrast CT images of the head were obtained. Images were reformatted in the coronal and sagittal planes. Up-to-date CT equipment and radiation dose reduction techniques were employed. FINDINGS: There is a stable 4.4 x 3.2 cm intra-axial mass identified anteriorly within the right basal ganglia with extension into the right frontal lobe. There is surrounding vasogenic edema within the right frontal lobe, right basal ganglia and right temporal lobe. There is 12 mm of right to left subfalcine herniation which is without significant change. No ventricular enlargement is identified. There is effacement of the right cerebral hemisphere sulci due to the mass effect. No intra-axial hemorrhage is identified. There is some physiologic calcification identified within the left cerebral hemisphere. 1. Stable 4.4 cm intra-axial mass within the right basal ganglia with surrounding vasogenic edema resulting in right to left subfalcine herniation as detailed above. IMPRESSION: 1. Normal noncontrast CT scan of the head. CT Head WO Contrast    Result Date: 7/4/2022  EXAM: CT HEAD WO CONTRAST INDICATION: fall, head trauma, headache COMPARISON: None. TECHNICAL: Axial CT imaging obtained from vertex to skull base. Axial images and multiplanar reformatted images reviewed. Dose modulation, iterative reconstruction and/or weight based adjustments of the mA/kV were utilized to reduce radiation dose to as low as reasonably achievable IV Contrast: None.  LIMITATIONS: None FINDINGS: : Unremarkable INTRACRANIAL HEMORRHAGE: Heterogeneous intracranial masslike lesion centered in the right basal ganglia/frontal lobe as described below. VENTRICLES: There is complete effacement of the right frontal horn and body, partial effacement of the left frontal horn and third ventricle with dilation of the remainder of the left lateral ventricle. BRAIN PARENCHYMA: There is a 3.9 x 3.3 x 3.9 cm heterogeneous masslike lesion centered in the right basal ganglia/frontal lobe with extensive surrounding edema in the right frontotemporal lobe. There is effacement of the sulci in the right frontotemporal  region. There is right to left subfalcine herniation and suspected right transtentorial herniation with mass effect on the midbrain. 9 mm right to left midline shift at the septum pellucidum. SKULL: No destructive osseous process or fracture. PARANASAL SINUSES AND MASTOIDS: The visualized paranasal sinuses and mastoid air cells are clear. ORBITS: Cataract surgery. EXTRACRANIAL SOFT TISSUES: Unremarkable. 1.  Heterogeneous 3.9 cm masslike lesion centered in the right basal ganglia/frontal lobe with extensive surrounding edema. This is concerning for a primary or metastatic malignancy. Focal subacute intraparenchymal hemorrhage, hemorrhage within the mass,  or infection are differential considerations. Brain MRI with and without contrast and neurosurgery consult recommended. 2.  Extensive mass effect with 9 mm right to left midline shift, subfalcine herniation and suspected partial transtentorial herniation resulting in effacement of the right frontal horn and body, and mass effect on midbrain. The above findings were discussed with Dr. Nickie Murrieta on 7/4/2022 at 7:25 AM.    XR SHOULDER LEFT (MIN 2 VIEWS)    Result Date: 7/4/2022  EXAM: XR SHOULDER LEFT (MIN 2 VIEWS) INDICATION:  Shoulder pain COMPARISON: None VIEWS: 3 views of the left shoulder FINDINGS: OSSEOUS STRUCTURES: No acute fracture or osseous destruction.  JOINT SPACES: Severe AC joint and glenohumeral joint 4. No evidence of any abdominal or pelvic lymphadenopathy. 5. Uncomplicated sigmoid colon diverticulosis. IMPRESSION:     MRI BRAIN W WO CONTRAST    Result Date: 7/4/2022  MRI of the brain with and without contrast dated 7/4/2022 Indication: new brain mass Comparison: CT head 7/4/2022 Technical Factors: Standard multiplanar multisequence MRI of the brain was performed before and after intravenous administration of 12 mL of ProHance contrast. Findings: There is a enhancing solid mass centered in the right basal ganglia extending into the right frontal lobe measuring 3.5 x 4.6 cm. There is marked surrounding vasogenic edema in the right frontotemporal lobe. There is 9 mm of right-to-left midline shift. Compression of the right ventricular system and third ventricle as seen on CT. Right-to-left subfalcine herniation and mass effect on the right midbrain and thalamus. No other mass is identified. 1.  Enhancing 4.6 cm mass centered in the right basal ganglia with extensive surrounding edema in the right frontotemporal lobe. Findings highly concerning for malignancy. 2.  Mass results in 9 mm right to left midline shift, subfalcine herniation, and compression of the right thalamus and brainstem. STAGING:     Cancer Staging  No matching staging information was found for the patient. ASSESSMENT:     Babs Moore is a 67yo male with history of Stage IVB DLBCL, c-Myc mutated, no DH or TH, with liver involvement at diagnosis (diagnosed in 12/2022), s/p R-CHOP x 6 (was on clinical trial, randomized to RCHOP arm), requiring multiple dose reductions. He now has a large right basal ganglia mass on MRI brain with significant edema and midline shift. This is concerning for CNS involvement of his DLBCL. Agree with plans for stereotactic biopsy to help guide treatment decisions. If this confirms lymphoma involvement, then I would favor systemic therapy with intrathecal or high dose methotrexate.   Palliative TREATMENT CONSENT    Bathe patient    Full Code    Inpatient consult to Neurosurgery    Inpatient consult to Hospitalist    Consult to Neurocritical care    Consult to Neurosurgery    Pharmacy to Dose: Other - See Comments: The pharmacist will review this patient's medication profile to evaluate IV medications and change all base solutions to 0.9% sodium chloride if possible based on compatibility and product availability. This. .. [address] to change base solutions.  Inpatient consult to Oncology    Inpatient consult to Radiation Oncology    OT eval and treat    PT evaluation and treat    Initiate Oxygen Therapy Protocol    Pulse oximetry, continuous    Speech Language Pathology (SLP) eval and treat    POCT Glucose    POCT glucose    POCT Glucose    POCT Glucose    POCT Glucose    POCT Glucose    EKG 12 Lead    TYPE AND SCREEN    TYPE AND SCREEN    EEG awake and asleep    ADMIT TO INPATIENT    Transfer patient    Seizure precautions        In excess of 35  minutes was spent with the patient in discussing workup, staging, medical history and treatment options. Over 50% of this was in counseling regarding treatment options, side effects, and outcomes.       Amauri Ruiz MD

## 2022-07-05 NOTE — PROGRESS NOTES
Attempted to see patient x2 this morning for radiation consultation. Patient was off department both times. Had discussion with his wife discussing the potential role of radiation therapy. Spoke with the neurosurgery [facility]. They are planning for biopsy tomorrow. Also spoke with Dr. Vani Peter from BMT. Patient likely will undergo methotrexate.     Will defer further discussion of radiation therapy pending these other procedures first.    Medhat Jacobsen, JOSEC

## 2022-07-05 NOTE — CONSULTS
l  Oncology [facility]ematology [facility]  Consult Note      Requesting Physician: Matt Lowe MD    CHIEF COMPLAINT: Weakness and a fall    HISTORY OF PRESENT ILLNESS:  Mr. Darian Zazueta  is a 67 y.o. male we are seeing in consultation for stage IV non-Hodgkin lymphoma. For this hospitalization, he presented to the emergency room after a fall. For several days prior to hospitalization, the patient had worsening speech and altered gait. His wife also noted that he will have periods of nonresponsiveness where he would stare off into space. In the ER, he underwent a head CT that demonstrated a mass in the basal ganglia with associated vasogenic edema and midline shift. He has since then undergone an MRI which demonstrated an approximate 4 cm mass. He has been started on steroids and Keppra. Medical oncology is consulted for further recommendations.     Past Medical History:      Diagnosis Date    Allergic rhinitis     Benign prostatic hyperplasia with weak urinary stream 12/10/2015     Updating Deprecated Diagnoses    Cancer ([facility] [address] 12/21/2021    Non Earl Lymphoma    Erectile dysfunction     History of gout 9/19/2015    History of thrombocytopenia 9/19/2015    Hypercalcemia     Hyperlipidemia     Hypertension     Lung nodule     Proteinuria     Type 2 diabetes mellitus without complication ([facility] [address] 4833    Type II or unspecified type diabetes mellitus without mention of complication, not stated as uncontrolled     Urinary disorder     Vitamin D deficiency 9/19/2015       Past Surgical History:        Procedure Laterality Date    BRONCHOSCOPY N/A 3/25/2022    BRONCHOSCOPY WITH BRONCHOALVEOLAR LAVAGE performed by Elly Mo MD at [address] [place] [address] Bilateral 2011    COLONOSCOPY  3/29/2011    repeat in 5 yrs: Kasi Jackson MD    COLONOSCOPY  03/14/2016    repeat in 7-8 years: Kasi Jackson MD    ELBOW FRACTURE SURGERY Left age 5   [address] Right 2007    index    IR PORT PLACEMENT EQUAL OR GREATER THAN 5 YEARS  2022    IR PORT PLACEMENT EQUAL OR GREATER THAN 5 YEARS 2022 [facility]    TONSILLECTOMY AND ADENOIDECTOMY  age 2   [facility]     Oncology History  (Recent Office Note)  Patient Name: Emir Wang  Patient : 1945  Patient MRN: [id number]    Primary Oncologist: Virgie Diamond  Referring Physician: Matt Perez (Internal Medicine)    Date of Service: 2022    Chief Complaint  Non-Hodgkin's lymphoma (disorder) ( Stage Date: Unknown, Stage IVB-Clinical  Disease Status: Initial presentation; Extranodal or Extralymphatic Site: Other-extranodal primary disease; Extranodal or Extralymphatic Site: Liver-without direct extension to adjacent lymph node; Extranodal or Extralymphatic Site-Other: adrenal, spleen; Splenic Involvement At Diagnosis: Yes; B Symptoms: Unexplained weight loss; Transformation: No; CD20 Result: Positive; CD30 Result: Negative; ALK (FISH): Negative; BCL2 Gene Rearrangement: Negative; BCL6 Gene Rearrangement: Negative; HIV-AIDS Status: Negative; LDH Range-Pre-treatment : High; LDH Value-Pre-treatment (U/L): 1259; SCT Eligible: No; Extent of Resection: Biopsy; Hepatitis B Testing Status: Result: Negative; )      Problem List  Non-Hodgkin's lymphoma (disorder) ( Stage Date: Unknown, Stage IVB-Clinical  Disease Status: Initial presentation; Extranodal or Extralymphatic Site: Other-extranodal primary disease; Extranodal or Extralymphatic Site: Liver-without direct extension to adjacent lymph node;  Extranodal or Extralymphatic Site-Other: adrenal, spleen; Splenic Involvement At Diagnosis: Yes; B Symptoms: Unexplained weight loss; Transformation: No; CD20 Result: Positive; CD30 Result: Negative; ALK (FISH): Negative; BCL2 Gene Rearrangement: Negative; BCL6 Gene Rearrangement: Negative; HIV-AIDS Status: Negative; LDH Range-Pre-treatment : High; LDH Value-Pre-treatment (U/L): 1259; SCT Eligible: No; Extent of Resection: Biopsy; Hepatitis B Testing Status: Result: Negative; )  Chronic interstitial lung disease  Diabetes mellitus type 2 (disorder)  Essential hypertension (disorder)  Fatigue  Hypercalcemia  Hyperlipidemia  Hypomagnesemia  Hypoxemia  Leukocytosis  Neutropenia  Participant in a clinical trial  Pneumonitis  SOB  Test result to patient personally (situation)  Thrombocytopenia    HPI  This is a 48-year-old gentleman who was recently hospitalized at [facility] For fatigue and right-sided facial swelling. He was found to be hypercalcemic and a CT of the neck showed a right parotid abscess. The abscess was drained percutaneously. Further imaging demonstrated a mass in the liver as well at groundglass opacities in the lungs. The mass in the liver was biopsied and this yielded a diagnosis of B-cell non-Hodgkin lymphoma. FISH studies Showed a MYC rearrangement 8q24, but not \"double\" or Triple hit. \".      Previous Therapies  CT-guided liver biopsy 2021  Initial therapy on protocol [id number] R-CHOP +/- Tafasitamab/Lenalidamide 2022 - present    Current Therapy  USOR [id number] Tafasitamab or Placebo D1,8,15 + Lenalidomide or Placebo D1-10 + Rituximab IV + CHOP Q21D Cycle Length: 21 Number Cycles: 6 Start: C1D1 on 2022 Assoc Dx: Non-Hodgkin's lymphoma (disorder) LOT: 1st Line Stage: IVB 2022 C2 D2  Tafasitamab invest ([id number] invest IV) or Placebo, HELD: 639 mg (12 mg/kg) As Directed, Dose modified  Lenalidomide invest Oral or Placebo, 10 mg As Directed, Dose modified  Ruxience (Rituximab-pvvr IV),  mg  Cyclophosphamide IV,  mg  Doxorubicin IV,  mg  Vincristine IV,  mg  Prednisone Oral, 100 mg As Directed  Pegfilgrastim Subcutaneous (via wearable injector),  mg  Allopurinol Oral, 300 mg qday  OHC Hydration Cycle Length: 1 Number Cycles: 8 Start: C1D1 on 2022 Assoc Dx: Non-Hodgkin's lymphoma (disorder) LOT: Toxicity Management 2022 C5 D1  Sodium Chloride IV 0.9 %, 0.9 % solution 1000 mL  Hydration IV and Electrolyte Replacement Supportive Care Cycle Length: 1 Number Cycles: 1 Start: C1D1 on 2022 Assoc Dx: Non-Hodgkin's lymphoma (disorder) LOT: Other 2022 C1 D1  Magnesium Sulfate IV,  gram      Interval History    Mr. Jaswant Berg returns to the office today for an urgent visit. He is s/p cycle #6 in May. His wife called in yesterday with c/o that the patient is not acting like himself. He is fatigued, sleeping a lot (minimum 14 hours per day) , and losing his balance and fell 2 days ago. His wife states he stares at her like a zombie. This has been ongoing for the past 3 weeks and has worsened over the past week. He has been eating and drinking well. His appetite is good. He does have a PET/CT scheduled for next week. Review of Systems  Constitutional:  No weight loss, No fever, No chills, No night sweats.   Energy level poor  Eyes:  No impairment or change in vision  ENT / Mouth:  No pain, abnormal ulceration, bleeding, nasal drip or change in voice or hearing  Cardiovascular:  No chest pain, palpitations, new edema, or calf discomfort  Respiratory:  No pain, hemoptysis, change to breathing  Breast:  No pain, discharge, change in appearance or texture  Gastrointestinal:  No pain, cramping, jaundice, change to eating and bowel habits  Urinary:  No pain, bleeding or change in continence  Genitalia: No pain, bleeding or discharge  Musculoskeletal:  No redness, pain, edema or +weakness  Skin:  No pruritus, rash, change to nodules or lesions  Neurologic:  No discomfort, change in mental status, speech, sensory or+ motor activity  Psychiatric:  + change in concentration or change to affect or mood  Endocrine:  No hot flashes, increased thirst, or change to urine production  Hematologic: No petechiae, ecchymosis or bleeding  Lymphatic:  No lymphadenopathy or lymphedema  Allergy / Immunologic:  No eczema, hives, frequent or recurrent infections      Vital Signs  Blood pressure: 122/70, Pulse: 72, Temperature: 97.4 F, Respirations: , O2 sat: , Pain Scale: 0, Height: 67 in, Weight: 114.4 lb, BSA: 1.57, BMI: 17.92 kg/m2    Physical Exam    CONSTITUTIONAL: awake, alert, cooperative, Frail; thin  EYES: pupils equal, round and reactive to light, sclera clear and conjunctiva normal  ENT: Normocephalic, without obvious abnormality, atraumatic  NECK: supple, symmetrical, no jugular venous distension and no carotid bruits   HEMATOLOGIC/LYMPHATIC: no cervical, supraclavicular or axillary lymphadenopathy   LUNGS: no increased work of breathing; Moderate air movement. No rales, rhonchi, wheezes; Using nasal cannula oxygen  CARDIOVASCULAR: regular rate and rhythm, normal S1 and S2, no murmur noted; R anterior chest wall port site free of erythema  ABDOMEN: normal bowel sounds x 4, soft, non-distended, non-tender, no masses palpated, no hepatosplenomegaly   MUSCULOSKELETAL: full range of motion noted, tone is normal  NEUROLOGIC: awake, alert, oriented to name, place and time. Motor skills grossly intact. SKIN: Normal skin color, texture, turgor and no jaundice. appears intact;  Healing abrasion on bridge of nose   EXTREMITIES: Without cyanosis, clubbing, edema or asymmetry        Labs  CBC  Lab Results 07/01/2022 05/24/2022 05/17/2022 05/10/2022 05/03/2022 04/26/2022    CBC         WBC x 10^3/uL 11.4 (H) 1.2 (LL) 5.1 9.5 (H) 10.6 (H) 0.9 (LL)      RBC x 10^6/uL 3.67 (L) 3.14 (L) 2.95 (L) 3.16 (L) 2.93 (L) 2.84 (L)      HGB g/dL 12.2 (L) 10.3 (L) 9.8 (L) 10.3 (L) 9.4 (L) 9.3 (L)      HCT % 38.3 (L) 33.1 (L) 31.1 (L) 33.8 (L) 31.0 (L) 29.9 (L)      MCV fL 104.4 (H) 105.4 (H) 105.4 (H) 107.0 (H) 105.8 (H) 105.3 (H)      MCH pg 33.2 (H) 32.8 (H) 33.2 (H) 32.6 (H) 32.1 32.7 (H)      MCHC g/dL 31.9 (L) 31.1 (L) 31.5 (L) 30.5 (L) 30.3 (L) 31.1 (L)      RDW-CV, % 15.7 (H) 17.5 (H) 16.6 (H) 18.1 (H) 17.8 (H) 16.7 (H)      PLT x 10^3/uL 130.0 (L) 117.0 (L) 69.0 (L) 138.0 (L) 110.0 (L) 55.0 (L)      Daksha % 87.6 (H) 39.8 95.7 (H) 93.1 (H) 88.6 (H) 78.8 (H)      LY % 4.0 (L) 13.6 (L) 2.3 (L) 2.6 (L) 3.1 (L) 7.8 (L)      MO % 6.8 40.7 (H) 0.4 (L) 3.6 (L) 7.7 6.7      EO % 1.5 4.2 1.4 0.5 (L) 0.5 (L) 6.7      Daksha # (ANC) x 10^3/uL 10.02 (H) 0.47 (L) 4.92 8.88 (H) 9.43 (H) 0.71 (L)      BA % 0.1 (L) 1.7 (H) 0.2 0.2 0.1 (L) 0.0 (L)      MO # x 10^3/uL 0.78 0.48 0.02 (L) 0.34 0.82 0.06 (L)      EO # x 10^3/uL 0.17 0.05 0.07 0.05 0.05 0.06      BA # x 10^3/uL 0.01 0.02 0.01 0.02 0.01 0.00 (L)      LY # x 10^3/uL 0.46 (L) 0.16 (L) 0.12 (L) 0.25 (L) 0.33 (L) 0.07 (L)  CMP  Lab Results 07/01/2022 05/24/2022 05/17/2022 05/10/2022 05/03/2022 04/26/2022    Chemistries      Glucose mg/dL 195 (H) 235 (H) 245 (H) 215 (H) 223 (H) 233 (H)      BUN mg/dL 29 (H) 29 (H) 24 (H) 19 16 19      Creatinine mg/dL 0.49 (L) 0.61 0.51 (L) 0.52 (L) 0.56 (L) 0.53 (L)      BUN/Creatinine ratio 59.2 (H) 47.5 (H) 47.1 (H) 36.5 (H) 28.6 (H) 35.8 (H)      Sodium mmol/L 145 141 141 141 141 139      Potassium mmol/L 4.2 4.2 4.2 4.0 4.1 3.9      Chloride mmol/L 106 103 102 106 104 103      CO2 mmol/L 27.0 27.6 30.3 26.8 28.1 29.2      Anion gap, mmol/L 12.0 10.4 8.7 8.2 8.9 6.8      Calcium mg/dL 10.5 10.5 10.5 9.9 10.1 10.2      Albumin g/dL 3.9 3.8 3.6 3.7 3.5 3.5      Total protein g/dL 6.8 6.7 6.3 6.6 6.4 6.1      A/G ratio 1.3 1.3 1.3 1.3 1.2 1.3      Bilirubin, total mg/dL 0.6 0.5 0.4 0.4 0.3 0.5      Bilirubin, direct mg/dL   0.11 0.12 0.10 <0.10 0.13      Alkaline phosphatase U/L 123 (H) 139 (H) 119 (H) 130 (H) 161 (H) 120 (H)      AST/SGOT U/L 33 32 25 30 26 19      ALT/SGPT U/L 41 52 (H) 30 26 24 21      LDH U/L   250 (H) 215 271 (H) 262 (H) 242      Uric acid mg/dL   5.4 5.0 5.7 6.0 5.3      GFR non-African American, estimated mL/min/1.73m2 >60.0 >60.0 >60.0 >60.0 >60.0 >60.0      GFR African American, estimated mL/min/1.73m2 >60.0 >60.0 >60.0 >60.0 >60.0 >60.0      Phosphorus mg/dL   3.6 3.1 2.5 3.2 2.5      C-reactive protein, quant, mg/L   25 (H) 28 (H) 13 (H) 25 (H) 92 (H)    Imaging    March 25, 2022  EXAM: CT SOFT TISSUE NECK W CONTRAST, CT CHEST ABDOMEN PELVIS W CONTRAST  INDICATION: Non-Hodgkin's lymphoma, unspecified body region, unspecified non-Hodgkin lymphoma type ([address]  COMPARISON: Multiple priors most recent CT chest 3/2/2022, PET/CT 1/10/2022  TECHNIQUE: Axial CT of the chest, abdomen, and pelvis with multiplanar reformatted images and axial maximum intensity projection images provided for review. Axial MIP images obtained of the chest. Coronal and sagittal reconstructions were obtained of  the  chest, abdomen, and pelvis. Individualized dose optimization technique was used in order to meet ALARA standards for radiation dose reduction. In addition to vendor specific dose reduction algorithms, the dose reduction techniques vary based on the  specific scanner utilized but frequently include automated exposure control, adjustment of the mA and/or kV according to patient size, and use of iterative reconstruction technique. CONTRAST: 80 mL Isovue 370  FINDINGS:  : No additional findings. Medical devices: Right chest port tip terminates in the lower SVC. CHEST:  Airways: Airways are patent. Lungs: Subpleural reticular and groundglass opacities throughout both lungs overall similar. Nodules: No suspicious noncalcified pulmonary nodules. Pleura: No pleural effusions or significant pleural thickening. Heart: Heart size is normal. Aortic valve and coronary artery calcifications are present. Great vessels: Aorta and pulmonary artery are normal in caliber. 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 Hazy opacities surrounding the bilateral axillary arteries, unchanged. Other mediastinal structures and lower neck: Normal.  Adenopathy: No lymphadenopathy. Calcified mediastinal and hilar nodes are present. Chest wall and axilla: Bilateral gynecomastia.   Osseous structures: No significant abnormality. ABDOMEN AND PELVIS:  Liver: Mass centered the left hepatic lobe measuring 5.9 x 4.4 cm, previously 12.8 x 7.8 cm (series 601, image 78). No new hepatic lesions. Biliary system:The gallbladder is normal. No biliary ductal dilation. Pancreas: Normal.  Spleen: Anterior splenic low-density lesion 1.6 x 1.3 cm, previously 2.4 x 1.8 cm (series 601, image 98). Spleen measures 13.1 cm, similar to the prior study. Adrenal glands: Right adrenal nodule is less conspicuous on this study measuring 0.7 cm (series 601, image 92). The left adrenal gland is normal.  Kidneys, ureters, bladder: Multiple subcentimeter low-density lesions in the kidneys are similar. No hydronephrosis or nephrolithiasis. Urinary bladder is unremarkable. Genital organs: Prostate is normal in contour. Bowel: No definite CT correlate for the activity seen on PET/CT within the stomach. The small and large bowel are normal in caliber. No bowel wall thickening. Colonic diverticulosis. The appendix is normal.  Abdominal wall: Normal.  Peritoneum/retroperitoneum: No fluid collections are seen. No free air. Vasculature: No aneurysm. Gastroesophageal varices are present. Lymph nodes: No lymphadenopathy is appreciated. Osseous structures: No suspicious abnormality. Multilevel degenerative changes in the spine are unchanged. IMPRESSION:  CHEST:  1. No evidence of malignancy in the chest.  2. Unchanged interstitial fibrotic changes. ABDOMEN AND PELVIS:  1. Interval decrease in size of the hepatic mass, splenic mass, and right adrenal nodule suggesting a favorable treatment response. 2. No definite CT correlate for the uptake in the stomach seen on PET/CT. 3. No new or progressive findings. Interpreted by: Darcie Aguilar MD  Signed by: Darcie Aguilar MD  3/25/22      OCM - Patient Care Management    Pain, if applicable:        Performance Status: ECOG 1 Symptoms, but ambulatory.  Restricted in physically strenuous activity, but ambulatory and able to carry out work of a light or sedentary nature (e.g., light housework, office work). (Date: 07/01/2022)     Depression Status: Was screened; Outcome positive: No; Screening Date: 01/19/2022; Screening Tool: Patient Health Questionnaire (PHQ9)    Psycho-social PHQ-9 Follow-up Plan (if applicable):     Research    Would you like this patient screened for clinical trial eligibility? If yes, please enter the order for \"Research: Screen for Eligibility\"    Assessment & Plan    Diffuse large B cell non-Hodgkin lymphoma  - FISH studies did not demonstrate double or triple hit. C-Myc was mutated but BCL-2 and BCL 6 were not. - GCB phenotype  - RIPI score 4  - PET/CT demonstrated stage IVb disease  - Prechemo echocardiogram showed a normal ejection fraction  - He has a port in place for chemotherapy administration; No redness or swelling  - We reviewed his diagnosis and staging. He has a diffuse large B cell non-Hodgkin's lymphoma with extranodal involvement, specifically the liver, stomach, adrenal, spleen. Stage IVB. It was somewhat worrying that he had recurrent hypercalcemia prior to chemotherapy. He received Zometa twice prior to initiation of chemotherapy. FISH studies showed a c-Myc mutation without Bcl-2 or BCL6 mutations. Therefore this is not a double or triple hit lymphoma. He is participating in a clinical trial which is a randomization between standard R-CHOP versus R-CHOP plus tafasitamab and Revlimid. Today is cycle 6 day 15. Treatment held today due to neutropenia. Treatment was previously held due to neutropenia. Because we had to make dose reductions on 2 previous occasions, Revlimid was discontinued.      He has imaging scheduled for next week and a follow up with Dr Yayo Cameron   there was concern that today's symptoms are similar to his presentation when he was hypercalcemic  Today, his Ca level remains normal and his CMP is essentially the same as with his last check  He does have leukocytosis on injection 1 mg, 1 mg, IntraMUSCular, PRN  dextrose 5 % solution, 100 mL/hr, IntraVENous, PRN  ipratropium (ATROVENT) 0.06 % nasal spray 2 spray, 2 spray, Each Nostril, 4x Daily  tamsulosin (FLOMAX) capsule 0.8 mg, 0.8 mg, Oral, Daily  acetaminophen (TYLENOL) tablet 650 mg, 650 mg, Oral, Q4H PRN  ondansetron (ZOFRAN-ODT) disintegrating tablet 4 mg, 4 mg, Oral, Q8H PRN **OR** ondansetron (ZOFRAN) injection 4 mg, 4 mg, IntraVENous, Q6H PRN    Allergies:  Patient has no known allergies. Social History:  Social History     Socioeconomic History    Marital status:      Spouse name: Not on file    Number of children: Not on file    Years of education: Not on file    Highest education level: Not on file   Occupational History    Not on file   Tobacco Use    Smoking status: Never Smoker    Smokeless tobacco: Never Used    Tobacco comment: Never smoked   Vaping Use    Vaping Use: Never used   Substance and Sexual Activity    Alcohol use: Not Currently     Alcohol/week: 0.0 standard drinks     Comment: drink alcohol very occasionally.  Drug use: No    Sexual activity: Not Currently     Partners: Female     Comment: Wife   Other Topics Concern    Not on file   Social History Narrative    Not on file     Social Determinants of Health     Financial Resource Strain:     Difficulty of Paying Living Expenses: Not on file   Food Insecurity:     Worried About Running Out of Food in the Last Year: Not on file    Darby of Food in the Last Year: Not on file   Transportation Needs:     Lack of Transportation (Medical): Not on file    Lack of Transportation (Non-Medical):  Not on file   Physical Activity:     Days of Exercise per Week: Not on file    Minutes of Exercise per Session: Not on file   Stress:     Feeling of Stress : Not on file   Social Connections:     Frequency of Communication with Friends and Family: Not on file    Frequency of Social Gatherings with Friends and Family: Not on file   [facility] Attends Taoist Services: Not on file    Active Member of Clubs or Organizations: Not on file    Attends Club or Organization Meetings: Not on file    Marital Status: Not on file   Intimate Partner Violence:     Fear of Current or Ex-Partner: Not on file    Emotionally Abused: Not on file    Physically Abused: Not on file    Sexually Abused: Not on file   Housing Stability:     Unable to Pay for Housing in the Last Year: Not on file    Number of [place] in the Last Year: Not on file    Unstable Housing in the Last Year: Not on file       Family History:         Problem Relation Age of Onset    Diabetes Mother     High Blood Pressure Mother     Dementia Mother     Hearing Loss Mother     High Cholesterol Mother     Cancer Neg Hx     Hearing Loss Neg Hx     Stroke Neg Hx     Heart Disease Neg Hx        REVIEW OF SYSTEMS:    Review of Systems   Constitutional: Positive for fatigue. HENT: Negative. Respiratory: Negative. Cardiovascular: Negative. Gastrointestinal: Negative. Genitourinary: Negative. Musculoskeletal: Negative. Skin: Negative. Neurological: Positive for weakness. PHYSICAL EXAM:      Vitals:  /72   Pulse 72   Temp 98.1 °F (36.7 °C) (Axillary)   Resp 16   Ht 5' 6\" (1.676 m)   Wt 118 lb (53.5 kg)   SpO2 100%   BMI 19.05 kg/m²       Intake/Output Summary (Last 24 hours) at 7/5/2022 0709  Last data filed at 7/5/2022 0255  Gross per 24 hour   Intake --   Output 1200 ml   Net -1200 ml       Physical Exam  Constitutional:       General: He is not in acute distress. Appearance: He is ill-appearing. HENT:      Head:      Comments: alopecia  Eyes:      General: No scleral icterus. Cardiovascular:      Rate and Rhythm: Normal rate and regular rhythm. Comments: PAC site free of erythema  Pulmonary:      Effort: Pulmonary effort is normal.      Breath sounds: Normal breath sounds. No wheezing, rhonchi or rales.    Abdominal:      Palpations: Abdomen is soft. Tenderness: There is no abdominal tenderness. Musculoskeletal:         General: No swelling. Lymphadenopathy:      Cervical: No cervical adenopathy. Neurological:      Motor: Weakness present. Comments: Slowed speech         DATA:  Recent Labs     07/04/22  0836   WBC 7.2   NEUTROABS 6.5   LYMPHOPCT 4.1   RBC 3.55*   HGB 11.7*   HCT 35.9*   .1*   MCH 33.0   MCHC 32.6   RDW 17.2*   *       Lab Results   Component Value Date     07/04/2022    K 4.0 07/04/2022     07/04/2022    CO2 29 07/04/2022    GLUCOSE 139 (H) 07/04/2022    BUN 25 (H) 07/04/2022    CREATININE <0.5 (L) 07/04/2022    LABGLOM >60 07/04/2022    GFRAA >60 07/04/2022    CALCIUM 9.7 07/04/2022    PROT 6.2 (L) 01/03/2022    LABALBU 3.9 01/03/2022    AGRATIO 1.7 01/03/2022    BILITOT 0.5 01/03/2022    ALKPHOS 147 (H) 01/03/2022    ALT 30 01/03/2022    AST 52 (H) 01/03/2022    GLOB 2.2 06/28/2021    MG 1.40 (L) 01/18/2022       Lab Results   Component Value Date    PROT 6.2 (L) 01/03/2022    PROT 7.1 12/21/2021    PROT 7.1 06/28/2021    INR 1.04 07/04/2022    INR 1.50 (H) 01/11/2022     Lab Results   Component Value Date    APTT 22.1 (L) 07/04/2022       TUMOR MARKERS:   Lab Results   Component Value Date    PSA 1.07 06/28/2021         Radiology Review:  CT Head WO Contrast    Result Date: 7/4/2022  EXAM: CT HEAD WO CONTRAST INDICATION: fall, head trauma, headache COMPARISON: None. TECHNICAL: Axial CT imaging obtained from vertex to skull base. Axial images and multiplanar reformatted images reviewed. Dose modulation, iterative reconstruction and/or weight based adjustments of the mA/kV were utilized to reduce radiation dose to as low as reasonably achievable IV Contrast: None. LIMITATIONS: None FINDINGS: : Unremarkable INTRACRANIAL HEMORRHAGE: Heterogeneous intracranial masslike lesion centered in the right basal ganglia/frontal lobe as described below.  VENTRICLES: There is complete effacement of the right frontal horn and body, partial effacement of the left frontal horn and third ventricle with dilation of the remainder of the left lateral ventricle. BRAIN PARENCHYMA: There is a 3.9 x 3.3 x 3.9 cm heterogeneous masslike lesion centered in the right basal ganglia/frontal lobe with extensive surrounding edema in the right frontotemporal lobe. There is effacement of the sulci in the right frontotemporal  region. There is right to left subfalcine herniation and suspected right transtentorial herniation with mass effect on the midbrain. 9 mm right to left midline shift at the septum pellucidum. SKULL: No destructive osseous process or fracture. PARANASAL SINUSES AND MASTOIDS: The visualized paranasal sinuses and mastoid air cells are clear. ORBITS: Cataract surgery. EXTRACRANIAL SOFT TISSUES: Unremarkable. 1.  Heterogeneous 3.9 cm masslike lesion centered in the right basal ganglia/frontal lobe with extensive surrounding edema. This is concerning for a primary or metastatic malignancy. Focal subacute intraparenchymal hemorrhage, hemorrhage within the mass,  or infection are differential considerations. Brain MRI with and without contrast and neurosurgery consult recommended. 2.  Extensive mass effect with 9 mm right to left midline shift, subfalcine herniation and suspected partial transtentorial herniation resulting in effacement of the right frontal horn and body, and mass effect on midbrain. The above findings were discussed with Dr. Emili Hutson on 7/4/2022 at 7:25 AM.    XR SHOULDER LEFT (MIN 2 VIEWS)    Result Date: 7/4/2022  EXAM: XR SHOULDER LEFT (MIN 2 VIEWS) INDICATION:  Shoulder pain COMPARISON: None VIEWS: 3 views of the left shoulder FINDINGS: OSSEOUS STRUCTURES: No acute fracture or osseous destruction. JOINT SPACES: Severe AC joint and glenohumeral joint osteoarthrosis. SOFT TISSUES: Normal. OTHER FINDINGS: None. 1.  No acute osseous abnormality.  2.  Severe AC joint and glenohumeral joint osteoarthrosis. MRI BRAIN W WO CONTRAST    Result Date: 7/4/2022  MRI of the brain with and without contrast dated 7/4/2022 Indication: new brain mass Comparison: CT head 7/4/2022 Technical Factors: Standard multiplanar multisequence MRI of the brain was performed before and after intravenous administration of 12 mL of ProHance contrast. Findings: There is a enhancing solid mass centered in the right basal ganglia extending into the right frontal lobe measuring 3.5 x 4.6 cm. There is marked surrounding vasogenic edema in the right frontotemporal lobe. There is 9 mm of right-to-left midline shift. Compression of the right ventricular system and third ventricle as seen on CT. Right-to-left subfalcine herniation and mass effect on the right midbrain and thalamus. No other mass is identified. 1.  Enhancing 4.6 cm mass centered in the right basal ganglia with extensive surrounding edema in the right frontotemporal lobe. Findings highly concerning for malignancy. 2.  Mass results in 9 mm right to left midline shift, subfalcine herniation, and compression of the right thalamus and brainstem. Problem List  Problem List Items Addressed This Visit     None      Visit Diagnoses     Intracranial mass    -  Primary          IMPRESSION/RECOMMENDATIONS:  DLBCL  - At presentation Jan 2022, stage IVb, RIPI score 4  - FISH studies did not demonstrate double or triple hit. C-Myc was mutated but BCL-2 and BCL 6 were not. - GCB phenotype  - S/P initial therapy on clinical trial utilizing R-CHOP w/ Revlimid and Tafasitimab; Cycle 6 day 15 was May 24, 2022 and was held due to neutropenia.  - Study mandated PET was due this week.   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 - Originally, he was given IV fluids and calcitonin while hospitalized at [facility]. He did not receive a bisphosphonate. - He received Zometa x 2 in office prior to implementation of chemotherapy. Interstitial lung disease  - In the course of his NHL tretatment, we uncovered interstitial lung disease. Dr Rufino Madden reviewed chest CTs in the past and feels that he had evidence of pulmonary fibrosis before his lymphoma diagnosis. He completed a prednisone taper.  - He continues to require supplemental oxygen. - Dyspnea is grade 1  - He had a bronchoscopy March 25 and PFTs April 1.  - He continues pulmonary rehabilitation  - He is seeing Dr. Rufino Madden    Diabetes  - Receiving SSI    Discussed with patient. Thank you for the consultation.   Briseyda Alfred MD  7/5/2022  7:09 AM

## 2022-07-05 NOTE — PROGRESS NOTES
Progress Note    Admit Date: 7/4/2022  Day:   Diet: ADULT DIET; Dysphagia - Soft and Bite Sized; 3 carb choices (45 gm/meal)    CC: fall    Interval history: Pt seen and examined bedside. No acute events overnight, Pt's wife says that pt's speech is better than before and that he is not using his left hand and mostly using only his right hand. Pt denies headache, n/v, sob, and chest pain. Medications:     Scheduled Meds:   levETIRAcetam  500 mg IntraVENous Q12H    fluticasone  1 spray Each Nostril Daily    insulin lispro  0-12 Units SubCUTAneous TID WC    insulin lispro  0-6 Units SubCUTAneous Nightly    ipratropium  2 spray Each Nostril 4x Daily    tamsulosin  0.8 mg Oral Daily     Continuous Infusions:   dextrose       PRN Meds:glucose, dextrose bolus **OR** dextrose bolus, glucagon (rDNA), dextrose, acetaminophen, ondansetron **OR** ondansetron    Objective:   Vitals:   T-max:  Patient Vitals for the past 8 hrs:   BP Temp Temp src Pulse Resp SpO2   07/05/22 0630 123/72 98.1 °F (36.7 °C) Axillary 72 16 100 %   07/05/22 0255 122/69 98.2 °F (36.8 °C) Oral 78 16 92 %       Intake/Output Summary (Last 24 hours) at 7/5/2022 0910  Last data filed at 7/5/2022 0255  Gross per 24 hour   Intake --   Output 1200 ml   Net -1200 ml       Review of Systems   Musculoskeletal: Positive for joint swelling (minimal swelling in LUE fingers and wrist). Physical Exam  Constitutional:       General: He is not in acute distress. Appearance: He is not diaphoretic. Comments: cachectic   HENT:      Right Ear: External ear normal.      Left Ear: External ear normal.      Nose: No rhinorrhea. Eyes:      Extraocular Movements: Extraocular movements intact. Conjunctiva/sclera: Conjunctivae normal.   Cardiovascular:      Rate and Rhythm: Normal rate and regular rhythm. Heart sounds: Normal heart sounds. Pulmonary:      Effort: Pulmonary effort is normal. No respiratory distress.       Comments: Coarse breath sounds BL  Abdominal:      General: Abdomen is flat. There is no distension. Palpations: Abdomen is soft. Tenderness: There is no abdominal tenderness. There is no guarding or rebound. Musculoskeletal:         General: No swelling. Right lower leg: No edema. Left lower leg: No edema. Skin:     General: Skin is warm and dry. Neurological:      Motor: Weakness (left sided) present. Comments: LUE spasticity; LUE, LLE strengths 3/5      LABS:    CBC:   Recent Labs     07/04/22  0836 07/05/22  0715   WBC 7.2 7.0   HGB 11.7* 11.8*   HCT 35.9* 35.8*   * 119*   .1* 100.0     Renal:    Recent Labs     07/04/22  0836 07/05/22  0715    136   K 4.0 4.3    99   CO2 29 28   BUN 25* 26*   CREATININE <0.5* 0.5*   GLUCOSE 139* 175*   CALCIUM 9.7 9.5   ANIONGAP 10 9     Hepatic: No results for input(s): AST, ALT, BILITOT, BILIDIR, PROT, LABALBU, ALKPHOS in the last 72 hours. Troponin: No results for input(s): TROPONINI in the last 72 hours. BNP: No results for input(s): BNP in the last 72 hours. Lipids: No results for input(s): CHOL, HDL in the last 72 hours. Invalid input(s): LDLCALCU, TRIGLYCERIDE  ABGs:  No results for input(s): PHART, JJN6EJR, PO2ART, WZD3FTP, BEART, THGBART, L0AOIPAG, WBG7LLW in the last 72 hours. INR:   Recent Labs     07/04/22  0836   INR 1.04     Lactate: No results for input(s): LACTATE in the last 72 hours. Cultures:  -----------------------------------------------------------------  RAD:   MRI BRAIN W WO CONTRAST   Final Result   1. Enhancing 4.6 cm mass centered in the right basal ganglia with extensive surrounding edema in the right frontotemporal lobe. Findings highly concerning for malignancy. 2.  Mass results in 9 mm right to left midline shift, subfalcine herniation, and compression of the right thalamus and brainstem. CT Head WO Contrast   Final Result      1.   Heterogeneous 3.9 cm masslike lesion centered in the right basal ganglia/frontal lobe with extensive surrounding edema. This is concerning for a primary or metastatic malignancy. Focal subacute intraparenchymal hemorrhage, hemorrhage within the mass,    or infection are differential considerations. Brain MRI with and without contrast and neurosurgery consult recommended. 2.  Extensive mass effect with 9 mm right to left midline shift, subfalcine herniation and suspected partial transtentorial herniation resulting in effacement of the right frontal horn and body, and mass effect on midbrain. The above findings were discussed with Dr. Denise West on 7/4/2022 at 7:25 AM.      XR SHOULDER LEFT (MIN 2 VIEWS)   Final Result   1. No acute osseous abnormality. 2.  Severe AC joint and glenohumeral joint osteoarthrosis. CT HEAD WO CONTRAST    (Results Pending)   FL MODIFIED BARIUM SWALLOW W VIDEO    (Results Pending)   CT CHEST ABDOMEN PELVIS W CONTRAST    (Results Pending)       Assessment/Plan:   Brain mass  Brain imaging showed mass like lesion with surrounding edema and hemorrhaging within the mass causing mass effect. Imaging concerning for malignancy, Hx of NHL, Hx of pulmonary nodule, adrenal nodule, hepatic and liver masses, coarse breath sounds.   -neurosurgery consult  -Keppra 500mg bid  -decadron 4mg q6h  -CT head today     NHL - Diffuse large B cell Lymphoma  Pt receiving chemotherapy.  Recently finished a trial of RCHOP or RCHOP with tafasitamab and Revlimid    -CT chest abdomen today     Nonspecific interstitial pneumonia  Pt had coarse breath sounds BL  -follows with Dr. Enrico ramos     DM 2  -hold diabetic meds  -insulin medium dose sliding scale     BPH  -continue home med        Code Status:Full code  FEN: soft and bite sized  PPX:   DISPO: Tae Moran MD, PGY-1  07/05/22  9:10 AM    This patient has been staffed and discussed with Sherri Walters MD.

## 2022-07-05 NOTE — PROCEDURES
INSTRUMENTAL SWALLOW REPORT  MODIFIED BARIUM SWALLOW    NAME: Rin Rhodes   : 1945  MRN: [id number]       Date of Eval: 2022     Ordering Physician: Hiwot Morgan  Radiologist: Mary Lou Hunt     Referring Diagnosis(es):      Past Medical History:  has a past medical history of Allergic rhinitis, Benign prostatic hyperplasia with weak urinary stream, Cancer ([facility] [address] Erectile dysfunction, History of gout, History of thrombocytopenia, Hypercalcemia, Hyperlipidemia, Hypertension, Lung nodule, Proteinuria, Type 2 diabetes mellitus without complication ([address] Type II or unspecified type diabetes mellitus without mention of complication, not stated as uncontrolled, Urinary disorder, and Vitamin D deficiency. Past Surgical History:  has a past surgical history that includes Finger trigger release (Right, ); Tonsillectomy and adenoidectomy (age 2); Elbow fracture surgery (Left, age 5); Vasectomy (); Cataract removal with implant (Bilateral, ); Colonoscopy (3/29/2011); Colonoscopy (2016); IR PORT PLACEMENT > 5 YEARS (2022); and bronchoscopy (N/A, 3/25/2022). Type of Study: Initial MBS       CT of Chest 22:  Impression   1. No evidence of any thoracic metastatic disease. 2. Stable changes of pulmonary fibrosis. CT Head 22:  1. Stable 4.4 cm intra-axial mass within the right basal ganglia with surrounding vasogenic edema resulting in right to left subfalcine herniation as detailed above.       IMPRESSION:   1. Normal noncontrast CT scan of the head. Patient Complaints/Reason for Referral:  Rin Rhodes was referred for a MBS to assess the efficiency of his/her swallow function, assess for aspiration, and to make recommendations regarding safe dietary consistencies, effective compensatory strategies, and safe eating environment.      Onset of problem:   Date of Onset: 22    General Comment  Comments: Pt is a 67year old male with PMHx of non-Hodgkin lymphoma, hyperlipidemia, hypertension, type 2 diabetes, and BPH. He is admitted with falls and found to have found to have brain mass with cerebral edema; suspected metastases of non hodgkin's lymphoma. Subjective  Subjective: Pt sitting in gurney in radiology suite, agreeable to study. He participates appropriately. Behavior/Cognition/Vision/Hearing:  Behavior/Cognition: Alert; Cooperative  Vision: Within Functional Limits  Hearing: Within functional limits    Impressions:  Treatment Dx and ICD 10: Oropharyngeal Dysphagia  Patient presents with mild-moderate oropharyngeal dysphagia in the setting of brain mass and generalized weakness. Pt demonstrated poor swallow coordination with aspiration of thin liquid x1 with straw presentation. No aspiration occurred with thin liquid via tsp/cup, nectar thick liquid, puree or soft solid. Recommend continue soft and bite sized diet with thin liquids - cup only, small sips, sit fully upright and consistent oral care. SLP to follow. Patient Position: Lateral     Consistencies Administered: Soft & Bite Sized; Thin straw; Thin teaspoon; Thin cup;Mildly Thick cup     Dysphagia Outcome Severity Scale: Level 4: Mild moderate dysphagia- Intermittent supervision/cueing. One - two diet consistencies restricted  Penetration-Aspiration Scale (PAS): 7 - Material enters the airway, passes below the vocal folds, and is not ejected from the trachea despite effort    Recommended Diet:  Solid consistency: Soft and Bite-Sized  Liquid consistency:  Thin  Liquid administration via: Cup (NO STRAW)    Medication administration: Meds in puree    Safe Swallow Protocol:  Supervision: Close  Compensatory Swallowing Strategies :   Small bites/sips  Remain upright for 30-45 minutes after meals  Upright as possible for all oral intake  Alternate solids and liquids  Bolus hold     Recommendations/Treatment  Requires SLP Intervention: Yes     D/C Recommendations: Ongoing speech therapy is recommended during this hospitalization  Postural Changes and/or Swallow Maneuvers: Upright    Education: Images and recommendations were reviewed with Dr. Roro Lauren following this exam.   Patient Education: Role of SLP, recommendations, risk of aspiration, plan of care  Patient Education Response: Verbalizes understanding    Safety Devices  Safety Devices in place: Yes  Type of devices: All fall risk precautions in place      Treatment/Goals  Goal 1: The patient will tolerate instrumental swallowing procedure  7/5: Goal met  Goal 2: The patient will tolerate recommended diet without observed clinical signs of aspiration  Goal 3: Pt / caregiver will verbalize understanding of results /recommendations and plan of care. 7/4: Pt and wife were educated on risk of aspiration with generalized weakness and weak cough. They are agreeable to plan of care, use of strict aspiration and completion of modified barium swallow study next date. 7/5: Pt educated following MBS on results including aspiration. Also discussed recommendations for soft food (pt prefers to remain on soft / bite size rather than minced/moist), strategies and plan of care. Patient agrees to no straws and use of strategies. SLP to follow and complete additional training. Continue goal    Oral Preparation / Oral Phase  Loss of thin liquid to interlabial space. Poor posterior bolus control with loss to pharynx during cued bolus hold. Slowed mastication of soft solid, with disorganized lingual motion for bolus transport. Min-moderate oral stasis observed following puree and soft solid. Pharyngeal Phase  Swallow was initiated with bolus in pyriform sinuses most consistently. Adequate velar elevation without escape to nasal cavity. Partial anterior hyolaryngeal excursion, adequate laryngeal elevation and full epiglottic inversion.  Reduced airway closure due to poor swallow timing (bolus spilling into airway from pyriforms) resulted in flash penetration of thin liquids via cup x1 and aspiration of thin liquid via straw (1 of 2 trials). Aspiration incident triggered an immediate coughing (not strong enough to eject from trachea). No aspiration occurred with 5 trials of thin liquid via cup, nectar thick liquids, puree or soft solids. No significant pharyngeal stasis observed. Pharyngeal stripping wave was present and complete.      Esophageal Phase  Unremarkable      Pain    No pain reported          Therapy Time:   Individual Concurrent Group Co-treatment   Time In 1040         Time Out 1100         Minutes 20            Total Treatment Time: [address] [place] [address], [facility], [id number] 7/5/2022, 12:15 PM

## 2022-07-05 NOTE — PROGRESS NOTES
NEUROLOGY / NEUROCRITICAL CARE PROGRESS NOTE       Patient Name: Janette Arredondo YOB: 1945   Sex: Male Age: 67 yrs     CC / Reason for Consult: brain mass    Interval Hx / Changes over last 24 hours: Wife reports Jarrod Nguyễn is more alert today  Seems to have some difficulty finding words  Awaiting oncology input- follows w/ Dr. Jeronimo Gray as outpt. S/p SLP eval - recommended soft/bite sized with thins  Na-136    ROS: +left hemiparesis (mild), paucity of speech    HISTORY   Admission HPI:   Janette Arredondo is a 67 y.o. male who presents with fall in the bathroom and hitting his head and found in ER to have suspected metastases of known Non-Hodgkin's Lymphoma. His wife at bedside reports that the past 1 week his speech has been reduced in capacity to only 1 word sentences, slightly reduced left smile, and he has had reduced strength on his left side, all in addition to his chronic generalized weakness and chronic dyspnea with exertion. He denies any headaches or any pain. He denies any change in sensation, change in vision, or change in hearing. He denies any dysphagia. He denies confusion or loss of consciousness, urinary or bowel incontinence, nausea or vomiting, and denies tongue biting. PMH Past Medical History:   Diagnosis Date    Allergic rhinitis     Benign prostatic hyperplasia with weak urinary stream 12/10/2015     Updating Deprecated Diagnoses    Cancer ([facility] [address] 12/21/2021    Non Earl Lymphoma    Erectile dysfunction     History of gout 9/19/2015    History of thrombocytopenia 9/19/2015    Hypercalcemia     Hyperlipidemia     Hypertension     Lung nodule     Proteinuria     Type 2 diabetes mellitus without complication ([address] [id number]    Type II or unspecified type diabetes mellitus without mention of complication, not stated as uncontrolled     Urinary disorder     Vitamin D deficiency 9/19/2015      Allergies No Known Allergies   Diet ADULT DIET;  Dysphagia - Soft and Bite Sized; 3 carb choices (45 gm/meal)   Isolation No active isolations     CURRENT SCHEDULED MEDICATIONS   Inpatient Medications     levETIRAcetam, 500 mg, IntraVENous, Q12H    fluticasone, 1 spray, Each Nostril, Daily    insulin lispro, 0-12 Units, SubCUTAneous, TID WC    insulin lispro, 0-6 Units, SubCUTAneous, Nightly    ipratropium, 2 spray, Each Nostril, 4x Daily    tamsulosin, 0.8 mg, Oral, Daily   Infusions    dextrose          LABS   Metabolic Panel Recent Labs     07/04/22  0836 07/05/22  0715    136   K 4.0 4.3    99   CO2 29 28   BUN 25* 26*   CREATININE <0.5* 0.5*   GLUCOSE 139* 175*   CALCIUM 9.7 9.5      CBC / Coags Recent Labs     07/04/22  0836 07/05/22  0715   WBC 7.2 7.0   RBC 3.55* 3.58*   HGB 11.7* 11.8*   HCT 35.9* 35.8*   * 119*   INR 1.04  --         DIAGNOSTICS   IMAGES:  Images personally reviewed and agree w/ radiology interpretation. Head CT w/o Contrast:  1.  Heterogeneous 3.9 cm masslike lesion centered in the right basal ganglia/frontal lobe with extensive surrounding edema. This is concerning for a primary or metastatic malignancy. Focal subacute intraparenchymal hemorrhage, hemorrhage within the mass,    or infection are differential considerations. Brain MRI with and without contrast and neurosurgery consult recommended. 2.  Extensive mass effect with 9 mm right to left midline shift, subfalcine herniation and suspected partial transtentorial herniation resulting in effacement of the right frontal horn and body, and mass effect on midbrain. MRI Brain w & w/o Contrast:  1.  Enhancing 4.6 cm mass centered in the right basal ganglia with extensive surrounding edema in the right frontotemporal lobe. Findings highly concerning for malignancy. 2.  Mass results in 9 mm right to left midline shift, subfalcine herniation, and compression of the right thalamus and brainstem.          PHYSICAL EXAMINATION     PHYSICAL EXAM:  Vitals:    07/04/22 1835 07/04/22 2310 07/05/22 0255 07/05/22 0630   BP: 122/75 128/69 122/69 123/72   Pulse:  82 78 72   Resp: 16 16 16 16   Temp: 98.4 °F (36.9 °C) 98.3 °F (36.8 °C) 98.2 °F (36.8 °C) 98.1 °F (36.7 °C)   TempSrc: Oral Oral Oral Axillary   SpO2:  93% 92% 100%   Weight:       Height:           Exam  -Mental status: Drowsy - just woke up. Oriented to person, place, month, year; pleasant & appropriate  -Speech & Language: some word-finding difficulty. no dysarthria  -Cranial nerves: pupils symmetric; no notable dysconjugate gaze; eyes midline; no facial asymmetry  -Motor: right upper limb 5/5. Left upper limb 4/5. BLE briefly antigravity.   -Other: no adventitious movements noted  Other Systems  -General Appearance: well-developed, well-nourished, no apparent distress  -Neck: supple  -Lungs: breathing unlabored, regular, no audible wheezes  -CV: pulses strong x4 extremities  -Abd: flat    ASSESSMENT & RECOMMENDATIONS   Assessment:  Colt Samuel is a 67 y.o. male who presented with falls and found to have brain mass with cerebral edema; suspected metastases of non-hodgkin's lymphoma.     Recommendation:  - Management of brain mass and cerebral edema per neurosurgery, agree with steroids.  - Given associated hemorrhagic features would hold antiplatelets, anticoagulants  - If any changes in examination, recommend stat repeat CT head. - Oncology involvement  - Routine EEG pending. Continue Keppra. - Continue PT/OT/SLP. Rehab as indicated. - F/U with neurology as outpatient    MARCIA Bowling - CNP   Neurology & Neurocritical Care   Neurology Line: [phone]  [facility]: [facility] NPs  7/5/2022 9:51 AM    I spent 25 minutes in the care of this patient. Over 50% of that time was in face-to-face counseling regarding disease process, diagnostic testing, preventative measures, and answering patient and family questions.

## 2022-07-05 NOTE — PROGRESS NOTES
low-attenuation lesion identified within the spleen. This previously measured 1.6 x 1.3 cm. The pancreas, gallbladder, left adrenal gland, and kidneys are normal. No lymphadenopathy is identified. PELVIS: No bowel dilatation or bowel wall thickening is identified. There is uncomplicated sigmoid colon diverticulosis. Degenerative changes are present within the spine. IMPRESSION:   1. Interval reduction in size of hepatic mass. 2. Slight interval reduction in size of low-attenuation lesion within the spleen. 3. No evidence of any adrenal mass. 4. No evidence of any abdominal or pelvic lymphadenopathy. 5. Uncomplicated sigmoid colon diverticulosis. IMPRESSION:            CT HEAD WO CONTRAST   Final Result   1. Stable 4.4 cm intra-axial mass within the right basal ganglia with surrounding vasogenic edema resulting in right to left subfalcine herniation as detailed above. IMPRESSION:   1. Normal noncontrast CT scan of the head. MRI BRAIN W WO CONTRAST   Final Result   1. Enhancing 4.6 cm mass centered in the right basal ganglia with extensive surrounding edema in the right frontotemporal lobe. Findings highly concerning for malignancy. 2.  Mass results in 9 mm right to left midline shift, subfalcine herniation, and compression of the right thalamus and brainstem. CT Head WO Contrast   Final Result      1. Heterogeneous 3.9 cm masslike lesion centered in the right basal ganglia/frontal lobe with extensive surrounding edema. This is concerning for a primary or metastatic malignancy. Focal subacute intraparenchymal hemorrhage, hemorrhage within the mass,    or infection are differential considerations. Brain MRI with and without contrast and neurosurgery consult recommended.    2.  Extensive mass effect with 9 mm right to left midline shift, subfalcine herniation and suspected partial transtentorial herniation resulting in effacement of the right frontal horn and body, and mass effect on midbrain. The above findings were discussed with Dr. Sushma Colón on 7/4/2022 at 7:25 AM.      XR SHOULDER LEFT (MIN 2 VIEWS)   Final Result   1. No acute osseous abnormality. 2.  Severe AC joint and glenohumeral joint osteoarthrosis. Neurologic Exam:  GCS:  4 - Opens eyes on own  5 - Alert and oriented  6 - Follows simple motor commands    Mental Status: Awake, alert, oriented x 4  Language: speech improving- responses very delayed, short 1-2 word answers, no clear aphasia, follows commands   Sensation: Intact to all extremities to light touch    Cranial Nerves:  II: Visual acuity not tested, denies new visual changes / diplopia  III, IV, VI: PERRL, 3 mm bilaterally, EOMI, no nystagmus noted  V: Facial sensation intact bilaterally to touch  VII: Face symmetric  VIII: Hearing intact bilaterally to spoken voice  IX: Palate movement equal bilaterally  XI: Shoulder shrug equal bilaterally  XII: Tongue midline    Musculoskeletal:   Gait: Not tested   Tone: normal   Motor strength:    Right  Left    Right  Left    Deltoid  5 4-  Hip Flex  5 3   Biceps  5 4-  Knee Extensors  5 4   Triceps  5 4-  Knee Flexors  5 4   Wrist Ext  5 4-  Ankle Dorsiflex. 5 4   Wrist Flex  5 4-  Ankle Plantarflex. 5 4   Handgrip  5 4-  Ext Ricardo Longus  5 4   Thumb Ext  5 4-           Assessment   66 yo admitted with history of non hodgkin's lymphoma admitted with altered mental status and weakness. Imaging demonstrates 4.6 cm mass centered in the right basal ganglia with extensive surrounding edema in the right frontotemporal lobe. Plan:  1. Plan for right frontal needle biopsy of brain mass tomorrow afternoon with Dr. Frankey Lutes    -NPO @ 0000, treatment consent, pre op labs, pre op ancef    -In language easily understood by a layperson, the risks and benefits of surgical intervention were discussed at length with the patient and any family members present.  Risks including, but not limited to, infection, bleeding, possible blood transfusion, numbness, weakness, paralysis, loss of bowel or bladder control, or death were explained fully. Any questions were answered to the patient and patient's wife satisfaction and signed consent obtained. 2. q 4 hour neuro checks  3. Hold steroids until after biopsy complete   4. Keppra BID for seizure prophylaxis, neurology following   5. Rad onc/oncology following    6. Activity as tolerated  7. Diet per SLP recommendations until 0000  8. Will follow closely, please call with questions. DISPO-remain inpatient, biopsy tomorrow      MARCIA Varner-CNP  Neurosurgery Nurse Practitioner  [phone]    Patient was seen and examined with Dr. Seb Patrick who agrees with above assessment and plan. Electronically signed by:  MARCIA Gonzalez NP, 7/5/2022 1:24 PM

## 2022-07-05 NOTE — H&P
Hospitalist Addendum           Addendum to Resident Note:   Patient seen and evaluated   I agree with findings, Assessment and plan documented as above except for below  CC on presentation: Shoulder Pain (pt c/o left shoulder pain after fall from toilet. sts that the \"handles on the toilet chair broke while using it causing pt to fall. \". pt sts that he did hit his head in the fall but denies LOC. )    P/w fall, head injury  Per wife progressive weakness on left and staring episodes. A/p  Principal Problem:    Brain mass  Resolved Problems:    * No resolved hospital problems.  *       Plan  Brain mass  Hx of Lymphoma  Start Decadron, Keppra  NS consulted, discussed with Dr Speedy Figueredo MD  8:00 AM

## 2022-07-05 NOTE — CONSULTS
Clinical Pharmacy Consult Note    All IVs in NS - Management by Pharmacy    Consult Date(s): 7/4/22  Consulting Provider(s): Dr. Nancy Dominguez / Plan    1)  Neurologic Injury - All IVs in Normal Saline  · Drips will be adjusted to normal saline as appropriate based on compatibility, in an effort to avoid fluid shifts, as D5W is osmotically active.  The following intermittent IV drips / infusions have been adjusted to saline:  o Levetiracetam - already in NS   The following medications must remain in D5W due to incompatibility with normal saline:  o None at this time   Note: Patient has dextrose ordered as part of hypoglycemia treatment protocol.  Total IV fluid delivered to patient over last 24 hrs: ~200mL   Pharmacist will follow daily to ensure all IVPBs / drips are in NS where possible.       Please call with questions--  Tino OropezaD, BCPS  Wireless: [id number]   7/5/2022 9:27 AM

## 2022-07-05 NOTE — PROGRESS NOTES
Occupational Therapy  Facility/Department: [facility] 5T ORTHO/NEURO  Occupational Therapy Initial Assessment    Name: Christina Almanzar  : 1945  MRN: [id number]  Date of Service: 2022    Discharge Recommendations:  Christina Almanzar scored a 12/24 on the AM-PAC ADL Inpatient form. Current research shows that an AM-PAC score of 17 or less is typically not associated with a discharge to the patient's home setting. Based on the patient's AM-PAC score and their current ADL deficits, it is recommended that the patient have 3-5 sessions per week of Occupational Therapy at d/c to increase the patient's independence. Please see assessment section for further patient specific details. If patient discharges prior to next session this note will serve as a discharge summary. Please see below for the latest assessment towards goals. OT Equipment Recommendations  Equipment Needed: No  Other: defer       Patient Diagnosis(es): The primary encounter diagnosis was Brain mass. Diagnoses of Intracranial mass and Aphasia were also pertinent to this visit. Past Medical History:  has a past medical history of Allergic rhinitis, Benign prostatic hyperplasia with weak urinary stream, Cancer ([address] Erectile dysfunction, History of gout, History of thrombocytopenia, Hypercalcemia, Hyperlipidemia, Hypertension, Lung nodule, Proteinuria, Type 2 diabetes mellitus without complication ([address] Type II or unspecified type diabetes mellitus without mention of complication, not stated as uncontrolled, Urinary disorder, and Vitamin D deficiency. Past Surgical History:  has a past surgical history that includes Finger trigger release (Right, 2007); Tonsillectomy and adenoidectomy (age 2); Elbow fracture surgery (Left, age 5); Vasectomy (); Cataract removal with implant (Bilateral, ); Colonoscopy (3/29/2011); Colonoscopy (2016); IR PORT PLACEMENT > 5 YEARS (2022); and bronchoscopy (N/A, 3/25/2022).     Treatment Diagnosis: impaired ADLs and functional mobility/transfers      Assessment   Performance deficits / Impairments: Decreased functional mobility ; Decreased endurance;Decreased posture;Decreased ADL status; Decreased balance;Decreased strength;Decreased safe awareness;Decreased vision/visual deficit; Decreased fine motor control;Decreased coordination  Assessment: Pt is a 68 y/o M who [facility] to [facility] after fall at home. Pt found to have intracranial mass. Pt is from home w/ wife, who reports over the last week, she has been assisting w/ transfers, ambulation, and ADLs. Pt was using rollator at home w/ assist from wife. Pt currently presents below baseline, as he required assist x2 for funcitonal mobility/transfers. Pt also demo decreased sitting balance, and required total A to mod A to maintain static sitting EOB. Pt w/ decreased LUE strength and AROM. Pt would benefit from cont inpt OT services upon dc to maximize safety and functional independence. WIll cont to follow pt for acute OT needs. Treatment Diagnosis: impaired ADLs and functional mobility/transfers  Prognosis: Fair;Guarded  Decision Making: Medium Complexity  REQUIRES OT FOLLOW-UP: Yes  Activity Tolerance  Activity Tolerance: Patient limited by fatigue;Patient Tolerated treatment well        Plan   Plan  Times per Week: 5-7  Times per Day: Daily  Current Treatment Recommendations: Strengthening,Balance training,Functional mobility training,Endurance training,Equipment evaluation, education, & procurement,Patient/Caregiver education & training,Safety education & training,Self-Care / ADL     Restrictions  Position Activity Restriction  Other position/activity restrictions:  Up with Assistance    Subjective   General  Chart Reviewed: Yes  Patient assessed for rehabilitation services?: Yes  Additional Pertinent Hx: 67 y.o. male with past medical history of non-Hodgkin lymphoma, hyperlipidemia, hypertension, type 2 diabetes, and BPH who presents to the emergency department after a fall in the bathroom and found to have brain mass with cerebral edema; suspected metastases of non hodgkin's lymphoma. His wife provides the history at bedside as the patient has difficulty verbally communicating at baseline. Family / Caregiver Present: Yes (wife)  Referring Practitioner: Jossy Carrington MD  Diagnosis: Intracranial mass  Subjective  Subjective: Pt supine in bed upon arrival, pleasant and agreeable to OT eval and treat     Social/Functional History  Social/Functional History  Lives With: Spouse  Type of Home: Condo  Home Layout: Two level,Able to Live on Main level with bedroom/bathroom,Performs ADL's on one level  Home Access: Stairs to enter with rails  Entrance Stairs - Number of Steps: 3 efren from garage w/ hand rail on R  Entrance Stairs - Rails: Right  Bathroom Shower/Tub: Walk-in shower,Shower chair without back  Bathroom Toilet: Handicap height (\"chair height\" toilets w/ hand rails attached)  Bathroom Equipment: Grab bars in shower  Home Equipment: Rollator  Has the patient had two or more falls in the past year or any fall with injury in the past year?: Yes (Pt's wife reports about 5 falls in the last 6 months)  ADL Assistance: Needs assistance (last week and a half, wife has been helping w ADLs.  Prior to that he was mostly independent)  Homemaking Assistance: Needs assistance (wife does cooking, cleaning and laundry)  Homemaking Responsibilities: No  Ambulation Assistance: Needs assistance (last week and a half has needed wife to help w/ ambulation and uses rollator)  Transfer Assistance: Needs assistance (has needed assist for the last week and a half)  Active : No  Occupation: Retired  Additional Comments: wife has been providing 24hr A for the last week and a half       Objective   Heart Rate: 66  Heart Rate Source: Monitor  BP: 128/77  BP Location: Left upper arm  BP Method: Automatic  Patient Position: Semi fowlers  MAP (Calculated): 94  Resp: 16  SpO2: 100 %  O2 Device: Nasal cannula          Observation/Palpation  Posture: Poor (pt w/ L lateral lean)  Safety Devices  Type of Devices: Left in chair;Chair alarm in place;Nurse notified;Call light within reach  Balance  Sitting: Impaired (sitting EOB w/ total A- brief periods of Mod A)  Standing: Impaired (Mod A x1- Mod Ax2)  Gait  Overall Level of Assistance: Assist X2;Moderate assistance (~4steps w/ RW from EOB to recliner)     AROM: Grossly decreased, non-functional (decreased movement at LUE- pt able to complete gross grasp and some elbow flex. No AROm noted at L shoulder this date)  Strength: Grossly decreased, non-functional (for LUE)  Coordination: Grossly decreased, non-functional (for LUE)  ADL  LE Bathing: Maximum assistance  LE Bathing Skilled Clinical Factors: seated in recliner to wash BLE w/ bathing wipes  UE Dressing: Moderate assistance  UE Dressing Skilled Clinical Factors: don/doff gown  LE Dressing: Verbal cueing; Increased time to complete;Maximum assistance  LE Dressing Skilled Clinical Factors: Assist for sitting balance while seated in recliner while pt utilized figure 4 tech and andrew tech to don socks. pt required ++ time and VC to sequence. Assist to thread BLE into briefs and to pull briefs up at hips. Toileting: Dependent/Total  Toileting Skilled Clinical Factors: male purewick in bed/chair     Activity Tolerance  Activity Tolerance: Patient limited by fatigue  Bed mobility  Rolling to Right: Maximum assistance  Supine to Sit: Maximum assistance  Scooting: Maximal assistance  Transfers  Sit to stand: 2 Person assistance; Moderate assistance  Stand to sit: 2 Person assistance; Moderate assistance  Vision - Basic Assessment  Vision Comments: Not formally assess this date, however noted possible L visual field neglect and R gaze preference  Cognition  Overall Cognitive Status: Exceptions  Arousal/Alertness: Delayed responses to stimuli  Following Commands: Follows one step commands with repetition; Follows one step commands with increased time  Attention Span: Attends with cues to redirect  Memory: Appears intact  Safety Judgement: Decreased awareness of need for assistance  Problem Solving: Assistance required to identify errors made;Assistance required to correct errors made  Insights: Decreased awareness of deficits  Initiation: Requires cues for some  Sequencing: Requires cues for some  Orientation  Overall Orientation Status: Within Functional Limits  Perception  Overall Perceptual Status: Impaired  Unilateral Attention: Cues to attend left visual field;Cues to maintain midline in sitting;Cues to attend to left side of body            Exercise Treatment: instructed pt to complete gross grasp/released and attempted AAROM elbow flex w/ LUE to decrease swelling and increase strength  Education Given To: Patient; Family  Education Provided: Role of Therapy;Plan of Care;ADL Adaptive Strategies;Transfer Training  Education Method: Demonstration;Verbal  Barriers to Learning: None  Education Outcome: Verbalized understanding;Continued education needed           Hand Dominance  Hand Dominance: Right            G-Code     OutComes Score                                                  AM-PAC Score        AM-[facility] Inpatient Daily Activity Raw Score: 12 (07/05/22 1543)  AM-PAC Inpatient ADL T-Scale Score : 30.6 (07/05/22 1543)  ADL Inpatient CMS 0-100% Score: 66.57 (07/05/22 1543)  ADL Inpatient CMS G-Code Modifier : CL (07/05/22 1543)    Goals  Short Term Goals  Time Frame for Short term goals: by dc  Short Term Goal 1: Pt will complete functional transfers w/ Min Ax1  Short Term Goal 2: Pt will complete LE dressing w/ Min Ax1  Short Term Goal 3: Pt will maintain sitting balance w/ CGA for 5min while engaging in ADL task       Therapy Time   Individual Concurrent Group Co-treatment   Time In 5865         Time Out 1453         Minutes 55                   Timed Code Treatment Minutes:  40    Total Treatment Minutes: [address]  [facility], OT

## 2022-07-05 NOTE — CARE COORDINATION
Case Management Assessment           Initial Evaluation                Date / Time of Evaluation: 7/5/2022 2:01 PM                 Assessment Completed by: CORDELL Messina    Patient Name: Ro Mckinnon     YOB: 1945  Diagnosis: Intracranial mass [R90.0]  Brain mass [G93.89]     Date / Time: 7/4/2022  5:49 AM    Patient Admission Status: Inpatient    If patient is discharged prior to next notation, then this note serves as note for discharge by case management.      Current PCP: Frieda Virgen MD  Clinic Patient: No    Chart Reviewed: Yes  Patient/ Family Interviewed: Yes    Initial assessment completed at bedside with: wife - patient out of room    Hospitalization in the last 30 days: No    Emergency Contacts:  Extended Emergency Contact Information  Primary Emergency Contact: [facility]  Address: Giuseppe Case [address] Phone: [phone]  Relation: Spouse    Advance Directives:   Code Status: Full [address]: No  Agent:   Contact Number:     Copy present: No     In paper Chart: No    Scanned into EMR No    Financial  Payor: Giulia Malave / Plan: Alexis Ramsey PPO / Product Type: Medicare /     Pre-cert required for SNF: Yes    Pharmacy    [facility] #[id number] - [address] - P [phone] - F [phone]  [address] [place] [address] [facility] [address]  Phone: [phone] Fax: [phone]    [address] [facility], [address] [phone] - F [phone]  [address] [facility]  Phone: [phone] Fax: [phone]    [facility] [address] [id number] - [address] [address] [phone] Sally Vega [phone]  [address] [place] [address]  Phone: [phone] Fax: [phone]    [place] [address] [address] [facility] [phone] - [address] [address]  Phone: [phone] Fax: [phone]      Potential assistance Purchasing Medications:    Does Patient want to participate in local refill/ meds to beds program?:      Meds To Beds General Rules:  1. Can ONLY be done Monday- Friday between 8:30am-5pm  2. Prescription(s) must be in pharmacy by 3pm to be filled same day  3. Copy of patient's insurance/ prescription drug card and patient face sheet must be sent along with the prescription(s)  4. Cost of Rx cannot be added to hospital bill. If financial assistance is needed, please contact unit  or ;  or  CANNOT provide pharmacy voucher for patients co-pays  5.  Patients can then  the prescription on their way out of the hospital at discharge, or pharmacy can deliver to the bedside if staff is available. (payment due at time of pick-up or delivery - cash, check, or card accepted)     Able to afford home medications/ co-pay costs: Yes    ADLS  Support Systems:      PT AM-PAC:   /24  OT AM-PAC:   /24    [place]: one level home  Steps: 3-4 step to enter    Plans to RETURN to current housing: tbd  Barriers to RETURNING to current housing: repeat CT today, oncology consult, biopsy tomorrow afternoon    [address] 77  Currently ACTIVE with [address]: No  Home Care Agency: Not Applicable    Currently ACTIVE with [place] [facility]: No  Passport/ Waiver: No  Passport/ Waiver Services: Not Applicable    :  Phone:       [address] [facility] Provider: unknown  Equipment: walker    Home Oxygen and Respiratory Equipment  Has HOME OXYGEN prior to admission: [address]: Not Applicable  Other Respiratory Equipment: n/a    Informed of need to bring portable home O2 tank on day of DISCHARGE for nursing to connect prior to leaving: No  Verbalized agreement/Understanding: No  Person to bring portable tank at discharge: n/a    Dialysis  Active with HD/PD prior to admission: No  Nephrologist: n/a    HD Center:  Not Applicable    DISCHARGE PLAN:  Disposition: tbd    Transportation PLAN for discharge: tbd     Factors facilitating achievement of predicted outcomes: Family support and Cooperative    Barriers to discharge: repeat CT today, oncology consult, biopsy tomorrow afternoon    Additional Case Management Notes: Patient lives in a one level home with his wife. Discharge plan will be more clear after biopsy tomorrow and PT/OT. CM spoke with patient's wife about various levels of care at discharge. Patient's wife did not have a preference for [place] [address] if needed, and was familiar with [facility] due to family use of [facility] as a SNF placement if needed. If going home wife can provide transportation if going to SNF medical transport will be needed. CM to follow up further concerning discharge plan tomorrow. The Plan for Transition of Care is related to the following treatment goals of Intracranial mass [R90.0]  Brain mass [G93.89]    The Patient and/or patient representative Ashwin Mccord and his family were provided with a choice of provider and agrees with the discharge plan Yes    Freedom of choice list was provided with basic dialogue that supports the patient's individualized plan of care/goals and shares the quality data associated with the providers.  Yes    Care Transition patient: Yes    CORDELL Carrero  [facility] [employer]  Case Management Department  Ph: [phone]   Fax: [phone]

## 2022-07-05 NOTE — PROGRESS NOTES
Physical Therapy  Facility/Department: [place] [address]  Physical Therapy Initial Assessment    Name: Rin Rhodes  : 1945  MRN: [id number]  Date of Service: 2022    Discharge Recommendations: Rin Rhodes scored a 10/24 on the AM-PAC short mobility form. Current research shows that an AM-PAC score of 17 or less is typically not associated with a discharge to the patient's home setting. Based on the patient's AM-PAC score and their current functional mobility deficits, it is recommended that the patient have 3-5 sessions per week of Physical Therapy at d/c to increase the patient's independence. Please see assessment section for further patient specific details. If patient discharges prior to next session this note will serve as a discharge summary. Please see below for the latest assessment towards goals. If patient discharges prior to next session this note will serve as a discharge summary. Please see below for the latest assessment towards goals. PT Equipment Recommendations  Equipment Needed: No      Patient Diagnosis(es): The primary encounter diagnosis was Brain mass. Diagnoses of Intracranial mass and Aphasia were also pertinent to this visit. Past Medical History:  has a past medical history of Allergic rhinitis, Benign prostatic hyperplasia with weak urinary stream, Cancer ([address] Erectile dysfunction, History of gout, History of thrombocytopenia, Hypercalcemia, Hyperlipidemia, Hypertension, Lung nodule, Proteinuria, Type 2 diabetes mellitus without complication ([address] Type II or unspecified type diabetes mellitus without mention of complication, not stated as uncontrolled, Urinary disorder, and Vitamin D deficiency. Past Surgical History:  has a past surgical history that includes Finger trigger release (Right, ); Tonsillectomy and adenoidectomy (age 2); Elbow fracture surgery (Left, age 5); Vasectomy (1971);  Cataract removal with implant (Bilateral, ); Colonoscopy (3/29/2011); Colonoscopy (03/14/2016); IR PORT PLACEMENT > 5 YEARS (1/11/2022); and bronchoscopy (N/A, 3/25/2022). Assessment   Assessment: During today's evaluation of the patient, he demo all functional mobility, transfers and ambulation below baseline per wife's report. She states that prior to a week ago, the pt was indep for majority of functional activities at home. Today he is a Max Ax2 for all transfers from bed to chair and needs multiple verbal cues for handplacement, directions and walker management. Due to the wife being the caretaker for the pt, PT is rec continued skilled IP therapy in order to improve strength, functional mobility, and ambulation in order to improve his independence at home, decrease the risk of falls and decrease caregiver burden. Will continue to follow. Treatment Diagnosis: Decreased strength and mobility. Therapy Prognosis: Fair  Decision Making: High Complexity  Requires PT Follow-Up: Yes  Activity Tolerance  Activity Tolerance: Patient limited by fatigue     Plan   Plan  Plan: 5-7 times per week  Safety Devices  Type of Devices: Left in chair,Chair alarm in place,Nurse notified,Call light within reach (Wife in room)     Restrictions  Position Activity Restriction  Other position/activity restrictions: Up with Assistance     Subjective   General  Chart Reviewed: Yes  Patient assessed for rehabilitation services?: Yes  Additional Pertinent Hx: Pt is a 68 yo male that presents from home to the ED status post fall off the [employer] 7[employer]. He stated that his grab bars broke and fell and hit his head but no LOC. Head MRI: Enhancing 4.6 cm mass centered in the right basal ganglia with extensive surrounding edema in the right frontotemporal lobe. Findings highly concerning for malignancy. Mass results in 9 mm right to left midline shift, subfalcine herniation, and compression of the right thalamus and brainstem. PMH: Cancer, Diabetes 2, Hyperliperdemia.   Diagnosis: Intracranial Mass  Subjective  Subjective: Pt found supine in bed, wife present. Agreeable to therapy. Social/Functional History  Social/Functional History  Lives With: Spouse  Type of Home: Condo  Home Layout: Two level,Able to Live on Main level with bedroom/bathroom,Performs ADL's on one level  Home Access: Stairs to enter with rails  Entrance Stairs - Number of Steps: 3 efren from garage w/ hand rail on R  Entrance Stairs - Rails: Right  Bathroom Shower/Tub: Walk-in shower,Shower chair without back  Bathroom Toilet: Handicap height (\"chair height\" toilets w/ hand rails attached)  Bathroom Equipment: Grab bars in shower  Home Equipment: Rollator  Has the patient had two or more falls in the past year or any fall with injury in the past year?: Yes (Pt's wife reports about 5 falls in the last 6 months)  ADL Assistance: Needs assistance (last week and a half, wife has been helping w ADLs.  Prior to that he was mostly independent)  Homemaking Assistance: Needs assistance (wife does cooking, cleaning and laundry)  Homemaking Responsibilities: No  Ambulation Assistance: Needs assistance (last week and a half has needed wife to help w/ ambulation and uses rollator)  Transfer Assistance: Needs assistance (has needed assist for the last week and a half)  Active : No  Occupation: Retired  Additional Comments: wife has been providing 24hr A for the last week and a half     Vision/Hearing  Vision  Vision: Impaired  Vision Exceptions: Wears glasses for reading  Hearing  Hearing: Within functional limits      Cognition   Orientation  Overall Orientation Status: Within Functional Limits     Objective   Heart Rate: 66  Heart Rate Source: Monitor  BP: 128/77  BP Location: Left upper arm  BP Method: Automatic  Patient Position: Semi fowlers  MAP (Calculated): 94  Resp: 16  SpO2: 100 %  O2 Device: Nasal cannula        Gross Assessment  AROM: Generally decreased, functional  Strength: Generally decreased, functional Bed mobility  Rolling to Right: Maximum assistance  Supine to Sit: Maximum assistance  Scooting: Maximal assistance  Transfers  Sit to Stand: Moderate Assistance;2 Person Assistance  Stand to sit: Moderate Assistance;2 Person Assistance  Bed to Chair: Moderate assistance;2 Person Assistance  Ambulation  Surface: level tile  Device: Rolling Walker  Assistance: 2 Person assistance; Moderate assistance  Quality of Gait: Unsteady  Gait Deviations: Slow Ashley;Decreased step length;Decreased step height  Distance: 5 steps to chair  Comments: Pt has signifcant posterior and right lean with standing, verbal cues for direction and handplacements. Balance  Sitting - Static: Poor (Dependent Assist, for sitting upright)           OutComes Score    AM-PAC Score  AM-PAC Inpatient Mobility Raw Score : 10 (07/05/22 1523)  AM-PAC Inpatient T-Scale Score : 32.29 (07/05/22 1523)  Mobility Inpatient CMS 0-100% Score: 76.75 (07/05/22 1523)  Mobility Inpatient CMS G-Code Modifier : CL (07/05/22 1523)          Goals  Short Term Goals  Time Frame for Short term goals: Discharge  Short term goal 1: Supine <> Sit CGA  Short term goal 2: Sit<>Stand CGA  Short term goal 3: Amb 20 ft with RW CGA  Patient Goals   Patient goals : To Return Home       Education  Patient Education  Education Given To: Patient; Family  Education Provided: Role of Therapy;Plan of Care;Transfer Training  Education Method: Demonstration  Barriers to Learning: None  Education Outcome: Verbalized understanding      Therapy Time   Individual Concurrent Group Co-treatment   Time In 1316         Time Out 1453         Minutes 55           Timed Code Treatment Minutes:   40    Total Treatment Minutes:  [address], PT

## 2022-07-05 NOTE — PLAN OF CARE
Problem: Safety - Adult  Goal: Free from fall injury  7/5/2022 1118 by Barbara Paige RN  Outcome: Progressing   All fall precautions in place. Bed locked and in lowest position with alarm on. Overbed table and personal belonings within reach. Call light within reach and patient instructed to use call light for assistance. Non-skid socks on. Problem: Pain  Goal: Verbalizes/displays adequate comfort level or baseline comfort level  Outcome: Progressing   Pt does well with Y/N questions. When asked if Pt was having pain he said no. Pt needs extra time for communication.

## 2022-07-05 NOTE — PROGRESS NOTES
Occupational/Physical Therapy  Attempt    Order's received and chart reviewed. Attempted to see pt for OT/PT eval this AM. Transport present currently taking pt off floor to radiology. Will re-attempt later today as schedule permits.      [place], [place], [address] [facility]  sAia Hardin , PT, DPT #[id number]

## 2022-07-05 NOTE — PROCEDURES
Janette Arredondo is a 67 y.o. male patient. 1. Intracranial mass      Past Medical History:   Diagnosis Date    Allergic rhinitis     Benign prostatic hyperplasia with weak urinary stream 12/10/2015     Updating Deprecated Diagnoses    Cancer ([facility] 12/21/2021    Non Earl Lymphoma    Erectile dysfunction     History of gout 9/19/2015    History of thrombocytopenia 9/19/2015    Hypercalcemia     Hyperlipidemia     Hypertension     Lung nodule     Proteinuria     Type 2 diabetes mellitus without complication ([facility] 3367    Type II or unspecified type diabetes mellitus without mention of complication, not stated as uncontrolled     Urinary disorder     Vitamin D deficiency 9/19/2015     Blood pressure 123/72, pulse 72, temperature 98.1 °F (36.7 °C), temperature source Axillary, resp. rate 16, height 5' 6\" (1.676 m), weight 118 lb (53.5 kg), SpO2 100 %. EEG awake and asleep    Date/Time: 7/5/2022 12:32 PM  Performed by: Eugenio Najera MD  Authorized by: Casey Matthews DO         CLINICAL HISTORY:  Janette Arredondo is a 67 y.o. male who presents with fall in the bathroom and hitting his head and found in ER to have suspected metastases of known Non-Hodgkin's Lymphoma. His wife reports that the past 1 week his speech has been reduced in capacity to only 1 word sentences, slightly reduced left smile, and he has had reduced strength on his left side, all in addition to his chronic generalized weakness and chronic dyspnea with exertion. He denies any headaches or any pain. He denied confusion or loss of consciousness, urinary or bowel incontinence, nausea or vomiting, and denied tongue biting. Clinical concern is for complex partial or simple partial seizures or subclinical status epilepticus. FINDINGS: This is a routine 16 channel referential and bipolar video EEG. There is a background rhythm in the alpha range on the left, but generally theta range in the right hemispheric leads.   Photic stimulation is performed without driving or abnormality. Hyperventilation is not performed. No epileptiform abnormalities are noted. No electrographic seizures are recorded. IMPRESSION:  This is an abnormal awake and drowsy EEG due to the presence of focal slowing in the right hemispheric leads consistent with known underlying mass. No epileptiform abnormalities or electrographic seizures.     Tiffany Greer MD  7/5/2022

## 2022-07-05 NOTE — PROGRESS NOTES
VSS on 2L via NC. AO to self/ and time. No acute changes in Neuro status this shift. Pt is able to verbalize needs occasionally, communication is inconsistent, but does best with Y/N questions and requires extra time for communication. Pt is up assist x2 with gb and SS. Pt is sitting in recliner and tolerating well. Pt is voiding adequately via incontinence/male purewick. Pt has blanchable redness to sacrum, sacral heart applied, two small scabs on spine, gauze applied,  frequent repositioning with pillow support and specialty mattress provided for pressure relief and comfort. Pt has two skin tears on L forearm. Cleansed with saline, applied xeroform, nonadherent dressing, wrapped with rolled gauze, posey sleeve on top. Pt is tolerating oral diet and PO fluids, denies N/V. All fall precautions in place.

## 2022-07-06 NOTE — PROGRESS NOTES
NEUROSURGERY PROGRESS NOTE    Fidelina Mancuso [facility]   [id number]   1945   7/6/2022    Hospital Day #2    Subjective:  Patient drowsy but awakens to voice, he has no complaints of pain. Anticipating biopsy later today. Objective:  /80   Pulse 68   Temp 97.6 °F (36.4 °C) (Oral)   Resp 16   Ht 5' 6\" (1.676 m)   Wt 118 lb (53.5 kg)   SpO2 98%   BMI 19.05 kg/m²     Labs:  Recent Labs     07/04/22  0836 07/05/22  0715 07/06/22  0623    136 141    99 101   CO2 29 28 25   BUN 25* 26* 22*   CREATININE <0.5* 0.5* <0.5*   GLUCOSE 139* 175* 161*     Recent Labs     07/04/22  0836 07/05/22  0715 07/05/22  1529 07/06/22  0623   WBC 7.2 7.0  --  6.8   RBC 3.55* 3.58*  --  4.51   INR 1.04  --  1.07  --      FL MODIFIED BARIUM SWALLOW W VIDEO   Final Result   Addendum 1 of 1     ADDENDUM #1    Addendum:      EXAM: MODIFIED BARIUM SWALLOW      INDICATION: Dysphagia       COMPARISON: None      TECHNIQUE: Multiple consistencies of barium contrast, consisting of puree,    thin, and cracker, were administered to the patient under the direction of    the speech pathologist. Lateral fluoroscopy of the neck was performed    during swallowing. Total number of images: 11   Fluoroscopy time: 1.2 minutes      FINDINGS:      Laryngeal aspiration on thin liquids with straw. Final      Laryngeal aspiration on thin liquids with straw. Please refer to detailed report of the speech therapist.   IMPRESSION:      Laryngeal aspiration on thin liquids. Please refer to detailed report of the speech therapist.      Carmen Duarte   Final Result   1. No evidence of any thoracic metastatic disease. 2. Stable changes of pulmonary fibrosis. ABDOMEN: There is a 4.7 x 2.6 cm mass identified anteriorly within the left lobe liver. This previously measured 5.9 x 4.4 cm. No new hepatic masses are identified. No adrenal mass is identified.       There is a 1.2 x 0.9 cm low-attenuation lesion identified within the spleen. This previously measured 1.6 x 1.3 cm. The pancreas, gallbladder, left adrenal gland, and kidneys are normal. No lymphadenopathy is identified. PELVIS: No bowel dilatation or bowel wall thickening is identified. There is uncomplicated sigmoid colon diverticulosis. Degenerative changes are present within the spine. IMPRESSION:   1. Interval reduction in size of hepatic mass. 2. Slight interval reduction in size of low-attenuation lesion within the spleen. 3. No evidence of any adrenal mass. 4. No evidence of any abdominal or pelvic lymphadenopathy. 5. Uncomplicated sigmoid colon diverticulosis. IMPRESSION:            CT HEAD WO CONTRAST   Final Result   1. Stable 4.4 cm intra-axial mass within the right basal ganglia with surrounding vasogenic edema resulting in right to left subfalcine herniation as detailed above. IMPRESSION:   1. Normal noncontrast CT scan of the head. MRI BRAIN W WO CONTRAST   Final Result   1. Enhancing 4.6 cm mass centered in the right basal ganglia with extensive surrounding edema in the right frontotemporal lobe. Findings highly concerning for malignancy. 2.  Mass results in 9 mm right to left midline shift, subfalcine herniation, and compression of the right thalamus and brainstem. CT Head WO Contrast   Final Result      1. Heterogeneous 3.9 cm masslike lesion centered in the right basal ganglia/frontal lobe with extensive surrounding edema. This is concerning for a primary or metastatic malignancy. Focal subacute intraparenchymal hemorrhage, hemorrhage within the mass,    or infection are differential considerations. Brain MRI with and without contrast and neurosurgery consult recommended.    2.  Extensive mass effect with 9 mm right to left midline shift, subfalcine herniation and suspected partial transtentorial herniation resulting in effacement of the right frontal horn and body, and mass effect on midbrain. The above findings were discussed with Dr. Agueda Gardner on 7/4/2022 at 7:25 AM.      XR SHOULDER LEFT (MIN 2 VIEWS)   Final Result   1. No acute osseous abnormality. 2.  Severe AC joint and glenohumeral joint osteoarthrosis. Neurologic Exam:  GCS:  3-  Opens eyes to voice   5 - Alert and oriented  6 - Follows simple motor commands    Mental Status: drowsy but awakens to voice, oriented to x 4  Language: speech improving- responses very delayed, short 1-2 word answers, follows commands   Sensation: Intact to all extremities to light touch    Cranial Nerves:  II: Visual acuity not tested, denies new visual changes / diplopia  III, IV, VI: PERRL, 3 mm bilaterally, EOMI, no nystagmus noted  V: Facial sensation intact bilaterally to touch  VII: Face symmetric  VIII: Hearing intact bilaterally to spoken voice  IX: Palate movement equal bilaterally  XII: Tongue midline    Musculoskeletal:   Gait: Not tested   Tone: normal   Motor strength:    Right  Left    Right  Left    Deltoid  5 3  Hip Flex  5 3   Biceps  5 3  Knee Extensors  5 4-   Triceps  5 3  Knee Flexors  5 4-   Wrist Ext  5 3  Ankle Dorsiflex. 5 4-   Wrist Flex  5 3  Ankle Plantarflex. 5 4-   Handgrip  5 3  Ext Ricardo Longus  5 4-   Thumb Ext  5 3           Assessment   66 yo admitted with history of non hodgkin's lymphoma admitted with altered mental status and weakness. Imaging demonstrates 4.6 cm mass centered in the right basal ganglia with extensive surrounding edema in the right frontotemporal lobe. Plan:  1. Plan for right frontal needle biopsy of brain mass today with Dr. Paddy Leone    -NPO, treatment consent, pre op labs, pre op ancef    -In language easily understood by a layperson, the risks and benefits of surgical intervention were discussed at length with the patient and any family members present.  Risks including, but not limited to, infection, bleeding, possible blood transfusion, numbness, weakness, paralysis, loss of

## 2022-07-06 NOTE — ANESTHESIA PRE PROCEDURE
Department of Anesthesiology  Preprocedure Note       Name:  Tomas Santos   Age:  67 y.o.  :  1945                                          MRN:  [id number]         Date:  2022      Surgeon: Leia Horvath):  Sally Devoid. [place], MD    Procedure: Procedure(s):  RIGHT FRONTAL NEEDLE BIOPSY OF BRAIN MASS    Medications prior to admission:   Prior to Admission medications    Medication Sig Start Date End Date Taking? Authorizing Provider   ipratropium (ATROVENT) 0.06 % nasal spray 2 sprays by Each Nostril route 4 times daily 22   Mary Edwards DO   sodium chloride (OCEAN) 0.65 % nasal spray 2 sprays by Nasal route 4 times daily 22   Mary Edwards DO   bacitracin-polymyxin b (POLYSPORIN) 500-44243 UNIT/GM ointment Apply topically twice daily to inside of both nostrils for 3 weeks then as needed for dry nose.  22   Mary Edwards DO   JARDIANCE 25 MG tablet TAKE 1 TABLET BY MOUTH DAILY 22   Ute Coy MD   SITagliptin-metFORMIN (JANUMET XR)  MG TB24 per extended release tablet TAKE 1 TABLET TWICE A DAY 22   Holly Michaud MD   Continuous Blood Gluc Sensor (DEXCOM G6 SENSOR) MISC Use for glucose monitoring 22   Holly Michaud MD   Continuous Blood Gluc  ([address]) SHAHANA Use for glucose monitoring 22   Holly Michaud MD   Continuous Blood Gluc Transmit (DEXCOM G6 TRANSMITTER) MISC Use for glucose monitoring 22   Holly Michaud MD   insulin lispro, 1 Unit Dial, (HUMALOG KWIKPEN) 100 UNIT/ML SOPN Up to 5-10 units before meals 3 times a day 22   Holly Michaud MD   Insulin Pen Needle (B-D UF III MINI PEN NEEDLES) 31G X 5 MM MISC 1 each by Does not apply route 4 times daily 22   Holly Michaud MD   predniSONE (DELTASONE) 20 MG tablet  22   Historical Provider, MD   blood glucose test strips (ONETOUCH ULTRA) strip TEST ONCE DAILY 22   Ute Coy MD   tamsulosin (FLOMAX) 0.4 MG capsule Take 2 capsules by mouth daily 2/17/22   Ute Coy MD   docusate sodium (COLACE) 100 MG capsule Take 1 capsule by mouth 2 times daily 2/17/22   Ute Coy MD   magnesium oxide (MAG-OX) 400 MG tablet Take 1 tablet by mouth daily 2/7/22   Ute Coy MD   aspirin 81 MG EC tablet Take 81 mg by mouth daily    Historical Provider, MD   prochlorperazine (COMPAZINE) 10 MG tablet TAKE 1 TABLET BY MOUTH EVERY 6 HOURS AS NEEDED FOR NAUSEA 1/13/22   Historical Provider, MD   ondansetron (ZOFRAN) 8 MG tablet TAKE 1 TABLET BY MOUTH EVERY 8 HOURS AS NEEDED FOR NAUSEA OR VOMITING 1/13/22   Historical Provider, MD   Multiple Vitamins-Minerals (CENTRUM ADULTS PO) Take by mouth    Historical Provider, MD   fluticasone (FLONASE) 50 MCG/ACT nasal spray SPRAY ONCE IN EACH NOSTRIL EVERY DAY 1/7/22   Mary Edwards, DO   Blood Glucose Monitoring Suppl (ONE TOUCH ULTRA 2) w/Device KIT 1 kit by Does not apply route daily 1/12/21   Ute Coy MD   Lancets MISC Use to test blood sugar once daily 1/12/21   Ute Coy MD   blood glucose test strips (ONETOUCH ULTRA) strip 1 each by Does not apply route daily 6/22/20   Ute Coy MD   ONE TOUCH ULTRASOFT LANCETS MISC 1 each by Does not apply route daily 7/28/16   Ute Coy MD   vitamin B-12 (CYANOCOBALAMIN) 1000 MCG tablet Take 1,000 mcg by mouth 2 times daily    Historical Provider, MD   Cholecalciferol (VITAMIN D) 2000 UNITS CAPS capsule Take by mouth daily    Historical Provider, MD   Omega-3 Fatty Acids (OMEGA 3 PO) Take 2 capsules by mouth daily     Historical Provider, MD       Current medications:    Current Facility-Administered Medications   Medication Dose Route Frequency Provider Last Rate Last Admin    levETIRAcetam (KEPPRA) 500 mg in sodium chloride 0.9 % 100 mL IVPB  500 mg IntraVENous Q12H Irvin Cleveland  mL/hr at 07/06/22 0811 500 mg at 07/06/22 0811    fluticasone (FLONASE) 50 MCG/ACT nasal spray 1 spray  1 spray Each Nostril Daily Mandeep R53.1    Fatigue R53.83    Balance problems R26.89    Neck mass R22.1    Weight loss R63.4    Appetite loss R63.0    Enlarged lymph node in neck R59.0    Urinary frequency R35.0    Diarrhea R19.7    B-cell lymphoma of solid organ excluding spleen (HCC) C85.19    Low magnesium level R79.0    Parotid mass K11.8    Liver mass R16.0    Skin abrasion T14. 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, E11.65    Brain mass G93.89       Past Medical History:        Diagnosis Date    Benign prostatic hyperplasia with weak urinary stream 12/10/2015     Updating Deprecated Diagnoses    Cancer ([facility] [address] 12/21/2021    Non Earl Lymphoma    Erectile dysfunction     History of gout 09/19/2015    History of thrombocytopenia 09/19/2015    Hypercalcemia     Hyperlipidemia     Hypertension     Lung nodule     Proteinuria     Type 2 diabetes mellitus without complication ([facility] [address] 5372    Vitamin D deficiency 09/19/2015       Past Surgical History:        Procedure Laterality Date    BRONCHOSCOPY N/A 3/25/2022    BRONCHOSCOPY WITH BRONCHOALVEOLAR LAVAGE performed by Jose R Benito MD at [address] [place] [address] Bilateral 2011    COLONOSCOPY  3/29/2011    repeat in 5 yrs: Brittani Grier MD    COLONOSCOPY  03/14/2016    repeat in 7-8 years: Brittani Grier MD    ELBOW FRACTURE SURGERY Left age 5   [address] Right 2007    index    IR PORT PLACEMENT EQUAL OR GREATER THAN 5 YEARS  1/11/2022    IR PORT PLACEMENT EQUAL OR GREATER THAN 5 YEARS 1/11/2022 [facility]ED[facility]    TONSILLECTOMY AND ADENOIDECTOMY  age 2   [facility] 01/03/2022 09:24 AM       POC Tests:   Recent Labs     07/06/22  0721   POCGLU 173*       Coags:   Lab Results   Component Value Date/Time    PROTIME 13.8 07/05/2022 03:29 PM    INR 1.07 07/05/2022 03:29 PM    APTT 22.1 07/04/2022 08:36 AM       HCG (If Applicable): No results found for: PREGTESTUR, PREGSERUM, HCG, HCGQUANT     ABGs: No results found for: PHART, PO2ART, IHZ1MXM, GWQ3ONF, BEART, G0UIMCXP     Type & Screen (If Applicable):  No results found for: LABABO, LABRH    Drug/Infectious Status (If Applicable):  No results found for: HIV, HEPCAB    COVID-19 Screening (If Applicable): No results found for: COVID19        Anesthesia Evaluation    Airway: Mallampati: II  TM distance: >3 FB   Neck ROM: full  Mouth opening: > = 3 FB   Dental:          Pulmonary:   (+) shortness of breath:                             Cardiovascular:    (+) hypertension:,         Rhythm: regular  Rate: normal                    Neuro/Psych:               GI/Hepatic/Renal:             Endo/Other:    (+) Diabetes, malignancy/cancer. Abdominal:             Vascular: Other Findings:           Anesthesia Plan      general     ASA 4       Induction: intravenous. Anesthetic plan and risks discussed with patient. Plan discussed with CRNA.                     Radha Dias MD   7/6/2022

## 2022-07-06 NOTE — TELEPHONE ENCOUNTER
Medication:   Requested Prescriptions     Pending Prescriptions Disp Refills    tamsulosin (FLOMAX) 0.4 MG capsule [Pharmacy Med Name: TAMSULOSIN CAP 0.4MG] 90 capsule 1     Sig: TAKE 1 CAPSULE DAILY     Last Filled: 2.17.22    Last appt: 4/18/2022   Next appt: Visit date not found    Last OARRS: No flowsheet data found.

## 2022-07-06 NOTE — PROGRESS NOTES
Speech Language Pathology    Npo for procedure. Attempted speech/language evaluation, however noted PT/OT documentation and family request to let pt sleep. Will hold and re-attempt speech/swallowing therapy tomorrow. Rylee, [address], .[phone]  Pg.  # [phone]

## 2022-07-06 NOTE — PROGRESS NOTES
[facility] Progress Note    2022     Carmen Kunz    MRN: [id number]    : 1945    Referring MD: No referring provider defined for this encounter. SUBJECTIVE:  He is unable to follow a simple conversation hence no reliable ROS was obtained.      Isolation: None    Medications    Scheduled Meds:   levETIRAcetam  500 mg IntraVENous Q12H    fluticasone  1 spray Each Nostril Daily    insulin lispro  0-12 Units SubCUTAneous TID WC    insulin lispro  0-6 Units SubCUTAneous Nightly    ipratropium  2 spray Each Nostril 4x Daily    tamsulosin  0.8 mg Oral Daily     Continuous Infusions:   dextrose       ROS:  As noted above, otherwise remainder of 10-point ROS negative    Physical Exam:     I&O:      Intake/Output Summary (Last 24 hours) at 2022 1023  Last data filed at 2022 0634  Gross per 24 hour   Intake 100 ml   Output 1900 ml   Net -1800 ml       Vital Signs:  /80   Pulse 68   Temp 97.6 °F (36.4 °C) (Oral)   Resp 16   Ht 5' 6\" (1.676 m)   Wt 118 lb (53.5 kg)   SpO2 98%   BMI 19.05 kg/m²     Weight:    Wt Readings from Last 3 Encounters:   22 118 lb (53.5 kg)   22 117 lb (53.1 kg)   22 119 lb (54 kg)         General:somnolent and not following simple commands   HEENT: normocephalic, PERRL, no scleral erythema or icterus, Oral mucosa moist and intact, throat clear  NECK: supple   BACK: Straight   SKIN: warm dry and intact without lesions rashes or masses  CHEST: CTA bilaterally without use of accessory muscles  CV: Normal S1 S2, RRR, no MRG  ABD: NT ND normoactive BS, no palpable masses or hepatosplenomegaly  EXTREMITIES: without edema, denies calf tenderness  NEURO: CN II - XII grossly intact, not following commands     Data    CBC:   Recent Labs     22  0836 22  0715 22  0623   WBC 7.2 7.0 6.8   HGB 11.7* 11.8* 14.7   HCT 35.9* 35.8* 44.4   .1* 100.0 98.5   * 119* 98*     BMP/Mag:  Recent Labs     22  0836 07/05/22  0715 07/06/22  0623    136 141   K 4.0 4.3 4.0    99 101   CO2 29 28 25   BUN 25* 26* 22*   CREATININE <0.5* 0.5* <0.5*     LIVP: No results for input(s): AST, ALT, LIPASE, BILIDIR, BILITOT, ALKPHOS in the last 72 hours. Invalid input(s): AMYLASE,  ALB  Coags:   Recent Labs     07/04/22  0836 07/05/22  1529   PROTIME 13.5 13.8   INR 1.04 1.07   APTT 22.1*  --      Uric Acid No results for input(s): LABURIC in the last 72 hours. ASSESSMENT AND PLAN:           DLBCL  - At presentation Jan 2022, stage IVb, RIPI score 4  - FISH studies did not demonstrate double or triple hit. C-Myc was mutated but BCL-2 and BCL 6 were not. - GCB phenotype  - S/P initial therapy on clinical trial utilizing R-CHOP w/ Revlimid and Tafasitimab; Cycle 6 day 15 was May 24, 2022 and was held due to neutropenia.  - Study mandated PET was due this week. - Unfortunately, he appears to have a CNS relapse with a mass in the R basal ganglia      Intracranial Mass  - R basal ganglia w/ assoc edema and shift  - Dr Precious Galarza biopsy today   - cont Keppra     - more recs once bx results available      History of Malignant Hypercalcemia  - This resolved once he began lymphoma directed therapy. - Originally, he was given IV fluids and calcitonin while hospitalized at [facility]. He did not receive a bisphosphonate. - He received Zometa x 2 in office prior to implementation of chemotherapy.     Interstitial lung disease  - In the course of his NHL tretatment, we uncovered interstitial lung disease. Dr Drew Espinoza reviewed chest CTs in the past and feels that he had evidence of pulmonary fibrosis before his lymphoma diagnosis. He completed a prednisone taper.  - He continues to require supplemental oxygen.   - Dyspnea is grade 1  - He had a bronchoscopy March 25 and PFTs April 1.  - He continues pulmonary rehabilitation  - He is seeing Dr. Kristopher Tompkins MD

## 2022-07-06 NOTE — PROGRESS NOTES
Pt A&O x4, VSS. No acute changes in neuro status thus far. NIH 3 for left sided weakness and aphasia. No complaints of pain thus far. Pt on 2 L O2. Voiding adequately external catheter. Pt has been NPO since midnight for needle biopsy today. Fall precautions in place. Pt in bed with call light in reach.

## 2022-07-06 NOTE — PLAN OF CARE
Problem: Safety - Adult  Goal: Free from fall injury  7/6/2022 1412 by Annetta Ojeda RN  Outcome: Progressing   All fall precautions in place. Bed locked and in lowest position with alarm on. Overbed table and personal belonings within reach. Call light within reach and patient instructed to use call light for assistance. Non-skid socks on. Problem: Pain  Goal: Verbalizes/displays adequate comfort level or baseline comfort level  Outcome: Progressing   Pt denies any pain at this time. Pt reports feeling very sleepy.

## 2022-07-06 NOTE — PROGRESS NOTES
Progress Note    Admit Date: 7/4/2022  Day: 2  Diet: Diet NPO    CC: fall    Interval history: 66 yo male resting comfortably in bed. No acute events overnight. Wife confirms that the biopsy procedure is later today in the afternoon. Medications:     Scheduled Meds:   levETIRAcetam  500 mg IntraVENous Q12H    fluticasone  1 spray Each Nostril Daily    insulin lispro  0-12 Units SubCUTAneous TID WC    insulin lispro  0-6 Units SubCUTAneous Nightly    ipratropium  2 spray Each Nostril 4x Daily    tamsulosin  0.8 mg Oral Daily     Continuous Infusions:   dextrose       PRN Meds:glucose, dextrose bolus **OR** dextrose bolus, glucagon (rDNA), dextrose, acetaminophen, ondansetron **OR** ondansetron    Objective:   Vitals:   T-max:  Patient Vitals for the past 8 hrs:   BP Temp Temp src Pulse Resp SpO2   07/06/22 0811 -- -- -- 68 -- 98 %   07/06/22 0634 119/80 97.6 °F (36.4 °C) Oral 64 16 100 %   07/06/22 0416 -- -- -- 68 18 97 %   07/06/22 0257 128/76 97.7 °F (36.5 °C) Oral 66 16 100 %       Intake/Output Summary (Last 24 hours) at 7/6/2022 0905  Last data filed at 7/6/2022 4648  Gross per 24 hour   Intake 220 ml   Output 1900 ml   Net -1680 ml       Review of Systems none    Physical Exam  Constitutional:       General: He is not in acute distress.     Appearance: He is not diaphoretic.      Comments: cachectic   HENT:      Right Ear: External ear normal.      Left Ear: External ear normal.      Nose: No rhinorrhea. Eyes:      Extraocular Movements: Extraocular movements intact.      Conjunctiva/sclera: Conjunctivae normal.   Cardiovascular:      Rate and Rhythm: Normal rate and regular rhythm.      Heart sounds: Normal heart sounds. Pulmonary:      Effort: Pulmonary effort is normal. No respiratory distress.      Comments: Coarse breath sounds BL  Abdominal:      General: Abdomen is flat. There is no distension.      Palpations: Abdomen is soft.      Tenderness: There is no abdominal tenderness.  There is no guarding or rebound. Musculoskeletal:         General: No swelling.      Right lower leg: No edema.      Left lower leg: No edema. Skin:     General: Skin is warm and dry. Neurological:      Motor: Weakness (left sided) present.      Comments: LUE spastic weakness, strength 3/5; LLE strength 4/5    LABS:    CBC:   Recent Labs     07/04/22  0836 07/05/22  0715   WBC 7.2 7.0   HGB 11.7* 11.8*   HCT 35.9* 35.8*   * 119*   .1* 100.0     Renal:    Recent Labs     07/04/22  0836 07/05/22  0715 07/06/22  0623    136 141   K 4.0 4.3 4.0    99 101   CO2 29 28 25   BUN 25* 26* 22*   CREATININE <0.5* 0.5* <0.5*   GLUCOSE 139* 175* 161*   CALCIUM 9.7 9.5 9.9   ANIONGAP 10 9 15     Hepatic: No results for input(s): AST, ALT, BILITOT, BILIDIR, PROT, LABALBU, ALKPHOS in the last 72 hours. Troponin: No results for input(s): TROPONINI in the last 72 hours. BNP: No results for input(s): BNP in the last 72 hours. Lipids: No results for input(s): CHOL, HDL in the last 72 hours. Invalid input(s): LDLCALCU, TRIGLYCERIDE  ABGs:  No results for input(s): PHART, SPK0PVI, PO2ART, MYN6EXK, BEART, THGBART, E7KILJIG, KYA1YME in the last 72 hours. INR:   Recent Labs     07/04/22  0836 07/05/22  1529   INR 1.04 1.07     Lactate: No results for input(s): LACTATE in the last 72 hours. Cultures:  -----------------------------------------------------------------  RAD:   FL MODIFIED BARIUM SWALLOW W VIDEO   Final Result   Addendum 1 of 1   ** ADDENDUM #1 **   Addendum:      EXAM: MODIFIED BARIUM SWALLOW      INDICATION: Dysphagia       COMPARISON: None      TECHNIQUE: Multiple consistencies of barium contrast, consisting of puree,    thin, and cracker, were administered to the patient under the direction of    the speech pathologist. Lateral fluoroscopy of the neck was performed    during swallowing.       Total number of images: 11   Fluoroscopy time: 1.2 minutes      FINDINGS:      Laryngeal aspiration on thin liquids with straw. Final      Laryngeal aspiration on thin liquids with straw. Please refer to detailed report of the speech therapist.   IMPRESSION:      Laryngeal aspiration on thin liquids. Please refer to detailed report of the speech therapist.      Ebonie Baker   Final Result   1. No evidence of any thoracic metastatic disease. 2. Stable changes of pulmonary fibrosis. ABDOMEN: There is a 4.7 x 2.6 cm mass identified anteriorly within the left lobe liver. This previously measured 5.9 x 4.4 cm. No new hepatic masses are identified. No adrenal mass is identified. There is a 1.2 x 0.9 cm low-attenuation lesion identified within the spleen. This previously measured 1.6 x 1.3 cm. The pancreas, gallbladder, left adrenal gland, and kidneys are normal. No lymphadenopathy is identified. PELVIS: No bowel dilatation or bowel wall thickening is identified. There is uncomplicated sigmoid colon diverticulosis. Degenerative changes are present within the spine. IMPRESSION:   1. Interval reduction in size of hepatic mass. 2. Slight interval reduction in size of low-attenuation lesion within the spleen. 3. No evidence of any adrenal mass. 4. No evidence of any abdominal or pelvic lymphadenopathy. 5. Uncomplicated sigmoid colon diverticulosis. IMPRESSION:            CT HEAD WO CONTRAST   Final Result   1. Stable 4.4 cm intra-axial mass within the right basal ganglia with surrounding vasogenic edema resulting in right to left subfalcine herniation as detailed above. IMPRESSION:   1. Normal noncontrast CT scan of the head. MRI BRAIN W WO CONTRAST   Final Result   1. Enhancing 4.6 cm mass centered in the right basal ganglia with extensive surrounding edema in the right frontotemporal lobe. Findings highly concerning for malignancy.    2.  Mass results in 9 mm right to left midline shift, subfalcine herniation, and compression of the right thalamus and brainstem. CT Head WO Contrast   Final Result      1. Heterogeneous 3.9 cm masslike lesion centered in the right basal ganglia/frontal lobe with extensive surrounding edema. This is concerning for a primary or metastatic malignancy. Focal subacute intraparenchymal hemorrhage, hemorrhage within the mass,    or infection are differential considerations. Brain MRI with and without contrast and neurosurgery consult recommended. 2.  Extensive mass effect with 9 mm right to left midline shift, subfalcine herniation and suspected partial transtentorial herniation resulting in effacement of the right frontal horn and body, and mass effect on midbrain. The above findings were discussed with Dr. Radha Pradhan on 7/4/2022 at 7:25 AM.      XR SHOULDER LEFT (MIN 2 VIEWS)   Final Result   1. No acute osseous abnormality. 2.  Severe AC joint and glenohumeral joint osteoarthrosis. Assessment/Plan:   Brain mass  Brain imaging showed mass like lesion with surrounding edema and hemorrhaging within the mass causing mass effect. Imaging concerning for malignancy, Hx of NHL, Hx of pulmonary nodule, adrenal nodule, splenicand hepatic masses, coarse breath sounds. CT head without contrast 7/5/22 shows lesion is stable    -neurosurgery consult  -Keppra 500mg bid  -decadron 4mg q6h held  -bx today at 4pm     NHL - Diffuse large B cell Lymphoma  Pt receiving chemotherapy.  Recently finished a trial of RCHOP or RCHOP with tafasitamab and Revlimid    CT chest abdomen 7/5/22 showed pulmonary fibrosis with no evidence of thoracic metastases and reduction in hepatic mass size as well as in splenic lesion; no evidence of adrenal mass.      Nonspecific interstitial pneumonia  Pt had coarse breath sounds BL. CT 7/5/22 showed stable pulmonary fibrosis.   -follows with Dr. Amelia ramos     DM 2  -hold diabetic meds  -insulin medium dose sliding scale     BPH  -continue home med        Code Status:Full code  FEN: soft and bite sized  PPX:   DISPO: Minnie Crowe MD, PGY-1  07/06/22  9:05 AM    This patient has been staffed and discussed with Tristan Knight MD.

## 2022-07-06 NOTE — CARE COORDINATION
Social Work: Discussed in Interdisciplinary Rounds:    SW following for discharge disposition. Per rounds today, pt is scheduled to have biopsy later today. Per therapy recs, inpatient rehab is recommended. SW to follow up for placement options. Precert will be needed for placement. UPDATE: SW met with wife of pt at bedside. She reviewed list of SNF options and [facility] SW sent referrals, will follow up.  [update] [facility], won't have bed availability till end of week or next week    [place] Mota, MSW  [phone]

## 2022-07-06 NOTE — PLAN OF CARE
Problem: Safety - Adult  Goal: Free from fall injury  7/6/2022 0043 by Eleno Souza RN  Outcome: Progressing     Problem: Skin/Tissue Integrity  Goal: Absence of new skin breakdown  Description: 1. Monitor for areas of redness and/or skin breakdown  2. Assess vascular access sites hourly  3. Every 4-6 hours minimum:  Change oxygen saturation probe site  4. Every 4-6 hours:  If on nasal continuous positive airway pressure, respiratory therapy assess nares and determine need for appliance change or resting period.   Outcome: Progressing     Problem: Discharge Planning  Goal: Discharge to home or other facility with appropriate resources  Outcome: Progressing

## 2022-07-06 NOTE — PROGRESS NOTES
Physical/Occupational Daily Treatment Refused    Charts were reviewed. Nurse stated the patient is very hungry and tired. He is going to have a needle biopsy today. Upon entering the room the wife states he had not slept all night and had just fallen asleep. Pt stated \"he did not want to do therapy. \" Will follow up at a later date.     Kevin Shaver , PT, DPT #[id number]  [facility] PICKENS

## 2022-07-06 NOTE — CONSULTS
Clinical Pharmacy Consult Note    All IVs in NS - Management by Pharmacy    Consult Date(s): 7/4/22  Consulting Provider(s): MARCIA Guzman    Assessment / Plan    1)  Neurologic Injury - All IVs in Normal Saline  · Drips will be adjusted to normal saline as appropriate based on compatibility, in an effort to avoid fluid shifts, as D5W is osmotically active.  The following intermittent IV drips / infusions have been adjusted to saline:  o Levetiracetam - already in NS   The following medications must remain in D5W due to incompatibility with normal saline:  o None at this time   Note: Patient has dextrose ordered as part of hypoglycemia treatment protocol.  Total IV fluid delivered to patient over last 24 hrs: ~200mL   Pharmacist will follow daily to ensure all IVPBs / drips are in NS where possible.       Please call with questions--  Konrad Murray PharmD, BCPS  Wireless: [phone]   7/6/2022 11:33 AM

## 2022-07-07 NOTE — ANESTHESIA POSTPROCEDURE EVALUATION
Department of Anesthesiology  Postprocedure Note    Patient: Carmen Kunz  MRN: [id number]  YOB: 1945  Date of evaluation: 7/6/2022      Procedure Summary     Date: 07/06/22 Room / Location: [address] / [facility]    Anesthesia Start: 1814 Anesthesia Stop: 2022    Procedure: RIGHT FRONTAL NEEDLE BIOPSY OF BRAIN MASS (Right Head) Diagnosis:       Brain mass      (Brain mass [G93.89])    Surgeons: Holley Geuvara. Seb Patrick MD Responsible Provider: Parveen Chapman DO    Anesthesia Type: general ASA Status: 4          Anesthesia Type: No value filed.     Rodo Phase I:      Rodo Phase II:        Anesthesia Post Evaluation    Patient location during evaluation: PACU  Patient participation: complete - patient participated  Level of consciousness: lethargic  Airway patency: patent  Nausea & Vomiting: no nausea and no vomiting  Cardiovascular status: blood pressure returned to baseline  Respiratory status: acceptable  Hydration status: euvolemic

## 2022-07-07 NOTE — PROGRESS NOTES
NEUROSURGERY POST-OP PROGRESS NOTE    Patient Name: Zeb Amaya YOB: 1945   Sex: Male Age: 67 yrs     Medical Record Number: [id number] Acct Number: [de-identified]   Room Number: 7528/1208-38 Hospital Day: Hospital Day: 4     Interval History:  Post-operative Day# 1 s/p RIGHT FRONTAL NEEDLE BIOPSY OF BRAIN MASS with Dr. Seb Patrick      Subjective: Patient resting in bed, his responses remained delayed. He complains of no pain. Objective:    VITAL SIGNS   BP (!) 123/58   Pulse 66   Temp 98.1 °F (36.7 °C) (Axillary)   Resp 19   Ht 5' 6\" (1.676 m)   Wt 118 lb (53.5 kg)   SpO2 100%   BMI 19.05 kg/m²    Height Height: 5' 6\" (167.6 cm)   Weight Weight: 118 lb (53.5 kg)        Allergies No Known Allergies   NPO Status ADULT DIET;  Dysphagia - Soft and Bite Sized; 3 carb choices (45 gm/meal)   Isolation No active isolations     LABS   Basic Metabolic Profile Recent Labs     07/05/22  0715 07/06/22  0623 07/07/22  0428    141 143   CL 99 101 108   CO2 28 25 26   BUN 26* 22* 18   CREATININE 0.5* <0.5* <0.5*   GLUCOSE 175* 161* 211*      Complete Blood Count Recent Labs     07/06/22  0623 07/06/22  1612 07/07/22  0428   WBC 6.8 7.2 8.3   RBC 4.51 4.07* 3.46*      Coagulation Studies Recent Labs     07/05/22  1529 07/07/22  0428   INR 1.07 1.14        MEDICATIONS   Inpatient Medications     pantoprazole, 40 mg, IntraVENous, Daily    dexamethasone, 4 mg, Oral, 4 times per day    sodium chloride flush, 5-40 mL, IntraVENous, 2 times per day    heparin (porcine), 5,000 Units, SubCUTAneous, 3 times per day    ceFAZolin, 2,000 mg, IntraVENous, Q8H    levETIRAcetam, 500 mg, IntraVENous, Q12H    fluticasone, 1 spray, Each Nostril, Daily    insulin lispro, 0-12 Units, SubCUTAneous, TID WC    insulin lispro, 0-6 Units, SubCUTAneous, Nightly    ipratropium, 2 spray, Each Nostril, 4x Daily    tamsulosin, 0.8 mg, Oral, Daily   Infusions    sodium chloride      sodium chloride      sodium chloride 100 mL/hr at 07/07/22 9030    dextrose        Antibiotics   Recent Abx Admin                   ceFAZolin (ANCEF) 2,000 mg in sodium chloride 0.9 % 50 mL IVPB (mini-bag) (mg) 2,000 mg New Bag 07/07/22 0210    ceFAZolin (ANCEF) injection 1000 mg (mg) 1,000 mg Given 07/06/22 1837     2,000 mg Given  1825                   CT Head wo Contrast   Final Result      1. Status post biopsy of right frontal lobe mass via a right frontal tobin hole with very mild acute hemorrhage along the biopsy tract and unchanged 12 mm leftward subfalcine herniation. Discussed with on-call resident. FL MODIFIED BARIUM SWALLOW W VIDEO   Final Result   Addendum 1 of 1   ADDENDUM #1   Addendum:      EXAM: MODIFIED BARIUM SWALLOW      INDICATION: Dysphagia       COMPARISON: None      TECHNIQUE: Multiple consistencies of barium contrast, consisting of puree,    thin, and cracker, were administered to the patient under the direction of    the speech pathologist. Lateral fluoroscopy of the neck was performed    during swallowing. Total number of images: 11   Fluoroscopy time: 1.2 minutes      FINDINGS:      Laryngeal aspiration on thin liquids with straw. Final      Laryngeal aspiration on thin liquids with straw. Please refer to detailed report of the speech therapist.   IMPRESSION:      Laryngeal aspiration on thin liquids. Please refer to detailed report of the speech therapist.      Acie Chamber   Final Result   1. No evidence of any thoracic metastatic disease. 2. Stable changes of pulmonary fibrosis. ABDOMEN: There is a 4.7 x 2.6 cm mass identified anteriorly within the left lobe liver. This previously measured 5.9 x 4.4 cm. No new hepatic masses are identified. No adrenal mass is identified. There is a 1.2 x 0.9 cm low-attenuation lesion identified within the spleen. This previously measured 1.6 x 1.3 cm.       The pancreas, gallbladder, left adrenal gland, and kidneys Result   1. No acute osseous abnormality. 2.  Severe AC joint and glenohumeral joint osteoarthrosis. Neurologic Exam:  GCS:  3-  Opens eyes to voice   5 - Alert and oriented  6 - Follows simple motor commands     Mental Status: drowsy but awakens to voice, oriented to x 4  Language: responses very delayed, short 1-2 word answers, follows commands   Sensation: Intact to all extremities to light touch     Cranial Nerves:  II: Visual acuity not tested, denies new visual changes / diplopia  III, IV, VI: PERRL, 3 mm bilaterally, EOMI, no nystagmus noted  V: Facial sensation intact bilaterally to touch  VII: Face symmetric  VIII: Hearing intact bilaterally to spoken voice  IX: Palate movement equal bilaterally  XII: Tongue midline     Musculoskeletal:   Gait: Not tested   Tone: normal   Motor strength:     Right  Left      Right  Left    Deltoid  5 3   Hip Flex  5 3   Biceps  5 3   Knee Extensors  5 4-   Triceps  5 3   Knee Flexors  5 4-   Wrist Ext  5 3   Ankle Dorsiflex. 5 4-   Wrist Flex  5 3   Ankle Plantarflex. 5 4-   Handgrip  5 3   Ext Ricardo Longus  5 4-   Thumb Ext  5 3                 Incision: dressing removed. Staples KAREN c/d/i     Respiratory:  Unlabored respiratory pattern    Abdomen:   Soft, ND   Last BM pre op    Cardiovascular:  Warm, well perfused    Assessment   66 yo male POD 1 s/p RIGHT FRONTAL NEEDLE BIOPSY OF BRAIN MASS with Dr. Emmanuel Mendiola:  1. Neurologic exam frequency: q 4   2. Mobility: PT/OT, activity as tolerated  3. Steroids: decadron 4 q 6 hours, PPI/accuchecks   4. Seizure Prophylaxis: Keppra BID  5. Diet: ADAT per SLP recommendations   6. Antibiotics: ancef post op for 6 doses   7. Ayala: removed   8. DVT Prophylaxis: SCDs, SQ heparin    9. GI Prophylaxis: protonix   10. Bowel Regimen: senokot, glycolax  11. Pain control: tylenol, roxicodone, robaxin   12. Incisional Care: cleanse daily with soap/water, pat dry with clean towel and paint with CHG swab  13.  Patient educated from Guide to Neurosurgery book, page 8, [phone]  14. Oncology/rad onc following   15. Dispo Planning: ok to transfer to [address] [place] [address] from [address] [place] [address] standpoint     Patient was seen and examined with Dr. Thalia Maharaj who agrees with above assessment and plan. Electronically signed by:  MARCIA Andrade NP, 7/7/2022 9:28 AM   Neurosurgery Nurse Practitioner  [phone]

## 2022-07-07 NOTE — PROGRESS NOTES
Procedure(s):  RIGHT FRONTAL NEEDLE BIOPSY OF BRAIN MASS    Current Allergies: Patient has no known allergies. Recent Labs     07/06/22  1630 07/06/22  2043   POCGLU 144* 170*       Admitted to PACU bed 6 from OR. Arrived on an inpatient  bed . Attached to PACU monitoring system. Alarms and parameters set. Report received from anesthesia personnel. OR staff did not report skin issues that were observed while in OR  Pt arrived with oxygen per nasal cannula with oxygen at 2 liters. Athrombic wraps in place. Upon arrival to PACU pt noted to be in Afib on bedside monitoring.

## 2022-07-07 NOTE — PROGRESS NOTES
Hospitalist Progress Note      PCP: Hetty Lesch, MD    Date of Admission: 7/4/2022    Chief Complaint: Fall secondary to left-sided weakness    Hospital Course: Admitted for fall secondary to left-sided weakness from brain mass. Status post brain mass biopsy. On Keppra and Decadron    Subjective: Patient alert and oriented x3. Able to follow commands and answer questions. Wife at bedside. Discussed patient condition and answered questions. Medications:  Reviewed    Infusion Medications    sodium chloride      sodium chloride      dextrose       Scheduled Medications    pantoprazole  40 mg IntraVENous Daily    dexamethasone  4 mg Oral 4 times per day    sodium chloride flush  5-40 mL IntraVENous 2 times per day    heparin (porcine)  5,000 Units SubCUTAneous 3 times per day    ceFAZolin  2,000 mg IntraVENous Q8H    levETIRAcetam  500 mg IntraVENous Q12H    fluticasone  1 spray Each Nostril Daily    insulin lispro  0-12 Units SubCUTAneous TID WC    insulin lispro  0-6 Units SubCUTAneous Nightly    ipratropium  2 spray Each Nostril 4x Daily    tamsulosin  0.8 mg Oral Daily     PRN Meds: sodium chloride, sodium chloride flush, sodium chloride, oxyCODONE, methocarbamol **OR** methocarbamol IVPB, glucose, dextrose bolus **OR** dextrose bolus, glucagon (rDNA), dextrose, acetaminophen, ondansetron **OR** ondansetron      Intake/Output Summary (Last 24 hours) at 7/7/2022 1334  Last data filed at 7/7/2022 1000  Gross per 24 hour   Intake 2831 ml   Output 1200 ml   Net 1631 ml       Physical Exam Performed:    /88   Pulse 81   Temp 98.1 °F (36.7 °C) (Axillary)   Resp 18   Ht 5' 6\" (1.676 m)   Wt 118 lb (53.5 kg)   SpO2 100%   BMI 19.05 kg/m²     General appearance: No apparent distress, cachectic and chronically ill-appearing  HEENT: Pupils equal, round, and reactive to light. Conjunctivae/corneas clear.   Biopsy site with dressing on the right side noted  Neck: Supple, with full

## 2022-07-07 NOTE — PROGRESS NOTES
ICU Progress Note    Admit Date: 7/4/2022  Day: 1  Vent Day: None  IV Access:Peripheral  IV Fluids:   Vasopressors:None                Antibiotics: None  Diet: ADULT DIET; Dysphagia - Soft and Bite Sized; 3 carb choices (45 gm/meal)    CC: Fall secondary to left-sided weakness    Interval history: Patient has remained afebrile and HDS. Denies complaints. More alert this morning, but unable to verbally answer questions, nods yes and no. 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 Patient denied any other symptoms, no headache, sensation changes, vision changes, hearing changes, or confusion. He has had frequent falls recently. On arrival, the patient had a CT scan and subsequent MRI, which showed an impressive brain mass with midline shift and herniation suspicious for likely metastatic NHL. The patient went to the OR today with neurosurgery for biopsy of the mass to determine next treatment steps. He was then transferred to the ICU for close neurologic monitoring.      Immediately post-operatively, the patient is still very drowsy, not able to open his eyes, but follows commands on the right side only. He has not taken anything PO. Otherwise, he remains stable without verbalized complaints.     Medications:     Scheduled Meds:   dexamethasone  4 mg IntraVENous Q6H    pantoprazole  40 mg IntraVENous Daily    sodium chloride flush  5-40 mL IntraVENous 2 times per day    heparin (porcine)  5,000 Units SubCUTAneous 3 times per day    ceFAZolin  2,000 mg IntraVENous Q8H    HYDROmorphone        levETIRAcetam  500 mg IntraVENous Q12H    fluticasone  1 spray Each Nostril Daily    insulin lispro 0-12 Units SubCUTAneous TID     insulin lispro  0-6 Units SubCUTAneous Nightly    ipratropium  2 spray Each Nostril 4x Daily    tamsulosin  0.8 mg Oral Daily     Continuous Infusions:   sodium chloride      sodium chloride      sodium chloride 125 mL/hr at 07/07/22 0430    dextrose       PRN Meds:sodium chloride, sodium chloride flush, sodium chloride, oxyCODONE, HYDROmorphone **OR** HYDROmorphone, diazePAM, methocarbamol **OR** methocarbamol IVPB, glucose, dextrose bolus **OR** dextrose bolus, glucagon (rDNA), dextrose, acetaminophen, ondansetron **OR** ondansetron    Objective:   Vitals:   T-max:  Patient Vitals for the past 8 hrs:   BP Temp Temp src Pulse Resp SpO2   07/07/22 0500 139/69 -- -- 74 16 100 %   07/07/22 0400 115/82 98 °F (36.7 °C) Oral 73 17 100 %   07/07/22 0300 114/60 -- -- 78 16 100 %   07/07/22 0200 (!) 104/55 -- -- 78 17 100 %   07/07/22 0100 (!) 106/54 -- -- 76 17 100 %   07/07/22 0000 121/66 97.7 °F (36.5 °C) Oral 78 21 95 %   07/06/22 2300 111/77 98 °F (36.7 °C) -- 81 18 100 %   07/06/22 2215 (!) 112/56 -- -- 80 17 100 %   07/06/22 2200 105/62 -- -- 85 19 --       Intake/Output Summary (Last 24 hours) at 7/7/2022 0559  Last data filed at 7/7/2022 0200  Gross per 24 hour   Intake 1131 ml   Output 1050 ml   Net 81 ml       Review of Systems   Constitutional: Positive for fatigue. Respiratory: Negative for chest tightness and shortness of breath. Cardiovascular: Negative for chest pain and leg swelling. Neurological: Positive for weakness. Negative for headaches. Physical Examination:      Physical Exam  Constitutional:       General: He is not in acute distress. Comments: Cachectic and frail-appearing   HENT:      Head:      Comments: Biopsy site dressed, C/D/I     Nose: Nose normal. No rhinorrhea. Eyes:      Comments: Opens eyes to voice  Cardiovascular:      Rate and Rhythm: Normal rate and regular rhythm. Pulses: Normal pulses.       Heart sounds: Normal heart subfalcine herniation. Discussed with on-call resident. FL MODIFIED BARIUM SWALLOW W VIDEO   Final Result   Addendum 1 of 1   [** ADDENDUM #1 **   Addendum:      EXAM: MODIFIED BARIUM SWALLOW      INDICATION: Dysphagia       COMPARISON: None      TECHNIQUE: Multiple consistencies of barium contrast, consisting of puree,    thin, and cracker, were administered to the patient under the direction of    the speech pathologist. Lateral fluoroscopy of the neck was performed    during swallowing. Total number of images: 11   Fluoroscopy time: 1.2 minutes      FINDINGS:      Laryngeal aspiration on thin liquids with straw. Final      Laryngeal aspiration on thin liquids with straw. Please refer to detailed report of the speech therapist.   IMPRESSION:      Laryngeal aspiration on thin liquids. Please refer to detailed report of the speech therapist.      [place]   Final Result   1. No evidence of any thoracic metastatic disease. 2. Stable changes of pulmonary fibrosis. ABDOMEN: There is a 4.7 x 2.6 cm mass identified anteriorly within the left lobe liver. This previously measured 5.9 x 4.4 cm. No new hepatic masses are identified. No adrenal mass is identified. There is a 1.2 x 0.9 cm low-attenuation lesion identified within the spleen. This previously measured 1.6 x 1.3 cm. The pancreas, gallbladder, left adrenal gland, and kidneys are normal. No lymphadenopathy is identified. PELVIS: No bowel dilatation or bowel wall thickening is identified. There is uncomplicated sigmoid colon diverticulosis. Degenerative changes are present within the spine. IMPRESSION:   1. Interval reduction in size of hepatic mass. 2. Slight interval reduction in size of low-attenuation lesion within the spleen. 3. No evidence of any adrenal mass. 4. No evidence of any abdominal or pelvic lymphadenopathy. 5. Uncomplicated sigmoid colon diverticulosis. IMPRESSION:            CT HEAD WO CONTRAST   Final Result   1. Stable 4.4 cm intra-axial mass within the right basal ganglia with surrounding vasogenic edema resulting in right to left subfalcine herniation as detailed above. IMPRESSION:   1. Normal noncontrast CT scan of the head. MRI BRAIN W WO CONTRAST   Final Result   1. Enhancing 4.6 cm mass centered in the right basal ganglia with extensive surrounding edema in the right frontotemporal lobe. Findings highly concerning for malignancy. 2.  Mass results in 9 mm right to left midline shift, subfalcine herniation, and compression of the right thalamus and brainstem. CT Head WO Contrast   Final Result      1. Heterogeneous 3.9 cm masslike lesion centered in the right basal ganglia/frontal lobe with extensive surrounding edema. This is concerning for a primary or metastatic malignancy. Focal subacute intraparenchymal hemorrhage, hemorrhage within the mass,    or infection are differential considerations. Brain MRI with and without contrast and neurosurgery consult recommended. 2.  Extensive mass effect with 9 mm right to left midline shift, subfalcine herniation and suspected partial transtentorial herniation resulting in effacement of the right frontal horn and body, and mass effect on midbrain. The above findings were discussed with Dr. Tara Marlow on 7/4/2022 at 7:25 AM.      XR SHOULDER LEFT (MIN 2 VIEWS)   Final Result   1. No acute osseous abnormality. 2.  Severe AC joint and glenohumeral joint osteoarthrosis.             Assessment/Plan:   Phyllis Ocasio is a 67 y.o. 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      Neuro:  Fall due to left-sided weakness  Etiology found to be intracranial mass: CT scan and subsequent MRI, which showed an enhancing 4.6cm right basal ganglia mass with extensive surrounding edema with 9mm leftward midline shift, subfalcine herniation, and ultimately compression of the right thalamus and brainstem. - POD 1 for biopsy of mass with neurosurgery  - q1hr neuro checks  - Continue 500mg Keppra BID, restarted 4mg Decadron q6 hours   - Immediate post-op CT scan with some mild post-op pneumocephalus and small hemorrhage at biopsy site, stable midline shift and herniation  - Patient drowsy but opens eyes to voice, some improvement of left side exam, continue to monitor  - On multimodal pain therapy of: oxycodone 5mg PRN, Dilaudid pain panel, Robaxin PRN and Tylenol PRN              - wean opioids as tolerated  - Appreciate neurosurgery and neurology recs     Cardiology:  - HTN: No home meds seen, continue to monitor    Pulmonology:  - Interstitial lung disease:  Follows Dr. Shanae Zamudio as an outpatient; has received prednisone taper in the past, currently stable disease process; Continue home O2 of 1L with supplementation as needed and Atrovent   - On 2L  Overnight, wean down to 1L as tolerated    GI:  - Zofran PRN    Renal/ :   No Hx renal dz, creatinine appropriate  - Strict I/O's  - UOP appropriate during third shift, though unmeasured during first two shifts  - BPH: Continue Flomax    ID:  - On Ancef 2000mg q8hrs for navarro-op prophylaxis              - will reach out to NSGY in AM for recs on stop date    Endocrinology:  - DM2: Continue MD DUMONT QID while NPO, will switch to AC/HS once taking PO   - Blood glucose levels 144-213 over past 24 hours    Heme/Onc:  Diffuse large B-cell Lymphoma , stage IV  - s/p R-CHOP with Revlimid and Tafasitimab x5 cycles, did not fully tolerate chemo, had needs for dosage reductions   - Masses in the liver and spleen with some interval reduction of size; adrenal gland mass resolved  - Med onc and Rad onc following, appreciate recs         Code Status:Full Code  FEN: Dysphagia diet  PPX:  SQH, Protonix 40mg QD given steroids  DISPO: ICU, will transfer to floor when stable per NSGY/ neuro  -----------------------------  Dedrick Gonzalez DO  PGY1, Internal Medicine  07/07/22  6:05 AM     This patient has been staffed and discussed with Dr. Hema Marin    Patient was seen, examined and discussed with Dr. Janna Vernon. I agree with the history of present illness, past medical/surgical histories, family history, social history, medication list and allergies as listed. The review of systems is as noted above. My physical exam confirms the findings listed  Chart was reviewed including labs, CT scan and medical records confirm the findings noted      Intracranial mass s/p biopsy  B cell lymphoma   ILD    Patient is known to me. Hemodynamically stable.  He is on 2 liters O2 with sats of 100%  Decadron   Keppra   Blood sugar at target  Swallow eval.  D/c IVF if he pass swallow eval

## 2022-07-07 NOTE — CONSULTS
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 Patient denied any other symptoms, no headache, sensation changes, vision changes, hearing changes, or confusion. He has had frequent falls recently. On arrival, the patient had a CT scan and subsequent MRI, which showed an impressive brain mass with midline shift and herniation suspicious for likely metastatic NHL. The patient went to the OR today with neurosurgery for biopsy of the mass to determine next treatment steps. He was then transferred to the ICU for close neurologic monitoring. Immediately post-operatively, the patient is still very drowsy, not able to open his eyes, but follows commands on the right side only. He has not taken anything PO. Otherwise, he remains stable without verbalized complaints.     PAST HISTORY:     Past Medical History:   Diagnosis Date    Benign prostatic hyperplasia with weak urinary stream 12/10/2015     Updating Deprecated Diagnoses    Cancer ([facility] [place] [address] 12/21/2021    Non Earl Lymphoma    Erectile dysfunction     History of gout 09/19/2015    History of thrombocytopenia 09/19/2015    Hypercalcemia     Hyperlipidemia     Hypertension     Lung nodule     Proteinuria     Type 2 diabetes mellitus without complication ([facility] [place] [address] 6301    Vitamin D deficiency 09/19/2015       Past Surgical History:   Procedure Laterality Date    BRONCHOSCOPY N/A 3/25/2022 BRONCHOSCOPY WITH BRONCHOALVEOLAR LAVAGE performed by Pb Victor MD at [address] Bilateral 2011    COLONOSCOPY  3/29/2011    repeat in 5 yrs: Lucero Dos Santos MD    COLONOSCOPY  03/14/2016    repeat in 7-8 years: Lucero Dos Santos MD   Madel[address]immy 44 Left age 5   [facility] [address] Right 2007    index    IR PORT PLACEMENT EQUAL OR GREATER THAN 5 YEARS  1/11/2022    IR PORT PLACEMENT EQUAL OR GREATER THAN 5 YEARS 1/11/2022 [facility]    TONSILLECTOMY AND ADENOIDECTOMY  age 1    VASECTOMY  25       SocialHistory:   The patient lives at home with his wife    Alcohol: Prior use, none currently  Illicit drugs: no use  Tobacco:  Never used    Family History:  Family History   Problem Relation Age of Onset    Diabetes Mother     High Blood Pressure Mother     Dementia Mother     Hearing Loss Mother     High Cholesterol Mother     Cancer Neg Hx     Hearing Loss Neg Hx     Stroke Neg Hx     Heart Disease Neg Hx        MEDICATIONS:     No current facility-administered medications on file prior to encounter. Current Outpatient Medications on File Prior to Encounter   Medication Sig Dispense Refill    ipratropium (ATROVENT) 0.06 % nasal spray 2 sprays by Each Nostril route 4 times daily 1 each 3    sodium chloride (OCEAN) 0.65 % nasal spray 2 sprays by Nasal route 4 times daily 1 each 3    bacitracin-polymyxin b (POLYSPORIN) 500-26001 UNIT/GM ointment Apply topically twice daily to inside of both nostrils for 3 weeks then as needed for dry nose.  1 each 1    JARDIANCE 25 MG tablet TAKE 1 TABLET BY MOUTH DAILY 90 tablet 0    SITagliptin-metFORMIN (JANUMET XR)  MG TB24 per extended release tablet TAKE 1 TABLET TWICE A  tablet 1    Continuous Blood Gluc Sensor (DEXCOM G6 SENSOR) MISC Use for glucose monitoring 9 each 3    Continuous Blood Gluc  (DEXCOM G6 ) SHAHANA Use for glucose monitoring 1 each 3    Continuous Palpations: Abdomen is soft. Musculoskeletal:      Right lower leg: No edema. Left lower leg: No edema. Skin:     General: Skin is warm and dry. Findings: Bruising present. Neurological:      Comments: Somnolent, follows commands on right, squeezes right hand, withdraws left UE from pain, downward flexion of toes with Babinski stimulation of feet bilaterally           No results for input(s): PHART, MAF2ZAO, PO2ART in the last 72 hours. DATA:       Labs:  CBC:   Recent Labs     07/05/22  0715 07/06/22  0623 07/06/22  1612   WBC 7.0 6.8 7.2   HGB 11.8* 14.7 13.4*   HCT 35.8* 44.4 41.2   * 98* 128*       BMP:   Recent Labs     07/04/22  0836 07/05/22  0715 07/06/22  0623    136 141   K 4.0 4.3 4.0    99 101   CO2 29 28 25   BUN 25* 26* 22*   CREATININE <0.5* 0.5* <0.5*   GLUCOSE 139* 175* 161*     LFT's: No results for input(s): AST, ALT, ALB, BILITOT, ALKPHOS in the last 72 hours. Troponin: No results for input(s): TROPONINI in the last 72 hours. BNP:No results for input(s): BNP in the last 72 hours. ABGs: No results for input(s): PHART, ZIA0QCN, PO2ART in the last 72 hours. INR:   Recent Labs     07/04/22  0836 07/05/22  1529   INR 1.04 1.07       U/A:No results for input(s): NITRITE, COLORU, PHUR, LABCAST, WBCUA, RBCUA, MUCUS, TRICHOMONAS, YEAST, BACTERIA, CLARITYU, SPECGRAV, LEUKOCYTESUR, UROBILINOGEN, BILIRUBINUR, BLOODU, GLUCOSEU, AMORPHOUS in the last 72 hours. Invalid input(s): KETONESU    FL MODIFIED BARIUM SWALLOW W VIDEO   Final Result   Addendum 1 of 1   [** ADDENDUM #1 **   Addendum:      EXAM: MODIFIED BARIUM SWALLOW      INDICATION: Dysphagia       COMPARISON: None      TECHNIQUE: Multiple consistencies of barium contrast, consisting of puree,    thin, and cracker, were administered to the patient under the direction of    the speech pathologist. Lateral fluoroscopy of the neck was performed    during swallowing.       Total number of images: 95 Fluoroscopy time: 1.2 minutes      FINDINGS:      Laryngeal aspiration on thin liquids with straw. Final      Laryngeal aspiration on thin liquids with straw. Please refer to detailed report of the speech therapist.   IMPRESSION:      Laryngeal aspiration on thin liquids. Please refer to detailed report of the speech therapist.      Blanca Mendosa   Final Result   1. No evidence of any thoracic metastatic disease. 2. Stable changes of pulmonary fibrosis. ABDOMEN: There is a 4.7 x 2.6 cm mass identified anteriorly within the left lobe liver. This previously measured 5.9 x 4.4 cm. No new hepatic masses are identified. No adrenal mass is identified. There is a 1.2 x 0.9 cm low-attenuation lesion identified within the spleen. This previously measured 1.6 x 1.3 cm. The pancreas, gallbladder, left adrenal gland, and kidneys are normal. No lymphadenopathy is identified. PELVIS: No bowel dilatation or bowel wall thickening is identified. There is uncomplicated sigmoid colon diverticulosis. Degenerative changes are present within the spine. IMPRESSION:   1. Interval reduction in size of hepatic mass. 2. Slight interval reduction in size of low-attenuation lesion within the spleen. 3. No evidence of any adrenal mass. 4. No evidence of any abdominal or pelvic lymphadenopathy. 5. Uncomplicated sigmoid colon diverticulosis. IMPRESSION:            CT HEAD WO CONTRAST   Final Result   1. Stable 4.4 cm intra-axial mass within the right basal ganglia with surrounding vasogenic edema resulting in right to left subfalcine herniation as detailed above. IMPRESSION:   1. Normal noncontrast CT scan of the head. MRI BRAIN W WO CONTRAST   Final Result   1. Enhancing 4.6 cm mass centered in the right basal ganglia with extensive surrounding edema in the right frontotemporal lobe. Findings highly concerning for malignancy.    2.  Mass results in 9 mm right to left midline shift, subfalcine herniation, and compression of the right thalamus and brainstem. CT Head WO Contrast   Final Result      1. Heterogeneous 3.9 cm masslike lesion centered in the right basal ganglia/frontal lobe with extensive surrounding edema. This is concerning for a primary or metastatic malignancy. Focal subacute intraparenchymal hemorrhage, hemorrhage within the mass,    or infection are differential considerations. Brain MRI with and without contrast and neurosurgery consult recommended. 2.  Extensive mass effect with 9 mm right to left midline shift, subfalcine herniation and suspected partial transtentorial herniation resulting in effacement of the right frontal horn and body, and mass effect on midbrain. The above findings were discussed with Dr. Nickie Murrieta on 7/4/2022 at 7:25 AM.      XR SHOULDER LEFT (MIN 2 VIEWS)   Final Result   1. No acute osseous abnormality. 2.  Severe AC joint and glenohumeral joint osteoarthrosis. CT Head wo Contrast    (Results Pending)       Echo: (1/11/22): Normal left ventricular systolic function with an estimated ejection fraction of 60-65%. ASSESSMENT AND PLAN:   Fortunato Gilmore is a 67 y.o. 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 Fall due to left-sided weakness  Etiology found to be intracranial mass: CT scan and subsequent MRI, which showed an enhancing 4.6cm right basal ganglia mass with extensive surrounding edema with 9mm leftward midline shift, subfalcine herniation, and ultimately compression of the right thalamus and brainstem.   - POD 0 for biopsy of mass with neurosurgery  - q1hr neuro checks  - Continue 500mg Keppra BID, restart 4mg Decadron q6 hours following biopsy  - Immediate post-op CT scan with some mild post-op pneumocephalus and small hemorrhage at biopsy site, stable midline shift and herniation  - Patient very somnolent, not voluntarily moving left side of body, continue to monitor, will reevaluate once further out from anesthesia  - On Ancef 2000mg q8hrs for navarro-op prophylaxis   - will reach out to NSGY in AM for recs on stop date  - On multimodal pain therapy of: oxycodone 5mg PRN, Dilaudid pain panel, Robaxin PRN and Tylenol PRN   - will wean opioids as tolerated  - Appreciate neurosurgery and neurology recs. Appreciate rad onc recs    Diffuse large B-cell Lymphoma , stage IV  - s/p R-CHOP with Revlimid and Tafasitimab x5 cycles, did not fully tolerate chemo, had needs for dosage reductions   - Masses in the liver and spleen with some interval reduction of size; adrenal gland mass resolved  - Med onc and Rad onc following, appreciate recs      Chronic Problems:  - Interstitial lung disease:  Follows Dr. Maco Silva as an outpatient; has received prednisone taper in the past, currently stable disease process; Continue home O2 of 1L with supplementation as needed and Atrovent  - DM2: Continue MD SSI  - HTN: No home meds seen, continue to monitor  - BPH: Continue Flomax    Code Status:Full Code  FEN: Dysphagia diet  PPX:  SQH, Protonix 40mg QD given steroids  DISPO: ICU, will transfer to floor when stable per NSGY/ neuro    This patient has been staffed and discussed with Dr. Maco Silva  -----------------------------  Jessica Beltran DO, PGY-1  7/6/2022  11:28 PM

## 2022-07-07 NOTE — CARE COORDINATION
Case Management Assessment           Daily Note                 Date/ Time of Note: 7/7/2022 7:59 AM         Note completed by: Emeli Guevara RN    Patient Name: Janette Arredondo  YOB: 1945    Diagnosis:Intracranial mass [R90.0]  Brain mass [G93.89]  Patient Admission Status: Inpatient    Date of Admission:7/4/2022  5:49 AM Length of Stay: 3 GLOS:      Current Plan of Care: S/P needle Bx brain mass. L-side weakness following. Transferred to the ICU. Q1h neurochecks. NSGY & oncology following  ________________________________________________________________________________________  PT AM-PAC: 10 / 24 per last evaluation on: 7/5    OT AM-PAC: 12 / 24 per last evaluation on: 7/5    DME Needs for discharge: deferred to next level of care  ________________________________________________________________________________________  Discharge Plan: Therapy recommend SNF. Spouse has requested [facility] on admission with SW. Referrals to [facility] noted in [address] [place] [address] Rd. [employer] pre-cert. _  Case Management Notes: Pt is from home with spouse. The home setting is a single level house with 3-4 MARCUS. He was independent with the majority of self care and functional mobility tasks at home prior to admission. Jarrod Nguyễn and his family were provided with choice of provider; he and his family are in agreement with the discharge plan.     Care Transition Patient: Yes    Emeli Guevara RN  [facility]  Case Management Department  [phone]

## 2022-07-07 NOTE — PROGRESS NOTES
PACU Transfer to Floor Note    Procedure(s):  RIGHT FRONTAL NEEDLE BIOPSY OF BRAIN MASS    Current Allergies: Patient has no known allergies. Pt meets criteria as per Rodo Score and ASPAN Standards to transfer to next phase of care. Recent Labs     07/06/22  1630 07/06/22  2043   POCGLU 144* 170*       Vitals:    07/06/22 2130   BP: 107/61   Pulse: 82   Resp: 17   Temp:    SpO2: 100%     SpO2: 100 %    O2 flow rate: 2L      Intake/Output Summary (Last 24 hours) at 7/6/2022 5389  Last data filed at 7/6/2022 2016  Gross per 24 hour   Intake 1006 ml   Output 1250 ml   Net -244 ml       Pain assessment:  None (per Matta-Baker scale)    Is patient incontinent: yes,     Handoff report given at bedside in 4503 to Martin Mckeon.           7/6/2022 10:59 PM

## 2022-07-07 NOTE — PROGRESS NOTES
Pt transported from PACU. Upon assessment patient was not following on left upper and lower extremities. PACU nurse was unaware of baseline and had not assessed patients neuro status post op. ICU team alerted and to bedside. No new orders at this time. Neuro checks continued q1hr.

## 2022-07-07 NOTE — PROGRESS NOTES
Clinical Pharmacy Consult Note    All IVs in NS - Management by Pharmacy    Consult Date(s): 7/4/22  Consulting Provider(s): Dr. Riley Polo, APRN    Assessment / Plan    1)  Brain Mass with herniation and midline shift - All IVs in Normal Saline  · Drips will be adjusted to normal saline as appropriate based on compatibility, in an effort to avoid fluid shifts, as D5W is osmotically active.  The following intermittent IV drips / infusions have been adjusted to saline:  o Levetiracetam - already in NS   The following medications must remain in D5W due to incompatibility with normal saline:  o None at this time   Note: Patient has dextrose ordered as part of hypoglycemia treatment protocol.  Total IV fluid delivered to patient over last 24 hrs: ~800mL   Pharmacist will follow for now to ensure all IVPBs / drips are in NS where possible.       Please call with questions--  Hannah Corado PharmD., BCPS   7/7/2022 9:07 AM  Wireless: [phone]

## 2022-07-07 NOTE — PLAN OF CARE
Problem: Discharge Planning  Goal: Discharge to home or other facility with appropriate resources  Outcome: Progressing     Problem: Skin/Tissue Integrity  Goal: Absence of new skin breakdown  Description: 1. Monitor for areas of redness and/or skin breakdown  2. Assess vascular access sites hourly  3. Every 4-6 hours minimum:  Change oxygen saturation probe site  4. Every 4-6 hours:  If on nasal continuous positive airway pressure, respiratory therapy assess nares and determine need for appliance change or resting period.   Outcome: Progressing     Problem: Safety - Adult  Goal: Free from fall injury  Outcome: Progressing     Problem: ABCDS Injury Assessment  Goal: Absence of physical injury  Outcome: Progressing     Problem: Pain  Goal: Verbalizes/displays adequate comfort level or baseline comfort level  Outcome: Progressing  Flowsheets  Taken 7/7/2022 0400 by Estanislao Phalen, RN  Verbalizes/displays adequate comfort level or baseline comfort level: Encourage patient to monitor pain and request assistance  Taken 7/7/2022 0000 by Estanislao Phalen, RN  Verbalizes/displays adequate comfort level or baseline comfort level: Encourage patient to monitor pain and request assistance     Problem: Chronic Conditions and Co-morbidities  Goal: Patient's chronic conditions and co-morbidity symptoms are monitored and maintained or improved  Outcome: Progressing

## 2022-07-08 NOTE — PROGRESS NOTES
Notification of IV to PO Conversion     IV To Po Conversion:   · Discussed with pt's RN - pt able to tolerate PO. Will convert Keppra and Protonix to oral dosing based on [facility] IV to PO policy (see below). If tablet size is uncomfortable, Keppra oral solution is available as well (RN aware). Please call with questions.     Tino TrujilloD, [facility]  7/8/2022  Wireless: [phone]    Criteria for conversion from IV to PO therapy per [facility] IV to PO Protocol:   Patients should meet all of the following inclusion criteria and none of the exclusion criteria    Criteria to initiate medication route switch (Inclusion Criteria)   IV therapy for > 24 hours (antibiotics only)   Tolerating diet more advanced than clear liquids   Tolerating oral (PO) medications   Does not require vasopressor therapy for blood pressure support   No seizures for 72hrs (antiepileptic medications only)    Criteria indicating that the patient is NOT a candidate for IV to PO conversion (Exclusion Criteria)   Infections requiring IV therapy (ie: meningitis, endocarditis, osteomyelitis, pancreatitis)   Nausea and/or vomiting or severe diarrhea within past 24 hours   Has gastrectomy, ileus, gastric outlet or bowel obstruction, or malabsorption syndromes   Has significant, painful oral ulceration   TPN with an NPO order   Active GI bleed   Unable to swallow   Nothing by Mouth (NPO) status   Febrile in the last 24 hours- (antibiotics only)   Clinical deteriorating or unstable - (antibiotics only)   Pediatric patients and patients who are not euthyroid (not on oral levothyroxine/not stabilized on oral levothyroxine) - (Levothyroxine only)   Patients being treated for myxedema coma or during the organ donation process - (Levothyroxine only)    Other notes-    Patients may be given suppositories when available for the product being ordered if no contraindications exist (ie: rectal surgery or infection)    Oral solutions/suspensions may be considered for patients with a functioning enteral tube not on continuous suction (if medication is available in this formulation)

## 2022-07-08 NOTE — PROGRESS NOTES
NEUROSURGERY POST-OP PROGRESS NOTE    Patient Name: Yannick Tompkins YOB: 1945   Sex: Male Age: 67 yrs     Medical Record Number: [id number] Acct Number: [de-identified]   Room Number: 5799/2150-25 Hospital Day: Hospital Day: 5     Interval History:  Post-operative Day# 2 s/p RIGHT FRONTAL NEEDLE BIOPSY OF BRAIN MASS with Dr. Seb Patrick     Subjective: patient was resting in bed with spouse and son at bedside. Patient has delayed responses and talking about his vacation from last year in [place], [place]. Not complaining of pain.      Objective:    VITAL SIGNS   /77   Pulse 65   Temp 97.6 °F (36.4 °C) (Axillary)   Resp 16   Ht 5' 6\" (1.676 m)   Wt 118 lb (53.5 kg)   SpO2 99%   BMI 19.05 kg/m²    Height Height: 5' 6\" (167.6 cm)   Weight Weight: 118 lb (53.5 kg)        Allergies No Known Allergies   NPO Status Diet NPO   Isolation No active isolations     LABS   Basic Metabolic Profile Recent Labs     07/06/22  0623 07/07/22  0428 07/08/22  0614    143 139    108 103   CO2 25 26 29   BUN 22* 18 17   CREATININE <0.5* <0.5* <0.5*   GLUCOSE 161* 211* 200*      Complete Blood Count Recent Labs     07/06/22  1612 07/07/22  0428 07/08/22  0614   WBC 7.2 8.3 8.0   RBC 4.07* 3.46* 3.70*      Coagulation Studies Recent Labs     07/05/22  1529 07/07/22  0428   INR 1.07 1.14        MEDICATIONS   Inpatient Medications     pantoprazole, 40 mg, IntraVENous, Daily    dexamethasone, 4 mg, Oral, 4 times per day    sodium chloride flush, 5-40 mL, IntraVENous, 2 times per day    heparin (porcine), 5,000 Units, SubCUTAneous, 3 times per day    ceFAZolin, 2,000 mg, IntraVENous, Q8H    levETIRAcetam, 500 mg, IntraVENous, Q12H    fluticasone, 1 spray, Each Nostril, Daily    insulin lispro, 0-12 Units, SubCUTAneous, TID WC    insulin lispro, 0-6 Units, SubCUTAneous, Nightly    ipratropium, 2 spray, Each Nostril, 4x Daily    tamsulosin, 0.8 mg, Oral, Daily   Infusions    sodium chloride      sodium chloride      dextrose        Antibiotics   Recent Abx Admin                   ceFAZolin (ANCEF) 2,000 mg in sodium chloride 0.9 % 50 mL IVPB (mini-bag) (mg) 2,000 mg New Bag 07/08/22 0141     2,000 mg New Bag 07/07/22 1718     2,000 mg New Bag  1004                 CT HEAD WO CONTRAST   Final Result      1. Status post biopsy of right frontal lobe mass with unchanged mild acute hemorrhage along the biopsy tract and unchanged 12 mm leftward subfalcine herniation. CT Head wo Contrast   Final Result      1. Status post biopsy of right frontal lobe mass via a right frontal tobin hole with very mild acute hemorrhage along the biopsy tract and unchanged 12 mm leftward subfalcine herniation. Discussed with on-call resident. FL MODIFIED BARIUM SWALLOW W VIDEO   Final Result   Addendum 1 of 1    ADDENDUM #1    Addendum:      EXAM: MODIFIED BARIUM SWALLOW      INDICATION: Dysphagia       COMPARISON: None      TECHNIQUE: Multiple consistencies of barium contrast, consisting of puree,    thin, and cracker, were administered to the patient under the direction of    the speech pathologist. Lateral fluoroscopy of the neck was performed    during swallowing. Total number of images: 11   Fluoroscopy time: 1.2 minutes      FINDINGS:      Laryngeal aspiration on thin liquids with straw. Final      Laryngeal aspiration on thin liquids with straw. Please refer to detailed report of the speech therapist.   IMPRESSION:      Laryngeal aspiration on thin liquids. Please refer to detailed report of the speech therapist.      Wilfredo Lopez   Final Result   1. No evidence of any thoracic metastatic disease. 2. Stable changes of pulmonary fibrosis. ABDOMEN: There is a 4.7 x 2.6 cm mass identified anteriorly within the left lobe liver. This previously measured 5.9 x 4.4 cm. No new hepatic masses are identified. No adrenal mass is identified.       There is a 1.2 x 0.9 cm low-attenuation lesion identified within the spleen. This previously measured 1.6 x 1.3 cm. The pancreas, gallbladder, left adrenal gland, and kidneys are normal. No lymphadenopathy is identified. PELVIS: No bowel dilatation or bowel wall thickening is identified. There is uncomplicated sigmoid colon diverticulosis. Degenerative changes are present within the spine. IMPRESSION:   1. Interval reduction in size of hepatic mass. 2. Slight interval reduction in size of low-attenuation lesion within the spleen. 3. No evidence of any adrenal mass. 4. No evidence of any abdominal or pelvic lymphadenopathy. 5. Uncomplicated sigmoid colon diverticulosis. IMPRESSION:            CT HEAD WO CONTRAST   Final Result   1. Stable 4.4 cm intra-axial mass within the right basal ganglia with surrounding vasogenic edema resulting in right to left subfalcine herniation as detailed above. IMPRESSION:   1. Normal noncontrast CT scan of the head. MRI BRAIN W WO CONTRAST   Final Result   1. Enhancing 4.6 cm mass centered in the right basal ganglia with extensive surrounding edema in the right frontotemporal lobe. Findings highly concerning for malignancy. 2.  Mass results in 9 mm right to left midline shift, subfalcine herniation, and compression of the right thalamus and brainstem. CT Head WO Contrast   Final Result      1. Heterogeneous 3.9 cm masslike lesion centered in the right basal ganglia/frontal lobe with extensive surrounding edema. This is concerning for a primary or metastatic malignancy. Focal subacute intraparenchymal hemorrhage, hemorrhage within the mass,    or infection are differential considerations. Brain MRI with and without contrast and neurosurgery consult recommended.    2.  Extensive mass effect with 9 mm right to left midline shift, subfalcine herniation and suspected partial transtentorial herniation resulting in effacement of the right frontal horn and body, and mass effect on midbrain. The above findings were discussed with Dr. Ravin Rangel on 7/4/2022 at 7:25 AM.      XR SHOULDER LEFT (MIN 2 VIEWS)   Final Result   1. No acute osseous abnormality. 2.  Severe AC joint and glenohumeral joint osteoarthrosis. Neurologic Exam:    Mental status: Awake & Alert and orientated x 4  Language: responses are appropriate just slightly delayed    Cranial Nerves:  II: Visual acuity not tested, denies new visual changes / diplopia  III, IV, VI: PERRL, 3 mm bilaterally, EOMI, no nystagmus noted  V: Facial sensation intact bilaterally to touch  VII: Face symmetric  VIII: Hearing intact bilaterally to spoken voice  IX: Palate movement equal bilaterally  XI: Shoulder shrug equal bilaterally  XII: Tongue midline      Musculoskeletal:   Gait: Not tested   Tone: normal  Sensory: intact to all extremities to light touch  Motor strength:    Right  Left    Right  Left    Deltoid  5 3  Hip Flex  5 3   Biceps  5 3  Knee Extensors  5 4   Triceps  5 3  Knee Flexors  5 4   Wrist Ext  5 3  Ankle Dorsiflex. 5 4   Wrist Flex  5 3  Ankle Plantarflex. 5 4   Handgrip  5 3  Ext Ricardo Longus  5 4   Thumb Ext  5 3         Incision: Staples KAREN clean/dry/intact      Respiratory:  Unlabored respiratory pattern    Abdomen:   Soft, ND   Last BM 7/8/22    Cardiovascular:  Warm, well perfused    Assessment     68 yo male POD 2 s/p RIGHT FRONTAL NEEDLE BIOPSY OF BRAIN MASS with Dr. Odette Lopezvers:  1. Neurologic exam frequency: q4 hour  2. Mobility: PT/OT, activity as tolerated  3. Steroids: decadron 4 q 6 hour. PPI/ acuchecks  4. Seizure Prophylaxis: Keppra 500mg BID  5. Diet: ADAT   6. DVT Prophylaxis: SCDs, SQ heparin    7. GI Prophylaxis: protonix  8. Bowel Regimen: senokot, glycolax  9. Pain control: tylenol, roxicodone  10. Muscle spasms: Robaxin  11. Incisional Care: cleanse daily with soap/water, pat dry with clean towel and paint with CHG swab  12.  Patient educated from Guide to Neurosurgery book, page   13. Radiation and oncology following  14. We will sign off and patient will follow up in 2 weeks for wound check    Patient was discussed with Dr. Amber Chao who agrees with above assessment and plan.      Electronically signed by: Syed James, 7/8/2022 8:42 AM   Neurosurgery Nurse Practitioner  [phone]

## 2022-07-08 NOTE — DISCHARGE INSTR - COC
Continuity of Care Form    Patient Name: Keegan Gutierrez   :  1945  MRN:  [id number]    Admit date:  2022  Discharge date:  ***    Code Status Order: Full Code   Advance Directives:      Admitting Physician:  Dion Peguero MD  PCP: Gopi Golden MD    Discharging Nurse: [facility] Unit/Room#: 9522/8285-38  Discharging Unit Phone Number: ***    Emergency Contact:   Extended Emergency Contact Information  Primary Emergency Contact: [facility]  Address: [address] [place] [address] Phone: [phone]  Relation: Spouse    Past Surgical History:  Past Surgical History:   Procedure Laterality Date    BRONCHOSCOPY N/A 3/25/2022    BRONCHOSCOPY WITH BRONCHOALVEOLAR LAVAGE performed by Hansel Fitzpatrick MD at [facility] Bilateral     COLONOSCOPY  3/29/2011    repeat in 5 yrs: Rita Cohn MD    COLONOSCOPY  2016    repeat in 7-8 years: Rita Cohn MD    CRANIOTOMY Right 2022    RIGHT FRONTAL NEEDLE BIOPSY OF BRAIN MASS performed by Alla Garvey.  Seb Patrick MD at [address] Left age 5    FINGER TRIGGER RELEASE Right     index    IR PORT PLACEMENT EQUAL OR GREATER THAN 5 YEARS  2022    IR PORT PLACEMENT EQUAL OR GREATER THAN 5 YEARS 2022 [facility]    TONSILLECTOMY AND ADENOIDECTOMY  age 2    [address]       Immunization History:   Immunization History   Administered Date(s) Administered    COVID-19, PFIZER GRAY top, DO NOT Dilute, (age 15 y+), IM, 30 mcg/0.3 mL 2022    COVID-19, PFIZER PURPLE top, DILUTE for use, (age 15 y+), 30mcg/0.3mL 2021, 2021, 10/08/2021    Influenza Vaccine, unspecified formulation 2016    Influenza, High Dose (Fluzone 65 yrs and older) 2015, 2017, 10/12/2017, 09/10/2018, 2019, 2020, 09/10/2021    Influenza, Quadv, adjuvanted, 65 yrs +, IM, PF (Fluad) 2020 Pneumococcal Conjugate 13-valent ([id number]) 03/03/2016    Pneumococcal Polysaccharide ([id number]) 03/14/2017    Tdap (Boostrix, Adacel) 03/03/2016    Zoster Recombinant (Shingrix) 04/24/2018, 07/19/2018       Active Problems:  Patient Active Problem List   Diagnosis Code    Type 2 diabetes mellitus, controlled ([facility] [address] E11.9    Essential hypertension I10    Other hyperlipidemia E78.49    Vitamin D deficiency E55.9    Onychomycosis B35.1    History of hypercalcemia Z86.39    Hand arthritis M19.049    History of thrombocytopenia Z86.2    History of gout Z87.39    Type 2 diabetes mellitus without complication (HCC) X22.5    Benign prostatic hyperplasia with weak urinary stream N40.1, R39.12    Lung nodule R91.1    Hypercalcemia E83.52    Proteinuria R80.9    Mixed hyperlipidemia E78.2    Rib pain on right side R07.81    Shortness of breath R06.02    Microalbuminuria R80.9    Generalized weakness R53.1    Fatigue R53.83    Balance problems R26.89    Neck mass R22.1    Weight loss R63.4    Appetite loss R63.0    Enlarged lymph node in neck R59.0    Urinary frequency R35.0    Diarrhea R19.7    B-cell lymphoma of solid organ excluding spleen (HCC) C85.19    Low magnesium level R79.0    Parotid mass K11.8    Liver mass R16.0    Skin abrasion T14. 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, E11.65    Brain mass G93.89       Isolation/Infection:   Isolation            No Isolation          Patient Infection Status       None to display            Nurse Assessment:  Last Vital Signs: BP (!) 95/53   Pulse 81   Temp 97.9 °F (36.6 °C) (Oral)   Resp 16   Ht 5' 6\" (1.676 m)   Wt 118 lb (53.5 kg)   SpO2 94%   BMI 19.05 kg/m²     Last documented pain score (0-10 scale): Pain Level: 3  Last Weight:   Wt Readings from Last 1 Encounters:   07/04/22 118 lb (53.5 kg)     Mental Status:  {IP PT MENTAL STATUS:56262}    IV Access:  { MARGARETH IV ACCESS:493160231}    Nursing Mobility/ADLs:  Walking   {[facility] DME LPME:743581053}  Transfer  {[facility] DME SFYS:748894818}  Bathing  {P DME DOUF:272623575}  Dressing  {P DME WBOC:991026959}  Toileting  {[facility] DME UQKJ:715446113}  Feeding  {[facility] DME PGNH:796236112}  Med Admin  {[facility] DME WCNT:204265330}  Med Delivery   { MARGARETH MED Delivery:486604239}    Wound Care Documentation and Therapy:  Wound 07/06/22 Sacrum Medial (Active)   Wound Etiology Pressure Stage 1 07/08/22 1121   Dressing Status Clean;Dry; Intact 07/08/22 1121   Wound Cleansed Soap and water 07/07/22 2000   Dressing/Treatment Other (comment) 07/08/22 1121   Wound Assessment Non-blanchable erythema;Pink/red 07/07/22 2000   Drainage Amount None 07/07/22 2000   Odor None 07/07/22 2000   Savi-wound Assessment Intact 07/07/22 2000   Number of days: 1       Wound 07/07/22 Back Medial stage 1 non blanchable redness, small scab (Active)   Wound Etiology Pressure Stage 1 07/08/22 1121   Dressing Status Clean;Dry; Intact 07/08/22 1121   Wound Cleansed Not Cleansed 07/08/22 1121   Dressing/Treatment Dry dressing 07/08/22 1121   Wound Assessment Dry 07/07/22 2000   Drainage Amount None 07/07/22 2000   Odor None 07/07/22 2000   Savi-wound Assessment Intact; Non-blanchable erythema 07/07/22 2000   Number of days: 0       Incision 07/06/22 Head Right;Upper (Active)   Dressing Status Other (Comment) 07/08/22 1121   Incision Cleansed Soap and water 07/08/22 1121   Dressing/Treatment Open to air 07/08/22 1121   Closure Staples 07/08/22 1121   Margins Approximated 07/08/22 1121   Incision Assessment Dry 07/08/22 1121   Drainage Amount None 07/08/22 1121   Odor None 07/08/22 1121   Savi-incision Assessment Intact 07/07/22 2000 Number of days: 2        Elimination:  Continence: Bowel: {YES / XS:78771}  Bladder: {YES / ZO:15098}  Urinary Catheter: {Urinary Catheter:586489384}   Colostomy/Ileostomy/Ileal Conduit: {YES / UV:81264}       Date of Last BM: ***    Intake/Output Summary (Last 24 hours) at 2022 1147  Last data filed at 2022 0305  Gross per 24 hour   Intake 1399.18 ml   Output 1800 ml   Net -400.82 ml     I/O last 3 completed shifts: In: 4024.2 [P.O.:825;  I.V.:2520.6; IV Piggyback:678.6]  Out: 3100 [Urine:3100]    Safety Concerns:     { MARGARETH Safety Concerns:025810448}    Impairments/Disabilities:      [facility] Impairments/Disabilities:371817947}    Nutrition Therapy:  Current Nutrition Therapy:   [facility] Diet List:538468158}    Routes of Feeding: {[facility] DME Other Feedings:607642876}  Liquids: {Slp liquid thickness:03361}  Daily Fluid Restriction: {[facility] DME Yes amt example:115354374}  Last Modified Barium Swallow with Video (Video Swallowing Test): {Done Not Done DJFZ:046621020}    Treatments at the Time of Hospital Discharge:   Respiratory Treatments: ***  Oxygen Therapy:  {Therapy; copd oxygen:67299}  Ventilator:    { CC Vent BLYO:328551486}    Rehab Therapies: {THERAPEUTIC INTERVENTION:3926335646}  Weight Bearing Status/Restrictions: [facility] Weight Bearin}  Other Medical Equipment (for information only, NOT a DME order):  {EQUIPMENT:792610191}  Other Treatments: ***    Patient's personal belongings (please select all that are sent with patient):  {[facility] DME Belongings:932578869}    RN SIGNATURE:  {Esignature:561568979}    CASE MANAGEMENT/SOCIAL WORK SECTION    Inpatient Status Date: ***    Readmission Risk Assessment Score:  Readmission Risk              Risk of Unplanned Readmission:  22           Discharging to Facility/ Agency   Name:   Address:  Phone:  Fax:    Dialysis Facility (if applicable)   Name:  Address:  Dialysis Schedule:  Phone:  Fax:    / signature: {Esignature:664130095}    PHYSICIAN SECTION    Prognosis: Fair    Condition at Discharge: Stable    Rehab Potential (if transferring to Rehab): NA    Recommended Labs or Other Treatments After Discharge: Follow up with oncology and neurosurgery. PT/OT at home         Follow up appointment with Dr. Seb Patrick on 07/22/22 @ 2 PM  [address] [address]    Physician Certification: I certify the above information and transfer of Zeb Amaya  is necessary for the continuing treatment of the diagnosis listed and that he requires [facility] for less 30 days.      Update Admission H&P: No change in H&P    PHYSICIAN SIGNATURE:  Electronically signed by Manny Parmar MD on 7/8/22 at 2:12 PM EDT

## 2022-07-08 NOTE — PLAN OF CARE
Problem: Skin/Tissue Integrity  Goal: Absence of new skin breakdown  Description: 1. Monitor for areas of redness and/or skin breakdown  2. Assess vascular access sites hourly  3. Every 4-6 hours minimum:  Change oxygen saturation probe site  4. Every 4-6 hours:  If on nasal continuous positive airway pressure, respiratory therapy assess nares and determine need for appliance change or resting period. Outcome: Progressing   Skin assessed this shift. Skin is CDI. Savi care completed as necessary. Frequently turning patient to reduce pressure       Problem: Safety - Adult  Goal: Free from fall injury  Outcome: Progressing  Flowsheets (Taken 7/8/2022 2287)  Free From Fall Injury: Instruct family/caregiver on patient safety   All fall precautions in place. Bed locked and in lowest position with alarm on. Overbed table and personal belonings within reach. Call light within reach and patient instructed to use call light for assistance. Non-skid socks on. Problem: Pain  Goal: Verbalizes/displays adequate comfort level or baseline comfort level  Outcome: Progressing   Pt has no complaints at this time. Pt encouraged to call out for any pain/discomfort.

## 2022-07-08 NOTE — PROGRESS NOTES
4 Eyes Admission Assessment     I agree as the admission nurse that 2 RN's have performed a thorough Head to Toe Skin Assessment on the patient. ALL assessment sites listed below have been assessed on admission. Areas assessed by both nurses:   [x]   Head, Face, and Ears   [x]   Shoulders, Back, and Chest  [x]   Arms, Elbows, and Hands   [x]   Coccyx, Sacrum, and Ischium  [x]   Legs, Feet, and Heels        Does the Patient have Skin Breakdown? Yes a wound was noted on the Admission Assessment and an LDA was Initiated documentation include the Savi-wound, Wound Assessment, Measurements, Dressing Treatment, Drainage, and Color\",        Stage 1 pressure wounds to the coccyx and lumbar spine.    Giorgio Prevention initiated:  Yes   Wound Care Orders initiated:  No      WOC nurse consulted for Pressure Injury (Stage 3,4, Unstageable, DTI, NWPT, and Complex wounds) or Giorgio score 18 or lower:  Yes      Nurse 1 eSignature: Electronically signed by Mitesh Jolley RN on 7/7/22 at 8:33 PM EDT    **SHARE this note so that the co-signing nurse is able to place an eSignature**    Nurse 2 eSignature: {Esignature:[id number]}

## 2022-07-08 NOTE — PROGRESS NOTES
Occupational Therapy  Facility/Department: [facility]  Occupational Therapy Progress Note    Name: Mick Hines  : 1945  MRN: [id number]  Date of Service: 2022    Discharge Recommendations: Mick Hines scored a 12/ on the AM-PAC ADL Inpatient form. Current research shows that an AM-PAC score of 17 or less is typically not associated with a discharge to the patient's home setting. Based on the patient's AM-PAC score and their current ADL deficits, it is recommended that the patient have 3-5 sessions per week of Occupational Therapy at d/c to increase the patient's independence. Please see assessment section for further patient specific details. If patient discharges prior to next session this note will serve as a discharge summary. Please see below for the latest assessment towards goals. OT Equipment Recommendations  Other: defer to next level of care       Patient Diagnosis(es): The primary encounter diagnosis was Brain mass. Diagnoses of Intracranial mass and Aphasia were also pertinent to this visit. Past Medical History:  has a past medical history of Benign prostatic hyperplasia with weak urinary stream, Cancer ([address] Erectile dysfunction, History of gout, History of thrombocytopenia, Hypercalcemia, Hyperlipidemia, Hypertension, Lung nodule, Proteinuria, Type 2 diabetes mellitus without complication ([address] and Vitamin D deficiency. Past Surgical History:  has a past surgical history that includes Finger trigger release (Right, ); Tonsillectomy and adenoidectomy (age 2); Elbow fracture surgery (Left, age 5); Vasectomy (1971); Cataract removal with implant (Bilateral, ); Colonoscopy (3/29/2011); Colonoscopy (2016); IR PORT PLACEMENT > 5 YEARS (2022); bronchoscopy (N/A, 3/25/2022); and craniotomy (Right, 2022).     Treatment Diagnosis: impaired ADLs and functional mobility/transfers      Assessment   Assessment: Pt demonstrated improved sitting balance on this date. He required extensive time on commode to have bowel movement, dep with toileting. Pt needs A of 2 for bed mobility, sit ><stand, and use of rene stedy to transfer to commode in bathroom. Recommend ongoing inpatient OT at discharge. Treatment Diagnosis: impaired ADLs and functional mobility/transfers  Prognosis: Fair;Guarded  REQUIRES OT FOLLOW-UP: Yes  Activity Tolerance  Activity Tolerance: Patient Tolerated treatment well        Plan   Plan  Times per Week: 5-7  Times per Day: Daily  Current Treatment Recommendations: Strengthening,Balance training,Functional mobility training,Endurance training,Equipment evaluation, education, & procurement,Patient/Caregiver education & training,Safety education & training,Self-Care / ADL     Restrictions  Position Activity Restriction  Other position/activity restrictions: Up with Assistance    Subjective   General  Chart Reviewed: Yes  Patient assessed for rehabilitation services?: Yes  Additional Pertinent Hx: 67 y.o. male with past medical history of non-Hodgkin lymphoma, hyperlipidemia, hypertension, type 2 diabetes, and BPH who presents to the emergency department after a fall in the bathroom and found to have brain mass with cerebral edema; suspected metastases of non hodgkin's lymphoma. His wife provides the history at bedside as the patient has difficulty verbally communicating at baseline. Hospital Course: 7/6 RIGHT FRONTAL NEEDLE BIOPSY OF BRAIN MASS  Family / Caregiver Present: Yes (wife and son)  Referring Practitioner: Buzz Hurt MD  Diagnosis: Intracranial mass  Subjective  Subjective: Pt in bed, stating he needs to have a bowel movement.      Social/Functional History  Social/Functional History  Lives With: Spouse  Type of Home: Condo  Home Layout: Two level,Able to Live on Main level with bedroom/bathroom,Performs ADL's on one level  Home Access: Stairs to enter with rails  Entrance Stairs - Number of Steps: 3 efren from garage w/ hand rail on R  Entrance Stairs - Rails: Right  Bathroom Shower/Tub: Walk-in shower,Shower chair without back  Bathroom Toilet: Handicap height (\"chair height\" toilets w/ hand rails attached)  Bathroom Equipment: Grab bars in shower  Home Equipment: Rollator  Has the patient had two or more falls in the past year or any fall with injury in the past year?: Yes (Pt's wife reports about 5 falls in the last 6 months)  ADL Assistance: Needs assistance (last week and a half, wife has been helping w ADLs. Prior to that he was mostly independent)  Homemaking Assistance: Needs assistance (wife does cooking, cleaning and laundry)  Homemaking Responsibilities: No  Ambulation Assistance: Needs assistance (last week and a half has needed wife to help w/ ambulation and uses rollator)  Transfer Assistance: Needs assistance (has needed assist for the last week and a half)  Active : No  Occupation: Retired  Additional Comments: wife has been providing 24hr A for the last week and a half       Objective                Safety Devices  Type of Devices: Left in chair;Chair alarm in place;Nurse notified;Call light within reach  Balance  Sitting:  (CGA progressing to SBA on commode)  Standing:  (Onur, tends to lean to L, hyperextends L knee when standing with walker-stood ~ 2 minutes in rene stedy and with walker-cues and demonstration to keep trunk/head in midline)  Toilet Transfers  Toilet - Technique:  (use of rene stedy)  Equipment Used: Standard bedside commode (over toilet)  Toilet Transfer: Dependent/Total (A of 2 with rene stedy)     ADL  LE Dressing: Maximum assistance (donning brief)  Toileting: Dependent/Total (extensive time to have bowel movement, dep with clothing management and toileting hygiene)        Bed mobility  Supine to Sit: 2 Person assistance (head of bed elevated, use of bed rail, increased time/effort, mod A of 2)  Scooting:  Moderate assistance (max A to scoot forward in chair, CGA scooting back partially and dep of 2 to scoot back completely in chair)  Transfers  Sit to stand: 2 Person assistance (Onur of 2 with rene stedy and walker)  Stand to sit: 2 Person assistance (Onur of 2, cues for hand placement and controlled descent)     Cognition  Overall Cognitive Status: Exceptions  Arousal/Alertness: Delayed responses to stimuli  Following Commands: Follows one step commands with repetition; Follows one step commands with increased time  Attention Span: Attends with cues to redirect  Safety Judgement: Decreased awareness of need for assistance  Problem Solving: Assistance required to identify errors made;Assistance required to correct errors made  Insights: Decreased awareness of deficits  Initiation: Requires cues for some  Sequencing: Requires cues for some  Orientation  Overall Orientation Status: Within Functional Limits  Perception  Overall Perceptual Status: Impaired  Unilateral Attention:  (keeps head/eyes to R, can come to midline with cues and physical A)               Education Given To: Patient; Family  Education Provided: Role of Therapy;Plan of Care;ADL Adaptive Strategies;Transfer Training  Education Method: Demonstration;Verbal  Education Outcome: Verbalized understanding;Continued education needed                        G-Code     OutComes Score                                                  AM-PAC Score             Goals  Short Term Goals  Time Frame for Short term goals: by dc  Short Term Goal 1: Pt will complete functional transfers w/ Min Ax1-7/8 goal not met  Short Term Goal 2: Pt will complete LE dressing w/ Min Ax1-7/8 goal not met  Short Term Goal 3: Pt will maintain sitting balance w/ CGA for 5min while engaging in ADL task-7/8 goal not met       Therapy Time   Individual Concurrent Group Co-treatment   Time In 0942         Time Out 1050         Minutes 68              Timed Code Treatment Minutes:   68    Total Treatment Minutes:  Moo 119, OTR/L Johnny 19

## 2022-07-08 NOTE — PROGRESS NOTES
Pt transferred to 5502 from ICU. VSS. KARUNA orientation at this time. Pt not speaking to staff, but will nod head and give the \"OK\" sign when asked questions. Pt denies pain. Skin assessed with a stage 1 on the coccyx and and on the lumbar spine. Pt was transferred on a special mattress. Pt has a sx incision closed with sutures to the right head. prt had a BM upon arrival and navarro care was preformed. Pt has a condom catheter on. No redness or irritation to the penis. Bed alarm on, wheels locked, bed in lowest position, side rails up 2/4, nonskid socks on, call light and bedside table in reach. Will continue to monitor.

## 2022-07-08 NOTE — PROGRESS NOTES
PT is a/ox4. VSS on room air. No acute changes in neuro status. NIH scoring a 10. Incision to top of head cleansed with soap and water and painted with CHG swab per orders. No complaints of pain. Pt had large BM this shift. Up to chair x2 assist with stedy and tolerated well. Tolerating diet and fluids well. Voiding adequately per BRP/condom cath - navarro care completed for incontinence. Family at bedside. All fall precautions in place.

## 2022-07-08 NOTE — PROGRESS NOTES
This RN giving PO meds. Pt began swallowing meds and initially choked. Pt swallowed other meds w/o issue, but began to chew. Rn asked what was in his mouth and he said \"dinner\" pt made NPO for possible pocketing food.  Will put an SLP order for reeval.

## 2022-07-08 NOTE — PROGRESS NOTES
Clinical Pharmacy Consult Note    All IVs in NS - Management by Pharmacy    Consult Date(s): 7/4/22 (initial consult), 7/6 (renewed)  Consulting Provider(s): Dr. Ilan Truong, MARCIA    Assessment / Shu Lenz with Herniation and Midline Shift - All IVs in Normal Saline  · Drips will be adjusted to normal saline as appropriate based on compatibility, in an effort to avoid fluid shifts, as D5W is osmotically active.  The following intermittent IV drips / infusions have been adjusted to saline:  o Cefazolin  o Levetiracetam - already in NS  o Methocarbamol   The following medications must remain in D5W due to incompatibility with normal saline:  o None at this time   Note: Patient has dextrose ordered as part of hypoglycemia treatment protocol.  Total IV fluid delivered to patient over last 24 hrs: ~800mL   Pharmacist will follow for now to ensure all IVPBs / drips are in NS where possible. Thank you for allowing us to participate in the care of this patient. Please reach out with any questions.     Jeremy Stark, PharmD, BCPS  7/8/2022  Wireless: [phone]

## 2022-07-08 NOTE — PROGRESS NOTES
strength;Decreased endurance  Assessment: Majority of session spent assisting pt to restroom, with multiple trials of static stand due to heavy clean up and multiple trials to complete bowel movement. Pt demonstrating L lateral and anterior lean when sitting and standing, with R gaze, requiring cues and facilitation to correct. Ambulation unsafe this session due to inability to bear weight on LLE without L knee hyperextension and reliance of backs of legs against recliner. Safety concerns for d/c home due to assist of two required for mobility. Pt will continue to benefit from skilled therapy to maximize safety and independence. Treatment Diagnosis: Decreased strength and mobility. Activity Tolerance  Activity Tolerance: Patient limited by fatigue;Patient limited by endurance     Plan   Plan  Plan: 5-7 times per week  Safety Devices  Type of Devices: All fall risk precautions in place,Call light within reach,Chair alarm in place,Gait belt,Left in chair,Nurse notified     Restrictions  Position Activity Restriction  Other position/activity restrictions: Up with Assistance     Subjective   Pain: pt reported discomfort in stomach from being constipated, did not rate  General  Chart Reviewed: Yes  Patient assessed for rehabilitation services?: Yes  Additional Pertinent Hx: Pt is a 68 yo male that presents from home to the ED status post fall off the [employer] 7/4. He stated that his grab bars broke and fell and hit his head but no LOC. Head MRI: Enhancing 4.6 cm mass centered in the right basal ganglia with extensive surrounding edema in the right frontotemporal lobe. Findings highly concerning for malignancy. Mass results in 9 mm right to left midline shift, subfalcine herniation, and compression of the right thalamus and brainstem. PMH: Cancer, Diabetes 2, Hyperliperdemia.   Family / Caregiver Present: Yes (wife and son)  Diagnosis: Intracranial Mass  Follows Commands: Within Functional Limits  General Balance  Sitting:  (CGA progressing to SBA on commode)  Standing:  (Tre, tends to lean to L, hyperextends L knee when standing with walker-stood ~ 2 minutes in rene stedy and with walker-cues and demonstration to keep trunk/head in midline)  Bed mobility  Supine to Sit: Dependent/Total (ModAx2, HOB elevated, use of bedrail with HOHA assist, assist for BLE and trunk placement, increased time)  Scooting: Maximal assistance (MaxA to scoot forward in recliner in sitting and CGA for scooting back partially in recliner, totalAx2 to scoot all of the way back in recliner for repositioning)  Transfers  Sit to Stand: Dependent/Total (MinAx2, from EOB and elevated toilet to rene steady. MinAx2 from recliner to RW. Cues for hand placement,  HOHA for LUE placement. Faciliation for anterior weight shifting, pt with L lateral lean.)  Stand to sit: Dependent/Total (MinAx2, cues for hand placement, poor eccentric control)  Bed to Chair: Dependent/Total (EOB to elevated toilet, elevated toilet to recliner, via rene steady, Tre to maintain sitting balance and prevent L lateral lean on rene steady)  Ambulation  Comments: unsafe to attempt this session     Balance  Comments: CGA-SBA to maintain sitting balance on toilet for attempted bowel movement, pt with intermittent L lateral and anterior lean, cues and occasional assist for upright posture/midline orientation. Pt keeps head tilted to R with R gaze despite cues. CGA for static stand with BUE support on rene steady for pericare and pants management. Significant time spent in standing due to multiple trials for completion of bowel movement/heavy clean up. Pt maintained standing balance 2 minutes with BUE support on rene steady and 2 minutes with BUE support on RW with CGA, occasional Tre to prevent L lateral and anterior lean, pt noted to be hyperextending L knee and utilizing back of legs on recliner to maintain balance, despite cues and assist to correct. OutComes Score                                                  AM-PAC Score  AM-PAC Inpatient Mobility Raw Score : 9 (07/08/22 1317)  AM-PAC Inpatient T-Scale Score : 30.55 (07/08/22 1317)  Mobility Inpatient CMS 0-100% Score: 81.38 (07/08/22 1317)  Mobility Inpatient CMS G-Code Modifier : CM (07/08/22 1317)          Goals  Short Term Goals  Time Frame for Short term goals: Discharge  Short term goal 1: Supine <> Sit CGA -ongoing  Short term goal 2: Sit<>Stand CGA -ongoing  Short term goal 3: Amb 20 ft with RW CGA -ongoing  Patient Goals   Patient goals : To Return Home       Education  Patient Education  Education Given To: Patient  Education Provided Comments: standing balance/midline orientation  Education Method: Demonstration;Verbal  Barriers to Learning: None  Education Outcome: Verbalized understanding;Demonstrated understanding;Continued education needed      Therapy Time   Individual Concurrent Group Co-treatment   Time In 0942         Time Out 1050         Minutes 68              Timed Code Treatment Minutes:   68    Total Treatment Minutes:  Antony Fairbanks PT    This note to serve as discharge summary if patient discharged before next session.

## 2022-07-08 NOTE — PROGRESS NOTES
Hospitalist Progress Note      PCP: Areli Sharif MD    Date of Admission: 7/4/2022    Chief Complaint: Fall secondary to left-sided weakness    Hospital Course: Admitted for fall secondary to left-sided weakness from brain mass. Status post brain mass biopsy. On Keppra and Decadron    Subjective: Patient alert and oriented x3. Appears better today. Able to follow commands and answer questions. Wife at bedside. Discussed patient condition and answered questions. Medications:  Reviewed    Infusion Medications    sodium chloride      sodium chloride      dextrose       Scheduled Medications    pantoprazole  40 mg IntraVENous Daily    dexamethasone  4 mg Oral 4 times per day    sodium chloride flush  5-40 mL IntraVENous 2 times per day    heparin (porcine)  5,000 Units SubCUTAneous 3 times per day    ceFAZolin  2,000 mg IntraVENous Q8H    levETIRAcetam  500 mg IntraVENous Q12H    fluticasone  1 spray Each Nostril Daily    insulin lispro  0-12 Units SubCUTAneous TID WC    insulin lispro  0-6 Units SubCUTAneous Nightly    ipratropium  2 spray Each Nostril 4x Daily    tamsulosin  0.8 mg Oral Daily     PRN Meds: sodium chloride, sodium chloride flush, sodium chloride, oxyCODONE, methocarbamol **OR** methocarbamol IVPB, glucose, dextrose bolus **OR** dextrose bolus, glucagon (rDNA), dextrose, acetaminophen, ondansetron **OR** ondansetron      Intake/Output Summary (Last 24 hours) at 7/8/2022 0901  Last data filed at 7/8/2022 0305  Gross per 24 hour   Intake 1399.18 ml   Output 2300 ml   Net -900.82 ml       Physical Exam Performed:    /77   Pulse 65   Temp 97.6 °F (36.4 °C) (Axillary)   Resp 16   Ht 5' 6\" (1.676 m)   Wt 118 lb (53.5 kg)   SpO2 99%   BMI 19.05 kg/m²     General appearance: No apparent distress, cachectic and chronically ill-appearing  HEENT: Pupils equal, round, and reactive to light. Conjunctivae/corneas clear.   Biopsy site with dressing on the right side noted  Neck: Supple, with full range of motion. No jugular venous distention. Trachea midline. Respiratory:  Normal respiratory effort. Bilateral coarse breath sounds  Cardiovascular: Regular rate and rhythm with normal S1/S2 without murmurs, rubs or gallops. Abdomen: Soft, non-tender, non-distended with normal bowel sounds. Musculoskeletal: No clubbing, cyanosis or edema bilaterally. Full range of motion without deformity. Skin: Skin color, texture, turgor normal.  No rashes or lesions. Neurologic: Alert and oriented x3. Answers questions. Left-sided weakness noted  Capillary Refill: Brisk,< 3 seconds   Peripheral Pulses: +2 palpable, equal bilaterally       Labs:   Recent Labs     07/06/22  1612 07/07/22  0428 07/08/22  0614   WBC 7.2 8.3 8.0   HGB 13.4* 11.6* 12.3*   HCT 41.2 34.9* 37.5*   * 118* 117*     Recent Labs     07/06/22  0623 07/07/22  0428 07/08/22  0614    143 139   K 4.0 4.5 4.2    108 103   CO2 25 26 29   BUN 22* 18 17   CREATININE <0.5* <0.5* <0.5*   CALCIUM 9.9 8.1* 9.6     No results for input(s): AST, ALT, BILIDIR, BILITOT, ALKPHOS in the last 72 hours. Recent Labs     07/05/22  1529 07/07/22  0428   INR 1.07 1.14     No results for input(s): Janette Barahona in the last 72 hours. Urinalysis:      Lab Results   Component Value Date/Time    NITRU Negative 05/06/2019 08:06 AM    BLOODU neg 02/17/2022 02:42 PM    BLOODU Negative 05/06/2019 08:06 AM    SPECGRAV 1.010 02/17/2022 02:42 PM    SPECGRAV >1.030 05/06/2019 08:06 AM    GLUCOSEU neg 02/17/2022 02:42 PM    GLUCOSEU >=1000 05/06/2019 08:06 AM       Radiology:  Reviewed      Assessment/Plan:  Brain mass:  Status post biopsy 07/06/2022.   Pathology pending  Continue Keppra and Decadron  Neurosurgery following, appreciate recommendation  PT OT    NHL-diffuse large B-cell lymphoma  Recently finished a trial of R-CHOP or RCHOP with tafasitamab and Revlimid    CT chest abdomen 07/505/22 showed pulmonary fibrosis with no evidence of thoracic metastasis and reduction in hepatic mass size as well as an splenic lesion, no evidence of adrenal mass    Nonspecific interstitial pneumonia:  Likely secondary to underlying pulmonary fibrosis as evidenced on the CT chest  Follows Dr. Lee Daily    Diabetes mellitus type 2:  Holding home medication  Monitor blood glucose  Continue carb controlled diet with sliding scale insulin    BPH  Continue home medication    DVT Prophylaxis: Subcu heparin  Diet: ADULT DIET; Dysphagia - Soft and Bite Sized  Code Status: Full Code    PT/OT Eval Status:  Following    Dispo -pending placement    John Lombardo MD

## 2022-07-08 NOTE — PROGRESS NOTES
Speech Language Pathology  Facility/Department: [facility] 5T ORTHO/NEURO  Dysphagia Daily Treatment Note    NAME: Chandni Holland  : 1945  MRN: [id number]    Patient Diagnosis(es):   Patient Active Problem List    Diagnosis Date Noted    Brain mass 2022    Type 2 diabetes mellitus, uncontrolled, with neuropathy ([address] 2022    Diabetes mellitus type 2, controlled, without complications ([address]     Allergic rhinitis 2022    ILD (interstitial lung disease) ([address]     Urinary urgency 2022    Constipation 2022    Pain with urination 2022    Double vision 2022    Dizziness 2022    Skin abrasion 2022    Diffuse large B-cell lymphoma ([address] 2022    B-cell lymphoma of solid organ excluding spleen ([address] 01/10/2022    Low magnesium level 01/10/2022    Parotid mass 01/10/2022    Liver mass 01/10/2022    Enlarged lymph node in neck 2021    Urinary frequency 2021    Diarrhea 2021    Generalized weakness 2021    Fatigue 2021    Balance problems 2021    Neck mass 2021    Weight loss 2021    Appetite loss 2021    Microalbuminuria 2021    Mixed hyperlipidemia 2021    Rib pain on right side 2021    Shortness of breath 2021    Proteinuria     Lung nodule     Hypercalcemia     Type 2 diabetes mellitus without complication ([address]     Benign prostatic hyperplasia with weak urinary stream 12/10/2015    Type 2 diabetes mellitus, controlled ([address] 2015    Essential hypertension 2015    Other hyperlipidemia 2015    Vitamin D deficiency 2015    Onychomycosis 2015    History of hypercalcemia 2015    Hand arthritis 2015    History of thrombocytopenia 2015    History of gout 2015     Allergies: No Known Allergies      MRI 22:      Impression   1.  Enhancing 4.6 cm mass centered in the right basal ganglia with extensive surrounding edema in the right frontotemporal lobe. Findings highly concerning for malignancy. 2.  Mass results in 9 mm right to left midline shift, subfalcine herniation, and compression of the right thalamus and brainstem.          CT chest 7/5/22  IMPRESSION:   1. Interval reduction in size of hepatic mass. 2. Slight interval reduction in size of low-attenuation lesion within the spleen. 3. No evidence of any adrenal mass. 4. No evidence of any abdominal or pelvic lymphadenopathy. 5. Uncomplicated sigmoid colon diverticulosis. Chart reviewed. Medical Diagnosis: Intracranial mass [R90.0]  Brain mass [G93.89]   Treatment Diagnosis:dysphagia     BSE Impression 7/4/22  Patient presents with intermittent signs of oropharyngeal dysphagia in the presence of generalized weakness and brain mass. Pt is recommended to complete instrumental swallow study as he is at high risk of aspiration; Pt to implement soft and bite sized diet with thin liquids until swallow study with high caution. Should signs of aspiration appear, make patient NPO until MBS next date. Dysphagia Diagnosis: Moderate oral stage dysphagia,Suspected needs further assessment    MBS results 7/5/22  Patient presents with mild-moderate oropharyngeal dysphagia in the setting of brain mass and generalized weakness. Pt demonstrated poor swallow coordination with aspiration of thin liquid x1 with straw presentation. No aspiration occurred with thin liquid via tsp/cup, nectar thick liquid, puree or soft solid. Recommend continue soft and bite sized diet with thin liquids - cup only, small sips, sit fully upright and consistent oral care. SLP to follow. Oral Preparation / Oral Phase  Loss of thin liquid to interlabial space. Poor posterior bolus control with loss to pharynx during cued bolus hold. Slowed mastication of soft solid, with disorganized lingual motion for bolus transport.  Min-moderate oral stasis observed following puree and soft solid. Pharyngeal Phase  Swallow was initiated with bolus in pyriform sinuses most consistently. Adequate velar elevation without escape to nasal cavity. Partial anterior hyolaryngeal excursion, adequate laryngeal elevation and full epiglottic inversion. Reduced airway closure due to poor swallow timing (bolus spilling into airway from pyriforms) resulted in flash penetration of thin liquids via cup x1 and aspiration of thin liquid via straw (1 of 2 trials). Aspiration incident triggered an immediate coughing (not strong enough to eject from trachea). No aspiration occurred with 5 trials of thin liquid via cup, nectar thick liquids, puree or soft solids. No significant pharyngeal stasis observed. Pharyngeal stripping wave was present and complete. Pain: denied pain     Current Diet : Diet NPO- 7/8- recommend- dysphagia III/soft and bite sized with thin liquids- NO STRAWS   Recommended Form of Meds: Meds in puree  Compensatory Swallowing Strategies : Small bites/sips,Remain upright for 30-45 minutes after meals,Upright as possible for all oral intake,Alternate solids and liquids     Treatment:  Pt seen bedside to address the following goals:    Goal 1: The patient will tolerate instrumental swallowing procedure  7/5: Goal met    Goal 2: The patient will tolerate recommended diet without observed clinical signs of aspiration  7/8- new order written d/t instance of choking when taking meds last evening, but at the time, pt also noted to have pocketed food from dinner. Per RN, when pt given meds this morning, pt displayed no difficulty with swallowing. Pt analyzed with trials with ice chips, water by cup, applesauce and cracker. With displayed no s/s aspiration with any consistency. Pt with prolonged mastication with cracker with lingual residue which was cleared with liquid wash or a bite of applesauce.  Recommend soft and bite sized diet with thin liquids by small sips an NO STRAWS. Recommend swabbing mouth after all meals. con't goal     Goal 3: Pt / caregiver will verbalize understanding of results /recommendations and plan of care. 7/4: Pt and wife were educated on risk of aspiration with generalized weakness and weak cough. They are agreeable to plan of care, use of strict aspiration and completion of modified barium swallow study next date.   7/5: Pt educated following MBS on results including aspiration. Also discussed recommendations for soft food (pt prefers to remain on soft / bite size rather than minced/moist), strategies and plan of care. Patient agrees to no straws and use of strategies. SLP to follow and complete additional training. Continue goal  7/8-  Pt and family were educated to the purpose of the visit, swallowing strategies, results of the MBS, diet recommendations and rational for swallowing strategies. All stated comprehension and had no further questions but the pt would benefit from reinforcement. con't goal            Patient/Family/Caregiver Education:  .see above     Compensatory Strategies:  Compensatory Swallowing Strategies : Small bites/sips,Remain upright for 30-45 minutes after meals,Upright as possible for all oral intake,Alternate solids and liquids      Plan:  Continue dysphagia treatment with goals per plan of care. Diet recommendations: dysphagia III/soft and bite sized/thin- NO STRAWS   DC recommendation:TBD closer to discharge   Treatment: 15  D/W nursing Stevie  Needs met prior to leaving room, call button in reach.     Renetta Castrejon, [address]  Speech-Language Pathologist  Pager [phone]      If patient is discharged prior to next treatment, this note will serve as the discharge summary

## 2022-07-08 NOTE — CARE COORDINATION
ADDENDUM:  lBayne COYLE  Never received referral they are reviewing now and will carly back. Electronically signed by CORDELL Arriola, MARIBELW on 7/8/2022 at 4:19 PM  .    Sw spoke to [id number] [facility] they doesnt have any beds until next week. SELMA LVM for admissions at [facility]. Pt will need pre-cert. SELMA following.      Electronically signed by CORDELL Arriola, MARIBELW on 7/8/2022 at 3:16 PM  [phone]

## 2022-07-09 NOTE — PROGRESS NOTES
4 Eyes Admission Assessment     I agree as the admission nurse that 2 RN's have performed a thorough Head to Toe Skin Assessment on the patient. ALL assessment sites listed below have been assessed on admission. Areas assessed by both nurses:   [x]   Head, Face, and Ears   [x]   Shoulders, Back, and Chest  [x]   Arms, Elbows, and Hands   [x]   Coccyx, Sacrum, and Ischium  [x]   Legs, Feet, and Heels        Does the Patient have Skin Breakdown?   Yes a wound was noted on the Admission Assessment and an LDA was Initiated documentation include the Savi-wound, Wound Assessment, Measurements, Dressing Treatment, Drainage, and Color\",         Giorgio Prevention initiated:  Yes   Wound Care Orders initiated:  Yes      [id number] nurse consulted for Pressure Injury (Stage 3,4, Unstageable, DTI, NWPT, and Complex wounds) or Giorgio score 18 or lower:  Yes      Nurse 1 eSignature: Electronically signed by [place] Cam[place] RN on 7/9/22 at 5:55 PM EDT    **SHARE this note so that the co-signing nurse is able to place an eSignature**    Nurse 2 eSignature: Electronically signed by Ana Lilly RN on 7/9/22 at 10:19 PM EDT

## 2022-07-09 NOTE — PROGRESS NOTES
Hospitalist Progress Note      PCP: Francia Martin MD    Date of Admission: 7/4/2022    Chief Complaint: Fall secondary to left-sided weakness    Hospital Course: Admitted for fall secondary to left-sided weakness from brain mass. Status post brain mass biopsy. Brain biopsy positive for B-cell lymphoma. On Keppra and Decadron    Subjective: Patient alert and oriented x2. Looks better than before. Following commands. Discussed pathology with patient and family. Discussed with Dr. Rosa Lowe, plan to transfer patient to [facility] and start radiation. Discharge order canceled.     Medications:  Reviewed    Infusion Medications    sodium chloride      sodium chloride 5 mL/hr at 07/08/22 1811    dextrose       Scheduled Medications    levETIRAcetam  500 mg Oral BID    pantoprazole  40 mg Oral QAM AC    dexamethasone  4 mg Oral 4 times per day    sodium chloride flush  5-40 mL IntraVENous 2 times per day    heparin (porcine)  5,000 Units SubCUTAneous 3 times per day    ceFAZolin  2,000 mg IntraVENous Q8H    fluticasone  1 spray Each Nostril Daily    insulin lispro  0-12 Units SubCUTAneous TID WC    insulin lispro  0-6 Units SubCUTAneous Nightly    ipratropium  2 spray Each Nostril 4x Daily    tamsulosin  0.8 mg Oral Daily     PRN Meds: sodium chloride, sodium chloride flush, sodium chloride, oxyCODONE, methocarbamol **OR** methocarbamol IVPB, glucose, dextrose bolus **OR** dextrose bolus, glucagon (rDNA), dextrose, acetaminophen, ondansetron **OR** ondansetron      Intake/Output Summary (Last 24 hours) at 7/9/2022 0840  Last data filed at 7/8/2022 1757  Gross per 24 hour   Intake 360 ml   Output --   Net 360 ml       Physical Exam Performed:    BP (!) 107/54   Pulse 63   Temp 97.9 °F (36.6 °C) (Oral)   Resp 16   Ht 5' 6\" (1.676 m)   Wt 118 lb (53.5 kg)   SpO2 97%   BMI 19.05 kg/m²     General appearance: No apparent distress, cachectic and chronically ill-appearing  HEENT: Pupils equal, round, and reactive to light. Conjunctivae/corneas clear. Right side scalp staples noted  Neck: Supple, with full range of motion. No jugular venous distention. Trachea midline. Respiratory:  Normal respiratory effort. Bilateral coarse breath sounds  Cardiovascular: Regular rate and rhythm with normal S1/S2 without murmurs, rubs or gallops. Abdomen: Soft, non-tender, non-distended with normal bowel sounds. Musculoskeletal: No clubbing, cyanosis or edema bilaterally. Full range of motion without deformity. Skin: Skin color, texture, turgor normal.  No rashes or lesions. Neurologic: Alert and oriented x2. Answers questions. Left-sided weakness noted, improving  Capillary Refill: Brisk,< 3 seconds   Peripheral Pulses: +2 palpable, equal bilaterally       Labs:   Recent Labs     07/07/22 0428 07/08/22 0614 07/09/22 0653   WBC 8.3 8.0 6.7   HGB 11.6* 12.3* 11.5*   HCT 34.9* 37.5* 34.3*   * 117* 109*     Recent Labs     07/07/22 0428 07/08/22 0614 07/09/22  0653    139 136   K 4.5 4.2 4.2    103 99   CO2 26 29 27   BUN 18 17 20   CREATININE <0.5* <0.5* <0.5*   CALCIUM 8.1* 9.6 9.3     No results for input(s): AST, ALT, BILIDIR, BILITOT, ALKPHOS in the last 72 hours. Recent Labs     07/07/22 0428   INR 1.14     No results for input(s): Daily [place] in the last 72 hours. Urinalysis:      Lab Results   Component Value Date/Time    NITRU Negative 05/06/2019 08:06 AM    BLOODU neg 02/17/2022 02:42 PM    BLOODU Negative 05/06/2019 08:06 AM    SPECGRAV 1.010 02/17/2022 02:42 PM    SPECGRAV >1.030 05/06/2019 08:06 AM    GLUCOSEU neg 02/17/2022 02:42 PM    GLUCOSEU >=1000 05/06/2019 08:06 AM       Radiology:  Reviewed      Assessment/Plan:  Brain mass:  Status post biopsy 07/06/2022. Pathology positive for B-cell lymphoma  Discussed with Dr. Oumar Montes De Oca. Plan to transfer to [facility] and start radiation.   Continue Keppra and Decadron  Neurosurgery following, appreciate recommendation  PT OT    NHL-diffuse large B-cell lymphoma  Recently finished a trial of R-CHOP or RCHOP with tafasitamab and Revlimid    CT chest abdomen 07/505/22 showed pulmonary fibrosis with no evidence of thoracic metastasis and reduction in hepatic mass size as well as an splenic lesion, no evidence of adrenal mass    Nonspecific interstitial pneumonia:  Likely secondary to underlying pulmonary fibrosis as evidenced on the CT chest  Follows Dr. Gabriela Romo    Diabetes mellitus type 2:  Holding home medication  Monitor blood glucose  Continue carb controlled diet with sliding scale insulin    BPH  Continue home medication    DVT Prophylaxis: Subcu heparin  Diet: ADULT DIET; Dysphagia - Soft and Bite Sized  Code Status: Full Code    PT/OT Eval Status: Following    Dispo -discharge order canceled. Plan to transfer to [facility]. Oncology will take over as primary. We will sign off tomorrow.     Anamaria Reddy MD

## 2022-07-09 NOTE — PLAN OF CARE
Problem: Skin/Tissue Integrity  Goal: Absence of new skin breakdown  Outcome: Progressing   Pt currently on a specialty mattress. Placed a sacral heart on the lower bony prominence of the back. Remainder of skin is intact. Will continue to monitor. Problem: Safety - Adult  Goal: Free from fall injury  Outcome: Progressing   Pt remains free from injury during this shift. Pt is a max assist x 2, gait belt and rene stedy. Encourage pt to call for all assistance. Call light is in reach, bed alarm is activated for safety, bed locked and in lowest position. Will continue to monitor. Problem: Pain  Goal: Verbalizes/displays adequate comfort level or baseline comfort level  Outcome: Progressing   No c/o pain during this shift. Encourage pt to notify nursing staff when pain is noted.

## 2022-07-09 NOTE — PLAN OF CARE
Problem: Skin/Tissue Integrity  Goal: Absence of new skin breakdown  Description: 1. Monitor for areas of redness and/or skin breakdown  2. Assess vascular access sites hourly  3. Every 4-6 hours minimum:  Change oxygen saturation probe site  4. Every 4-6 hours:  If on nasal continuous positive airway pressure, respiratory therapy assess nares and determine need for appliance change or resting period.   7/9/2022 0903 by Lesli Reyes RN  Outcome: Progressing  7/9/2022 0754 by Montse Hassan RN  Outcome: Progressing     Problem: Safety - Adult  Goal: Free from fall injury  7/9/2022 0903 by Lesli Reyes RN  Outcome: Progressing  7/9/2022 0754 by Montse Hassan RN  Outcome: Progressing     Problem: ABCDS Injury Assessment  Goal: Absence of physical injury  Outcome: Progressing     Problem: Pain  Goal: Verbalizes/displays adequate comfort level or baseline comfort level  7/9/2022 0903 by Lesli Reyes RN  Outcome: Progressing  7/9/2022 0754 by Montse Hassan RN  Outcome: Progressing

## 2022-07-09 NOTE — PROGRESS NOTES
[address] Progress Note    2022     Deb Sutton    MRN: [id number]    : 1945    Referring MD: No referring provider defined for this encounter. SUBJECTIVE:  Patient is now able to carry a full conversation. He reports ongoing weakness involving his left upper and lower extremity but otherwise feels remarkably better.      Isolation: None    Medications    Scheduled Meds:   levETIRAcetam  500 mg Oral BID    pantoprazole  40 mg Oral QAM AC    dexamethasone  4 mg Oral 4 times per day    sodium chloride flush  5-40 mL IntraVENous 2 times per day    heparin (porcine)  5,000 Units SubCUTAneous 3 times per day    ceFAZolin  2,000 mg IntraVENous Q8H    fluticasone  1 spray Each Nostril Daily    insulin lispro  0-12 Units SubCUTAneous TID WC    insulin lispro  0-6 Units SubCUTAneous Nightly    ipratropium  2 spray Each Nostril 4x Daily    tamsulosin  0.8 mg Oral Daily     Continuous Infusions:   sodium chloride      sodium chloride 5 mL/hr at 22 1811    dextrose       ROS:  As noted above, otherwise remainder of 10-point ROS negative    Physical Exam:     I&O:      Intake/Output Summary (Last 24 hours) at 2022 1230  Last data filed at 2022 1030  Gross per 24 hour   Intake 1180 ml   Output --   Net 1180 ml       Vital Signs:  /72   Pulse 76   Temp 97.4 °F (36.3 °C) (Oral)   Resp 16   Ht 5' 6\" (1.676 m)   Wt 118 lb (53.5 kg)   SpO2 97%   BMI 19.05 kg/m²     Weight:    Wt Readings from Last 3 Encounters:   22 118 lb (53.5 kg)   22 117 lb (53.1 kg)   22 119 lb (54 kg)         General: Alert oriented and in no acute distress  HEENT: normocephalic, PERRL, no scleral erythema or icterus, Oral mucosa moist and intact, throat clear  NECK: supple   BACK: Straight   SKIN: warm dry and intact without lesions rashes or masses  CHEST: CTA bilaterally without use of accessory muscles  CV: Normal S1 S2, RRR, no MRG  ABD: NT ND normoactive BS, no palpable masses or hepatosplenomegaly  EXTREMITIES: without edema, denies calf tenderness  NEURO: CN II - XII grossly intact, not following commands     Data    CBC:   Recent Labs     07/07/22 0428 07/08/22 0614 07/09/22 0653   WBC 8.3 8.0 6.7   HGB 11.6* 12.3* 11.5*   HCT 34.9* 37.5* 34.3*   .0* 101.2* 99.6   * 117* 109*     BMP/Mag:  Recent Labs     07/07/22 0428 07/08/22 0614 07/09/22 0653    139 136   K 4.5 4.2 4.2    103 99   CO2 26 29 27   BUN 18 17 20   CREATININE <0.5* <0.5* <0.5*     LIVP: No results for input(s): AST, ALT, LIPASE, BILIDIR, BILITOT, ALKPHOS in the last 72 hours. Invalid input(s): AMYLASE,  ALB  Coags:   Recent Labs     07/07/22 0428   PROTIME 14.5   INR 1.14   APTT 23.1     Uric Acid No results for input(s): LABURIC in the last 72 hours. ASSESSMENT AND PLAN:           DLBCL: Relapsed refractory diffuse large B cell non-Hodgkin's lymphoma now with a large CNS mass, bx proven NHL. I discussed the recent brain biopsy results with patient and his family. I discussed the option of a palliative care/hospice route however patient is not interested in this at this time. Therefore, I discussed starting with XRT to the brain mass to reduce the size and once that is achieved pursuing systemic chemotherapy. He will remain on Decadron for now with no changes. - At presentation Jan 2022, stage IVb, RIPI score 4  - FISH studies did not demonstrate double or triple hit. C-Myc was mutated but BCL-2 and BCL 6 were  not. - GCB phenotype  - S/P initial therapy on clinical trial utilizing R-CHOP w/ Revlimid and Tafasitimab; Cycle 6 day 15 was  May 24, 2022 and was held due to neutropenia.  - Study mandated PET was due this week BUT in-pt      - cont Keppra    History of Malignant Hypercalcemia  - This resolved once he began lymphoma directed therapy. - Originally, he was given IV fluids and calcitonin while hospitalized at [facility]. He did not receive a bisphosphonate.   - He received Zometa x 2 in office prior to implementation of chemotherapy.     Interstitial lung disease  - In the course of his NHL tretatment, we uncovered interstitial lung disease. Dr aKt Rondon reviewed chest CTs in the past and feels that he had evidence of pulmonary fibrosis before his lymphoma diagnosis. He completed a prednisone taper.  - He continues to require supplemental oxygen.   - Dyspnea is grade 1  - He had a bronchoscopy March 25 and PFTs April 1.  - He continues pulmonary rehabilitation  - He is seeing Dr. Kat Rondon     Diabetes/hyperglycmia    - Continue insulin sliding scale    Hem: Anemia plus thrombocytopenia likely related to recent Systemic chemotherapy   - Transfuse per standard BMT protocol    ID: afebrile     Toxicities: none      Jalyn Pineda MD

## 2022-07-09 NOTE — CARE COORDINATION
Cm following Left  for [facility] to see if they could accept pt, and start precert, waiting a call back. Spoke with wife Ivonne Gomez for more SNF referrals she will call me back with a few more. Electronically signed by Gisel Smith RN on 7/9/2022 at 12:07 PM   [phone]    UPDATE:   Spoke with wife after meeting with  and the pt will transfer to [facility] and start Radiation. No DC at this time. Dr Parish Drake also aware and canceled DC order.   Electronically signed by Gisel Smith RN on 7/9/2022 at 12:44 PM   [phone]

## 2022-07-09 NOTE — PROGRESS NOTES
4 Eyes Admission Assessment     I agree as the admission nurse that 2 RN's have performed a thorough Head to Toe Skin Assessment on the patient. ALL assessment sites listed below have been assessed on admission. Areas assessed by both nurses: eduardo and rica  [x]   Head, Face, and Ears   [x]   Shoulders, Back, and Chest  [x]   Arms, Elbows, and Hands   [x]   Coccyx, Sacrum, and Ischium  [x]   Legs, Feet, and Heels        Does the Patient have Skin Breakdown?   Yes a wound was noted on the Admission Assessment and an LDA was Initiated documentation include the Savi-wound, Wound Assessment, Measurements, Dressing Treatment, Drainage, and Color\",         Giorgio Prevention initiated:  Yes   Wound Care Orders initiated:  Yes      [id number]  nurse consulted for Pressure Injury (Stage 3,4, Unstageable, DTI, NWPT, and Complex wounds) or Giorgio score 18 or lower:  Yes      Nurse 1 eSignature: Electronically signed by Jayda Baker RN on 7/9/22 at 3:04 PM EDT    **SHARE this note so that the co-signing nurse is able to place an eSignature**    Nurse 2 eSignature: Electronically signed by Debi Reno RN on 7/9/22 at 3:08 PM EDT

## 2022-07-09 NOTE — PLAN OF CARE
Problem: Skin/Tissue Integrity  Goal: Absence of new skin breakdown  Description: 1. Monitor for areas of redness and/or skin breakdown  2. Assess vascular access sites hourly  3. Every 4-6 hours minimum:  Change oxygen saturation probe site  4. Every 4-6 hours:  If on nasal continuous positive airway pressure, respiratory therapy assess nares and determine need for appliance change or resting period. 7/9/2022 1520 by Benjamin Killian RN  Note:   Pt incontinent of stool and urine. Hygiene performed. Pt turned q 2 hours with assistance. Skin breakdown noted on 4eyes assessment. Wound care consulted. Problem: Safety - Adult  Goal: Free from fall injury  7/9/2022 1520 by Benjamin Killian RN  Note: Pt is a high fall risk (see Margy Ladd Fall Risk assessment). Oriented pt to room and call light. Instructed pt to call for help when needed. Call light and personal items within reach. Bed in lowest position, brakes on, and 2/4 side rails up. Non-skid footwear on. Falls risk stop sign on door; hourly visual checks implemented. Falls risk arm band on pt. Bed alarm is on. Pt using call light appropriately. Will continue to monitor. Problem: Pain  Goal: Verbalizes/displays adequate comfort level or baseline comfort level  7/9/2022 1520 by Benjamin Killian RN  Note: Pt denies pain during assessment. Educated Pt to notify nurse if pain occurs.

## 2022-07-09 NOTE — CONSULTS
Clinical Pharmacy Consult Note    All IVs in NS - Management by Pharmacy    Consult Date(s): 7/4/22 (initial consult), 7/6 (renewed)  Consulting Provider(s): MARCIA Guzman    Assessment / Dino Cummings with Herniation and Midline Shift - All IVs in Normal Saline  · Drips will be adjusted to normal saline as appropriate based on compatibility, in an effort to avoid fluid shifts, as D5W is osmotically active.  The following intermittent IV drips / infusions have been adjusted to saline:  o Cefazolin -- stop date today  o Levetiracetam - already in NS  o Methocarbamol (no doses given)   The following medications must remain in D5W due to incompatibility with normal saline:  o None at this time   Note: Patient has dextrose ordered as part of hypoglycemia treatment protocol.  Total IV fluid delivered to patient over last 24 hrs: ~200mL   As patient is not being treated for pituitary tumor resection or requiring hypertonic saline (defined as >0.9% NaCl), pharmacy will sign off of IV review consult, per protocol. Thank you, please call with questions.     Petros Santoyo PharmD., BCPS   7/9/2022 12:53 PM  Wireless: [phone]

## 2022-07-10 NOTE — PROGRESS NOTES
[facility] Progress Note    7/10/2022     Chilo Purcell    MRN: [id number]    : 1945    Referring MD: No referring provider defined for this encounter.       SUBJECTIVE:  Patient is somnolent, change in mental status from yesterday    ECOG PS:  (4) Completely disabled, unable to carry out self-care and confined to bed or chair    KPS: 40% Disabled; requires special care and assistance    Isolation: None    Medications    Scheduled Meds:   dexamethasone  4 mg IntraVENous Q6H    [START ON 2022] pantoprazole  40 mg IntraVENous Daily    levETIRAcetam  500 mg IntraVENous Q12H    sodium chloride flush  5-40 mL IntraVENous 2 times per day    heparin (porcine)  5,000 Units SubCUTAneous 3 times per day    fluticasone  1 spray Each Nostril Daily    insulin lispro  0-12 Units SubCUTAneous TID WC    insulin lispro  0-6 Units SubCUTAneous Nightly    ipratropium  2 spray Each Nostril 4x Daily    tamsulosin  0.8 mg Oral Daily     Continuous Infusions:   sodium chloride 5 mL/hr at 22 1811    dextrose       PRN Meds:.sodium chloride flush, sodium chloride, oxyCODONE, methocarbamol **OR** methocarbamol IVPB, glucose, dextrose bolus **OR** dextrose bolus, glucagon (rDNA), dextrose, acetaminophen, ondansetron **OR** ondansetron    ROS:  As noted above, otherwise remainder of 10-point ROS negative    Physical Exam:     I&O:      Intake/Output Summary (Last 24 hours) at 7/10/2022 0945  Last data filed at 7/10/2022 0600  Gross per 24 hour   Intake 1050 ml   Output 250 ml   Net 800 ml       Vital Signs:  /72   Pulse 55   Temp 98.2 °F (36.8 °C) (Oral)   Resp 18   Ht 5' 6\" (1.676 m)   Wt 118 lb (53.5 kg)   SpO2 99%   BMI 19.05 kg/m²     Weight:    Wt Readings from Last 3 Encounters:   22 118 lb (53.5 kg)   22 117 lb (53.1 kg)   22 119 lb (54 kg)         General: Somnolent  HEENT: normocephalic, PERRL, no scleral erythema or icterus, Oral mucosa moist and intact, throat clear  NECK: supple without palpable adenopathy  BACK: Straight negative CVAT  SKIN: warm dry and intact without lesions rashes or masses  CHEST: CTA bilaterally without use of accessory muscles  CV: Normal S1 S2, RRR, no MRG  ABD: NT ND normoactive BS, no palpable masses or hepatosplenomegaly  EXTREMITIES: without edema, denies calf tenderness  NEURO: CN II - XII grossly intact  CATHETER: R SL PAC    Data    CBC:   Recent Labs     07/08/22 0614 07/09/22  0653 07/10/22  0335   WBC 8.0 6.7 5.7   HGB 12.3* 11.5* 11.4*   HCT 37.5* 34.3* 33.6*   .2* 99.6 98.3   * 109* 98*     BMP/Mag:  Recent Labs     07/08/22 0614 07/09/22  0653 07/10/22  0335    136 137   K 4.2 4.2 4.3    99 99   CO2 29 27 27   BUN 17 20 21*   CREATININE <0.5* <0.5* <0.5*     LIVP: No results for input(s): AST, ALT, LIPASE, BILIDIR, BILITOT, ALKPHOS in the last 72 hours. Invalid input(s): AMYLASE,  ALB  Coags: No results for input(s): PROTIME, INR, APTT in the last 72 hours. Uric Acid No results for input(s): LABURIC in the last 72 hours. PROBLEM LIST:             1.  DLBCL-   2. Interstitial pneumonia  3. DM2  4. H/O malignant hypercalcemia      TREATMENT:            1.  Initial therapy on protocol [id number] R-CHOP +/- Tafasitamab/Lenalidamide Jan 2022 - July 2022       ASSESSMENT AND PLAN:             1. 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  - At presentation Jan 2022, stage IVb, RIPI score 4- Liver , spleen, right adrenal gland, possible stomach  - FISH studies did not demonstrate double or triple hit. C-Myc was mutated but BCL-2 and BCL 6 were not. - GCB phenotype  - S/P initial therapy on clinical trial utilizing R-CHOP w/ Revlimid and Tafasitimab; Cycle 6 day 15 was  May 24, 2022 and was held due to neutropenia.  - Study mandated PET was due this week BUT in-pt   - Large CNS mass- bx proven NHL 7/6/22.   Ki-67>90%   -Plan to start XRT to brain mass to reduce size and then possible systemic chemotherapy. Consult RadOnc   -Continue Decadron 4 mg IV Q 6 hours   - cont Keppra     2. ID: Patient is currently afebrile without evidence of infection.   - Start Diflucan, Valtrex and Levaquin prophylaxis if ANC <1.5.     3. Heme:  Anemia and thrombocytopenia likely r/t recent chemotherapy  - Transfuse for Hgb < 7 and Platelets < 10 K.   - No transfusion today     4. Metabolic:  Electrolytes are WNL and renal fxn stable except hyperglycemia  - Start NS @ 50 cc/hr with somnolence as cannot take PO  - Replace Potassium and Magnesium per protocol. H/O malignant hypercalcemia. - This resolved once he began lymphoma directed therapy.  -Send Ionized calcium today    5. Neuro:  Intermittent somnolence-   -alert and oriented on 7/9/22 but now completely somnolent on 7/10/22  -Change Dex to IV as unable to take PO  -STAT head CT  -Consult Rad/Onc- will likely see on Monday      6. Nutrition:     - Cont low microbial diet- NPO when somnolent/confused  - Dietary to follow closely     7. Pulm:  Interstitial lung disease  - In the course of his NHL tretatment, we uncovered interstitial lung disease. Dr Jt Yu reviewed chest CTs in the past and feels that he had evidence of pulmonary fibrosis before his lymphoma diagnosis. He completed a prednisone taper.  - Dyspnea is grade 1  - He had a bronchoscopy March 25 and PFTs April 1.  - He continues pulmonary rehabilitation  - He is seeing Dr. tJ Yu    8.  DM II- exacerbated by steroids  -Start Lantus 10 unit Q HS  -Cont sliding scale      - DVT Prophylaxis: Heparin Q 8  Contraindications to pharmacologic prophylaxis: Other: Heparin Q 8  Contraindications to mechanical prophylaxis: None    - Disposition: Unknown at this time    The patient was seen and examined by Dr. Rochelle Ny, APRN - CNP

## 2022-07-10 NOTE — PLAN OF CARE
Problem: Discharge Planning  Goal: Discharge to home or other facility with appropriate resources  Outcome: Progressing     Problem: Skin/Tissue Integrity  Goal: Absence of new skin breakdown  Description: 1. Monitor for areas of redness and/or skin breakdown  2. Assess vascular access sites hourly  3. Every 4-6 hours minimum:  Change oxygen saturation probe site  4. Every 4-6 hours:  If on nasal continuous positive airway pressure, respiratory therapy assess nares and determine need for appliance change or resting period. 7/10/2022 0458 by J Carlos Reagan RN  Outcome: Progressing  7/9/2022 1520 by Karol Christian RN  Note:   Pt incontinent of stool and urine. Hygiene performed. Pt turned q 2 hours with assistance. Skin breakdown noted on 4eyes assessment. Wound care consulted. Problem: Safety - Adult  Goal: Free from fall injury  7/10/2022 0458 by J Carlos Reagan RN  Outcome: Progressing  7/9/2022 1520 by Karol Christian RN  Note: Pt is a high fall risk (see [facility] Fall Risk assessment). Oriented pt to room and call light. Instructed pt to call for help when needed. Call light and personal items within reach. Bed in lowest position, brakes on, and 2/4 side rails up. Non-skid footwear on. Falls risk stop sign on door; hourly visual checks implemented. Falls risk arm band on pt. Bed alarm is on. Pt using call light appropriately. Will continue to monitor. Problem: ABCDS Injury Assessment  Goal: Absence of physical injury  Outcome: Progressing     Problem: Pain  Goal: Verbalizes/displays adequate comfort level or baseline comfort level  7/10/2022 0458 by J Carlos Reagan RN  Outcome: Progressing  7/9/2022 1520 by Karol Christian RN  Note: Pt denies pain during assessment. Educated Pt to notify nurse if pain occurs.       Problem: Chronic Conditions and Co-morbidities  Goal: Patient's chronic conditions and co-morbidity symptoms are monitored and maintained or improved  Outcome: Progressing

## 2022-07-10 NOTE — PROGRESS NOTES
4 Eyes Assessment     I agree as the admission nurse that 2 RN's have performed a thorough Head to Toe Skin Assessment on the patient. ALL assessment sites listed below have been assessed on admission. Areas assessed by both nurses: gabby rn and marylin rn   [x]   Head, Face, and Ears   [x]   Shoulders, Back, and Chest  [x]   Arms, Elbows, and Hands   [x]   Coccyx, Sacrum, and Ischium  [x]   Legs, Feet, and Heels        Does the Patient have Skin Breakdown?   Yes a wound was noted on the Admission Assessment and an LDA was Initiated documentation include the Savi-wound, Wound Assessment, Measurements, Dressing Treatment, Drainage, and Color\",         Giorgio Prevention initiated:  Yes   Wound Care Orders initiated:  Yes      [address]  nurse consulted for Pressure Injury (Stage 3,4, Unstageable, DTI, NWPT, and Complex wounds) or Giorgio score 18 or lower:  Yes      Nurse 1 eSignature: Electronically signed by Kulwant Hussein RN on 7/10/22 at 6:39 AM EDT    **SHARE this note so that the co-signing nurse is able to place an eSignature**    Nurse 2 eSignature: {Esignature:[id number]}

## 2022-07-11 NOTE — PROGRESS NOTES
[facility] [employer] [facility] [employer] Progress Note    2022     Yasmeen Cardenas    MRN: [id number]    : 1945    Referring MD: No referring provider defined for this encounter. SUBJECTIVE:  Once again pt alert and oriented following a full conversation and following all commands. He cont to have left upper ext motor weakness BUT that has also improved!      ECOG PS: (4) Completely disabled, unable to carry out self-care and confined to bed or chair    KPS: 40% Disabled; requires special care and assistance    Isolation: None    Medications    Scheduled Meds:   dexamethasone  4 mg IntraVENous Q6H    pantoprazole  40 mg IntraVENous Daily    levETIRAcetam  500 mg IntraVENous Q12H    insulin glargine  10 Units SubCUTAneous Nightly    sodium chloride flush  5-40 mL IntraVENous 2 times per day    heparin (porcine)  5,000 Units SubCUTAneous 3 times per day    fluticasone  1 spray Each Nostril Daily    insulin lispro  0-12 Units SubCUTAneous TID WC    insulin lispro  0-6 Units SubCUTAneous Nightly    ipratropium  2 spray Each Nostril 4x Daily    tamsulosin  0.8 mg Oral Daily     Continuous Infusions:   sodium chloride 50 mL/hr at 07/10/22 1025    sodium chloride 5 mL/hr at 22 1811    dextrose       PRN Meds:.sodium chloride flush, sodium chloride, oxyCODONE, methocarbamol **OR** methocarbamol IVPB, glucose, dextrose bolus **OR** dextrose bolus, glucagon (rDNA), dextrose, acetaminophen, ondansetron **OR** ondansetron    ROS:  As noted above, otherwise remainder of 10-point ROS negative    Physical Exam:     I&O:      Intake/Output Summary (Last 24 hours) at 2022 0840  Last data filed at 2022 0520  Gross per 24 hour   Intake 1136 ml   Output 1450 ml   Net -314 ml       Vital Signs:  /86   Pulse 72   Temp 97.6 °F (36.4 °C) (Oral)   Resp 22   Ht 5' 6\" (1.676 m)   Wt 130 lb 1.1 oz (59 kg)   SpO2 96%   BMI 20.99 kg/m²     Weight:    Wt Readings from Last 3 Encounters:   22 130 lb 1.1 oz (59 kg)   05/31/22 117 lb (53.1 kg)   05/04/22 119 lb (54 kg)       General: Somnolent  HEENT: normocephalic, PERRL, no scleral erythema or icterus, Oral mucosa moist and intact, throat clear  NECK: supple   BACK: Straight   SKIN: warm dry and intact without lesions rashes or masses  CHEST: CTA bilaterally without use of accessory muscles  CV: Normal S1 S2, RRR, no MRG  ABD: NT ND normoactive BS, no palpable masses or hepatosplenomegaly  EXTREMITIES: without edema, denies calf tenderness  NEURO: CN II - XII grossly intact  CATHETER: R SL PAC    Data    CBC:   Recent Labs     07/09/22  0653 07/10/22  0335 07/11/22  0320   WBC 6.7 5.7 5.4   HGB 11.5* 11.4* 10.9*   HCT 34.3* 33.6* 32.3*   MCV 99.6 98.3 98.0   * 98* 94*     BMP/Mag:  Recent Labs     07/09/22  0653 07/10/22  0335 07/11/22  0320 07/11/22  0500    137  --  138   K 4.2 4.3  --  4.3   CL 99 99  --  101   CO2 27 27  --  27   PHOS  --   --  2.2*  --    BUN 20 21*  --  18   CREATININE <0.5* <0.5*  --  <0.5*   MG  --   --   --  1.50*     LIVP:   Recent Labs     07/11/22 0320   AST 42*   ALT 42*   BILIDIR <0.2   BILITOT 0.3   ALKPHOS 106     Coags:   Recent Labs     07/11/22  0320   PROTIME 14.0   INR 1.09   APTT 33.8     Uric Acid   Recent Labs     07/11/22  0320   LABURIC 3.7     DIAGNOSTIC:  1. CT Head 7/4:  1.  Heterogeneous 3.9 cm masslike lesion centered in the right basal ganglia/frontal lobe with extensive surrounding edema. This is concerning for a primary or metastatic malignancy. Focal subacute intraparenchymal hemorrhage, hemorrhage within the mass,    or infection are differential considerations. Brain MRI with and without contrast and neurosurgery consult recommended. 2.  Extensive mass effect with 9 mm right to left midline shift, subfalcine herniation and suspected partial transtentorial herniation resulting in effacement of the right frontal horn and body, and mass effect on midbrain.      2. MRI Brain 7/4:  1.  Enhancing 4.6 cm mass centered in the right basal ganglia with extensive surrounding edema in the right frontotemporal lobe. Findings highly concerning for malignancy. 2.  Mass results in 9 mm right to left midline shift, subfalcine herniation, and compression of the right thalamus and brainstem. 3. CT C/A/P 7/5:  CHEST:  1. No evidence of any thoracic metastatic disease. 2. Stable changes of pulmonary fibrosis. A/P:  IMPRESSION:   1. Interval reduction in size of hepatic mass. 2. Slight interval reduction in size of low-attenuation lesion within the spleen. 3. No evidence of any adrenal mass. 4. No evidence of any abdominal or pelvic lymphadenopathy. 5. Uncomplicated sigmoid colon diverticulosis. 4. CT Head 7/8:  1.  Status post biopsy of right frontal lobe mass with unchanged mild acute hemorrhage along the biopsy tract and unchanged 12 mm leftward subfalcine herniation. 5. CT Head 7/10:  Status post biopsy of right frontal lobe mass with unchanged mild acute hemorrhage along the biopsy tract and unchanged 12 mm leftward subfalcine herniation. 4. MBS 7/5/22:    PATHOLOGY:  1. Right Frontal Brain Mass Bx 7/6/22: Involved with malignant B-cell lymphoma with aggressive morphologic   features. COMMENT: The histologic and immunohistochemical changes are   supportive of a malignant B-cell lymphoma, large cell with aggressive   morphologic features based on the high Ki-67 proliferative index.  The   patient's prior liver lymphoma diagnosis ( [address] [place], [place] [id number]) supporting an aggressive B-cell lymphoma is noted.  FISH for   the aggressive B-cell lymphoma panel and cytogenetics are currently   pending and the results of the studies will be reflected in supplemental   Reports    FISH: Pending    PROBLEM LIST:             1.  DLBCL-   2. Interstitial pneumonia  3. DM2  4.   H/O malignant hypercalcemia      TREATMENT:            1.  Initial therapy on protocol [id number] R-CHOP +/- Tafasitamab/Lenalidamide Jan 2022 - July 2022    ASSESSMENT AND PLAN:           1. 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 C-Myc was mutated but BCL-2 and BCL 6 were  not. - GCB phenotype  - S/p initial therapy on clinical trial utilizing R-CHOP w/ Revlimid and Tafasitimab;  - Study mandated PET was due this week BUT in-pt     Relapse / Progression: Large CNS mass - bx proven NHL 7/6/22. Ki-67>90%  - Plan to start XRT to brain mass to reduce size and then possible systemic chemotherapy. - Cont Decadron 4 mg IV Q 6 hours --> will NOT change to po, for now. - [address] - scheduled to start tx today or tomorrow   - Consult palliative care - currently limited code, no to all. Consult pending    2. ID: Patient is currently afebrile without evidence of infection. 3. Heme: Anemia and thrombocytopenia likely r/t recent chemotherapy  - Transfuse for Hgb < 7 and Platelets < 10 K.   - No transfusion today     4. Metabolic: Hyperglycemia 2/2 steroids + HypoMg + HypoPhos  - Cont IVFs: NS at 50mL/hr  - Replace K+ & Mg per PRN orders    5. GI / Nutrition:     - Cont low microbial diet - soft and bite sized diet with thin liquids by small sips an NO STRAWS. Recommend swabbing mouth after all meals. Must be completely awake for PO intake  - Dietary to follow closely     6. Pulm:  Interstitial lung disease  - In the course of his NHL tretatment, we uncovered interstitial lung disease. Dr Duane Bridge reviewed chest CTs in the past and feels that he had evidence of pulmonary fibrosis before his lymphoma diagnosis.  He completed a prednisone taper.  - Dyspnea is grade 1  - He had a bronchoscopy March 25 and PFTs April 1.  - He continues pulmonary rehabilitation  - He is seeing Dr. Jose R Tomlinson    7. Endo: T2DM exacerbated by steroids  - Cont lantus 10 units nightly  - Cont Humalog Med SSI AS/HS    8. Acute Debilitation: 2/2 CNS Lymphoma  - Consult PT/OT - [facility] 9 / 12  - Consult SLP: DAVID 7/5/2: poor swallow coordination with aspiration of thin liquid x1 with straw presentation. No aspiration occurred with thin liquid via tsp/cup, nectar thick liquid, puree or soft solid. Recommend continue soft and bite sized diet with thin liquids - cup only, small sips, sit fully upright and consistent oral care.    - Consult SW: Will likely require SNF vs ARU at d/c    - DVT Prophylaxis: Platelets >88,609 cells/dL, - daily lovenox prophylaxis ordered  Contraindications to pharmacologic prophylaxis: None  Contraindications to mechanical prophylaxis: None    - Disposition: Uncertain at this time    Ether Salvage, APRN - CNP     Medardo Lara MD

## 2022-07-11 NOTE — PROGRESS NOTES
Occupational / Physical Therapy  Hold   Per RN Hold at this time pt leaving for radiation. Will follow up at later date/time as therapy schedule allows.      Leah Lin, SPENCER, OTR/L  [place], [place] [address]

## 2022-07-11 NOTE — PROGRESS NOTES
centered in the right basal ganglia with extensive surrounding edema in the right frontotemporal lobe. Findings highly concerning for malignancy. 2.  Mass results in 9 mm right to left midline shift, subfalcine herniation, and compression of the right thalamus and brainstem.          CT chest 7/5/22  IMPRESSION:   1. Interval reduction in size of hepatic mass. 2. Slight interval reduction in size of low-attenuation lesion within the spleen. 3. No evidence of any adrenal mass. 4. No evidence of any abdominal or pelvic lymphadenopathy. 5. Uncomplicated sigmoid colon diverticulosis. Chart reviewed. Medical Diagnosis: Intracranial mass [R90.0]  Brain mass [G93.89]   Treatment Diagnosis:dysphagia     BSE Impression 7/4/22  Patient presents with intermittent signs of oropharyngeal dysphagia in the presence of generalized weakness and brain mass. Pt is recommended to complete instrumental swallow study as he is at high risk of aspiration; Pt to implement soft and bite sized diet with thin liquids until swallow study with high caution. Should signs of aspiration appear, make patient NPO until MBS next date. Dysphagia Diagnosis: Moderate oral stage dysphagia,Suspected needs further assessment    MBS results 7/5/22  Patient presents with mild-moderate oropharyngeal dysphagia in the setting of brain mass and generalized weakness. Pt demonstrated poor swallow coordination with aspiration of thin liquid x1 with straw presentation. No aspiration occurred with thin liquid via tsp/cup, nectar thick liquid, puree or soft solid. Recommend continue soft and bite sized diet with thin liquids - cup only, small sips, sit fully upright and consistent oral care. SLP to follow. Oral Preparation / Oral Phase  Loss of thin liquid to interlabial space. Poor posterior bolus control with loss to pharynx during cued bolus hold. Slowed mastication of soft solid, with disorganized lingual motion for bolus transport. Min-moderate oral stasis observed following puree and soft solid. Pharyngeal Phase  Swallow was initiated with bolus in pyriform sinuses most consistently. Adequate velar elevation without escape to nasal cavity. Partial anterior hyolaryngeal excursion, adequate laryngeal elevation and full epiglottic inversion. Reduced airway closure due to poor swallow timing (bolus spilling into airway from pyriforms) resulted in flash penetration of thin liquids via cup x1 and aspiration of thin liquid via straw (1 of 2 trials). Aspiration incident triggered an immediate coughing (not strong enough to eject from trachea). No aspiration occurred with 5 trials of thin liquid via cup, nectar thick liquids, puree or soft solids. No significant pharyngeal stasis observed. Pharyngeal stripping wave was present and complete. Pain: denied pain     Current Diet : Diet NPO- 7/8- recommend- dysphagia III/soft and bite sized with thin liquids- NO STRAWS   Recommended Form of Meds: Meds in puree  Compensatory Swallowing Strategies : Small bites/sips,Remain upright for 30-45 minutes after meals,Upright as possible for all oral intake,Alternate solids and liquids     Treatment:  Pt seen bedside to address the following goals:    Goal 1: The patient will tolerate instrumental swallowing procedure  7/5: Goal met    Goal 2: The patient will tolerate recommended diet without observed clinical signs of aspiration  7/8- new order written d/t instance of choking when taking meds last evening, but at the time, pt also noted to have pocketed food from dinner. Per RN, when pt given meds this morning, pt displayed no difficulty with swallowing. Pt analyzed with trials with ice chips, water by cup, applesauce and cracker. With displayed no s/s aspiration with any consistency. Pt with prolonged mastication with cracker with lingual residue which was cleared with liquid wash or a bite of applesauce.  Recommend soft and bite sized diet with thin liquids by small sips an NO STRAWS. Recommend swabbing mouth after all meals. 7/11- Pt tolerating SBS/T via cup this day with no overt s/s aspiration. Pt demonstrates small bites / sips, slow rate of intake. Pt demo prolonged and weak mastication, min residue on lingual surface cleared with liquid wash. Pt appears to be tolerating LRD at this time. Goal met. Goal 3: Pt / caregiver will verbalize understanding of results /recommendations and plan of care. 7/4: Pt and wife were educated on risk of aspiration with generalized weakness and weak cough. They are agreeable to plan of care, use of strict aspiration and completion of modified barium swallow study next date.   7/5: Pt educated following MBS on results including aspiration. Also discussed recommendations for soft food (pt prefers to remain on soft / bite size rather than minced/moist), strategies and plan of care. Patient agrees to no straws and use of strategies. SLP to follow and complete additional training. Continue goal  7/8-  Pt and family were educated to the purpose of the visit, swallowing strategies, results of the MBS, diet recommendations and rational for swallowing strategies. All stated comprehension and had no further questions but the pt would benefit from reinforcement. con't goal    7/11- Pt educated on purpose of visit and safe swallowing precautions. Pt independently demonstrates use of strategies. Goal met. Patient/Family/Caregiver Education:  .see above     Compensatory Strategies:  Compensatory Swallowing Strategies : Small bites/sips,Remain upright for 30-45 minutes after meals,Upright as possible for all oral intake,Alternate solids and liquids      Plan:  Dysphagia treatment not indicated at this time, please re-consult as needed.   Diet recommendations: dysphagia III/soft and bite sized/thin- NO STRAWS   DC recommendation: TBD  Treatment: 15  D/W nursing   Needs met prior to leaving room, call button in reach.    Electronically signed by:   Mar Thomson., [address] [phone]  Speech Language Pathologist    If patient is discharged prior to next treatment, this note will serve as the discharge summary

## 2022-07-11 NOTE — PLAN OF CARE
Problem: Discharge Planning  Goal: Discharge to home or other facility with appropriate resources  7/11/2022 1020 by Maura Moore RN  Outcome: Progressing  7/11/2022 0348 by Fiorella Farmer RN  Outcome: Progressing     Problem: Skin/Tissue Integrity  Goal: Absence of new skin breakdown  Description: 1. Monitor for areas of redness and/or skin breakdown  2. Assess vascular access sites hourly  3. Every 4-6 hours minimum:  Change oxygen saturation probe site  4. Every 4-6 hours:  If on nasal continuous positive airway pressure, respiratory therapy assess nares and determine need for appliance change or resting period.   7/11/2022 1020 by Maura Moore RN  Outcome: Progressing  7/11/2022 0348 by Fiorella Farmer RN  Outcome: Progressing     Problem: Safety - Adult  Goal: Free from fall injury  7/11/2022 1020 by Maura Moore RN  Outcome: Progressing  7/11/2022 0348 by Fiorella Farmer RN  Outcome: Progressing     Problem: ABCDS Injury Assessment  Goal: Absence of physical injury  7/11/2022 1020 by Maura Moore RN  Outcome: Progressing  7/11/2022 0348 by Fiorella Farmer RN  Outcome: Progressing     Problem: Pain  Goal: Verbalizes/displays adequate comfort level or baseline comfort level  7/11/2022 1020 by Maura Moore RN  Outcome: Progressing  7/11/2022 0348 by Fiorella Farmer RN  Outcome: Progressing     Problem: Chronic Conditions and Co-morbidities  Goal: Patient's chronic conditions and co-morbidity symptoms are monitored and maintained or improved  7/11/2022 1020 by Maura Moore RN  Outcome: Progressing  7/11/2022 0348 by Fiorella Farmer RN  Outcome: Progressing

## 2022-07-11 NOTE — PLAN OF CARE
Problem: Discharge Planning  Goal: Discharge to home or other facility with appropriate resources  Outcome: Progressing     Problem: Skin/Tissue Integrity  Goal: Absence of new skin breakdown  Description: 1. Monitor for areas of redness and/or skin breakdown  2. Assess vascular access sites hourly  3. Every 4-6 hours minimum:  Change oxygen saturation probe site  4. Every 4-6 hours:  If on nasal continuous positive airway pressure, respiratory therapy assess nares and determine need for appliance change or resting period.   Outcome: Progressing     Problem: Safety - Adult  Goal: Free from fall injury  Outcome: Progressing     Problem: ABCDS Injury Assessment  Goal: Absence of physical injury  Outcome: Progressing     Problem: Pain  Goal: Verbalizes/displays adequate comfort level or baseline comfort level  Outcome: Progressing     Problem: Chronic Conditions and Co-morbidities  Goal: Patient's chronic conditions and co-morbidity symptoms are monitored and maintained or improved  Outcome: Progressing

## 2022-07-11 NOTE — PROGRESS NOTES
4 Eyes Skin Assessment     The patient is being assess for   Shift Handoff    I agree that 2 RN's have performed a thorough Head to Toe Skin Assessment on the patient. ALL assessment sites listed below have been assessed. Areas assessed for pressure by both nurses:   [x]   Head, Face, and Ears   [x]   Shoulders, Back, and Chest, Abdomen  [x]   Arms, Elbows, and Hands   [x]   Coccyx, Sacrum, and Ischium  [x]   Legs, Feet, and Heels        Skin Assessed Under all Medical Devices by both nurses:  na              All Mepilex Borders were peeled back and area peeked at by both nurses:  no  Please list where Mepilex Borders are located:  bilateral elbows and heels, sacrum.                 **SHARE this note so that the co-signing nurse is able to place an eSignature**    Co-signer eSignature: Electronically signed by Marc Thomson RN on 7/12/22 at 7:27 AM EDT           Giorgio Prevention initiated:  Yes   Wound Care Orders initiated:  Yes      [address]  nurse consulted for Pressure Injury (Stage 3,4, Unstageable, DTI, NWPT, Complex wounds)and New or Established Ostomies:  NA      Primary Nurse eSignature: Electronically signed by Vince Yañez RN on 7/11/22 at 7:17 PM EDT

## 2022-07-11 NOTE — CONSULTS
[facility]  Palliative Medicine Consultation Note      Date Of Admission:7/4/2022  Date of consult: 07/11/22  Seen by [facility] in the past:  No    Recommendations:        Pt is alert and oriented today, with his wife and son at the bedside. Introduced palliative care and discussed goals of care. The pt says he is waiting to hear from radiation oncology about whether they recommend radiation to his brain. He is open to continuing aggressive treatment if recommended. He is pretty comfortable overall and has a good appetite. He is significantly weaker than prior to this admission, which would be a barrier to continuing aggressive treatment. Pt/family want to ask radiation oncology about potential side effects of radiation. We briefly talked about hospice and agreed to follow up after they've spoken with radiation oncology. 1. Goals of Care/Advanced Care planning/Code status: DNR/DNI (Limited- no to all interventions). Pt/family are open to radiation and/or chemotherapy if recommended. 2. Pain: pt c/o occasional headaches which are short lived and resolve with tylenol. He denies headache at this time. He has oxycodone available prn which he has not received. 3. SOB: denies sob at rest and is breathing comfortably on room air. He uses 2 L oxygen at home as needed. He says he is fine at rest but has dyspnea on exertion. Wife reports he becomes hypoxic to 80-85% if he transfers at home without supplemental oxygen. 4. Generalized weakness: pt was mobile at home but is much weaker than prior to admission. He scored 9 and 12 on AMPAC scale and has been planning on SNF at discharge.    5. Disposition: TBD, SNF vs hospice    Reason for Consult:         [x]  Goals of Care  [x]  Code Status Discussion/Advanced Care Planning   [x]  Psychosocial/Family Support  [x]  Symptom Management  []  Other (Specify)    Requesting Physician: MARCIA Galvan    CHIEF COMPLAINT:  Fall    History Obtained From:  patient, spouse, electronic medical record    History of Present Illness:         Rogelio Lutz is a 67 y.o. male with PMH of NHL, HTN, HLD, gout, diabetes and BPH who presented with a fall. He had gotten up to the bathroom and his wife found him on the floor. In the ED, pt underwent head CT that showed that showed a right basal ganglia mass with surrounding edema causing mass effect. 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 Comfort care was discussed but the patient was interested in pursuing aggressive treatment, therefore radiation to the brain mass was discussed. 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    Erectile dysfunction     History of gout 09/19/2015    History of thrombocytopenia 09/19/2015    Hypercalcemia     Hyperlipidemia     Hypertension     Lung nodule     Proteinuria     Type 2 diabetes mellitus without complication ([facility] [address] 5408    Vitamin D deficiency 09/19/2015       Past Surgical History:        Procedure Laterality Date    BRONCHOSCOPY N/A 3/25/2022    BRONCHOSCOPY WITH BRONCHOALVEOLAR LAVAGE performed by Kwabena Storey MD at [address] Bilateral 2011    COLONOSCOPY  3/29/2011    repeat in 5 yrs: Chilo Morales MD    COLONOSCOPY  03/14/2016    repeat in 7-8 years: Chilo Morales MD    CRANIOTOMY Right 7/6/2022    RIGHT FRONTAL NEEDLE BIOPSY OF BRAIN MASS performed by Freya Prater.  Seb Patrick MD at [facility] Left age 5    FINGER TRIGGER RELEASE Right 2007    index    IR PORT PLACEMENT EQUAL OR GREATER THAN 5 YEARS  1/11/2022    IR PORT PLACEMENT EQUAL OR GREATER THAN 5 YEARS 1/11/2022 [facility]ED[facility]    TONSILLECTOMY AND ADENOIDECTOMY  age 1    VASECTOMY 1971       Current Medications:    Medications Prior to Admission: ipratropium (ATROVENT) 0.06 % nasal spray, 2 sprays by Each Nostril route 4 times daily  sodium chloride (OCEAN) 0.65 % nasal spray, 2 sprays by Nasal route 4 times daily  bacitracin-polymyxin b (POLYSPORIN) 500-74180 UNIT/GM ointment, Apply topically twice daily to inside of both nostrils for 3 weeks then as needed for dry nose.   JARDIANCE 25 MG tablet, TAKE 1 TABLET BY MOUTH DAILY  SITagliptin-metFORMIN (JANUMET XR)  MG TB24 per extended release tablet, TAKE 1 TABLET TWICE A DAY  Continuous Blood Gluc Sensor (DEXCOM G6 SENSOR) MISC, Use for glucose monitoring  Continuous Blood Gluc  (DEXCOM G6 ) SHAHANA, Use for glucose monitoring  Continuous Blood Gluc Transmit (DEXCOM G6 TRANSMITTER) MISC, Use for glucose monitoring  insulin lispro, 1 Unit Dial, (HUMALOG KWIKPEN) 100 UNIT/ML SOPN, Up to 5-10 units before meals 3 times a day  Insulin Pen Needle (B-D UF III MINI PEN NEEDLES) 31G X 5 MM MISC, 1 each by Does not apply route 4 times daily  [DISCONTINUED] predniSONE (DELTASONE) 20 MG tablet,   blood glucose test strips (ONETOUCH ULTRA) strip, TEST ONCE DAILY  docusate sodium (COLACE) 100 MG capsule, Take 1 capsule by mouth 2 times daily  [DISCONTINUED] tamsulosin (FLOMAX) 0.4 MG capsule, Take 2 capsules by mouth daily  magnesium oxide (MAG-OX) 400 MG tablet, Take 1 tablet by mouth daily  aspirin 81 MG EC tablet, Take 81 mg by mouth daily  prochlorperazine (COMPAZINE) 10 MG tablet, TAKE 1 TABLET BY MOUTH EVERY 6 HOURS AS NEEDED FOR NAUSEA  ondansetron (ZOFRAN) 8 MG tablet, TAKE 1 TABLET BY MOUTH EVERY 8 HOURS AS NEEDED FOR NAUSEA OR VOMITING  Multiple Vitamins-Minerals (CENTRUM ADULTS PO), Take by mouth  fluticasone (FLONASE) 50 MCG/ACT nasal spray, SPRAY ONCE IN EACH NOSTRIL EVERY DAY  Blood Glucose Monitoring Suppl (ONE TOUCH ULTRA 2) w/Device KIT, 1 kit by Does not apply route daily  Lancets MISC, Use to test blood sugar once daily  blood glucose test strips (ONETOUCH ULTRA) strip, 1 each by Does not apply route daily  ONE TOUCH ULTRASOFT LANCETS MISC, 1 each by Does not apply route daily  vitamin B-12 (CYANOCOBALAMIN) 1000 MCG tablet, Take 1,000 mcg by mouth 2 times daily  Cholecalciferol (VITAMIN D) 2000 UNITS CAPS capsule, Take by mouth daily  Omega-3 Fatty Acids (OMEGA 3 PO), Take 2 capsules by mouth daily     Allergies:  Patient has no known allergies. Social History:    · TOBACCO: reports that he has never smoked. He has never used smokeless tobacco.  · ETOH:   reports previous alcohol use. · Patient currently lives with family - wife    Review of Systems -   Review of Systems   Constitutional: Positive for fatigue. HENT: Negative. Respiratory: Positive for shortness of breath. Cardiovascular: Negative. Gastrointestinal:        Gas pains occasionally   Genitourinary: Negative. Musculoskeletal: Positive for gait problem. Skin: Negative. Neurological: Positive for weakness and headaches. Psychiatric/Behavioral:        Has been lethargic some days   :     Objective:        Physical Exam  Constitutional:       General: He is not in acute distress. HENT:      Head: Normocephalic and atraumatic. Cardiovascular:      Rate and Rhythm: Normal rate and regular rhythm. Pulmonary:      Effort: Pulmonary effort is normal.      Breath sounds: Rales present. Abdominal:      General: Bowel sounds are normal.      Palpations: Abdomen is soft. Musculoskeletal:         General: No swelling. Skin:     General: Skin is warm and dry. Neurological:      Mental Status: He is alert and oriented to person, place, and time. Palliative Performance Scale:  [] 60% Ambulation reduced; Significant disease; Can't do hobbies/housework; intake normal or reduced; occasional assist; LOC full/confusion  [] 50% Mainly sit/lie;  Extensive disease; Can't do any work; Considerable assist; intake normal  Or reduced; LOC full/confusion  [x] 40% Mainly in bed; Extensive disease; Mainly assist; intake normal or reduced; occasional assist; LOC full/confusion  [] 30% Bed Bound; Extensive disease; Total care; intake reduced; LOC full/confusion  [] 20% Bed Bound; Extensive disease; Total care; intake minimal; Drowsy/coma  [] 10% Bed Bound; Extensive disease; Total care; Mouth care only; Drowsy/coma  [] 0% Death      Vitals:    /86   Pulse 72   Temp 97.6 °F (36.4 °C) (Oral)   Resp 22   Ht 5' 6\" (1.676 m)   Wt 130 lb 1.1 oz (59 kg)   SpO2 96%   BMI 20.99 kg/m²     Labs:    BMP:   Recent Labs     07/09/22  0653 07/10/22  0335 07/11/22  0500    137 138   K 4.2 4.3 4.3   CL 99 99 101   CO2 27 27 27   BUN 20 21* 18   CREATININE <0.5* <0.5* <0.5*   GLUCOSE 255* 293* 302*     CBC:   Recent Labs     07/09/22  0653 07/10/22  0335 07/11/22  0320   WBC 6.7 5.7 5.4   HGB 11.5* 11.4* 10.9*   HCT 34.3* 33.6* 32.3*   * 98* 94*       LFT's:   Recent Labs     07/11/22  0320   AST 42*   ALT 42*   BILITOT 0.3   ALKPHOS 106     Troponin: No results for input(s): TROPONINI in the last 72 hours. BNP: No results for input(s): BNP in the last 72 hours. ABGs: No results for input(s): PHART, XMR5ZEZ, PO2ART in the last 72 hours. INR:   Recent Labs     07/11/22  0320   INR 1.09       U/A:No results for input(s): NITRITE, COLORU, PHUR, LABCAST, WBCUA, RBCUA, MUCUS, TRICHOMONAS, YEAST, BACTERIA, CLARITYU, SPECGRAV, LEUKOCYTESUR, UROBILINOGEN, BILIRUBINUR, BLOODU, GLUCOSEU, AMORPHOUS in the last 72 hours. Invalid input(s): Tracey Suazo          Conclusion/Time spent:         Recommendations see above    Pt/family [id number]  Time spent with patient and/or family: 20 min  Time reviewing records: 10 min   Time communicating with staff: 10 min     A total of 40 minutes spent with the patient and family on unit greater than 50% in counseling regarding palliative care and in goals of care for the patient.     Thank you to  Talita/MARCIA Betancourt for this consultation. We will continue to follow Mr. Daley's care as needed.     Mell Tyson NP  [address] [place] [address]

## 2022-07-12 NOTE — PROGRESS NOTES
4 Eyes Admission Assessment     I agree as the admission nurse that 2 RN's have performed a thorough Head to Toe Skin Assessment on the patient. ALL assessment sites listed below have been assessed on admission. Areas assessed by both nurses: [address] &   [x]   Head, Face, and Ears   [x]   Shoulders, Back, and Chest  [x]   Arms, Elbows, and Hands   [x]   Coccyx, Sacrum, and Ischium  [x]   Legs, Feet, and Heels        Does the Patient have Skin Breakdown?   Yes a wound was noted on the Admission Assessment and an LDA was Initiated documentation include the Savi-wound, Wound Assessment, Measurements, Dressing Treatment, Drainage, and Color\",         Giorgio Prevention initiated:  Yes   Wound Care Orders initiated:  Yes      WOC nurse consulted for Pressure Injury (Stage 3,4, Unstageable, DTI, NWPT, and Complex wounds) or Giorgio score 18 or lower:  Yes      Nurse 1 eSignature: Electronically signed by Ronald Toscano RN on 7/12/22 at 6:18 PM EDT    **SHARE this note so that the co-signing nurse is able to place an eSignature**    Nurse 2 eSignature: {Esignature:[id number]}

## 2022-07-12 NOTE — PROGRESS NOTES
Palliative Care Chart Review  and Check in Note:     NAME:  Mikel Members Date: 7/4/2022  Hospital Day:  Hospital Day: 9   Current Code status: Limited    Palliative care is continuing to following Mr. Heidi Dee for symptom management,  and goals of care discussion as needed. Patient's chart reviewed today 7/12/22. Met with pt and his wife, Milvia Armstrong, at the bedside. Milvia Armstrong reports that the consultation with radiation oncology went well yesterday and they are optimistic that the course of treatment will be beneficial. All of their questions and concerns about radiation were addressed. Milvia Armstrong is concerned that the pt has not been able to get out of bed in several days, PT/OT consults are ordered. When asked about how hes feeling about proceeding with radiation, [place] Malloriecarlo responded by saying Im ready for it!  The pt and his wife continue to be optimistic about the current treatment plan. The following are the currently established goals/code status, and Symptom management.      Goals of care: Continue with current management, pt/family hopeful for improvement    Code status: DNR/DNI    Discharge plan: MARCIA Benedict - [facility]  07/12/22  11:44 AM

## 2022-07-12 NOTE — PROGRESS NOTES
[address] [facility] Progress Note    2022     Phyllis Ocasio    MRN: [id number]    : 1945    Referring MD: No referring provider defined for this encounter. SUBJECTIVE: He feels well overall. His po intake is great! He cont to report weakness of upper and to a lesser extend lower ext BUT all improved according to the pt.      ECOG PS: (4) Completely disabled, unable to carry out self-care and confined to bed or chair    KPS: 40% Disabled; requires special care and assistance    Isolation: None    Medications    Scheduled Meds:   enoxaparin  40 mg SubCUTAneous Q24H    dexamethasone  4 mg IntraVENous Q6H    pantoprazole  40 mg IntraVENous Daily    levETIRAcetam  500 mg IntraVENous Q12H    insulin glargine  10 Units SubCUTAneous Nightly    sodium chloride flush  5-40 mL IntraVENous 2 times per day    fluticasone  1 spray Each Nostril Daily    insulin lispro  0-12 Units SubCUTAneous TID WC    insulin lispro  0-6 Units SubCUTAneous Nightly    ipratropium  2 spray Each Nostril 4x Daily    tamsulosin  0.8 mg Oral Daily     Continuous Infusions:   sodium chloride 50 mL/hr at 22 1152    sodium chloride 5 mL/hr at 22 1811    dextrose       PRN Meds:.magnesium sulfate, potassium chloride, sodium chloride flush, sodium chloride, oxyCODONE, methocarbamol **OR** methocarbamol IVPB, glucose, dextrose bolus **OR** dextrose bolus, glucagon (rDNA), dextrose, acetaminophen, ondansetron **OR** ondansetron    ROS:  As noted above, otherwise remainder of 10-point ROS negative    Physical Exam:     I&O:      Intake/Output Summary (Last 24 hours) at 2022 0833  Last data filed at 2022 0601  Gross per 24 hour   Intake 2270 ml   Output 1950 ml   Net 320 ml       Vital Signs:  /62   Pulse 58   Temp 97.1 °F (36.2 °C) (Temporal)   Resp 23   Ht 5' 6\" (1.676 m)   Wt 130 lb 11.7 oz (59.3 kg)   SpO2 98%   BMI 21.10 kg/m²     Weight:    Wt Readings from Last 3 Encounters:   22 130 lb 11.7 oz (59.3 kg)   05/31/22 117 lb (53.1 kg)   05/04/22 119 lb (54 kg)       General: Somnolent  HEENT: normocephalic, PERRL, no scleral erythema or icterus, Oral mucosa moist and intact, throat clear  NECK: supple   BACK: Straight   SKIN: warm dry and intact without lesions rashes or masses  CHEST: CTA bilaterally without use of accessory muscles  CV: Normal S1 S2, RRR, no MRG  ABD: NT ND normoactive BS, no palpable masses or hepatosplenomegaly  EXTREMITIES: without edema, denies calf tenderness  NEURO: CN II - XII grossly intact, motor weakness of his left upper and lower ext - slightly improved from yesterday   CATHETER: R SL PAC    Data    CBC:   Recent Labs     07/10/22  0335 07/11/22  0320 07/12/22  0344   WBC 5.7 5.4 5.0   HGB 11.4* 10.9* 10.7*   HCT 33.6* 32.3* 32.6*   MCV 98.3 98.0 100.2*   PLT 98* 94* 86*     BMP/Mag:  Recent Labs     07/10/22  0335 07/11/22  0320 07/11/22  0500 07/12/22  0344     --  138 137   K 4.3  --  4.3 4.3   CL 99  --  101 101   CO2 27  --  27 26   PHOS  --  2.2*  --   --    BUN 21*  --  18 22*   CREATININE <0.5*  --  <0.5* <0.5*   MG  --   --  1.50*  --      LIVP:   Recent Labs     07/11/22 0320   AST 42*   ALT 42*   BILIDIR <0.2   BILITOT 0.3   ALKPHOS 106     Coags:   Recent Labs     07/11/22 0320   PROTIME 14.0   INR 1.09   APTT 33.8     Uric Acid   Recent Labs     07/11/22 0320   LABURIC 3.7     DIAGNOSTIC:  1. CT Head 7/4:  1.  Heterogeneous 3.9 cm masslike lesion centered in the right basal ganglia/frontal lobe with extensive surrounding edema. This is concerning for a primary or metastatic malignancy. Focal subacute intraparenchymal hemorrhage, hemorrhage within the mass,    or infection are differential considerations. Brain MRI with and without contrast and neurosurgery consult recommended.    2.  Extensive mass effect with 9 mm right to left midline shift, subfalcine herniation and suspected partial transtentorial herniation resulting in effacement of the right frontal horn and body, and mass effect on midbrain. 2. MRI Brain 7/4:  1.  Enhancing 4.6 cm mass centered in the right basal ganglia with extensive surrounding edema in the right frontotemporal lobe. Findings highly concerning for malignancy. 2.  Mass results in 9 mm right to left midline shift, subfalcine herniation, and compression of the right thalamus and brainstem. 3. CT C/A/P 7/5:  CHEST:  1. No evidence of any thoracic metastatic disease. 2. Stable changes of pulmonary fibrosis. A/P:  IMPRESSION:   1. Interval reduction in size of hepatic mass. 2. Slight interval reduction in size of low-attenuation lesion within the spleen. 3. No evidence of any adrenal mass. 4. No evidence of any abdominal or pelvic lymphadenopathy. 5. Uncomplicated sigmoid colon diverticulosis. 4. CT Head 7/8:  1.  Status post biopsy of right frontal lobe mass with unchanged mild acute hemorrhage along the biopsy tract and unchanged 12 mm leftward subfalcine herniation. 5. CT Head 7/10:  Status post biopsy of right frontal lobe mass with unchanged mild acute hemorrhage along the biopsy tract and unchanged 12 mm leftward subfalcine herniation. 4. MBS 7/5/22:    PATHOLOGY:  1. Right Frontal Brain Mass Bx 7/6/22: Involved with malignant B-cell lymphoma with aggressive morphologic   features. COMMENT: The histologic and immunohistochemical changes are   supportive of a malignant B-cell lymphoma, large cell with aggressive   morphologic features based on the high Ki-67 proliferative index.  The   patient's prior liver lymphoma diagnosis ( [address] [place] [address]-) supporting an aggressive B-cell lymphoma is noted.  FISH for   the aggressive B-cell lymphoma panel and cytogenetics are currently   pending and the results of the studies will be reflected in supplemental   Reports    FISH: Pending    PROBLEM LIST:             1.  DLBCL-   2. Interstitial pneumonia  3. DM2  4.   H/O malignant hypercalcemia      TREATMENT:            1.  Initial therapy on protocol [id number] R-CHOP +/- Tafasitamab/Lenalidamide Jan 2022 - July 2022    ASSESSMENT AND PLAN:           1. 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 C-Myc was mutated but BCL-2 and BCL 6 were  not. - GCB phenotype  - S/p initial therapy on clinical trial utilizing R-CHOP w/ Revlimid and Tafasitimab;  - Study mandated PET was due this week BUT in-pt     Relapse / Progression: Large CNS mass - bx proven NHL 7/6/22. Ki-67>90%  - Plan to start XRT to brain mass to reduce size and then possible systemic chemotherapy. - Cont Decadron 4 mg IV Q 6 hours --> will NOT change to po, for now. - Cont Keppra   - Consult [facility]On - XRT to start today (7/12/22)   - Consult palliative care - following     2. ID: Patient is currently afebrile without evidence of infection. 3. Heme: Anemia and thrombocytopenia likely r/t recent chemotherapy  - Transfuse for Hgb < 7 and Platelets < 10 K.   - No transfusion today     4. Metabolic: Hyperglycemia 2/2 steroids + HypoMg + HypoPhos  - Cont IVFs: NS at 50mL/hr  - Replace K+ & Mg per PRN orders    5. GI / Nutrition:     - Cont diet and change to reg diet  - Recommend swabbing mouth after all meals. Must be completely awake for PO intake  - Dietary to follow closely     6. Pulm:  Interstitial lung disease  - In the course of his NHL tretatment, we uncovered interstitial lung disease. Dr Chele Salgado reviewed chest CTs in the past and feels that he had evidence of pulmonary fibrosis before his lymphoma diagnosis.  He completed a prednisone taper.  - Dyspnea is grade 1  - He had a bronchoscopy March 25 and PFTs April 1.  - He continues pulmonary rehabilitation  - He is seeing  Jessy    7. Endo: T2DM exacerbated by steroids  - Cont lantus 10 units nightly  - Cont Humalog Med SSI AS/HS    8. Acute Debilitation: 2/2 CNS Lymphoma  - Consult PT/OT - [facility] 9 / 12  - Consult SLP: DAVID 7/5/2: poor swallow coordination with aspiration of thin liquid x1 with straw presentation. No aspiration occurred with thin liquid via tsp/cup, nectar thick liquid, puree or soft solid. Recommend continue soft and bite sized diet with thin liquids - cup only, small sips, sit fully upright and consistent oral care.    - Consult SW: Will likely require SNF vs ARU at d/c    - DVT Prophylaxis: Platelets >97,607 cells/dL, - daily lovenox prophylaxis ordered  Contraindications to pharmacologic prophylaxis: None  Contraindications to mechanical prophylaxis: None    - Disposition: Uncertain at this time    Lolly Ortiz MD

## 2022-07-12 NOTE — PLAN OF CARE
Problem: Neurosensory - Adult  Goal: Achieves stable or improved neurological status  Outcome: Progressing     Q4 neuro exam done this shift. No changes throughout this shift. Problem: Neurosensory - Adult  Goal: Achieves maximal functionality and self care  Outcome: Progressing     Encouraged pt to participate in self care and hygiene. Pt able to participate with PT/OT and help with repositioning self. Problem: Skin/Tissue Integrity - Adult  Goal: Skin integrity remains intact  Outcome: Progressing     Pt is a low destiny, 4 eyes done at shift change. Pt  is repositioned frequently. Wound care consulted    Problem: Infection - Adult  Goal: Absence of infection during hospitalization  Outcome: Progressing   CVC site remains free of signs/symptoms of infection. No drainage, edema, erythema, pain, or warmth noted at site. Dressing changes continue per protocol and on an as needed basis - see flowsheet. Compliant with [facility] Bath Protocol:  Performed CHG bath today per [facility] protocol utilizing Bed bath with CHG wipes. CVC site cleansed with CHG wipe over dressing, skin surrounding dressing, and first 6\" of IV tubing. Pt tolerated well. Continued to encourage daily CHG bathing per [facility] protocol. Problem: Hematologic - Adult  Goal: Maintains hematologic stability  Outcome: Progressing   Patient's hemoglobin this AM:   Recent Labs     07/12/22  0344   HGB 10.7*     Patient's platelet count this AM:   Recent Labs     07/12/22  0344   PLT 86*    Thrombocytopenia Precautions in place. Patient showing no signs or symptoms of active bleeding. Transfusion not indicated at this time. Patient verbalizes understanding of all instructions. Will continue to assess and implement POC. Call light within reach and hourly rounding in place.

## 2022-07-12 NOTE — PROGRESS NOTES
Occupational Therapy    Occupational Therapy  Daily Treatment Note  Patient Name: Chandni oHlland  MRN: [id number]    Chart Reviewed: Yes       Other position/activity restrictions: Up with Assistance     Additional Pertinent Hx: Pt is a 68 yo male that presents from home to the ED status post fall off the toliet 7/4. He stated that his grab bars broke and fell and hit his head but no LOC. Head MRI: Enhancing 4.6 cm mass centered in the right basal ganglia with extensive surrounding edema in the right frontotemporal lobe. Findings highly concerning for malignancy. Mass results in 9 mm right to left midline shift, subfalcine herniation, and compression of the right thalamus and brainstem. PMH: Cancer, Diabetes 2, Hyperliperdemia. Diagnosis: Intracranial mass  Treatment Diagnosis: impaired ADLs and functional mobility/transfers    Subjective: Pt sleeping supine in bed upon entry, with vcs/tactile cues pt awakened. Pt pleasant and agreeable to therapy session. Pt required rest breaks between UB exercises and activity. General Comment:  socks donned Dependent. Pt supine to sit Max A. Pt sit EOB primarily Mod A with brief Min A/CGA and completed UB ROM exercises with PIERCE GARCIA (see below). Pt sit to stand Min A with Gracy Schmidt and hygiene care provided by this writer due to smear. Pt transferred via Steva Goes to recliner, pt stand to sit Mod A. Pt Dependent to thread brief, pt with minimal initiation. Pt sit to stand from recliner Mod A (x 2 trials) stance ~30sec x 2 trials Mod A and stand to sit Mod A. Brief mgmt performed by this writer in stance. Pt served lunch and feeding with setup. Call light in reach and chair alarm on.    Pain: Denies    Social/Functional History  Lives With: Spouse  Type of Home: Condo  Home Layout: Two level,Able to Live on Main level with bedroom/bathroom,Performs ADL's on one level  Home Access: Stairs to enter with rails  Entrance Stairs - Number of Steps: 3 efren from garage w/ hand rail on R  Entrance Stairs - Rails: Right  Bathroom Shower/Tub: Walk-in shower,Shower chair without back  Bathroom Toilet: Handicap height (\"chair height\" toilets w/ hand rails attached)  Bathroom Equipment: Grab bars in shower  Home Equipment: Rollator  Has the patient had two or more falls in the past year or any fall with injury in the past year?: Yes (Pt's wife reports about 5 falls in the last 6 months)  ADL Assistance: Needs assistance (last week and a half, wife has been helping w ADLs. Prior to that he was mostly independent)  Homemaking Assistance: Needs assistance (wife does cooking, cleaning and laundry)  Homemaking Responsibilities: No  Ambulation Assistance: Needs assistance (last week and a half has needed wife to help w/ ambulation and uses rollator)  Transfer Assistance: Needs assistance (has needed assist for the last week and a half)  Active : No  Occupation: Retired  Additional Comments: wife has been providing 24hr A for the last week and a half  Prior Function  ADL Assistance: Needs assistance (last week and a half, wife has been helping w ADLs. Prior to that he was mostly independent)  Homemaking Assistance: Needs assistance (wife does cooking, cleaning and laundry)  Ambulation Assistance: Needs assistance (last week and a half has needed wife to help w/ ambulation and uses rollator)  Transfer Assistance: Needs assistance (has needed assist for the last week and a half)  Additional Comments: wife has been providing 24hr A for the last week and a half    Objective:    Cognition/Orientation:Ox4, delayed response to stimuli, requires some cues for initiation/sequencing    Bed mobility   Rolling:N/A  Supine to sit: Max A  Sit to Supine: N/A  Scooting: Max A to EOB    Functional Mobility   Sit to Stand: Min A Orvan Quails) and Mod A (HHA) - x 2 trials  Stand to Sit: Mod A  Bed to Chair Transfer: Dependent Orvan Quails  Commode Transfer:N/A  Other:     ADLs   Grooming:N/A  Bathing:N/A  UB dressing:N/A  LB dressing: Dependent for brief change  Toileting: (hygiene care Dependent for smear)  Other: setup for feeding    UE Exercises : 5 reps x 2 sets each of the following (PIERCE GARCIA): right/left bicep curls, right/left front raises, right/left cross midline touches, scap squeezes, shoulder shrugs    Activity Tolerance: Pt tolerated therapy well, required rest breaks      Patient Education: UB exercises, transfer training, POC, midline orientation for head/neck    Safety Devices in Place: Chair alarm on and call light in reach    Assessment: Pt is progressing with therapy with good effort, pt continues to be limited by left sided weakness. Pt sat EOB ~18 min mainly Mod A with very brief CGA/Min A ~20sec. Pt Dependent for brief change and hygiene care. Pt would benefit from inpt therapy to maximize functional independence and safety. Continue with POC. Discharge Recommendations:Balwinder Coxla Fátimaroxanne scored a 12/24 on the AM-PAC ADL Inpatient form. Current research shows that an AM-PAC score of 17 or less is typically not associated with a discharge to the patient's home setting. Based on the patient's AM-PAC score and their current ADL deficits, it is recommended that the patient have 3-5 sessions per week of Occupational Therapy at d/c to increase the patient's independence. Please see assessment section for further patient specific details. If patient discharges prior to next session this note will serve as a discharge summary. Please see below for the latest assessment towards goals.    Equipment Needs:  Defer to next settting    Therapy Time:   Individual Concurrent Group Co-treatment   Time In 1100         Time Out 1153         Minutes 53         Timed Code Treatment Minutes:  53  Total Treatment Minutes:  53    Goals:  Short Term Goals  Time Frame for Short term goals: by dc  Short Term Goal 1: Pt will complete functional transfers w/ Min Ax1-7/12 goal not met  Short Term Goal 2: Pt will complete LE dressing w/ Min Ax1-7/8 goal not met 7/12  Short Term Goal 3: Pt will maintain sitting balance w/ CGA for 5min while engaging in ADL task-7/12 goal not met         Plan:      Times per Week: 5-7   Times per Day: Daily    If patient is discharged prior to next treatment, this note will serve as the discharge summary.       Walter Kothari, [address]

## 2022-07-12 NOTE — PROGRESS NOTES
Physical Therapy  Daily Treatment Note    Discharge Recommendations: Pauly Boyd scored a 9/24 on the AM-PAC short mobility form. Current research shows that an AM-PAC score of 17 or less is typically not associated with a discharge to the patient's home setting. Based on the patient's AM-PAC score and their current functional mobility deficits, it is recommended that the patient have 3-5 sessions per week of Physical Therapy at d/c to increase the patient's independence. Please see assessment section for further patient specific details. Assessment:  Pt with good effort and participation. Continues to demonstrated L sided weakness UE and LE. Also tend to hold head towards R. Pt with decreased trunk control as evidenced by difficulty maintaining balance sitting EOB. Pt is currently functioning below baseline. Would benefit from continued IP PT at D/C prior to returning home. Plan is for SNF. Equipment Needs: Defer to next level of care    Chart Reviewed: Yes     Other position/activity restrictions: Up with Assistance   Additional Pertinent Hx: Pt is a 68 yo male that presents from home to the ED status post fall off the toliet 7/4. He stated that his grab bars broke and fell and hit his head but no LOC. Head MRI: Enhancing 4.6 cm mass centered in the right basal ganglia with extensive surrounding edema in the right frontotemporal lobe. Findings highly concerning for malignancy. Mass results in 9 mm right to left midline shift, subfalcine herniation, and compression of the right thalamus and brainstem. PMH: Cancer, Diabetes 2, Hyperliperdemia. Diagnosis: Intracranial Mass   Treatment Diagnosis: Decreased strength and mobility. Subjective: Pt in bed initially. Wife arrived during session. Pain: Denies    Objective:    Bed mobility  Supine to sit: Max assist x 1, HOB up partially  Scooting:  Mod assist to EOB    Transfers  Sit to stand: Dependent (Min assist x 2) from raised height bed to Peewee Dilling Michaela; Min assist x 1 from [address]; Mod assist x 1 from chair without UE support (x 2 trials). Cues for hand placement/pushing up from armrests when not using Rula Nest. Stand to sit: Min to Mod assist x 1 into chair (x 3 trials.) Decreased eccentric control. Cues for hand placement/reaching back for chair when not using Urla Nest. Bed > chair: Dependent via Rula Nest    Exercises  While seated in chair: 10 reps B LAQ, hip flexion, hip adductor squeezes (with pillow). Other: Cues for quality and pace of exercises. Balance  Sat EOB x 18 minutes total. Needing mostly Mod assist x 1. Able to maintain with CGA ~10 seconds x 2 trials with 1-2 UE support of bedrail. Pt with LOB mostly posteriorly. Pt performing some UE activites/exercises with OT while seated EOB. Sat in Rula Nest for transfers with CGA  Static stance at Rula Nest with CGA to Min assist x 1 (up to 20 secs x 2 trials). Static stance with R hand held support and Mod assist x 1 (30 secs x 2 trials). Pt noted to have some lower LE support from chair. Other: Pt needing frequent cues/reminders for upright posture and neutral head position in sitting and stance. Pt tends to hold head in R rotation and sidebend. Patient Education  Role of PT. Expressed understanding. Safety Devices  Pt left with needs in reach. In chair with with RN, OT and wife present. RN updated.      AM-PAC score  AM-PAC Inpatient Mobility Raw Score : 9  AM-PAC Inpatient T-Scale Score : 30.55  Mobility Inpatient CMS 0-100% Score: 81.38  Mobility Inpatient CMS G-Code Modifier : CM    Goals: (as determined and assessed by primary PT)  Time Frame for Short term goals: Discharge  Short term goal 1: Supine <> Sit CGA -ongoing   Short term goal 2: Sit<>Stand CGA -ongoing   Short term goal 3: Amb 20 ft with RW CGA -ongoing      Plan:  Plan: 5-7 times per week    Therapy Time    Individual  Concurrent  Group  Co-treatment    Time In  1104            Time Out  1147           Minutes  43              Timed Code Treatment Minutes: 43  Total Treatment Minutes: 43    Will continue per plan of care. If patient is discharged prior to next treatment, this note will serve as the discharge summary.     Ethan Burgess #[id number]

## 2022-07-12 NOTE — PROGRESS NOTES
FOLLOW UP NOTE    Diagnosis:  Past Medical History:   Diagnosis Date    Benign prostatic hyperplasia with weak urinary stream 12/10/2015     Updating Deprecated Diagnoses    Cancer ([facility] 12/21/2021    Non Earl Lymphoma    Erectile dysfunction     History of gout 09/19/2015    History of thrombocytopenia 09/19/2015    Hypercalcemia     Hyperlipidemia     Hypertension     Lung nodule     Proteinuria     Type 2 diabetes mellitus without complication ([facility] 6272    Vitamin D deficiency 09/19/2015     History:  Zeb Amaya is  67 y.o., he Remains hospitalized for CNS lymphoma. Patient states he feels well overall. He reports generalized weakness but he is sitting up in a chair. States weakness is improving. Patient is eating well. Patient has started physical therapy and Occupational Therapy today. Past Medical History, Surgical History, Social History, Family History and Medications are reviewed in detail and updated in his chart. ROS:    Constitutional:  No weight loss, No fever, No chills, No night sweats. Mild fatigue. ENT / Mouth:  No dysphagia, No hoarseness, No oral ulcers. No sore throat, No tinnitus, Normal hearing. Cardiovascular:  No chest pain, No palpitations, No syncope, No upper extremity or lower extremity edema, No calf discomfort. Respiratory:  No cough. No hemoptysis, No wheezing, No dyspnea. Gastrointestinal:  No abdominal pain, No nausea, No vomiting, No constipation, No diarrhea, No melena, No dyspepsia. Urinary:  No dysuria, No hematuria, No urinary incontinence. Musculoskeletal:  No muscle pain, No bone pain. Generalized weakness. Skin:  No rash, No nodules, No pruritus, No lesions. Neurologic:  No confusion, No seizures, No syncope, No tremor, No speech change, No headache, No abnormal gait, No sensory changes, No focal weakness.     Psychiatric:  No depression, No anxiety, Concentration normal.    Endocrine:  No hot flashes, No thyroid symptoms. Hematologic:  No epistaxis, No petechiae, No ecchymosis. Lymphatic:  No lymphadenopathy, No lymphedema. Physical Exam:  /68   Pulse 75   Temp 98.1 °F (36.7 °C) (Oral)   Resp 26   Ht 5' 6\" (1.676 m)   Wt 130 lb 11.7 oz (59.3 kg)   SpO2 98%   BMI 21.10 kg/m²     GENERAL: No acute distress. Alert & oriented x3. NECK: No thyromegaly or masses. LYMPHATICS:  No cervical, supraclavicular, infraclavicular or axillary lymphadenopathy. LUNGS:  Clear to auscultation bilaterally. No rales or wheezes. Good respiratory effort. HEART:  Regular rate and rhythm. ABDOMEN: Soft, ND, NT. No masses or hepatosplenomegaly. MUSCULOSKELETAL:  No tenderness to palpation of the spine or pelvis. Muscle strength 4/5 times all 4 extremities. EXTREMETIES:  No edema. Neuro: Cranial nerves II through XII grossly intact. Radiology:  No results found for this or any previous visit. Results for orders placed during the hospital encounter of 07/04/22    MRI BRAIN W WO CONTRAST    Narrative  MRI of the brain with and without contrast dated 7/4/2022    Indication: new brain mass    Comparison: CT head 7/4/2022    Technical Factors: Standard multiplanar multisequence MRI of the brain was performed before and after intravenous administration of 12 mL of ProHance contrast.    Findings: There is a enhancing solid mass centered in the right basal ganglia extending into the right frontal lobe measuring 3.5 x 4.6 cm. There is marked surrounding vasogenic edema in the right frontotemporal lobe. There is 9 mm of right-to-left midline shift. Compression of the right ventricular system and third ventricle as seen on CT. Right-to-left subfalcine herniation and mass effect on the right midbrain and thalamus. No other mass is identified. Impression  1. Enhancing 4.6 cm mass centered in the right basal ganglia with extensive surrounding edema in the right frontotemporal lobe.  Findings highly concerning for malignancy. 2.  Mass results in 9 mm right to left midline shift, subfalcine herniation, and compression of the right thalamus and brainstem. Assessment and Plan:  Aimee Bonilla is a 67 y.o. patient with above history of CNS lymphoma. Patient is recovering well from craniotomy. Denies complaints at this time. States he is ready to start radiation therapy. Simulation was performed yesterday. Plan for 2400 cGy over 8 fractions to be started in the next few days. Please note this document has been produced using speech recognition software and may contain errors related to that system including errors in grammar, punctuation, and spelling, as well as words and phrases that may be inappropriate. If there are any questions or concerns please feel free to contact the dictating provider for clarification. A total of 35 minutes was spent on today's patient encounter. If applicable, non-patient-facing activities:     ( x  )   Preparing to see the patient and reviewing records     (   )   Individual interpretation of results      (   )   Discussion or coordination of care with other health care professionals     (   )   Ordering of unique tests, medications, or procedures     ( x  )   Documentation within the EHR     Geoff Segura was available for consultation with this visit.

## 2022-07-13 NOTE — PROGRESS NOTES
[facility] Progress Note    2022     Reford Deirdre    MRN: [id number]    : 1945    Referring MD: No referring provider defined for this encounter. SUBJECTIVE: he feels well with no new complaints. He cont to have weakness involving his left upper ext.      ECOG PS: (4) Completely disabled, unable to carry out self-care and confined to bed or chair    KPS: 40% Disabled; requires special care and assistance    Isolation: None    Medications    Scheduled Meds:   enoxaparin  40 mg SubCUTAneous Q24H    dexamethasone  4 mg IntraVENous Q6H    pantoprazole  40 mg IntraVENous Daily    levETIRAcetam  500 mg IntraVENous Q12H    insulin glargine  10 Units SubCUTAneous Nightly    sodium chloride flush  5-40 mL IntraVENous 2 times per day    fluticasone  1 spray Each Nostril Daily    insulin lispro  0-12 Units SubCUTAneous TID WC    insulin lispro  0-6 Units SubCUTAneous Nightly    ipratropium  2 spray Each Nostril 4x Daily    tamsulosin  0.8 mg Oral Daily     Continuous Infusions:   sodium chloride 50 mL/hr at 22 0548    sodium chloride 5 mL/hr at 22 1811    dextrose       PRN Meds:.magnesium sulfate, potassium chloride, sodium chloride flush, sodium chloride, oxyCODONE, methocarbamol **OR** methocarbamol IVPB, glucose, dextrose bolus **OR** dextrose bolus, glucagon (rDNA), dextrose, acetaminophen, ondansetron **OR** ondansetron    ROS:  As noted above, otherwise remainder of 10-point ROS negative    Physical Exam:     I&O:      Intake/Output Summary (Last 24 hours) at 2022 0915  Last data filed at 2022 0548  Gross per 24 hour   Intake 1764.28 ml   Output 1700 ml   Net 64.28 ml       Vital Signs:  /63   Pulse 56   Temp 98.2 °F (36.8 °C) (Oral)   Resp 28   Ht 5' 6\" (1.676 m)   Wt 120 lb 2.4 oz (54.5 kg) Comment: without machiene on end of bed  SpO2 97%   BMI 19.39 kg/m²     Weight:    Wt Readings from Last 3 Encounters:   22 120 lb 2.4 oz (54.5 kg) 05/31/22 117 lb (53.1 kg)   05/04/22 119 lb (54 kg)       General: awake alert and oriented   HEENT: normocephalic, PERRL, no scleral erythema or icterus, Oral mucosa moist and intact, throat clear  NECK: supple   BACK: Straight   SKIN: warm dry and intact without lesions rashes or masses  CHEST: CTA bilaterally without use of accessory muscles  CV: Normal S1 S2, RRR, no MRG  ABD: NT ND normoactive BS, no palpable masses or hepatosplenomegaly  EXTREMITIES: without edema, denies calf tenderness  NEURO: CN II - XII grossly intact, motor weakness of his left upper and lower ext - unchanged from yesterday   CATHETER: R SL PAC    Data    CBC:   Recent Labs     07/11/22 0320 07/12/22 0344 07/13/22 0329   WBC 5.4 5.0 5.5   HGB 10.9* 10.7* 11.3*   HCT 32.3* 32.6* 33.1*   MCV 98.0 100.2* 97.8   PLT 94* 86* 79*     BMP/Mag:  Recent Labs     07/11/22 0320 07/11/22  0500 07/12/22 0344 07/13/22 0329   NA  --  138 137 137   K  --  4.3 4.3 4.1   CL  --  101 101 100   CO2  --  27 26 28   PHOS 2.2*  --   --  2.5   BUN  --  18 22* 18   CREATININE  --  <0.5* <0.5* <0.5*   MG  --  1.50*  --   --      LIVP:   Recent Labs     07/11/22 0320 07/13/22 0329   AST 42* 52*   ALT 42* 84*   BILIDIR <0.2 <0.2   BILITOT 0.3 0.3   ALKPHOS 106 131*     Coags:   Recent Labs     07/11/22 0320 07/13/22 0329   PROTIME 14.0  --    INR 1.09  --    APTT 33.8 24.3     Uric Acid   Recent Labs     07/11/22 0320 07/13/22 0329   LABURIC 3.7 3.3*     DIAGNOSTIC:  1. CT Head 7/4:  1.  Heterogeneous 3.9 cm masslike lesion centered in the right basal ganglia/frontal lobe with extensive surrounding edema. This is concerning for a primary or metastatic malignancy. Focal subacute intraparenchymal hemorrhage, hemorrhage within the mass,    or infection are differential considerations. Brain MRI with and without contrast and neurosurgery consult recommended.    2.  Extensive mass effect with 9 mm right to left midline shift, subfalcine herniation and suspected partial transtentorial herniation resulting in effacement of the right frontal horn and body, and mass effect on midbrain. 2. MRI Brain 7/4:  1.  Enhancing 4.6 cm mass centered in the right basal ganglia with extensive surrounding edema in the right frontotemporal lobe. Findings highly concerning for malignancy. 2.  Mass results in 9 mm right to left midline shift, subfalcine herniation, and compression of the right thalamus and brainstem. 3. CT C/A/P 7/5:  CHEST:  1. No evidence of any thoracic metastatic disease. 2. Stable changes of pulmonary fibrosis. A/P:  IMPRESSION:   1. Interval reduction in size of hepatic mass. 2. Slight interval reduction in size of low-attenuation lesion within the spleen. 3. No evidence of any adrenal mass. 4. No evidence of any abdominal or pelvic lymphadenopathy. 5. Uncomplicated sigmoid colon diverticulosis. 4. CT Head 7/8:  1.  Status post biopsy of right frontal lobe mass with unchanged mild acute hemorrhage along the biopsy tract and unchanged 12 mm leftward subfalcine herniation. 5. CT Head 7/10:  Status post biopsy of right frontal lobe mass with unchanged mild acute hemorrhage along the biopsy tract and unchanged 12 mm leftward subfalcine herniation. 4. MBS 7/5/22:    PATHOLOGY:  1. Right Frontal Brain Mass Bx 7/6/22: Involved with malignant B-cell lymphoma with aggressive morphologic   features.      COMMENT: The histologic and immunohistochemical changes are   supportive of a malignant B-cell lymphoma, large cell with aggressive   morphologic features based on the high Ki-67 proliferative index.  The   patient's prior liver lymphoma diagnosis ( [address] [place] [address]   WK-) supporting an aggressive B-cell lymphoma is noted.  FISH for   the aggressive B-cell lymphoma panel and cytogenetics are currently   pending and the results of the studies will be reflected in supplemental   Reports    FISH: Pending    PROBLEM LIST: 1. DLBCL-   2. Interstitial pneumonia  3. DM2  4. H/O malignant hypercalcemia      TREATMENT:            1.  Initial therapy on protocol [id number] R-CHOP +/- Tafasitamab/Lenalidamide Jan 2022 - July 2022    ASSESSMENT AND PLAN:           1. 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 C-Myc was mutated but BCL-2 and BCL 6 were  not. - GCB phenotype  - S/p initial therapy on clinical trial utilizing R-CHOP w/ Revlimid and Tafasitimab;  - Study mandated PET was due this week BUT in-pt     Simulation was performed yesterday (7/11). Plan for 2400 cGy over 8 fractions to be started in the next few days. Relapse / Progression: Large CNS mass - bx proven NHL 7/6/22. Ki-67>90%  - Plan to start XRT to brain mass to reduce size and then possible systemic chemotherapy. - Cont Decadron 4 mg IV Q 6 hours --> will NOT change to po, for now. - Cont Keppra   - Consult RadOnc - XRT started (7/12/22)   - Consult palliative care - following      2. ID: afebrile     3. Heme: Anemia and thrombocytopenia likely r/t recent chemotherapy  - Transfuse for Hgb < 7 and Platelets < 10 K.   - No transfusion today     4. Metabolic: Hyperglycemia 2/2 steroids + HypoMg + HypoPhos  - Cont IVFs: NS at 50mL/hr  - Replace K+ & Mg per PRN orders    5. GI / Nutrition:     - Cont diet and change to reg diet  - Recommend swabbing mouth after all meals. Must be completely awake for PO intake  - Dietary to follow closely     6. Pulm:  Interstitial lung disease  - In the course of his NHL tretatment, we uncovered interstitial lung disease. Dr Loida Alvarez reviewed chest CTs in the past and feels that he had evidence of pulmonary fibrosis before his lymphoma diagnosis. He completed a prednisone taper.   - Dyspnea is grade 1  - He had a bronchoscopy March 25 and PFTs April 1.  - He continues pulmonary rehabilitation  - He is seeing Dr. Jt Yu    7. Endo: T2DM exacerbated by steroids  - Cont lantus 10 units nightly  - Cont Humalog Med SSI AS/HS    8. Acute Debilitation: 2/2 CNS Lymphoma  - Consult PT/OT - [facility] 9 / 12  - Consult SLP: DAVID 7/5/2: poor swallow coordination with aspiration of thin liquid x1 with straw presentation. No aspiration occurred with thin liquid via tsp/cup, nectar thick liquid, puree or soft solid. Recommend continue soft and bite sized diet with thin liquids - cup only, small sips, sit fully upright and consistent oral care.    - Consult SW: Will likely require SNF vs ARU at d/c    - DVT Prophylaxis: Platelets >76,580 cells/dL, - daily lovenox prophylaxis ordered  Contraindications to pharmacologic prophylaxis: None  Contraindications to mechanical prophylaxis: None    - Disposition: Uncertain at this time    Amanda Jaimes MD

## 2022-07-13 NOTE — PLAN OF CARE
Problem: Neurosensory - Adult  Goal: Achieves stable or improved neurological status  Outcome: Progressing  Note: Pt A&O x3. Left side weakness noted. Neuro checks intact. Will continue to monitor. Problem: Skin/Tissue Integrity - Adult  Goal: Skin integrity remains intact  Outcome: Progressing  Note: No new issues with skin integrity noted this shift. Pt continues with pressure injury to coccyx and skin tear to elbow. Preventative measures in place. Pt is turned and repositioned q2h by nursing. Will continue to monitor. Problem: Hematologic - Adult  Goal: Maintains hematologic stability  Outcome: Progressing  Note: Patient's hemoglobin this AM:   Recent Labs     07/13/22  0329   HGB 11.3*     Patient's platelet count this AM:   Recent Labs     07/13/22  0329   PLT 79*    Thrombocytopenia Precautions in place. Patient showing no signs or symptoms of active bleeding. Transfusion not indicated at this time. Patient verbalizes understanding of all instructions. Will continue to assess and implement POC. Call light within reach and hourly rounding in place.

## 2022-07-13 NOTE — PROGRESS NOTES
4 Eyes Admission Assessment     I agree as the admission nurse that 2 RN's have performed a thorough Head to Toe Skin Assessment on the patient. ALL assessment sites listed below have been assessed on admission. Areas assessed by both nurses: Niki Keller/Reshma Adams  [x]   Head, Face, and Ears   [x]   Shoulders, Back, and Chest  [x]   Arms, Elbows, and Hands   [x]   Coccyx, Sacrum, and Ischium  [x]   Legs, Feet, and Heels        Does the Patient have Skin Breakdown?   Yes a wound was noted on the Admission Assessment and an LDA was Initiated documentation include the Asvi-wound, Wound Assessment, Measurements, Dressing Treatment, Drainage, and Color\",         Giorgio Prevention initiated:  Yes   Wound Care Orders initiated:  Yes      [id number]  nurse consulted for Pressure Injury (Stage 3,4, Unstageable, DTI, NWPT, and Complex wounds) or Giorgio score 18 or lower:  Yes      Nurse 1 eSignature: Electronically signed by Geovanni Hassan RN on 7/13/22 at 6:53 PM EDT    **SHARE this note so that the co-signing nurse is able to place an eSignature**    Nurse 2 eSignature: {Esignature:[id number]}

## 2022-07-13 NOTE — PROGRESS NOTES
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 Patient states he feels well overall. Patient states he did not have physical therapy today. He is alert. Eating well. He is accompanied by his wife. Past Medical History, Surgical History, Social History, Family History and Medications are reviewed in detail and updated in his chart. ROS:    Constitutional:  No weight loss, No fever, No chills, No night sweats. Mild fatigue.     ENT / Mouth:  No dysphagia, No hoarseness, No oral ulcers. No sore throat, No tinnitus, Normal hearing.     Cardiovascular:  No chest pain, No palpitations, No syncope, No upper extremity or lower extremity edema, No calf discomfort.     Respiratory:  No cough. No hemoptysis, No wheezing, No dyspnea.     Gastrointestinal:  No abdominal pain, No nausea, No vomiting, No constipation, No diarrhea, No melena, No dyspepsia.     Urinary:  No dysuria, No hematuria, No urinary incontinence.     Musculoskeletal:  No muscle pain, No bone pain. Left-sided weakness.     Skin:  No rash, No nodules, No pruritus, No lesions.     Neurologic:  No confusion, No seizures, No syncope, No tremor, No speech change, No headache, No sensory changes.   Left-sided weakness.     Psychiatric:  No depression, No anxiety, Concentration normal.     Endocrine:  No hot flashes, No thyroid symptoms.     Hematologic:  No epistaxis, No petechiae, No ecchymosis.     Lymphatic:  No lymphadenopathy, No lymphedema. Physical Exam:  BP (!) 120/57   Pulse 64   Temp 98.1 °F (36.7 °C) (Oral)   Resp 22   Ht 5' 6\" (1.676 m)   Wt 120 lb 2.4 oz (54.5 kg) Comment: without machiene on end of bed  SpO2 98%   BMI 19.39 kg/m²     GENERAL: No acute distress. Alert & oriented x3. Head:  Surgical incision right scalp well approximated no evidence of infection. Healing well. NECK: No thyromegaly or masses. LYMPHATICS:  No cervical, supraclavicular, infraclavicular or axillary lymphadenopathy. LUNGS:  Clear to auscultation bilaterally. No rales or wheezes. Good respiratory effort. HEART:  Regular rate and rhythm. ABDOMEN: Soft, ND, NT. No masses or hepatosplenomegaly. MUSCULOSKELETAL:  Muscle strength 4/5 times all 4 extremities however left side is weaker than the right. EXTREMETIES:  No edema. Radiology:  No results found for this or any previous visit. Results for orders placed during the hospital encounter of 07/04/22    MRI BRAIN W WO CONTRAST    Narrative  MRI of the brain with and without contrast dated 7/4/2022    Indication: new brain mass    Comparison: CT head 7/4/2022    Technical Factors: Standard multiplanar multisequence MRI of the brain was performed before and after intravenous administration of 12 mL of ProHance contrast.    Findings: There is a enhancing solid mass centered in the right basal ganglia extending into the right frontal lobe measuring 3.5 x 4.6 cm. There is marked surrounding vasogenic edema in the right frontotemporal lobe. There is 9 mm of right-to-left midline shift. Compression of the right ventricular system and third ventricle as seen on CT. Right-to-left subfalcine herniation and mass effect on the right midbrain and thalamus. No other mass is identified. Impression  1.   Enhancing 4.6 cm mass centered in the right basal ganglia with extensive surrounding edema in the right frontotemporal lobe. Findings highly concerning for malignancy. 2.  Mass results in 9 mm right to left midline shift, subfalcine herniation, and compression of the right thalamus and brainstem. Assessment and Plan:  Carmen Kunz is a 67 y.o. patient with above history of CNS lymphoma. Patient is recovering well from craniotomy. Denies complaints at this time. Patient has not started radiation therapy yet. Wife is concerned that this will hold up his discharge. Discussed that he is receiving a complex plan and sometimes these are more time-consuming to plan. Discussed with Dr. Mj Salazar. We will push to start radiation tomorrow afternoon. Plan remains 2400 cGy over 8 fractions. Please note this document has been produced using speech recognition software and may contain errors related to that system including errors in grammar, punctuation, and spelling, as well as words and phrases that may be inappropriate. If there are any questions or concerns please feel free to contact the dictating provider for clarification. A total of 35 minutes was spent on today's patient encounter. If applicable, non-patient-facing activities:     ( x  )   Preparing to see the patient and reviewing records     (   )   Individual interpretation of results      (   )   Discussion or coordination of care with other health care professionals     (   )   Ordering of unique tests, medications, or procedures     ( x  )   Documentation within the EHR        MARCIA Wright - CNP      This visit was completed in consultation with Dr. Mj Salazar.

## 2022-07-13 NOTE — PLAN OF CARE
Problem: Neurosensory - Adult  Goal: Achieves stable or improved neurological status  7/12/2022 2248 by Abel Sanz RN  Outcome: Progressing  7/12/2022 1454 by Marilyn Topete RN  Outcome: Progressing  Goal: Remains free of injury related to seizures activity  7/12/2022 2248 by Abel Sanz RN  Outcome: Progressing  7/12/2022 1454 by Marilyn Topete RN  Outcome: Progressing  Goal: Achieves maximal functionality and self care  7/12/2022 2248 by Abel Sanz RN  Outcome: Progressing  Flowsheets (Taken 7/12/2022 2248)  Achieves maximal functionality and self care:   Monitor swallowing and airway patency with patient fatigue and changes in neurological status   Encourage and assist patient to increase activity and self care with guidance from physical therapy/occupational therapy  7/12/2022 1454 by Marilyn Topete RN  Outcome: Progressing     Problem: Skin/Tissue Integrity - Adult  Goal: Skin integrity remains intact  7/12/2022 1454 by Marilyn Topete RN  Outcome: Progressing  Goal: Incisions, wounds, or drain sites healing without S/S of infection  7/12/2022 1454 by Marilyn Topete RN  Outcome: Progressing     Problem: Musculoskeletal - Adult  Goal: Return mobility to safest level of function  7/12/2022 1454 by Marilyn Topete RN  Outcome: Progressing  Goal: Maintain proper alignment of affected body part  7/12/2022 1454 by Marilyn Topete RN  Outcome: Progressing  Goal: Return ADL status to a safe level of function  7/12/2022 1454 by Marilyn Topete RN  Outcome: Progressing     Problem: Infection - Adult  Goal: Absence of infection at discharge  7/12/2022 1454 by Marilyn Topete RN  Outcome: Progressing  Goal: Absence of infection during hospitalization  7/12/2022 1454 by Marilyn Topete RN  Outcome: Progressing     Problem: Hematologic - Adult  Goal: Maintains hematologic stability  7/12/2022 1454 by Marilyn Topete RN  Outcome: Progressing

## 2022-07-14 PROBLEM — E43 SEVERE MALNUTRITION (HCC): Status: ACTIVE | Noted: 2022-01-01

## 2022-07-14 NOTE — PLAN OF CARE
Problem: Neurosensory - Adult  Goal: Achieves stable or improved neurological status  7/13/2022 2051 by Fortunato Saleem RN  Outcome: Progressing  7/13/2022 1856 by Man Blanchard RN  Outcome: Progressing  Note: Pt A&O x3. Left side weakness noted. Neuro checks intact. Will continue to monitor. Goal: Remains free of injury related to seizures activity  7/13/2022 2051 by Fortunato Saleem RN  Outcome: Progressing  7/13/2022 1856 by Man Blanchard RN  Outcome: Progressing  Goal: Achieves maximal functionality and self care  7/13/2022 2051 by Fortunato Saleem RN  Outcome: Progressing  7/13/2022 1856 by Man Blanchard RN  Outcome: Progressing     Problem: Skin/Tissue Integrity - Adult  Goal: Skin integrity remains intact  7/13/2022 2051 by Fortunato Saleem RN  Outcome: Progressing  7/13/2022 1856 by Man Blanchard RN  Outcome: Progressing  Note: No new issues with skin integrity noted this shift. Pt continues with pressure injury to coccyx and skin tear to elbow. Preventative measures in place. Pt is turned and repositioned q2h by nursing. Will continue to monitor.   Goal: Incisions, wounds, or drain sites healing without S/S of infection  7/13/2022 2051 by Fortunato Saleem RN  Outcome: Progressing  7/13/2022 1856 by Man Blanchard RN  Outcome: Progressing     Problem: Musculoskeletal - Adult  Goal: Return mobility to safest level of function  7/13/2022 2051 by Fortunato Saleem RN  Outcome: Progressing  7/13/2022 1856 by Man Blanchard RN  Outcome: Progressing  Goal: Maintain proper alignment of affected body part  7/13/2022 2051 by Fortunato Saleem RN  Outcome: Progressing  7/13/2022 1856 by Man Blanchard RN  Outcome: Progressing  Goal: Return ADL status to a safe level of function  7/13/2022 2051 by Fortunato Saleem RN  Outcome: Progressing  7/13/2022 1856 by Man Blanchard RN  Outcome: Progressing     Problem: Infection - Adult  Goal: Absence of infection at discharge  7/13/2022 2051 by Fortunato Saleem RN  Outcome: Progressing  7/13/2022 1856 by Kevin Conn RN  Outcome: Progressing  Goal: Absence of infection during hospitalization  7/13/2022 2051 by Zac Lauren RN  Outcome: Progressing  7/13/2022 1856 by Kevin Conn RN  Outcome: Progressing     Problem: Hematologic - Adult  Goal: Maintains hematologic stability  7/13/2022 2051 by Zac Lauren RN  Outcome: Progressing  7/13/2022 1856 by Kevin Conn RN  Outcome: Progressing  Note: Patient's hemoglobin this AM:   Recent Labs     07/13/22  0329   HGB 11.3*     Patient's platelet count this AM:   Recent Labs     07/13/22  0329   PLT 79*    Thrombocytopenia Precautions in place. Patient showing no signs or symptoms of active bleeding. Transfusion not indicated at this time. Patient verbalizes understanding of all instructions. Will continue to assess and implement POC. Call light within reach and hourly rounding in place.

## 2022-07-14 NOTE — PROGRESS NOTES
Comprehensive Nutrition Assessment    Type and Reason for Visit:  Initial    Nutrition Recommendations/Plan:   1. Soft and bite sized, low microbial diet free of therapeutic restrictions to promote adequate nutrition  2. Diet texture/liquid consistency per SLP- thin liquids by small sips, no straws  3. Add Glucerna shakes and magic cups BID  4. Encourage PO intakes, assist with meals  5. 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 XRT started on 7/12. Currently on soft and bite sized, 4 carb choice, low microbial diet with PO intakes %. Spoke with pt and wife at bedside. Wife reports pt has had a significant weight loss since dx around [place], UBW was 150#. CBW of 120#. Wife reports weight has recently stabilized around 116-118#. States pt has had excellent intakes recently, wife visits during most meals. Educated on low microbial diet and provided booklet, discussed not bringing in food prepared at home or restaurant food. Wife voiced understanding. Will remove carb control diet restriction to help promote PO intakes d/t severe malnutrition. Pt favorable to adding glucerna shakes and magic cups BID. Encouraged continued good PO intakes. Will continue to montior. Nutrition Related Findings:    Mg 1.40. BM 7/12.  Wound Type: Multiple,Stage I,Surgical Incision       Current Nutrition Intake & Therapies:    Average Meal Intake: %  Average Supplements Intake: None Ordered  ADULT DIET; Dysphagia - Soft and Bite Sized; 4 carb choices (60 gm/meal); Low Microbial    Anthropometric Measures:  Height: 5' 6\" (167.6 cm)  Ideal Body Weight (IBW): 142 lbs (65 kg)       Current Body Weight: 120 lb (54.4 kg), 84.5 % IBW.  Weight Source: Bed Scale  Current BMI (kg/m2): 19.4                          BMI Categories: Underweight (BMI less than 22) age over 72    Estimated Daily Nutrient Needs:  Energy Requirements Based On: Kcal/kg (30-35kcal/kg +250kcals to promote wt gain)  Weight Used for Energy Requirements: Current  Energy (kcal/day): 4123-3375  Weight Used for Protein Requirements: Current (1.5-1.8)  Protein (g/day): 83-99  Method Used for Fluid Requirements: 1 ml/kcal  Fluid (ml/day): 8509-6626    Nutrition Diagnosis:   · Severe malnutrition related to inadequate protein-energy intake,catabolic illness as evidenced by weight loss,severe muscle loss,severe loss of subcutaneous fat,poor intake prior to admission,BMI    Nutrition Interventions:   Food and/or Nutrient Delivery: Modify Current Diet,Start Oral Nutrition Supplement  Nutrition Education/Counseling: Education completed (low microbial diet)  Coordination of Nutrition Care: Continue to monitor while inpatient  Plan of Care discussed with: Patient, wife    Goals:     Goals: PO intake 75% or greater,prior to discharge       Nutrition Monitoring and Evaluation:   Behavioral-Environmental Outcomes: None Identified  Food/Nutrient Intake Outcomes: Food and Nutrient Intake,Supplement Intake  Physical Signs/Symptoms Outcomes: Biochemical Data,Nutrition Focused Physical Findings,Weight,Chewing or Swallowing    Discharge Planning:    Continue current diet,Continue Oral Nutrition Supplement     [address], [address]  Contact: [phone]

## 2022-07-14 NOTE — PROGRESS NOTES
Physical Therapy  Daily Treatment Note    Discharge Recommendations: Rogelio Lutz scored a 11/24 on the AM-PAC short mobility form. Current research shows that an AM-PAC score of 17 or less is typically not associated with a discharge to the patient's home setting. Based on the patient's AM-PAC score and their current functional mobility deficits, it is recommended that the patient have 3-5 sessions per week of Physical Therapy at d/c to increase the patient's independence. Please see assessment section for further patient specific details. Assessment:  Pt with improved tolerance for standing & taking steps today. Pt continues to demonstrate L UE/LE weakness, but pt able to use them functionally with increased effort. Pt needing up to 2 person assist for safe mobility at this time. Able to take a few steps with wheeled walker today. Would benefit from continued IP PT at D/C prior to returning home. Equipment Needs: Defer to next level of care    Chart Reviewed: Yes     Other position/activity restrictions: Up with Assistance   Additional Pertinent Hx: Pt is a 66 yo male that presents from home to the ED status post fall off the toliet 7/4. He stated that his grab bars broke and fell and hit his head but no LOC. Head MRI: Enhancing 4.6 cm mass centered in the right basal ganglia with extensive surrounding edema in the right frontotemporal lobe. Findings highly concerning for malignancy. Mass results in 9 mm right to left midline shift, subfalcine herniation, and compression of the right thalamus and brainstem. PMH: Cancer, Diabetes 2, Hyperliperdemia. Diagnosis: Intracranial Mass   Treatment Diagnosis: Decreased strength and mobility. Subjective: Pt in bed initially. Wife present. Agreeable to working with PT. Alert and in good spirits. Confusion noted at times during treatment. Talking to wife about \"yelling across the sanchez\" to his grandson.      Pain: No c/o voiced    Objective:    Bed mobility  Supine to sit: Mod assist x 1, HOB up partially with use of rail. Cues for technique. Scooting: Min assist to EOB    Transfers  Sit to stand: Dependent (Min assist x 2) from bed to walker; Dependent (Miin assist x 2) from bed to B hand held; Mod assist x 1 from chair  Stand to sit: Min assist x 1-2 onto bed (once) and into chair (twice). Cues for hand placement and controlled descent. Bed > chair: Dependent (MIn assist x 2) via B hand held assist. Stand-step transfer. Ambulation  Assistance Level: Dependent (Min assist x 2)  Assistive device: Wheeled walker  Distance: 2 ft forwards + 2 ft backwards  Quality of gait: Decreased step length; weak; effortful; slow  Other: Also 2 ft bed > chair with Min assist x 2, B hand held     Balance  Sat EOB ~ 10 minutes total. Pt needing Mod assist dynamically (pt performing some ADL activities with OT.) Needing Min to SBA statically. Cues for R hand in lap, as pt tends to push when R hand on bed. Cues for upright posture neck extension/midline. Pt continues to position head in R sidebend, rotation and flexion, but improved from last PT session. Static stance with wheeled walker Min assist x 1, up to 30 seconds x 2 trials. Cues for upright posture and forward gaze. L knee hyperextension noted in stance. Patient Education  Hand placement with transfers when using walker. Eccentric control with stand to sit. Demonstrated understanding with verbal/tactile cues. Safety Devices  Pt left with needs in reach. In chair (reclined) with chair alarm on. RN updated.      AM-PAC score  AM-PAC Inpatient Mobility Raw Score : 11  AM-PAC Inpatient T-Scale Score : 33.86  Mobility Inpatient CMS 0-100% Score: 72.57  Mobility Inpatient CMS G-Code Modifier : CL    Goals: (as determined and assessed by primary PT)  Time Frame for Short term goals: Discharge  Short term goal 1: Supine <> Sit CGA -ongoing   Short term goal 2: Sit<>Stand CGA -ongoing   Short term goal 3: Amb 20 ft with RW CGA -ongoing    Plan:  Plan: 5-7 times per week    Therapy Time    Individual  Concurrent  Group  Co-treatment    Time In  1143            Time Out  1231            Minutes  38              Timed Code Treatment Minutes: 38  Total Treatment Minutes: 38    Will continue per plan of care. If patient is discharged prior to next treatment, this note will serve as the discharge summary.     Ethan Burgess #[id number]

## 2022-07-14 NOTE — PROGRESS NOTES
Training: Yes  Sit to Stand: Minimum assistance;Assist X2 (Min A x2 from EOB and Mod Ax1 from chair.)  Bed to Chair: Minimum assistance;Assist X2 (HHA- stand step transfer EOB>chair towards R side)      Gait  Overall Level of Assistance: Minimum assistance;Assist X2  Interventions: Verbal cues  Speed/Ashley: Slow  Distance (ft): 2 Feet (forward/backward)  Assistive Device: Walker, rolling;Gait belt         ADL  Feeding: Setup  LE Dressing: Maximum assistance (A needed to thread BLEs and pull up fully pass hips on L side.)  Toileting: Dependent/Total (Disconnected from male purewick, DEP for all parts clean up. RN aware)             Safety Devices  Type of Devices: Left in chair;Call light within reach; Chair alarm in place;Nurse notified     Patient Education  Education Given To: Patient  Education Provided: Role of Therapy;Plan of Care;ADL Adaptive Strategies;Transfer Training; Fall Prevention Strategies  Education Method: Demonstration;Verbal  Barriers to Learning: None  Education Outcome: Verbalized understanding;Continued education needed    Goals  Short Term Goals  Time Frame for Short term goals: by dc  Short Term Goal 1: Pt will complete functional transfers w/ Min Ax1 not met  Short Term Goal 2: Pt will complete LE dressing w/ Min Ax1-not met  Short Term Goal 3: Pt will maintain sitting balance w/ CGA for 5min while engaging in ADL task- not met       Therapy Time   Individual Concurrent Group Co-treatment   Time In 1143         Time Out 1230         Minutes 47         Timed Code Treatment Minutes: [address], COY/L

## 2022-07-14 NOTE — ONCOLOGY
[address] 46    [phone]    DATE: 07/14/2022    AREA TREATED: BRAIN    DAILY DOSE: 300 cGy    CUMULATIVE DOSE: 300 cGy    # 1  OUT OF 8 TREATMENTS    NEXT PLANNED TREATMENT  DATE: 07/15/22    Daily Treatments are Monday through Friday:       INPATIENT CODING:  Beam Radiation /    Photons   Photon energy : 1-10mv

## 2022-07-14 NOTE — PROGRESS NOTES
4 Eyes Admission Assessment     I agree as the admission nurse that 2 RN's have performed a thorough Head to Toe Skin Assessment on the patient. ALL assessment sites listed below have been assessed on admission. Areas assessed by both nurses: [facility] HAMLET  [x]   Head, Face, and Ears   [x]   Shoulders, Back, and Chest  [x]   Arms, Elbows, and Hands   [x]   Coccyx, Sacrum, and Ischium  [x]   Legs, Feet, and Heels        Does the Patient have Skin Breakdown?   Yes a wound was noted on the Admission Assessment and an LDA was Initiated documentation include the Savi-wound, Wound Assessment, Measurements, Dressing Treatment, Drainage, and Color\",         Giorgio Prevention initiated:  Yes   Wound Care Orders initiated:  Yes      [id number]  nurse consulted for Pressure Injury (Stage 3,4, Unstageable, DTI, NWPT, and Complex wounds) or Giorgio score 18 or lower:  Yes      Nurse 1 eSignature: Electronically signed by Alyce Espinosa RN on 7/14/22 at 7:44 PM EDT    **SHARE this note so that the co-signing nurse is able to place an eSignature**    Nurse 2 eSignature: Electronically signed by Rosy Cleveland RN on 7/15/22 at 5:41 AM EDT

## 2022-07-14 NOTE — PROGRESS NOTES
[address] [facility] Progress Note    2022     Phyllis Ocasio    MRN: [id number]    : 1945    Referring MD: No referring provider defined for this encounter. SUBJECTIVE: He feels well with no new complaints. He cont to have weakness involving his left upper ext.      ECOG PS: (4) Completely disabled, unable to carry out self-care and confined to bed or chair    KPS: 40% Disabled; requires special care and assistance    Isolation: None    Medications    Scheduled Meds:   enoxaparin  40 mg SubCUTAneous Q24H    dexamethasone  4 mg IntraVENous Q6H    pantoprazole  40 mg IntraVENous Daily    levETIRAcetam  500 mg IntraVENous Q12H    insulin glargine  10 Units SubCUTAneous Nightly    sodium chloride flush  5-40 mL IntraVENous 2 times per day    fluticasone  1 spray Each Nostril Daily    insulin lispro  0-12 Units SubCUTAneous TID WC    insulin lispro  0-6 Units SubCUTAneous Nightly    ipratropium  2 spray Each Nostril 4x Daily    tamsulosin  0.8 mg Oral Daily     Continuous Infusions:   sodium chloride 50 mL/hr at 22 0924    sodium chloride 5 mL/hr at 22 1811    dextrose       PRN Meds:.magnesium sulfate, potassium chloride, sodium chloride flush, sodium chloride, oxyCODONE, methocarbamol **OR** methocarbamol IVPB, glucose, dextrose bolus **OR** dextrose bolus, glucagon (rDNA), dextrose, acetaminophen, ondansetron **OR** ondansetron    ROS:  As noted above, otherwise remainder of 10-point ROS negative    Physical Exam:     I&O:      Intake/Output Summary (Last 24 hours) at 2022 1053  Last data filed at 2022 4324  Gross per 24 hour   Intake 1884.25 ml   Output 2000 ml   Net -115.75 ml       Vital Signs:  BP (!) 107/57   Pulse 70   Temp 98.3 °F (36.8 °C) (Oral)   Resp 26   Ht 5' 6\" (1.676 m)   Wt 120 lb 2.4 oz (54.5 kg) Comment: without machiene on end of bed  SpO2 97%   BMI 19.39 kg/m²     Weight:    Wt Readings from Last 3 Encounters:   22 120 lb 2.4 oz (54.5 kg) 05/31/22 117 lb (53.1 kg)   05/04/22 119 lb (54 kg)       General: awake alert and oriented   HEENT: normocephalic, PERRL, no scleral erythema or icterus, Oral mucosa moist and intact, throat clear  NECK: supple   BACK: Straight   SKIN: warm dry and intact without lesions rashes or masses  CHEST: CTA bilaterally without use of accessory muscles  CV: Normal S1 S2, RRR, no MRG  ABD: NT ND normoactive BS, no palpable masses or hepatosplenomegaly  EXTREMITIES: without edema, denies calf tenderness  NEURO: CN II - XII grossly intact, motor weakness of his left upper and lower ext - unchanged from yesterday   CATHETER: R SL PAC    Data    CBC:   Recent Labs     07/12/22 0344 07/13/22 0329 07/14/22 0345   WBC 5.0 5.5 6.1   HGB 10.7* 11.3* 11.2*   HCT 32.6* 33.1* 34.6*   .2* 97.8 99.7   PLT 86* 79* 80*     BMP/Mag:  Recent Labs     07/12/22 0344 07/13/22 0329 07/14/22 0345    137 136   K 4.3 4.1 4.2    100 99   CO2 26 28 26   PHOS  --  2.5  --    BUN 22* 18 19   CREATININE <0.5* <0.5* <0.5*   MG  --   --  1.40*     LIVP:   Recent Labs     07/13/22 0329   AST 52*   ALT 84*   BILIDIR <0.2   BILITOT 0.3   ALKPHOS 131*     Coags:   Recent Labs     07/13/22 0329 07/14/22 0345   PROTIME  --  14.5   INR  --  1.13   APTT 24.3  --      Uric Acid   Recent Labs     07/13/22 0329   LABURIC 3.3*     DIAGNOSTIC:  1. CT Head 7/4:  1.  Heterogeneous 3.9 cm masslike lesion centered in the right basal ganglia/frontal lobe with extensive surrounding edema. This is concerning for a primary or metastatic malignancy. Focal subacute intraparenchymal hemorrhage, hemorrhage within the mass,    or infection are differential considerations. Brain MRI with and without contrast and neurosurgery consult recommended.    2.  Extensive mass effect with 9 mm right to left midline shift, subfalcine herniation and suspected partial transtentorial herniation resulting in effacement of the right frontal horn and body, and mass effect on midbrain. 2. MRI Brain 7/4:  1.  Enhancing 4.6 cm mass centered in the right basal ganglia with extensive surrounding edema in the right frontotemporal lobe. Findings highly concerning for malignancy. 2.  Mass results in 9 mm right to left midline shift, subfalcine herniation, and compression of the right thalamus and brainstem. 3. CT C/A/P 7/5:  CHEST:  1. No evidence of any thoracic metastatic disease. 2. Stable changes of pulmonary fibrosis. A/P:  IMPRESSION:   1. Interval reduction in size of hepatic mass. 2. Slight interval reduction in size of low-attenuation lesion within the spleen. 3. No evidence of any adrenal mass. 4. No evidence of any abdominal or pelvic lymphadenopathy. 5. Uncomplicated sigmoid colon diverticulosis. 4. CT Head 7/8:  1.  Status post biopsy of right frontal lobe mass with unchanged mild acute hemorrhage along the biopsy tract and unchanged 12 mm leftward subfalcine herniation. 5. CT Head 7/10:  Status post biopsy of right frontal lobe mass with unchanged mild acute hemorrhage along the biopsy tract and unchanged 12 mm leftward subfalcine herniation. 4. MBS 7/5/22:    PATHOLOGY:  1. Right Frontal Brain Mass Bx 7/6/22: Involved with malignant B-cell lymphoma with aggressive morphologic   features. COMMENT: The histologic and immunohistochemical changes are   supportive of a malignant B-cell lymphoma, large cell with aggressive   morphologic features based on the high Ki-67 proliferative index.  The   patient's prior liver lymphoma diagnosis ( [address] [place] [address]   [place] supporting an aggressive B-cell lymphoma is noted.  FISH for   the aggressive B-cell lymphoma panel and cytogenetics are currently   pending and the results of the studies will be reflected in supplemental   Reports    FISH: Pending    PROBLEM LIST:             1.  DLBCL-   2. Interstitial pneumonia  3. DM2  4.   H/O malignant hypercalcemia      TREATMENT: 1.  Initial therapy on protocol [id number] R-CHOP +/- Tafasitamab/Lenalidamide Jan 2022 - July 2022    ASSESSMENT AND PLAN:           1. 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 C-Myc was mutated but BCL-2 and BCL 6 were  not. - GCB phenotype  - S/p initial therapy on clinical trial utilizing R-CHOP w/ Revlimid and Tafasitimab;  - Study mandated PET was due this week BUT in-pt     Simulation was performed yesterday (7/11). Plan for 2400 cGy over 8 fractions to be started today     Relapse / Progression: Large CNS mass - bx proven NHL 7/6/22. Ki-67>90%  - Plan to start XRT to brain mass to reduce size and then possible systemic chemotherapy. - Cont Decadron 4 mg IV Q 6 hours --> will NOT change to po, for now. - Cont Keppra   - Consult [facility] - XRT started (7/12/22)   - Consult palliative care - following      2. ID: afebrile     3. Heme: Anemia and thrombocytopenia likely r/t recent chemotherapy  - Transfuse for Hgb < 7 and Platelets < 10 K.   - No transfusion today     4. Metabolic: Hyperglycemia 2/2 steroids + HypoMg + HypoPhos  - Cont IVFs: NS at 50mL/hr  - Replace K+ & Mg per PRN orders    5. GI / Nutrition:     - Cont diet and change to reg diet  - Recommend swabbing mouth after all meals. Must be completely awake for PO intake  - Dietary to follow closely     6. Pulm:  Interstitial lung disease  - In the course of his NHL tretatment, we uncovered interstitial lung disease. Dr Zach Vergara reviewed chest CTs in the past and feels that he had evidence of pulmonary fibrosis before his lymphoma diagnosis.  He completed a prednisone taper.  - Dyspnea is grade 1  - He had a bronchoscopy March 25 and PFTs April 1.  - He continues pulmonary rehabilitation  - He is seeing [facility]    7. Endo: T2DM exacerbated by steroids  - Cont lantus 10 units nightly  - Cont Humalog Med SSI AS/HS    8. Acute Debilitation: 2/2 CNS Lymphoma    - Consult PT/OT - [facility] 9 / 12 - appreciate input     - Consult SLP: DAVID 7/5/2: poor swallow coordination with aspiration of thin liquid x1 with straw  presentation. No aspiration occurred with thin liquid via tsp/cup, nectar thick liquid, puree or soft  solid. Recommend continue soft and bite sized diet with thin liquids - cup only, small sips, sit fully  upright and consistent oral care.      - Consult SW: Will likely require SNF vs ARU at d/c    - DVT Prophylaxis: Platelets >20,425 cells/dL, - daily lovenox prophylaxis ordered  Contraindications to pharmacologic prophylaxis: None  Contraindications to mechanical prophylaxis: None    - Disposition: Uncertain at this time but possibly early next wk    Facundo Nunn MD

## 2022-07-15 NOTE — PROGRESS NOTES
[facility] [facility] [address] Progress Note    7/15/2022     Patsey Habermann    MRN: [id number]    : 1945    Referring MD: No referring provider defined for this encounter. SUBJECTIVE: He feels well with no new complaints. He cont to have weakness involving his left upper ext.      ECOG PS: (4) Completely disabled, unable to carry out self-care and confined to bed or chair    KPS: 40% Disabled; requires special care and assistance    Isolation: None    Medications    Scheduled Meds:   enoxaparin  40 mg SubCUTAneous Q24H    dexamethasone  4 mg IntraVENous Q6H    pantoprazole  40 mg IntraVENous Daily    levETIRAcetam  500 mg IntraVENous Q12H    insulin glargine  10 Units SubCUTAneous Nightly    sodium chloride flush  5-40 mL IntraVENous 2 times per day    fluticasone  1 spray Each Nostril Daily    insulin lispro  0-12 Units SubCUTAneous TID WC    insulin lispro  0-6 Units SubCUTAneous Nightly    ipratropium  2 spray Each Nostril 4x Daily    tamsulosin  0.8 mg Oral Daily     Continuous Infusions:   sodium chloride 50 mL/hr at 07/15/22 0438    sodium chloride 5 mL/hr at 22 1811    dextrose       PRN Meds:.magnesium sulfate, potassium chloride, sodium chloride flush, sodium chloride, oxyCODONE, methocarbamol **OR** methocarbamol IVPB, glucose, dextrose bolus **OR** dextrose bolus, glucagon (rDNA), dextrose, acetaminophen, ondansetron **OR** ondansetron    ROS:  As noted above, otherwise remainder of 10-point ROS negative    Physical Exam:     I&O:      Intake/Output Summary (Last 24 hours) at 7/15/2022 0946  Last data filed at 7/15/2022 0700  Gross per 24 hour   Intake 2378 ml   Output 700 ml   Net 1678 ml         Vital Signs:  BP (!) 151/69   Pulse 55   Temp 97.2 °F (36.2 °C) (Oral)   Resp 22   Ht 5' 6\" (1.676 m)   Wt 120 lb 2.4 oz (54.5 kg) Comment: without machiene on end of bed  SpO2 100%   BMI 19.39 kg/m²     Weight:    Wt Readings from Last 3 Encounters:   22 120 lb 2.4 oz (54.5 kg)   22 117 lb (53.1 kg)   05/04/22 119 lb (54 kg)       General: awake alert and oriented   HEENT: normocephalic, PERRL, no scleral erythema or icterus, Oral mucosa moist and intact, throat clear  NECK: supple   BACK: Straight   SKIN: warm dry and intact without lesions rashes or masses  CHEST: CTA bilaterally without use of accessory muscles  CV: Normal S1 S2, RRR, no MRG  ABD: NT ND normoactive BS, no palpable masses or hepatosplenomegaly  EXTREMITIES: without edema, denies calf tenderness  NEURO: CN II - XII grossly intact, motor weakness of his left upper and lower ext - unchanged from yesterday   CATHETER: R SL PAC    Data    CBC:   Recent Labs     07/13/22  0329 07/14/22  0345 07/15/22  0440   WBC 5.5 6.1 7.1   HGB 11.3* 11.2* 11.1*   HCT 33.1* 34.6* 34.0*   MCV 97.8 99.7 99.9   PLT 79* 80* 79*       BMP/Mag:  Recent Labs     07/13/22  0329 07/14/22  0345 07/15/22  0440    136 135*   K 4.1 4.2 4.5    99 98*   CO2 28 26 26   PHOS 2.5  --  2.8   BUN 18 19 20   CREATININE <0.5* <0.5* <0.5*   MG  --  1.40*  --        LIVP:   Recent Labs     07/13/22  0329 07/15/22  0440   AST 52* 50*   ALT 84* 93*   BILIDIR <0.2 <0.2   BILITOT 0.3 0.3   ALKPHOS 131* 125       Coags:   Recent Labs     07/13/22  0329 07/14/22  0345 07/15/22  0440   PROTIME  --  14.5  --    INR  --  1.13  --    APTT 24.3  --  24.8       Uric Acid   Recent Labs     07/13/22  0329 07/15/22  0440   LABURIC 3.3* 3.5       DIAGNOSTIC:  1. CT Head 7/4:  1. Heterogeneous 3.9 cm masslike lesion centered in the right basal ganglia/frontal lobe with extensive surrounding edema. This is concerning for a primary or metastatic malignancy. Focal subacute intraparenchymal hemorrhage, hemorrhage within the mass,    or infection are differential considerations. Brain MRI with and without contrast and neurosurgery consult recommended.    2.  Extensive mass effect with 9 mm right to left midline shift, subfalcine herniation and suspected partial transtentorial herniation resulting in effacement of the right frontal horn and body, and mass effect on midbrain. 2. MRI Brain 7/4:  1. Enhancing 4.6 cm mass centered in the right basal ganglia with extensive surrounding edema in the right frontotemporal lobe. Findings highly concerning for malignancy. 2.  Mass results in 9 mm right to left midline shift, subfalcine herniation, and compression of the right thalamus and brainstem. 3. CT C/A/P 7/5:  CHEST:  1. No evidence of any thoracic metastatic disease. 2. Stable changes of pulmonary fibrosis. A/P:  IMPRESSION:   1. Interval reduction in size of hepatic mass. 2. Slight interval reduction in size of low-attenuation lesion within the spleen. 3. No evidence of any adrenal mass. 4. No evidence of any abdominal or pelvic lymphadenopathy. 5. Uncomplicated sigmoid colon diverticulosis. 4. CT Head 7/8:  1.  Status post biopsy of right frontal lobe mass with unchanged mild acute hemorrhage along the biopsy tract and unchanged 12 mm leftward subfalcine herniation. 5. CT Head 7/10:  Status post biopsy of right frontal lobe mass with unchanged mild acute hemorrhage along the biopsy tract and unchanged 12 mm leftward subfalcine herniation. 4. MBS 7/5/22:    PATHOLOGY:  1. Right Frontal Brain Mass Bx 7/6/22: Involved with malignant B-cell lymphoma with aggressive morphologic   features. COMMENT: The histologic and immunohistochemical changes are   supportive of a malignant B-cell lymphoma, large cell with aggressive   morphologic features based on the high Ki-67 proliferative index. The   patient's prior liver lymphoma diagnosis ( [address]    [place]-) supporting an aggressive B-cell lymphoma is noted. FISH for   the aggressive B-cell lymphoma panel and cytogenetics are currently   pending and the results of the studies will be reflected in supplemental   Reports    FISH: Pending    PROBLEM LIST:             1.  DLBCL-   2.   Interstitial pneumonia  3. DM2  4. H/O malignant hypercalcemia      TREATMENT:            1.  Initial therapy on protocol [id number] R-CHOP +/- Tafasitamab/Lenalidamide Jan 2022 - July 2022    ASSESSMENT AND PLAN:           1. 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 C-Myc was mutated but BCL-2 and BCL 6 were  not. - GCB phenotype  - S/p initial therapy on clinical trial utilizing R-CHOP w/ Revlimid and Tafasitimab;  - Study mandated PET was due this week BUT in-pt     Simulation was performed (7/11). Plan for 2400 cGy over 8 fractions to be started 7/14/22     Relapse / Progression: Large CNS mass - bx proven NHL 7/6/22. Ki-67>90%  - Plan to start XRT to brain mass to reduce size and then possible systemic chemotherapy. - Cont Decadron 4 mg IV Q 6 hours --> will change to PO starting sat (7/16)   - Cont Keppra  BUT change to PO starting sat (7/16)   - Consult RadOnc - XRT started (7/13/22)   - Consult palliative care - following      2. ID: afebrile     3. Heme: Anemia and thrombocytopenia likely r/t recent chemotherapy  - Transfuse for Hgb < 7 and Platelets < 10 K  - No transfusion today     4. Metabolic: hypoNa + abnl LFTs   - Cont IVFs: NS at 50mL/hr  - Replace K+ & Mg per PRN orders    5. GI / Nutrition:     - Cont diet and change to reg diet  - Recommend swabbing mouth after all meals. Must be completely awake for PO intake  - Dietary to follow closely     6. Pulm:  Interstitial lung disease  - In the course of his NHL tretatment, we uncovered interstitial lung disease. Dr Loida Alvarez reviewed chest CTs in the past and feels that he had evidence of pulmonary fibrosis before his lymphoma diagnosis.  He completed a prednisone taper.  - Dyspnea is grade 1  - He had a bronchoscopy March 25 and PFTs April 1.  - He continues pulmonary rehabilitation  - He is seeing Dr. Leblanc Mo    7. Endo: T2DM exacerbated by steroids  - Cont lantus 10 units nightly  - Cont Humalog Med SSI AS/HS    8. Acute Debilitation: 2/2 CNS Lymphoma    - Consult PT/OT - [facility] 9 / 12 - appreciate input     - Consult SLP: DAVID 7/5/2: poor swallow coordination with aspiration of thin liquid x1 with straw  presentation. No aspiration occurred with thin liquid via tsp/cup, nectar thick liquid, puree or soft  solid. Recommend continue soft and bite sized diet with thin liquids - cup only, small sips, sit fully  upright and consistent oral care.      - DVT Prophylaxis: Platelets >52,105 cells/dL, - daily lovenox prophylaxis ordered  Contraindications to pharmacologic prophylaxis: None  Contraindications to mechanical prophylaxis: None    - Disposition: Uncertain at this time but possibly early next wk once off all IVs (ARU vs NH vs hm)     wTin Toscano MD

## 2022-07-15 NOTE — PROGRESS NOTES
Palliative Care Chart Review  and Check in Note:     NAME:  Nilsa Kahn Date: 7/4/2022  Hospital Day:  Hospital Day: 12   Current Code status: Limited    Palliative care is continuing to following Mr. Chino Bryant for symptom management,  and goals of care discussion as needed. Patient's chart reviewed today 7/15/22. Visited with pt's wife at the bedside while pt slept. She reports he's doing ok with radiation other than pressure to his nose. Offered her emotional support and encouraged self care. The following are the currently established goals/code status, and Symptom management. Goals of care: Pt wants to continue radiation and go to SNF in hopes of being able to return home with his wife.     Code status: DNR/DNI (Limited- no to all interventions)    Discharge plan: D/c to SNF when medically ready    Nicola Mcfadden NP  [address]

## 2022-07-15 NOTE — PLAN OF CARE
Problem: Neurosensory - Adult  Goal: Remains free of injury related to seizures activity  Outcome: Progressing  No seizures noted today. Problem: Skin/Tissue Integrity - Adult  Goal: Incisions, wounds, or drain sites healing without S/S of infection  Outcome: Progressing  No signs or symptoms of infection noted. Problem: Musculoskeletal - Adult  Goal: Return mobility to safest level of function  Outcome: Progressing  Pt ambulated with PT to the chair this shift. Problem: Infection - Adult  Goal: Absence of infection during hospitalization  Outcome: Progressing  CVC site remains free of signs/symptoms of infection. No drainage, edema, erythema, pain, or warmth noted at site. Dressing changes continue per protocol and on an as needed basis - see flowsheet. Performed CHG bath today per [facility] protocol utilizing Bed bath with CHG wipes. CVC site cleansed with CHG wipe over dressing, skin surrounding dressing, and first 6\" of IV tubing. Pt tolerated well. Continued to encourage daily CHG bathing per [facility] protocol. Problem: Nutrition Deficit:  Goal: Optimize nutritional status  Outcome: Progressing  Pt educated on importance of staying hydrated and having as much intake as possible. Verbalized understanding. Will continue to monitor.

## 2022-07-15 NOTE — PROGRESS NOTES
Pt reported redness and pain on his nose, which he attributed to his mask from radiation. Pt was hoping the radiation techs would be about to do something to prevent that for his next treatment. Radiation  notified and will inform night shift nurse.

## 2022-07-15 NOTE — PROGRESS NOTES
4 Eyes Admission Assessment     I agree as the admission nurse that 2 RN's have performed a thorough Head to Toe Skin Assessment on the patient. ALL assessment sites listed below have been assessed on admission. Areas assessed by both nurses: Denise Beath and [place]  [x]   Head, Face, and Ears   [x]   Shoulders, Back, and Chest  [x]   Arms, Elbows, and Hands   [x]   Coccyx, Sacrum, and Ischium  [x]   Legs, Feet, and Heels        Does the Patient have Skin Breakdown?   Yes a wound was noted on the Admission Assessment and an LDA was Initiated documentation include the Savi-wound, Wound Assessment, Measurements, Dressing Treatment, Drainage, and Color\",         Giorgio Prevention initiated:  Yes   Wound Care Orders initiated:  Yes      [address]  nurse consulted for Pressure Injury (Stage 3,4, Unstageable, DTI, NWPT, and Complex wounds) or Giorgio score 18 or lower:  Yes      Nurse 1 eSignature: Electronically signed by Gt Ellis RN on 7/15/22 at 5:43 AM EDT    **SHARE this note so that the co-signing nurse is able to place an eSignature**    Nurse 2 eSignature: Electronically signed by Beata Obrien RN on 7/15/22 at 3:41 PM EDT

## 2022-07-15 NOTE — PROGRESS NOTES
Occupational Therapy  Facility/Department: [facility]  Occupational Therapy Treatment    Name: Phyllis Ocasio  : 1945  MRN: [id number]  Date of Service: 7/15/2022    Discharge Recommendations: Phyllis Ocasio scored a 14/24 on the AM-PAC ADL Inpatient form. Current research shows that an AM-PAC score of 17 or less is typically not associated with a discharge to the patient's home setting. Based on the patient's AM-PAC score and their current ADL deficits, it is recommended that the patient have 3-5 sessions per week of Occupational Therapy at d/c to increase the patient's independence. Please see assessment section for further patient specific details. If patient discharges prior to next session this note will serve as a discharge summary. Please see below for the latest assessment towards goals. OT Equipment Recommendations  Other: defer to next level of care       Patient Diagnosis(es): The primary encounter diagnosis was Brain mass. Diagnoses of Intracranial mass and Aphasia were also pertinent to this visit. Past Medical History:  has a past medical history of Benign prostatic hyperplasia with weak urinary stream, Cancer ([address] Erectile dysfunction, History of gout, History of thrombocytopenia, Hypercalcemia, Hyperlipidemia, Hypertension, Lung nodule, Proteinuria, Type 2 diabetes mellitus without complication ([address] and Vitamin D deficiency. Past Surgical History:  has a past surgical history that includes Finger trigger release (Right, ); Tonsillectomy and adenoidectomy (age 2); Elbow fracture surgery (Left, age 5); Vasectomy (1971); Cataract removal with implant (Bilateral, ); Colonoscopy (3/29/2011); Colonoscopy (2016); IR PORT PLACEMENT > 5 YEARS (2022); bronchoscopy (N/A, 3/25/2022); and craniotomy (Right, 2022).     Treatment Diagnosis: impaired ADLs and functional mobility/transfers      Assessment   Performance deficits / Impairments: Decreased gaze mostly to R side, decreased attention to L     Social/Functional History  Social/Functional History  Lives With: Spouse  Type of Home: Condo  Home Layout: Two level, Able to Live on Main level with bedroom/bathroom, Performs ADL's on one level  Home Access: Stairs to enter with rails  Entrance Stairs - Number of Steps: 3 efren from garage w/ hand rail on R  Entrance Stairs - Rails: Right  Bathroom Shower/Tub: Walk-in shower, Shower chair without back  Bathroom Toilet: Handicap height (\"chair height\" toilets w/ hand rails attached)  Bathroom Equipment: Grab bars in shower  Home Equipment: Rollator  Has the patient had two or more falls in the past year or any fall with injury in the past year?: Yes (Pt's wife reports about 5 falls in the last 6 months)  ADL Assistance: Needs assistance (last week and a half, wife has been helping w ADLs. Prior to that he was mostly independent)  Homemaking Assistance: Needs assistance (wife does cooking, cleaning and laundry)  Homemaking Responsibilities: No  Ambulation Assistance: Needs assistance (last week and a half has needed wife to help w/ ambulation and uses rollator)  Transfer Assistance: Needs assistance (has needed assist for the last week and a half)  Active : No  Occupation: Retired  Additional Comments: wife has been providing 24hr A for the last week and a half                  Safety Devices  Type of Devices: Left in chair;Call light within reach; Chair alarm in place;Nurse notified;Gait belt; All fall risk precautions in place; Patient at risk for falls  Bed Mobility Training  Bed Mobility Training: Yes  Supine to Sit: Moderate assistance;Assist X1  Scooting:  Moderate assistance;Maximum assistance;Assist X1  Balance  Sitting: With support (Min to [address] and poserior lean)  Standing: With support  Transfer Training  Transfer Training: Yes  Sit to Stand: Minimum assistance;Assist X1  Bed to Chair: Minimum assistance;Assist X2  Gait  Overall Level of Assistance: Minimum assistance;Assist X2 (amb bed to recliner chair--> standard chair ~10-15'--> back to recliner chair with RW and Edwardo x2)  Assistive Device: Walker, rolling;Gait belt        ADL  Feeding: Setup  Grooming: Setup;Stand by assistance (wipe face)  LE Bathing: Maximum assistance  LE Dressing: Maximum assistance  Toileting: Maximum assistance  Toileting Skilled Clinical Factors: incont urine, brief change, pericare                 Cognition  Overall Cognitive Status: Exceptions  Arousal/Alertness: Appropriate responses to stimuli;Delayed responses to stimuli  Following Commands: Follows one step commands with repetition; Follows one step commands with increased time  Attention Span: Attends with cues to redirect  Memory: Appears intact  Insights: Decreased awareness of deficits  Initiation: Requires cues for some  Sequencing: Requires cues for some  Orientation  Overall Orientation Status: Within Functional Limits               A/AROM Exercises: AAROM LUE shoulder flex/ext to 90 degrees, elbow flex/ext, wrist flex/ext, open and close hand 5x  Education Given To: Patient  Education Provided: Role of Therapy;Plan of Care;ADL Adaptive Strategies;Transfer Training; Fall Prevention Strategies  Education Method: Demonstration;Verbal  Barriers to Learning: None  Education Outcome: Verbalized understanding;Continued education needed                          AM-PAC Score        AM-[facility] Inpatient Daily Activity Raw Score: 14 (07/15/22 1241)  AM-PAC Inpatient ADL T-Scale Score : 33.39 (07/15/22 1241)  ADL Inpatient CMS 0-100% Score: 59.67 (07/15/22 1241)  ADL Inpatient CMS G-Code Modifier : CK (07/15/22 1241)    Goals  Short Term Goals  Time Frame for Short term goals: by dc  Short Term Goal 1: Pt will complete functional transfers w/ Min Ax1 not met  Short Term Goal 2: Pt will complete LE dressing w/ Min Ax1-not met  Short Term Goal 3: Pt will maintain sitting balance w/ CGA for 5min while engaging in ADL task- not met Therapy Time   Individual Concurrent Group Co-treatment   Time In 1115         Time Out 1153         Minutes 38             Timed Code Treatment Minutes:   38    Total Treatment Minutes:  [address], OT

## 2022-07-15 NOTE — ONCOLOGY
[address]     [phone]     DATE: 07/15/2022     AREA TREATED: BRAIN     DAILY DOSE: 300 cGy     CUMULATIVE DOSE: 600 cGy     # 2  OUT OF 8 TREATMENTS     NEXT PLANNED TREATMENT  DATE: 07/18/22     Daily Treatments are Monday through Friday:        INPATIENT CODING:  Beam Radiation /    Photons  Photon energy : 1-10mv

## 2022-07-15 NOTE — PLAN OF CARE
Problem: Neurosensory - Adult  Goal: Remains free of injury related to seizures activity  Outcome: Progressing  No seizure activity noted this shift. Problem: Skin/Tissue Integrity - Adult  Goal: Incisions, wounds, or drain sites healing without S/S of infection  Outcome: Progressing  No signs or symptoms on infection noted around incision site on scalp. Problem: Musculoskeletal - Adult  Goal: Return mobility to safest level of function  Outcome: Progressing  Pt ambulated approximately 10 feet with PT/OT this shift. Problem: Infection - Adult  Goal: Absence of infection during hospitalization  Outcome: Progressing  CVC site remains free of signs/symptoms of infection. No drainage, edema, erythema, pain, or warmth noted at site. Dressing changes continue per protocol and on an as needed basis - see flowsheet. erformed CHG bath today per [facility] protocol utilizing Bed bath with CHG wipes. CVC site cleansed with CHG wipe over dressing, skin surrounding dressing, and first 6\" of IV tubing. Pt tolerated well. Continued to encourage daily CHG bathing per [address] protocol. Problem: Hematologic - Adult  Goal: Maintains hematologic stability  Outcome: Progressing  Patient's hemoglobin this AM:   Recent Labs     07/15/22  0440   HGB 11.1*     Patient's platelet count this AM:   Recent Labs     07/15/22  0440   PLT 79*    Thrombocytopenia Precautions in place. Patient showing no signs or symptoms of active bleeding. Transfusion not indicated at this time. Patient verbalizes understanding of all instructions. Will continue to assess and implement POC. Call light within reach and hourly rounding in place. Problem: Nutrition Deficit:  Goal: Optimize nutritional status  Outcome: Progressing  Pt is able to feed himself with set-up. Pt educated on importance of staying hydrated and having as much intake as possible. Verbalized understanding. Will continue to monitor.

## 2022-07-15 NOTE — PROGRESS NOTES
Physical Therapy  Facility/Department: [address]  Physical Therapy Daily Treatment Note    Name: Christina Almanzar  : 1945  MRN: [id number]  Date of Service: 7/15/2022    Discharge Recommendations:       Christina Almanzar scored a 12/24 on the AM-PAC short mobility form. Current research shows that an AM-PAC score of 17 or less is typically not associated with a discharge to the patient's home setting. Based on the patient's AM-PAC score and their current functional mobility deficits, it is recommended that the patient have 3-5 sessions per week of Physical Therapy at d/c to increase the patient's independence. Please see assessment section for further patient specific details. If patient discharges prior to next session this note will serve as a discharge summary. Please see below for the latest assessment towards goals. Patient Diagnosis(es): The primary encounter diagnosis was Brain mass. Diagnoses of Intracranial mass and Aphasia were also pertinent to this visit. Past Medical History:  has a past medical history of Benign prostatic hyperplasia with weak urinary stream, Cancer ([address] Erectile dysfunction, History of gout, History of thrombocytopenia, Hypercalcemia, Hyperlipidemia, Hypertension, Lung nodule, Proteinuria, Type 2 diabetes mellitus without complication ([address] and Vitamin D deficiency. Past Surgical History:  has a past surgical history that includes Finger trigger release (Right, ); Tonsillectomy and adenoidectomy (age 2); Elbow fracture surgery (Left, age 5); Vasectomy (1971); Cataract removal with implant (Bilateral, ); Colonoscopy (3/29/2011); Colonoscopy (2016); IR PORT PLACEMENT > 5 YEARS (2022); bronchoscopy (N/A, 3/25/2022); and craniotomy (Right, 2022). Assessment   Assessment: Decreased assist for transfers. Increased gt endurance. Needing min assist x 1-2 for transfers/gt.   Min to mod assist for sitting/standing balance with poor awarenss of midline. At risk for falls and not safe to get up alone. Continues to be below baseline function. Rec cont skilled PT to maximize mobility and independence. Requires PT Follow-Up: Yes     Plan   Plan  Plan: 5-7 times per week  Current Treatment Recommendations: Strengthening, Functional mobility training, Gait training, Neuromuscular re-education, Safety education & training, Patient/Caregiver education & training  Safety Devices  Type of Devices: Left in chair, Call light within reach, Chair alarm in place, Nurse notified     Restrictions  Position Activity Restriction  Other position/activity restrictions: Up with Assistance     Subjective   General  Chart Reviewed: Yes  Additional Pertinent Hx: Pt is a 66 yo male that presents from home to the ED status post fall off the toliet 7/4. He stated that his grab bars broke and fell and hit his head but no LOC. Head MRI: Enhancing 4.6 cm mass centered in the right basal ganglia with extensive surrounding edema in the right frontotemporal lobe. Findings highly concerning for malignancy. Mass results in 9 mm right to left midline shift, subfalcine herniation, and compression of the right thalamus and brainstem. PMH: Cancer, Diabetes 2, Hyperliperdemia. Family / Caregiver Present: Yes (wife)  Subjective  Subjective: Pt found supine. Agreeable to PT. \"I'm ready for the segway. \"  Denies pain         Social/Functional History  Social/Functional History  Lives With: Spouse  Type of Home: Condo  Home Layout: Two level, Able to Live on Main level with bedroom/bathroom, Performs ADL's on one level  Home Access: Stairs to enter with rails  Entrance Stairs - Number of Steps: 3 efren from garage w/ hand rail on R  Entrance Stairs - Rails: Right  Bathroom Shower/Tub: Walk-in shower, Shower chair without back  Bathroom Toilet: Handicap height (\"chair height\" toilets w/ hand rails attached)  Bathroom Equipment: Grab bars in shower  Home Equipment: Rollator  Has the patient had two or more falls in the past year or any fall with injury in the past year?: Yes (Pt's wife reports about 5 falls in the last 6 months)  ADL Assistance: Needs assistance (last week and a half, wife has been helping w ADLs. Prior to that he was mostly independent)  Homemaking Assistance: Needs assistance (wife does cooking, cleaning and laundry)  Homemaking Responsibilities: No  Ambulation Assistance: Needs assistance (last week and a half has needed wife to help w/ ambulation and uses rollator)  Transfer Assistance: Needs assistance (has needed assist for the last week and a half)  Active : No  Occupation: Retired  Additional Comments: wife has been providing 24hr A for the last week and a half  Vision/Hearing  Hearing  Hearing: Exceptions to [facility]  Hearing Exceptions: Hard of hearing/hearing concerns    Cognition   Orientation  Overall Orientation Status: Within Functional Limits     Objective                                Bed mobility  Supine to Sit: Moderate assistance (HOB elevated, cues for sequencing and technique)  Scooting: Maximal assistance (to EOB)  Transfers  Sit to Stand: Minimal Assistance (from bed, CGA from chair first trial, min to mod assist from chair second trial.  Cues for hand placement - tends to pull up on walker)  Stand to sit: Minimal Assistance (to mod assist, cues for safety and hand placement)  Bed to Chair: Dependent/Total (min assist x 2, stand pivot with walker)  Ambulation  Device: Rolling Walker  Assistance: Dependent/Total (min assist x 2)  Quality of Gait: decreased bilat step length/height especially LLE - cues to increase, assist to maneuver walker  Distance: 4-5 steps to chair, 10' x 2     Balance  Sitting - Static:  (mod assist at EOB, leans post and to L. Able to move toward midline with cues and min assist however unable to maintain midline)  Standing - Static:  (min to mod assist with walker - cues for upright posture.   Leans post and to L) OutComes Score                                                  AM-PAC Score  AM-PAC Inpatient Mobility Raw Score : 12 (07/15/22 1153)  AM-PAC Inpatient T-Scale Score : 35.33 (07/15/22 1153)  Mobility Inpatient CMS 0-100% Score: 68.66 (07/15/22 1153)  Mobility Inpatient CMS G-Code Modifier : CL (07/15/22 1153)          Goals  Short Term Goals  Time Frame for Short term goals: Discharge  Short term goal 1: Supine <> Sit CGA -ongoing  Short term goal 2: Sit<>Stand CGA -ongoing  Short term goal 3: Amb 20 ft with RW CGA -ongoing  Patient Goals   Patient goals :  To Return Home       Education  Patient Education  Education Given To: Patient  Education Provided: Role of Therapy;Plan of Care  Education Provided Comments: standing balance/midline orientation  Education Outcome: Verbalized understanding;Demonstrated understanding;Continued education needed      Therapy Time   Individual Concurrent Group Co-treatment   Time In 1114         Time Out 1153         Minutes 39             Timed Code Treatment Minutes: 39      Total Treatment Minutes:  [facility],

## 2022-07-16 NOTE — PROGRESS NOTES
4 Eyes Admission Assessment     I agree as the admission nurse that 2 RN's have performed a thorough Head to Toe Skin Assessment on the patient. ALL assessment sites listed below have been assessed on admission. Areas assessed by both nurses: Bismark Ray and Annetta  [x]   Head, Face, and Ears   [x]   Shoulders, Back, and Chest  [x]   Arms, Elbows, and Hands   [x]   Coccyx, Sacrum, and Ischium  [x]   Legs, Feet, and Heels        Does the Patient have Skin Breakdown?   Yes a wound was noted on the Admission Assessment and an LDA was Initiated documentation include the Savi-wound, Wound Assessment, Measurements, Dressing Treatment, Drainage, and Color\",         Giorgio Prevention initiated:  Yes   Wound Care Orders initiated:  Yes      WOC nurse consulted for Pressure Injury (Stage 3,4, Unstageable, DTI, NWPT, and Complex wounds) or Giorgio score 18 or lower:  Yes      Nurse 1 eSignature: Electronically signed by Rosina Quan RN on 7/16/22 at 7:17 AM EDT    **SHARE this note so that the co-signing nurse is able to place an eSignature**    Nurse 2 eSignature: Electronically signed by Eric Pollack RN on 7/16/22 at 11:07 AM EDT

## 2022-07-16 NOTE — PROGRESS NOTES
[facility] Progress Note    2022     Tomas Santos    MRN: [id number]    : 1945    Referring MD: No referring provider defined for this encounter. SUBJECTIVE: No complaints. Working to get stronger.     ECOG PS: (4) Completely disabled, unable to carry out self-care and confined to bed or chair    KPS: 40% Disabled; requires special care and assistance    Isolation: None    Medications    Scheduled Meds:   levETIRAcetam  500 mg Oral BID    dexamethasone  4 mg Oral 4 times per day    enoxaparin  40 mg SubCUTAneous Q24H    pantoprazole  40 mg IntraVENous Daily    insulin glargine  10 Units SubCUTAneous Nightly    sodium chloride flush  5-40 mL IntraVENous 2 times per day    fluticasone  1 spray Each Nostril Daily    insulin lispro  0-12 Units SubCUTAneous TID WC    insulin lispro  0-6 Units SubCUTAneous Nightly    ipratropium  2 spray Each Nostril 4x Daily    tamsulosin  0.8 mg Oral Daily     Continuous Infusions:   sodium chloride 50 mL/hr at 07/15/22 1808    sodium chloride 5 mL/hr at 22 1811    dextrose       PRN Meds:.magnesium sulfate, potassium chloride, sodium chloride flush, sodium chloride, oxyCODONE, methocarbamol **OR** methocarbamol IVPB, glucose, dextrose bolus **OR** dextrose bolus, glucagon (rDNA), dextrose, acetaminophen, ondansetron **OR** ondansetron    ROS:  As noted above, otherwise remainder of 10-point ROS negative    Physical Exam:     I&O:      Intake/Output Summary (Last 24 hours) at 2022 0817  Last data filed at 2022 0536  Gross per 24 hour   Intake 1806 ml   Output 1150 ml   Net 656 ml         Vital Signs:  /61   Pulse 85   Temp 98 °F (36.7 °C) (Oral)   Resp 16   Ht 5' 6\" (1.676 m)   Wt 120 lb 2.4 oz (54.5 kg) Comment: without machiene on end of bed  SpO2 100%   BMI 19.39 kg/m²     Weight:    Wt Readings from Last 3 Encounters:   22 120 lb 2.4 oz (54.5 kg)   22 117 lb (53.1 kg)   22 119 lb (54 kg)       General: awake alert and oriented   HEENT: normocephalic, PERRL, no scleral erythema or icterus, Oral mucosa moist and intact, throat clear  NECK: supple   BACK: Straight   SKIN: warm dry and intact without lesions rashes or masses  CHEST: CTA bilaterally without use of accessory muscles  CV: Normal S1 S2, RRR, no MRG  ABD: NT ND normoactive BS, no palpable masses or hepatosplenomegaly  EXTREMITIES: without edema, denies calf tenderness  NEURO: CN II - XII grossly intact, motor weakness of his left upper and lower ext - unchanged from yesterday   CATHETER: R SL PAC    Data    CBC:   Recent Labs     07/14/22  0345 07/15/22  0440 07/16/22  0315   WBC 6.1 7.1 7.2   HGB 11.2* 11.1* 10.9*   HCT 34.6* 34.0* 32.8*   MCV 99.7 99.9 99.2   PLT 80* 79* 69*       BMP/Mag:  Recent Labs     07/14/22  0345 07/15/22  0440 07/16/22  0315    135* 134*   K 4.2 4.5 4.7   CL 99 98* 97*   CO2 26 26 27   PHOS  --  2.8  --    BUN 19 20 24*   CREATININE <0.5* <0.5* <0.5*   MG 1.40*  --   --        LIVP:   Recent Labs     07/15/22  0440   AST 50*   ALT 93*   BILIDIR <0.2   BILITOT 0.3   ALKPHOS 125       Coags:   Recent Labs     07/14/22  0345 07/15/22  0440   PROTIME 14.5  --    INR 1.13  --    APTT  --  24.8       Uric Acid   Recent Labs     07/15/22  0440   LABURIC 3.5       DIAGNOSTIC:  1. CT Head 7/4:  1. Heterogeneous 3.9 cm masslike lesion centered in the right basal ganglia/frontal lobe with extensive surrounding edema. This is concerning for a primary or metastatic malignancy. Focal subacute intraparenchymal hemorrhage, hemorrhage within the mass,    or infection are differential considerations. Brain MRI with and without contrast and neurosurgery consult recommended. 2.  Extensive mass effect with 9 mm right to left midline shift, subfalcine herniation and suspected partial transtentorial herniation resulting in effacement of the right frontal horn and body, and mass effect on midbrain. 2. MRI Brain 7/4:  1.   Enhancing 4.6 cm mass centered in the right basal ganglia with extensive surrounding edema in the right frontotemporal lobe. Findings highly concerning for malignancy. 2.  Mass results in 9 mm right to left midline shift, subfalcine herniation, and compression of the right thalamus and brainstem. 3. CT C/A/P 7/5:  CHEST:  1. No evidence of any thoracic metastatic disease. 2. Stable changes of pulmonary fibrosis. A/P:  IMPRESSION:   1. Interval reduction in size of hepatic mass. 2. Slight interval reduction in size of low-attenuation lesion within the spleen. 3. No evidence of any adrenal mass. 4. No evidence of any abdominal or pelvic lymphadenopathy. 5. Uncomplicated sigmoid colon diverticulosis. 4. CT Head 7/8:  1.  Status post biopsy of right frontal lobe mass with unchanged mild acute hemorrhage along the biopsy tract and unchanged 12 mm leftward subfalcine herniation. 5. CT Head 7/10:  Status post biopsy of right frontal lobe mass with unchanged mild acute hemorrhage along the biopsy tract and unchanged 12 mm leftward subfalcine herniation. 4. MBS 7/5/22:    PATHOLOGY:  1. Right Frontal Brain Mass Bx 7/6/22: Involved with malignant B-cell lymphoma with aggressive morphologic   features. COMMENT: The histologic and immunohistochemical changes are   supportive of a malignant B-cell lymphoma, large cell with aggressive   morphologic features based on the high Ki-67 proliferative index. The   patient's prior liver lymphoma diagnosis ( [facility] supporting an aggressive B-cell lymphoma is noted. FISH for   the aggressive B-cell lymphoma panel and cytogenetics are currently   pending and the results of the studies will be reflected in supplemental   Reports    FISH: Pending    PROBLEM LIST:             1.  DLBCL-   2. Interstitial pneumonia  3. DM2  4.   H/O malignant hypercalcemia      TREATMENT:            1.  Initial therapy on protocol [id number] R-CHOP +/- Tafasitamab/Lenalidamide Jan 2022 - July 2022    ASSESSMENT AND PLAN:           1. 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 C-Myc was mutated but BCL-2 and BCL 6 were  not. - GCB phenotype  - S/p initial therapy on clinical trial utilizing R-CHOP w/ Revlimid and Tafasitimab;  - Study mandated PET was due this week BUT in-pt     Simulation was performed (7/11). Plan for 2400 cGy over 8 fractions to be started 7/14/22     Relapse / Progression: Large CNS mass - bx proven NHL 7/6/22. Ki-67>90%  - Plan to start XRT to brain mass to reduce size and then possible systemic chemotherapy. - Cont Decadron 4 mg IV Q 6 hours -->changed to PO starting sat (7/16)   - Cont Keppra  BUT change to PO starting sat (7/16)   - Consult RadOnc - XRT started (7/13/22)   - Consult palliative care - following      2. ID: afebrile     3. Heme: Anemia and thrombocytopenia likely r/t recent chemotherapy  - Transfuse for Hgb < 7 and Platelets < 10 K  - No transfusion today     4. Metabolic: hypoNa + abnl LFTs   - Cont IVFs: NS at 50mL/hr, Stop 7/16/22  - Replace K+ & Mg per PRN orders    5. GI / Nutrition:     - Cont diet and change to reg diet  - Recommend swabbing mouth after all meals. Must be completely awake for PO intake  - Dietary to follow closely     6. Pulm:  Interstitial lung disease  - In the course of his NHL tretatment, we uncovered interstitial lung disease. Dr Gina Guidry reviewed chest CTs in the past and feels that he had evidence of pulmonary fibrosis before his lymphoma diagnosis. He completed a prednisone taper.  - Dyspnea is grade 1  - He had a bronchoscopy March 25 and PFTs April 1.  - He continues pulmonary rehabilitation  - He is seeing Dr. Gina Guidry    7.  Endo: T2DM exacerbated by steroids  - Cont lantus 10 units nightly, change to 25 units HS 7/16/22  - Cont Humalog Med SSI AS/HS, increase to high ISS 7/16/22    8. Acute Debilitation: 2/2 CNS Lymphoma    - Consult PT/OT - [facility] 9 / 12 - appreciate input     - Consult SLP: DAVID 7/5/2: poor swallow coordination with aspiration of thin liquid x1 with straw  presentation. No aspiration occurred with thin liquid via tsp/cup, nectar thick liquid, puree or soft  solid. Recommend continue soft and bite sized diet with thin liquids - cup only, small sips, sit fully  upright and consistent oral care.      - DVT Prophylaxis: Platelets >33,494 cells/dL, - daily lovenox prophylaxis ordered  Contraindications to pharmacologic prophylaxis: None  Contraindications to mechanical prophylaxis: None    - Disposition: Uncertain at this time but possibly early next wk once off all IVs ([facility] vs NH vs hm)     Zeb Jacobs MD

## 2022-07-16 NOTE — PROGRESS NOTES
4 Eyes Admission Assessment     I agree as the admission nurse that 2 RN's have performed a thorough Head to Toe Skin Assessment on the patient. ALL assessment sites listed below have been assessed on admission. Areas assessed by both nurses: Dianne Drown  [x]   Head, Face, and Ears   [x]   Shoulders, Back, and Chest  [x]   Arms, Elbows, and Hands   [x]   Coccyx, Sacrum, and Ischium  [x]   Legs, Feet, and Heels        Does the Patient have Skin Breakdown?   Yes a wound was noted on the Admission Assessment and an LDA was Initiated documentation include the Savi-wound, Wound Assessment, Measurements, Dressing Treatment, Drainage, and Color\",         Giorgio Prevention initiated:  Yes   Wound Care Orders initiated:  Yes      WOC nurse consulted for Pressure Injury (Stage 3,4, Unstageable, DTI, NWPT, and Complex wounds) or Giorgio score 18 or lower:  Yes      Nurse 1 eSignature: Electronically signed by Raymond Starkey RN on 7/16/22 at 3:43 PM EDT    **SHARE this note so that the co-signing nurse is able to place an eSignature**    Nurse 2 eSignature: Electronically signed by Petrona Hebert RN on 7/17/22 at 7:22 PM EDT

## 2022-07-16 NOTE — PLAN OF CARE
Problem: Neurosensory - Adult  Goal: Achieves stable or improved neurological status  Outcome: Progressing     Pt sensation is returning to baseline. Pt continues with left sided weakness, although improving. Problem: Neurosensory - Adult  Goal: Remains free of injury related to seizures activity  Outcome: Progressing     No seizure activity noted or reported. Pt given scheduled medication. Problem: Skin/Tissue Integrity - Adult  Goal: Skin integrity remains intact  Outcome: Progressing  Flowsheets (Taken 7/16/2022 0801)  Skin Integrity Remains Intact: Monitor for areas of redness and/or skin breakdown     No new areas of skin break down noted. Pt remains a low destiny and requires frequent repositioning as well as 4 eyes on shift change. Pt has documented skin tears and pressure injury. Problem: Skin/Tissue Integrity - Adult  Goal: Incisions, wounds, or drain sites healing without S/S of infection  Outcome: Progressing     Incision on head closed with staples, remains KAREN. Incision site is clean and closed, no issues. Problem: Hematologic - Adult  Goal: Maintains hematologic stability  Outcome: Progressing     Patient's hemoglobin this AM:   Recent Labs     07/16/22  0315   HGB 10.9*     Patient's platelet count this AM:   Recent Labs     07/16/22  0315   PLT 69*    Thrombocytopenia Precautions in place. Patient showing no signs or symptoms of active bleeding. Transfusion not indicated at this time. Patient verbalizes understanding of all instructions. Will continue to assess and implement POC. Call light within reach and hourly rounding in place. Problem: Nutrition Deficit:  Goal: Optimize nutritional status  Outcome: Progressing   Pt eating 100% of all meals. Pt needs set up but is able to feel himself. Pt reports having a good appetite.

## 2022-07-17 NOTE — PROGRESS NOTES
[facility] Progress Note    2022     Angel Meier    MRN: [id number]    : 1945    Referring MD: No referring provider defined for this encounter. SUBJECTIVE: No complaints. Up in the chair yesterday. Eating well.     ECOG PS: (4) Completely disabled, unable to carry out self-care and confined to bed or chair    KPS: 40% Disabled; requires special care and assistance    Isolation: None    Medications    Scheduled Meds:   insulin glargine  25 Units SubCUTAneous Nightly    insulin lispro  0-18 Units SubCUTAneous TID WC    insulin lispro  0-9 Units SubCUTAneous Nightly    levETIRAcetam  500 mg Oral BID    dexamethasone  4 mg Oral 4 times per day    enoxaparin  40 mg SubCUTAneous Q24H    pantoprazole  40 mg IntraVENous Daily    sodium chloride flush  5-40 mL IntraVENous 2 times per day    fluticasone  1 spray Each Nostril Daily    ipratropium  2 spray Each Nostril 4x Daily    tamsulosin  0.8 mg Oral Daily     Continuous Infusions:   sodium chloride 5 mL/hr at 22 1811    dextrose       PRN Meds:.magnesium sulfate, potassium chloride, sodium chloride flush, sodium chloride, oxyCODONE, methocarbamol **OR** methocarbamol IVPB, glucose, dextrose bolus **OR** dextrose bolus, glucagon (rDNA), dextrose, acetaminophen, ondansetron **OR** ondansetron    ROS:  As noted above, otherwise remainder of 10-point ROS negative    Physical Exam:     I&O:      Intake/Output Summary (Last 24 hours) at 2022 0820  Last data filed at 2022 0646  Gross per 24 hour   Intake 3516 ml   Output 2900 ml   Net 616 ml         Vital Signs:  /82   Pulse 62   Temp 97.7 °F (36.5 °C) (Temporal)   Resp 18   Ht 5' 6\" (1.676 m)   Wt 120 lb 2.4 oz (54.5 kg) Comment: without machiene on end of bed  SpO2 96%   BMI 19.39 kg/m²     Weight:    Wt Readings from Last 3 Encounters:   22 120 lb 2.4 oz (54.5 kg)   22 117 lb (53.1 kg)   22 119 lb (54 kg)       General: awake alert and oriented   HEENT: normocephalic, PERRL, no scleral erythema or icterus, Oral mucosa moist and intact, throat clear  NECK: supple   BACK: Straight   SKIN: warm dry and intact without lesions rashes or masses  CHEST: CTA bilaterally without use of accessory muscles  CV: Normal S1 S2, RRR, no MRG  ABD: NT ND normoactive BS, no palpable masses or hepatosplenomegaly  EXTREMITIES: without edema, denies calf tenderness  NEURO: CN II - XII grossly intact, motor weakness of his left upper and lower ext - unchanged from yesterday   CATHETER: R SL PAC    Data    CBC:   Recent Labs     07/15/22  0440 07/16/22  0315 07/17/22  0412   WBC 7.1 7.2 8.8   HGB 11.1* 10.9* 11.0*   HCT 34.0* 32.8* 32.8*   MCV 99.9 99.2 98.7   PLT 79* 69* 75*       BMP/Mag:  Recent Labs     07/15/22  0440 07/16/22  0315 07/17/22  0412   * 134* 133*   K 4.5 4.7 4.7   CL 98* 97* 95*   CO2 26 27 28   PHOS 2.8  --   --    BUN 20 24* 21*   CREATININE <0.5* <0.5* <0.5*       LIVP:   Recent Labs     07/15/22  0440   AST 50*   ALT 93*   BILIDIR <0.2   BILITOT 0.3   ALKPHOS 125       Coags:   Recent Labs     07/15/22  0440   APTT 24.8       Uric Acid   Recent Labs     07/15/22  0440   LABURIC 3.5       DIAGNOSTIC:  1. CT Head 7/4:  1. Heterogeneous 3.9 cm masslike lesion centered in the right basal ganglia/frontal lobe with extensive surrounding edema. This is concerning for a primary or metastatic malignancy. Focal subacute intraparenchymal hemorrhage, hemorrhage within the mass,    or infection are differential considerations. Brain MRI with and without contrast and neurosurgery consult recommended. 2.  Extensive mass effect with 9 mm right to left midline shift, subfalcine herniation and suspected partial transtentorial herniation resulting in effacement of the right frontal horn and body, and mass effect on midbrain. 2. MRI Brain 7/4:  1. Enhancing 4.6 cm mass centered in the right basal ganglia with extensive surrounding edema in the right frontotemporal lobe. Findings highly concerning for malignancy. 2.  Mass results in 9 mm right to left midline shift, subfalcine herniation, and compression of the right thalamus and brainstem. 3. CT C/A/P 7/5:  CHEST:  1. No evidence of any thoracic metastatic disease. 2. Stable changes of pulmonary fibrosis. A/P:  IMPRESSION:   1. Interval reduction in size of hepatic mass. 2. Slight interval reduction in size of low-attenuation lesion within the spleen. 3. No evidence of any adrenal mass. 4. No evidence of any abdominal or pelvic lymphadenopathy. 5. Uncomplicated sigmoid colon diverticulosis. 4. CT Head 7/8:  1.  Status post biopsy of right frontal lobe mass with unchanged mild acute hemorrhage along the biopsy tract and unchanged 12 mm leftward subfalcine herniation. 5. CT Head 7/10:  Status post biopsy of right frontal lobe mass with unchanged mild acute hemorrhage along the biopsy tract and unchanged 12 mm leftward subfalcine herniation. 4. MBS 7/5/22:    PATHOLOGY:  1. Right Frontal Brain Mass Bx 7/6/22: Involved with malignant B-cell lymphoma with aggressive morphologic   features. COMMENT: The histologic and immunohistochemical changes are   supportive of a malignant B-cell lymphoma, large cell with aggressive   morphologic features based on the high Ki-67 proliferative index. The   patient's prior liver lymphoma diagnosis ( [facility]   KK-) supporting an aggressive B-cell lymphoma is noted. FISH for   the aggressive B-cell lymphoma panel and cytogenetics are currently   pending and the results of the studies will be reflected in supplemental   Reports    FISH: Pending    PROBLEM LIST:             1.  DLBCL-   2. Interstitial pneumonia  3. DM2  4. H/O malignant hypercalcemia      TREATMENT:            1.  Initial therapy on protocol [id number] R-CHOP +/- Tafasitamab/Lenalidamide Jan 2022 - July 2022    ASSESSMENT AND PLAN:           1.  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 C-Myc was mutated but BCL-2 and BCL 6 were  not. - GCB phenotype  - S/p initial therapy on clinical trial utilizing R-CHOP w/ Revlimid and Tafasitimab;  - Study mandated PET was due this week BUT in-pt     Simulation was performed (7/11). Plan for 2400 cGy over 8 fractions to be started 7/14/22     Relapse / Progression: Large CNS mass - bx proven NHL 7/6/22. Ki-67>90%  - Plan to start XRT to brain mass to reduce size and then possible systemic chemotherapy. - Cont Decadron 4 mg IV Q 6 hours -->changed to PO starting sat (7/16)   - Cont Keppra  BUT change to PO starting sat (7/16)   - Consult RadOnc - XRT started (7/13/22)   - Consult palliative care - following      2. ID: afebrile     3. Heme: Anemia and thrombocytopenia likely r/t recent chemotherapy  - Transfuse for Hgb < 7 and Platelets < 10 K  - No transfusion today     4. Metabolic: hypoNa + abnl LFTs   - Cont IVFs: NS at 50mL/hr, Stop 7/16/22  - Replace K+ & Mg per PRN orders    5. GI / Nutrition:     - Cont diet and change to reg diet  - Recommend swabbing mouth after all meals. Must be completely awake for PO intake  - Dietary to follow closely     6. Pulm:  Interstitial lung disease  - In the course of his NHL tretatment, we uncovered interstitial lung disease. Dr Rebecca Coates reviewed chest CTs in the past and feels that he had evidence of pulmonary fibrosis before his lymphoma diagnosis. He completed a prednisone taper.  - Dyspnea is grade 1  - He had a bronchoscopy March 25 and PFTs April 1.  - He continues pulmonary rehabilitation  - He is seeing Dr. Rebecca Coates    7.  Endo: T2DM exacerbated by steroids  - Cont lantus 10 units nightly, change to 25 units HS 7/16/22  - Cont Humalog Med SSI AS/HS, increase to high ISS 7/16/22    8. Acute Debilitation: 2/2 CNS Lymphoma    - Consult PT/OT - [facility] 9 / 12 - appreciate input     - Consult SLP: DAVID 7/5/2: poor swallow coordination with aspiration of thin liquid x1 with straw  presentation. No aspiration occurred with thin liquid via tsp/cup, nectar thick liquid, puree or soft  solid. Recommend continue soft and bite sized diet with thin liquids - cup only, small sips, sit fully  upright and consistent oral care.      - DVT Prophylaxis: Platelets >32,561 cells/dL, - daily lovenox prophylaxis ordered  Contraindications to pharmacologic prophylaxis: None  Contraindications to mechanical prophylaxis: None    - Disposition: Uncertain at this time but possibly early next wk once off all IVs ([facility] vs NH vs hm)     Corrinne Mam, MD

## 2022-07-17 NOTE — PROGRESS NOTES
Occupational Therapy  Daily Treatment Note  Patient Name: Carmen Kunz  MRN: [id number]    Assessment: Pt demonstrates significant improvements in mobility. He can ambulate short distances in room CGA to min assist with walker. He still has a tendency for L and posterior LOB, of which he had 3 during session. Pt continues to need assist with many ADLs due to impaired balance and cognition. Recommend continued inpt OT/PT/SLP at d/c. Discharge Recommendations: Carmen Kunz scored a 16/24 on the AM-PAC ADL Inpatient form. Current research shows that an AM-PAC score of 17 or less is typically not associated with a discharge to the patient's home setting. Based on the patient's AM-PAC score and their current ADL deficits, it is recommended that the patient have 5-7 sessions per week of Occupational Therapy at d/c to increase the patient's independence. At this time, this patient demonstrates complex nursing, medical, and rehabilitative needs, and would benefit from intensive rehabilitation services upon discharge from the Inpatient setting. This patient demonstrates the ability to participate in and benefit from an intensive therapy program with a coordinated interdisciplinary team approach to foster frequent, structured, and documented communication among disciplines, who will work together to establish, prioritize, and achieve treatment goals. Please see assessment section for further patient specific details. Equipment Needs:  No    Chart Reviewed: Yes     Other position/activity restrictions: Up with Assistance   Additional Pertinent Hx: Pt is a 68 yo male that presents from home to the ED status post fall off the toliet 7/4. He stated that his grab bars broke and fell and hit his head but no LOC. Head MRI: Enhancing 4.6 cm mass centered in the right basal ganglia with extensive surrounding edema in the right frontotemporal lobe. Findings highly concerning for malignancy.  Mass results in 9 mm right to chair    Goals:  Short Term Goals  Time Frame for Short term goals: by dc  Short Term Goal 1: Pt will complete functional transfers w/ Min Ax1 - Met 7/17; Transfer to/from toilet and chairs SBA - Not met  Short Term Goal 2: Pt will complete LE dressing w/ Min Ax1-not met  Short Term Goal 3: Pt will maintain sitting balance w/ CGA for 5min while engaging in ADL task- not met  Short Term Goal 4: Tolerate ambulation to/from bathroom for toileting/ADLs - Not met         Plan:      Times per Week: 5-7   Times per Day: Daily    If patient is discharged prior to next treatment, this note will serve as the discharge summary.       Therapy Time   Individual Concurrent Group Co-treatment   Time In 1110         Time Out 1137         Minutes 27          Timed Code Treatment Minutes: 27  Total Treatment Time: 27       Francoise Sarabia, OT

## 2022-07-17 NOTE — PROGRESS NOTES
Physical Therapy  Facility/Department: [address]  Physical Therapy Treatment    Name: Pauly Boyd  : 1945  MRN: [id number]  Date of Service: 2022    Discharge Recommendations:Balwinder Pedraza scored a 15/24 on the AM-PAC short mobility form. Current research shows that an AM-PAC score of 17 or less is typically not associated with a discharge to the patient's home setting. Based on the patient's AM-PAC score and their current functional mobility deficits, it is recommended that the patient have 5-7 sessions per week of Physical Therapy at d/c to increase the patient's independence. At this time, this patient demonstrates complex nursing, medical, and rehabilitative needs, and would benefit from intensive rehabilitation services upon discharge from the Inpatient setting. This patient demonstrates the ability to participate in and benefit from an intensive therapy program with a coordinated interdisciplinary team approach to foster frequent, structured, and documented communication among disciplines, who will work together to establish, prioritize, and achieve treatment goals. Please see assessment section for further patient specific details. If patient discharges prior to next session this note will serve as a discharge summary. Please see below for the latest assessment towards goals. PT Equipment Recommendations  Other: defer      Patient Diagnosis(es): The primary encounter diagnosis was Brain mass. Diagnoses of Intracranial mass and Aphasia were also pertinent to this visit. Past Medical History:  has a past medical history of Benign prostatic hyperplasia with weak urinary stream, Cancer ([address] Erectile dysfunction, History of gout, History of thrombocytopenia, Hypercalcemia, Hyperlipidemia, Hypertension, Lung nodule, Proteinuria, Type 2 diabetes mellitus without complication ([address] and Vitamin D deficiency.   Past Surgical History:  has a past surgical history that 4.6 cm mass centered in the right basal ganglia with extensive surrounding edema in the right frontotemporal lobe. Findings highly concerning for malignancy. Mass results in 9 mm right to left midline shift, subfalcine herniation, and compression of the right thalamus and brainstem. PMH: Cancer, Diabetes 2, Hyperliperdemia. Family / Caregiver Present: Yes (wife, son, and daughter in law)  Diagnosis: Intracranial Mass  Follows Commands: Within Functional Limits  Subjective  Subjective: Pt presents sitting in recliner and agreeable to therapy. \"We can walk. \"         Social/Functional History  Social/Functional History  Lives With: Spouse  Type of Home: [facility]  Home Layout: Two level, Able to Live on Main level with bedroom/bathroom, Performs ADL's on one level  Home Access: Stairs to enter with rails  Entrance Stairs - Number of Steps: 3 efren from garage w/ hand rail on R  Entrance Stairs - Rails: Right  Bathroom Shower/Tub: Walk-in shower, Shower chair without back  Bathroom Toilet: Handicap height (\"chair height\" toilets w/ hand rails attached)  Bathroom Equipment: Grab bars in shower  Home Equipment: Rollator  Has the patient had two or more falls in the past year or any fall with injury in the past year?: Yes (Pt's wife reports about 5 falls in the last 6 months)  ADL Assistance: Needs assistance (last week and a half, wife has been helping w ADLs.  Prior to that he was mostly independent)  Homemaking Assistance: Needs assistance (wife does cooking, cleaning and laundry)  Homemaking Responsibilities: No  Ambulation Assistance: Needs assistance (last week and a half has needed wife to help w/ ambulation and uses rollator)  Transfer Assistance: Needs assistance (has needed assist for the last week and a half)  Active : No  Occupation: Retired  Additional Comments: wife has been providing 24hr A for the last week and a half  Vision/Hearing       Cognition         Objective   Heart Rate: 66  Heart Rate Source: Telemetry  BP: 119/68  BP Location: Right upper arm  BP Method: Automatic  Patient Position: Up in chair  MAP (Calculated): 85  Resp: 18  SpO2: 98 %  O2 Device: None (Room air)    Transfers  Sit to Stand: Contact guard assistance (from recliner)  Stand to sit: Contact guard assistance  Comment: use of walker with mobility; cues for hand placement with transfer with little carryover  Ambulation  Surface: level tile  Device: Rolling Walker  Assistance: Minimal assistance  Quality of Gait: decreased bilat step length/height especially LLE - cues to increase, LOB when turning and backing up x 1  Gait Deviations: Slow Ashley;Decreased step length;Decreased step height  Distance: 10' x 2  Comments: limited by fatigue. LOB was posterior and L. Balance  Sitting - Static: Fair;+  Sitting - Dynamic: Fair  Standing - Static: Fair;-  Standing - Dynamic: Poor;+  Comments: Pt sitting balance SBA-CGA and standing balance CGA-min A. Standing at times losing his balance to the L and posterior x 2 requiring min A to correct. Standing x 2 min while therapist adjusted brief and then for exercises  Exercise Treatment: standing heel raises and marches x 10 [place] min A          AM-PAC Score  AM-PAC Inpatient Mobility Raw Score : 15 (07/17/22 1311)  AM-PAC Inpatient T-Scale Score : 39.45 (07/17/22 1311)  Mobility Inpatient CMS 0-100% Score: 57.7 (07/17/22 1311)  Mobility Inpatient CMS G-Code Modifier : CK (07/17/22 1311)          Goals  Short Term Goals  Time Frame for Short term goals: Discharge  Short term goal 1: Supine <> Sit CGA -ongoing  Short term goal 2: Sit<>Stand CGA -MET 7/17 revise to sit<>stand with supervision  Short term goal 3: Amb 20 ft with RW CGA -ongoing  Patient Goals   Patient goals :  To Return Home       Education  Patient Education  Education Given To: Patient  Education Provided: Role of Therapy;Plan of Care  Education Provided Comments: standing balance/midline orientation  Education Method:

## 2022-07-18 NOTE — PLAN OF CARE
Problem: Neurosensory - Adult  Goal: Achieves stable or improved neurological status  Outcome: Progressing Towards Goal  Pt alert oriented and cooperative. No c/o confusion. Problem: Neurosensory - Adult  Goal: Remains free of injury related to seizures activity  Outcome: Progressing Towards Goal  Pt working with PT and OT. He is getting stronger. No c/o blurred vision and no seizure. Problem: Skin/Tissue Integrity - Adult  Goal: Incisions, wounds, or drain sites healing without S/S of infection  Outcome: Progressing Towards Goal  No new skin breakdown noted. See flow sheet. Problem: Musculoskeletal - Adult  Goal: Return mobility to safest level of function  Outcome: Progressing Towards Goal     Problem: Nutrition Deficit:  Goal: Optimize nutritional status  Outcome: Progressing Towards Goal  Flowsheets (Taken 7/18/2022 8920 by Frank Gao RD)  Nutrient intake appropriate for improving, restoring, or maintaining nutritional needs:   Assess nutritional status and recommend course of action   Monitor oral intake, labs, and treatment plans   Recommend appropriate diets, oral nutritional supplements, and vitamin/mineral supplements  Pt has a good appetite.

## 2022-07-18 NOTE — PROGRESS NOTES
standing w/ gait belt), 73.9 % IBW.  Weight Source: Standing Scale  Current BMI (kg/m2): 17                          BMI Categories: Underweight (BMI less than 22) age over 72    Nutrition Diagnosis:   Severe malnutrition related to inadequate protein-energy intake, catabolic illness as evidenced by weight loss, severe muscle loss, severe loss of subcutaneous fat, poor intake prior to admission, BMI    Nutrition Interventions:   Food and/or Nutrient Delivery: Modify Current Diet, Continue Oral Nutrition Supplement  Coordination of Nutrition Care: Continue to monitor while inpatient  Plan of Care discussed with: Patient, wife    Goals:  Previous Goal Met: Progressing toward Goal(s)  Goals: PO intake 75% or greater, prior to discharge       Nutrition Monitoring and Evaluation:   Behavioral-Environmental Outcomes: None Identified  Food/Nutrient Intake Outcomes: Food and Nutrient Intake, Supplement Intake  Physical Signs/Symptoms Outcomes: Biochemical Data, Nutrition Focused Physical Findings, Weight, Chewing or Swallowing    Discharge Planning:    Continue current diet, Continue Oral Nutrition Supplement     [address], [address]  Contact: [phone]

## 2022-07-18 NOTE — PROGRESS NOTES
[address] Progress Note    2022     Chandni Holland    MRN: [id number]    : 1945    Referring MD: No referring provider defined for this encounter. SUBJECTIVE: No complaints. He cont to be very weak and tired.      ECOG PS: (4) Completely disabled, unable to carry out self-care and confined to bed or chair    KPS: 40% Disabled; requires special care and assistance    Isolation: None    Medications    Scheduled Meds:   docusate sodium  100 mg Oral Daily    insulin glargine  25 Units SubCUTAneous Nightly    insulin lispro  0-18 Units SubCUTAneous TID WC    insulin lispro  0-9 Units SubCUTAneous Nightly    levETIRAcetam  500 mg Oral BID    dexamethasone  4 mg Oral 4 times per day    enoxaparin  40 mg SubCUTAneous Q24H    pantoprazole  40 mg IntraVENous Daily    sodium chloride flush  5-40 mL IntraVENous 2 times per day    fluticasone  1 spray Each Nostril Daily    ipratropium  2 spray Each Nostril 4x Daily    tamsulosin  0.8 mg Oral Daily     Continuous Infusions:   sodium chloride 5 mL/hr at 22 1811    dextrose       PRN Meds:.magnesium sulfate, potassium chloride, sodium chloride flush, sodium chloride, oxyCODONE, methocarbamol **OR** methocarbamol IVPB, glucose, dextrose bolus **OR** dextrose bolus, glucagon (rDNA), dextrose, acetaminophen, ondansetron **OR** ondansetron    ROS:  As noted above, otherwise remainder of 10-point ROS negative    Physical Exam:     I&O:      Intake/Output Summary (Last 24 hours) at 2022 0928  Last data filed at 2022 0340  Gross per 24 hour   Intake 2786 ml   Output 2300 ml   Net 486 ml         Vital Signs:  /71   Pulse 73   Temp 98.3 °F (36.8 °C) (Oral)   Resp 26   Ht 5' 6\" (1.676 m)   Wt 120 lb 2.4 oz (54.5 kg) Comment: without machiene on end of bed  SpO2 97%   BMI 19.39 kg/m²     Weight:    Wt Readings from Last 3 Encounters:   22 120 lb 2.4 oz (54.5 kg)   22 117 lb (53.1 kg)   22 119 lb (54 kg)       General: awake alert and oriented   HEENT: normocephalic, PERRL, no scleral erythema or icterus, Oral mucosa moist and intact, throat clear  NECK: supple   BACK: Straight   SKIN: warm dry and intact without lesions rashes or masses  CHEST: CTA bilaterally without use of accessory muscles  CV: Normal S1 S2, RRR, no MRG  ABD: NT ND normoactive BS, no palpable masses or hepatosplenomegaly  EXTREMITIES: without edema, denies calf tenderness  NEURO: CN II - XII grossly intact, motor weakness of his left upper and lower ext - unchanged from yesterday   CATHETER: R SL PAC    Data    CBC:   Recent Labs     07/16/22 0315 07/17/22 0412 07/18/22  0345   WBC 7.2 8.8 12.1*   HGB 10.9* 11.0* 11.1*   HCT 32.8* 32.8* 33.0*   MCV 99.2 98.7 97.2   PLT 69* 75* 88*       BMP/Mag:  Recent Labs     07/16/22 0315 07/17/22  0412 07/18/22  0345 07/18/22  0507   * 133*  --  134*   K 4.7 4.7  --  4.6   CL 97* 95*  --  95*   CO2 27 28  --  29   PHOS  --   --  3.0  --    BUN 24* 21*  --  24*   CREATININE <0.5* <0.5*  --  <0.5*   MG  --   --   --  1.40*       LIVP:   Recent Labs     07/18/22  0345   AST 51*   *   BILIDIR <0.2   BILITOT 0.3   ALKPHOS 137*       Coags:   Recent Labs     07/18/22  0424   PROTIME 13.4   INR 1.02   APTT 23.0       Uric Acid   Recent Labs     07/18/22  0345   LABURIC 3.6       DIAGNOSTIC:  1. CT Head 7/4:  1. Heterogeneous 3.9 cm masslike lesion centered in the right basal ganglia/frontal lobe with extensive surrounding edema. This is concerning for a primary or metastatic malignancy. Focal subacute intraparenchymal hemorrhage, hemorrhage within the mass,    or infection are differential considerations. Brain MRI with and without contrast and neurosurgery consult recommended. 2.  Extensive mass effect with 9 mm right to left midline shift, subfalcine herniation and suspected partial transtentorial herniation resulting in effacement of the right frontal horn and body, and mass effect on midbrain.      2. MRI Brain 7/4:  1. Enhancing 4.6 cm mass centered in the right basal ganglia with extensive surrounding edema in the right frontotemporal lobe. Findings highly concerning for malignancy. 2.  Mass results in 9 mm right to left midline shift, subfalcine herniation, and compression of the right thalamus and brainstem. 3. CT C/A/P 7/5:  CHEST:  1. No evidence of any thoracic metastatic disease. 2. Stable changes of pulmonary fibrosis. A/P:  IMPRESSION:   1. Interval reduction in size of hepatic mass. 2. Slight interval reduction in size of low-attenuation lesion within the spleen. 3. No evidence of any adrenal mass. 4. No evidence of any abdominal or pelvic lymphadenopathy. 5. Uncomplicated sigmoid colon diverticulosis. 4. CT Head 7/8:  1.  Status post biopsy of right frontal lobe mass with unchanged mild acute hemorrhage along the biopsy tract and unchanged 12 mm leftward subfalcine herniation. 5. CT Head 7/10:  Status post biopsy of right frontal lobe mass with unchanged mild acute hemorrhage along the biopsy tract and unchanged 12 mm leftward subfalcine herniation. 4. MBS 7/5/22:    PATHOLOGY:  1. Right Frontal Brain Mass Bx 7/6/22: Involved with malignant B-cell lymphoma with aggressive morphologic   features. COMMENT: The histologic and immunohistochemical changes are   supportive of a malignant B-cell lymphoma, large cell with aggressive   morphologic features based on the high Ki-67 proliferative index. The   patient's prior liver lymphoma diagnosis ( [facility]   EC-) supporting an aggressive B-cell lymphoma is noted. FISH for   the aggressive B-cell lymphoma panel and cytogenetics are currently   pending and the results of the studies will be reflected in supplemental   Reports    FISH: Pending    PROBLEM LIST:             1.  DLBCL-   2. Interstitial pneumonia  3. DM2  4.   H/O malignant hypercalcemia      TREATMENT:            1.  Initial therapy on protocol [id number] R-CHOP +/- Tafasitamab/Lenalidamide Jan 2022 - July 2022    ASSESSMENT AND PLAN:           1. 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 C-Myc was mutated but BCL-2 and BCL 6 were  not. - GCB phenotype  - S/p initial therapy on clinical trial utilizing R-CHOP w/ Revlimid and Tafasitimab;  - Study mandated PET was due this week BUT in-pt     Simulation was performed (7/11). Plan for 2400 cGy over 8 fractions to be started 7/14/22     Relapse / Progression: Large CNS mass - bx proven NHL 7/6/22. Ki-67>90%  - Plan to start XRT to brain mass to reduce size and then possible systemic chemotherapy. - Cont Decadron 4 mg IV Q 6 hours -->changed to PO starting sat (7/16) --> will taper frequency later this wk   - Cont Keppra  BUT change to PO starting sat (7/16)   - Consult RadOnc - XRT started (7/13/22)   - Consult palliative care - following      2. ID: afebrile     3. Heme: leukocytosis + Anemia and thrombocytopenia likely r/t recent chemotherapy  - Transfuse for Hgb < 7 and Platelets < 10 K  - No transfusion today     4. Metabolic: hypoNa + abnl LFTs which are trending upward   - Cont IVFs: NS at 50mL/hr, Stop 7/16/22  - Replace K+ & Mg per PRN orders    5. GI / Nutrition:     - Cont diet and change to reg diet  - Recommend swabbing mouth after all meals. Must be completely awake for PO intake  - Dietary to follow closely     6. Pulm:  Interstitial lung disease  - In the course of his NHL tretatment, we uncovered interstitial lung disease. Dr Carson Dong reviewed chest CTs in the past and feels that he had evidence of pulmonary fibrosis before his lymphoma diagnosis.  He completed a prednisone taper.  - Dyspnea is grade 1  - He had a bronchoscopy March 25 and PFTs April 1.  - He continues pulmonary rehabilitation  - He is seeing Dr. Rufino Madden    7. Endo: T2DM exacerbated by steroids  - Cont lantus 10 units nightly, change to 25 units HS 7/16/22  - Cont Humalog Med SSI AS/HS, increase to high ISS 7/16/22    8. Acute Debilitation: 2/2 CNS Lymphoma    - Consult PT/OT - [facility] 9 / 12 - appreciate input     - Consult SLP: DAVID 7/5/2: poor swallow coordination with aspiration of thin liquid x1 with straw  presentation. No aspiration occurred with thin liquid via tsp/cup, nectar thick liquid, puree or soft  solid. Recommend continue soft and bite sized diet with thin liquids - cup only, small sips, sit fully  upright and consistent oral care.      - DVT Prophylaxis: Platelets >20,102 cells/dL, - daily lovenox prophylaxis ordered  Contraindications to pharmacologic prophylaxis: None  Contraindications to mechanical prophylaxis: None    - Disposition: Uncertain at this time but possibly early next wk once off all IVs (ARU vs NH vs hm)       Ryland Arredondo MD

## 2022-07-18 NOTE — PROGRESS NOTES
Occupational Therapy  Facility/Department: [facility]  Occupational Therapy Treatment    Name: Axel Marcelino  : 1945  MRN: [id number]  Date of Service: 2022    Discharge Recommendations: Axel Marcelino scored a 16/24 on the AM-PAC ADL Inpatient form. Current research shows that an AM-PAC score of 17 or less is typically not associated with a discharge to the patient's home setting. Based on the patient's AM-PAC score and their current ADL deficits, it is recommended that the patient have 5-7 sessions per week of Occupational Therapy at d/c to increase the patient's independence. At this time, this patient demonstrates complex nursing, medical, and rehabilitative needs, and would benefit from intensive rehabilitation services upon discharge from the Inpatient setting. This patient demonstrates the ability to participate in and benefit from an intensive therapy program with a coordinated interdisciplinary team approach to foster frequent, structured, and documented communication among disciplines, who will work together to establish, prioritize, and achieve treatment goals. Please see assessment section for further patient specific details. If patient discharges prior to next session this note will serve as a discharge summary. Please see below for the latest assessment towards goals. OT Equipment Recommendations  Other: defer to next level of care       Patient Diagnosis(es): The primary encounter diagnosis was Brain mass. Diagnoses of Intracranial mass and Aphasia were also pertinent to this visit. Past Medical History:  has a past medical history of Benign prostatic hyperplasia with weak urinary stream, Cancer ([address] Erectile dysfunction, History of gout, History of thrombocytopenia, Hypercalcemia, Hyperlipidemia, Hypertension, Lung nodule, Proteinuria, Type 2 diabetes mellitus without complication ([address] and Vitamin D deficiency.   Past Surgical History:  has a past surgical history that includes Finger trigger release (Right, 2007); Tonsillectomy and adenoidectomy (age 2); Elbow fracture surgery (Left, age 5); Vasectomy (1971); Cataract removal with implant (Bilateral, 2011); Colonoscopy (3/29/2011); Colonoscopy (03/14/2016); IR PORT PLACEMENT > 5 YEARS (1/11/2022); bronchoscopy (N/A, 3/25/2022); and craniotomy (Right, 7/6/2022). Treatment Diagnosis: impaired ADLs and functional mobility/transfers      Assessment   Performance deficits / Impairments: Decreased functional mobility ; Decreased endurance;Decreased posture;Decreased ADL status; Decreased balance;Decreased strength;Decreased safe awareness;Decreased vision/visual deficit; Decreased fine motor control;Decreased coordination  Assessment: Needing more assist this date with mobility, transfers, standing balance. Pt able to ambulate short distance with RW 4-6' and Min to [employer]. Pt cont to demo decreased awareness to L side, leaning to L in sitting and standing position. Assist with ADLs this date. Completing sit<>stands 7x, WB through LUE, ROM, sitting and standing balance training. ++ time and effort for all tasks, seated rest breaks between tasks. Limited by fatigue this date, limited by calf pain/tightness, manual stretch to calves, pt needing to sit frequently and quickly 2/2 pain, max cues for safe transfers. Would benefit from cont skilled OT while in acute care and cont inpt at dc to rtn to fx baseline.   Treatment Diagnosis: impaired ADLs and functional mobility/transfers  REQUIRES OT FOLLOW-UP: Yes  Activity Tolerance  Activity Tolerance: Patient Tolerated treatment well;Patient limited by fatigue;Patient limited by pain (more fatigued this date than prev session, limited by pain in calves needing to sit frequently and quickly)  Activity Tolerance Comments: rest breaks between activity        Plan   Plan  Times per Week: 5-7  Times per Day: Daily  Current Treatment Recommendations: Strengthening, Balance training, Functional mobility training, Endurance training, Equipment evaluation, education, & procurement, Patient/Caregiver education & training, Safety education & training, Self-Care / ADL     Restrictions  Position Activity Restriction  Other position/activity restrictions: Up with Assistance    Subjective   General  Chart Reviewed: Yes  Patient assessed for rehabilitation services?: Yes  Additional Pertinent Hx: 67 y.o. male with past medical history of non-Hodgkin lymphoma, hyperlipidemia, hypertension, type 2 diabetes, and BPH who presents to the emergency department after a fall in the bathroom and found to have brain mass with cerebral edema; suspected metastases of non hodgkin's lymphoma. His wife provides the history at bedside as the patient has difficulty verbally communicating at baseline. Hospital Course: 7/6 RIGHT FRONTAL NEEDLE BIOPSY OF BRAIN MASS  Family / Caregiver Present: Yes (wife and son)  Referring Practitioner: Buzz Hurt MD  Diagnosis: Intracranial mass  Subjective  Subjective: In chair aggreable to session, reporting, \"my calves are still hurting and tight\"     Social/Functional History  Social/Functional History  Lives With: Spouse  Type of Home: [facility]  Home Layout: Two level, Able to Live on Main level with bedroom/bathroom, Performs ADL's on one level  Home Access: Stairs to enter with rails  Entrance Stairs - Number of Steps: 3 efren from garage w/ hand rail on R  Entrance Stairs - Rails: Right  Bathroom Shower/Tub: Walk-in shower, Shower chair without back  Bathroom Toilet: Handicap height (\"chair height\" toilets w/ hand rails attached)  Bathroom Equipment: Grab bars in shower  Home Equipment: Rollator  Has the patient had two or more falls in the past year or any fall with injury in the past year?: Yes (Pt's wife reports about 5 falls in the last 6 months)  ADL Assistance: Needs assistance (last week and a half, wife has been helping w ADLs.  Prior to that he was mostly independent)  Homemaking Assistance: Needs assistance (wife does cooking, cleaning and laundry)  Homemaking Responsibilities: No  Ambulation Assistance: Needs assistance (last week and a half has needed wife to help w/ ambulation and uses rollator)  Transfer Assistance: Needs assistance (has needed assist for the last week and a half)  Active : No  Occupation: Retired  Additional Comments: wife has been providing 24hr A for the last week and a half       Objective   Heart Rate: 61  Heart Rate Source: Telemetry  BP: (!) 123/96  BP Location: Right upper arm  BP Method: Automatic  Patient Position: Up in chair  MAP (Calculated): 105  Resp: 16  SpO2: 98 %  O2 Device: None (Room air)             Safety Devices  Type of Devices: Left in chair;Call light within reach; Chair alarm in place;Nurse notified;Gait belt; All fall risk precautions in place; Patient at risk for falls  Bed Mobility Training  Bed Mobility Training: No (in chair)  Balance  Sitting: With support (L and posterior lean, practicing trunk balance, correcting seated in chair unsupported back, Min to SBA to correct posture)  Sitting - Static: Occasional  Sitting - Dynamic: Occasional  Standing: Impaired  Standing - Static: Constant support  Standing - Dynamic: Constant support  Transfer Training  Transfer Training: Yes  Sit to Stand: Minimum assistance;Assist X1  Stand to Sit: Minimum assistance;Assist X1  Gait  Overall Level of Assistance: Moderate assistance;Assist X1;Minimum assistance (Min to [employer] x1 stand balance and taking steps forward and back ~4-6 feet, max cues for hand placement, reaching for chair, trying to sit too soon)  Base of Support: Narrowed  Speed/Ashley: Slow  Assistive Device: Walker, rolling;Gait belt        ADL  Grooming: Setup;Stand by assistance (wipe face)  UE Dressing:  Moderate assistance  LE Dressing: Maximum assistance  Toileting: Maximum assistance  Toileting Skilled Clinical Factors: male purewick in place not suctioning properly RN aware                                 A/AROM Exercises: AAROM LUE shoulder flex/ext to 90 degrees, elbow flex/ext, wrist flex/ext, open and close hand 5x, WB through LUE 7x. Sit to stands from chair 7x  Education Given To: Patient  Education Provided: Role of Therapy;Plan of Care;ADL Adaptive Strategies;Transfer Training; Fall Prevention Strategies  Education Method: Demonstration;Verbal  Barriers to Learning: None  Education Outcome: Verbalized understanding;Continued education needed                   AM-PAC Score        AM-PAC Inpatient Daily Activity Raw Score: 16 (07/18/22 1302)  AM-PAC Inpatient ADL T-Scale Score : 35.96 (07/18/22 1302)  ADL Inpatient CMS 0-100% Score: 53.32 (07/18/22 1302)  ADL Inpatient CMS G-Code Modifier : CK (07/18/22 1302)    Goals  Short Term Goals  Time Frame for Short term goals: by dc  Short Term Goal 1: Pt will complete functional transfers w/ Min Ax1 - Met 7/17; Transfer to/from toilet and chairs SBA - Not met  Short Term Goal 2: Pt will complete LE dressing w/ Min Ax1-not met  Short Term Goal 3: Pt will maintain sitting balance w/ CGA for 5min while engaging in ADL task- not met  Short Term Goal 4: Tolerate ambulation to/from bathroom for toileting/ADLs - Not met       Therapy Time   Individual Concurrent Group Co-treatment   Time In 8845         Time Out 1574         Minutes 38             Timed Code Treatment Minutes:  38    Total Treatment Minutes:  [address], OT

## 2022-07-18 NOTE — PROGRESS NOTES
4 Eyes Admission Assessment     I agree as the admission nurse that 2 RN's have performed a thorough Head to Toe Skin Assessment on the patient. ALL assessment sites listed below have been assessed on admission. Areas assessed by both nurses: Mary Bookbinder  [x]   Head, Face, and Ears   [x]   Shoulders, Back, and Chest  [x]   Arms, Elbows, and Hands   [x]   Coccyx, Sacrum, and Ischium  [x]   Legs, Feet, and Heels        Does the Patient have Skin Breakdown?   Yes a wound was noted on the Admission Assessment and an LDA was Initiated documentation include the Savi-wound, Wound Assessment, Measurements, Dressing Treatment, Drainage, and Color\",         Giorgio Prevention initiated:  Yes   Wound Care Orders initiated:  Yes      [address]  nurse consulted for Pressure Injury (Stage 3,4, Unstageable, DTI, NWPT, and Complex wounds) or Giorgio score 18 or lower:  Yes      Nurse 1 eSignature: Electronically signed by Markus Goodson RN on 7/18/22 at 6:25 AM EDT    **SHARE this note so that the co-signing nurse is able to place an eSignature**    Nurse 2 eSignature: Electronically signed by Sammy Ramirez RN on 7/18/22 at 7:02 PM EDT

## 2022-07-18 NOTE — PROGRESS NOTES
4 Eyes Assessment  I agree as the admission nurse that 2 RN's have performed a thorough Head to Toe Skin Assessment on the patient. ALL assessment sites listed below have been assessed on admission. Areas assessed by both nurses: Tia Primmer  [x]   Head, Face, and Ears   [x]   Shoulders, Back, and Chest  [x]   Arms, Elbows, and Hands   [x]   Coccyx, Sacrum, and Ischium  [x]   Legs, Feet, and Heels        Does the Patient have Skin Breakdown?   Yes a wound was noted on the Admission Assessment and an LDA was Initiated documentation include the Savi-wound, Wound Assessment, Measurements, Dressing Treatment, Drainage, and Color\",         Giorgio Prevention initiated:  Yes   Wound Care Orders initiated:  Yes      WOC nurse consulted for Pressure Injury (Stage 3,4, Unstageable, DTI, NWPT, and Complex wounds) or Giorgio score 18 or lower:  Yes      Nurse 1 eSignature: Electronically signed by Marylen Neighbors, RN on 7/18/22 at 7:05 PM EDT    **SHARE this note so that the co-signing nurse is able to place an eSignature**    Nurse 2 eSignature: Electronically signed by Winsome Mullen RN on 7/18/22 at 9:26 PM EDT

## 2022-07-18 NOTE — ONCOLOGY
[address]    [phone]    DATE:7/18/22    AREA TREATED: Brain Mass    DAILY DOSE: 300cGy    CUMULATIVE DOSE: 900cGy    # 3  OUT OF 8 TREATMENTS    NEXT PLANNED TREATMENT  DATE:Tuesday 7/19    Daily Treatments are Monday through Friday      INPATIENT CODING:  Beam Radiation   Photons   Photon energy : 6 mv

## 2022-07-18 NOTE — OP NOTE
Operative Report    PATIENT NAME:  Laurette Casino D Roz Hamman OF BIRTH:  1945  MEDICAL RECORD#  [id number]  SURGERY DATE:  7/6/2022  SURGEON:   Jonathan Foster MD, PhD  ASSISTANT:   Marisol Marinelli PA-C. She served as assistant on this surgery due to the complex nature of the surgery and the lack of a resident or fellow assistant. She provided assistance with opening, wound closure and assistance with protecting critical neuro elements. DICTATED BY:  Jonathan Foster MD, PhD           PREOPERATIVE DIAGNOSIS(ES):       1. Right frontal mass lesion       POSTOPERATIVE DIAGNOSIS(ES):       1. Right mass lesion, frozen pathology consistent with lymphoma       PROCEDURE(S) PERFORMED:      1. Right frontal tobin hole craniectomy  2. Image guided stereotactic biopsy of right frontal lesion using Brainlab Varioguide       ANESTHESIA:  General    IMPLANTS:  Synthes cranial plating tobin hole cover and screws (#6)      INDICATIONS FOR SURGERY:   Chandni Holland who was recently diagnosed with a right frontal mass lesion with associate mass effect. He has a known history of lymphoma The patient was made aware of the potential benefits and the possible risks including (but not limited to):  infection, bleeding, seizures, paralysis, weakness, numbness, intraparenchymal hemorrhage, wound healing problems, stroke, coma or even death. The patient understood that these risks and wished to proceed with stereotactic biopsy of the lesion. DETAILS OF PROCEDURE:  The patient was brought to the operating room, placed on the table in supine position and underwent endotracheal intubation and general anesthesia. A time out was performed. The head was fixed in the [facility] and the [facility] stereotactic imaging was registered successfully with good precision. The incision was planned over the lesion utilizing navigation and injected with local anesthetic containing epinephrine.  The skin was shaved, prepped and draped in the usual fashion. A #15 blade was used to incise the skin full thickness. The pericranium was incised with the Bovie and a bur hole was created with a Midas Tommy drill. The dura was coagulated with the bipolar electrocautery, incised with a #15 blade and opened in a cruciate fashion. The surface of the cortex appeared normal. A corticectomy was performed using a bipolar, followed by microscissors. The Varioguide was brought into the field and registered with good precision along the planned trajectory. The biopsy needle was advanced through the Varioguide to the target lesion. A syringe was attached to the needle and set to the 12 0'Clock position. The aperture was opened, suctioned applied and the sample removed. This was sent for frozen pathology. The procedure was repeated at the 3 and 6 O'Clock positions and those samples retained for permanent pathology. The wound was then copiously irrigated and covered with a sterile towel until diagnosis was confirmed by frozen pathology. The wound again irrigated and meticulous hemostasis was ensured. A piece of gelfoam was placed over the incised dura and covered with a medium titanium tobin hole covered affixed with #4 screws. The wound was irrigated copiously with antibiotic impregnated solution. The galea was closed with 2-0 Vicryl sutures. The skin was closed with a subcuticular 4-0 monocryl and dressed with Dermabond. The patient awoke in the operating room and was extubated. The patient was brought to the recovery room in good condition with stable vital signs. There were no complications. All needle, instrument, and sponge counts were correct. In accordance with CMS guidelines, I attest that I was present for the entire procedure from the creation of the skin incision to the closure. ESTIMATED BLOOD LOSS: minimal    COMPLICATIONS: No complications apparent.     DISPOSITION: The patient was transferred to the PACU in stable condition, awake, alert, and

## 2022-07-18 NOTE — PROGRESS NOTES
Physical Therapy  Facility/Department: [address]  Physical Therapy Daily Treatment Note    Name: Julio César Romo  : 1945  MRN: [id number]  Date of Service: 2022    Discharge Recommendations:    Julio César Romo scored a 16/24 on the AM-PAC short mobility form. Current research shows that an AM-PAC score of 17 or less is typically not associated with a discharge to the patient's home setting. Based on the patient's AM-PAC score and their current functional mobility deficits, it is recommended that the patient have 5-7 sessions per week of Physical Therapy at d/c to increase the patient's independence. At this time, this patient demonstrates complex nursing, medical, and rehabilitative needs, and would benefit from intensive rehabilitation services upon discharge from the Inpatient setting. This patient demonstrates the ability to participate in and benefit from an intensive therapy program with a coordinated interdisciplinary team approach to foster frequent, structured, and documented communication among disciplines, who will work together to establish, prioritize, and achieve treatment goals. Please see assessment section for further patient specific details. If patient discharges prior to next session this note will serve as a discharge summary. Please see below for the latest assessment towards goals. Patient Diagnosis(es): The primary encounter diagnosis was Brain mass. Diagnoses of Intracranial mass and Aphasia were also pertinent to this visit. Past Medical History:  has a past medical history of Benign prostatic hyperplasia with weak urinary stream, Cancer ([address] Erectile dysfunction, History of gout, History of thrombocytopenia, Hypercalcemia, Hyperlipidemia, Hypertension, Lung nodule, Proteinuria, Type 2 diabetes mellitus without complication ([address] and Vitamin D deficiency.   Past Surgical History:  has a past surgical history that includes Finger trigger release (Right, 2007); Tonsillectomy and adenoidectomy (age 2); Elbow fracture surgery (Left, age 5); Vasectomy (1971); Cataract removal with implant (Bilateral, 2011); Colonoscopy (3/29/2011); Colonoscopy (03/14/2016); IR PORT PLACEMENT > 5 YEARS (1/11/2022); bronchoscopy (N/A, 3/25/2022); and craniotomy (Right, 7/6/2022). Assessment   Body Structures, Functions, Activity Limitations Requiring Skilled Therapeutic Intervention: Decreased functional mobility ; Decreased ROM; Decreased strength;Decreased endurance  Assessment: Progressing well. Increased gt endurance this date. Continues with L sided weakness and decreased coordination. Continues to be below baseline function. Needing min to mod assist for transfers and CG/min assist for gt. At riskf for falls and not safe to get up alone. Rec cont skilled PT to maximize mobilitly and independence. Treatment Diagnosis: Decreased strength and mobility. Requires PT Follow-Up: Yes     Plan   Plan  Plan: 5-7 times per week  Current Treatment Recommendations: Strengthening, Functional mobility training, Gait training, Neuromuscular re-education, Safety education & training, Patient/Caregiver education & training  Safety Devices  Type of Devices: Bed alarm in place, Left in bed, Nurse notified, Call light within reach     Restrictions  Position Activity Restriction  Other position/activity restrictions: Up with Assistance     Subjective   General  Chart Reviewed: Yes  Additional Pertinent Hx: Pt is a 68 yo male that presents from home to the ED status post fall off the [employer] 7/4. He stated that his grab bars broke and fell and hit his head but no LOC. Head MRI: Enhancing 4.6 cm mass centered in the right basal ganglia with extensive surrounding edema in the right frontotemporal lobe. Findings highly concerning for malignancy. Mass results in 9 mm right to left midline shift, subfalcine herniation, and compression of the right thalamus and brainstem.  PMH: Cancer, Diabetes 2, Hyperliperdemia. Family / Caregiver Present: No  Subjective  Subjective: Pt found sitting in recliner. Agreeable to PT. \"I need to go to the bathroom. \"         Social/Functional History  Social/Functional History  Lives With: Spouse  Type of Home: Condo  Home Layout: Two level, Able to Live on Main level with bedroom/bathroom, Performs ADL's on one level  Home Access: Stairs to enter with rails  Entrance Stairs - Number of Steps: 3 efren from garage w/ hand rail on R  Entrance Stairs - Rails: Right  Bathroom Shower/Tub: Walk-in shower, Shower chair without back  Bathroom Toilet: Handicap height (\"chair height\" toilets w/ hand rails attached)  Bathroom Equipment: Grab bars in shower  Home Equipment: Rollator  Has the patient had two or more falls in the past year or any fall with injury in the past year?: Yes (Pt's wife reports about 5 falls in the last 6 months)  ADL Assistance: Needs assistance (last week and a half, wife has been helping w ADLs. Prior to that he was mostly independent)  Homemaking Assistance: Needs assistance (wife does cooking, cleaning and laundry)  Homemaking Responsibilities: No  Ambulation Assistance: Needs assistance (last week and a half has needed wife to help w/ ambulation and uses rollator)  Transfer Assistance: Needs assistance (has needed assist for the last week and a half)  Active : No  Occupation: Retired  Additional Comments: wife has been providing 24hr A for the last week and a half  Vision/Hearing  Vision  Vision: Within Functional Limits  Vision Exceptions: Wears glasses for reading  Hearing  Hearing: Exceptions to [facility]  Hearing Exceptions: Hard of hearing/hearing concerns    Cognition   Orientation  Overall Orientation Status: Within Functional Limits  Cognition  Overall Cognitive Status: Exceptions  Arousal/Alertness: Appropriate responses to stimuli;Delayed responses to stimuli  Following Commands: Follows one step commands with repetition; Follows one step commands with increased time  Attention Span: Attends with cues to redirect  Memory: Appears intact  Insights: Decreased awareness of deficits  Initiation: Requires cues for some  Sequencing: Requires cues for some     Objective                               Bed mobility  Sit to Supine: Stand by assistance  Transfers  Sit to Stand: Minimal Assistance (from chair, min to mod assist from commode with grab bar - 2 trials)  Stand to sit: Contact guard assistance (cues for safety)  Ambulation  Device: Rolling Walker  Assistance: Minimal assistance  Quality of Gait: decreased bilat step length/height especially LLE - cues to increase, occas assist to maneuver walker  Distance: 25', 10'        A/AROM Exercises: AP x 10 reps with assist for full range LLE, SAQ LLE x 5 reps independent, SLR LLE x 5 reps independent, QS x 10 reps independent. Therapist performed LLE heel cord stretching. Pt instructed in performing AP and QS throughout the day. Pt verbalized understanding        OutComes Score                                                  AM-PAC Score  AM-PAC Inpatient Mobility Raw Score : 16 (07/18/22 1412)  AM-PAC Inpatient T-Scale Score : 40.78 (07/18/22 1412)  Mobility Inpatient CMS 0-100% Score: 54.16 (07/18/22 1412)  Mobility Inpatient CMS G-Code Modifier : CK (07/18/22 1412)          Goals  Short Term Goals  Time Frame for Short term goals: Discharge  Short term goal 1: Supine <> Sit CGA -ongoing  Short term goal 2: Sit<>Stand CGA -MET 7/17 revise to sit<>stand with supervision. Ongoing  Short term goal 3: Amb 20 ft with RW CGA -ongoing. Patient Goals   Patient goals :  To Return Home       Education  Patient Education  Education Given To: Patient  Education Provided: Role of Therapy;Plan of Care  Education Provided Comments: standing balance/midline orientation  Education Method: Demonstration;Verbal  Barriers to Learning: None  Education Outcome: Verbalized understanding;Demonstrated understanding;Continued education needed      Therapy Time   Individual Concurrent Group Co-treatment   Time In 1332         Time Out 1410         Minutes 38             Timed Code Treatment Minutes:  38    Total Treatment Minutes:  [address], [place]

## 2022-07-19 NOTE — PLAN OF CARE
Problem: Neurosensory - Adult  Goal: Achieves stable or improved neurological status  7/19/2022 0159 by Chris Henriquez RN  Outcome: Progressing Towards Goal   Pt AXO X4 this shift, able to answer all neurological questions correctly. NIHSS scale performed as per orders, pt remains at baseline. Will continue to monitor and assess. Problem: Skin/Tissue Integrity - Adult  Goal: Skin integrity remains intact  7/19/2022 0159 by Chris Henriquez RN  Outcome: Progressing Towards Goal   Pt repositioned/turned in bed q2 hours per [facility] protocol. No new skin breakdown noted. Will continue to monitor and assess. Problem: Infection - Adult  Goal: Absence of infection at discharge  7/19/2022 0159 by Chris Henriquez RN  Outcome: Progressing Towards Goal   Pt remained afebrile this shift. Problem: Hematologic - Adult  Goal: Maintains hematologic stability  7/19/2022 0159 by Chris Henriquez RN  Outcome: Progressing Towards Goal  Patient's hemoglobin this AM:   Recent Labs     07/19/22  0337   HGB 10.2*     Patient's platelet count this AM:   Recent Labs     07/19/22  0337   PLT 71*    Thrombocytopenia Precautions in place. Patient showing no signs or symptoms of active bleeding. Transfusion not indicated at this time. Patient verbalizes understanding of all instructions. Will continue to assess and implement POC. Call light within reach and hourly rounding in place. Problem: Nutrition Deficit:  Goal: Optimize nutritional status  7/19/2022 0159 by Chirs Henriquez RN  Outcome: Progressing Towards Goal   Pt drank several Glucernas this shift, tolerated well. Will continue to monitor and assess.

## 2022-07-19 NOTE — PROGRESS NOTES
4 Eyes Admission Assessment     I agree as the admission nurse that 2 RN's have performed a thorough Head to Toe Skin Assessment on the patient. ALL assessment sites listed below have been assessed on admission. Areas assessed by both nurses: [place]  [x]   Head, Face, and Ears   [x]   Shoulders, Back, and Chest  [x]   Arms, Elbows, and Hands   [x]   Coccyx, Sacrum, and Ischium  [x]   Legs, Feet, and Heels        Does the Patient have Skin Breakdown?   Yes a wound was noted on the Admission Assessment and an LDA was Initiated documentation include the Savi-wound, Wound Assessment, Measurements, Dressing Treatment, Drainage, and Color\",         Giorgio Prevention initiated:  Yes   Wound Care Orders initiated:  Yes      WOC nurse consulted for Pressure Injury (Stage 3,4, Unstageable, DTI, NWPT, and Complex wounds) or Giorgio score 18 or lower:  Yes      Nurse 1 eSignature: Electronically signed by Carlie Echols RN on 7/19/22 at 6:49 AM EDT    **SHARE this note so that the co-signing nurse is able to place an eSignature**    Nurse 2 eSignature: {Esignature:[id number]}

## 2022-07-19 NOTE — ONCOLOGY
[address]     DATE:7/19/22     AREA TREATED: Brain Mass     DAILY DOSE: 300cGy     CUMULATIVE DOSE: 1200cGy     # 4  OUT OF 8 TREATMENTS     NEXT PLANNED TREATMENT  DATE:7/20/22     Daily Treatments are Monday through Friday        INPATIENT CODING:  Beam Radiation  Photons  Photon energy : 6 mv

## 2022-07-19 NOTE — PROGRESS NOTES
[address] [facility] [facility] Progress [facility]    2022     Julio César Romo    MRN: [id number]    : 1945    Referring MD: No referring provider defined for this encounter. SUBJECTIVE: No complaints. He cont to be very weak and tired.      ECOG PS: (4) Completely disabled, unable to carry out self-care and confined to bed or chair    KPS: 40% Disabled; requires special care and assistance    Isolation: None    Medications    Scheduled Meds:   Hydrocerin   Topical BID    docusate sodium  100 mg Oral Daily    insulin glargine  25 Units SubCUTAneous Nightly    insulin lispro  0-18 Units SubCUTAneous TID WC    insulin lispro  0-9 Units SubCUTAneous Nightly    levETIRAcetam  500 mg Oral BID    dexamethasone  4 mg Oral 4 times per day    enoxaparin  40 mg SubCUTAneous Q24H    pantoprazole  40 mg IntraVENous Daily    sodium chloride flush  5-40 mL IntraVENous 2 times per day    fluticasone  1 spray Each Nostril Daily    ipratropium  2 spray Each Nostril 4x Daily    tamsulosin  0.8 mg Oral Daily     Continuous Infusions:   sodium chloride 5 mL/hr at 22 1811    dextrose       PRN Meds:.magnesium sulfate, potassium chloride, sodium chloride flush, sodium chloride, oxyCODONE, methocarbamol **OR** methocarbamol IVPB, glucose, dextrose bolus **OR** dextrose bolus, glucagon (rDNA), dextrose, acetaminophen, ondansetron **OR** ondansetron    ROS:  As noted above, otherwise remainder of 10-point ROS negative    Physical Exam:     I&O:      Intake/Output Summary (Last 24 hours) at 2022 0914  Last data filed at 2022 0539  Gross per 24 hour   Intake 480 ml   Output 2300 ml   Net -1820 ml         Vital Signs:  BP (!) 156/74   Pulse 59   Temp 98.3 °F (36.8 °C) (Oral)   Resp 19   Ht 5' 6\" (1.676 m)   Wt 128 lb 4.9 oz (58.2 kg)   SpO2 99%   BMI 20.71 kg/m²     Weight:    Wt Readings from Last 3 Encounters:   22 128 lb 4.9 oz (58.2 kg)   22 117 lb (53.1 kg)   22 119 lb (54 kg)       General: awake alert and oriented   HEENT: normocephalic, PERRL, no scleral erythema or icterus, Oral mucosa moist and intact, throat clear  NECK: supple   BACK: Straight   SKIN: warm dry and intact without lesions rashes or masses  CHEST: CTA bilaterally without use of accessory muscles  CV: Normal S1 S2, RRR, no MRG  ABD: NT ND normoactive BS, no palpable masses or hepatosplenomegaly  EXTREMITIES: without edema, denies calf tenderness  NEURO: CN II - XII grossly intact, motor weakness of his left upper and lower ext - unchanged from yesterday   CATHETER: R SL PAC    Data    CBC:   Recent Labs     07/17/22  0412 07/18/22  0345 07/19/22  0337   WBC 8.8 12.1* 9.0   HGB 11.0* 11.1* 10.2*   HCT 32.8* 33.0* 30.5*   MCV 98.7 97.2 98.9   PLT 75* 88* 71*       BMP/Mag:  Recent Labs     07/17/22  0412 07/18/22  0345 07/18/22  0507 07/19/22  0410   *  --  134* 129*   K 4.7  --  4.6 5.2*   CL 95*  --  95* 93*   CO2 28  --  29 30   PHOS  --  3.0  --   --    BUN 21*  --  24* 30*   CREATININE <0.5*  --  <0.5* 0.6*   MG  --   --  1.40*  --        LIVP:   Recent Labs     07/18/22  0345   AST 51*   *   BILIDIR <0.2   BILITOT 0.3   ALKPHOS 137*       Coags:   Recent Labs     07/18/22  0424   PROTIME 13.4   INR 1.02   APTT 23.0       Uric Acid   Recent Labs     07/18/22  0345   LABURIC 3.6       DIAGNOSTIC:  1. CT Head 7/4:  1. Heterogeneous 3.9 cm masslike lesion centered in the right basal ganglia/frontal lobe with extensive surrounding edema. This is concerning for a primary or metastatic malignancy. Focal subacute intraparenchymal hemorrhage, hemorrhage within the mass,    or infection are differential considerations. Brain MRI with and without contrast and neurosurgery consult recommended. 2.  Extensive mass effect with 9 mm right to left midline shift, subfalcine herniation and suspected partial transtentorial herniation resulting in effacement of the right frontal horn and body, and mass effect on midbrain. 2. MRI Brain 7/4:  1. Enhancing 4.6 cm mass centered in the right basal ganglia with extensive surrounding edema in the right frontotemporal lobe. Findings highly concerning for malignancy. 2.  Mass results in 9 mm right to left midline shift, subfalcine herniation, and compression of the right thalamus and brainstem. 3. CT C/A/P 7/5:  CHEST:  1. No evidence of any thoracic metastatic disease. 2. Stable changes of pulmonary fibrosis. A/P:  IMPRESSION:   1. Interval reduction in size of hepatic mass. 2. Slight interval reduction in size of low-attenuation lesion within the spleen. 3. No evidence of any adrenal mass. 4. No evidence of any abdominal or pelvic lymphadenopathy. 5. Uncomplicated sigmoid colon diverticulosis. 4. CT Head 7/8:  1.  Status post biopsy of right frontal lobe mass with unchanged mild acute hemorrhage along the biopsy tract and unchanged 12 mm leftward subfalcine herniation. 5. CT Head 7/10:  Status post biopsy of right frontal lobe mass with unchanged mild acute hemorrhage along the biopsy tract and unchanged 12 mm leftward subfalcine herniation. 4. MBS 7/5/22:    PATHOLOGY:  1. Right Frontal Brain Mass Bx 7/6/22: Involved with malignant B-cell lymphoma with aggressive morphologic   features. COMMENT: The histologic and immunohistochemical changes are   supportive of a malignant B-cell lymphoma, large cell with aggressive   morphologic features based on the high Ki-67 proliferative index. The   patient's prior liver lymphoma diagnosis ( [facility]   DG-) supporting an aggressive B-cell lymphoma is noted. FISH for   the aggressive B-cell lymphoma panel and cytogenetics are currently   pending and the results of the studies will be reflected in supplemental   Reports    FISH: Pending    PROBLEM LIST:             1.  DLBCL-   2. Interstitial pneumonia  3. DM2  4.   H/O malignant hypercalcemia      TREATMENT:            1.  Initial therapy on protocol [id number] R-CHOP +/- Tafasitamab/Lenalidamide Jan 2022 - July 2022    ASSESSMENT AND PLAN:           1. 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 C-Myc was mutated but BCL-2 and BCL 6 were  not. - GCB phenotype  - S/p initial therapy on clinical trial utilizing R-CHOP w/ Revlimid and Tafasitimab;  - Study mandated PET was due this week BUT in-pt     Simulation was performed (7/11). Plan for 2400 cGy over 8 fractions to be started 7/14/22 - tolerating well     Relapse / Progression: Large CNS mass - bx proven NHL 7/6/22. Ki-67>90%  - Plan to start XRT to brain mass to reduce size and then possible systemic chemotherapy. - Cont Decadron 4 mg IV Q 6 hours -->changed to PO starting sat (7/16) --> decrease to 4mg po TID (7/19)   - Cont Keppra  BUT change to PO starting sat (7/16)   - Consult RadOnc - XRT started (7/13/22)   - Consult palliative care - following      2. ID: afebrile     3. Heme: leukocytosis + Anemia and thrombocytopenia likely r/t recent chemotherapy  - Transfuse for Hgb < 7 and Platelets < 10 K  - No transfusion today     4. Metabolic: hypoNa + hypoK + abnl LFTs which are trending upward   - Cont IVFs: NS at 50mL/hr, Stop 7/16/22  - Replace K+ & Mg per PRN orders    5. GI / Nutrition:     - Cont diet and change to reg diet  - Recommend swabbing mouth after all meals. Must be completely awake for PO intake  - Dietary to follow closely     6. Pulm:  Interstitial lung disease  - In the course of his NHL tretatment, we uncovered interstitial lung disease. Dr Chele Salgado reviewed chest CTs in the past and feels that he had evidence of pulmonary fibrosis before his lymphoma diagnosis.  He completed a prednisone taper.  - Dyspnea is grade 1  - He had a bronchoscopy March 25 and PFTs April 1.  - He continues pulmonary rehabilitation  - He is seeing Dr. Loida Alvarez    7. Endo: T2DM exacerbated by steroids  - Cont lantus 10 units nightly, change to 25 units HS 7/16/22  - Cont Humalog Med SSI AS/HS, increase to high ISS 7/16/22    8. Acute Debilitation: 2/2 CNS Lymphoma    - Consult PT/OT - [facility] 9 / 12 - appreciate input     - Consult SLP: DAVID 7/5/2: poor swallow coordination with aspiration of thin liquid x1 with straw  presentation. No aspiration occurred with thin liquid via tsp/cup, nectar thick liquid, puree or soft  solid. Recommend continue soft and bite sized diet with thin liquids - cup only, small sips, sit fully  upright and consistent oral care.      - DVT Prophylaxis: Platelets >53,762 cells/dL, - daily lovenox prophylaxis ordered  Contraindications to pharmacologic prophylaxis: None  Contraindications to mechanical prophylaxis: None    - Disposition: Uncertain at this time but possibly early next wk once off all IVs ([facility] vs NH vs hm)       Alyce Child MD

## 2022-07-19 NOTE — PROGRESS NOTES
Physical Therapy  Facility/Department: [address]  Physical Therapy Daily Treatment Note    Name: Jamila Stoddard  : 1945  MRN: [id number]  Date of Service: 2022    Discharge Recommendations:    Jamila Stoddard scored a 16/24 on the AM-PAC short mobility form. Current research shows that an AM-PAC score of 17 or less is typically not associated with a discharge to the patient's home setting. Based on the patient's AM-PAC score and their current functional mobility deficits, it is recommended that the patient have 5-7 sessions per week of Physical Therapy at d/c to increase the patient's independence. At this time, this patient demonstrates complex nursing, medical, and rehabilitative needs, and would benefit from intensive rehabilitation services upon discharge from the Inpatient setting. This patient demonstrates the ability to participate in and benefit from an intensive therapy program with a coordinated interdisciplinary team approach to foster frequent, structured, and documented communication among disciplines, who will work together to establish, prioritize, and achieve treatment goals. Please see assessment section for further patient specific details. If patient discharges prior to next session this note will serve as a discharge summary. Please see below for the latest assessment towards goals. PT Equipment Recommendations  Equipment Needed: No      Patient Diagnosis(es): The primary encounter diagnosis was Brain mass. Diagnoses of Intracranial mass and Aphasia were also pertinent to this visit. Past Medical History:  has a past medical history of Benign prostatic hyperplasia with weak urinary stream, Cancer ([address] Erectile dysfunction, History of gout, History of thrombocytopenia, Hypercalcemia, Hyperlipidemia, Hypertension, Lung nodule, Proteinuria, Type 2 diabetes mellitus without complication ([address] and Vitamin D deficiency.   Past Surgical History:  has a past surgical history that includes Finger trigger release (Right, 2007); Tonsillectomy and adenoidectomy (age 2); Elbow fracture surgery (Left, age 5); Vasectomy (1971); Cataract removal with implant (Bilateral, 2011); Colonoscopy (3/29/2011); Colonoscopy (03/14/2016); IR PORT PLACEMENT > 5 YEARS (1/11/2022); bronchoscopy (N/A, 3/25/2022); and craniotomy (Right, 7/6/2022). Assessment   Assessment: Pt with improving gait distance using wheeled walker. Pt does continue to require assist for balance and walker management. Pt shows mild confusion to conversation this date. Rec continued inpt PT at d/c prior to return home. Will continue. Treatment Diagnosis: Decreased strength and mobility  Therapy Prognosis: Fair  Decision Making: Medium Complexity  Requires PT Follow-Up: Yes  Activity Tolerance  Activity Tolerance: Patient tolerated treatment well;Patient limited by fatigue     Plan   Plan  Plan: 5-7 times per week  Current Treatment Recommendations: Strengthening, Functional mobility training, Gait training, Safety education & training, Therapeutic activities, Balance training, Transfer training  Safety Devices  Type of Devices: Left in chair, Call light within reach, Chair alarm in place, Nurse notified     Restrictions  Position Activity Restriction  Other position/activity restrictions: Up with Assistance     Subjective   General  Chart Reviewed: Yes  Additional Pertinent Hx: Pt is a 68 yo male that presents from home to the ED status post fall off the [employer] 7/4. He stated that his grab bars broke and fell and hit his head but no LOC. Head MRI: Enhancing 4.6 cm mass centered in the right basal ganglia with extensive surrounding edema in the right frontotemporal lobe. Findings highly concerning for malignancy. Mass results in 9 mm right to left midline shift, subfalcine herniation, and compression of the right thalamus and brainstem. PMH: Cancer, Diabetes 2, Hyperliperdemia. Family / Caregiver Present:  Yes (Wife)  Diagnosis: Intracranial Mass  Subjective  Subjective: Pt up in bathroom with nursing and agreeable to PT. Pt taken into bathroom with nursing using Laqueta Schiller. Pain: None rated    Social/Functional History  Social/Functional History  Lives With: Spouse  Type of Home: Condo  Home Layout: Two level, Able to Live on Main level with bedroom/bathroom, Performs ADL's on one level  Home Access: Stairs to enter with rails  Entrance Stairs - Number of Steps: 3 efren from garage w/ hand rail on R  Entrance Stairs - Rails: Right  Bathroom Shower/Tub: Walk-in shower, Shower chair without back  Bathroom Toilet: Handicap height (\"chair height\" toilets w/ hand rails attached)  Bathroom Equipment: Grab bars in shower  Home Equipment: Rollator  Has the patient had two or more falls in the past year or any fall with injury in the past year?: Yes (Pt's wife reports about 5 falls in the last 6 months)  ADL Assistance: Needs assistance (last week and a half, wife has been helping w ADLs. Prior to that he was mostly independent)  Homemaking Assistance: Needs assistance (wife does cooking, cleaning and laundry)  Homemaking Responsibilities: No  Ambulation Assistance: Needs assistance (last week and a half has needed wife to help w/ ambulation and uses rollator)  Transfer Assistance: Needs assistance (has needed assist for the last week and a half)  Active : No  Occupation: Retired  Additional Comments: wife has been providing 24hr A for the last week and a half    Vision/Hearing  Vision  Vision: Within Functional Limits  Vision Exceptions: Wears glasses for reading  Hearing  Hearing: Exceptions to [facility]  Hearing Exceptions: Hard of hearing/hearing concerns      Cognition   Orientation  Overall Orientation Status:  (Mild confusion to conversation)  Cognition  Following Commands:  Follows one step commands with increased time  Initiation: Requires cues for some  Sequencing: Requires cues for some     Objective   Transfers  Sit to Stand: Moderate Assistance (From low surface, min assist from higher surface)  Stand to sit: Contact guard assistance    Ambulation  Device: Rolling Walker  Assistance: Minimal assistance  Quality of Gait: Narrow VIKTORIYA, flexed trunk, assist for walker management  Gait Deviations: Slow Ashley;Decreased step length;Decreased step height  Distance: 30 feet and 50 feet    Balance  Sitting - Static: Fair  Standing - Static: Fair;- (With walker)  Standing - Dynamic: Fair;- (With walker)    Goals  Short Term Goals  Time Frame for Short term goals: Discharge  Short term goal 1: Supine <> Sit CGA -ongoing  Short term goal 2: Sit<>Stand CGA -MET 7/17 revise to sit<>stand with supervision -ongoing  Short term goal 3: Amb 20 ft with RW CGA -ongoing  Patient Goals   Patient goals :  To Return Home    Education  Patient Education  Education Given To: Patient  Education Provided: Role of Therapy  Education Outcome: Verbalized understanding;Continued education needed    Therapy Time   Individual Concurrent Group Co-treatment   Time In 1142         Time Out 1220         Minutes 38             Timed Code Treatment Minutes:  38 Minutes    Total Treatment Minutes:   1330 Emily Guevara PT

## 2022-07-19 NOTE — PROGRESS NOTES
Occupational Therapy  Facility/Department: [address]  Daily Treatment Note  NAME: Keegan Gutierrez  : 1945  MRN: [id number]    Date of Service: 2022    Discharge Recommendations: Keegan Gutierrez scored a 17/24 on the AM-PAC ADL Inpatient form. Current research shows that an AM-PAC score of 17 or less is typically not associated with a discharge to the patient's home setting. Based on the patient's AM-PAC score and their current ADL deficits, it is recommended that the patient have 5-7 sessions per week of Occupational Therapy at d/c to increase the patient's independence. At this time, this patient demonstrates complex nursing, medical, and rehabilitative needs, and would benefit from intensive rehabilitation services upon discharge from the Inpatient setting. This patient demonstrates the ability to participate in and benefit from an intensive therapy program with a coordinated interdisciplinary team approach to foster frequent, structured, and documented communication among disciplines, who will work together to establish, prioritize, and achieve treatment goals. Please see assessment section for further patient specific details. If patient discharges prior to next session this note will serve as a discharge summary. Please see below for the latest assessment towards goals. Patient Diagnosis(es): The primary encounter diagnosis was Brain mass. Diagnoses of Intracranial mass and Aphasia were also pertinent to this visit. Assessment        Assessment: Pt improving this session. Needing less A for LB dressing and funct mob, but cont to require constant cues. Able to tolerate longer ambulation this session. Pt would benefit from cont OT to work on ADLs, funct mob and transfers.  Cont with POC      Activity Tolerance: Patient tolerated treatment well      Plan   Plan  Times per Week: 5-7  Times per Day: Daily     Restrictions       Subjective   Subjective  Subjective: Pt in bed upon arrival. Pleasant and agreeable to OT. Wife present  Pain: Complaint of pain in calves. RN aware           Objective    Vitals     Balance  Sitting: Intact  Standing: With support (RW- CGA)      Transfer Training  Transfer Training: Yes  Sit to Stand: Contact-guard assistance; Additional time (++time to reach full stance, vc's for hand placement)  Stand to Sit: Contact-guard assistance      Gait  Overall Level of Assistance: Contact-guard assistance (One slight LOB noted- Min A to correct)  Interventions: Verbal cues; Safety awareness training  Distance (ft): 30 Feet (to/from door)  Assistive Device: Gait belt;Walker, rolling           ADL  Feeding: Setup  Grooming: Contact guard assistance (CGA for stance at sink for hand hygiene)  LE Dressing: Verbal cueing; Increased time to complete;Minimal assistance (++time and cues to thread BLEs. Min A to pull up pass hips with CGA for stance)  Toileting:  (Male Gattis Blanco in place)         Safety Devices  Type of Devices: Left in chair;Call light within reach; Chair alarm in place;Nurse notified     Patient Education  Education Given To: Patient  Education Provided: Role of Therapy;Plan of Care;ADL Adaptive Strategies;Transfer Training; Fall Prevention Strategies  Education Method: Verbal  Barriers to Learning: Cognition  Education Outcome: Verbalized understanding;Continued education needed    Goals  Short Term Goals  Time Frame for Short term goals: by dc  Short Term Goal 1: Pt will complete functional transfers w/ Min Ax1 - Met 7/17; Transfer to/from toilet and chairs SBA - Not met  Short Term Goal 2: Pt will complete LE dressing w/ Min Ax1- met for pants 7/19  Short Term Goal 3: Pt will maintain sitting balance w/ CGA for 5min while engaging in ADL task- not met  Short Term Goal 4: Tolerate ambulation to/from bathroom for toileting/ADLs - Not met       Therapy Time   Individual Concurrent Group Co-treatment   Time In 1127         Time Out 1205         Minutes 38 Timed Code Treatment Minutes: [address] COY/L

## 2022-07-20 NOTE — PROGRESS NOTES
4 Eyes Admission Assessment     I agree as the admission nurse that 2 RN's have performed a thorough Head to Toe Skin Assessment on the patient. ALL assessment sites listed below have been assessed on admission. Areas assessed by both nurses: Shane Sandoval RN  [x]   Head, Face, and Ears   [x]   Shoulders, Back, and Chest  [x]   Arms, Elbows, and Hands   [x]   Coccyx, Sacrum, and Ischium  [x]   Legs, Feet, and Heels        Does the Patient have Skin Breakdown?   Yes a wound was noted on the Admission Assessment and an LDA was Initiated documentation include the Savi-wound, Wound Assessment, Measurements, Dressing Treatment, Drainage, and Color\",         Giorgio Prevention initiated:  Yes   Wound Care Orders initiated:  Yes      [id number]  nurse consulted for Pressure Injury (Stage 3,4, Unstageable, DTI, NWPT, and Complex wounds) or Giorgio score 18 or lower:  Yes      Nurse 1 eSignature: Electronically signed by Danyelle Bond RN on 7/19/22 at 8:06 PM EDT    **SHARE this note so that the co-signing nurse is able to place an eSignature**    Nurse 2 eSignature: Electronically signed by Governor Nya RN on 7/20/22 at 5:45 AM EDT

## 2022-07-20 NOTE — ONCOLOGY
[address]    [phone]    DATE:7/20/22    AREA TREATED:BRAIN MASS    DAILY DOSE:300 cGy    CUMULATIVE DOSE: 1500cGy    # 5  OUT OF8  TREATMENTS    NEXT PLANNED TREATMENT  DATE:Thursday 7/21    Daily Treatments are Monday through Friday      INPATIENT CODING  Beam Radiation   Photons   Photon energy : 6 MV

## 2022-07-20 NOTE — PROGRESS NOTES
Physical Therapy  Facility/Department: [address]  Daily Treatment Note  NAME: Steve Mishra  : 1945  MRN: [id number]    Date of Service: 2022    Discharge Recommendations:    Steve Mishra scored a 17/24 on the AM-PAC short mobility form. Current research shows that an AM-PAC score of 17 or less is typically not associated with a discharge to the patient's home setting. Based on the patient's AM-PAC score and their current functional mobility deficits, it is recommended that the patient have 5-7 sessions per week of Physical Therapy at d/c to increase the patient's independence. At this time, this patient demonstrates complex nursing, medical, and rehabilitative needs, and would benefit from intensive rehabilitation services upon discharge from the Inpatient setting. This patient demonstrates the ability to participate in and benefit from an intensive therapy program with a coordinated interdisciplinary team approach to foster frequent, structured, and documented communication among disciplines, who will work together to establish, prioritize, and achieve treatment goals. Please see assessment section for further patient specific details. If patient discharges prior to next session this note will serve as a discharge summary. Please see below for the latest assessment towards goals. PT Equipment Recommendations  Equipment Needed:  (defer for now)    Patient Diagnosis(es): The primary encounter diagnosis was Brain mass. Diagnoses of Intracranial mass and Aphasia were also pertinent to this visit. Assessment   Assessment: Pt slowly progressing with mobility & endurance. Continues to function below baseline. Pt would benefit from cont inpt PT upon D/C. Will follow per plan of care. Activity Tolerance: Patient tolerated treatment well  Equipment Needed:  (defer for now)     Plan    Plan  Plan: 5-7 times per week  Current Treatment Recommendations: Strengthening; Functional mobility training;Gait training; Safety education & training; Therapeutic activities;Balance training;Transfer training     Restrictions  Position Activity Restriction  Other position/activity restrictions: Up with Assistance     Subjective    Subjective  Subjective: Pt supine in bed & agreeable to PT. Pain: Pt denies     Objective      Bed Mobility Training  Bed Mobility Training: Yes  Supine to Sit: Stand-by assistance (HOB elevated, pt using rail. verbal cues required.)  Scooting: Stand-by assistance (seated)  Balance  Sitting: Intact  Standing - Static:  (CGA with RW)  Standing - Dynamic:  (CGA with RW)  Transfer Training  Transfer Training: Yes  Sit to Stand: Contact-guard assistance (verbal cues required)  Stand to Sit: Contact-guard assistance (verbal cues required)  Gait Training  Gait Training: Yes  Gait  Overall Level of Assistance: Contact-guard assistance (pt amb 50 ft x 1, 100 ft x 1 with RW & CGA. slow alexa, decreased step length & height bilat, flexed posture, narrow SEBASTIEN, decreased foot clearance left LE.)     PT Exercises  Exercise Treatment: x 20 bilat: marching, LAQ, ankle pumps. x 10 bilat: heel slides, hip abd, SLR. pt required cues to remain on task. Safety Devices  Type of Devices: Left in chair;Call light within reach; Chair alarm in place;Nurse notified       Goals  Short Term Goals  Time Frame for Short term goals: Discharge  Short term goal 1: Supine <> Sit CGA -ongoing  Short term goal 2: Sit<>Stand CGA -MET 7/17 revise to sit<>stand with supervision -ongoing  Short term goal 3: Amb 20 ft with RW CGA -ongoing  Patient Goals   Patient goals :  To Return Home    Education  Patient Education  Education Given To: Patient  Education Provided: Plan of Care;Transfer Training  Education Method: Demonstration;Verbal  Education Outcome: Verbalized understanding;Demonstrated understanding;Continued education needed    Therapy Time   Individual Concurrent Group Co-treatment   Time In 1107 Time Out [id number] [facility], PT

## 2022-07-20 NOTE — PROGRESS NOTES
4 Eyes Admission Assessment     I agree as the admission nurse that 2 RN's have performed a thorough Head to Toe Skin Assessment on the patient. ALL assessment sites listed below have been assessed on admission. Areas assessed by both nurses: Efrain Kingdom and  [x]   Head, Face, and Ears   [x]   Shoulders, Back, and Chest  [x]   Arms, Elbows, and Hands   [x]   Coccyx, Sacrum, and Ischium  [x]   Legs, Feet, and Heels        Does the Patient have Skin Breakdown?   Yes a wound was noted on the Admission Assessment and an LDA was Initiated documentation include the Savi-wound, Wound Assessment, Measurements, Dressing Treatment, Drainage, and Color\",         Giorgio Prevention initiated:  Yes   Wound Care Orders initiated:  Yes      [address]  nurse consulted for Pressure Injury (Stage 3,4, Unstageable, DTI, NWPT, and Complex wounds) or Giorgio score 18 or lower:  Yes      Nurse 1 eSignature: Electronically signed by Matilde Rodriguez RN on 7/20/22 at 6:53 PM EDT    **SHARE this note so that the co-signing nurse is able to place an eSignature**    Nurse 2 eSignature: Electronically signed by Salina Martino RN on 7/20/22 at 10:45 PM EDT

## 2022-07-20 NOTE — PROGRESS NOTES
[employer] Progress Note    2022     Jamila Stoddard    MRN: [id number]    : 1945    Referring MD: No referring provider defined for this encounter. SUBJECTIVE: No complaints. He cont to be very weak and tired.      ECOG PS: (4) Completely disabled, unable to carry out self-care and confined to bed or chair    KPS: 40% Disabled; requires special care and assistance    Isolation: None    Medications    Scheduled Meds:   dexamethasone  4 mg Oral 3 times per day    insulin glargine  30 Units SubCUTAneous Nightly    Hydrocerin   Topical BID    docusate sodium  100 mg Oral Daily    insulin lispro  0-18 Units SubCUTAneous TID WC    insulin lispro  0-9 Units SubCUTAneous Nightly    levETIRAcetam  500 mg Oral BID    enoxaparin  40 mg SubCUTAneous Q24H    pantoprazole  40 mg IntraVENous Daily    sodium chloride flush  5-40 mL IntraVENous 2 times per day    fluticasone  1 spray Each Nostril Daily    ipratropium  2 spray Each Nostril 4x Daily    tamsulosin  0.8 mg Oral Daily     Continuous Infusions:   sodium chloride 5 mL/hr at 22 1811    dextrose       PRN Meds:.magnesium sulfate, potassium chloride, sodium chloride flush, sodium chloride, oxyCODONE, methocarbamol **OR** methocarbamol IVPB, glucose, dextrose bolus **OR** dextrose bolus, glucagon (rDNA), dextrose, acetaminophen, ondansetron **OR** ondansetron    ROS:  As noted above, otherwise remainder of 10-point ROS negative    Physical Exam:     I&O:      Intake/Output Summary (Last 24 hours) at 2022 0949  Last data filed at 2022 0705  Gross per 24 hour   Intake 930 ml   Output 5000 ml   Net -4070 ml         Vital Signs:  /72   Pulse 69   Temp 98 °F (36.7 °C) (Oral)   Resp 26   Ht 5' 6\" (1.676 m)   Wt 128 lb 4.9 oz (58.2 kg)   SpO2 98%   BMI 20.71 kg/m²     Weight:    Wt Readings from Last 3 Encounters:   22 128 lb 4.9 oz (58.2 kg)   22 117 lb (53.1 kg)   22 119 lb (54 kg)       General: awake alert and oriented   HEENT: normocephalic, PERRL, no scleral erythema or icterus, Oral mucosa moist and intact, throat clear  NECK: supple   BACK: Straight   SKIN: warm dry and intact without lesions rashes or masses  CHEST: CTA bilaterally without use of accessory muscles  CV: Normal S1 S2, RRR, no MRG  ABD: NT ND normoactive BS, no palpable masses or hepatosplenomegaly  EXTREMITIES: without edema, denies calf tenderness  NEURO: CN II - XII grossly intact, motor weakness of his left upper and lower ext - unchanged from yesterday   CATHETER: R SL PAC    Data    CBC:   Recent Labs     07/18/22 0345 07/19/22  0337 07/20/22  0245   WBC 12.1* 9.0 7.7   HGB 11.1* 10.2* 10.3*   HCT 33.0* 30.5* 29.8*   MCV 97.2 98.9 97.8   PLT 88* 71* 73*       BMP/Mag:  Recent Labs     07/18/22  0345 07/18/22  0507 07/19/22  0410 07/20/22  0245   NA  --  134* 129* 134*   K  --  4.6 5.2* 4.4   CL  --  95* 93* 97*   CO2  --  29 30 30   PHOS 3.0  --   --  3.0   BUN  --  24* 30* 23*   CREATININE  --  <0.5* 0.6* <0.5*   MG  --  1.40*  --   --        LIVP:   Recent Labs     07/18/22  0345 07/20/22  0245   AST 51* 82*   * 154*   BILIDIR <0.2 <0.2   BILITOT 0.3 <0.2   ALKPHOS 137* 151*       Coags:   Recent Labs     07/18/22  0424 07/20/22  0245   PROTIME 13.4  --    INR 1.02  --    APTT 23.0 22.5*       Uric Acid   Recent Labs     07/18/22  0345 07/20/22  0245   LABURIC 3.6 3.3*       DIAGNOSTIC:  1. CT Head 7/4:  1. Heterogeneous 3.9 cm masslike lesion centered in the right basal ganglia/frontal lobe with extensive surrounding edema. This is concerning for a primary or metastatic malignancy. Focal subacute intraparenchymal hemorrhage, hemorrhage within the mass,    or infection are differential considerations. Brain MRI with and without contrast and neurosurgery consult recommended.    2.  Extensive mass effect with 9 mm right to left midline shift, subfalcine herniation and suspected partial transtentorial herniation resulting in effacement of the right frontal horn and body, and mass effect on midbrain. 2. MRI Brain 7/4:  1. Enhancing 4.6 cm mass centered in the right basal ganglia with extensive surrounding edema in the right frontotemporal lobe. Findings highly concerning for malignancy. 2.  Mass results in 9 mm right to left midline shift, subfalcine herniation, and compression of the right thalamus and brainstem. 3. CT C/A/P 7/5:  CHEST:  1. No evidence of any thoracic metastatic disease. 2. Stable changes of pulmonary fibrosis. A/P:  IMPRESSION:   1. Interval reduction in size of hepatic mass. 2. Slight interval reduction in size of low-attenuation lesion within the spleen. 3. No evidence of any adrenal mass. 4. No evidence of any abdominal or pelvic lymphadenopathy. 5. Uncomplicated sigmoid colon diverticulosis. 4. CT Head 7/8:  1.  Status post biopsy of right frontal lobe mass with unchanged mild acute hemorrhage along the biopsy tract and unchanged 12 mm leftward subfalcine herniation. 5. CT Head 7/10:  Status post biopsy of right frontal lobe mass with unchanged mild acute hemorrhage along the biopsy tract and unchanged 12 mm leftward subfalcine herniation. 4. MBS 7/5/22:    PATHOLOGY:  1. Right Frontal Brain Mass Bx 7/6/22: Involved with malignant B-cell lymphoma with aggressive morphologic   features. COMMENT: The histologic and immunohistochemical changes are   supportive of a malignant B-cell lymphoma, large cell with aggressive   morphologic features based on the high Ki-67 proliferative index. The   patient's prior liver lymphoma diagnosis ( [facility] supporting an aggressive B-cell lymphoma is noted. FISH for   the aggressive B-cell lymphoma panel and cytogenetics are currently   pending and the results of the studies will be reflected in supplemental   Reports    FISH: Pending    PROBLEM LIST:             1.  DLBCL-   2. Interstitial pneumonia  3. DM2  4.   H/O malignant hypercalcemia      TREATMENT:            1.  Initial therapy on protocol [id number] R-CHOP +/- Tafasitamab/Lenalidamide Jan 2022 - July 2022    ASSESSMENT AND PLAN:           1. 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 C-Myc was mutated but BCL-2 and BCL 6 were  not. - GCB phenotype  - S/p initial therapy on clinical trial utilizing R-CHOP w/ Revlimid and Tafasitimab;  - Study mandated PET was due this week BUT in-pt     Simulation was performed (7/11). Plan for 2400 cGy over 8 fractions to be started 7/14/22 - tolerating well     Relapse / Progression: Large CNS mass - bx proven NHL 7/6/22. Ki-67>90%  - Plan to start XRT to brain mass to reduce size and then possible systemic chemotherapy. - Cont Decadron 4 mg IV Q 6 hours -->changed to PO starting sat (7/16) --> decrease to 4mg po TID (7/19)   - Cont Keppra  BUT change to PO starting sat (7/16)   - Consult RadOnc - XRT started (7/13/22)   - Consult palliative care - following      2. ID: afebrile     3. Heme: leukocytosis + Anemia and thrombocytopenia likely r/t recent chemotherapy  - Transfuse for Hgb < 7 and Platelets < 10 K  - No transfusion today     4. Metabolic: hypoNa + hypoK + abnl LFTs which are trending upward   - Cont IVFs: NS at 50mL/hr, Stop 7/16/22  - Replace K+ & Mg per PRN orders    5. GI / Nutrition:     - Cont diet and change to reg diet  - Recommend swabbing mouth after all meals. Must be completely awake for PO intake  - Dietary to follow closely     6. Pulm:  Interstitial lung disease  - In the course of his NHL tretatment, we uncovered interstitial lung disease. Dr Gina Guidry reviewed chest CTs in the past and feels that he had evidence of pulmonary fibrosis before his lymphoma diagnosis.  He completed a prednisone taper.  - Dyspnea is grade 1  - He had a bronchoscopy March 25 and PFTs April 1.  - He continues pulmonary rehabilitation  - He is seeing Dr. Gina Guidry    7. Endo: T2DM exacerbated by steroids  - Cont lantus 10 units nightly, change to 25 units HS 7/16/22  - Cont Humalog Med SSI AS/HS, increase to high ISS 7/16/22    8. Acute Debilitation: 2/2 CNS Lymphoma    - Consult PT/OT - [facility] 9 / 12 - appreciate input     - Consult SLP: DAVID 7/5/2: poor swallow coordination with aspiration of thin liquid x1 with straw  presentation. No aspiration occurred with thin liquid via tsp/cup, nectar thick liquid, puree or soft  solid. Recommend continue soft and bite sized diet with thin liquids - cup only, small sips, sit fully  upright and consistent oral care.      - DVT Prophylaxis: Platelets >05,768 cells/dL, - daily lovenox prophylaxis ordered  Contraindications to pharmacologic prophylaxis: None  Contraindications to mechanical prophylaxis: None    - Disposition: Uncertain at this time but possibly early next wk once off all IVs ([facility] vs NH vs hm)       Francisco Mtz MD

## 2022-07-20 NOTE — PROGRESS NOTES
4 Eyes Admission Assessment     I agree as the admission nurse that 2 RN's have performed a thorough Head to Toe Skin Assessment on the patient. ALL assessment sites listed below have been assessed on admission. Areas assessed by both nurses: [place] and   [x]   Head, Face, and Ears   [x]   Shoulders, Back, and Chest  [x]   Arms, Elbows, and Hands   [x]   Coccyx, Sacrum, and Ischium  [x]   Legs, Feet, and Heels        Does the Patient have Skin Breakdown?   Yes a wound was noted on the Admission Assessment and an LDA was Initiated documentation include the Savi-wound, Wound Assessment, Measurements, Dressing Treatment, Drainage, and Color\",         Giorgio Prevention initiated:  Yes   Wound Care Orders initiated:  Yes      [address]  nurse consulted for Pressure Injury (Stage 3,4, Unstageable, DTI, NWPT, and Complex wounds) or Giorgio score 18 or lower:  Yes      Nurse 1 eSignature: Electronically signed by Tia Muller RN on 7/20/22 at 5:46 AM EDT    **SHARE this note so that the co-signing nurse is able to place an eSignature**    Nurse 2 eSignature: Electronically signed by Nel Fontaine RN on 7/20/22 at 6:53 PM EDT

## 2022-07-20 NOTE — PLAN OF CARE
Problem: Neurosensory - Adult  Goal: Achieves stable or improved neurological status  Outcome: Progressing   Pt confused to year, oriented to person, place and situation. Will continue to monitor and assess. Problem: Skin/Tissue Integrity - Adult  Goal: Skin integrity remains intact  Outcome: Progressing   Pt turned and repositioned q2 hours and as needed this shift. Tolerated well. Sacral heart in place. Will continue to monitor and assess. Problem: Musculoskeletal - Adult  Goal: Return mobility to safest level of function  Outcome: Progressing   Pt transferred from chair to bed this shift via rene steady. Tolerated well. Problem: Infection - Adult  Goal: Absence of infection at discharge  Outcome: Progressing   Pt remained afebrile this shift. Problem: Hematologic - Adult  Goal: Maintains hematologic stability  Outcome: Progressing   Patient's hemoglobin this AM:   Recent Labs     07/20/22  0245   HGB 10.3*     Patient's platelet count this AM:   Recent Labs     07/20/22  0245   PLT 73*    Thrombocytopenia Precautions in place. Patient showing no signs or symptoms of active bleeding. Transfusion not indicated at this time. Patient verbalizes understanding of all instructions. Will continue to assess and implement POC. Call light within reach and hourly rounding in place. Problem: Nutrition Deficit:  Goal: Optimize nutritional status  Outcome: Progressing   Pt displaying good appetite this shift. Drank several glucernas. Will continue to monitor and assess.

## 2022-07-20 NOTE — PROGRESS NOTES
Consulted for Stage 1 on Coccyx and bilateral elbows. Wound Care orders placed. Wound Care to sign off. Please re-consult for changes or deterioration.

## 2022-07-21 NOTE — FLOWSHEET NOTE
07/21/22 1149   Encounter Summary   Encounter Overview/Reason  Initial Encounter   Service Provided For: Patient and family together   Referral/Consult From: Rounding   Support System Spouse; Children   Last Encounter    (es 7/21)   Complexity of Encounter Moderate   Begin Time 1050   End Time  1100   Total Time Calculated 10 min   Assessment/Intervention/Outcome   Assessment Other (Comment)  (patient was pretty passive. wife was holding back tears. I let her know that I am available to her.)   Intervention Active listening;Discussed illness injury and its impact; Discussed belief system/Scientologist practices/isabel   Outcome Receptive   Plan and Referrals   Plan/Referrals Other (Comment)  (as needed)

## 2022-07-21 NOTE — PROGRESS NOTES
4 Eyes Admission Assessment     I agree as the admission nurse that 2 RN's have performed a thorough Head to Toe Skin Assessment on the patient. ALL assessment sites listed below have been assessed on admission. Areas assessed by both nurses:   [x]   Head, Face, and Ears   [x]   Shoulders, Back, and Chest  [x]   Arms, Elbows, and Hands   [x]   Coccyx, Sacrum, and Ischium  [x]   Legs, Feet, and Heels        Does the Patient have Skin Breakdown?   Yes a wound was noted on the Admission Assessment and an LDA was Initiated documentation include the Savi-wound, Wound Assessment, Measurements, Dressing Treatment, Drainage, and Color\",         Giorgio Prevention initiated:  Yes   Wound Care Orders initiated:  Yes      [id number]  nurse consulted for Pressure Injury (Stage 3,4, Unstageable, DTI, NWPT, and Complex wounds) or Giorgio score 18 or lower:  Yes      Nurse 1 eSignature: Electronically signed by Yemi Grant RN on 7/21/22 at 1:18 AM EDT    **SHARE this note so that the co-signing nurse is able to place an eSignature**    Nurse 2 eSignature: Electronically signed by Abhi Mirza RN on 7/21/22 at 7:09 PM EDT

## 2022-07-21 NOTE — PROGRESS NOTES
4 Eyes Admission Assessment     I agree as the admission nurse that 2 RN's have performed a thorough Head to Toe Skin Assessment on the patient. ALL assessment sites listed below have been assessed on admission. Areas assessed by both nurses: [place] &   [x]   Head, Face, and Ears   [x]   Shoulders, Back, and Chest  [x]   Arms, Elbows, and Hands   [x]   Coccyx, Sacrum, and Ischium  [x]   Legs, Feet, and Heels        Does the Patient have Skin Breakdown?   Yes a wound was noted on the Admission Assessment and an LDA was Initiated documentation include the Savi-wound, Wound Assessment, Measurements, Dressing Treatment, Drainage, and Color\",         Giorgio Prevention initiated:  Yes   Wound Care Orders initiated:  Yes      WOC nurse consulted for Pressure Injury (Stage 3,4, Unstageable, DTI, NWPT, and Complex wounds) or Giorgio score 18 or lower:  Yes      Nurse 1 eSignature: Electronically signed by Marilyn Topete RN on 7/21/22 at 7:09 PM EDT    **SHARE this note so that the co-signing nurse is able to place an eSignature**    Nurse 2 eSignature: Electronically signed by Anastacia Sandoval on 7/21/22 at 8:01 PM EDT

## 2022-07-21 NOTE — PLAN OF CARE
Problem: Neurosensory - Adult  Goal: Achieves stable or improved neurological status  Outcome: Progressing  Neuro status improving, only deficits are on L sided weakness. Problem: Neurosensory - Adult  Goal: Achieves maximal functionality and self care  Outcome: Progressing  Pt requires assist but is able to help with ADLs. Pt unable to report all needs. Problem: Skin/Tissue Integrity - Adult  Goal: Skin integrity remains intact  Outcome: Progressing     Pt does have skin breakdown. Protocol initiated for low destiny. Pt is a 4 eyes, requires repositioning, wound care consulted. No new skin breakdown at this time. Problem: Infection - Adult  Goal: Absence of infection during hospitalization  Outcome: Progressing     CVC site remains free of signs/symptoms of infection. No drainage, edema, erythema, pain, or warmth noted at site. Dressing changes continue per protocol and on an as needed basis - see flowsheet. Compliant with [facility] Bath Protocol:  Performed CHG bath today per [facility] protocol utilizing Bed bath with CHG wipes. CVC site cleansed with CHG wipe over dressing, skin surrounding dressing, and first 6\" of IV tubing. Pt tolerated well. Continued to encourage daily CHG bathing per [employer] protocol. Problem: Hematologic - Adult  Goal: Maintains hematologic stability  Outcome: Progressing   Patient's hemoglobin this AM:   Recent Labs     07/21/22  0332   HGB 10.1*     Patient's platelet count this AM:   Recent Labs     07/21/22  0332   PLT 74*    Thrombocytopenia Precautions in place. Patient showing no signs or symptoms of active bleeding. Transfusion not indicated at this time. Patient verbalizes understanding of all instructions. Will continue to assess and implement POC. Call light within reach and hourly rounding in place.

## 2022-07-21 NOTE — PROGRESS NOTES
Physical Therapy  Facility/Department: [address]  Physical Therapy Daily Treatment Note    Name: Rolando Mccormick  : 1945  MRN: [id number]  Date of Service: 2022    Discharge Recommendations:    Rolando Mccormick scored a 17/24 on the AM-PAC short mobility form. Current research shows that an AM-PAC score of 17 or less is typically not associated with a discharge to the patient's home setting. Based on the patient's AM-PAC score and their current functional mobility deficits, it is recommended that the patient have 5-7 sessions per week of Physical Therapy at d/c to increase the patient's independence. At this time, this patient demonstrates complex nursing, medical, and rehabilitative needs, and would benefit from intensive rehabilitation services upon discharge from the Inpatient setting. This patient demonstrates the ability to participate in and benefit from an intensive therapy program with a coordinated interdisciplinary team approach to foster frequent, structured, and documented communication among disciplines, who will work together to establish, prioritize, and achieve treatment goals. Please see assessment section for further patient specific details. If patient discharges prior to next session this note will serve as a discharge summary. Please see below for the latest assessment towards goals. PT Equipment Recommendations  Equipment Needed:  (defer)      Patient Diagnosis(es): The primary encounter diagnosis was Brain mass. Diagnoses of Intracranial mass and Aphasia were also pertinent to this visit. Past Medical History:  has a past medical history of Benign prostatic hyperplasia with weak urinary stream, Cancer ([address] Erectile dysfunction, History of gout, History of thrombocytopenia, Hypercalcemia, Hyperlipidemia, Hypertension, Lung nodule, Proteinuria, Type 2 diabetes mellitus without complication ([address] and Vitamin D deficiency.   Past Surgical History:  has a past surgical history that includes Finger trigger release (Right, 2007); Tonsillectomy and adenoidectomy (age 2); Elbow fracture surgery (Left, age 5); Vasectomy (1971); Cataract removal with implant (Bilateral, 2011); Colonoscopy (3/29/2011); Colonoscopy (03/14/2016); IR PORT PLACEMENT > 5 YEARS (1/11/2022); bronchoscopy (N/A, 3/25/2022); and craniotomy (Right, 7/6/2022). Assessment   Assessment: Practiced steps this date - needed min to mod assist and cues for sequencing. Pt hoping to go home next week. Continues to be limited by L sided weakness. Needing CG/min assist for transfers/gt. If pt goes home, will need 24 hr direct assist.  At risk for falls and not safe to get up alone. Needs cues for safety with transfers/gt. Rec cont skilled PT to maximize mobility and independence  Treatment Diagnosis: Decreased strength and mobility        Plan   Plan  Plan: 5-7 times per week  Current Treatment Recommendations: Strengthening, Functional mobility training, Gait training, Safety education & training, Therapeutic activities, Balance training, Transfer training  Safety Devices  Type of Devices: Left in chair, Call light within reach, Chair alarm in place, Nurse notified, All fall risk precautions in place, Gait belt     Restrictions  Position Activity Restriction  Other position/activity restrictions: Up with Assistance     Subjective   General  Chart Reviewed: Yes  Additional Pertinent Hx: Pt is a 66 yo male that presents from home to the ED status post fall off the [employer] 7/4. He stated that his grab bars broke and fell and hit his head but no LOC. Head MRI: Enhancing 4.6 cm mass centered in the right basal ganglia with extensive surrounding edema in the right frontotemporal lobe. Findings highly concerning for malignancy. Mass results in 9 mm right to left midline shift, subfalcine herniation, and compression of the right thalamus and brainstem. PMH: Cancer, Diabetes 2, Hyperliperdemia.   Family / Caregiver activity  Stairs/Curb  Stairs?: Yes (Up/down 4 steps with L ascending rail with min to mod assist, cues for sequencing, step to pattern)                 OutComes Score                                                  AM-PAC Score  AM-PAC Inpatient Mobility Raw Score : 17 (07/21/22 1517)  AM-PAC Inpatient T-Scale Score : 42.13 (07/21/22 1517)  Mobility Inpatient CMS 0-100% Score: 50.57 (07/21/22 1517)  Mobility Inpatient CMS G-Code Modifier : CK (07/21/22 1517)          Tinneti Score       Goals  Short Term Goals  Time Frame for Short term goals: Discharge  Short term goal 1: Supine <> Sit CGA -ongoing  Short term goal 2: Sit<>Stand CGA -MET 7/17 revise to sit<>stand with supervision -ongoing  Short term goal 3: Amb 20 ft with RW CGA -ongoing  Short term goal 4: Pt will up/down 3-4 steps with rail CGA  Patient Goals   Patient goals :  To Return Home       Education  Patient Education  Education Given To: Patient  Education Provided: Plan of Care;Transfer Training  Education Provided Comments: standing balance/midline orientation  Education Method: Demonstration;Verbal  Barriers to Learning: None  Education Outcome: Verbalized understanding;Demonstrated understanding;Continued education needed      Therapy Time   Individual Concurrent Group Co-treatment   Time In 1428         Time Out 1506         Minutes 38               Timed Code Treatment Minutes:  38    Total Treatment Minutes:  [address], PT

## 2022-07-21 NOTE — PLAN OF CARE
Problem: Skin/Tissue Integrity - Adult  Goal: Skin integrity remains intact  Outcome: Progressing  Flowsheets (Taken 7/21/2022 0357)  Skin Integrity Remains Intact: Monitor for areas of redness and/or skin breakdown     Problem: Hematologic - Adult  Goal: Maintains hematologic stability  Outcome: Progressing  Note: Patient's hemoglobin this AM:   Recent Labs     07/21/22  0332   HGB 10.1*     Patient's platelet count this AM:   Recent Labs     07/21/22  0332   PLT 74*    Thrombocytopenia Precautions in place. Patient showing no signs or symptoms of active bleeding. Transfusion not indicated at this time. Patient verbalizes understanding of all instructions. Will continue to assess and implement POC. Call light within reach and hourly rounding in place.

## 2022-07-21 NOTE — PROGRESS NOTES
[facility] Progress [facility]    2022     Julio César Romo    MRN: [id number]    : 1945    Referring MD: No referring provider defined for this encounter. SUBJECTIVE: No complaints. He cont to be very weak and tired.      ECOG PS: (4) Completely disabled, unable to carry out self-care and confined to bed or chair    KPS: 40% Disabled; requires special care and assistance    Isolation: None    Medications    Scheduled Meds:   dexamethasone  4 mg Oral 3 times per day    insulin glargine  30 Units SubCUTAneous Nightly    Hydrocerin   Topical BID    docusate sodium  100 mg Oral Daily    insulin lispro  0-18 Units SubCUTAneous TID WC    insulin lispro  0-9 Units SubCUTAneous Nightly    levETIRAcetam  500 mg Oral BID    enoxaparin  40 mg SubCUTAneous Q24H    pantoprazole  40 mg IntraVENous Daily    sodium chloride flush  5-40 mL IntraVENous 2 times per day    fluticasone  1 spray Each Nostril Daily    ipratropium  2 spray Each Nostril 4x Daily    tamsulosin  0.8 mg Oral Daily     Continuous Infusions:   sodium chloride 5 mL/hr at 22 1811    dextrose       PRN Meds:.magnesium sulfate, potassium chloride, sodium chloride flush, sodium chloride, oxyCODONE, methocarbamol **OR** methocarbamol IVPB, glucose, dextrose bolus **OR** dextrose bolus, glucagon (rDNA), dextrose, acetaminophen, ondansetron **OR** ondansetron    ROS:  As noted above, otherwise remainder of 10-point ROS negative    Physical Exam:     I&O:      Intake/Output Summary (Last 24 hours) at 2022 1204  Last data filed at 2022 0750  Gross per 24 hour   Intake 900 ml   Output 2625 ml   Net -1725 ml         Vital Signs:  /70   Pulse 74   Temp 98.5 °F (36.9 °C) (Oral)   Resp 18   Ht 5' 6\" (1.676 m)   Wt 123 lb 10.9 oz (56.1 kg)   SpO2 98%   BMI 19.96 kg/m²     Weight:    Wt Readings from Last 3 Encounters:   22 123 lb 10.9 oz (56.1 kg)   22 117 lb (53.1 kg)   22 119 lb (54 kg)       General: awake alert and oriented   HEENT: normocephalic, PERRL, no scleral erythema or icterus, Oral mucosa moist and intact, throat clear  NECK: supple   BACK: Straight   SKIN: warm dry and intact without lesions rashes or masses  CHEST: CTA bilaterally without use of accessory muscles  CV: Normal S1 S2, RRR, no MRG  ABD: NT ND normoactive BS, no palpable masses or hepatosplenomegaly  EXTREMITIES: without edema, denies calf tenderness  NEURO: CN II - XII grossly intact, motor weakness of his left upper and lower ext - unchanged from yesterday   CATHETER: R SL PAC    Data    CBC:   Recent Labs     07/19/22  0337 07/20/22 0245 07/21/22 0332   WBC 9.0 7.7 6.9   HGB 10.2* 10.3* 10.1*   HCT 30.5* 29.8* 29.5*   MCV 98.9 97.8 97.0   PLT 71* 73* 74*       BMP/Mag:  Recent Labs     07/19/22  0410 07/20/22 0245 07/21/22 0332   * 134* 132*   K 5.2* 4.4 4.6   CL 93* 97* 94*   CO2 30 30 29   PHOS  --  3.0  --    BUN 30* 23* 26*   CREATININE 0.6* <0.5* <0.5*   MG  --   --  1.40*       LIVP:   Recent Labs     07/20/22 0245   AST 82*   *   BILIDIR <0.2   BILITOT <0.2   ALKPHOS 151*       Coags:   Recent Labs     07/20/22 0245 07/21/22 0332   PROTIME  --  13.6   INR  --  1.05   APTT 22.5*  --        Uric Acid   Recent Labs     07/20/22 0245   LABURIC 3.3*       DIAGNOSTIC:  1. CT Head 7/4:  1. Heterogeneous 3.9 cm masslike lesion centered in the right basal ganglia/frontal lobe with extensive surrounding edema. This is concerning for a primary or metastatic malignancy. Focal subacute intraparenchymal hemorrhage, hemorrhage within the mass,    or infection are differential considerations. Brain MRI with and without contrast and neurosurgery consult recommended. 2.  Extensive mass effect with 9 mm right to left midline shift, subfalcine herniation and suspected partial transtentorial herniation resulting in effacement of the right frontal horn and body, and mass effect on midbrain. 2. MRI Brain 7/4:  1.   Enhancing 4.6 cm mass centered in the right basal ganglia with extensive surrounding edema in the right frontotemporal lobe. Findings highly concerning for malignancy. 2.  Mass results in 9 mm right to left midline shift, subfalcine herniation, and compression of the right thalamus and brainstem. 3. CT C/A/P 7/5:  CHEST:  1. No evidence of any thoracic metastatic disease. 2. Stable changes of pulmonary fibrosis. A/P:  IMPRESSION:   1. Interval reduction in size of hepatic mass. 2. Slight interval reduction in size of low-attenuation lesion within the spleen. 3. No evidence of any adrenal mass. 4. No evidence of any abdominal or pelvic lymphadenopathy. 5. Uncomplicated sigmoid colon diverticulosis. 4. CT Head 7/8:  1.  Status post biopsy of right frontal lobe mass with unchanged mild acute hemorrhage along the biopsy tract and unchanged 12 mm leftward subfalcine herniation. 5. CT Head 7/10:  Status post biopsy of right frontal lobe mass with unchanged mild acute hemorrhage along the biopsy tract and unchanged 12 mm leftward subfalcine herniation. 4. MBS 7/5/22:    PATHOLOGY:  1. Right Frontal Brain Mass Bx 7/6/22: Involved with malignant B-cell lymphoma with aggressive morphologic   features. COMMENT: The histologic and immunohistochemical changes are   supportive of a malignant B-cell lymphoma, large cell with aggressive   morphologic features based on the high Ki-67 proliferative index. The   patient's prior liver lymphoma diagnosis ( [facility]   [facility]-) supporting an aggressive B-cell lymphoma is noted. FISH for   the aggressive B-cell lymphoma panel and cytogenetics are currently   pending and the results of the studies will be reflected in supplemental   Reports    FISH: Pending    PROBLEM LIST:             1.  DLBCL-   2. Interstitial pneumonia  3. DM2  4.   H/O malignant hypercalcemia      TREATMENT:            1.  Initial therapy on protocol [id number] R-CHOP +/- Tafasitamab/Lenalidamide Jan 2022 - July 2022    ASSESSMENT AND PLAN:           1. 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 C-Myc was mutated but BCL-2 and BCL 6 were  not. - GCB phenotype  - S/p initial therapy on clinical trial utilizing R-CHOP w/ Revlimid and Tafasitimab;  - Study mandated PET was due this week BUT in-pt     Simulation was performed (7/11). Plan for 2400 cGy over 8 fractions to be started 7/14/22 - tolerating well     Relapse / Progression: Large CNS mass - bx proven NHL 7/6/22. Ki-67>90%  - Plan to start XRT to brain mass to reduce size and then possible systemic chemotherapy. - Cont Decadron 4 mg IV Q 6 hours -->changed to PO starting sat (7/16) --> decrease to 4mg po TID (7/19)   - Cont Keppra  BUT change to PO starting sat (7/16)   - Consult RadOnc - XRT started (7/13/22)   - Consult palliative care - following      2. ID: afebrile     3. Heme: leukocytosis + Anemia and thrombocytopenia likely r/t recent chemotherapy  - Transfuse for Hgb < 7 and Platelets < 10 K  - No transfusion today     4. Metabolic: hypoNa + hypoK + abnl LFTs which are trending upward   - Cont IVFs: NS at 50mL/hr, Stop 7/16/22  - Replace K+ & Mg per PRN orders    5. GI / Nutrition:     - Cont diet and change to reg diet  - Recommend swabbing mouth after all meals. Must be completely awake for PO intake  - Dietary to follow closely     6. Pulm:  Interstitial lung disease  - In the course of his NHL tretatment, we uncovered interstitial lung disease. Dr Sophia Garcia reviewed chest CTs in the past and feels that he had evidence of pulmonary fibrosis before his lymphoma diagnosis.  He completed a prednisone taper.  - Dyspnea is grade 1  - He had a bronchoscopy March 25 and PFTs April 1.  - He continues pulmonary rehabilitation  - He is seeing Dr. Milla Perez    7. Endo: T2DM exacerbated by steroids  - Cont lantus 10 units nightly, change to 25 units HS 7/16/22  - Cont Humalog Med SSI AS/HS, increase to high ISS 7/16/22    8. Acute Debilitation: 2/2 CNS Lymphoma    - Consult PT/OT - [facility] 9 / 12 - appreciate input     - Consult SLP: DAVID 7/5/2: poor swallow coordination with aspiration of thin liquid x1 with straw  presentation. No aspiration occurred with thin liquid via tsp/cup, nectar thick liquid, puree or soft  solid. Recommend continue soft and bite sized diet with thin liquids - cup only, small sips, sit fully  upright and consistent oral care.      - DVT Prophylaxis: Platelets >30,007 cells/dL, - daily lovenox prophylaxis ordered  Contraindications to pharmacologic prophylaxis: None  Contraindications to mechanical prophylaxis: None    - Disposition: Uncertain at this time but possibly early next wk once off all IVs ([facility] vs NH vs hm)       Radha Ennis MD

## 2022-07-21 NOTE — ONCOLOGY
[address] 46     [phone]     DATE:7/21/22     AREA TREATED:BRAIN MASS     DAILY DOSE:300 cGy     CUMULATIVE DOSE: 1800cGy     # 6  OUT OF8  TREATMENTS     NEXT PLANNED TREATMENT  DATE: 07/22/2022     Daily Treatments are Monday through Friday        INPATIENT CODING  [employer]  Photons  Photon energy : 6 MV

## 2022-07-21 NOTE — PROGRESS NOTES
surgical history that includes Finger trigger release (Right, 2007); Tonsillectomy and adenoidectomy (age 2); Elbow fracture surgery (Left, age 5); Vasectomy (1971); Cataract removal with implant (Bilateral, 2011); Colonoscopy (3/29/2011); Colonoscopy (03/14/2016); IR PORT PLACEMENT > 5 YEARS (1/11/2022); bronchoscopy (N/A, 3/25/2022); and craniotomy (Right, 7/6/2022). Treatment Diagnosis: impaired ADLs and functional mobility/transfers      Assessment   Performance deficits / Impairments: Decreased functional mobility ; Decreased endurance;Decreased posture;Decreased ADL status; Decreased balance;Decreased strength;Decreased safe awareness;Decreased vision/visual deficit; Decreased fine motor control;Decreased coordination  Assessment: Pt completing mobility this date with CGA and RW. Wheeled down hallway to practice stairs with PT/OT. Pt completing bathroom mobility, and from bathroom to recliner chair ~20' with CGA. Transfers with CGA to Tre. Assist with ADLs, LB dressing and toileting. Pt would benefit from cont skilled OT while in acute care. If pt dc home will need 24hr hands on assist and dc. Treatment Diagnosis: impaired ADLs and functional mobility/transfers  REQUIRES OT FOLLOW-UP: Yes  Activity Tolerance  Activity Tolerance: Patient Tolerated treatment well;Patient limited by fatigue        Plan   Plan  Times per Week: 5-7  Times per Day: Daily  Current Treatment Recommendations: Strengthening, Balance training, Functional mobility training, Endurance training, Equipment evaluation, education, & procurement, Patient/Caregiver education & training, Safety education & training, Self-Care / ADL     Restrictions  Position Activity Restriction  Other position/activity restrictions:  Up with Assistance    Subjective   General  Chart Reviewed: Yes  Patient assessed for rehabilitation services?: Yes  Additional Pertinent Hx: 67 y.o. male with past medical history of non-Hodgkin lymphoma, hyperlipidemia, hypertension, type 2 diabetes, and BPH who presents to the emergency department after a fall in the bathroom and found to have brain mass with cerebral edema; suspected metastases of non hodgkin's lymphoma. His wife provides the history at bedside as the patient has difficulty verbally communicating at baseline. Hospital Course: 7/6 RIGHT FRONTAL NEEDLE BIOPSY OF BRAIN MASS  Family / Caregiver Present: Yes (wife and son)  Referring Practitioner: Renny Martinez MD  Diagnosis: Intracranial mass  Subjective  Subjective: In chair, agreeable to session, reporting no pain     Social/Functional History  Social/Functional History  Lives With: Spouse  Type of Home: Condo  Home Layout: Two level, Able to Live on Main level with bedroom/bathroom, Performs ADL's on one level  Home Access: Stairs to enter with rails  Entrance Stairs - Number of Steps: 3 efren from garage w/ hand rail on R  Entrance Stairs - Rails: Right  Bathroom Shower/Tub: Walk-in shower, Shower chair without back  Bathroom Toilet: Handicap height (\"chair height\" toilets w/ hand rails attached)  Bathroom Equipment: Grab bars in shower  Home Equipment: Rollator  Has the patient had two or more falls in the past year or any fall with injury in the past year?: Yes (Pt's wife reports about 5 falls in the last 6 months)  ADL Assistance: Needs assistance (last week and a half, wife has been helping w ADLs.  Prior to that he was mostly independent)  Homemaking Assistance: Needs assistance (wife does cooking, cleaning and laundry)  Homemaking Responsibilities: No  Ambulation Assistance: Needs assistance (last week and a half has needed wife to help w/ ambulation and uses rollator)  Transfer Assistance: Needs assistance (has needed assist for the last week and a half)  Active : No  Occupation: Retired  Additional Comments: wife has been providing 24hr A for the last week and a half                  Safety Devices  Type of Devices: Left in chair;Call light within reach; Chair alarm in place;Nurse notified; All fall risk precautions in place;Gait belt  Bed Mobility Training  Bed Mobility Training: No  Balance  Sitting: Intact  Standing: With support  Transfer Training  Transfer Training: Yes  Sit to Stand: Contact-guard assistance  Stand to Sit: Contact-guard assistance;Minimum assistance (cues to reach back)  Toilet Transfer: Minimum assistance  Gait  Overall Level of Assistance: Contact-guard assistance  Assistive Device: Gait belt;Walker, rolling        ADL  LE Dressing: Moderate assistance (brief)  Toileting: Minimal assistance (clothing management)                                    Education Given To: Patient  Education Provided: Role of Therapy;Plan of Care;ADL Adaptive Strategies;Transfer Training; Fall Prevention Strategies  Education Method: Verbal  Barriers to Learning: Cognition  Education Outcome: Verbalized understanding;Continued education needed                        AM-PAC Score        AM-[facility] Inpatient Daily Activity Raw Score: 16 (07/18/22 1302)  AM-PAC Inpatient ADL T-Scale Score : 35.96 (07/18/22 1302)  ADL Inpatient CMS 0-100% Score: 53.32 (07/18/22 1302)  ADL Inpatient CMS G-Code Modifier : CK (07/18/22 1302)        Goals  Short Term Goals  Time Frame for Short term goals: by dc  Short Term Goal 1: Pt will complete functional transfers w/ Min Ax1 - Met 7/17; Transfer to/from toilet and chairs SBA - Not met  Short Term Goal 2: Pt will complete LE dressing w/ Min Ax1- met for pants 7/19  Short Term Goal 3: Pt will maintain sitting balance w/ CGA for 5min while engaging in ADL task- not met  Short Term Goal 4: Tolerate ambulation to/from bathroom for toileting/ADLs - Not met       Therapy Time   Individual Concurrent Group Co-treatment   Time In 1425         Time Out 1503         Minutes 38          Timed Code Treatment Minutes:  38    Total Treatment Minutes:38           Brittany Huynh OT

## 2022-07-22 NOTE — PROGRESS NOTES
[facility] Progress Note    2022     Carmen Kunz    MRN: [id number]    : 1945    Referring MD: No referring provider defined for this encounter. SUBJECTIVE: No complaints. He cont to be very weak and tired.      ECOG PS: (4) Completely disabled, unable to carry out self-care and confined to bed or chair    KPS: 40% Disabled; requires special care and assistance    Isolation: None    Medications    Scheduled Meds:   dexamethasone  4 mg Oral 3 times per day    insulin glargine  30 Units SubCUTAneous Nightly    Hydrocerin   Topical BID    docusate sodium  100 mg Oral Daily    insulin lispro  0-18 Units SubCUTAneous TID WC    insulin lispro  0-9 Units SubCUTAneous Nightly    levETIRAcetam  500 mg Oral BID    enoxaparin  40 mg SubCUTAneous Q24H    pantoprazole  40 mg IntraVENous Daily    sodium chloride flush  5-40 mL IntraVENous 2 times per day    fluticasone  1 spray Each Nostril Daily    ipratropium  2 spray Each Nostril 4x Daily    tamsulosin  0.8 mg Oral Daily     Continuous Infusions:   sodium chloride 5 mL/hr at 22 1811    dextrose       PRN Meds:.magnesium sulfate, potassium chloride, sodium chloride flush, sodium chloride, oxyCODONE, methocarbamol **OR** methocarbamol IVPB, glucose, dextrose bolus **OR** dextrose bolus, glucagon (rDNA), dextrose, acetaminophen, ondansetron **OR** ondansetron    ROS:  As noted above, otherwise remainder of 10-point ROS negative    Physical Exam:     I&O:      Intake/Output Summary (Last 24 hours) at 2022 0947  Last data filed at 2022 0400  Gross per 24 hour   Intake 840 ml   Output 1404 ml   Net -564 ml         Vital Signs:  /66   Pulse 69   Temp 96.9 °F (36.1 °C) (Temporal)   Resp 16   Ht 5' 6\" (1.676 m)   Wt 124 lb 12.5 oz (56.6 kg)   SpO2 98%   BMI 20.14 kg/m²     Weight:    Wt Readings from Last 3 Encounters:   22 124 lb 12.5 oz (56.6 kg)   22 117 lb (53.1 kg)   22 119 lb (54 kg)       General: awake alert and oriented   HEENT: normocephalic, PERRL, no scleral erythema or icterus, Oral mucosa moist and intact, throat clear  NECK: supple   BACK: Straight   SKIN: warm dry and intact without lesions rashes or masses  CHEST: CTA bilaterally without use of accessory muscles  CV: Normal S1 S2, RRR, no MRG  ABD: NT ND normoactive BS, no palpable masses or hepatosplenomegaly  EXTREMITIES: without edema, denies calf tenderness  NEURO: CN II - XII grossly intact, motor weakness of his left upper and lower ext - unchanged from yesterday   CATHETER: R SL PAC    Data    CBC:   Recent Labs     07/20/22 0245 07/21/22 0332 07/22/22  0330   WBC 7.7 6.9 7.3   HGB 10.3* 10.1* 10.8*   HCT 29.8* 29.5* 32.1*   MCV 97.8 97.0 97.0   PLT 73* 74* 86*       BMP/Mag:  Recent Labs     07/20/22 0245 07/21/22 0332 07/22/22  0330   * 132* 135*   K 4.4 4.6 4.3   CL 97* 94* 96*   CO2 30 29 30   PHOS 3.0  --  2.7   BUN 23* 26* 24*   CREATININE <0.5* <0.5* <0.5*   MG  --  1.40*  --        LIVP:   Recent Labs     07/20/22 0245 07/22/22  0330   AST 82* 47*   * 134*   BILIDIR <0.2 <0.2   BILITOT <0.2 0.3   ALKPHOS 151* 137*       Coags:   Recent Labs     07/20/22 0245 07/21/22 0332 07/22/22  0330   PROTIME  --  13.6  --    INR  --  1.05  --    APTT 22.5*  --  22.3*       Uric Acid   Recent Labs     07/20/22 0245 07/22/22  0330   LABURIC 3.3* 3.4*       DIAGNOSTIC:  1. CT Head 7/4:  1. Heterogeneous 3.9 cm masslike lesion centered in the right basal ganglia/frontal lobe with extensive surrounding edema. This is concerning for a primary or metastatic malignancy. Focal subacute intraparenchymal hemorrhage, hemorrhage within the mass,    or infection are differential considerations. Brain MRI with and without contrast and neurosurgery consult recommended.    2.  Extensive mass effect with 9 mm right to left midline shift, subfalcine herniation and suspected partial transtentorial herniation resulting in effacement of the right frontal horn and body, and mass effect on midbrain. 2. MRI Brain 7/4:  1. Enhancing 4.6 cm mass centered in the right basal ganglia with extensive surrounding edema in the right frontotemporal lobe. Findings highly concerning for malignancy. 2.  Mass results in 9 mm right to left midline shift, subfalcine herniation, and compression of the right thalamus and brainstem. 3. CT C/A/P 7/5:  CHEST:  1. No evidence of any thoracic metastatic disease. 2. Stable changes of pulmonary fibrosis. A/P:  IMPRESSION:   1. Interval reduction in size of hepatic mass. 2. Slight interval reduction in size of low-attenuation lesion within the spleen. 3. No evidence of any adrenal mass. 4. No evidence of any abdominal or pelvic lymphadenopathy. 5. Uncomplicated sigmoid colon diverticulosis. 4. CT Head 7/8:  1.  Status post biopsy of right frontal lobe mass with unchanged mild acute hemorrhage along the biopsy tract and unchanged 12 mm leftward subfalcine herniation. 5. CT Head 7/10:  Status post biopsy of right frontal lobe mass with unchanged mild acute hemorrhage along the biopsy tract and unchanged 12 mm leftward subfalcine herniation. 4. MBS 7/5/22:    PATHOLOGY:  1. Right Frontal Brain Mass Bx 7/6/22: Involved with malignant B-cell lymphoma with aggressive morphologic   features. COMMENT: The histologic and immunohistochemical changes are   supportive of a malignant B-cell lymphoma, large cell with aggressive   morphologic features based on the high Ki-67 proliferative index. The   patient's prior liver lymphoma diagnosis ( [facility]   [facility]-) supporting an aggressive B-cell lymphoma is noted. FISH for   the aggressive B-cell lymphoma panel and cytogenetics are currently   pending and the results of the studies will be reflected in supplemental   Reports    FISH: Pending    PROBLEM LIST:             1.  DLBCL-   2. Interstitial pneumonia  3. DM2  4.   H/O malignant hypercalcemia TREATMENT:            1.  Initial therapy on protocol [id number] R-CHOP +/- Tafasitamab/Lenalidamide Jan 2022 - July 2022    ASSESSMENT AND PLAN:           1. 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 C-Myc was mutated but BCL-2 and BCL 6 were  not. - GCB phenotype  - S/p initial therapy on clinical trial utilizing R-CHOP w/ Revlimid and Tafasitimab;  - Study mandated PET was due this week BUT in-pt     Simulation was performed (7/11). Plan for 2400 cGy over 8 fractions to be started 7/14/22 - tolerating well     Relapse / Progression: Large CNS mass - bx proven NHL 7/6/22. Ki-67>90%  - Plan to start XRT to brain mass to reduce size and then possible systemic chemotherapy. - Cont Decadron 4 mg IV Q 6 hours -->changed to PO starting sat (7/16) --> decrease to 4mg po TID (7/19) --> decrease to 4mg po bid (7/22/22)   - Cont Keppra  BUT change to PO starting sat (7/16)   - Consult RadOnc - XRT started (7/13/22)   - Consult palliative care - following      2. ID: afebrile     3. Heme: leukocytosis + Anemia and thrombocytopenia likely r/t recent chemotherapy  - Transfuse for Hgb < 7 and Platelets < 10 K  - No transfusion today     4. Metabolic: hypoNa + abnl LFTs which are trending upward   - Cont IVFs: NS at 50mL/hr, Stop 7/16/22  - Replace K+ & Mg per PRN orders    5. GI / Nutrition:     - Cont diet and change to reg diet  - Recommend swabbing mouth after all meals. Must be completely awake for PO intake  - Dietary to follow closely     6. Pulm:  Interstitial lung disease  - In the course of his NHL tretatment, we uncovered interstitial lung disease. Dr Catalino Florian reviewed chest CTs in the past and feels that he had evidence of pulmonary fibrosis before his lymphoma diagnosis.

## 2022-07-22 NOTE — PROGRESS NOTES
Occupational Therapy  Facility/Department: [facility]  Occupational Therapy Treatment    Name: Axel Marcelino  : 1945  MRN: [id number]  Date of Service: 2022    Discharge Recommendations: Axel Marcelino scored a 18/24 on the AM-PAC ADL Inpatient form. Current research shows that an AM-PAC score of 17 or less is typically not associated with a discharge to the patient's home setting. Based on the patient's AM-PAC score and their current ADL deficits, it is recommended that the patient have 5-7 sessions per week of Occupational Therapy at d/c to increase the patient's independence. At this time, this patient demonstrates complex nursing, medical, and rehabilitative needs, and would benefit from intensive rehabilitation services upon discharge from the Inpatient setting. This patient demonstrates the ability to participate in and benefit from an intensive therapy program with a coordinated interdisciplinary team approach to foster frequent, structured, and documented communication among disciplines, who will work together to establish, prioritize, and achieve treatment goals. Please see assessment section for further patient specific details. If patient discharges prior to next session this note will serve as a discharge summary. Please see below for the latest assessment towards goals. OT Equipment Recommendations  Equipment Needed: No       Patient Diagnosis(es): The primary encounter diagnosis was Brain mass. Diagnoses of Intracranial mass and Aphasia were also pertinent to this visit. Past Medical History:  has a past medical history of Benign prostatic hyperplasia with weak urinary stream, Cancer ([address] Erectile dysfunction, History of gout, History of thrombocytopenia, Hypercalcemia, Hyperlipidemia, Hypertension, Lung nodule, Proteinuria, Type 2 diabetes mellitus without complication ([address] and Vitamin D deficiency.   Past Surgical History:  has a past surgical history that includes Finger trigger release (Right, 2007); Tonsillectomy and adenoidectomy (age 2); Elbow fracture surgery (Left, age 5); Vasectomy (1971); Cataract removal with implant (Bilateral, 2011); Colonoscopy (3/29/2011); Colonoscopy (03/14/2016); IR PORT PLACEMENT > 5 YEARS (1/11/2022); bronchoscopy (N/A, 3/25/2022); and craniotomy (Right, 7/6/2022). Treatment Diagnosis: impaired ADLs and functional mobility/transfers      Assessment   Performance deficits / Impairments: Decreased functional mobility ; Decreased endurance;Decreased posture;Decreased ADL status; Decreased balance;Decreased strength;Decreased safe awareness;Decreased vision/visual deficit; Decreased fine motor control;Decreased coordination  Assessment: Pt completing mobility this date with CGA and RW. Mobility in room ~80'. Assist with ADLs, LB dressing. UE exercise seated in chair. Pt would benefit from cont skilled OT while in acute care. If pt dc home will need 24hr hands on assist and dc. REQUIRES OT FOLLOW-UP: Yes  Activity Tolerance  Activity Tolerance: Patient Tolerated treatment well;Patient limited by fatigue  Activity Tolerance Comments: rest breaks between activity        Plan   Plan  Times per Week: 5-7  Times per Day: Daily  Current Treatment Recommendations: Strengthening, Balance training, Functional mobility training, Endurance training, Equipment evaluation, education, & procurement, Patient/Caregiver education & training, Safety education & training, Self-Care / ADL     Restrictions  Position Activity Restriction  Other position/activity restrictions:  Up with Assistance    Subjective   General  Chart Reviewed: Yes  Patient assessed for rehabilitation services?: Yes  Additional Pertinent Hx: 67 y.o. male with past medical history of non-Hodgkin lymphoma, hyperlipidemia, hypertension, type 2 diabetes, and BPH who presents to the emergency department after a fall in the bathroom and found to have brain mass with cerebral edema; suspected metastases of non hodgkin's lymphoma. His wife provides the history at bedside as the patient has difficulty verbally communicating at baseline. Hospital Course: 7/6 RIGHT FRONTAL NEEDLE BIOPSY OF BRAIN MASS  Family / Caregiver Present: Yes (wife and son)  Referring Practitioner: Buzz Hurt MD  Diagnosis: Intracranial mass  Subjective  Subjective: In chair, agreeable to session, reporting no pain     Social/Functional History  Social/Functional History  Lives With: Spouse  Type of Home: Condo  Home Layout: Two level, Able to Live on Main level with bedroom/bathroom, Performs ADL's on one level  Home Access: Stairs to enter with rails  Entrance Stairs - Number of Steps: 3 efren from garage w/ hand rail on R  Entrance Stairs - Rails: Right  Bathroom Shower/Tub: Walk-in shower, Shower chair without back  Bathroom Toilet: Handicap height (\"chair height\" toilets w/ hand rails attached)  Bathroom Equipment: Grab bars in shower  Home Equipment: Rollator  Has the patient had two or more falls in the past year or any fall with injury in the past year?: Yes (Pt's wife reports about 5 falls in the last 6 months)  ADL Assistance: Needs assistance (last week and a half, wife has been helping w ADLs. Prior to that he was mostly independent)  Homemaking Assistance: Needs assistance (wife does cooking, cleaning and laundry)  Homemaking Responsibilities: No  Ambulation Assistance: Needs assistance (last week and a half has needed wife to help w/ ambulation and uses rollator)  Transfer Assistance: Needs assistance (has needed assist for the last week and a half)  Active : No  Occupation: Retired  Additional Comments: wife has been providing 24hr A for the last week and a half                    Safety Devices  Type of Devices: Left in chair;Call light within reach; Chair alarm in place;Nurse notified; All fall risk precautions in place;Gait belt  Bed Mobility Training  Bed Mobility Training: No (in chair, left in chair end of session)  Balance  Sitting: Intact  Standing: With support  Transfer Training  Transfer Training: Yes  Sit to Stand: Contact-guard assistance  Stand to Sit: Contact-guard assistance  Gait  Overall Level of Assistance: Contact-guard assistance  Distance (ft): 80 Feet  Assistive Device: Gait belt;Walker, rolling        ADL  Feeding: Stand by assistance; Beverage management  LE Dressing: Minimal assistance (donning brief)  Toileting:  (declined need)                 Cognition  Overall Cognitive Status: Exceptions  Arousal/Alertness: Appropriate responses to stimuli;Delayed responses to stimuli  Following Commands: Follows one step commands with increased time; Follows one step commands with repetition  Attention Span: Attends with cues to redirect  Memory: Appears intact  Insights: Decreased awareness of deficits  Initiation: Requires cues for some  Sequencing: Requires cues for some               A/AROM Exercises: AROM BUE shoulder flex/ext to 90 degrees, elbow flex/ext, LUE wrist flex/ext, open and close hand 10x  Education Given To: Patient  Education Provided: Role of Therapy;Plan of Care;ADL Adaptive Strategies;Transfer Training; Fall Prevention Strategies  Education Method: Verbal  Barriers to Learning: Cognition  Education Outcome: Continued education needed                          AM-PAC Score        AM-[facility] Inpatient Daily Activity Raw Score: 18 (07/22/22 1457)  AM-PAC Inpatient ADL T-Scale Score : 38.66 (07/22/22 1457)  ADL Inpatient CMS 0-100% Score: 46.65 (07/22/22 1457)  ADL Inpatient CMS G-Code Modifier : CK (07/22/22 1457)      Goals  Short Term Goals  Time Frame for Short term goals: by dc  Short Term Goal 1: Pt will complete functional transfers w/ Min Ax1 - Met 7/17; Transfer to/from toilet and chairs SBA - Not met  Short Term Goal 2: Pt will complete LE dressing w/ Min Ax1- met for pants 7/19  Short Term Goal 3: Pt will maintain sitting balance w/ CGA for 5min while engaging in ADL task- not met  Short Term Goal 4: Tolerate ambulation to/from bathroom for toileting/ADLs - progressing       Therapy Time   Individual Concurrent Group Co-treatment   Time In 1333         Time Out 1400         Minutes 27          Timed Code Treatment Minutes:  27 Minutes    Total Treatment Minutes:  [address], OT

## 2022-07-22 NOTE — ONCOLOGY
[address]     [phone]     DATE:7/22/22     AREA TREATED:BRAIN MASS     DAILY DOSE:300 cGy     CUMULATIVE DOSE: 2100 cGy     #  7 OUT OF8  TREATMENTS     NEXT PLANNED TREATMENT  DATE: 07/25/2022     Daily Treatments are Monday through Friday        INPATIENT CODING  Beam Radiation  Photons  PHOT[employer]ER[employer]: 6 MV

## 2022-07-22 NOTE — PROGRESS NOTES
Swallowing    Discharge Planning:    Continue current diet, Continue Oral Nutrition Supplement     Melody Bragg RD, LD  Contact: [phone]

## 2022-07-22 NOTE — PLAN OF CARE
Problem: Nutrition Deficit:  Goal: Optimize nutritional status  7/22/2022 0533 by Neha Slaughter RN  Outcome: Adequate for Discharge  7/21/2022 1911 by Maria Ines Rodriguez RN  Outcome: Progressing     Problem: Hematologic - Adult  Goal: Maintains hematologic stability  7/22/2022 0533 by Neha Slaughter RN  Outcome: Progressing  7/21/2022 1911 by Maria Ines Rodriguez RN  Outcome: Progressing     Problem: Infection - Adult  Goal: Absence of infection at discharge  7/22/2022 0533 by Neha Slaughter RN  Outcome: Progressing  7/21/2022 1911 by Maria Ines Rodriguez RN  Outcome: Progressing  Goal: Absence of infection during hospitalization  7/22/2022 0533 by Neha Slaughter RN  Outcome: Progressing  7/21/2022 1911 by Maria Ines Rodriguez RN  Outcome: Progressing     Problem: Musculoskeletal - Adult  Goal: Return mobility to safest level of function  7/22/2022 0533 by Neha Slaughter RN  Outcome: Progressing  7/21/2022 1911 by Maria Ines Rodriguez RN  Outcome: Progressing  Goal: Maintain proper alignment of affected body part  7/22/2022 0533 by Neha Slaughter RN  Outcome: Progressing  7/21/2022 1911 by Maria Ines Rodriguez RN  Outcome: Progressing  Goal: Return ADL status to a safe level of function  7/22/2022 0533 by Neha Slaughter RN  Outcome: Progressing  7/21/2022 1911 by Maria Ines Rodriguez RN  Outcome: Progressing     Problem: Skin/Tissue Integrity - Adult  Goal: Skin integrity remains intact  7/22/2022 0533 by Neha Slaughter RN  Outcome: Progressing  7/21/2022 1911 by Maria Ines Rodriguez RN  Outcome: Progressing  Goal: Incisions, wounds, or drain sites healing without S/S of infection  7/22/2022 0533 by Neha Slaughter RN  Outcome: Progressing  7/21/2022 1911 by Maria Ines Rodriguez RN  Outcome: Progressing     Problem: Neurosensory - Adult  Goal: Achieves stable or improved neurological status  7/22/2022 0533 by Neha Slaughter RN  Outcome: Progressing  7/21/2022 1911 by Eriberto Ambrosio RN  Outcome: Progressing  Goal: Remains free of injury related to seizures activity  7/22/2022 0533 by Montserrat Gorman RN  Outcome: Progressing  7/21/2022 1911 by Eriberto Ambrosio RN  Outcome: Progressing  Goal: Achieves maximal functionality and self care  7/22/2022 0533 by Montserrat Gorman RN  Outcome: Progressing  7/21/2022 1911 by Eriberto Ambrosio RN  Outcome: Progressing

## 2022-07-22 NOTE — PLAN OF CARE
Problem: Neurosensory - Adult  Goal: Achieves stable or improved neurological status  7/22/2022 1829 by Yanni Weinstein RN  Outcome: Progressing   Q4 neuro checks performed. Left sided weakness noted. Neurologic status stable and improving. Problem: Skin/Tissue Integrity - Adult  Goal: Skin integrity remains intact  7/22/2022 1829 by Yanni Weinstein RN  Outcome: Progressing  Flowsheets (Taken 7/22/2022 0753)  Skin Integrity Remains Intact: Monitor for areas of redness and/or skin breakdown   Pt is low Giorgio. Requires 4 eyes skin assessment. Frequent repositioning, wound care consulted. Problem: Infection - Adult  Goal: Absence of infection at discharge  7/22/2022 1829 by Yanni Weinstein RN  Outcome: Progressing   No signs or symptoms at this time. Port cleaned with CHG wipes. Pt bathed per protocol. Problem: Hematologic - Adult  Goal: Maintains hematologic stability  7/22/2022 1829 by Yanni Weinstein RN  Outcome: Progressing   Pt stable. Blood transfusion not indicated at this time. Daily labs are being monitored.

## 2022-07-22 NOTE — PROGRESS NOTES
4 Eyes Admission Assessment     I agree as the admission nurse that 2 RN's have performed a thorough Head to Toe Skin Assessment on the patient. ALL assessment sites listed below have been assessed on admission. Areas assessed by both nurses: Valaria Brunt  [x]   Head, Face, and Ears   [x]   Shoulders, Back, and Chest  [x]   Arms, Elbows, and Hands   [x]   Coccyx, Sacrum, and Ischium  [x]   Legs, Feet, and Heels        Does the Patient have Skin Breakdown?   Yes a wound was noted on the Admission Assessment and an LDA was Initiated documentation include the Savi-wound, Wound Assessment, Measurements, Dressing Treatment, Drainage, and Color\",         Giorgio Prevention initiated:  Yes   Wound Care Orders initiated:  Yes      [address]  nurse consulted for Pressure Injury (Stage 3,4, Unstageable, DTI, NWPT, and Complex wounds) or Giorgio score 18 or lower:  Yes      Nurse 1 eSignature: Electronically signed by Radha Sanchez RN on 7/22/22 at 7:22 PM EDT    **SHARE this note so that the co-signing nurse is able to place an eSignature**    Nurse 2 eSignature: Electronically signed by Chris Henriquez RN on 7/23/22 at 4:19 AM EDT

## 2022-07-22 NOTE — PROGRESS NOTES
4 Eyes Skin Assessment     NAME:  Jarrod Guy  OF BIRTH:  1945  MEDICAL RECORD NUMBER:  [id number]    The patient is being assess for  Shift Handoff    I agree that 2 RN's have performed a thorough Head to Toe Skin Assessment on the patient. ALL assessment sites listed below have been assessed. Areas assessed by both nurses:    Head, Face, Ears, Shoulders, Back, Chest, Arms, Elbows, Hands, Sacrum. Buttock, Coccyx, Ischium, and Legs. Feet and Heels        Does the Patient have a Wound? Yes wound(s) were present on assessment.  LDA wound assessment was Initiated and completed        Giorgio Prevention initiated:  Yes   Wound Care Orders initiated:  Yes    Pressure Injury (Stage 3,4, Unstageable, DTI, NWPT, and Complex wounds) if present place referral/consult order under [de-identified] NA    New and Established Ostomies if present place consult order under : NA      Nurse 1 eSignature: Electronically signed by Alina Box RN on 7/21/22 at 10:46 PM EDT    **SHARE this note so that the co-signing nurse is able to place an eSignature**    Nurse 2 eSignature: Electronically signed by Pantera Aparicio RN on 7/22/22 at 7:15 AM EDT

## 2022-07-23 NOTE — PROGRESS NOTES
4 Eyes Admission Assessment     I agree as the admission nurse that 2 RN's have performed a thorough Head to Toe Skin Assessment on the patient. ALL assessment sites listed below have been assessed on admission. Areas assessed by both nurses: [place] A and   [x]   Head, Face, and Ears   [x]   Shoulders, Back, and Chest  [x]   Arms, Elbows, and Hands   [x]   Coccyx, Sacrum, and Ischium  [x]   Legs, Feet, and Heels        Does the Patient have Skin Breakdown?   Yes a wound was noted on the Admission Assessment and an LDA was Initiated documentation include the Savi-wound, Wound Assessment, Measurements, Dressing Treatment, Drainage, and Color\",         Giorgio Prevention initiated:  Yes   Wound Care Orders initiated:  Yes      WOC nurse consulted for Pressure Injury (Stage 3,4, Unstageable, DTI, NWPT, and Complex wounds) or Giorgio score 18 or lower:  Yes      Nurse 1 eSignature: Electronically signed by Ranjit Dey RN on 7/23/22 at 6:06 PM EDT    **SHARE this note so that the co-signing nurse is able to place an eSignature**    Nurse 2 eSignature: Electronically signed by Ian Hernandez RN on 7/23/22 at 10:23 PM EDT

## 2022-07-23 NOTE — PROGRESS NOTES
4 Eyes Admission Assessment     I agree as the admission nurse that 2 RN's have performed a thorough Head to Toe Skin Assessment on the patient. ALL assessment sites listed below have been assessed on admission. Areas assessed by both nurses: Jennifer Duarte and   [x]   Head, Face, and Ears   [x]   Shoulders, Back, and Chest  [x]   Arms, Elbows, and Hands   [x]   Coccyx, Sacrum, and Ischium  [x]   Legs, Feet, and Heels        Does the Patient have Skin Breakdown?   Yes a wound was noted on the Admission Assessment and an LDA was Initiated documentation include the Savi-wound, Wound Assessment, Measurements, Dressing Treatment, Drainage, and Color\",         Giorgio Prevention initiated:  Yes   Wound Care Orders initiated:  Yes      WOC nurse consulted for Pressure Injury (Stage 3,4, Unstageable, DTI, NWPT, and Complex wounds) or Giorgio score 18 or lower:  Yes      Nurse 1 eSignature: Electronically signed by Andree Tucker RN on 7/23/22 at 4:19 AM EDT    **SHARE this note so that the co-signing nurse is able to place an eSignature**    Nurse 2 eSignature: Electronically signed by Pavithra Dove RN on 7/23/22 at 6:05 PM EDT

## 2022-07-23 NOTE — PROGRESS NOTES
[facility] Progress Note    2022     Carmen Kunz    MRN: [id number]    : 1945    Referring MD: No referring provider defined for this encounter. SUBJECTIVE: Maureen Henriquez is doing well. No headaches. He is eating well. Wife asking when he will be going home. We discussed Tuesday or Wednesday. He completes radiation on Monday.      ECOG PS: (4) Completely disabled, unable to carry out self-care and confined to bed or chair    KPS: 40% Disabled; requires special care and assistance    Isolation: None    Medications    Scheduled Meds:   insulin glargine  20 Units SubCUTAneous Nightly    dexamethasone  4 mg Oral 2 times per day    Hydrocerin   Topical BID    docusate sodium  100 mg Oral Daily    insulin lispro  0-18 Units SubCUTAneous TID WC    insulin lispro  0-9 Units SubCUTAneous Nightly    levETIRAcetam  500 mg Oral BID    enoxaparin  40 mg SubCUTAneous Q24H    pantoprazole  40 mg IntraVENous Daily    sodium chloride flush  5-40 mL IntraVENous 2 times per day    fluticasone  1 spray Each Nostril Daily    ipratropium  2 spray Each Nostril 4x Daily    tamsulosin  0.8 mg Oral Daily     Continuous Infusions:   sodium chloride 5 mL/hr at 22 1811    dextrose       PRN Meds:.magnesium sulfate, potassium chloride, sodium chloride flush, sodium chloride, oxyCODONE, methocarbamol **OR** methocarbamol IVPB, glucose, dextrose bolus **OR** dextrose bolus, glucagon (rDNA), dextrose, acetaminophen, ondansetron **OR** ondansetron    ROS:  As noted above, otherwise remainder of 10-point ROS negative    Physical Exam:     I&O:      Intake/Output Summary (Last 24 hours) at 2022 0713  Last data filed at 2022 0112  Gross per 24 hour   Intake 120 ml   Output 1900 ml   Net -1780 ml         Vital Signs:  /63   Pulse 75   Temp 98.2 °F (36.8 °C) (Oral)   Resp 23   Ht 5' 6\" (1.676 m)   Wt 124 lb 12.5 oz (56.6 kg)   SpO2 97%   BMI 20.14 kg/m²     Weight:    Wt Readings from Last 3 Encounters: 07/22/22 124 lb 12.5 oz (56.6 kg)   05/31/22 117 lb (53.1 kg)   05/04/22 119 lb (54 kg)       General: awake alert and oriented   HEENT: normocephalic, PERRL, no scleral erythema or icterus, Oral mucosa moist and intact, throat clear  NECK: supple   BACK: Straight   SKIN: warm dry and intact without lesions rashes or masses  CHEST: CTA bilaterally without use of accessory muscles  CV: Normal S1 S2, RRR, no MRG  ABD: NT ND normoactive BS, no palpable masses or hepatosplenomegaly  EXTREMITIES: without edema, denies calf tenderness  NEURO: CN II - XII grossly intact, motor weakness of his left upper and lower ext - unchanged from yesterday   CATHETER: R SL PAC    Data    CBC:   Recent Labs     07/21/22  0332 07/22/22  0330 07/23/22  0352   WBC 6.9 7.3 7.5   HGB 10.1* 10.8* 10.8*   HCT 29.5* 32.1* 33.1*   MCV 97.0 97.0 99.2   PLT 74* 86* 92*       BMP/Mag:  Recent Labs     07/21/22  0332 07/22/22  0330 07/23/22  0444   * 135* 135*   K 4.6 4.3 4.4   CL 94* 96* 96*   CO2 29 30 28   PHOS  --  2.7  --    BUN 26* 24* 25*   CREATININE <0.5* <0.5* <0.5*   MG 1.40*  --   --        LIVP:   Recent Labs     07/22/22  0330   AST 47*   *   BILIDIR <0.2   BILITOT 0.3   ALKPHOS 137*       Coags:   Recent Labs     07/21/22  0332 07/22/22  0330   PROTIME 13.6  --    INR 1.05  --    APTT  --  22.3*       Uric Acid   Recent Labs     07/22/22  0330   LABURIC 3.4*       DIAGNOSTIC:  1. CT Head 7/4:  1. Heterogeneous 3.9 cm masslike lesion centered in the right basal ganglia/frontal lobe with extensive surrounding edema. This is concerning for a primary or metastatic malignancy. Focal subacute intraparenchymal hemorrhage, hemorrhage within the mass,    or infection are differential considerations. Brain MRI with and without contrast and neurosurgery consult recommended.    2.  Extensive mass effect with 9 mm right to left midline shift, subfalcine herniation and suspected partial transtentorial herniation resulting in effacement of the right frontal horn and body, and mass effect on midbrain. 2. MRI Brain 7/4:  1. Enhancing 4.6 cm mass centered in the right basal ganglia with extensive surrounding edema in the right frontotemporal lobe. Findings highly concerning for malignancy. 2.  Mass results in 9 mm right to left midline shift, subfalcine herniation, and compression of the right thalamus and brainstem. 3. CT C/A/P 7/5:  CHEST:  1. No evidence of any thoracic metastatic disease. 2. Stable changes of pulmonary fibrosis. A/P:  IMPRESSION:   1. Interval reduction in size of hepatic mass. 2. Slight interval reduction in size of low-attenuation lesion within the spleen. 3. No evidence of any adrenal mass. 4. No evidence of any abdominal or pelvic lymphadenopathy. 5. Uncomplicated sigmoid colon diverticulosis. 4. CT Head 7/8:  1.  Status post biopsy of right frontal lobe mass with unchanged mild acute hemorrhage along the biopsy tract and unchanged 12 mm leftward subfalcine herniation. 5. CT Head 7/10:  Status post biopsy of right frontal lobe mass with unchanged mild acute hemorrhage along the biopsy tract and unchanged 12 mm leftward subfalcine herniation. 4. MBS 7/5/22:    PATHOLOGY:  1. Right Frontal Brain Mass Bx 7/6/22: Involved with malignant B-cell lymphoma with aggressive morphologic   features. COMMENT: The histologic and immunohistochemical changes are   supportive of a malignant B-cell lymphoma, large cell with aggressive   morphologic features based on the high Ki-67 proliferative index. The   patient's prior liver lymphoma diagnosis ( [facility]   TZ-) supporting an aggressive B-cell lymphoma is noted. FISH for   the aggressive B-cell lymphoma panel and cytogenetics are currently   pending and the results of the studies will be reflected in supplemental   Reports    FISH: Pending    PROBLEM LIST:             1.  DLBCL-   2. Interstitial pneumonia  3. diagnosis. He completed a prednisone taper.  - Dyspnea is grade 1  - He had a bronchoscopy March 25 and PFTs April 1.  - He continues pulmonary rehabilitation  - He is seeing Dr. Wild Alcaraz    7. Endo: T2DM exacerbated by steroids  - Cont lantus 10 units nightly, change to 25 units HS 7/16/22  - Cont Humalog Med SSI AS/HS, increase to high ISS 7/16/22    8. 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 No aspiration occurred with thin liquid via tsp/cup, nectar thick liquid, puree or soft  solid. Recommend continue soft and bite sized diet with thin liquids - cup only, small sips, sit fully upright and consistent oral care.      - DVT Prophylaxis: Platelets >53,833 cells/dL, - daily lovenox prophylaxis ordered  Contraindications to pharmacologic prophylaxis: None  Contraindications to mechanical prophylaxis: None    - Disposition: Uncertain at this time but possibly early next wk once off all IVs ([facility] vs NH vs hm)     MARCIA Moses - ZAHRA Singh DO

## 2022-07-23 NOTE — PLAN OF CARE
Problem: Neurosensory - Adult  Goal: Achieves stable or improved neurological status  7/23/2022 0206 by Fareed Dailey RN  Outcome: Progressing   Pt at baseline with neuro checks. Will continue to monitor and assess. Problem: Skin/Tissue Integrity - Adult  Goal: Skin integrity remains intact  7/23/2022 0206 by Fareed Dailey RN  Outcome: Progressing   Pt turned and repositioned q 2 hours and as needed. Will continue to monitor and assess. Problem: Musculoskeletal - Adult  Goal: Return mobility to safest level of function  7/23/2022 0206 by Fareed Dailey RN  Outcome: Progressing   Pt transferred from chair to bed this shift via walker. Tolerated well. Will continue to monitor and assess. Problem: Infection - Adult  Goal: Absence of infection at discharge  7/23/2022 0206 by Fareed Dailey RN  Outcome: Progressing   Pt remained afebrile this shift. Problem: Hematologic - Adult  Goal: Maintains hematologic stability  7/23/2022 0206 by Fareed Dailey RN  Outcome: Progressing   Patient's hemoglobin this AM:   Recent Labs     07/23/22  0352   HGB 10.8*     Patient's platelet count this AM:   Recent Labs     07/23/22  0352   PLT 92*    Thrombocytopenia Precautions in place. Patient showing no signs or symptoms of active bleeding. Transfusion not indicated at this time. Patient verbalizes understanding of all instructions. Will continue to assess and implement POC. Call light within reach and hourly rounding in place. Problem: Nutrition Deficit:  Goal: Optimize nutritional status  7/23/2022 0206 by Fareed Dailey RN  Outcome: Progressing   Pt had one Glucerna this shift. Tolerated well.

## 2022-07-24 NOTE — PROGRESS NOTES
4 Eyes Admission Assessment     I agree as the admission nurse that 2 RN's have performed a thorough Head to Toe Skin Assessment on the patient. ALL assessment sites listed below have been assessed on admission. Areas assessed by both nurses: Marques Soulier  [x]   Head, Face, and Ears   [x]   Shoulders, Back, and Chest  [x]   Arms, Elbows, and Hands   [x]   Coccyx, Sacrum, and Ischium  [x]   Legs, Feet, and Heels        Does the Patient have Skin Breakdown?   Yes a wound was noted on the Admission Assessment and an LDA was Initiated documentation include the Savi-wound, Wound Assessment, Measurements, Dressing Treatment, Drainage, and Color\",         Giorgio Prevention initiated:  Yes   Wound Care Orders initiated:  Yes      WOC nurse consulted for Pressure Injury (Stage 3,4, Unstageable, DTI, NWPT, and Complex wounds) or Giorgio score 18 or lower:  Yes      Nurse 1 eSignature: Electronically signed by Dannie Pruitt RN on 7/24/22 at 5:18 PM EDT    **SHARE this note so that the co-signing nurse is able to place an eSignature**    Nurse 2 eSignature: Electronically signed by Hoang Reddy RN on 7/25/22 at 12:00 AM EDT

## 2022-07-24 NOTE — PROGRESS NOTES
Occupational Therapy  Occupational Therapy  Daily Treatment Note  Patient Name: Phill Camargo  MRN: [id number]    Assessment:   Pt met supine in bed requiring encouragement for therapy due to general  fatigue. Bed mobility completed with SBA and effortful movement. Stance from EOB And functional mobility completed with CGA. Pt easily fatigued after short distance around bed to chair. Pt would benefit from intensive inpt OT for maximizing functional independence and safety. Continue OT per POC. Discharge Recommendations:   Phill Camargo scored a 18/24 on the AM-PAC ADL Inpatient form. Current research shows that an AM-PAC score of 17 or less is typically not associated with a discharge to the patient's home setting. Based on the patient's AM-PAC score and their current ADL deficits, it is recommended that the patient have 5-7 sessions per week of Occupational Therapy at d/c to increase the patient's independence. At this time, this patient demonstrates complex nursing, medical, and rehabilitative needs, and would benefit from intensive rehabilitation services upon discharge from the Inpatient setting. This patient demonstrates the ability to participate in and benefit from an intensive therapy program with a coordinated interdisciplinary team approach to foster frequent, structured, and documented communication among disciplines, who will work together to establish, prioritize, and achieve treatment goals. Please see assessment section for further patient specific details. If patient discharges prior to next session this note will serve as a discharge summary. Please see below for the latest assessment towards goals. Equipment Needs:      Chart Reviewed: Yes       Other position/activity restrictions: Up with Assistance     Additional Pertinent Hx: Pt is a 66 yo male that presents from home to the ED status post fall off the toliet 7/4.  He stated that his grab bars broke and fell and hit his head but no LOC. Head MRI: Enhancing 4.6 cm mass centered in the right basal ganglia with extensive surrounding edema in the right frontotemporal lobe. Findings highly concerning for malignancy. Mass results in 9 mm right to left midline shift, subfalcine herniation, and compression of the right thalamus and brainstem. PMH: Cancer, Diabetes 2, Hyperliperdemia. Diagnosis: Intracranial mass  Treatment Diagnosis: impaired ADLs and functional mobility/transfers    Subjective:   Pt met supine in bed requiring encouragement for therapy due to fatigue. Pain:   Denies    Social/Functional History  Lives With: Spouse  Type of Home: Condo  Home Layout: Two level, Able to Live on Main level with bedroom/bathroom, Performs ADL's on one level  Home Access: Stairs to enter with rails  Entrance Stairs - Number of Steps: 3 efren from garage w/ hand rail on R  Entrance Stairs - Rails: Right  Bathroom Shower/Tub: Walk-in shower, Shower chair without back  Bathroom Toilet: Handicap height (\"chair height\" toilets w/ hand rails attached)  Bathroom Equipment: Grab bars in shower  Home Equipment: Rollator  Has the patient had two or more falls in the past year or any fall with injury in the past year?: Yes (Pt's wife reports about 5 falls in the last 6 months)  ADL Assistance: Needs assistance (last week and a half, wife has been helping w ADLs. Prior to that he was mostly independent)  Homemaking Assistance: Needs assistance (wife does cooking, cleaning and laundry)  Homemaking Responsibilities: No  Ambulation Assistance: Needs assistance (last week and a half has needed wife to help w/ ambulation and uses rollator)  Transfer Assistance: Needs assistance (has needed assist for the last week and a half)  Active : No  Occupation: Retired  Additional Comments: wife has been providing 24hr A for the last week and a half  Prior Function  ADL Assistance: Needs assistance (last week and a half, wife has been helping w ADLs.  Prior to that he was mostly independent)  Homemaking Assistance: Needs assistance (wife does cooking, cleaning and laundry)  Ambulation Assistance: Needs assistance (last week and a half has needed wife to help w/ ambulation and uses rollator)  Transfer Assistance: Needs assistance (has needed assist for the last week and a half)  Additional Comments: wife has been providing 24hr A for the last week and a half    Objective:    Cognition/Orientation:  WFL    Bed mobility   Supine to sit:  SBA with effortful movement  Scooting: SBA to EOB and back in chair    Functional Mobility   Sit to Stand: CGA from EOB  Stand to Sit: CGA to recliner chair  Bed to Chair Transfer: CGA with Vcs for hand placement and postitioning  Other: Functional mobility demonstrated in room around end of bed with RW and CGA. Pt encouraged to complete UE exercise and ADLS however pt declining due to fatigue. Activity Tolerance:  Decreased activity tolerance this session due to fatigue    Patient Education:   Role of OT, activity promotion, safe transfers. Safety Devices in Place:  Pt left in chair with alarm on and call light in reach.      Second Session Therapy Time:   Individual Concurrent Group Co-treatment   Time In  1435         Time Out  1452         Minutes  17           Timed Code Treatment Minutes:  17    Total Treatment Minutes:  17       Goals: (as determined and assessed by primary OT)   Short Term Goals  Time Frame for Short term goals: by dc  Short Term Goal 1: Pt will complete functional transfers w/ Min Ax1 - Met 7/17; Transfer to/from toilet and chairs SBA - Not met  Short Term Goal 2: Pt will complete LE dressing w/ Min Ax1- met for pants 7/19  Short Term Goal 3: Pt will maintain sitting balance w/ CGA for 5min while engaging in ADL task- not met  Short Term Goal 4: Tolerate ambulation to/from bathroom for toileting/ADLs - not met         Plan: If pt discharges prior to next treatment, this note will serve as discharge summary Times per Week: 5-7   Times per Day: Daily    If patient is discharged prior to next treatment, this note will serve as the discharge summary.       [address] [place] [address], Ne, COY/L

## 2022-07-24 NOTE — PROGRESS NOTES
4 Eyes Admission Assessment     I agree as the admission nurse that 2 RN's have performed a thorough Head to Toe Skin Assessment on the patient. ALL assessment sites listed below have been assessed on admission. Areas assessed by both nurses: Cormier Fair &   [x]   Head, Face, and Ears   [x]   Shoulders, Back, and Chest  [x]   Arms, Elbows, and Hands   [x]   Coccyx, Sacrum, and Ischium  [x]   Legs, Feet, and Heels        Does the Patient have Skin Breakdown?   Yes a wound was noted on the Admission Assessment and an LDA was Initiated documentation include the Savi-wound, Wound Assessment, Measurements, Dressing Treatment, Drainage, and Color\",         Giorgio Prevention initiated:  Yes   Wound Care Orders initiated:  Yes      WOC nurse consulted for Pressure Injury (Stage 3,4, Unstageable, DTI, NWPT, and Complex wounds) or Giorgio score 18 or lower:  Yes      Nurse 1 eSignature: Electronically signed by Jarocho Paniagua RN on 7/24/22 at 2:25 AM EDT    **SHARE this note so that the co-signing nurse is able to place an eSignature**    Nurse 2 eSignature: Electronically signed by Alan Browning RN on 7/24/22 at 5:17 PM EDT

## 2022-07-24 NOTE — PROGRESS NOTES
[facility] Progress Note    2022     Refpedro Gilmore    MRN: [id number]    : 1945    Referring MD: No referring provider defined for this encounter. SUBJECTIVE: no events overnight.  Had tachycardic episode this am , ekg pending     ECOG PS: (4) Completely disabled, unable to carry out self-care and confined to bed or chair    KPS: 40% Disabled; requires special care and assistance    Isolation: None    Medications    Scheduled Meds:   insulin glargine  20 Units SubCUTAneous Nightly    dexamethasone  4 mg Oral 2 times per day    Hydrocerin   Topical BID    docusate sodium  100 mg Oral Daily    insulin lispro  0-18 Units SubCUTAneous TID WC    insulin lispro  0-9 Units SubCUTAneous Nightly    levETIRAcetam  500 mg Oral BID    enoxaparin  40 mg SubCUTAneous Q24H    pantoprazole  40 mg IntraVENous Daily    sodium chloride flush  5-40 mL IntraVENous 2 times per day    fluticasone  1 spray Each Nostril Daily    ipratropium  2 spray Each Nostril 4x Daily    tamsulosin  0.8 mg Oral Daily     Continuous Infusions:   sodium chloride 5 mL/hr at 22 1811    dextrose       PRN Meds:.magnesium sulfate, potassium chloride, sodium chloride flush, sodium chloride, oxyCODONE, methocarbamol **OR** methocarbamol IVPB, glucose, dextrose bolus **OR** dextrose bolus, glucagon (rDNA), dextrose, acetaminophen, ondansetron **OR** ondansetron    ROS:  As noted above, otherwise remainder of 10-point ROS negative    Physical Exam:     I&O:      Intake/Output Summary (Last 24 hours) at 2022 0722  Last data filed at 2022 9697  Gross per 24 hour   Intake 240 ml   Output 2200 ml   Net -1960 ml         Vital Signs:  /63   Pulse 71   Temp 98 °F (36.7 °C) (Oral)   Resp 17   Ht 5' 6\" (1.676 m)   Wt 124 lb 1.9 oz (56.3 kg)   SpO2 98%   BMI 20.03 kg/m²     Weight:    Wt Readings from Last 3 Encounters:   22 124 lb 1.9 oz (56.3 kg)   22 117 lb (53.1 kg)   22 119 lb (54 kg)       General: awake alert and oriented   HEENT: normocephalic, PERRL, no scleral erythema or icterus, Oral mucosa moist and intact, throat clear  NECK: supple   BACK: Straight   SKIN: warm dry and intact without lesions rashes or masses  CHEST: CTA bilaterally without use of accessory muscles  CV: Normal S1 S2, RRR, no MRG  ABD: NT ND normoactive BS, no palpable masses or hepatosplenomegaly  EXTREMITIES: without edema, denies calf tenderness  NEURO: CN II - XII grossly intact, motor weakness of his left upper and lower ext - unchanged from yesterday   CATHETER: R SL PAC    Data    CBC:   Recent Labs     07/22/22  0330 07/23/22  0352 07/24/22  0358   WBC 7.3 7.5 6.1   HGB 10.8* 10.8* 10.4*   HCT 32.1* 33.1* 31.2*   MCV 97.0 99.2 98.3   PLT 86* 92* 88*       BMP/Mag:  Recent Labs     07/22/22  0330 07/23/22  0444 07/24/22  0358   * 135* 131*   K 4.3 4.4 4.7   CL 96* 96* 93*   CO2 30 28 28   PHOS 2.7  --   --    BUN 24* 25* 25*   CREATININE <0.5* <0.5* <0.5*       LIVP:   Recent Labs     07/22/22  0330   AST 47*   *   BILIDIR <0.2   BILITOT 0.3   ALKPHOS 137*       Coags:   Recent Labs     07/22/22  0330   APTT 22.3*       Uric Acid   Recent Labs     07/22/22  0330   LABURIC 3.4*       DIAGNOSTIC:  1. CT Head 7/4:  1. Heterogeneous 3.9 cm masslike lesion centered in the right basal ganglia/frontal lobe with extensive surrounding edema. This is concerning for a primary or metastatic malignancy. Focal subacute intraparenchymal hemorrhage, hemorrhage within the mass,    or infection are differential considerations. Brain MRI with and without contrast and neurosurgery consult recommended. 2.  Extensive mass effect with 9 mm right to left midline shift, subfalcine herniation and suspected partial transtentorial herniation resulting in effacement of the right frontal horn and body, and mass effect on midbrain. 2. MRI Brain 7/4:  1.   Enhancing 4.6 cm mass centered in the right basal ganglia with extensive surrounding edema in the right frontotemporal lobe. Findings highly concerning for malignancy. 2.  Mass results in 9 mm right to left midline shift, subfalcine herniation, and compression of the right thalamus and brainstem. 3. CT C/A/P 7/5:  CHEST:  1. No evidence of any thoracic metastatic disease. 2. Stable changes of pulmonary fibrosis. A/P:  IMPRESSION:   1. Interval reduction in size of hepatic mass. 2. Slight interval reduction in size of low-attenuation lesion within the spleen. 3. No evidence of any adrenal mass. 4. No evidence of any abdominal or pelvic lymphadenopathy. 5. Uncomplicated sigmoid colon diverticulosis. 4. CT Head 7/8:  1.  Status post biopsy of right frontal lobe mass with unchanged mild acute hemorrhage along the biopsy tract and unchanged 12 mm leftward subfalcine herniation. 5. CT Head 7/10:  Status post biopsy of right frontal lobe mass with unchanged mild acute hemorrhage along the biopsy tract and unchanged 12 mm leftward subfalcine herniation. 4. MBS 7/5/22:    PATHOLOGY:  1. Right Frontal Brain Mass Bx 7/6/22: Involved with malignant B-cell lymphoma with aggressive morphologic   features. COMMENT: The histologic and immunohistochemical changes are   supportive of a malignant B-cell lymphoma, large cell with aggressive   morphologic features based on the high Ki-67 proliferative index. The   patient's prior liver lymphoma diagnosis ( [facility]   TR-) supporting an aggressive B-cell lymphoma is noted. FISH for   the aggressive B-cell lymphoma panel and cytogenetics are currently   pending and the results of the studies will be reflected in supplemental   Reports    FISH: Pending    PROBLEM LIST:             1.  DLBCL-   2. Interstitial pneumonia  3. DM2  4.   H/O malignant hypercalcemia      TREATMENT:            1.  Initial therapy on protocol [id number] R-CHOP +/- Tafasitamab/Lenalidamide Jan 2022 - July 2022    ASSESSMENT AND PLAN: 1. 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 C-Myc was mutated but BCL-2 and BCL 6 were  not. - GCB phenotype  - S/p initial therapy on clinical trial utilizing R-CHOP w/ Revlimid and Tafasitimab;  - Study mandated PET was due this week BUT in-pt     Simulation was performed (7/11). Plan for 2400 cGy over 8 fractions to be started 7/14/22 - tolerating well     Relapse / Progression: Large CNS mass - bx proven NHL 7/6/22. Ki-67>90%  - Plan to start XRT to brain mass to reduce size and then possible systemic chemotherapy. - Cont Decadron 4 mg IV Q 6 hours -->changed to PO starting sat (7/16) --> decrease to 4mg po TID (7/19) --> decrease to 4mg po bid (7/22/22)   - Cont Keppra  BUT change to PO starting sat (7/16)   - Consult RadOnc - XRT started (7/13/22)   - Consult palliative care - following      2. ID: afebrile     3. Heme: leukocytosis + Anemia and thrombocytopenia likely r/t recent chemotherapy  - Transfuse for Hgb < 7 and Platelets < 10 K  - No transfusion today     4. Metabolic: hypoNa + abnl LFTs which are trending upward   - No IVFs  - Replace K+ & Mg per PRN orders    5. GI / Nutrition:     - Cont diet and change to reg diet  - Recommend swabbing mouth after all meals. Must be completely awake for PO intake  - Dietary to follow closely     6. Pulm:  Interstitial lung disease  - In the course of his NHL tretatment, we uncovered interstitial lung disease. Dr Rebecca Coates reviewed chest CTs in the past and feels that he had evidence of pulmonary fibrosis before his lymphoma diagnosis.  He completed a prednisone taper.  - Dyspnea is grade 1  - He had a bronchoscopy March 25 and PFTs April 1.  - He continues pulmonary rehabilitation  - He is seeing  Jessy    7. Endo: T2DM exacerbated by steroids  - Cont lantus 10 units nightly, change to 25 units HS 7/16/22  - Cont Humalog Med SSI AS/HS, increase to high ISS 7/16/22    8. Acute Debilitation: 2/2 CNS Lymphoma    - Consult PT/OT - [facility] 9 / 12 - appreciate input     - Consult SLP: DAVID 7/5/2: poor swallow coordination with aspiration of thin liquid x1 with straw  presentation. No aspiration occurred with thin liquid via tsp/cup, nectar thick liquid, puree or soft  solid. Recommend continue soft and bite sized diet with thin liquids - cup only, small sips, sit fully upright and consistent oral care.      - DVT Prophylaxis: Platelets >95,823 cells/dL, - daily lovenox prophylaxis ordered  Contraindications to pharmacologic prophylaxis: None  Contraindications to mechanical prophylaxis: None    - Disposition: Uncertain at this time but possibly early next wk once off all IVs ([facility] vs NH vs hm)     MARCIA Collazo - ZAHRA Danielle DO

## 2022-07-24 NOTE — PROGRESS NOTES
Patient's ACHS (pre dinner) blood glucose check 485. Repeat 483. Given 18 of insulin per orders. MD BERNY Banuelos notified of high blood sugar. Orders to increase Lantus from 20 to 25, nightly.

## 2022-07-24 NOTE — PLAN OF CARE
Problem: Neurosensory - Adult  Goal: Achieves stable or improved neurological status  Outcome: Progressing  Note: Pt A&O x4. Left side weakness noted but improving since admission. Neuro checks intact. Problem: Skin/Tissue Integrity - Adult  Goal: Skin integrity remains intact  Outcome: Progressing  Note: No new issues with skin integrity noted this shift. Pt continues with pressure injury to coccyx and skin tear to elbow. Preventative measures in place. Pt is turned and repositioned q2h by nursing. Problem: Infection - Adult  Goal: Absence of infection at discharge  Outcome: Progressing  Note: CVC site remains free of signs/symptoms of infection. No drainage, edema, erythema, pain, or warmth noted at site. Dressing changes continue per protocol and on an as needed basis - see flowsheet. Compliant with [facility] Bath Protocol:  Performed CHG bath today per [facility] protocol utilizing Bed bath with CHG wipes. CVC site cleansed with CHG wipe over dressing, skin surrounding dressing, and first 6\" of IV tubing. Pt tolerated well. Continued to encourage daily CHG bathing per [facility] protocol. Patient's hemoglobin this AM:   Recent Labs     07/24/22  0358   HGB 10.4*     Patient's platelet count this AM:   Recent Labs     07/24/22  0358   PLT 88*    Thrombocytopenia Precautions in place. Patient showing no signs or symptoms of active bleeding. Transfusion not indicated at this time. Patient verbalizes understanding of all instructions. Will continue to assess and implement POC. Call light within reach and hourly rounding in place.

## 2022-07-25 NOTE — PLAN OF CARE
Problem: Neurosensory - Adult  Goal: Achieves stable or improved neurological status  Outcome: Progressing  Note: Pt A&O x4. Left side weakness noted but improving since admission. Neuro checks intact. Problem: Skin/Tissue Integrity - Adult  Goal: Skin integrity remains intact  Outcome: Progressing  Note: No new issues with skin integrity noted this shift. Pt continues with pressure injury to coccyx and skin tear to elbow. Preventative measures in place. Pt is turned and repositioned q2h by nursing. Problem: Infection - Adult  Goal: Absence of infection at discharge  Outcome: Progressing  Note: CVC site remains free of signs/symptoms of infection. No drainage, edema, erythema, pain, or warmth noted at site. Dressing changes continue per protocol and on an as needed basis - see flowsheet. Problem: Hematologic - Adult  Goal: Maintains hematologic stability  Outcome: Progressing   Patient's hemoglobin this AM:   Recent Labs     07/25/22  0348   HGB 10.4*     Patient's platelet count this AM:   Recent Labs     07/25/22  0348   PLT 92*    Thrombocytopenia Precautions in place. Patient showing no signs or symptoms of active bleeding. Transfusion not indicated at this time. Patient verbalizes understanding of all instructions. Will continue to assess and implement POC. Call light within reach and hourly rounding in place.

## 2022-07-25 NOTE — CARE COORDINATION
8:55 AM  Met with wife at bedside this morning. She is hopeful to take the patient home. She has been connected with the [place] [facility] and home health care will be arranged. She reports that she has a rolling walker and a rollator for the patient at home. Plan to have patient work with therapy today with possible discharge on 7/26.

## 2022-07-25 NOTE — PROGRESS NOTES
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 He completed radiation therapy today receiving 2400 cGy over 8 fractions. He continues to have left-sided weakness. Per patient's wife he is able to transfer with assistance of gait belt and walker from the chair to the bed and back. He is also able to walk short distances in his room with the assistance of the gait belt and walker. Decadron was decreased to 2 mg twice a day today. Past Medical History, Surgical History, Social History, Family History and Medications are reviewed in detail and updated in his chart. ROS:    Constitutional:  No weight loss, No fever, No chills, No night sweats. Moderate fatigue. ENT / Mouth:  No dysphagia, No hoarseness, No oral ulcers. No sore throat, No tinnitus, Normal hearing. Cardiovascular:  No chest pain, No palpitations, No syncope, No upper extremity or lower extremity edema, No calf discomfort. Respiratory:  No cough. No hemoptysis, No wheezing, No dyspnea. Gastrointestinal:  No abdominal pain, No nausea, No vomiting, No constipation, No diarrhea, No melena, No dyspepsia. Urinary:  No dysuria, No hematuria, No urinary incontinence. Musculoskeletal:  No muscle pain, No bone pain. Skin:  No rash, No nodules, No pruritus, No lesions. Neurologic:  No seizures, No syncope, No tremor, No speech change, No headache, No sensory changes. Left-sided weakness. Patient with a history of dementia. Psychiatric:  No depression, No anxiety, Concentration normal.    Endocrine:  No hot flashes, No thyroid symptoms. Hematologic:  No epistaxis, No petechiae, No ecchymosis. Lymphatic:  No lymphadenopathy, No lymphedema. Physical Exam:  /71   Pulse 72   Temp 97.8 °F (36.6 °C) (Oral)   Resp 21   Ht 5' 6\" (1.676 m)   Wt 116 lb 10 oz (52.9 kg)   SpO2 97%   BMI 18.82 kg/m²     GENERAL: No acute distress. Alert & oriented x3. NECK: No thyromegaly or masses. LYMPHATICS:  No cervical, supraclavicular, infraclavicular or axillary lymphadenopathy. LUNGS:  Clear to auscultation bilaterally. No rales or wheezes. Good respiratory effort. HEART:  Regular rate and rhythm. ABDOMEN: Soft, ND, NT. No masses or hepatosplenomegaly. MUSCULOSKELETAL:  No tenderness to palpation of the spine or pelvis. Muscle strength 5/5 right upper and lower extremity. 4/5 left upper and lower extremity. Stable. EXTREMETIES:  No edema. Radiology:  No results found for this or any previous visit. Results for orders placed during the hospital encounter of 07/04/22    MRI BRAIN W WO CONTRAST    Narrative  MRI of the brain with and without contrast dated 7/4/2022    Indication: new brain mass    Comparison: CT head 7/4/2022    Technical Factors: Standard multiplanar multisequence MRI of the brain was performed before and after intravenous administration of 12 mL of ProHance contrast.    Findings: There is a enhancing solid mass centered in the right basal ganglia extending into the right frontal lobe measuring 3.5 x 4.6 cm. There is marked surrounding vasogenic edema in the right frontotemporal lobe. There is 9 mm of right-to-left midline shift. Compression of the right ventricular system and third ventricle as seen on CT. Right-to-left subfalcine herniation and mass effect on the right midbrain and thalamus. No other mass is identified. Impression  1. Enhancing 4.6 cm mass centered in the right basal ganglia with extensive surrounding edema in the right frontotemporal lobe. Findings highly concerning for malignancy. 2.  Mass results in 9 mm right to left midline shift, subfalcine herniation, and compression of the right thalamus and brainstem. PROCEDURE: CT HEAD WITHOUT CONTRAST  7/10/2022       INDICATION: Acute MS changes; post right frontal lobe mass biopsy. COMPARISON: 7/8/2022. TECHNIQUE: CT head was performed without contrast according to standard protocol. Axial images and multiplanar reformatted images reviewed. Up-to-date CT equipment and radiation dose reduction techniques were employed. FINDINGS:       Prior right frontal approach needle biopsy of a 4 cm right frontal lobe mass with large volume surrounding edema is redemonstrated. There is unchanged small volume acute intraparenchymal hemorrhage along the biopsy tract, greatest at the anterolateral    margin of the mass. There is unchanged are a few foci of air redemonstrated, overall improved. . Bilateral basal ganglia calcifications are again noted. The ventricles are nondilated. The mass is again demonstrated in the deep right frontal lobe, with effacement of the anterior horn of the right lateral ventricle. There is stable 12 mm leftward subfalcine herniation. Visualized portions of the orbits, paranasal sinuses, and mastoids appear unremarkable. Impression       Status post biopsy of right frontal lobe mass with unchanged mild acute hemorrhage along the biopsy tract and unchanged 12 mm leftward subfalcine herniation. 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 Patient continues to have left-sided weakness which is stable. He is not able to ambulate short distances with a walker and assist of gait belt. Decadron decreased to 2 mg twice a day per Dr. Fatoumata Andino.   Would anticipate that he should decrease to 2 mg daily on 7/28/2022 and discontinue Decadron on 7/31/2022 if continues to tolerate Decadron taper. Plan for him to be discharged tomorrow with home physical therapy so that he can start systemic therapy. Patient should follow-up with our office in 1 to 2 weeks for symptom check. He will continue to follow-up with Dr. Gely Drake as an outpatient for management of systemic therapy. Intent of treatment: Palliative    Please note this document has been produced using speech recognition software and may contain errors related to that system including errors in grammar, punctuation, and spelling, as well as words and phrases that may be inappropriate. If there are any questions or concerns please feel free to contact the dictating provider for clarification. A total of 45 minutes was spent on today's patient encounter. If applicable, non-patient-facing activities:     (  x )   Preparing to see the patient and reviewing records     (   )   Individual interpretation of results      (   )   Discussion or coordination of care with other health care professionals     (   )   Ordering of unique tests, medications, or procedures     (  x )   Documentation within the EHR     Magali Ibrahim was available for consultation with this visit.

## 2022-07-25 NOTE — PROGRESS NOTES
Occupational Therapy  Facility/Department: [facility]  Occupational Therapy Treatment    Name: Rogelio Lutz  : 1945  MRN: [id number]  Date of Service: 2022    Discharge Recommendations: Rogelio Lutz scored a 18/24 on the AM-PAC ADL Inpatient form. Current research shows that an AM-PAC score of 17 or less is typically not associated with a discharge to the patient's home setting. Based on the patient's AM-PAC score and their current ADL deficits, it is recommended that the patient have 5-7 sessions per week of Occupational Therapy at d/c to increase the patient's independence. At this time, this patient demonstrates complex nursing, medical, and rehabilitative needs, and would benefit from intensive rehabilitation services upon discharge from the Inpatient setting. This patient demonstrates the ability to participate in and benefit from an intensive therapy program with a coordinated interdisciplinary team approach to foster frequent, structured, and documented communication among disciplines, who will work together to establish, prioritize, and achieve treatment goals. Please see assessment section for further patient specific details. If patient discharges prior to next session this note will serve as a discharge summary. Please see below for the latest assessment towards goals. OT Equipment Recommendations  Other: defer to next level of care       Patient Diagnosis(es): The primary encounter diagnosis was Brain mass. Diagnoses of Intracranial mass and Aphasia were also pertinent to this visit. Past Medical History:  has a past medical history of Benign prostatic hyperplasia with weak urinary stream, Cancer ([address] Erectile dysfunction, History of gout, History of thrombocytopenia, Hypercalcemia, Hyperlipidemia, Hypertension, Lung nodule, Proteinuria, Type 2 diabetes mellitus without complication ([address] and Vitamin D deficiency.   Past Surgical History:  has a past surgical history that includes Finger trigger release (Right, 2007); Tonsillectomy and adenoidectomy (age 2); Elbow fracture surgery (Left, age 5); Vasectomy (1971); Cataract removal with implant (Bilateral, 2011); Colonoscopy (3/29/2011); Colonoscopy (03/14/2016); IR PORT PLACEMENT > 5 YEARS (1/11/2022); bronchoscopy (N/A, 3/25/2022); and craniotomy (Right, 7/6/2022). Treatment Diagnosis: impaired ADLs and functional mobility/transfers      Assessment   Performance deficits / Impairments: Decreased functional mobility ; Decreased endurance;Decreased posture;Decreased ADL status; Decreased balance;Decreased strength;Decreased safe awareness;Decreased vision/visual deficit; Decreased fine motor control;Decreased coordination  Assessment: Pt completing mobility this date with CGA and RW. Mobility in room ~75' with seated rest break between activity. UE/LE exercise seated in chair. Declined ADLs this date. Pt would benefit from cont skilled OT while in acute care. If pt dc home will need 24hr hands on assist and dc. Pt cont to demo decreased cognition and insight. Requires 24hr, fall risk, should not ambulate alone. Treatment Diagnosis: impaired ADLs and functional mobility/transfers  REQUIRES OT FOLLOW-UP: Yes  Activity Tolerance  Activity Tolerance: Patient Tolerated treatment well;Patient limited by fatigue  Activity Tolerance Comments: rest breaks between activity        Plan   Plan  Times per Week: 5-7  Times per Day: Daily  Current Treatment Recommendations: Strengthening, Balance training, Functional mobility training, Endurance training, Equipment evaluation, education, & procurement, Patient/Caregiver education & training, Safety education & training, Self-Care / ADL     Restrictions  Position Activity Restriction  Other position/activity restrictions:  Up with Assistance    Subjective   General  Chart Reviewed: Yes  Patient assessed for rehabilitation services?: Yes  Additional Pertinent Hx: 67 y.o. male with past medical history of non-Hodgkin lymphoma, hyperlipidemia, hypertension, type 2 diabetes, and BPH who presents to the emergency department after a fall in the bathroom and found to have brain mass with cerebral edema; suspected metastases of non hodgkin's lymphoma. His wife provides the history at bedside as the patient has difficulty verbally communicating at baseline. Hospital Course: 7/6 RIGHT FRONTAL NEEDLE BIOPSY OF BRAIN MASS  Family / Caregiver Present: Yes (wife and son)  Referring Practitioner: Stefani Cash MD  Diagnosis: Intracranial mass  Subjective  Subjective: In chair, agreeable to session, \"I practiced the stairs earlier today\"     Social/Functional History  Social/Functional History  Lives With: Spouse  Type of Home: [facility]  Home Layout: Two level, Able to Live on Main level with bedroom/bathroom, Performs ADL's on one level  Home Access: Stairs to enter with rails  Entrance Stairs - Number of Steps: 3 efren from garage w/ hand rail on R  Entrance Stairs - Rails: Right  Bathroom Shower/Tub: Walk-in shower, Shower chair without back  Bathroom Toilet: Handicap height (\"chair height\" toilets w/ hand rails attached)  Bathroom Equipment: Grab bars in shower  Home Equipment: Rollator  Has the patient had two or more falls in the past year or any fall with injury in the past year?: Yes (Pt's wife reports about 5 falls in the last 6 months)  ADL Assistance: Needs assistance (last week and a half, wife has been helping w ADLs.  Prior to that he was mostly independent)  Homemaking Assistance: Needs assistance (wife does cooking, cleaning and laundry)  Homemaking Responsibilities: No  Ambulation Assistance: Needs assistance (last week and a half has needed wife to help w/ ambulation and uses rollator)  Transfer Assistance: Needs assistance (has needed assist for the last week and a half)  Active : No  Occupation: Retired  Additional Comments: wife has been providing 24hr A for the last week and a half                Safety Devices  Type of Devices: Left in chair;Call light within reach; Chair alarm in place;Nurse notified; All fall risk precautions in place;Gait belt;Patient at risk for falls  Bed Mobility Training  Bed Mobility Training: No (in chair, left in chair end of session)  Balance  Sitting: Intact  Standing: With support  Transfer Training  Transfer Training: Yes  Sit to Stand: Contact-guard assistance;Stand-by assistance  Stand to Sit: Contact-guard assistance;Stand-by assistance  Gait  Overall Level of Assistance: Contact-guard assistance  Assistive Device: Gait belt;Walker, rolling     AROM: Generally decreased, functional  ADL  Additional Comments: declined ADLs this date \"I did all that already\"                 Cognition  Overall Cognitive Status: Exceptions  Arousal/Alertness: Appropriate responses to stimuli;Delayed responses to stimuli  Following Commands: Follows one step commands with increased time; Follows one step commands with repetition  Attention Span: Attends with cues to redirect  Memory: Appears intact  Safety Judgement: Decreased awareness of need for assistance  Problem Solving: Assistance required to identify errors made;Assistance required to correct errors made  Insights: Decreased awareness of deficits  Initiation: Requires cues for some  Sequencing: Requires cues for some               A/AROM Exercises: AROM BUE shoulder flex/ext to 90 degrees, elbow flex/ext, wrist flex/ext, open and close hand 10x, BLE- hip flex/ext, knee flex/ext, ankle pumps x15  Education Given To: Patient  Education Provided: Role of Therapy;Plan of Care;ADL Adaptive Strategies;Transfer Training; Fall Prevention Strategies  Education Method: Verbal  Barriers to Learning: Cognition  Education Outcome: Continued education needed                     AM-PAC Score        AM-Merged with [facility] Inpatient Daily Activity Raw Score: 18 (07/25/22 1449)  AM-PAC Inpatient ADL T-Scale Score : 38.66 (07/25/22 1449)  ADL Inpatient CMS 0-100% Score: 46.65 (07/25/22 1449)  ADL Inpatient CMS G-Code Modifier : CK (07/25/22 1449)      Goals  Short Term Goals  Time Frame for Short term goals: by dc  Short Term Goal 1: Transfer to/from toilet and chairs SBA - progressing (met for chair)  Short Term Goal 2: Pt will complete LE dressing w/ Min Ax1- met for pants 7/19  Short Term Goal 3: Pt will maintain sitting balance w/ CGA for 5min while engaging in ADL task- goal met SBA  Short Term Goal 4: Tolerate ambulation to/from bathroom for toileting/ADLs - progressing       Therapy Time   Individual Concurrent Group Co-treatment   Time In 1329         Time Out 1407         Minutes 38             Timed Code Treatment Minutes:  38  Total Treatment Minutes:  [address], OT

## 2022-07-25 NOTE — ONCOLOGY
[address]    DATE:7/25/22    AREA TREATED:BRAIN MASS    DAILY DOSE: 300cGy    CUMULATIVE DOSE: 2400cGy    #  8 OUT OF  8 TREATMENTS    NEXT PLANNED TREATMENT  DATE:COMPLETED    Daily Treatments are Monday through Friday      INPATIENT CODING:  Beam Radiation  Photons   Photon energy : 6 MV

## 2022-07-25 NOTE — PLAN OF CARE
Problem: Neurosensory - Adult  Goal: Achieves stable or improved neurological status  Outcome: Progressing     Problem: Skin/Tissue Integrity - Adult  Goal: Skin integrity remains intact  Outcome: Progressing     Problem: Musculoskeletal - Adult  Goal: Return mobility to safest level of function  Outcome: Progressing     Problem: Infection - Adult  Goal: Absence of infection at discharge  Outcome: Progressing     Problem: Hematologic - Adult  Goal: Maintains hematologic stability  Outcome: Progressing     Problem: Nutrition Deficit:  Goal: Optimize nutritional status  Outcome: Progressing

## 2022-07-25 NOTE — PROGRESS NOTES
Physical Therapy  Facility/Department: [address]  Physical Therapy Daily Treatment Note    Name: Angel Meier  : 1945  MRN: [id number]  Date of Service: 2022    Discharge Recommendations:    Angel Meier scored a 17/24 on the AM-PAC short mobility form. Current research shows that an AM-PAC score of 18 or greater is typically associated with a discharge to the patient's home setting. Based on the patient's AM-PAC score and their current functional mobility deficits, it is recommended that the patient have 2-3 sessions per week of Physical Therapy at d/c to increase the patient's independence. At this time, this patient demonstrates the endurance and safety to discharge home with home PT and a follow up treatment frequency of 2-3x/wk. Please see assessment section for further patient specific details. If patient discharges prior to next session this note will serve as a discharge summary. Please see below for the latest assessment towards goals. PT Equipment Recommendations  Equipment Needed: No      Patient Diagnosis(es): The primary encounter diagnosis was Brain mass. Diagnoses of Intracranial mass and Aphasia were also pertinent to this visit. Past Medical History:  has a past medical history of Benign prostatic hyperplasia with weak urinary stream, Cancer ([address] Erectile dysfunction, History of gout, History of thrombocytopenia, Hypercalcemia, Hyperlipidemia, Hypertension, Lung nodule, Proteinuria, Type 2 diabetes mellitus without complication ([address] and Vitamin D deficiency. Past Surgical History:  has a past surgical history that includes Finger trigger release (Right, ); Tonsillectomy and adenoidectomy (age 2); Elbow fracture surgery (Left, age 5); Vasectomy (); Cataract removal with implant (Bilateral, ); Colonoscopy (3/29/2011); Colonoscopy (2016);  IR PORT PLACEMENT > 5 YEARS (2022); bronchoscopy (N/A, 3/25/2022); and craniotomy (Right, 7/6/2022). Assessment   Body Structures, Functions, Activity Limitations Requiring Skilled Therapeutic Intervention: Decreased functional mobility ; Decreased ROM; Decreased strength;Decreased endurance  Assessment: Continues to progress well. Decreased assist for steps this date. Overall needing CG/min assist for all mobility. Needs cues for safety with transfers/gt. At risk for falls and not safe to ambulate alone. If pt goes home, rec 24 hr direct assistance. Discussed with wife who is able to provide. Rec cont skilled PT to maximize mobilitly and independence  Treatment Diagnosis: Decreased strength and mobility        Plan   Plan  Plan: 5-7 times per week  Current Treatment Recommendations: Strengthening, Functional mobility training, Gait training, Safety education & training, Therapeutic activities, Balance training, Transfer training  Safety Devices  Type of Devices: Left in chair, Call light within reach, Chair alarm in place, Nurse notified, All fall risk precautions in place, Gait belt     Restrictions  Position Activity Restriction  Other position/activity restrictions: Up with Assistance     Subjective   General  Chart Reviewed: Yes  Additional Pertinent Hx: Pt is a 66 yo male that presents from home to the ED status post fall off the [employer] 7/4. He stated that his grab bars broke and fell and hit his head but no LOC. Head MRI: Enhancing 4.6 cm mass centered in the right basal ganglia with extensive surrounding edema in the right frontotemporal lobe. Findings highly concerning for malignancy. Mass results in 9 mm right to left midline shift, subfalcine herniation, and compression of the right thalamus and brainstem. PMH: Cancer, Diabetes 2, Hyperliperdemia. Family / Caregiver Present: Yes (wife)  Subjective  Subjective: Pt found sitting in recliner. Agreeable to PT . Denies pain. Wife reports son built ramp to enter home over the weekend.          Social/Functional History  Social/Functional History  Lives With: Spouse  Type of Home: Condo  Home Layout: Two level, Able to Live on Main level with bedroom/bathroom, Performs ADL's on one level  Home Access: Stairs to enter with rails  Entrance Stairs - Number of Steps: 3 efren from garage w/ hand rail on R  Entrance Stairs - Rails: Right  Bathroom Shower/Tub: Walk-in shower, Shower chair without back  Bathroom Toilet: Handicap height (\"chair height\" toilets w/ hand rails attached)  Bathroom Equipment: Grab bars in shower  Home Equipment: Rollator  Has the patient had two or more falls in the past year or any fall with injury in the past year?: Yes (Pt's wife reports about 5 falls in the last 6 months)  ADL Assistance: Needs assistance (last week and a half, wife has been helping w ADLs. Prior to that he was mostly independent)  Homemaking Assistance: Needs assistance (wife does cooking, cleaning and laundry)  Homemaking Responsibilities: No  Ambulation Assistance: Needs assistance (last week and a half has needed wife to help w/ ambulation and uses rollator)  Transfer Assistance: Needs assistance (has needed assist for the last week and a half)  Active : No  Occupation: Retired  Additional Comments: wife has been providing 24hr A for the last week and a half        Objective                                   Transfers  Sit to Stand: Contact guard assistance (from chair, min assist from commode, cues for hand placement - pulls up on walker)  Stand to sit: Contact guard assistance (to min assist - cues for hand placement and safety)  Ambulation  Device: Rolling Walker  Assistance: Contact guard assistance (to min assist)  Quality of Gait: Narrow SEBASTIEN, decreased alexa, decreased bilat step length/height, QUESADA  Distance: 10' x 2, 80', 20'  Comments: Pulse ox 90% at end of session in sitting.   Cues for pursed lip breathing and sats increased to 95%  Stairs/Curb  Stairs?:  (Up 4 steps with bilat handrail, CGA step to pattern, down steps with min assist step to pattern and cues for sequencing. SLow and effortful)                 OutComes Score                                                  AM-PAC Score  AM-PAC Inpatient Mobility Raw Score : 17 (07/25/22 1221)  AM-PAC Inpatient T-Scale Score : 42.13 (07/25/22 1221)  Mobility Inpatient CMS 0-100% Score: 50.57 (07/25/22 1221)  Mobility Inpatient CMS G-Code Modifier : CK (07/25/22 1221)          T[place] Score       Goals  Short Term Goals  Time Frame for Short term goals: Discharge  Short term goal 1: Supine <> Sit CGA -ongoing  Short term goal 2: Sit<>Stand CGA -MET 7/17 revise to sit<>stand with supervision -ongoing  Short term goal 3: Amb 20 ft with RW CGA -Partially met 7/25. Revised goal: Pt will amb >150' with RW SBA  Short term goal 4: Pt will up/down 3-4 steps with rail CGA. Ongoing  Patient Goals   Patient goals : To Return Home       Education  Patient Education  Education Given To: Patient; Family  Education Provided: Plan of Care;Transfer Training;IADL Safety  Education Provided Comments: safety with transfers/gt, activity at home  Education Method: Demonstration;Verbal  Barriers to Learning: None  Education Outcome: Verbalized understanding;Demonstrated understanding;Continued education needed      Therapy Time   Individual Concurrent Group Co-treatment   Time In 1150         Time Out 1220         Minutes 30             Timed Code Treatment Minutes:  30    Total Treatment Minutes:  [facility] , PT

## 2022-07-25 NOTE — PROGRESS NOTES
[address] Progress Note    2022     Chilo Purcell    MRN: [id number]    : 1945    Referring MD: No referring provider defined for this encounter. SUBJECTIVE: doing well. No issues. Eating breakfast this am. EKG with normal sinus rhythm. Last day of radiation today. We discussed home tomorrow pending final PT records.      ECOG PS: (4) Completely disabled, unable to carry out self-care and confined to bed or chair    KPS: 40% Disabled; requires special care and assistance    Isolation: None    Medications    Scheduled Meds:   dexamethasone  2 mg Oral 2 times per day    insulin glargine  25 Units SubCUTAneous Nightly    Hydrocerin   Topical BID    docusate sodium  100 mg Oral Daily    insulin lispro  0-18 Units SubCUTAneous TID WC    insulin lispro  0-9 Units SubCUTAneous Nightly    levETIRAcetam  500 mg Oral BID    enoxaparin  40 mg SubCUTAneous Q24H    pantoprazole  40 mg IntraVENous Daily    sodium chloride flush  5-40 mL IntraVENous 2 times per day    fluticasone  1 spray Each Nostril Daily    ipratropium  2 spray Each Nostril 4x Daily    tamsulosin  0.8 mg Oral Daily     Continuous Infusions:   sodium chloride 5 mL/hr at 22 1811    dextrose       PRN Meds:.magnesium sulfate, potassium chloride, sodium chloride flush, sodium chloride, oxyCODONE, methocarbamol **OR** methocarbamol IVPB, glucose, dextrose bolus **OR** dextrose bolus, glucagon (rDNA), dextrose, acetaminophen, ondansetron **OR** ondansetron    ROS:  As noted above, otherwise remainder of 10-point ROS negative    Physical Exam:     I&O:      Intake/Output Summary (Last 24 hours) at 2022 0859  Last data filed at 2022 1803  Gross per 24 hour   Intake 120 ml   Output 1200 ml   Net -1080 ml         Vital Signs:  /72   Pulse 70   Temp 97.8 °F (36.6 °C) (Oral)   Resp 21   Ht 5' 6\" (1.676 m)   Wt 119 lb 7.8 oz (54.2 kg)   SpO2 97%   BMI 19.29 kg/m²     Weight:    Wt Readings from Last 3 Encounters:   22 119 lb 7.8 oz (54.2 kg)   05/31/22 117 lb (53.1 kg)   05/04/22 119 lb (54 kg)       General: awake alert and oriented   HEENT: normocephalic, PERRL, no scleral erythema or icterus, Oral mucosa moist and intact, throat clear  NECK: supple   BACK: Straight   SKIN: warm dry and intact without lesions rashes or masses  CHEST: CTA bilaterally without use of accessory muscles  CV: Normal S1 S2, RRR, no MRG  ABD: NT ND normoactive BS, no palpable masses or hepatosplenomegaly  EXTREMITIES: without edema, denies calf tenderness  NEURO: CN II - XII grossly intact, motor weakness of his left upper and lower ext - unchanged from yesterday   CATHETER: R SL PAC    Data    CBC:   Recent Labs     07/23/22  0352 07/24/22  0358 07/25/22 0348   WBC 7.5 6.1 6.0   HGB 10.8* 10.4* 10.4*   HCT 33.1* 31.2* 31.3*   MCV 99.2 98.3 98.0   PLT 92* 88* 92*       BMP/Mag:  Recent Labs     07/23/22  0444 07/24/22  0358 07/25/22 0348   * 131* 130*   K 4.4 4.7 4.4   CL 96* 93* 92*   CO2 28 28 32   PHOS  --   --  2.9   BUN 25* 25* 28*   CREATININE <0.5* <0.5* <0.5*   MG  --   --  1.40*       LIVP:   Recent Labs     07/25/22 0348   AST 45*   *   BILIDIR <0.2   BILITOT 0.3   ALKPHOS 133*       Coags:   Recent Labs     07/25/22 0348   PROTIME 13.4   INR 1.03   APTT 23.2       Uric Acid   Recent Labs     07/25/22 0348   LABURIC 3.9       DIAGNOSTIC:  1. CT Head 7/4:  1. Heterogeneous 3.9 cm masslike lesion centered in the right basal ganglia/frontal lobe with extensive surrounding edema. This is concerning for a primary or metastatic malignancy. Focal subacute intraparenchymal hemorrhage, hemorrhage within the mass,    or infection are differential considerations. Brain MRI with and without contrast and neurosurgery consult recommended.    2.  Extensive mass effect with 9 mm right to left midline shift, subfalcine herniation and suspected partial transtentorial herniation resulting in effacement of the right frontal horn and body, and mass effect on midbrain. 2. MRI Brain 7/4:  1. Enhancing 4.6 cm mass centered in the right basal ganglia with extensive surrounding edema in the right frontotemporal lobe. Findings highly concerning for malignancy. 2.  Mass results in 9 mm right to left midline shift, subfalcine herniation, and compression of the right thalamus and brainstem. 3. CT C/A/P 7/5:  CHEST:  1. No evidence of any thoracic metastatic disease. 2. Stable changes of pulmonary fibrosis. A/P:  IMPRESSION:   1. Interval reduction in size of hepatic mass. 2. Slight interval reduction in size of low-attenuation lesion within the spleen. 3. No evidence of any adrenal mass. 4. No evidence of any abdominal or pelvic lymphadenopathy. 5. Uncomplicated sigmoid colon diverticulosis. 4. CT Head 7/8:  1.  Status post biopsy of right frontal lobe mass with unchanged mild acute hemorrhage along the biopsy tract and unchanged 12 mm leftward subfalcine herniation. 5. CT Head 7/10:  Status post biopsy of right frontal lobe mass with unchanged mild acute hemorrhage along the biopsy tract and unchanged 12 mm leftward subfalcine herniation. 4. MBS 7/5/22:    PATHOLOGY:  1. Right Frontal Brain Mass Bx 7/6/22: Involved with malignant B-cell lymphoma with aggressive morphologic   features. COMMENT: The histologic and immunohistochemical changes are   supportive of a malignant B-cell lymphoma, large cell with aggressive   morphologic features based on the high Ki-67 proliferative index. The   patient's prior liver lymphoma diagnosis ( [facility]   OR-) supporting an aggressive B-cell lymphoma is noted. FISH for   the aggressive B-cell lymphoma panel and cytogenetics are currently   pending and the results of the studies will be reflected in supplemental   Reports    FISH: Pending    PROBLEM LIST:             1.  DLBCL-   2. Interstitial pneumonia  3. DM2  4.   H/O malignant hypercalcemia      TREATMENT: 1.  Initial therapy on protocol [id number] R-CHOP +/- Tafasitamab/Lenalidamide Jan 2022 - July 2022    ASSESSMENT AND PLAN:           1. 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 C-Myc was mutated but BCL-2 and BCL 6 were  not. - GCB phenotype  - S/p initial therapy on clinical trial utilizing R-CHOP w/ Revlimid and Tafasitimab;  - Study mandated PET was due this week BUT in-pt     Simulation was performed (7/11). Plan for 2400 cGy over 8 fractions to be started 7/14/22 - tolerating well     Relapse / Progression: Large CNS mass - bx proven NHL 7/6/22. Ki-67>90%  - Plan to start XRT to brain mass to reduce size and then possible systemic chemotherapy. - Cont Decadron 4 mg IV Q 6 hours -->changed to PO starting sat (7/16) --> decrease to 4mg po TID (7/19) --> decrease to 4mg po bid (7/22/22), decrease it to 2 mg BID 7/25/22  - Cont Keppra  BUT change to PO starting sat (7/16)   - Consult RadOnc - XRT started (7/13/22), finishing today   - Consult palliative care - following   - will likely be home tomorrow and plan to see Dr Cecily Philip in clinic      2. ID: afebrile     3. Heme: leukocytosis + Anemia and thrombocytopenia likely r/t recent chemotherapy  - Transfuse for Hgb < 7 and Platelets < 10 K  - No transfusion today     4. Metabolic: hypoNa + abnl LFTs which are trending upward   - No IVFs  - Replace K+ & Mg per PRN orders    5. GI / Nutrition:     - Cont diet and change to reg diet  - Recommend swabbing mouth after all meals. Must be completely awake for PO intake  - Dietary to follow closely     6. Pulm:  Interstitial lung disease  - In the course of his NHL tretatment, we uncovered interstitial lung disease.  Dr Adair Agee reviewed chest CTs in the past and feels that he had evidence of pulmonary fibrosis before his lymphoma diagnosis. He completed a prednisone taper.  - Dyspnea is grade 1  - He had a bronchoscopy March 25 and PFTs April 1.  - He continues pulmonary rehabilitation  - He is seeing Dr. Shravan Loomis    7. Endo: T2DM exacerbated by steroids  - Cont lantus 10 units nightly, change to 25 units HS 7/16/22  - Cont Humalog Med SSI AS/HS, increase to high ISS 7/16/22    8. Acute Debilitation: 2/2 CNS Lymphoma    - Consult PT/OT - [facility] 9 / 12 - appreciate input     - Consult SLP: DAVID 7/5/2: poor swallow coordination with aspiration of thin liquid x1 with straw  presentation. No aspiration occurred with thin liquid via tsp/cup, nectar thick liquid, puree or soft  solid. Recommend continue soft and bite sized diet with thin liquids - cup only, small sips, sit fully upright and consistent oral care.      - DVT Prophylaxis: Platelets >20,665 cells/dL, - daily lovenox prophylaxis ordered  Contraindications to pharmacologic prophylaxis: None  Contraindications to mechanical prophylaxis: None    - Disposition: likely home tomorrow with home health

## 2022-07-25 NOTE — CARE COORDINATION
CTN [facility] [phone]. They have accepted this patient and will pull referral from [facility].  They will contact patient and make arrangements for [facility] by 7/28  Electronically signed by Alycia Campbell LPN on 3/73/7472 at 61:55 PM

## 2022-07-26 NOTE — PROGRESS NOTES
4 Eyes Shift Assessment     I agree that 2 RN's have performed a thorough Head to Toe Skin Assessment on the patient. ALL assessment sites listed below have been assessed on admission. Areas assessed by both nurses: Mercedez Nunezon  [x]   Head, Face, and Ears   [x]   Shoulders, Back, and Chest  [x]   Arms, Elbows, and Hands   [x]   Coccyx, Sacrum, and Ischium  [x]   Legs, Feet, and Heels        Does the Patient have Skin Breakdown? Yes a wound was noted on the Admission Assessment and an LDA was Initiated documentation include the Savi-wound, Wound Assessment, Measurements, Dressing Treatment, Drainage, and Color\",         Giorgio Prevention initiated:  Yes   Wound Care Orders initiated:  Yes      [address] nurse consulted for Pressure Injury (Stage 3,4, Unstageable, DTI, NWPT, and Complex wounds) or Giorgio score 18 or lower:  Yes      Nurse 1 eSignature:  Rita Garg    **SHARE this note so that the co-signing nurse is able to place an eSignature**    Nurse 2 eSignature: Electronically signed by Pamela Grande RN on 7/25/22 at 9:25 PM EDT

## 2022-07-26 NOTE — PROGRESS NOTES
Patient discharged to home, port deaccessed and heparin locked. Staples removed by Neurology NP. Went over discharge instructions with patient and his wife. All questions answered. All belongings sent with patient.

## 2022-07-26 NOTE — CARE COORDINATION
Patient will be discharged today. Patient will follow up with Dr. Raj Nowak in [facility] on Thursday. Verified scripts are being filled at [facility] and the pharmacist is working on them and they will be ready this afternoon. Home Care with [facility] has been set up by SELMA. Patient aware. Updated RN. Will continue to monitor for any further discharge needs.

## 2022-07-26 NOTE — PLAN OF CARE
Problem: Neurosensory - Adult  Goal: Achieves stable or improved neurological status  7/26/2022 0434 by Crystal Brennan RN  Outcome: Progressing Towards Goal  Note: Pt is alert and oriented x4. Problem: Skin/Tissue Integrity - Adult  Goal: Skin integrity remains intact  7/26/2022 0434 by Crystal Brennan RN  Outcome: Progressing Towards Goal  Note: Pt has multiple skin breakdown sites on skin, mepilexs in place. Problem: Infection - Adult  Goal: Absence of infection at discharge  7/26/2022 0434 by Crystal Brennan RN  Outcome: Progressing Towards Goal  Note: Pt continues to be afebrile throughout shift. Problem: Hematologic - Adult  Goal: Maintains hematologic stability  7/26/2022 0434 by Crystal Brennan RN  Outcome: Progressing Towards Goal  Note: Patient's hemoglobin this AM:   Recent Labs     07/26/22  0255   HGB 11.9*     Patient's platelet count this AM:   Recent Labs     07/26/22  0255   *    Thrombocytopenia not present at this time. Patient showing no signs or symptoms of active bleeding. Transfusion not indicated at this time. Patient verbalizes understanding of all instructions. Will continue to assess and implement POC. Call light within reach and hourly rounding in place.

## 2022-07-26 NOTE — CARE COORDINATION
Script was sent to mail order pharmacy for Flomax. Pt concerned that they did not have correct dose for tomorrow. Called patient at home to let them know we can call in a new script for this medication. When patient wife checked the current bottle that they have at home they have the correct dose 0.4 mg instructed to take one capsule. Caregiver stated no toher questions at this time.

## 2022-07-26 NOTE — PROGRESS NOTES
Physician Progress Note      PATIENT:               Ruthann Dong  CSN #:                  [id number]  :                       1945  ADMIT DATE:       2022 5:49 AM  DISCH DATE:  RESPONDING  PROVIDER #:        Shelley Krishnan          QUERY TEXT:    Pt admitted with Intracranial Mass. Noted documentation of  Severe   malnutrition on  by ordered dietician consultant. If possible, please   document in progress notes and discharge summary:    The medical record reflects the following:  Risk Factors: relapsed DLBCL NHL with new brain mass  Clinical Indicators: per ASPEN- Body Fat Loss:  Severe body fat loss Orbital,   Triceps ; Muscle Mass Loss:  Severe muscle mass loss  Temples (temporalis),Clavicles (pectoralis & deltoids)  ? Treatment: dietician consult; Add Glucerna shakes and magic cups BID,   Encourage PO intakes, assist with meals, Monitor nutrition  adequacy, pertinent labs, bowel habits, wt. changes, and clinical progress  Options provided:  -- Severe malnutrition confirmed present on admission  -- Severe malnutrition ruled out  -- Other - I will add my own diagnosis  -- Disagree - Not applicable / Not valid  -- Disagree - Clinically unable to determine / Unknown  -- Refer to Clinical Documentation Reviewer    PROVIDER RESPONSE TEXT:    The diagnosis of Severe malnutrition was confirmed as present on admission.     Query created by: Danica Carias on 2022 4:22 PM      Electronically signed by:  Shelley Krishnan 2022 8:31 AM

## 2022-07-26 NOTE — CARE COORDINATION
Case Management Assessment            Discharge Note                    Date / Time of Note: 7/26/2022 9:56 AM                  Discharge Note Completed by: RUSS Crowell    Patient Name: Carmen Kunz   YOB: 1945  Diagnosis: Intracranial mass [R90.0]  Brain mass [G93.89]   Date / Time: 7/4/2022  5:49 AM    Current PCP: Kiesha Canales MD  Clinic patient: No    Hospitalization in the last 30 days: No    Advance Directives:  Code Status: Limited  [place] DNR form completed and on chart: No    Financial:  Payor: [facility] [address] / Plan: [facility] PPO / Product Type: Medicare /      Pharmacy:    Elizabet Burk [address] [facility], [address] [place] [address] [phone] - f [phone]  [address] [place] [address]  Phone: [phone] Fax: Vilma Mcdermott [address], [address] [phone] - F [phone]  [address] Mark Jess Mcneill [address] [facility]  Phone: [phone] Fax: [phone]    [facility] - [address] [address] [phone] Clealena Patience [phone]  [address] [place] [address]  Phone: [phone] Fax: [phone]    [facility], [address] [facility] [phone] - f [phone]  [address] [place] [address]  Phone: [phone] Fax: [phone]      Assistance purchasing medications?: Potential Assistance Purchasing Medications: No  Assistance provided by Case Management: None at this time    Does patient want to participate in local refill/ meds to beds program?: No    Meds To Beds General Rules:  1. Can ONLY be done Monday- Friday between 8:30am-5pm  2. Prescription(s) must be in pharmacy by 3pm to be filled same day  3. Copy of patient's insurance/ prescription drug card and patient face sheet must be sent along with the prescription(s)  4. Cost of Rx cannot be added to hospital bill.  If financial assistance is needed, please contact unit  or ;  or  CANNOT provide pharmacy voucher for patients co-pays  5. Patients can then  the prescription on their way out of the hospital at discharge, or pharmacy can deliver to the bedside if staff is available. (payment due at time of pick-up or delivery - cash, check, or card accepted)     Able to afford home medications/ co-pay costs: Yes    ADLS:  Current PT AM-PAC Score: 17 /24  Current OT AM-PAC Score: 18 /24      DISCHARGE Disposition: Home with [address]: [facility]     LOC at discharge: Not Applicable  MARGARETH Completed: No    Notification completed in HENS/PAS?:  Not Applicable    IMM Completed:   Yes, Case management has presented and reviewed IMM letter #2 to the patient and/or family/ POA. Patient and/or family/POA verbalized understanding of their medicare rights and appeal process if needed. Patient and/or family/POA has signed, initialed and placed today's date (7/26/2022) and time (4655) on IMM letter #2 on the the appropriate lines. Patient and/or family/POA, copy of letter offered and they are aware that this original copy of IMM letter #2 is available prior to discharge from the paper chart on the unit. Electronic documentation has been entered into epic for IMM letter #2 and original paper copy has been added to the paper chart at the nurses station.      Transportation:  Transportation PLAN for discharge: family   Mode of Transport: Private Car  Reason for medical transport: Not Applicable  Name of [address] [place] [address],[address]: Not Applicable  Time of Transport: none    Transport form completed: No    Home Care:  [address] ordered at discharge: Yes  [address]: Ida Gallegos [facility] [address]  Phone: [phone] Fax: [phone]  Orders faxed: Yes    Durable Medical Equipment:  DME Provider: none  Equipment obtained during hospitalization: none    Home Oxygen and Respiratory Equipment:  Oxygen needed at discharge?: No  [address]: Not Applicable  Portable tank available for discharge?: No    Dialysis:  Dialysis patient: No    Dialysis Center:  Not Applicable    Hospice Services:  Location: Not Applicable  Agency: Not Applicable    Consents signed: No    Referrals made at [facility] for outpatient continued care:  Not Applicable    Additional CM Notes: Patient to discharge home with wife today. He has home health care arranged with [facility] as an s4. Wife has necessary DME at home. Patient to follow-up at [facility] for outpatient treatment. The Plan for Transition of Care is related to the following treatment goals of Intracranial mass [R90.0]  Brain mass [G93.89]    The Patient and/or patient representative Damon Cheng and his family were provided with a choice of provider and agrees with the discharge plan Yes    Freedom of choice list was provided with basic dialogue that supports the patient's individualized plan of care/goals and shares the quality data associated with the providers.  Yes    Care Transitions patient: Yes    Favio Reyes [facility]  [facility]  Case Management Department  Ph: [phone]  Fax: [phone]

## 2022-07-26 NOTE — PROGRESS NOTES
[employer] Progress Note    2022     Ro Mckinnon    MRN: [id number]    : 1945    Referring MD: No referring provider defined for this encounter. SUBJECTIVE: Renan Esquivel is doing well. He feels well about going home. He eating breakfast this am. Denies any pain.  His wife is at bedside and she agrees with the plan    ECOG PS: (4) Completely disabled, unable to carry out self-care and confined to bed or chair    KPS: 40% Disabled; requires special care and assistance    Isolation: None    Medications    Scheduled Meds:   insulin glargine  20 Units SubCUTAneous Nightly    dexamethasone  2 mg Oral 2 times per day    Hydrocerin   Topical BID    docusate sodium  100 mg Oral Daily    insulin lispro  0-18 Units SubCUTAneous TID WC    insulin lispro  0-9 Units SubCUTAneous Nightly    levETIRAcetam  500 mg Oral BID    enoxaparin  40 mg SubCUTAneous Q24H    pantoprazole  40 mg IntraVENous Daily    sodium chloride flush  5-40 mL IntraVENous 2 times per day    fluticasone  1 spray Each Nostril Daily    ipratropium  2 spray Each Nostril 4x Daily    tamsulosin  0.8 mg Oral Daily     Continuous Infusions:   sodium chloride 5 mL/hr at 22 1811    dextrose       PRN Meds:.magnesium sulfate, potassium chloride, sodium chloride flush, sodium chloride, oxyCODONE, methocarbamol **OR** methocarbamol IVPB, glucose, dextrose bolus **OR** dextrose bolus, glucagon (rDNA), dextrose, acetaminophen, ondansetron **OR** ondansetron    ROS:  As noted above, otherwise remainder of 10-point ROS negative    Physical Exam:     I&O:      Intake/Output Summary (Last 24 hours) at 2022 0854  Last data filed at 2022 0659  Gross per 24 hour   Intake 937 ml   Output 2600 ml   Net -1663 ml         Vital Signs:  BP (!) 110/49   Pulse 97   Temp 98.6 °F (37 °C) (Oral)   Resp 30   Ht 5' 6\" (1.676 m)   Wt 116 lb 10 oz (52.9 kg)   SpO2 95%   BMI 18.82 kg/m²     Weight:    Wt Readings from Last 3 Encounters:   22 116 lb 10 oz (52.9 kg)   05/31/22 117 lb (53.1 kg)   05/04/22 119 lb (54 kg)       General: awake alert and oriented   HEENT: normocephalic, PERRL, no scleral erythema or icterus, Oral mucosa moist and intact, throat clear  NECK: supple   BACK: Straight   SKIN: warm dry and intact without lesions rashes or masses  CHEST: CTA bilaterally without use of accessory muscles  CV: Normal S1 S2, RRR, no MRG  ABD: NT ND normoactive BS, no palpable masses or hepatosplenomegaly  EXTREMITIES: without edema, denies calf tenderness  NEURO: CN II - XII grossly intact, motor weakness of his left upper and lower ext - unchanged from yesterday   CATHETER: R SL PAC    Data    CBC:   Recent Labs     07/24/22 0358 07/25/22 0348 07/26/22  0255   WBC 6.1 6.0 7.7   HGB 10.4* 10.4* 11.9*   HCT 31.2* 31.3* 35.7*   MCV 98.3 98.0 97.9   PLT 88* 92* 106*       BMP/Mag:  Recent Labs     07/24/22 0358 07/25/22 0348 07/26/22  0255   * 130* 135*   K 4.7 4.4 4.3   CL 93* 92* 97*   CO2 28 32 30   PHOS  --  2.9  --    BUN 25* 28* 27*   CREATININE <0.5* <0.5* <0.5*   MG  --  1.40*  --        LIVP:   Recent Labs     07/25/22 0348   AST 45*   *   BILIDIR <0.2   BILITOT 0.3   ALKPHOS 133*       Coags:   Recent Labs     07/25/22 0348   PROTIME 13.4   INR 1.03   APTT 23.2       Uric Acid   Recent Labs     07/25/22 0348   LABURIC 3.9       DIAGNOSTIC:  1. CT Head 7/4:  1. Heterogeneous 3.9 cm masslike lesion centered in the right basal ganglia/frontal lobe with extensive surrounding edema. This is concerning for a primary or metastatic malignancy. Focal subacute intraparenchymal hemorrhage, hemorrhage within the mass,    or infection are differential considerations. Brain MRI with and without contrast and neurosurgery consult recommended.    2.  Extensive mass effect with 9 mm right to left midline shift, subfalcine herniation and suspected partial transtentorial herniation resulting in effacement of the right frontal horn and body, and mass effect on midbrain. 2. MRI Brain 7/4:  1. Enhancing 4.6 cm mass centered in the right basal ganglia with extensive surrounding edema in the right frontotemporal lobe. Findings highly concerning for malignancy. 2.  Mass results in 9 mm right to left midline shift, subfalcine herniation, and compression of the right thalamus and brainstem. 3. CT C/A/P 7/5:  CHEST:  1. No evidence of any thoracic metastatic disease. 2. Stable changes of pulmonary fibrosis. A/P:  IMPRESSION:   1. Interval reduction in size of hepatic mass. 2. Slight interval reduction in size of low-attenuation lesion within the spleen. 3. No evidence of any adrenal mass. 4. No evidence of any abdominal or pelvic lymphadenopathy. 5. Uncomplicated sigmoid colon diverticulosis. 4. CT Head 7/8:  1.  Status post biopsy of right frontal lobe mass with unchanged mild acute hemorrhage along the biopsy tract and unchanged 12 mm leftward subfalcine herniation. 5. CT Head 7/10:  Status post biopsy of right frontal lobe mass with unchanged mild acute hemorrhage along the biopsy tract and unchanged 12 mm leftward subfalcine herniation. 4. MBS 7/5/22:    PATHOLOGY:  1. Right Frontal Brain Mass Bx 7/6/22: Involved with malignant B-cell lymphoma with aggressive morphologic   features. COMMENT: The histologic and immunohistochemical changes are   supportive of a malignant B-cell lymphoma, large cell with aggressive   morphologic features based on the high Ki-67 proliferative index. The   patient's prior liver lymphoma diagnosis ( [facility], [address] [place] [address]-) supporting an aggressive B-cell lymphoma is noted. FISH for   the aggressive B-cell lymphoma panel and cytogenetics are currently   pending and the results of the studies will be reflected in supplemental   Reports    FISH: Pending    PROBLEM LIST:             1.  DLBCL-   2. Interstitial pneumonia  3. DM2  4.   H/O malignant hypercalcemia      TREATMENT:            1. Initial therapy on protocol [id number] R-CHOP +/- Tafasitamab/Lenalidamide Jan 2022 - July 2022    ASSESSMENT AND PLAN:           1. 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 C-Myc was mutated but BCL-2 and BCL 6 were  not. - GCB phenotype  - S/p initial therapy on clinical trial utilizing R-CHOP w/ Revlimid and Tafasitimab;  - Study mandated PET was due this week BUT in-pt     Simulation was performed (7/11). Plan for 2400 cGy over 8 fractions to be started 7/14/22 - tolerating well     Relapse / Progression: Large CNS mass - bx proven NHL 7/6/22. Ki-67>90%  - Plan to start XRT to brain mass to reduce size and then possible systemic chemotherapy. - Cont Decadron 4 mg IV Q 6 hours -->changed to PO starting sat (7/16) --> decrease to 4mg po TID (7/19) --> decrease to 4mg po bid (7/22/22), decrease it to 2 mg BID 7/25/22  - Cont Keppra  BUT change to PO starting sat (7/16)   - Consult RadOnc - XRT started (7/13/22), finished 7/25/22  - Consult palliative care - following   - home today and will follow up with with dr Kassadnra Rojas on Thursday 7/28     2. ID: afebrile     3. Heme: leukocytosis + Anemia and thrombocytopenia likely r/t recent chemotherapy  - Transfuse for Hgb < 7 and Platelets < 10 K  - No transfusion today     4. Metabolic: hypoNa + abnl LFTs which are trending upward   - No IVFs  - Replace K+ & Mg per PRN orders    5. GI / Nutrition:     - Cont diet and change to reg diet  - Recommend swabbing mouth after all meals. Must be completely awake for PO intake  - Dietary to follow closely     6. Pulm:  Interstitial lung disease  - In the course of his NHL tretatment, we uncovered interstitial lung disease.  Dr Ingrid Dixon reviewed chest CTs in the past and feels that he had evidence of pulmonary fibrosis before his lymphoma diagnosis. He completed a prednisone taper.  - Dyspnea is grade 1  - He had a bronchoscopy March 25 and PFTs April 1.  - He continues pulmonary rehabilitation  - He is seeing Dr. Shravan Loomis    7. Endo: T2DM exacerbated by steroids  - Cont lantus 10 units nightly, change to 25 units HS 7/16/22  - Cont Humalog Med SSI AS/HS, increase to high ISS 7/16/22  - decreased Dex. will go home on humalog SSI, janumet and jardiance, will monitor blood glucose on home. Will discuss lantus in clinic if needed   8. Acute Debilitation: 2/2 CNS Lymphoma    - Consult PT/OT - [facility] 9 / 12 - appreciate input     - Consult SLP: DAVID 7/5/2: poor swallow coordination with aspiration of thin liquid x1 with straw  presentation. No aspiration occurred with thin liquid via tsp/cup, nectar thick liquid, puree or soft  solid. Recommend continue soft and bite sized diet with thin liquids - cup only, small sips, sit fully upright and consistent oral care.      - DVT Prophylaxis: Platelets >51,539 cells/dL, - daily lovenox prophylaxis ordered  Contraindications to pharmacologic prophylaxis: None  Contraindications to mechanical prophylaxis: None    - Disposition: home with homehealth today

## 2022-07-27 NOTE — CARE COORDINATION
Anjel 45 Transitions Initial Follow Up Call    Call within 2 business days of discharge: Yes    Patient: Colt Samuel Patient : 1945   MRN: [id number]  Reason for Admission: brain mass  Discharge Date: 22 RARS: Readmission Risk Score: 19.1      Last Discharge [facility]       Date Complaint Diagnosis Description Type Department Provider    22 Shoulder Pain Brain mass . .. ED to Hosp-Admission (Discharged) (ADMITTED) Ghada Whitney MD; Spencer Mckeon . .. Spoke with: wife, Sangeetha Mcneal: [facility]    Non-face-to-face services provided:  Obtained and reviewed discharge summary and/or continuity of care documents  Education of patient/family/caregiver/guardian to support self-management-incision care, f/u  Assessment and support for treatment adherence and medication management-full medication reconciliation and 1111f completed    Care Transitions 24 Hour Call    Do you have a copy of your discharge instructions?: Yes  Do you have all of your prescriptions and are they filled?: Yes  Have you been contacted by a [id number] Pharmacist?: No  Have you scheduled your follow up appointment?: Yes  How are you going to get to your appointment?: Car - family or friend to transport  Do you have support at home?: Partner/Spouse/SO  Do you feel like you have everything you need to keep you well at home?: Yes  Are you an active caregiver in your home?: No  Care Transitions Interventions         Transitions of Care Initial Call    Was this an external facility discharge? No Discharge Facility: NA    Challenges to be reviewed by the provider   Additional needs identified to be addressed with provider: No  none             Method of communication with provider : none    Advance Care Planning:   Does patient have an Advance Directive: reviewed and current.      LPN Care Coordinator contacted the family by telephone to perform post hospital discharge assessment. Verified name and  with family as identifiers. Provided introduction to self, and explanation of the LPN CC role. LPN CC reviewed discharge instructions, medical action plan and red flags with family who verbalized understanding. Family given an opportunity to ask questions and does not have any further questions or concerns at this time. Were discharge instructions available to patient? Yes. Reviewed appropriate site of care based on symptoms and resources available to patient including: PCP  Specialist  Home health. The family agrees to contact the PCP office for questions related to their healthcare. Medication reconciliation was performed with family, who verbalizes understanding of administration of home medications. Advised obtaining a 90-day supply of all daily and as-needed medications. Was patient discharged with a pulse oximeter? no    Wife verified patient  and was pleasant and agreeable to transition calls. Ambulating ok. As good as can be expected. Appetite good. Denies problems with urinating or having BM. Denies HA, dizziness. Incisions healing well. Sutures removed prior to d/c home. Denies fever/chills, N/V/D, LOC, seizures, fall. Reports some confusion. Support system good. HC active. Wife is adjusting to administering the patient his insulin as patient did it himself before. States she has a family member with diabetes that is helping her to get used to this. SN to help with some diabetic testing supplies, Dexcom not working. Confirmed [facility] appt Thursday, Endocrine 8/15, & is calling [place] for f/u. Full medication reconciliation and 1111f completed. LPN CC provided contact information. Plan for follow-up call in 5-7 days based on severity of symptoms and risk factors.   Plan for next call: symptom management-s/s infection, seizures, LOC, HA, dizziness,   self management-ambulation, incision, MS, glucose  follow up appointment-[facility], [place], Endocrine  medication management-new or changed  referrals-[facility]-SN, PT, OT, SW, HHA       Follow Up  Future Appointments   Date Time Provider Antony Mala   8/15/2022 10:30 AM Caro Hurt MD [facility]       Zofia Joaquin LPN

## 2022-07-27 NOTE — DISCHARGE SUMMARY
[facility] Discharge Summary             Attending Physician: No att. providers found    Referring MD: No referring provider defined for this encounter. Name: Refpedro Gilmore :  1945  MRN:  [id number]    Admission: 2022   Discharge: 2022      Date: 2022    Diagnosis on admit: CNS lymphoma     Procedures: Routine chest x-ray, laboratories, EKG, IV fluid hydration, Panculture for fevers, IV antimicrobial therapy, Respiratory therapy, Oxygen therapy, Blood Product Infusions    Consultations: Radiation oncology     Medications:      Medication List        START taking these medications      dexamethasone 2 MG tablet  Commonly known as: DECADRON  Take 1 tablet by mouth in the morning and 1 tablet before bedtime. Do all this for 10 days. Notes to patient: Dexamethasone (Decadron)  Use:  used in combination with other drugs as a part of your chemotherapy regimen    Side effects:  mood changes, increased hunger, high blood sugars     levETIRAcetam 500 MG tablet  Commonly known as: KEPPRA  Take 1 tablet by mouth in the morning and 1 tablet before bedtime. Notes to patient: Anticonvulsant-Keppra Treats seizures. Important to take this medication as directed by your physician. No Driving while taking this medication. Side effects;  Drowsiness, weakness      ondansetron 4 MG disintegrating tablet  Commonly known as: ZOFRAN-ODT  Take 1 tablet by mouth every 8 hours as needed for Nausea or Vomiting  Notes to patient: Ondansetron (Zofran)  Use:  to prevent or treat nausea and vomiting    Side effects:  headache, fatigue, constipation              CHANGE how you take these medications      tamsulosin 0.4 MG capsule  Commonly known as: FLOMAX  TAKE 1 CAPSULE DAILY  What changed: See the new instructions.   Notes to patient: Tamsulosin (Flomax)  USE--  Treat an enlarged prostate  SIDE EFFECTS--  Feeling dizzy, headache, low blood pressure            CONTINUE taking these medications      aspirin 81 MG EC tablet  Notes to patient: sea  Aspirin  USE--Prevents heart attack and stroke (decreases platelet adhesion)  SIDE EFFECTS-- Stomach upset, bleeding/bruising more easily     B-D UF III MINI PEN NEEDLES 31G X 5 MM Misc  Generic drug: Insulin Pen Needle  1 each by Does not apply route 4 times daily     bacitracin-polymyxin b 500-60991 UNIT/GM ointment  Commonly known as: POLYSPORIN  Apply topically twice daily to inside of both nostrils for 3 weeks then as needed for dry nose.      CENTRUM ADULTS PO     Dexcom G6  Carole  Use for glucose monitoring     Dexcom G6 Sensor Misc  Use for glucose monitoring     Dexcom G6 Transmitter Misc  Use for glucose monitoring     docusate sodium 100 MG capsule  Commonly known as: Colace  Take 1 capsule by mouth 2 times daily  Notes to patient: Helps prevent constipation      fluticasone 50 MCG/ACT nasal spray  Commonly known as: FLONASE  SPRAY ONCE IN EACH NOSTRIL EVERY DAY  Notes to patient: Nasal allergies     insulin lispro (1 Unit Dial) 100 UNIT/ML Sopn  Commonly known as: HumaLOG KwikPen  Up to 5-10 units before meals 3 times a day  Notes to patient: Insulin Lispro  (Humalog)  USE--  Treatment of diabetes or high blood sugar  SIDE EFFECTS--  Low blood sugar, irritation at injection site     ipratropium 0.06 % nasal spray  Commonly known as: ATROVENT  2 sprays by Each Nostril route 4 times daily     Janumet XR  MG Tb24 per extended release tablet  Generic drug: SITagliptin-metFORMIN  TAKE 1 TABLET TWICE A DAY     Jardiance 25 MG tablet  Generic drug: empagliflozin  TAKE 1 TABLET BY MOUTH DAILY     magnesium oxide 400 MG tablet  Commonly known as: MAG-OX  Take 1 tablet by mouth daily  Notes to patient: Supplement to help with low magnesium levels     OMEGA 3 PO     ondansetron 8 MG tablet  Commonly known as: ZOFRAN     ONE TOUCH ULTRA 2 w/Device Kit  1 kit by Does not apply route daily     * ONE TOUCH ULTRASOFT LANCETS Misc  1 each by Does not apply route daily     * Lancets Misc  Use to test blood sugar once daily     * OneTouch Ultra strip  Generic drug: blood glucose test strips  1 each by Does not apply route daily     * OneTouch Ultra strip  Generic drug: blood glucose test strips  TEST ONCE DAILY     prochlorperazine 10 MG tablet  Commonly known as: COMPAZINE  Notes to patient: Side effects:  drowsiness     sodium chloride 0.65 % nasal spray  Commonly known as: OCEAN  2 sprays by Nasal route 4 times daily     vitamin B-12 1000 MCG tablet  Commonly known as: CYANOCOBALAMIN     vitamin D 50 MCG (2000 UT) Caps capsule           * This list has 4 medication(s) that are the same as other medications prescribed for you. Read the directions carefully, and ask your doctor or other care provider to review them with you. STOP taking these medications      predniSONE 20 MG tablet  Commonly known as: Cayetano Colindres               Where to Get Your Medications        These medications were sent to [facility] [address] [place] [address]      Phone: [phone]   tamsulosin 0.4 MG capsule       These medications were sent to [address] [place] [address] [phone] Laurette Cheadle [phone]  [address] [place] [phone]      Phone: [phone]   dexamethasone 2 MG tablet  levETIRAcetam 500 MG tablet  ondansetron 4 MG disintegrating tablet         Reason for Admission: cns [facility] Hospital Course:   Mr. Yenifer Adams  is a 67 y.o. male we are seeing in consultation for stage IV non-Hodgkin lymphoma. For this hospitalization, he presented to the emergency room after a fall. For several days prior to hospitalization, the patient had worsening speech and altered gait. His wife also noted that he will have periods of nonresponsiveness where he would stare off into space.   In the ER, he underwent a head CT that demonstrated a mass in the basal ganglia with associated vasogenic edema and midline shift. He has since then undergone an MRI which demonstrated an approximate 4 cm mass. He has been started on steroids and Keppra. Medical oncology is consulted for further recommendations. He underwent biopsy which suggested Involved with malignant B-cell lymphoma with aggressive morphologic   features. Patient was treated with dexamethasone and radiation therapy. He is being discharged ad will follow up with Dr Vani Peter in clinic for systemic treatment    Physical Exam:     Vital Signs:  /76   Pulse 92   Temp 97.7 °F (36.5 °C) (Oral)   Resp 18   Ht 5' 6\" (1.676 m)   Wt 116 lb 10 oz (52.9 kg)   SpO2 95%   BMI 18.82 kg/m²     Weight:    Wt Readings from Last 3 Encounters:   07/25/22 116 lb 10 oz (52.9 kg)   05/31/22 117 lb (53.1 kg)   05/04/22 119 lb (54 kg)       KPS: 50%  Requires considerable assistance and frequent medical care    General: Awake, alert and oriented    HEENT: normocephalic, alopecia, PERRL, no scleral erythema or icterus, Oral mucosa moist and intact, throat clear.    NECK: supple without palpable adenopathy  BACK: Straight, negative CVAT  SKIN: warm dry and intact without lesions rashes or masses  CHEST: CTA bilaterally without use of accessory muscles  CV: Normal S1 S2, RRR, no MRG  ABD: NT ND normoactive BS, no palpable masses or hepatosplenomegaly  EXTREMITIES: without edema, denies calf tenderness  NEURO: CN II - XII grossly intact      Discharge Laboratory Data:  CBC:   Recent Labs     07/25/22  0348 07/26/22  0255   WBC 6.0 7.7   HGB 10.4* 11.9*   HCT 31.3* 35.7*   MCV 98.0 97.9   PLT 92* 106*     BMP/Mag:  Recent Labs     07/25/22  0348 07/26/22  0255   * 135*   K 4.4 4.3   CL 92* 97*   CO2 32 30   PHOS 2.9  --    BUN 28* 27*   CREATININE <0.5* <0.5*   MG 1.40*  --      LIVP:   Recent Labs     07/25/22  0348   AST 45*   *   BILIDIR <0.2   BILITOT 0.3   ALKPHOS 133*     Coags:   Recent Labs 07/25/22  0348   PROTIME 13.4   INR 1.03   APTT 23.2     Uric Acid   Recent Labs     07/25/22  0348   LABURIC 3.9     Tacro:  No results for input(s): TACROLEV in the last 72 hours. CMV Quant DNA by PCR: No results found for: CMVDNAQNT  IgG: No results for input(s): IGG in the last 72 hours. Microbiology:     Diagnostics:     Pathology:       PROBLEM LIST:                  TREATMENT:                  ASSESSMENT AND PLAN:           1. 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 C-Myc was mutated but BCL-2 and BCL 6 were         not. - GCB phenotype  - S/p initial therapy on clinical trial utilizing R-CHOP w/ Revlimid and Tafasitimab;  - Study mandated PET was due this week BUT in-pt      Simulation was performed (7/11). Plan for 2400 cGy over 8 fractions to be started 7/14/22 - tolerating well     Relapse / Progression: Large CNS mass - bx proven NHL 7/6/22. Ki-67>90%  - Plan to start XRT to brain mass to reduce size and then possible systemic chemotherapy. - Cont Decadron 4 mg IV Q 6 hours -->changed to PO starting sat (7/16) --> decrease to 4mg po TID (7/19) --> decrease to 4mg po bid (7/22/22), decrease it to 2 mg BID 7/25/22  - Cont Keppra  BUT change to PO starting sat (7/16)  - Consult [facility] - XRT started (7/13/22), finished 7/25/22  - Consult palliative care - following   - home today and will follow up with with dr Dorinda Acuna on Thursday 7/28     2. ID: afebrile     3. Heme: leukocytosis + Anemia and thrombocytopenia likely r/t recent chemotherapy  - Transfuse for Hgb < 7 and Platelets < 10 K  - No transfusion today     4.  Metabolic: hypoNa + abnl LFTs which are trending upward  - No IVFs  - Replace K+ & Mg per PRN orders     5. GI / Nutrition:           - Cont diet and change to reg diet  - Recommend swabbing mouth after all meals. Must be completely awake for PO intake  - Dietary to follow closely     6. Pulm:  Interstitial lung disease  - In the course of his NHL tretatment, we uncovered interstitial lung disease. Dr Sarah Dodge reviewed chest CTs in the past and feels that he had evidence of pulmonary fibrosis before his lymphoma diagnosis. He completed a prednisone taper.  - Dyspnea is grade 1  - He had a bronchoscopy March 25 and PFTs April 1.  - He continues pulmonary rehabilitation  - He is seeing Dr. Sarah Dodge     7. Endo: T2DM exacerbated by steroids  - Cont lantus 10 units nightly, change to 25 units HS 7/16/22  - Cont Humalog Med SSI AS/HS, increase to high ISS 7/16/22  - decreased Dex. will go home on humalog SSI, janumet and jardiance, will monitor blood glucose on home. Will discuss lantus in clinic if needed   8. Acute Debilitation: 2/2 CNS Lymphoma    Condition on discharge: stable     Discharge Instructions: The patient was advised on activity and dietary restrictions. The patient was advised to follow up in the emergency department or contact the physician with any unresolved nausea/vomiting/diarrhea/pain or temperature greater than 100.5 F or any other unusual symptoms.            Nicolasa Sevilla DO, CNP

## 2022-08-03 NOTE — TELEPHONE ENCOUNTER
From: Chilo Purcell  To: Dr. Mc Bucio  Sent: 8/2/2022 3:16 PM EDT  Subject: Insulin    On July 26 I got out of the hospital after finding out I have a large mass on my brain. After 23 days in the hospital and receiving 6 rounds of radiation I was released. On August 1 I started chemo. My sugar has bounced around from 171 to 406. I have been following the insulin dosage you recommended. Should I do anything different before my appointment on August 15?   I can be reached at [phone]

## 2022-08-04 NOTE — CARE COORDINATION
[facility] Transitions Follow Up Call    2022    Patient: Yannick Tompkins  Patient : 1945   MRN: [id number]   Reason for Admission:  BRAIN MASS   Discharge Date: 22 RARS: Readmission Risk Score: 19.1         Spoke with: [address] Transitions Subsequent and Final Call    Subsequent and Final Calls  Do you have any ongoing symptoms?: No  Have your medications changed?: No  Do you have any questions related to your medications?: No  Do you currently have any active services?: Yes  Are you currently active with any services?: Home Health  Do you have any needs or concerns that I can assist you with?: No  Identified Barriers: None  Care Transitions Interventions  Other Interventions:           Care Transitions Follow Up Call    Challenges to be reviewed by the provider   Additional needs identified to be addressed with provider:  yes    no    AMB CC Provider Discharge Needs:  none     Method of communication with provider : none    Care Transition Nurse (CTN) contacted the patient by telephone to follow up after admission:    Verified name with patient as identifier. Provided introduction to self, and explanation of the CTN role. Addressed changes since last contact:  home health care-  [facility]   medications- NO CHANGES      Discussed follow-up appointments. If no appointment was previously scheduled, appointment scheduling offered:   No     Is follow up appointment scheduled within 7 days of discharge? Saw oncologist - 22    CTN reviewed discharge instructions, medical action plan and red flags with patient and discussed any barriers to care and/or understanding of plan of care after discharge.      Discussed appropriate site of care based on symptoms and resources available to patient including:      PCP   Specialist   After hour contact number   Urgent Care Clinics   When to call Farnaz Geiger for reactivation or family member permission [phone]   Condition related references      The patient agrees to contact the PCP office for questions related to their healthcare. Patients top risk factors for readmission:   functional cognitive ability  functional physical ability  falls  lack of knowledge about disease  medical condition-   medication management  multiple health system providers    Interventions to address risk factors:   Education of patient/family/caregiver/guardian to support self-management-   Assessment and support for treatment adherence and medication management-    CTN provided contact information for future needs. Plan for f/u call in 7-10 days based on severity of symptoms and risk factors. Plan for next call:   symptom management  self-management  follow up appointment  medication management    SUMMARY  CTN spoke to the Pt who denies c/o fever, chills, nausea, vomiting, cough, congestion, SOB / DB, chest pain, one side weakness, falls, vision / speech changes, face droop, and swallowing difficulties. Pt states he is \"doing pretty good\". Pt denies pain at surgical site and confirms that surgical would is free of s/ s of infection. Pt denies issues with fluid intake, appetite, urination, and LBM was today. Pt confirms they have all meds and taking as prescribed. Pt has not scheduled HFU with PCP  and neurosurgeon. From CDC: Are you at higher risk for severe illness? Wash your hands often. Avoid close contact (6 feet, which is about two arm lengths) with people who are sick. Put distance between yourself and other people if COVID-19 is spreading in your community. Clean and disinfect frequently touched surfaces. Avoid all cruise travel and non-essential air travel. Call your healthcare professional if you have concerns about COVID-19 and your underlying condition or if you are sick. Pt will take all meds as prescribed and schedule / keep doctor's appt.  CTC provided education on s/s that require medical attention and when to seek medical attention. CTC advised Pt of use urgent care or physician's 24 hr access line if assistance is needed after hours or on the weekend. Patient denies any needs or concerns and is agreeable with additional calls.     Follow Up  Future Appointments   Date Time Provider Antony Medeiros   8/15/2022 10:30 AM Mc Bucio MD [facility]       Shoaib Diamond RN

## 2022-08-08 NOTE — TELEPHONE ENCOUNTER
Please advise: \"Mehdi has not been using the Dexcom unit because I have to use a transport belt to move him around and the belt hits it. The visiting nurse and I tried it on the back of his arm but kept getting an error. I have been taking his sugar using the One Touch meter. This morning the reading is 549. I have kept a log of the readings since he got out of the hospital.  549 is the highest reading ever. Should I do something different.   Heather - Mehdi's wife\"

## 2022-08-09 PROBLEM — R09.02 HYPOXIA: Status: ACTIVE | Noted: 2022-01-01

## 2022-08-09 NOTE — H&P
[facility] History and Physical       Attending Physician: Omar Rojas MD    Primary Care: Manas Hickey MD       Referring MD: Omar Rojas MD  [address] [place] [address]    Name: Meenakshi Minaya :  1945  MRN:  [id number]    Admission: 2022      Date: 2022    Reason for Admission: hypoxia    History of Present Illness:     Mr. Franci Cornejo  is a 67 y.o. male with stage IV non-Hodgkin lymphoma. He recently was admitted after a fall at home. For several days prior to hospitalization, the patient had worsening speech and altered gait. His wife also noted that he will have periods of nonresponsiveness where he would stare off into space. In the ER, he underwent a head CT that demonstrated a mass in the basal ganglia with associated vasogenic edema and midline shift. He has since then undergone an MRI which demonstrated an approximate 4 cm mass. He was started on steroids and Keppra. He underwent biopsy which suggested Involved with malignant B-cell lymphoma with aggressive morphologic   features. Patient was treated with dexamethasone and radiation therapy. He was then started on Jerzy/BR on 22. His wife called [facility] on 22 and reported that overnight his breathing was labored. He had home oxygen, which he had not needed since prior his last hospitalization. She placed home O2 on him and noted that his saturations were in the 70s. He has had a cough but has not had fevers. He presented to [facility] for further evaluation and was hypoxic at 63% upon arrival. He quickly improved after being placed on oxygen to the mid 90s. He denied fevers or chills or chest pain. He endorses shortness of breath at times. Given hypoxia, he will be admitted for further work up and treatment. Upon admission, he will have a viral respiratory panel, a CT scan of his chest and pulmonology will be consulted.        Past Surgical History:   Procedure Laterality Date BRONCHOSCOPY N/A 3/25/2022    BRONCHOSCOPY WITH BRONCHOALVEOLAR LAVAGE performed by Eladia Tompkins MD at [address] Bilateral 2011    COLONOSCOPY  3/29/2011    repeat in 5 yrs: Na Buenrostro MD    COLONOSCOPY  03/14/2016    repeat in 7-8 years: Na Buenrostro MD    CRANIOTOMY Right 7/6/2022    RIGHT FRONTAL NEEDLE BIOPSY OF BRAIN MASS performed by Mabel Hernandez. [place], MD at [address] Left age 5    FINGER TRIGGER RELEASE Right 2007    index    IR PORT PLACEMENT EQUAL OR GREATER THAN 5 YEARS  1/11/2022    IR PORT PLACEMENT EQUAL OR GREATER THAN 5 YEARS 1/11/2022 [facility]    TONSILLECTOMY AND ADENOIDECTOMY  age 2    [address]       Past Medical History:   Diagnosis Date    Benign prostatic hyperplasia with weak urinary stream 12/10/2015     Updating Deprecated Diagnoses    Cancer ([facility] 12/21/2021    Non Earl Lymphoma    Erectile dysfunction     History of gout 09/19/2015    History of thrombocytopenia 09/19/2015    Hypercalcemia     Hyperlipidemia     Hypertension     Lung nodule     Proteinuria     Type 2 diabetes mellitus without complication ([facility] 5824    Vitamin D deficiency 09/19/2015       Prior to Admission medications    Medication Sig Start Date End Date Taking? Authorizing Provider   levETIRAcetam (KEPPRA) 500 MG tablet Take 1 tablet by mouth in the morning and 1 tablet before bedtime.  7/26/22   Norah Pee, DO   ondansetron (ZOFRAN-ODT) 4 MG disintegrating tablet Take 1 tablet by mouth every 8 hours as needed for Nausea or Vomiting 7/26/22   Norah Pee, DO   tamsulosin (FLOMAX) 0.4 MG capsule TAKE 1 CAPSULE DAILY 7/6/22   Daphne Vann MD   ipratropium (ATROVENT) 0.06 % nasal spray 2 sprays by Each Nostril route 4 times daily 5/26/22   Maritza Hahn,    sodium chloride (OCEAN) 0.65 % nasal spray 2 sprays by Nasal route 4 times daily 5/26/22   Maritza Hahn, DO   bacitracin-polymyxin b (POLYSPORIN) 500-90245 UNIT/GM ointment Apply topically twice daily to inside of both nostrils for 3 weeks then as needed for dry nose.  5/26/22   Phan Pagan DO   JARDIANCE 25 MG tablet TAKE 1 TABLET BY MOUTH DAILY 5/16/22   Joselyn Camilo MD   SITagliptin-metFORMIN (JANUMET XR)  MG TB24 per extended release tablet TAKE 1 TABLET TWICE A DAY 5/4/22   Rory Way MD   Continuous Blood Gluc Sensor (DEXCOM G6 SENSOR) MISC Use for glucose monitoring 5/4/22   Rory Way MD   Continuous Blood Gluc  ([address]) SHAHANA Use for glucose monitoring 5/4/22   Rory Way MD   Continuous Blood Gluc Transmit (DEXCOM G6 TRANSMITTER) MISC Use for glucose monitoring 5/4/22   Rory Way MD   insulin lispro, 1 Unit Dial, (HUMALOG KWIKPEN) 100 UNIT/ML SOPN Up to 5-10 units before meals 3 times a day 5/4/22   Rory Way MD   Insulin Pen Needle (B-D UF III MINI PEN NEEDLES) 31G X 5 MM MISC 1 each by Does not apply route 4 times daily 5/4/22   Rory Way MD   blood glucose test strips (ONETOUCH ULTRA) strip TEST ONCE DAILY 2/28/22   Joselyn Camilo MD   docusate sodium (COLACE) 100 MG capsule Take 1 capsule by mouth 2 times daily 2/17/22   Joselyn Camilo MD   magnesium oxide (MAG-OX) 400 MG tablet Take 1 tablet by mouth daily 2/7/22   Joselyn Camilo MD   aspirin 81 MG EC tablet Take 81 mg by mouth daily    Historical Provider, MD   prochlorperazine (COMPAZINE) 10 MG tablet TAKE 1 TABLET BY MOUTH EVERY 6 HOURS AS NEEDED FOR NAUSEA 1/13/22   Historical Provider, MD   ondansetron (ZOFRAN) 8 MG tablet TAKE 1 TABLET BY MOUTH EVERY 8 HOURS AS NEEDED FOR NAUSEA OR VOMITING 1/13/22   Historical Provider, MD   Multiple Vitamins-Minerals (CENTRUM ADULTS PO) Take by mouth    Historical Provider, MD   fluticasone (FLONASE) 50 MCG/ACT nasal spray SPRAY ONCE IN EACH NOSTRIL EVERY DAY 1/7/22   Phan Pagan DO   Blood Glucose Monitoring Suppl (ONE TOUCH ULTRA 2) w/Device KIT 1 kit by Does not apply route daily 1/12/21 Khanh Murray MD   Lancets MISC Use to test blood sugar once daily 1/12/21   Khanh Murray MD   blood glucose test strips (ONETOUCH ULTRA) strip 1 each by Does not apply route daily 6/22/20   Khanh Murray MD   ONE TOUCH ULTRASOFT LANCETS MISC 1 each by Does not apply route daily 7/28/16   Khanh Murray MD   vitamin B-12 (CYANOCOBALAMIN) 1000 MCG tablet Take 1,000 mcg by mouth 2 times daily    Historical Provider, MD   Cholecalciferol (VITAMIN D) 2000 UNITS CAPS capsule Take by mouth daily    Historical Provider, MD   Omega-3 Fatty Acids (OMEGA 3 PO) Take 2 capsules by mouth daily     Historical Provider, MD       No Known Allergies    Family History   Problem Relation Age of Onset    Diabetes Mother     High Blood Pressure Mother     Dementia Mother     Hearing Loss Mother     High Cholesterol Mother     Cancer Neg Hx     Hearing Loss Neg Hx     Stroke Neg Hx     Heart Disease Neg Hx         Social History     Socioeconomic History    Marital status:      Spouse name: Not on file    Number of children: Not on file    Years of education: Not on file    Highest education level: Not on file   Occupational History    Not on file   Tobacco Use    Smoking status: Never    Smokeless tobacco: Never    Tobacco comments:     Never smoked   Vaping Use    Vaping Use: Never used   Substance and Sexual Activity    Alcohol use: Not Currently     Alcohol/week: 0.0 standard drinks     Comment: drink alcohol very occasionally.     Drug use: No    Sexual activity: Not Currently     Partners: Female     Comment: Wife   Other Topics Concern    Not on file   Social History Narrative    Not on file     Social Determinants of Health     Financial Resource Strain: Not on file   Food Insecurity: Not on file   Transportation Needs: Not on file   Physical Activity: Not on file   Stress: Not on file   Social Connections: Not on file   Intimate Partner Violence: Not on file   Housing Stability: Not on file        ROS: As noted above, otherwise remainder of 10-point ROS negative    Physical Exam:     Vital Signs:  /73   Pulse (!) 120   Temp 97.4 °F (36.3 °C) (Oral)   Resp 24   SpO2 98%     Weight:    Wt Readings from Last 3 Encounters:   07/25/22 116 lb 10 oz (52.9 kg)   05/31/22 117 lb (53.1 kg)   05/04/22 119 lb (54 kg)       KPS: 50%  Requires considerable assistance and frequent medical care    ECOG PS:  (3) Capable of limited self-care, confined to bed or chair > 50% of waking hours    General: Awake, alert and oriented. HEENT: normocephalic, PERRL, no scleral erythema or icterus, Oral mucosa moist and intact, throat clear  NECK: supple without palpable adenopathy  BACK: Straight negative CVAT  SKIN: warm dry and intact without lesions rashes or masses  CHEST: CTA bilaterally without use of accessory muscles  CV: Normal S1 S2, RRR, no MRG  ABD: NT ND normoactive BS, no palpable masses or hepatosplenomegaly  EXTREMITIES: without edema, denies calf tenderness  NEURO: CN II - XII grossly intact    Laboratory Data:   CBC: No results for input(s): WBC, HGB, HCT, MCV, PLT in the last 72 hours. BMP/Mag:No results for input(s): NA, K, CL, CO2, PHOS, BUN, CREATININE, CA, MG in the last 72 hours. LIVP: No results for input(s): AST, ALT, LIPASE, BILIDIR, BILITOT, ALKPHOS in the last 72 hours. Invalid input(s): AMYLASE,  ALB  Coags: No results for input(s): PROTIME, INR, APTT in the last 72 hours. Uric Acid No results for input(s): LABURIC in the last 72 hours. PROBLEM LIST:             DLBC  Interstitial lung disease  Type 2 Diabetes Mellitus       TREATMENT:            R-CHOP w/ Revlimid and Tafasitimab  Polatuzumab/BR (C1D1 8/1/22)           ASSESSMENT AND PLAN:        1.  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 ID: afebrile  - Viral respiratory w/ COVID 19 (8/9/22): pending   - Hold off on antibiotics until after imaging      3. Heme: Anemia and thrombocytopenia likely r/t recent chemotherapy  - Transfuse for Hgb < 7 and Platelets < 10 K  - No transfusion today     4. Metabolic: hypoNa + abnl LFTs which are trending upward  - IVFs: NS @ 50 ml/hr   - Replace K+ & Mg per PRN orders     5. GI / Nutrition:           - Cont diet and change to reg diet  - Recommend swabbing mouth after all meals. Must be completely awake for PO intake  - Dietary to follow closely     6. Pulm:  Interstitial lung disease, admitted with acute hypoxemia   - In the course of his NHL tretatment, we uncovered interstitial lung disease. Dr Carson Dong reviewed chest CTs in the past and feels that he had evidence of pulmonary fibrosis before his lymphoma diagnosis. He completed a prednisone taper.  - He had a bronchoscopy March 25 and PFTs April 1.  - Consult Dr. Carson Dong on admission  - CT Chest with IV contrast 8/9/22 ordered    7. Endo: T2DM exacerbated by steroids  - Cont lantus 10 units nightly  - Cont Humalog Med SSI AS/HS  - Home regimen: on humalog SSI, janumet and jardiance    8. Acute Debilitation: 2/2 CNS Lymphoma      - DVT Prophylaxis: Platelets >49,667 cells/dL, - daily lovenox prophylaxis ordered  Contraindications to pharmacologic prophylaxis: None  Contraindications to mechanical prophylaxis: None      - Disposition: Uncertain    The patient was seen and examined by Dr. Marni Dillard. This admission history and physical has been discussed and agreed upon by Dr. Marni Dillard.     BUDDY Winter

## 2022-08-09 NOTE — PROGRESS NOTES
4 Eyes Admission Assessment     I agree as the admission nurse that 2 RN's have performed a thorough Head to Toe Skin Assessment on the patient. ALL assessment sites listed below have been assessed on admission. Areas assessed by both nurses:   [x]   Head, Face, and Ears   [x]   Shoulders, Back, and Chest  [x]   Arms, Elbows, and Hands   [x]   Coccyx, Sacrum, and Ischium  [x]   Legs, Feet, and Heels        Does the Patient have Skin Breakdown? Yes     Patient has excoriation/redness to coccyx and lower back. Skin tear to right forearm, chest, and left knee.    Giorgio Prevention initiated:  Yes   Wound Care Orders initiated:  Yes       [id number]  nurse consulted for Pressure Injury (Stage 3,4, Unstageable, DTI, NWPT, and Complex wounds) or Giorgio score 18 or lower:  yes     Nurse 1 eSignature: Electronically signed by Stephie Cueva RN on 8/9/22 at 7:33 PM EDT    **SHARE this note so that the co-signing nurse is able to place an eSignature**    Nurse 2 eSignature: Electronically signed by Sarah Higgins RN on 8/10/22 at 6:06 AM EDT

## 2022-08-09 NOTE — PROGRESS NOTES
Patient admitted to room 3517, , O2 97% on 3L NC, /73, temp 97.4 F. Awaiting orders.  Will continue to monitor

## 2022-08-09 NOTE — PROGRESS NOTES
Pharmacist Review and Automatic Dose Adjustment of Prophylactic Enoxaparin    The reviewing pharmacist has made an adjustment to the ordered enoxaparin dose or converted to UFH per the approved [facility] protocol and table as defined below. Plan / Rationale: Based upon the patient's weight and renal function, the ordered dose of 40 mg QDay has been converted to 30 mg QDay. Thank you,  Carloz Melgar, [facility]  8/9/2022, 6:35 PM      Pauly Boyd is a 67 y.o. male. No results for input(s): CREATININE in the last 72 hours. Estimated Creatinine Clearance: 80 mL/min (based on SCr of 0.5 mg/dL). No results for input(s): HGB, HCT, PLT in the last 72 hours. No results for input(s): INR in the last 72 hours.     Height:   Ht Readings from Last 1 Encounters:   07/22/22 5' 6\" (1.676 m)     Weight:  Wt Readings from Last 1 Encounters:   08/09/22 100 lb 15.5 oz (45.8 kg)

## 2022-08-09 NOTE — TELEPHONE ENCOUNTER
1.  Is he still on dexamethasone tablets? What is the dose? Is it going to be tapered or continued for a while? 2.  I need 2-3 days of fingersticks 4 times a day before meals and at bedtime. 3.  Start Lantus insulin 10 units at night. Let me know if patient agrees and I will send prescription to pharmacy. 4.  Humalog sliding scale: If blood glucose 150-200-2 units, 201-250-4 units, 251-300-6 units, 301-350-8 units, 351-400-10 units, more than 401-12 units. 4.  Send blood glucose messages or call at least once a week.

## 2022-08-10 NOTE — PROGRESS NOTES
Pt appeared Short of Breath with increased work of breathing RR 22, O2 at 12lpm HF. IS attempted PT getting 500ml/breath with Pox falling into the upper 80s.  RN stated that Pulmonary  Was called

## 2022-08-10 NOTE — PROGRESS NOTES
[facility] Progress Note    8/10/2022     Harinder Hein    MRN: [id number]    : 1945    Referring MD: Jey Sims MD  [address], [address],  [address]      SUBJECTIVE:  Patient looks terrible. SOB with high flow NC and NRB mask in place. Need Pulmonary input ASAP.   Prob needs bronch        ECOG PS:  (2) Ambulatory and capable of self care, unable to carry out work activity, up and about > 50% or waking hours    KPS: 70% Cares for self; unable to carry on normal activity or to do active work    Isolation: None    Medications    Scheduled Meds:   aspirin  81 mg Oral Daily    bacitracin-polymyxin b   Topical BID    docusate sodium  100 mg Oral BID    fluticasone  1 spray Each Nostril Daily    levETIRAcetam  500 mg Oral BID    ipratropium  2 spray Each Nostril 4x Daily    sodium chloride  2 spray Nasal 4x Daily    tamsulosin  0.4 mg Oral Daily    vitamin B-12  1,000 mcg Oral BID    insulin lispro  0-8 Units SubCUTAneous TID WC    insulin lispro  0-4 Units SubCUTAneous Nightly    insulin glargine  10 Units SubCUTAneous Nightly    sodium chloride flush  5-40 mL IntraVENous 2 times per day    Saline Mouthwash  15 mL Swish & Spit 4x Daily AC & HS    enoxaparin  30 mg SubCUTAneous Daily    dexamethasone  2 mg Oral Daily     Continuous Infusions:   sodium chloride      sodium chloride      potassium chloride      dextrose      sodium chloride 50 mL/hr at 22 1856     PRN Meds:.ondansetron, prochlorperazine, sodium chloride, sodium chloride flush, sodium chloride, potassium chloride, magnesium sulfate, magnesium hydroxide, Saline Mouthwash, alteplase (CATHFLO) with sterile water injection, glucose, dextrose bolus **OR** dextrose bolus, glucagon (rDNA), dextrose    ROS:  As noted above, otherwise remainder of 10-point ROS negative    Physical Exam:     I&O:    Intake/Output Summary (Last 24 hours) at 8/10/2022 0738  Last data filed at 8/10/2022 0340  Gross per 24 hour   Intake 677 ml Output 1100 ml   Net -423 ml       Vital Signs:  /69   Pulse 100   Temp 98.3 °F (36.8 °C) (Oral)   Resp 20   Ht 5' 6\" (1.676 m)   Wt 100 lb 15.5 oz (45.8 kg)   SpO2 99%   BMI 16.30 kg/m²     Weight:    Wt Readings from Last 3 Encounters:   08/09/22 100 lb 15.5 oz (45.8 kg)   07/25/22 116 lb 10 oz (52.9 kg)   05/31/22 117 lb (53.1 kg)         General: Awake, alert and oriented. HEENT: normocephalic, PERRL, no scleral erythema or icterus, Oral mucosa moist and intact, throat clear  NECK: supple without palpable adenopathy  BACK: Straight negative CVAT  SKIN: warm dry and intact without lesions rashes or masses  CHEST: Limited air movement with rales sharron in lower lung fields. CV: Normal S1 S2, RRR, no MRG  ABD: NT ND normoactive BS, no palpable masses or hepatosplenomegaly  EXTREMITIES: without edema, denies calf tenderness  NEURO: CN II - XII grossly intact    Data    CBC:   Recent Labs     08/09/22  1904 08/10/22  0338   WBC 5.3 4.5   HGB 10.0* 9.0*   HCT 30.0* 27.0*   MCV 96.6 96.8   * 87*     BMP/Mag:  Recent Labs     08/09/22  1903 08/10/22  0338    138   K 3.8 4.1   CL 98* 99   CO2 27 23   PHOS 3.3 2.9   BUN 27* 21*   CREATININE <0.5* <0.5*   MG 1.60*  --      LIVP:   Recent Labs     08/09/22  1903 08/10/22  0338   AST 24 23   ALT 23 20   BILIDIR <0.2 <0.2   BILITOT 0.4 0.4   ALKPHOS 100 86     Coags:   Recent Labs     08/09/22 1903   PROTIME 14.2   INR 1.10   APTT 28.0     Uric Acid   Recent Labs     08/09/22  1903 08/10/22  0338   LABURIC 5.5 5.7       PROBLEM LIST:           DLBC  Interstitial lung disease  Type 2 Diabetes Mellitus       TREATMENT:            R-CHOP w/ Revlimid and Tafasitimab  Polatuzumab/BR (C1D1 8/1/22)           ASSESSMENT AND PLAN:        1.  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 ID: afebrile  - Viral respiratory w/ COVID 19 (8/9/22): Neg  - CT chest 8/9/22: Moderate bilateral groundglass opacity new since prior chest CT consistent with infectious/inflammatory pneumonitis. Background mild-to-moderate lower lobe predominant pulmonary fibrosis without change  - Start Cefepime D1 (8/10/22)  - Consult Pulm     3. Heme: Anemia and thrombocytopenia likely r/t recent chemotherapy  - Transfuse for Hgb < 7 and Platelets < 10 K  - No transfusion today     4. Metabolic: Electrolytes and renal fxn stable  - IVFs: NS @ 50 ml/hr   - Replace K+ & Mg per PRN orders     5. GI / Nutrition:           - Cont diet and change to reg diet  - Recommend swabbing mouth after all meals. Must be completely awake for PO intake  - Dietary to follow closely     6. Pulm:  Interstitial lung disease, admitted with acute hypoxemia   - In the course of his NHL tretatment, we uncovered interstitial lung disease. Dr Zach Vergara reviewed chest CTs in the past and feels that he had evidence of pulmonary fibrosis before his lymphoma diagnosis. He completed a prednisone taper.  - He had a bronchoscopy March 25 and PFTs April 1.  - Consult Dr. Zach Vergara on admission  - CT Chest with IV contrast 8/9/22 ordered     7. Endo: T2DM exacerbated by steroids  - Cont lantus 10 units nightly  - Cont Humalog Med SSI AS/HS  - Home regimen: on humalog SSI, janumet and jardiance     8. Acute Debilitation: 2/2 CNS Lymphoma       - DVT Prophylaxis: Platelets >41,401 cells/dL, - daily lovenox prophylaxis ordered  Contraindications to pharmacologic prophylaxis: None  Contraindications to mechanical prophylaxis: None    - Disposition: Unknown at this time    The patient was seen and examined by Dr. Jonah Lynn, [address].  Neel Posadas, [facility] [address]

## 2022-08-10 NOTE — PROGRESS NOTES
Pt transferred to ICU. Report given to [facility], ICU RN. Pt's wife notified.

## 2022-08-10 NOTE — PROGRESS NOTES
Pt was unable to breathe through his nose, despite multiple reminders. RN obtained a nonrebreather and placed it on the pt. Pt currently at 100% on 15L nonrebreather.

## 2022-08-10 NOTE — CONSULTS
Pulmonology Consult Note  PGY 2    CC Shortness of breath, hypoxia    History Obtained From:  patient, electronic medical record    HISTORY OF PRESENT ILLNESS:  Mr. Facundo Diamond  is a 67 y.o. male with stage IV non-Hodgkin lymphoma. He recently was admitted after a fall at home. For several days prior to hospitalization, the patient had worsening speech and altered gait. His wife also noted that he will have periods of nonresponsiveness where he would stare off into space. In the ER, he underwent a head CT that demonstrated a mass in the basal ganglia with associated vasogenic edema and midline shift. He has since then undergone an MRI which demonstrated an approximate 4 cm mass. He was started on steroids and Keppra. He underwent biopsy which suggested Involved with malignant B-cell lymphoma with aggressive morphologic   features. Patient was treated with dexamethasone and radiation therapy. He was then started on Jerzy/BR on 8/1/22. His wife called [facility] on 8/9/22 and reported that overnight his breathing was labored. He had home oxygen, which he had not needed since prior his last hospitalization. She placed home O2 on him and noted that his saturations were in the 70s. He has had a cough but has not had fevers. He presented to [facility] for further evaluation and was hypoxic at 63% upon arrival. He quickly improved after being placed on oxygen to the mid 90s. He denied fevers or chills or chest pain. He endorses shortness of breath at times. Given hypoxia, he will be admitted for further work up and treatment. Upon admission, he will have a viral respiratory panel, a CT scan of his chest and pulmonology will be consulted. Pulmonology was consulted for patient's worsening hypoxia. Patient was seen and examined at bedside. Patient reports that he was feeling SOB yesterday. He also notes a new onset cough productive of yellow sputum that he states started yesterday as well. Patient is on 2L NC at baseline.  Of note, patient was recently started on a new chemotherapy drug Povatuzumab on 8/1/22. CT chest done here did show new moderate bilateral groundglass opacities consistent with infectious vs inflammatory pneumonitis. This morning, patient began to require more oxygen to maintain saturations and was noted to have increasing work of breathing. On examination this morning, patient was tachypneic into the 40s on 10L HFNC before being switched to NRB to maintain his saturations. Patient was then transferred to the ICU for closer monitoring. Past Medical History:        Diagnosis Date    Benign prostatic hyperplasia with weak urinary stream 12/10/2015     Updating Deprecated Diagnoses    Cancer ([facility] 12/21/2021    Non Earl Lymphoma    Erectile dysfunction     History of gout 09/19/2015    History of thrombocytopenia 09/19/2015    Hypercalcemia     Hyperlipidemia     Hypertension     Lung nodule     Proteinuria     Type 2 diabetes mellitus without complication ([facility] 9063    Vitamin D deficiency 09/19/2015       Past Surgical History:        Procedure Laterality Date    BRONCHOSCOPY N/A 3/25/2022    BRONCHOSCOPY WITH BRONCHOALVEOLAR LAVAGE performed by Harinder Bailey MD at [address] [place] [address] Bilateral 2011    COLONOSCOPY  3/29/2011    repeat in 5 yrs: Joseph Elaine MD    COLONOSCOPY  03/14/2016    repeat in 7-8 years: Joseph Elaine MD    CRANIOTOMY Right 7/6/2022    RIGHT FRONTAL NEEDLE BIOPSY OF BRAIN MASS performed by Tess Aiken.  Karol Quezada MD at [address] [facility] Left age 5    FINGER TRIGGER RELEASE Right 2007    index    IR PORT PLACEMENT EQUAL OR GREATER THAN 5 YEARS  1/11/2022    IR PORT PLACEMENT EQUAL OR GREATER THAN 5 YEARS 1/11/2022 [facility] SPECIAL PROCEDURES    TONSILLECTOMY AND ADENOIDECTOMY  age 2    VASECTOMY  [address]       Medications Priorto Admission:    Medications Prior to Admission: levETIRAcetam (KEPPRA) 500 MG tablet, Take 1 tablet by mouth in the morning and 1 tablet before bedtime. ondansetron (ZOFRAN-ODT) 4 MG disintegrating tablet, Take 1 tablet by mouth every 8 hours as needed for Nausea or Vomiting (Patient not taking: Reported on 8/9/2022)  tamsulosin (FLOMAX) 0.4 MG capsule, TAKE 1 CAPSULE DAILY  ipratropium (ATROVENT) 0.06 % nasal spray, 2 sprays by Each Nostril route 4 times daily  sodium chloride (OCEAN) 0.65 % nasal spray, 2 sprays by Nasal route 4 times daily  bacitracin-polymyxin b (POLYSPORIN) 500-45002 UNIT/GM ointment, Apply topically twice daily to inside of both nostrils for 3 weeks then as needed for dry nose.   JARDIANCE 25 MG tablet, TAKE 1 TABLET BY MOUTH DAILY  SITagliptin-metFORMIN (JANUMET XR)  MG TB24 per extended release tablet, TAKE 1 TABLET TWICE A DAY  Continuous Blood Gluc Sensor (DEXCOM G6 SENSOR) MISC, Use for glucose monitoring  Continuous Blood Gluc  (DEXCOM G6 ) SHAHANA, Use for glucose monitoring  Continuous Blood Gluc Transmit (DEXCOM G6 TRANSMITTER) MISC, Use for glucose monitoring  insulin lispro, 1 Unit Dial, (HUMALOG KWIKPEN) 100 UNIT/ML SOPN, Up to 5-10 units before meals 3 times a day  Insulin Pen Needle (B-D UF III MINI PEN NEEDLES) 31G X 5 MM MISC, 1 each by Does not apply route 4 times daily  blood glucose test strips (ONETOUCH ULTRA) strip, TEST ONCE DAILY  docusate sodium (COLACE) 100 MG capsule, Take 1 capsule by mouth 2 times daily  magnesium oxide (MAG-OX) 400 MG tablet, Take 1 tablet by mouth daily  aspirin 81 MG EC tablet, Take 81 mg by mouth daily  prochlorperazine (COMPAZINE) 10 MG tablet, TAKE 1 TABLET BY MOUTH EVERY 6 HOURS AS NEEDED FOR NAUSEA (Patient not taking: Reported on 8/9/2022)  ondansetron (ZOFRAN) 8 MG tablet, TAKE 1 TABLET BY MOUTH EVERY 8 HOURS AS NEEDED FOR NAUSEA OR VOMITING (Patient not taking: Reported on 8/9/2022)  Multiple Vitamins-Minerals (CENTRUM ADULTS PO), Take by mouth  fluticasone (FLONASE) 50 MCG/ACT nasal spray, SPRAY ONCE IN EACH NOSTRIL EVERY DAY  Blood Glucose Monitoring Suppl (ONE TOUCH ULTRA 2) w/Device KIT, 1 kit by Does not apply route daily  Lancets MISC, Use to test blood sugar once daily  blood glucose test strips (ONETOUCH ULTRA) strip, 1 each by Does not apply route daily  ONE TOUCH ULTRASOFT LANCETS MISC, 1 each by Does not apply route daily  vitamin B-12 (CYANOCOBALAMIN) 1000 MCG tablet, Take 1,000 mcg by mouth 2 times daily  Cholecalciferol (VITAMIN D) 2000 UNITS CAPS capsule, Take by mouth daily  Omega-3 Fatty Acids (OMEGA 3 PO), Take 2 capsules by mouth daily     Allergies:  Patient has no known allergies. Social History:   TOBACCO:   reports that he has never smoked. He has never used smokeless tobacco.  ETOH:   reports that he does not currently use alcohol. Family History:       Problem Relation Age of Onset    Diabetes Mother     High Blood Pressure Mother     Dementia Mother     Hearing Loss Mother     High Cholesterol Mother     Cancer Neg Hx     Hearing Loss Neg Hx     Stroke Neg Hx     Heart Disease Neg Hx        Review of Systems   Constitutional:  Negative for chills and fever. Respiratory:  Positive for cough and shortness of breath. Cardiovascular:  Negative for palpitations. Pleuritic chest pain   Gastrointestinal:  Negative for abdominal pain, diarrhea and nausea. Musculoskeletal:  Negative for myalgias. Skin:  Negative for color change. Neurological:  Negative for headaches. Psychiatric/Behavioral:  Negative for agitation. ROS: A 10 point review of systems was conducted, significant findings as noted in HPI. Physical Exam  Constitutional:       General: He is in acute distress. Appearance: He is ill-appearing. HENT:      Head: Normocephalic and atraumatic. Eyes:      Extraocular Movements: Extraocular movements intact. Cardiovascular:      Rate and Rhythm: Regular rhythm. Tachycardia present. Pulses: Normal pulses. Pulmonary:      Effort: Respiratory distress present.       Breath sounds: Rales present. Comments: Rales bilateral lower lobes  Abdominal:      General: Abdomen is flat. Bowel sounds are normal.      Palpations: Abdomen is soft. Musculoskeletal:         General: Normal range of motion. Skin:     General: Skin is warm and dry. Neurological:      General: No focal deficit present. Mental Status: He is alert and oriented to person, place, and time. Physical exam:       Vitals:    08/10/22 1310   BP:    Pulse: 94   Resp: 22   Temp:    SpO2: 98%       DATA:    Labs:  CBC:   Recent Labs     08/09/22  1904 08/10/22  0338   WBC 5.3 4.5   HGB 10.0* 9.0*   HCT 30.0* 27.0*   * 87*       BMP:   Recent Labs     08/09/22  1903 08/10/22  0338    138   K 3.8 4.1   CL 98* 99   CO2 27 23   BUN 27* 21*   CREATININE <0.5* <0.5*   GLUCOSE 153* 141*   PHOS 3.3 2.9     LFT's:   Recent Labs     08/09/22  1903 08/10/22  0338   AST 24 23   ALT 23 20   BILITOT 0.4 0.4   ALKPHOS 100 86     Troponin: No results for input(s): TROPONINI in the last 72 hours. BNP:No results for input(s): BNP in the last 72 hours. ABGs: No results for input(s): PHART, MFL1LLN, PO2ART in the last 72 hours. INR:   Recent Labs     08/09/22 1903   INR 1.10       U/A:  Recent Labs     08/09/22  1932   COLORU Yellow   PHUR 6.0   WBCUA 3-5   RBCUA 0-2   BACTERIA 3+*   CLARITYU Clear   SPECGRAV 1.020   LEUKOCYTESUR Negative   UROBILINOGEN 0.2   BILIRUBINUR Negative   BLOODU Negative   GLUCOSEU >=1000*       CT CHEST W CONTRAST   Final Result      1. Moderate bilateral groundglass opacity new since prior chest CT consistent with infectious/inflammatory pneumonitis. 2. Background mild-to-moderate lower lobe predominant pulmonary fibrosis without change. 3. A 5 cm lesion in the anterior aspect left lobe of the liver stable to mildly decreased in size and of indeterminate etiology. XR CHEST (2 VW)   Final Result   1. Diffuse patchy bilateral airspace disease concerning for pneumonia or pulmonary edema. ASSESSMENT AND PLAN:  Mr. Flores Shown  is a 67 y.o. male with stage IV non-Hodgkin lymphoma who was admitted for hypoxia. Acute on chronic hypoxic respiratory failure  - Has history of pulmonary fibrosis, likely present prior to lymphoma diagnosis  - On 2L O2 at home. Patient began to require more and more O2 here this morning, eventually being put on NRB at 15L. Patient was also tachypneic into the 40s  - CT chest with contrast does show new moderate bilateral groundglass opacities consistent with infectious vs inflammatory pneumonitis. - Respiratory viral panel negative. Procal 0.80. WBC 4.5. Afebrile. - Patient recently started on Polatuzumab chemotherapy drug on 8/1/22. This drug does have potential side effect of pneumonitis and is possibly the cause of patient's hypoxia  - Increased steroids to 60mg IV solu-medrol QD   - Consider bronchoscopy    Will discuss with attending physician Dr. Beverly De MD.    Pierre Quiroz, DO PGY-2  8/10/2022,  1:30 PM       Patient was seen, examined and discussed with Dr. Jewel Cerna. I agree with the history of present illness, past medical/surgical histories, family history, social history, medication list and allergies as listed. The review of systems is as noted above. My physical exam confirms the findings listed above. Chart was reviewed including labs, CT scan, EKG and medical records confirm the findings noted    Acute hypoxic respiratory failure   Diffuse large B cell lymphoma with CNS metastasis, currently on Polatuzumab / Bendamustine and Rituximab   ILD    CT scan reviewed. There is new bilateral GGO that was not present before. This is probably  pneumonitis related to polatuzumab or rituxan, however I can not rule out infectious process.     Started on solu medrol 1mg /kg  Plan for bronchoscopy

## 2022-08-10 NOTE — PLAN OF CARE
Problem: Chronic Conditions and Co-morbidities  Goal: Patient's chronic conditions and co-morbidity symptoms are monitored and maintained or improved  Outcome: Progressing     Problem: ABCDS Injury Assessment  Goal: Absence of physical injury  Outcome: Progressing     Problem: Safety - Adult  Goal: Free from fall injury  Outcome: Progressing     Problem: Skin/Tissue Integrity  Goal: Absence of new skin breakdown  Description: 1. Monitor for areas of redness and/or skin breakdown  2. Assess vascular access sites hourly  3. Every 4-6 hours minimum:  Change oxygen saturation probe site  4. Every 4-6 hours:  If on nasal continuous positive airway pressure, respiratory therapy assess nares and determine need for appliance change or resting period.   Outcome: Progressing

## 2022-08-10 NOTE — PROCEDURES
PROCEDURE: Fiberoptic bronchoscopy with BAL to RUL    Preprocedure diagnosis: pneumonitis   Post procedure diagnosis: same      DESCRIPTION OF PROCEDURE: Informed consent was obtained from the patient after explaining the risks and benefits. A time out was taken. Mallampati II  ASA III  Anesthesia:       Type of sedation used: Moderate Sedation  Medications: Fentanyl 50 mcg, Versed 2 mg      Bronchoscope was inserted into the main airway via the mouth. Vocal cords were normal looking and mobile. Trachea and bronchial trees were examined to the subsegmental level. There was no masses or bleeding. Mucosa was not  inflamed, there was minimal thin secretions. BAL was obtained from RUL. 75 mL of saline used with 25 mL return. Samples:  - BAL RUL    Sent for appropriate testing. The patient tolerated the procedure well.   No immediate complication    EBL: none

## 2022-08-10 NOTE — PROGRESS NOTES
Respiratory Therapy Bronchoscopy Assist      Name:  Charlene [employer] Record Number:  [id number]  Age: 67 y.o. Gender: male  : 1945  Today's Date:  8/10/2022  Room:  [address] [place] [address]      BAL cath samples obtained from right lung and left lung during bronchoscopy assist with patient on  15lpm High flow canula . Sterile field maintained throughout procedure. Patient was pre-sedated. 75 mL of saline instilled. 25 mL of cloudy fluid obtained. Pt tolerated procedure well. Samples sent to lab for testing. Patient SpO2 within acceptable range throughout bronchscopy procedure. Physician assisted with bronch from 1712PM to 6809NJ without complications.  Bronchoscope ID: endoscopy scope used    Patient/caregiver was educated on the proper method of use:  Yes      Level of patient/caregiver understanding able to:   [] Verbalize understanding   [] Demonstrate understanding       [] Teach back        [] Needs reinforcement       []  No available caregiver               []  Other:     Response to education:  Very Good     Electronically signed by Ramirez Bobby RCP on 8/10/2022 at 5:27 PM

## 2022-08-10 NOTE — PROGRESS NOTES
4 Eyes Assessment     I agree as the admission nurse that 2 RN's have performed a thorough Head to Toe Skin Assessment on the patient. ALL assessment sites listed below have been assessed on admission. Areas assessed by both nurses:   [x]   Head, Face, and Ears   [x]   Shoulders, Back, and Chest  [x]   Arms, Elbows, and Hands   [x]   Coccyx, Sacrum, and Ischium  [x]   Legs, Feet, and Heels        Does the Patient have Skin Breakdown?   Yes a wound was noted on the Admission Assessment and an LDA was Initiated documentation include the Savi-wound, Wound Assessment, Measurements, Dressing Treatment, Drainage, and Color\",         Giorgio Prevention initiated:  Yes   Wound Care Orders initiated:  Yes      [address]  nurse consulted for Pressure Injury (Stage 3,4, Unstageable, DTI, NWPT, and Complex wounds) or Giorgio score 18 or lower:  Yes      Nurse 1 eSignature: Electronically signed by Aislinn Canales RN on 8/10/22 at 6:06 AM EDT    **SHARE this note so that the co-signing nurse is able to place an eSignature**    Nurse 2 eSignature: Electronically signed by Frances Mcgovern RN on 8/10/22 at 11:00 AM EDT

## 2022-08-10 NOTE — PROGRESS NOTES
At 754, RN was notified that the pt's oxygen saturation was at 82%. RN presented to the pt's room to find he was sitting comfortably in bed, eating his breakfast. RN turned his oxygen up to 6L. Pt's oxygen saturation continued to be less than 90% after a minute, so RN increased his oxygen to 15L and called for a high flow nasal cannula to be brought in. High flow nasal cannula applied. RN attempted to wean the pt back down to previous oxygen rate of 4L, but pt was unable to tolerate. Pulmonologist and Respiratory Therapist notified. /65 (80), T 97.9, , R 41, Oxygen saturation 93% on 12L high flow nasal cannula. RN continued to attempt wean the pt down. Pt is currently 92% on 12L high flow nasal cannula. Pt intermittently drops below 90% at this rate, so RN discontinued attempts to wean at this time.

## 2022-08-10 NOTE — CARE COORDINATION
Does Patient want to participate in local refill/ meds to beds program?:      Meds To Beds General Rules:  1. Can ONLY be done Monday- Friday between 8:30am-5pm  2. Prescription(s) must be in pharmacy by 3pm to be filled same day  3. Copy of patient's insurance/ prescription drug card and patient face sheet must be sent along with the prescription(s)  4. Cost of Rx cannot be added to hospital bill. If financial assistance is needed, please contact unit  or ;  or  CANNOT provide pharmacy voucher for patients co-pays  5.  Patients can then  the prescription on their way out of the hospital at discharge, or pharmacy can deliver to the bedside if staff is available. (payment due at time of pick-up or delivery - cash, check, or card accepted)     Able to afford home medications/ co-pay costs: Yes    ADLS  Support Systems:      PT AM-PAC:   /24  OT AM-PAC:   /24    [place]: home with wife  Steps: ramp    Plans to RETURN to current housing: Yes  Barriers to RETURNING to current housing: medical clearance    Home Care Information  Currently ACTIVE with [address]: Yes  [address]: [place] Gal[place] -  [address]  Phone: [phone] Fax: [phone]    Currently ACTIVE with [place] on Aging: No  Passport/ Waiver: No  Passport/ Waiver Services: Not Applicable      Durable Medical Equipment  DME Provider: unknown  Equipment: walker    Home Oxygen and Respiratory Equipment  Has HOME OXYGEN prior to admission: No  [address]: Not Applicable  Other Respiratory Equipment: none    Informed of need to bring portable home O2 tank on day of DISCHARGE for nursing to connect prior to leaving: No  Verbalized agreement/Understanding: No  Person to bring portable tank at discharge: none    Dialysis  Active with HD/PD prior to admission: No  Nephrologist: none    HD Center:  Not Applicable    DISCHARGE PLAN:  Disposition: TBD     Transportation PLAN for discharge: family     Factors facilitating achievement of predicted outcomes: Family support, Motivated, and Cooperative    Barriers to discharge: medical clearance    Additional Case Management Notes: Patient is from home with his wife. He was recently discharged home with s4 home care with [facility]. He was admitted with hypoxia. [facility]. Following for possible dc needs. The Plan for Transition of Care is related to the following treatment goals of Hypoxia [R09.02]    The Patient and/or patient representative Ashwin Mccord and his family were provided with a choice of provider and agrees with the discharge plan Yes    Freedom of choice list was provided with basic dialogue that supports the patient's individualized plan of care/goals and shares the quality data associated with the providers.  Yes    Care Transition patient: Yes    Geoff Kay [facility]  Case Management Department  Ph: [phone]   Fax: [phone]

## 2022-08-10 NOTE — PROGRESS NOTES
Pharmacy Note - Extended Infusion Beta-Lactam Adjustment    Cefepime ordered for treatment of PNA. Per [address] Extended Infusion Beta-Lactam Policy, cefepime will be changed to 2000 mg IV q8h extended infusion. Estimated Creatinine Clearance: Estimated Creatinine Clearance: 80 mL/min (based on SCr of 0.5 mg/dL). Dialysis Status, FRANKI, CKD: BMI: no  Body mass index is 16.3 kg/m². Rationale for Adjustment: Agent demonstrates time-dependent effect on bacterial eradication. Extended-infusion dosing strategy aims to enhance microbiologic and clinical efficacy. Pharmacy will continue to monitor cultures and sensitivities (where available) and adjust dose as necessary. Please call with any questions.   Mariaa Rendon, PharmD, BCPS  Main pharmacy: [id number]  8/10/2022 7:48 AM

## 2022-08-11 NOTE — PROGRESS NOTES
4 Eyes Admission Assessment     I agree as the admission nurse that 2 RN's have performed a thorough Head to Toe Skin Assessment on the patient. ALL assessment sites listed below have been assessed on admission. Areas assessed by both nurses: Maria Luisa Sawant and  [x]   Head, Face, and Ears   [x]   Shoulders, Back, and Chest  [x]   Arms, Elbows, and Hands   [x]   Coccyx, Sacrum, and Ischium  [x]   Legs, Feet, and Heels        Does the Patient have Skin Breakdown?   Yes a wound was noted on the Admission Assessment and an LDA was Initiated documentation include the Savi-wound, Wound Assessment, Measurements, Dressing Treatment, Drainage, and Color\",         Giorgio Prevention initiated:  Yes   Wound Care Orders initiated:  Yes      WOC nurse consulted for Pressure Injury (Stage 3,4, Unstageable, DTI, NWPT, and Complex wounds) or Giorgio score 18 or lower:  Yes      Nurse 1 eSignature: Electronically signed by Uriel Claire RN on 8/11/22 at 7:02 PM EDT    **SHARE this note so that the co-signing nurse is able to place an eSignature**    Nurse 2 eSignature: {Esignature:[id number]}

## 2022-08-11 NOTE — PROGRESS NOTES
Occupational Therapy  Facility/Department: [address]  Occupational Therapy Initial Assessment and Treatment    Name: Pauly Boyd  : 1945  MRN: [id number]  Date of Service: 2022    Discharge Recommendations:  Pauly Boyd scored a  on the AM-PAC ADL Inpatient form. Current research shows that an AM-PAC score of 17 or less is typically not associated with a discharge to the patient's home setting. Based on the patient's AM-PAC score and their current ADL deficits, it is recommended that the patient have 3-5 sessions per week of Occupational Therapy at d/c to increase the patient's independence. Please see assessment section for further patient specific details. If patient discharges prior to next session this note will serve as a discharge summary. Please see below for the latest assessment towards goals. OT Equipment Recommendations  Other: cont to assess    Patient Diagnosis(es): There were no encounter diagnoses. Past Medical History:  has a past medical history of Benign prostatic hyperplasia with weak urinary stream, Cancer ([address] Erectile dysfunction, History of gout, History of thrombocytopenia, Hypercalcemia, Hyperlipidemia, Hypertension, Lung nodule, Proteinuria, Type 2 diabetes mellitus without complication ([address] and Vitamin D deficiency. Past Surgical History:  has a past surgical history that includes Finger trigger release (Right, ); Tonsillectomy and adenoidectomy (age 2); Elbow fracture surgery (Left, age 5); Vasectomy (1971); Cataract removal with implant (Bilateral, ); Colonoscopy (3/29/2011); Colonoscopy (2016); IR PORT PLACEMENT > 5 YEARS (2022); bronchoscopy (N/A, 3/25/2022); and craniotomy (Right, 2022). Treatment Diagnosis: decreased ability to perform ADL 2/2 hypoxia    Assessment   Performance deficits / Impairments: Decreased functional mobility ; Decreased ADL status; Decreased strength;Decreased safe awareness;Decreased cognition;Decreased endurance;Decreased balance;Decreased posture  After evaluation and treatment, pt found to be presenting with the above mentioned deficits. Pt would benefit from continued skilled occupational therapy to address these deficits, increasing safety and independence with ADL and functional mobility. In May, pt was able to complete short distance functional mobility and some ADL without physical assistance, but has had a functional decline since July. He now is on 8L O2 via NC and desaturating to 78% with supine therex. EOB/OOB deferred 2/2 respiratory status. Pt may benefit from palliative care consult. Will continue to assess for discharge needs. Treatment Diagnosis: decreased ability to perform ADL 2/2 hypoxia  Prognosis: Good  Decision Making: Medium Complexity  REQUIRES OT FOLLOW-UP: Yes  Activity Tolerance  Activity Tolerance: Treatment limited secondary to medical complications (free text) (respiratory status)  Activity Tolerance Comments: Pt on 8L high flow O2 via NC during session. Vitals supine at rest: SPO2 = 92%, 110/66, 99bpm. SPO2 dropping to 78%, 50RR, 116bpm with 5 heel slides each leg, needing 5 mins to recover to 90s with cues for PLB. RN and MD aware. Plan   Plan  Times per Week: 2-5  Current Treatment Recommendations: ROM, Strengthening, Balance training, Functional mobility training, Endurance training, Patient/Caregiver education & training, Self-Care / ADL, Safety education & training, Positioning, Equipment evaluation, education, & procurement, Pain management     Restrictions  Position Activity Restriction  Other position/activity restrictions: Up as Tolerated, If platelet count equal to or less than 10 but the patient has received a platelet transfusion, physical therapy or occupational therapy may proceed with treatment.     Subjective   General  Chart Reviewed: Yes  Patient assessed for rehabilitation services?: Yes  Additional Pertinent Hx: DLBCL with CNS mass  Family / Caregiver Present: Yes (wife)  Referring Practitioner: MARCIA Ashton CNP  Diagnosis: Hypoxia  Subjective  Subjective: RN cleared pt for tx. Pt supine at session start. Social/Functional History  Social/Functional History  Lives With: Spouse (can provide 24hr assist)  Type of Home: Condo  Home Layout: Two level, Performs ADL's on one level, Able to Live on Main level with bedroom/bathroom  Home Access: Ramped entrance  Bathroom Shower/Tub: Walk-in shower  Bathroom Toilet: Handicap height  Bathroom Equipment: Shower chair, Grab bars in shower, Grab bars around toilet  Home Equipment: Rafael Janus, 4 wheeled, [employer], Oxygen (transport chair, 2L when needed)  Has the patient had two or more falls in the past year or any fall with injury in the past year?: Yes  Receives Help From: Family  ADL Assistance: Needs assistance  Homemaking Responsibilities: No  Ambulation Assistance: Needs assistance  Active : No  Additional Comments: Information from wife as pt is a questionable historian. In May, he was able to walk in house with RW and was fairly independent with ADL. At beginning of July, he was experiencing functional decline, had fall, found brain mass at hospital admission. Recently, wife has been doing \"everything\" and it \"has been a challenge\". Objective              Safety Devices  Type of Devices: Call light within reach; Left in bed;Bed alarm in place;Nurse notified             ADL  Grooming: Stand by assistance;Setup (to wash face supine)  LE Dressing: Dependent/Total (brief change supine)  Toileting: Dependent/Total  Additional Comments: unable to assess activity out of supine 2/2 respiratory status       Activity Tolerance  Activity Tolerance: Treatment limited secondary to medical complications  Bed mobility  Rolling to Left: Minimal assistance  Rolling to Right: Minimal assistance  Scooting: Dependent/Total (to boost in bed)    Transfers  Transfer Comments: unable to assess 2/2 respiratory status    Vision  Vision: Within Functional Limits  Hearing  Hearing: Within functional limits    Cognition  Overall Cognitive Status: Exceptions  Arousal/Alertness: Delayed responses to stimuli  Following Commands: Follows one step commands with repetition; Follows one step commands with increased time  Attention Span: Difficulty dividing attention; Difficulty attending to directions  Memory: Decreased short term memory;Decreased recall of biographical Information;Decreased recall of recent events  Safety Judgement: Decreased awareness of need for assistance;Decreased awareness of need for safety  Problem Solving: Assistance required to implement solutions;Assistance required to generate solutions;Assistance required to identify errors made;Assistance required to correct errors made;Decreased awareness of errors  Insights: Decreased awareness of deficits  Initiation: Requires cues for some  Sequencing: Requires cues for some  Orientation  Overall Orientation Status: Impaired  Orientation Level: Oriented to person;Oriented to place (oriented to year only, grossly oriented to situation; reoriented by therapist)                                      BUE AROM are WFL, strength NT formally but at least 3/5    Therapeutic Exercise  Pt completed 1 set, 10 reps of B digit, elbow, and shoulder flex/ext while supine after education. Pt and wife educated to complete at least 3x/day, 10 reps increasing repetitions as able. Pt and wife stated and demonstrated understanding.     Education: Role of OT, safe t/f training, safe use of DME, awareness of deficits, discharge planning, ADL as therapeutic exercise, importance of OOB, energy conservation and breathing techniques, therex       AM-PAC Score        AM-PAC Inpatient Daily Activity Raw Score: 8 (08/11/22 1220)  AM-PAC Inpatient ADL T-Scale Score : 22.86 (08/11/22 1220)  ADL Inpatient CMS 0-100% Score: 85.69 (08/11/22 1220)  ADL Inpatient CMS G-Code Modifier : CM (08/11/22 1220)         Goals  Short Term Goals  Time Frame for Short term goals: 1 week  Short Term Goal 1: Tolerate sitting EOB 2 mins with mod A with SPO2>90 in prep for ADL  Short Term Goal 2: Complete 10 reps of AROM BUE therex in supported sitting position  Short Term Goal 3: Supine to sit with min A x2  Patient Goals   Patient goals : \"I want to be able to sit up in the chair. \"       Therapy Time   Individual Concurrent Group Co-treatment   Time In 0378         Time Out 1000         Minutes 39         Timed Code Treatment Minutes: 24 Minutes       If patient is discharged prior to next treatment session, this note will serve as the discharge summary.   Fernie Ma, OTR/L #[id number]

## 2022-08-11 NOTE — PROGRESS NOTES
Average Meal Intake: Unable to assess  Average Supplements Intake: Unable to assess  ADULT DIET; Regular; Low Microbial    Anthropometric Measures:  Height: 5' 6\" (167.6 cm)  Ideal Body Weight (IBW): 142 lbs (65 kg)    Current Body Weight: 114 lb (51.7 kg),   IBW. Weight Source: Bed Scale  Current BMI (kg/m2): 18.4  Weight Adjustment For: No Adjustment  BMI Categories: Underweight (BMI less than 22) age over 72    Estimated Daily Nutrient Needs:  Energy Requirements Based On: Kcal/kg (30-35 kcals/kg CBW 52kg)  Weight Used for Energy Requirements: Current (52kg)  Energy (kcal/day): 8628-7487  Weight Used for Protein Requirements: Current (1.5-1.8 gm/kg CBW 52 kg)  Protein (g/day): 78-94  Method Used for Fluid Requirements: 1 ml/kcal  Fluid (ml/day): 5935-6311    Nutrition Diagnosis:   Severe malnutrition related to catabolic illness, inadequate protein-energy intake as evidenced by weight loss, severe loss of subcutaneous fat, severe muscle loss    Nutrition Interventions:   Food and/or Nutrient Delivery: Continue Current Diet, Start Oral Nutrition Supplement  Nutrition Education/Counseling: Education not indicated  Coordination of Nutrition Care: Continue to monitor while inpatient  Plan of Care discussed with: pt and wife    Goals:  Goals: PO intake 50% or greater, prior to discharge    Nutrition Monitoring and Evaluation:   Behavioral-Environmental Outcomes: None Identified  Food/Nutrient Intake Outcomes: Food and Nutrient Intake, Supplement Intake, Diet Advancement/Tolerance  Physical Signs/Symptoms Outcomes: Biochemical Data, Weight, Nutrition Focused Physical Findings, Hemodynamic Status    Discharge Planning:     Too soon to determine     Dewey Hendrix, [address] [place] [address],   Contact: [phone]

## 2022-08-11 NOTE — TELEPHONE ENCOUNTER
Lorena Sigala was admitted to the hospital late yesterday so I will not be testing him myself until he is released.

## 2022-08-11 NOTE — PROGRESS NOTES
Pulmonology Progress Note  PGY 2    CC Shortness of breath, hypoxia    History Obtained From:  patient, electronic medical record    Interval History:  Patient seen and examined at bedside. No acute events overnight. Patient is s/p bronchoscopy yesterday. Oxygen is currently being titrated down, was on 15L yesterday down to 8L today, though he was turned up to 10L for breakfast. Patient does still note SOB and states he feels about the same as yesterday although he is less tachypneic this morning. HISTORY OF PRESENT ILLNESS:  Mr. Orlin Girard  is a 67 y.o. male with stage IV non-Hodgkin lymphoma. He recently was admitted after a fall at home. For several days prior to hospitalization, the patient had worsening speech and altered gait. His wife also noted that he will have periods of nonresponsiveness where he would stare off into space. In the ER, he underwent a head CT that demonstrated a mass in the basal ganglia with associated vasogenic edema and midline shift. He has since then undergone an MRI which demonstrated an approximate 4 cm mass. He was started on steroids and Keppra. He underwent biopsy which suggested Involved with malignant B-cell lymphoma with aggressive morphologic   features. Patient was treated with dexamethasone and radiation therapy. He was then started on Jerzy/BR on 8/1/22. His wife called [facility] on 8/9/22 and reported that overnight his breathing was labored. He had home oxygen, which he had not needed since prior his last hospitalization. She placed home O2 on him and noted that his saturations were in the 70s. He has had a cough but has not had fevers. He presented to [facility] for further evaluation and was hypoxic at 63% upon arrival. He quickly improved after being placed on oxygen to the mid 90s. He denied fevers or chills or chest pain. He endorses shortness of breath at times. Given hypoxia, he will be admitted for further work up and treatment.  Upon admission, he will have a viral respiratory panel, a CT scan of his chest and pulmonology will be consulted. Pulmonology was consulted for patient's worsening hypoxia. Patient was seen and examined at bedside. Patient reports that he was feeling SOB yesterday. He also notes a new onset cough productive of yellow sputum that he states started yesterday as well. Patient is on 2L NC at baseline. Of note, patient was recently started on a new chemotherapy drug Povatuzumab on 8/1/22. CT chest done here did show new moderate bilateral groundglass opacities consistent with infectious vs inflammatory pneumonitis. This morning, patient began to require more oxygen to maintain saturations and was noted to have increasing work of breathing. On examination this morning, patient was tachypneic into the 40s on 10L HFNC before being switched to NRB to maintain his saturations. Patient was then transferred to the ICU for closer monitoring. Review of Systems   Constitutional:  Negative for chills and fever. Respiratory:  Positive for cough and shortness of breath. Cardiovascular:  Negative for palpitations. Pleuritic chest pain   Gastrointestinal:  Negative for abdominal pain, diarrhea and nausea. Musculoskeletal:  Negative for myalgias. Skin:  Negative for color change. Neurological:  Negative for headaches. Psychiatric/Behavioral:  Negative for agitation. ROS: A 10 point review of systems was conducted, significant findings as noted in HPI. Physical Exam  Constitutional:       General: He is not in acute distress. Appearance: He is ill-appearing. HENT:      Head: Normocephalic and atraumatic. Eyes:      Extraocular Movements: Extraocular movements intact. Cardiovascular:      Rate and Rhythm: Regular rhythm. Tachycardia present. Pulses: Normal pulses. Pulmonary:      Effort: Respiratory distress present. Breath sounds: Rales present.       Comments: Rales bilateral lower lobes  Abdominal:      General: Abdomen is flat. Bowel sounds are normal.      Palpations: Abdomen is soft. Musculoskeletal:         General: Normal range of motion. Skin:     General: Skin is warm and dry. Neurological:      General: No focal deficit present. Mental Status: He is alert and oriented to person, place, and time. Physical exam:       Vitals:    08/11/22 0755   BP: 109/75   Pulse: 94   Resp: 26   Temp: 97.6 °F (36.4 °C)   SpO2: 96%       DATA:    Labs:  CBC:   Recent Labs     08/09/22  1904 08/10/22  0338 08/11/22  0340   WBC 5.3 4.5 4.6   HGB 10.0* 9.0* 9.3*   HCT 30.0* 27.0* 28.8*   * 87* 98*         BMP:   Recent Labs     08/09/22  1903 08/10/22  0338 08/11/22  0340    138 139   K 3.8 4.1 3.8   CL 98* 99 103   CO2 27 23 26   BUN 27* 21* 15   CREATININE <0.5* <0.5* <0.5*   GLUCOSE 153* 141* 94   PHOS 3.3 2.9  --        LFT's:   Recent Labs     08/09/22  1903 08/10/22  0338   AST 24 23   ALT 23 20   BILITOT 0.4 0.4   ALKPHOS 100 86       Troponin: No results for input(s): TROPONINI in the last 72 hours. BNP:No results for input(s): BNP in the last 72 hours. ABGs: No results for input(s): PHART, HCC6QUJ, PO2ART in the last 72 hours. INR:   Recent Labs     08/09/22 1903 08/11/22 0340   INR 1.10 1.08         U/A:  Recent Labs     08/09/22  1932   COLORU Yellow   PHUR 6.0   WBCUA 3-5   RBCUA 0-2   BACTERIA 3+*   CLARITYU Clear   SPECGRAV 1.020   LEUKOCYTESUR Negative   UROBILINOGEN 0.2   BILIRUBINUR Negative   BLOODU Negative   GLUCOSEU >=1000*         CT CHEST W CONTRAST   Final Result      1. Moderate bilateral groundglass opacity new since prior chest CT consistent with infectious/inflammatory pneumonitis. 2. Background mild-to-moderate lower lobe predominant pulmonary fibrosis without change. 3. A 5 cm lesion in the anterior aspect left lobe of the liver stable to mildly decreased in size and of indeterminate etiology. XR CHEST (2 VW)   Final Result   1.  Diffuse patchy bilateral airspace disease concerning for pneumonia or pulmonary edema. ASSESSMENT AND PLAN:  Mr. Narcisa Patel  is a 67 y.o. male with stage IV non-Hodgkin lymphoma who was admitted for hypoxia. Acute on chronic hypoxic respiratory failure  - Has history of pulmonary fibrosis, likely present prior to lymphoma diagnosis  - On 2L O2 at home. Patient began to require more and more O2 here this morning, eventually being put on NRB at 15L. Patient was also tachypneic into the 40s  - CT chest with contrast does show new moderate bilateral groundglass opacities consistent with infectious vs inflammatory pneumonitis. - Respiratory viral panel negative. Procal 0.80. WBC 4.5. Afebrile. - Patient recently started on Polatuzumab chemotherapy drug on 8/1/22. This drug does have potential side effect of pneumonitis and is possibly the cause of patient's hypoxia  - Continue steroids at 60mg IV solu-medrol QD   - Bronchoscopy done yesterday at bedside with BAL from RUL. Labs were sent and are currently pending. Will discuss with attending physician Dr. Tien Melton MD.    Fernando Nelson, DO PGY-2  8/11/2022,  11:04 AM    Patient was seen, examined and discussed with Dr. Heidy Brennan. I agree with the interval history. My physical exam confirms the findings listed below  Chart was reviewed including labs and medical records confirm the findings noted    Acute hypoxic respiratory failure, still requiring high flow NC at 8-15L. However breathing is less labored when he is resting. 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 There is new bilateral GGO that was not present before. This is probably  pneumonitis related to polatuzumab or rituxan, however I can not rule out infectious process.       Will increase solu medrol to ~ 2mg/kg  F/u on cultures (bronch on 8/10)

## 2022-08-11 NOTE — PROGRESS NOTES
[facility] Progress [facility]    2022     Radha Tam    MRN: [id number]    : 1945    Referring MD: Matt Ortiz MD  [address] [facility],  [address] [place] [address]    SUBJECTIVE:  he feels better, his sob is getting slightly better compared to yesterday. His po intake is good.  He was very hungry this am.     ECOG PS:  (2) Ambulatory and capable of self care, unable to carry out work activity, up and about > 50% or waking hours    KPS: 70% Cares for self; unable to carry on normal activity or to do active work    Isolation: None    Medications    Scheduled Meds:   cefepime  2,000 mg IntraVENous Q8H    methylPREDNISolone  60 mg IntraVENous Daily    [Held by provider] aspirin  81 mg Oral Daily    bacitracin-polymyxin b   Topical BID    docusate sodium  100 mg Oral BID    fluticasone  1 spray Each Nostril Daily    levETIRAcetam  500 mg Oral BID    ipratropium  2 spray Each Nostril 4x Daily    sodium chloride  2 spray Nasal 4x Daily    tamsulosin  0.4 mg Oral Daily    vitamin B-12  1,000 mcg Oral BID    insulin lispro  0-8 Units SubCUTAneous TID WC    insulin lispro  0-4 Units SubCUTAneous Nightly    insulin glargine  10 Units SubCUTAneous Nightly    sodium chloride flush  5-40 mL IntraVENous 2 times per day    Saline Mouthwash  15 mL Swish & Spit 4x Daily AC & HS    enoxaparin  30 mg SubCUTAneous Daily     Continuous Infusions:   sodium chloride      sodium chloride      potassium chloride      dextrose      sodium chloride 50 mL/hr at 08/10/22 1532     PRN Meds:.ondansetron, prochlorperazine, sodium chloride, sodium chloride flush, sodium chloride, potassium chloride, magnesium sulfate, magnesium hydroxide, Saline Mouthwash, alteplase (CATHFLO) with sterile water injection, glucose, dextrose bolus **OR** dextrose bolus, glucagon (rDNA), dextrose    ROS:  As noted above, otherwise remainder of 10-point ROS negative    Physical Exam:     I&O:    Intake/Output Summary (Last 24 hours) at 2022 [address] filed at 8/11/2022 0200  Gross per 24 hour   Intake 870 ml   Output 900 ml   Net -30 ml         Vital Signs:  /65   Pulse 99   Temp 98.8 °F (37.1 °C) (Oral)   Resp (!) 32   Ht 5' 6\" (1.676 m)   Wt 110 lb 0.2 oz (49.9 kg)   SpO2 97%   BMI 17.76 kg/m²     Weight:    Wt Readings from Last 3 Encounters:   08/10/22 110 lb 0.2 oz (49.9 kg)   07/25/22 116 lb 10 oz (52.9 kg)   05/31/22 117 lb (53.1 kg)         General: Awake, alert and oriented. HEENT: normocephalic, PERRL, no scleral erythema or icterus, Oral mucosa moist and intact, throat clear  NECK: supple without palpable adenopathy  BACK: Straight negative CVAT  SKIN: warm dry and intact without lesions rashes or masses  CHEST: BS present bilat with diffuse rhonchi crackles throughout   CV: Normal S1 S2, RRR, no MRG  ABD: NT ND normoactive BS, no palpable masses or hepatosplenomegaly  EXTREMITIES: without edema, denies calf tenderness  NEURO: CN II - XII grossly intact  CATHETER:  Right IJ SL PAC (1/11/22) - CDI    Data    CBC:   Recent Labs     08/09/22  1904 08/10/22  0338 08/11/22  0340   WBC 5.3 4.5 4.6   HGB 10.0* 9.0* 9.3*   HCT 30.0* 27.0* 28.8*   MCV 96.6 96.8 97.2   * 87* 98*       BMP/Mag:  Recent Labs     08/09/22  1903 08/10/22  0338 08/11/22  0340    138 139   K 3.8 4.1 3.8   CL 98* 99 103   CO2 27 23 26   PHOS 3.3 2.9  --    BUN 27* 21* 15   CREATININE <0.5* <0.5* <0.5*   MG 1.60*  --  1.50*       LIVP:   Recent Labs     08/09/22  1903 08/10/22  0338   AST 24 23   ALT 23 20   BILIDIR <0.2 <0.2   BILITOT 0.4 0.4   ALKPHOS 100 86       Coags:   Recent Labs     08/09/22  1903 08/11/22  0340   PROTIME 14.2 13.9   INR 1.10 1.08   APTT 28.0 30.1       Uric Acid   Recent Labs     08/09/22  1903 08/10/22  0338   LABURIC 5.5 5.7       Diagnostics:   CT chest (8/9/22):  1. Moderate bilateral groundglass opacity new since prior chest CT consistent with infectious/inflammatory pneumonitis.    2. Background mild-to-moderate lower lobe predominant pulmonary fibrosis without change. 3. A 5 cm lesion in the anterior aspect left lobe of the liver stable to mildly decreased in size and of indeterminate etiology. PROBLEM LIST:           DLBC  Interstitial lung disease / Pulmonary Fibrosis   Type 2 Diabetes Mellitus   BPH  Acute on Chronic Respiratory Failure (8/2022)      TREATMENT:            R-CHOP w/ Revlimid and Tafasitimab  Polatuzumab/BR (C1D1 8/1/22)       ASSESSMENT AND PLAN:          1. 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 ID: afebrile w/ possible bacterial multifocal PNA  - Viral respiratory w/ COVID 19 (8/9/22): Negative  - CT chest (8/9/22): Moderate bilateral groundglass opacity new since prior chest CT consistent with infectious/inflammatory pneumonitis. Background mild-to-moderate lower lobe predominant pulmonary fibrosis without change  - S/p Bronchoscopy w/ BAL and biopsy (8/10/22) - Pending   - MRSA swab (8/11/22) - Pending  - Procalcitonin (8/10/22) - 0.8  - Cont Cefepime Day + 2 (started 8/10/22)     3. Heme: Anemia and thrombocytopenia likely r/t recent chemotherapy  - Transfuse for Hgb < 7 and Platelets < 10 K  - No transfusion today     4. Metabolic: Electrolytes and renal fxn stable  - Cont IVFs: NS @ 50 ml/hr   - Replace K+ & Mg per PRN orders     5. GI / Nutrition:          Nutrition:     - Cont regular diet  - Recommend swabbing mouth after all meals. Must be completely awake for PO intake  - Dietary to follow closely  PPX:    - Start PPI while on steroids   Nausea:  - Cont Zofran and Compazine as needed  Constipation:  no complaints   - Cont Colace bid     6.  Pulm:    - H/o Interstitial lung disease / pulmonary fibrosis  - F/b  Any   - S/p bronchoscopy (3/25/22)and PFTs (4/1/22)  - Home O2 - 2 l/min    Hypoxemia:  Admitted w/ acute on chronic respiratory failure  possibly from  pneumonitis from chemotherapy, but may also have multifocal PNA  - Pulm following, appreciate recs  - CT chest (8/9/22) - Moderate bilateral groundglass opacity new since prior chest CT consistent with infectious/inflammatory pneumonitis. Background mild-to-moderate lower lobe predominant pulmonary fibrosis without change. A 5 cm lesion in the anterior aspect left lobe of the liver stable to mildly decreased in size and of indeterminate etiology. - S/p Bronchoscopy w/ BAL and biopsy (8/10/22) - Pending   - Cont Solumedrol 60 mg IV daily (started 8/10/22)  - See ID section for additional treatment and management       7. Endo:   - H/o T2DM   T2DM:  exacerbated by steroids  - Home regimen: on humalog SSI, janumet and jardiance  - S/p Lantus 10 units nightly (stopped 8/11/22) - low AM blood glucose   - Cont Humalog Med SSI AS/HS       8. MSK:  he has acute debilitation and generalized weakness d/t CNS Lymphoma and infection  - Consult PT/OT    9.   :  - H/o BPH  BPH:  - Cont Flomax daily        - DVT Prophylaxis: Platelets >85,519 cells/dL, - daily lovenox prophylaxis ordered  Contraindications to pharmacologic prophylaxis: None  Contraindications to mechanical prophylaxis: None    - Disposition: Unknown at this time; once hypoxemia improves     The patient was seen and examined by MD Tyshawn Lao MD

## 2022-08-11 NOTE — PROGRESS NOTES
Physical Therapy  Facility/Department: [facility]  Physical Therapy Initial Assessment    Name: Harinder Hein  : 1945  MRN: [id number]  Date of Service: 2022    Discharge Recommendations: Harinder Hein scored a 8/24 on the AM-PAC short mobility form. Current research shows that an AM-PAC score of 17 or less is typically not associated with a discharge to the patient's home setting. Based on the patient's AM-PAC score and their current functional mobility deficits, it is recommended that the patient have 3-5 sessions per week of Physical Therapy at d/ to increase the patient's independence. Please see assessment section for further patient specific details. If patient discharges prior to next session this note will serve as a discharge summary. Please see below for the latest assessment towards goals. PT Equipment Recommendations  Equipment Needed: No (defer until OOB activities are assessed)      Patient Diagnosis(es): There were no encounter diagnoses. Past Medical History:  has a past medical history of Benign prostatic hyperplasia with weak urinary stream, Cancer ([address] Erectile dysfunction, History of gout, History of thrombocytopenia, Hypercalcemia, Hyperlipidemia, Hypertension, Lung nodule, Proteinuria, Type 2 diabetes mellitus without complication ([address] and Vitamin D deficiency. Past Surgical History:  has a past surgical history that includes Finger trigger release (Right, ); Tonsillectomy and adenoidectomy (age 2); Elbow fracture surgery (Left, age 5); Vasectomy (1971); Cataract removal with implant (Bilateral, ); Colonoscopy (3/29/2011); Colonoscopy (2016); IR PORT PLACEMENT > 5 YEARS (2022); bronchoscopy (N/A, 3/25/2022); and craniotomy (Right, 2022). Assessment   Assessment: Pt significantly limited during today's assessment due to symptoms of hypoxia.  Pt unable to perform any EOB or OOB activities due to destating to 78% with supine exercises and respiratory rate in the 50's. Nurse and Physician made aware. Pt was able to rolling left to right min A in order to sit on the bed pan. Pt was then position in the chair position in the bed and SpO2 stats increased signifcantly up in the 96%. Pt was very labored breathing during the full session and pt needed multiple verbal cues for breathing techqniues. Pt and wife were educated. PT will continue to assess EOB and OOB activities with more sessions, but PT rec the pt have skilled IP therapy in order to improve strenght, mobility and functional mobility in order to decrease caregiver burden and promote safety. Treatment Diagnosis: decreased functional mobility  Therapy Prognosis: Good  Decision Making: Medium Complexity  Requires PT Follow-Up: Yes  Activity Tolerance  Activity Tolerance: Treatment limited secondary to medical complications     Plan   Plan  Plan:  (2-5)  Current Treatment Recommendations: Strengthening, Gait training, Balance training, Stair training, Functional mobility training, Transfer training, Endurance training, Wheelchair mobility training, Patient/Caregiver education & training, Safety education & training  Safety Devices  Type of Devices: Call light within reach, Left in bed, Bed alarm in place, Nurse notified, All fall risk precautions in place     Restrictions  Position Activity Restriction  Other position/activity restrictions: Up as Tolerated, If platelet count equal to or less than 10 but the patient has received a platelet transfusion, physical therapy or occupational therapy may proceed with treatment. Subjective   General  Chart Reviewed: Yes  Patient assessed for rehabilitation services?: Yes  Additional Pertinent Hx: Pt is a 68 yo male that presents from home with worsening labor breathing per wife's observation.  Pt was just discharged at the end of July from the hospital. CT Chest: Moderate bilateral groundglass opacity new since prior chest CT consistent with infectious/inflammatory pneumonitis. PMH: Non-Hodgkins Lymphoma, brain mass, Hypertension. Referring Practitioner: Nallely Moreno Md  Diagnosis: Hypoxia  Subjective  Subjective: Pt found supine in bed, labored breathing, agreeable to therapy with wife in the room         Social/Functional History  Social/Functional History  Lives With: Spouse (can provide 24hr assist)  Type of Home: Condo  Home Layout: Two level, Performs ADL's on one level, Able to Live on Main level with bedroom/bathroom  Home Access: Ramped entrance  Bathroom Shower/Tub: Walk-in shower  Bathroom Toilet: Handicap height  Bathroom Equipment: Shower chair, Grab bars in shower, Grab bars around toilet  Home Equipment: Britt Abdias, 4 wheeled, [place], Oxygen (transport chair, 2L when needed)  Has the patient had two or more falls in the past year or any fall with injury in the past year?: Yes  Receives Help From: Family  ADL Assistance: Needs assistance  Homemaking Responsibilities: No  Ambulation Assistance: Needs assistance  Active : No  Additional Comments: Information from wife as pt is a questionable historian. In May, he was able to walk in house with RW and was fairly independent with ADL. At beginning of July, he was experiencing functional decline, had fall, found brain mass at hospital admission. Recently, wife has been doing \"everything\" and it \"has been a challenge\".   Vision/Hearing  Vision  Vision: Within Functional Limits  Hearing  Hearing: Within functional limits    Cognition   Orientation  Overall Orientation Status: Impaired  Orientation Level: Oriented to person;Oriented to place     Objective   Heart Rate: (!) 123  Heart Rate Source: Telemetry  BP: (!) 113/58  BP Location: Left upper arm  BP Method: Automatic  Patient Position: Semi fowlers  MAP (Calculated): 76.33  Resp: (!) 44  SpO2: 94 %  O2 Device: High flow nasal cannula                             Bed mobility  Rolling to Left: Minimal assistance  Rolling to Right: Minimal assistance  Transfers  Comment: unable to assess due to low SpO2% and high respiratory rate  Ambulation  WB Status: unable to assess due to SpO2% and high respiratory rate                 OutComes Score      AM-PAC Score  AM-PAC Inpatient Mobility Raw Score : 8 (08/11/22 1223)  AM-PAC Inpatient T-Scale Score : 28.52 (08/11/22 1223)  Mobility Inpatient CMS 0-100% Score: 86.62 (08/11/22 1223)  Mobility Inpatient CMS G-Code Modifier : CM (08/11/22 1223)          Tinneti Score       Goals  Short Term Goals  Time Frame for Short term goals: Discharge  Short term goal 1: Rolling Left and Right SBA  Short term goal 2: Sit<>Supine Min A  Short term goal 3: Sit<>Stand with RW Min A  Short term goal 4: Stand Pivot with RW Min A  Short term goal 5: Amb 5ft with RW Min A  Patient Goals   Patient goals : To Return Home and Feel Better       Education  Patient Education  Education Given To: Patient; Family  Education Provided: Role of Therapy; Family Education;Precautions;Transfer Training;Energy Conservation  Education Method: Demonstration  Barriers to Learning: None  Education Outcome: Continued education needed      Therapy Time   Individual Concurrent Group Co-treatment   Time In 0920         Time Out 1000         Minutes 40          Timed Code Treatment Minutes:   25    Total Treatment Minutes:  40         Aristides Baugh PT

## 2022-08-12 NOTE — PROGRESS NOTES
[facility] Progress Note    2022     Yasmeen Cardenas    MRN: [id number]    : 1945    Referring MD: Adriana Medina MD  [facility] [address], [address],  [address]    SUBJECTIVE:  cont with gen weakness and ongoing sob     ECOG PS:  (2) Ambulatory and capable of self care, unable to carry out work activity, up and about > 50% or waking hours    KPS: 70% Cares for self; unable to carry on normal activity or to do active work    Isolation: None    Medications    Scheduled Meds:   pantoprazole  40 mg Oral QAM AC    methylPREDNISolone  60 mg IntraVENous Q12H    insulin glargine  5 Units SubCUTAneous Nightly    cefepime  2,000 mg IntraVENous Q8H    [Held by provider] aspirin  81 mg Oral Daily    bacitracin-polymyxin b   Topical BID    docusate sodium  100 mg Oral BID    fluticasone  1 spray Each Nostril Daily    levETIRAcetam  500 mg Oral BID    sodium chloride  2 spray Nasal 4x Daily    tamsulosin  0.4 mg Oral Daily    vitamin B-12  1,000 mcg Oral BID    insulin lispro  0-8 Units SubCUTAneous TID WC    insulin lispro  0-4 Units SubCUTAneous Nightly    sodium chloride flush  5-40 mL IntraVENous 2 times per day    Saline Mouthwash  15 mL Swish & Spit 4x Daily AC & HS    enoxaparin  30 mg SubCUTAneous Daily     Continuous Infusions:   sodium chloride      sodium chloride      potassium chloride      dextrose      sodium chloride 50 mL/hr at 22 1751     PRN Meds:.ondansetron, prochlorperazine, sodium chloride, sodium chloride flush, sodium chloride, potassium chloride, magnesium sulfate, magnesium hydroxide, Saline Mouthwash, alteplase (CATHFLO) with sterile water injection, glucose, dextrose bolus **OR** dextrose bolus, glucagon (rDNA), dextrose    ROS:  As noted above, otherwise remainder of 10-point ROS negative    Physical Exam:     I&O:    Intake/Output Summary (Last 24 hours) at 2022 7832  Last data filed at 2022 0400  Gross per 24 hour   Intake 2310 ml   Output 2175 ml   Net Immunizations Administered     Name Date Dose Route    Pneumococcal Polysaccharide ([id number]) 1/11/2022 0.5 mL Intramuscular    Site: Deltoid- Left    Lot: [id number]    NDC: 6009-4225-22          VIS GIVEN. CONSENT SIGNED  PATIENT TOLERATED WELL. ABN SIGNED. 135 ml         Vital Signs:  /67   Pulse 90   Temp 97.4 °F (36.3 °C) (Oral)   Resp (!) 32   Ht 5' 6\" (1.676 m)   Wt 114 lb 10.2 oz (52 kg)   SpO2 98%   BMI 18.50 kg/m²     Weight:    Wt Readings from Last 3 Encounters:   08/11/22 114 lb 10.2 oz (52 kg)   07/25/22 116 lb 10 oz (52.9 kg)   05/31/22 117 lb (53.1 kg)         General: Awake, alert and oriented. HEENT: normocephalic, PERRL, no scleral erythema or icterus, Oral mucosa moist and intact, throat clear  NECK: supple   BACK: Straight   SKIN: warm dry and intact without lesions rashes or masses  CHEST: BS present bilat with diffuse rhonchi crackles throughout   CV: Normal S1 S2, RRR, no MRG  ABD: NT ND normoactive BS, no palpable masses or hepatosplenomegaly  EXTREMITIES: without edema, denies calf tenderness  NEURO: CN II - XII grossly intact  CATHETER:  Right IJ SL PAC (1/11/22) - CDI    Data    CBC:   Recent Labs     08/10/22  0338 08/11/22  0340 08/12/22  0351   WBC 4.5 4.6 4.8   HGB 9.0* 9.3* 9.1*   HCT 27.0* 28.8* 27.2*   MCV 96.8 97.2 96.7   PLT 87* 98* 81*       BMP/Mag:  Recent Labs     08/09/22  1903 08/10/22  0338 08/11/22  0340 08/12/22  0351    138 139 138   K 3.8 4.1 3.8 4.0   CL 98* 99 103 102   CO2 27 23 26 26   PHOS 3.3 2.9  --  1.3*   BUN 27* 21* 15 14   CREATININE <0.5* <0.5* <0.5* <0.5*   MG 1.60*  --  1.50* 1.80       LIVP:   Recent Labs     08/09/22  1903 08/10/22  0338 08/12/22  0351   AST 24 23 27   ALT 23 20 17   BILIDIR <0.2 <0.2 <0.2   BILITOT 0.4 0.4 0.3   ALKPHOS 100 86 105       Coags:   Recent Labs     08/09/22  1903 08/11/22  0340   PROTIME 14.2 13.9   INR 1.10 1.08   APTT 28.0 30.1       Uric Acid   Recent Labs     08/09/22  1903 08/10/22  0338 08/12/22  0351   LABURIC 5.5 5.7 3.8       Diagnostics:   CT chest (8/9/22):  1. Moderate bilateral groundglass opacity new since prior chest CT consistent with infectious/inflammatory pneumonitis.    2. Background mild-to-moderate lower lobe predominant pulmonary fibrosis without change. 3. A 5 cm lesion in the anterior aspect left lobe of the liver stable to mildly decreased in size and of indeterminate etiology. PROBLEM LIST:           DLBC  Interstitial lung disease / Pulmonary Fibrosis   Type 2 Diabetes Mellitus   BPH  Acute on Chronic Respiratory Failure (8/2022)      TREATMENT:            R-CHOP w/ Revlimid and Tafasitimab  Polatuzumab/BR (C1D1 8/1/22)       ASSESSMENT AND PLAN:          1. DLBCL: Relapsed/ refractory diffuse large B cell non-Hodgkin's lymphoma now with a large CNS mass, bx proven NHL   - S/p XRT to brain (7/13/22-7/25/22) 2400 cGy over 8 fractions    PLAN:  Jerzy-BR (C1D1 8/1/22). - S/p Decadron 2 mg daily (lowered 8/1/22)   - Cont Keppra 500 mg bid     Cycle 1, Day + 12     2. ID: afebrile w/ possible bacterial multifocal PNA  - Viral respiratory w/ COVID 19 (8/9/22): Negative  - CT chest (8/9/22): Moderate bilateral groundglass opacity new since prior chest CT consistent with infectious/inflammatory pneumonitis. Background mild-to-moderate lower lobe predominant pulmonary fibrosis without change  - S/p Bronchoscopy w/ BAL and biopsy (8/10/22) - Pending   - MRSA swab (8/11/22) - Pending  - Procalcitonin (8/10/22) - 0.8  - Cont Cefepime Day + 3 (started 8/10/22)      3. Heme: Anemia and thrombocytopenia likely r/t recent chemotherapy  - Transfuse for Hgb < 7 and Platelets < 10 K  - No transfusion today     4. Metabolic: Electrolytes and renal fxn stable; hypoPhos. Weight up, but unsure if accurate d/t being a bed weight   - NaPhos 30 mmol x 1 (8/12/22)  - Lasix 40 mg IV x 1 (8/12/22)  - Decrease IVFs: NS @ 20 ml/hr (8/12)   - Replace Phos, K+ & Mg per PRN orders     5. GI / Nutrition:          Nutrition:  Severe malnutrition w/ chronic illness    - Cont regular diet  - Add Glucerna shakes BID and Magic cups BID  - Recommend swabbing mouth after all meals.  Must be completely awake for PO intake  - Dietary to follow closely  PPX:    - Cont PPI while on steroids   Nausea:  - Cont Zofran and Compazine as needed  Constipation:  no complaints   - Cont Colace bid     6. Pulm:    - H/o Interstitial lung disease / pulmonary fibrosis  - F/b Dr. Theodore Aid   - S/p bronchoscopy (3/25/22) and PFTs (4/1/22)  - Home O2 - 2 l/min    Hypoxemia:  Admitted w/ acute on chronic respiratory failure  possibly from  pneumonitis from chemotherapy (Rituxan vs Jerzy), but may also have multifocal PNA. Al-Alana thinks PNA is less likely. - Pulm following, appreciate recs  - CT chest (8/9/22) - Moderate bilateral groundglass opacity new since prior chest CT consistent with infectious/inflammatory pneumonitis. Background mild-to-moderate lower lobe predominant pulmonary fibrosis without change. A 5 cm lesion in the anterior aspect left lobe of the liver stable to mildly decreased in size and of indeterminate etiology. - S/p Bronchoscopy w/ BAL and biopsy (8/10/22) - Pending   - Cont Solumedrol 60 mg IV daily (started 8/10/22) --> increased to 60mg iv BID (8/12/22) due to increased oxygen requirements   - See ID section for additional treatment and management       7. Endo:   - H/o T2DM   T2DM:  exacerbated by steroids  - Home regimen: on humalog SSI, janumet and jardiance  - Resume Lantus 10 units nightly (stopped 8/11/22, restart 8/12/22)   - Cont Humalog Med SSI AS/HS       8. MSK:  he has acute debilitation and generalized weakness d/t CNS lymphoma and hypoxemia   - Consult PT/OT - he is extremely weak and may required ARU or SNF when hypoxemia improves     9.   :  - H/o BPH  BPH:  - Cont Flomax daily        - DVT Prophylaxis: Platelets >42,778 cells/dL, - daily lovenox prophylaxis ordered  Contraindications to pharmacologic prophylaxis: None  Contraindications to mechanical prophylaxis: None    - Disposition: Unknown at this time; once hypoxemia improves     The patient was seen and examined by MD Lars Zambrano, [address] [place] [address] Kadeem Flores MD

## 2022-08-12 NOTE — PROGRESS NOTES
Occupational Therapy  Facility/Department: [address]  Occupational Therapy Progress Note    Name: Hayley Rosales  : 1945  MRN: [id number]  Date of Service: 2022    Discharge Recommendations:  Hayley Rosales scored a  on the AM-PAC ADL Inpatient form. Current research shows that an AM-PAC score of 17 or less is typically not associated with a discharge to the patient's home setting. Based on the patient's AM-PAC score and their current ADL deficits, it is recommended that the patient have 3-5 sessions per week of Occupational Therapy at d/c to increase the patient's independence. Please see assessment section for further patient specific details. If patient discharges prior to next session this note will serve as a discharge summary. Please see below for the latest assessment towards goals. Patient Diagnosis(es): There were no encounter diagnoses. Past Medical History:  has a past medical history of Benign prostatic hyperplasia with weak urinary stream, Cancer ([address] Erectile dysfunction, History of gout, History of thrombocytopenia, Hypercalcemia, Hyperlipidemia, Hypertension, Lung nodule, Proteinuria, Type 2 diabetes mellitus without complication ([address] and Vitamin D deficiency. Past Surgical History:  has a past surgical history that includes Finger trigger release (Right, ); Tonsillectomy and adenoidectomy (age 2); Elbow fracture surgery (Left, age 5); Vasectomy (1971); Cataract removal with implant (Bilateral, ); Colonoscopy (3/29/2011); Colonoscopy (2016); IR PORT PLACEMENT > 5 YEARS (2022); bronchoscopy (N/A, 3/25/2022); and craniotomy (Right, 2022). Treatment Diagnosis: decreased ability to perform ADL 2/2 hypoxia      Assessment   Performance deficits / Impairments: Decreased functional mobility ; Decreased ADL status; Decreased strength;Decreased safe awareness;Decreased cognition;Decreased endurance;Decreased balance;Decreased posture  Assessment: Rn increased O2 to 15 liters high flow while OT/PT treating patient. He tolerated simple hygiene and UE exerc in bed, and then tolerated supine><sit and sitting on side of bed. Recommend ongoing inpatient OT at discharge. Treatment Diagnosis: decreased ability to perform ADL 2/2 hypoxia  REQUIRES OT FOLLOW-UP: Yes  Activity Tolerance  Activity Tolerance Comments: Rn placed pt on 15 liters high flow O2. During session, O2 sat varied 94-99%, HR increased up to 123, but the decreased to 111        Plan   Plan  Times per Week: 2-5  Current Treatment Recommendations: ROM, Strengthening, Balance training, Functional mobility training, Endurance training, Patient/Caregiver education & training, Self-Care / ADL, Safety education & training, Positioning, Equipment evaluation, education, & procurement, Pain management     Restrictions  Position Activity Restriction  Other position/activity restrictions: Up as Tolerated, If platelet count equal to or less than 10 but the patient has received a platelet transfusion, physical therapy or occupational therapy may proceed with treatment. Subjective   General  Chart Reviewed: Yes  Patient assessed for rehabilitation services?: Yes  Additional Pertinent Hx: DLBCL with CNS mass  Family / Caregiver Present: No  Referring Practitioner: MARCIA Grier CNP  Diagnosis: Hypoxia  Subjective  Subjective: Pt in bed, agreeable to work with OT.   General Comment  Comments: Rn increased O2 to 15 liters high flow oxygen     Social/Functional History  Social/Functional History  Lives With: Spouse (can provide 24hr assist)  Type of Home: Condo  Home Layout: Two level, Performs ADL's on one level, Able to Live on Main level with bedroom/bathroom  Home Access: Ramped entrance  Bathroom Shower/Tub: Walk-in shower  Bathroom Toilet: Handicap height  Bathroom Equipment: Shower chair, Grab bars in shower, Grab bars around toilet  Home Equipment: Valentino Sings, 4 wheeled, [address], Ne, Oxygen (transport chair, 2L when needed)  Has the patient had two or more falls in the past year or any fall with injury in the past year?: Yes  Receives Help From: Family  ADL Assistance: Needs assistance  Homemaking Responsibilities: No  Ambulation Assistance: Needs assistance  Active : No  Additional Comments: Information from wife as pt is a questionable historian. In May, he was able to walk in house with RW and was fairly independent with ADL. At beginning of July, he was experiencing functional decline, had fall, found brain mass at hospital admission. Recently, wife has been doing \"everything\" and it \"has been a challenge\". Objective                Safety Devices  Type of Devices: Call light within reach; Left in bed;Bed alarm in place;Nurse notified; All fall risk precautions in place  Balance  Sitting:  (SBA for ~8 minutes-while doing UE/LE exerc)-pt worked on sitting in midline with erect posture, 5 reps scapular adduction        ADL  Grooming: Setup (while supine in bed, with head of bed elevated, pt washed face and hands)     Activity Tolerance  Activity Tolerance: Patient limited by fatigue  Bed mobility  Supine to Sit: Moderate assistance (head of bed elevated, use of bed rail)  Sit to Supine: Minimal assistance  Scooting: Dependent/Total (scooting forward on bed)        Cognition  Overall Cognitive Status: Exceptions  Following Commands:  Follows one step commands with increased time  Initiation: Requires cues for some  Sequencing: Requires cues for some  Orientation  Overall Orientation Status:  (not formally assessed, oriented to conversation)         Exerc in bed with head of bed elevated:  12 reps finger flex/ext, 12 reps supination/pronation, 12 reps elbow flex/ext         Education Given To: Patient  Education Provided: Role of Therapy  Education Method: Demonstration;Verbal  Education Outcome: Verbalized understanding;Continued education needed                        G-Code OutComes Score                                                  AM-PAC Score        AM-PAC Inpatient Daily Activity Raw Score: 9 (08/12/22 1451)  AM-PAC Inpatient ADL T-Scale Score : 25.33 (08/12/22 1451)  ADL Inpatient CMS 0-100% Score: 79.59 (08/12/22 1451)  ADL Inpatient CMS G-Code Modifier : CL (08/12/22 1451)    Tinneti Score       Goals  Short Term Goals  Time Frame for Short term goals: 1 week  Short Term Goal 1: Tolerate sitting EOB 2 mins with mod A with SPO2>90 in prep for ADL-8/12 goal met, updated goal:  tolerate sitting on edge of bed, 9-12 minutes with S, with SPO2>90 in prep for ADL  Short Term Goal 2: Complete 10 reps of AROM BUE therex in supported sitting position-8/12 goal not met  Short Term Goal 3: Supine to sit with min A x2-8/12 goal met, updated goal supine to sit with Onur  Patient Goals   Patient goals : \"I want to be able to sit up in the chair. \"       Therapy Time   Individual Concurrent Group Co-treatment   Time In 4578         Time Out 1425         Minutes 30          Timed Code Treatment Minutes:   30    Total Treatment Minutes:   [facility], OTR/L [phone]

## 2022-08-12 NOTE — CONSULTS
[address] [address] Continence Nurse  Consult Note       NAME:  Radha Tam  MEDICAL RECORD NUMBER:  [id number]  AGE: 67 y.o. GENDER: male  : 1945  TODAY'S DATE:  2022    Subjective   Reason for WOCN Evaluation and Assessment: Sacrum - Stage 2      Radha Tam is a 67 y.o. male referred by:   [] Physician  [x] Nursing  [] Other:     Wound Identification:  Wound Type: pressure  Contributing Factors: diabetes, chronic pressure, decreased mobility, shear force, and malnutrition    Wound History: Mr. Jina Galindo  is a 67 y.o. male with stage IV non-Hodgkin lymphoma. He recently was admitted after a fall at home. For several days prior to hospitalization, the patient had worsening speech and altered gait. His wife also noted that he will have periods of nonresponsiveness where he would stare off into space. In the ER, he underwent a head CT that demonstrated a mass in the basal ganglia with associated vasogenic edema and midline shift. He has since then undergone an MRI which demonstrated an approximate 4 cm mass. He was started on steroids and Keppra. He underwent biopsy which suggested Involved with malignant B-cell lymphoma with aggressive morphologic   features. Patient was treated with dexamethasone and radiation therapy. He was then started on Jerzy/BR on 22. His wife called [facility] on 22 and reported that overnight his breathing was labored. He had home oxygen, which he had not needed since prior his last hospitalization. She placed home O2 on him and noted that his saturations were in the 70s. He has had a cough but has not had fevers. He presented to [facility] for further evaluation and was hypoxic at 63% upon arrival. He quickly improved after being placed on oxygen to the mid 90s. He denied fevers or chills or chest pain. He endorses shortness of breath at times. Given hypoxia, he will be admitted for further work up and treatment.  Upon admission, he will have a viral respiratory panel, a CT scan of his chest and pulmonology will be consulted  Current Wound Care Treatment:  Sacrum - Foam    Patient Goal of Care:  [x] Wound Healing  [] Odor Control  [] Palliative Care  [] Pain Control   [] Other:         PAST MEDICAL HISTORY        Diagnosis Date    Benign prostatic hyperplasia with weak urinary stream 12/10/2015     Updating Deprecated Diagnoses    Cancer ([facility] 12/21/2021    Non Earl Lymphoma    Erectile dysfunction     History of gout 09/19/2015    History of thrombocytopenia 09/19/2015    Hypercalcemia     Hyperlipidemia     Hypertension     Lung nodule     Proteinuria     Type 2 diabetes mellitus without complication ([facility] 6738    Vitamin D deficiency 09/19/2015       PAST SURGICAL HISTORY    Past Surgical History:   Procedure Laterality Date    BRONCHOSCOPY N/A 3/25/2022    BRONCHOSCOPY WITH BRONCHOALVEOLAR LAVAGE performed by Hansel Fitzpatrick MD at [address] [place] [address] Bilateral 2011    COLONOSCOPY  3/29/2011    repeat in 5 yrs: Rita Cohn MD    COLONOSCOPY  03/14/2016    repeat in 7-8 years: Rita Cohn MD    CRANIOTOMY Right 7/6/2022    RIGHT FRONTAL NEEDLE BIOPSY OF BRAIN MASS performed by Alla Garvey.  Cele Perales MD at [address] [facility] Left age 5    FINGER TRIGGER RELEASE Right 2007    index    IR PORT PLACEMENT EQUAL OR GREATER THAN 5 YEARS  1/11/2022    IR PORT PLACEMENT EQUAL OR GREATER THAN 5 YEARS 1/11/2022 [facility] PROC[facility]    TONSILLECTOMY AND ADENOIDECTOMY  age 2    VASECTOMY  25       FAMILY HISTORY    Family History   Problem Relation Age of Onset    Diabetes Mother     High Blood Pressure Mother     Dementia Mother     Hearing Loss Mother     High Cholesterol Mother     Cancer Neg Hx     Hearing Loss Neg Hx     Stroke Neg Hx     Heart Disease Neg Hx        SOCIAL HISTORY    Social History     Tobacco Use    Smoking status: Never    Smokeless tobacco: Never    Tobacco comments:     Never smoked   Vaping Use    Vaping Use: Never used   Substance Use Topics    Alcohol use: Not Currently     Alcohol/week: 0.0 standard drinks     Comment: drink alcohol very occasionally. Drug use: No       ALLERGIES    No Known Allergies    MEDICATIONS    No current facility-administered medications on file prior to encounter. Current Outpatient Medications on File Prior to Encounter   Medication Sig Dispense Refill    levETIRAcetam (KEPPRA) 500 MG tablet Take 1 tablet by mouth in the morning and 1 tablet before bedtime. 60 tablet 3    ondansetron (ZOFRAN-ODT) 4 MG disintegrating tablet Take 1 tablet by mouth every 8 hours as needed for Nausea or Vomiting (Patient not taking: Reported on 8/9/2022) 30 tablet 0    tamsulosin (FLOMAX) 0.4 MG capsule TAKE 1 CAPSULE DAILY 90 capsule 1    ipratropium (ATROVENT) 0.06 % nasal spray 2 sprays by Each Nostril route 4 times daily 1 each 3    sodium chloride (OCEAN) 0.65 % nasal spray 2 sprays by Nasal route 4 times daily 1 each 3    bacitracin-polymyxin b (POLYSPORIN) 500-30298 UNIT/GM ointment Apply topically twice daily to inside of both nostrils for 3 weeks then as needed for dry nose.  1 each 1    JARDIANCE 25 MG tablet TAKE 1 TABLET BY MOUTH DAILY 90 tablet 0    SITagliptin-metFORMIN (JANUMET XR)  MG TB24 per extended release tablet TAKE 1 TABLET TWICE A  tablet 1    Continuous Blood Gluc Sensor (DEXCOM G6 SENSOR) MISC Use for glucose monitoring 9 each 3    Continuous Blood Gluc  (DEXCOM G6 ) SHAHANA Use for glucose monitoring 1 each 3    Continuous Blood Gluc Transmit (DEXCOM G6 TRANSMITTER) MISC Use for glucose monitoring 1 each 3    insulin lispro, 1 Unit Dial, (HUMALOG KWIKPEN) 100 UNIT/ML SOPN Up to 5-10 units before meals 3 times a day 5 pen 1    Insulin Pen Needle (B-D UF III MINI PEN NEEDLES) 31G X 5 MM MISC 1 each by Does not apply route 4 times daily 100 each 1    blood glucose test strips (ONETOUCH ULTRA) strip TEST ONCE DAILY 100 strip 3    docusate sodium (COLACE) 100 MG capsule Take 1 capsule by mouth 2 times daily      magnesium oxide (MAG-OX) 400 MG tablet Take 1 tablet by mouth daily 30 tablet 1    aspirin 81 MG EC tablet Take 81 mg by mouth daily      prochlorperazine (COMPAZINE) 10 MG tablet TAKE 1 TABLET BY MOUTH EVERY 6 HOURS AS NEEDED FOR NAUSEA (Patient not taking: Reported on 8/9/2022)      ondansetron (ZOFRAN) 8 MG tablet TAKE 1 TABLET BY MOUTH EVERY 8 HOURS AS NEEDED FOR NAUSEA OR VOMITING (Patient not taking: Reported on 8/9/2022)      Multiple Vitamins-Minerals (CENTRUM ADULTS PO) Take by mouth      fluticasone (FLONASE) 50 MCG/ACT nasal spray SPRAY ONCE IN EACH NOSTRIL EVERY DAY 48 g 3    Blood Glucose Monitoring Suppl (ONE TOUCH ULTRA 2) w/Device KIT 1 kit by Does not apply route daily 1 kit 0    Lancets MISC Use to test blood sugar once daily 100 each 3    blood glucose test strips (ONETOUCH ULTRA) strip 1 each by Does not apply route daily 100 strip 2    ONE TOUCH ULTRASOFT LANCETS MISC 1 each by Does not apply route daily 100 each 3    vitamin B-12 (CYANOCOBALAMIN) 1000 MCG tablet Take 1,000 mcg by mouth 2 times daily      Cholecalciferol (VITAMIN D) 2000 UNITS CAPS capsule Take by mouth daily      Omega-3 Fatty Acids (OMEGA 3 PO) Take 2 capsules by mouth daily          Objective    /66   Pulse 97   Temp 97.4 °F (36.3 °C) (Oral)   Resp 27   Ht 5' 6\" (1.676 m)   Wt 121 lb 14.6 oz (55.3 kg)   SpO2 95%   BMI 19.68 kg/m²     LABS:  WBC:    Lab Results   Component Value Date/Time    WBC 4.8 08/12/2022 03:51 AM     H/H:    Lab Results   Component Value Date/Time    HGB 9.1 08/12/2022 03:51 AM    HCT 27.2 08/12/2022 03:51 AM     PTT:    Lab Results   Component Value Date/Time    APTT 30.1 08/11/2022 03:40 AM   [APTT}  PT/INR:    Lab Results   Component Value Date/Time    PROTIME 13.9 08/11/2022 03:40 AM    INR 1.08 08/11/2022 03:40 AM     HgBA1c:    Lab Results   Component Value Date/Time    LABA1C 8.1 08/03/2022 08:00 AM       Assessment: Small pink wound bed. Periwound blanchable erythema   Giorgio Risk Score: Giorgio Scale Score: 17    Patient Active Problem List   Diagnosis Code    Type 2 diabetes mellitus, controlled (HCC) E11.9    Essential hypertension I10    Other hyperlipidemia E78.49    Vitamin D deficiency E55.9    Onychomycosis B35.1    History of hypercalcemia Z86.39    Hand arthritis M19.049    History of thrombocytopenia Z86.2    History of gout Z87.39    Type 2 diabetes mellitus without complication (HCC) J28.7    Benign prostatic hyperplasia with weak urinary stream N40.1, R39.12    Lung nodule R91.1    Hypercalcemia E83.52    Proteinuria R80.9    Mixed hyperlipidemia E78.2    Rib pain on right side R07.81    Shortness of breath R06.02    Microalbuminuria R80.9    Generalized weakness R53.1    Fatigue R53.83    Balance problems R26.89    Neck mass R22.1    Weight loss R63.4    Appetite loss R63.0    Enlarged lymph node in neck R59.0    Urinary frequency R35.0    Diarrhea R19.7    B-cell lymphoma of solid organ excluding spleen (HCC) C85.19    Low magnesium level R79.0    Parotid mass K11.8    Liver mass R16.0    Skin abrasion T14. 8XXA    Diffuse large B-cell lymphoma (HCC) C83.30    Double vision H53.2    Dizziness R42    Urinary urgency R39.15    Constipation K59.00    Pain with urination R30.9    ILD (interstitial lung disease) ([facility]) J84.9    Allergic rhinitis J30.9    Diabetes mellitus type 2, controlled, without complications (HCC) S94.4    Type 2 diabetes mellitus, uncontrolled, with neuropathy (HCC) E11.40, E11.65    Brain mass G93.89    Severe malnutrition ([facility]) E43    Hypoxia R09.02       Measurements:  Wound 07/06/22 Sacrum Medial (Active)   Wound Image   08/12/22 1512   Wound Etiology Pressure Stage 1 08/12/22 1512   Dressing Status Reinforced dressing 08/12/22 1512   Wound Cleansed Not Cleansed 08/12/22 1512   Dressing/Treatment Foam 08/12/22 1512   Dressing Change Due 07/23/22 07/22/22 2123   Wound Length (cm) 0.5 cm 08/12/22 1512   Wound Width (cm) 0.5 cm 08/12/22 1512   Wound Depth (cm) 0.1 cm 08/12/22 1512   Wound Surface Area (cm^2) 0.25 cm^2 08/12/22 1512   Wound Volume (cm^3) 0.025 cm^3 08/12/22 1512   Wound Assessment Pink/red 08/12/22 1512   Drainage Amount Scant 08/12/22 1512   Drainage Description Yellow 08/12/22 1512   Odor None 08/12/22 1512   Savi-wound Assessment Blanchable erythema; Intact 08/12/22 1512   Margins Attached edges; Defined edges 08/12/22 1512   Number of days: 36   Sacrum:      Response to treatment:  Well tolerated by patient. Pain Assessment:  Severity:  0 / 10  Quality of pain: N/A  Wound Pain Timing/Severity: none  Premedicated: No    Plan   Plan of Care: Wound 07/06/22 Sacrum Medial-Dressing/Treatment: Foam  Recommendation: Sacrum - clean with barrier cream wipes. Foam dressing, change every 3 days  Reposition pt every 2 hours  Call Wound Care for deterioration [phone]    Specialty Bed Required : No   [] Low Air Loss   [] Pressure Redistribution  [] Fluid Immersion  [] Bariatric  [] Total Pressure Relief  [] Other:     Current Diet: ADULT DIET;  Regular; Low Microbial  ADULT ORAL NUTRITION SUPPLEMENT; Lunch, Dinner; Frozen Oral Supplement  ADULT ORAL NUTRITION SUPPLEMENT; Breakfast, Lunch; Diabetic Oral Supplement  Dietician consult:  Yes    Discharge Plan:  Placement for patient upon discharge: skilled nursing vs home with [facility]   Patient appropriate for Outpatient [address]: No    Referrals:  [x]   [] [address]  [] Supplies  [] Other    Patient/Caregiver Teaching:  Level of patient/caregiver understanding able to:   [] Indicates understanding       [] Needs reinforcement  [] Unsuccessful      [] Verbal Understanding  [] Demonstrated understanding       [] No evidence of learning  [] Refused teaching         [] N/A       Electronically signed by Dorina Biswas RN, CWOCN on 8/12/2022 at 3:14 PM

## 2022-08-12 NOTE — PLAN OF CARE
Problem: Chronic Conditions and Co-morbidities  Goal: Patient's chronic conditions and co-morbidity symptoms are monitored and maintained or improved  Outcome: Progressing  Flowsheets (Taken 8/12/2022 0800)  Care Plan - Patient's Chronic Conditions and Co-Morbidity Symptoms are Monitored and Maintained or Improved: Monitor and assess patient's chronic conditions and comorbid symptoms for stability, deterioration, or improvement     Problem: Safety - Adult  Goal: Free from fall injury  8/12/2022 1508 by Sydney Story  Outcome: Progressing  Flowsheets (Taken 8/12/2022 0800)  Free From Fall Injury: Instruct family/caregiver on patient safety  Note: Pt is a High fall risk. See [place] Fall Score and ABCDS Injury Risk assessments. Explained fall risk precautions to pt and family and rationale behind their use to keep the patient safe. Pt bed is in low position, side rails up, call light and belongings are in reach. Fall wristband applied and present on pts wrist.  Bed alarm on. Pt encouraged to call for assistance. Will continue with hourly rounds for PO intake, pain needs, toileting and repositioning as needed. Problem: Skin/Tissue Integrity  Goal: Absence of new skin breakdown  Description: 1. Monitor for areas of redness and/or skin breakdown  2. Assess vascular access sites hourly  3. Every 4-6 hours minimum:  Change oxygen saturation probe site  4. Every 4-6 hours:  If on nasal continuous positive airway pressure, respiratory therapy assess nares and determine need for appliance change or resting period. 8/12/2022 1508 by Carolee Redding  Outcome: Progressing  Note: Patient with pressure ulcer on coccyx. Mepilex- sacral heart applied. Wound care assessed pt at bedside. No other interventions at this time. Heels elevated, bilateral podus boots on. Q2h repositioning while in bed.

## 2022-08-12 NOTE — PROGRESS NOTES
Physical Therapy  Facility/Department: [facility]  Physical Therapy Daily Treatment    Name: Vane Hanks  : 1945  MRN: [id number]  Date of Service: 2022    Discharge Recommendations: Vane Hanks scored a 9/24 on the AM-PAC short mobility form. Current research shows that an AM-PAC score of 17 or less is typically not associated with a discharge to the patient's home setting. Based on the patient's AM-PAC score and their current functional mobility deficits, it is recommended that the patient have 3-5 sessions per week of Physical Therapy at d/ to increase the patient's independence. Please see assessment section for further patient specific details. If patient discharges prior to next session this note will serve as a discharge summary. Please see below for the latest assessment towards goals. PT Equipment Recommendations  Equipment Needed: No      Patient Diagnosis(es): There were no encounter diagnoses. Past Medical History:  has a past medical history of Benign prostatic hyperplasia with weak urinary stream, Cancer ([address] Erectile dysfunction, History of gout, History of thrombocytopenia, Hypercalcemia, Hyperlipidemia, Hypertension, Lung nodule, Proteinuria, Type 2 diabetes mellitus without complication ([address] and Vitamin D deficiency. Past Surgical History:  has a past surgical history that includes Finger trigger release (Right, ); Tonsillectomy and adenoidectomy (age 2); Elbow fracture surgery (Left, age 5); Vasectomy (1971); Cataract removal with implant (Bilateral, ); Colonoscopy (3/29/2011); Colonoscopy (2016); IR PORT PLACEMENT > 5 YEARS (2022); bronchoscopy (N/A, 3/25/2022); and craniotomy (Right, 2022). Assessment   Assessment: Pt continues to be limited during the treatment session due to fatigue. Pt was on 15 L high flow O2 and was able to stay with % during the full treatment.  Pt was able to sit EOB for 8 minutes, requiring Mod A for sitting EOB and Min A for sit to supine. Pt states that he needs to relax and is exhausted. PT will continue to assess EOB and OOB activities with more sessions. PT continues to rec the pt have skilled IP therapy in order to improve strength, mobility and functional mobility i order to decrease care giver burden and promote safety. Will continue to follow. Requires PT Follow-Up: Yes  Activity Tolerance  Activity Tolerance: Patient limited by fatigue     Plan   Plan  Plan:  (2-5)  Current Treatment Recommendations: Strengthening, Gait training, Balance training, Stair training, Functional mobility training, Transfer training, Endurance training, Wheelchair mobility training, Patient/Caregiver education & training, Safety education & training  Safety Devices  Type of Devices: Call light within reach, Left in bed, Bed alarm in place, Nurse notified, All fall risk precautions in place     Restrictions  Position Activity Restriction  Other position/activity restrictions: Up as Tolerated, If platelet count equal to or less than 10 but the patient has received a platelet transfusion, physical therapy or occupational therapy may proceed with treatment. Subjective   Subjective  Subjective: Pt found supine in bed, labored breathing, agreeable to therapy.          Social/Functional History  Social/Functional History  Lives With: Spouse (can provide 24hr assist)  Type of Home: Condo  Home Layout: Two level, Performs ADL's on one level, Able to Live on Main level with bedroom/bathroom  Home Access: Ramped entrance  Bathroom Shower/Tub: Walk-in shower  Bathroom Toilet: Handicap height  Bathroom Equipment: Shower chair, Grab bars in shower, Grab bars around toilet  Home Equipment: Walker, 4 wheeled, Torsten Gallus, Oxygen (transport chair, 2L when needed)  Has the patient had two or more falls in the past year or any fall with injury in the past year?: Yes  Receives Help From: Family  ADL Assistance: Needs assistance  Homemaking Responsibilities: No  Ambulation Assistance: Needs assistance  Active : No  Additional Comments: Information from wife as pt is a questionable historian. In May, he was able to walk in house with RW and was fairly independent with ADL. At beginning of July, he was experiencing functional decline, had fall, found brain mass at hospital admission. Recently, wife has been doing \"everything\" and it \"has been a challenge\". Vision/Hearing       Cognition         Objective   Heart Rate: 95  Heart Rate Source: Telemetry  BP: 114/71  BP Location: Left upper arm  BP Method: Automatic  Patient Position: Semi fowlers  MAP (Calculated): 85.33  Resp: 27  SpO2: 97 %  O2 Device: High flow nasal cannula                          Balance  Sitting:  (SBA for ~8 minutes-while doing UE/LE exerc)  Bed mobility  Supine to Sit: Moderate assistance  Sit to Supine: Minimal assistance  Scooting: Dependent/Total           Balance  Sitting - Static: Good (SBA EOB 8 minutes)  Sitting - Dynamic: Good  Exercise Treatment: x10 Knee to chest (supine), x10 ankle pumps, LAQ sitting EOB        OutComes Score      AM-PAC Score  AM-PAC Inpatient Mobility Raw Score : 9 (08/12/22 1451)  AM-PAC Inpatient T-Scale Score : 30.55 (08/12/22 1451)  Mobility Inpatient CMS 0-100% Score: 81.38 (08/12/22 1451)  Mobility Inpatient CMS G-Code Modifier : CM (08/12/22 1451)          Tinneti Score       Goals  Short Term Goals  Time Frame for Short term goals: Discharge- all ongoing  Short term goal 1: Rolling Left and Right SBA  Short term goal 2: Sit<>Supine Min A  Short term goal 3: Sit<>Stand with RW Min A  Short term goal 4: Stand Pivot with RW Min A  Short term goal 5: Amb 5ft with RW Min A  Patient Goals   Patient goals :  To Return Home and Feel Better       Education  Patient Education  Education Given To: Patient  Education Provided: Role of Therapy;Precautions;Transfer Training;Energy Conservation  Education Method: Demonstration  Barriers to Learning: None  Education Outcome: Continued education needed      Therapy Time   Individual Concurrent Group Co-treatment   Time In 1355         Time Out 1425         Minutes 30          Timed Code Treatment Minutes:   30    Total Treatment Minutes:  30         Leann Posada PT

## 2022-08-12 NOTE — PROGRESS NOTES
Pulmonary & Critical Care Medicine    Admit Date: 2022  PCP: Ute Coy MD    CC:  hypoxia  Events of Last 24 hours:   He looks a bit better today. Breathing is less labored though RR is still high. At the time of evaluation he was on 8 liters, eating lung  Continue to have cough with clear sputum. No fever or chills. Vitals:  Tmax:  VITALS:  /66   Pulse (!) 107   Temp 97.6 °F (36.4 °C) (Oral)   Resp (!) 40   Ht 5' 6\" (1.676 m)   Wt 121 lb 14.6 oz (55.3 kg)   SpO2 96%   BMI 19.68 kg/m²   24HR INTAKE/OUTPUT:    Intake/Output Summary (Last 24 hours) at 2022 1303  Last data filed at 2022 1220  Gross per 24 hour   Intake 2325 ml   Output 4275 ml   Net -1950 ml     CURRENT PULSE OXIMETRY:  SpO2: 96 %  24HR PULSE OXIMETRY RANGE:  SpO2  Av.9 %  Min: 90 %  Max: 100 %    EXAM:  General: No distress. Alert. Frail   Eyes: PERRL. No sclera icterus. No conjunctival injection. ENT: No discharge. Moist oral mucosa   Neck: Trachea midline. Neck is supple   Resp: No accessory muscle use. Diminished air entry bilaterally   CV: Regular rate. Regular rhythm. No mumur or rub. No edema. GI: Non-tender. Non-distended. Normal bowel sounds. Neuro: Awake. Speech is clear. Psych: No anxiety or agitation.      Medications:    IV:   sodium chloride      sodium chloride      potassium chloride      dextrose      sodium chloride 20 mL/hr at 22 0636         Scheduled Meds:   sodium phosphate IVPB  30 mmol IntraVENous Once    insulin glargine  10 Units SubCUTAneous Nightly    pantoprazole  40 mg Oral QAM AC    methylPREDNISolone  60 mg IntraVENous Q12H    cefepime  2,000 mg IntraVENous Q8H    bacitracin-polymyxin b   Topical BID    docusate sodium  100 mg Oral BID    fluticasone  1 spray Each Nostril Daily    levETIRAcetam  500 mg Oral BID    sodium chloride  2 spray Nasal 4x Daily    tamsulosin  0.4 mg Oral Daily    vitamin B-12  1,000 mcg Oral BID    insulin lispro  0-8 Units SubCUTAneous TID WC    insulin lispro  0-4 Units SubCUTAneous Nightly    sodium chloride flush  5-40 mL IntraVENous 2 times per day    Saline Mouthwash  15 mL Swish & Spit 4x Daily AC & HS    enoxaparin  30 mg SubCUTAneous Daily         Diet: ADULT DIET; Regular; Low Microbial  ADULT ORAL NUTRITION SUPPLEMENT; Lunch, Dinner; Frozen Oral Supplement  ADULT ORAL NUTRITION SUPPLEMENT; Breakfast, Lunch; Diabetic Oral Supplement     Results:  CBC:   Recent Labs     08/10/22  0338 08/11/22  0340 08/12/22  0351   WBC 4.5 4.6 4.8   HGB 9.0* 9.3* 9.1*   HCT 27.0* 28.8* 27.2*   MCV 96.8 97.2 96.7   PLT 87* 98* 81*     BMP:   Recent Labs     08/09/22  1903 08/10/22  0338 08/11/22  0340 08/12/22  0351    138 139 138   K 3.8 4.1 3.8 4.0   CL 98* 99 103 102   CO2 27 23 26 26   PHOS 3.3 2.9  --  1.3*   BUN 27* 21* 15 14   CREATININE <0.5* <0.5* <0.5* <0.5*     LIVER PROFILE:   Recent Labs     08/09/22  1903 08/10/22  0338 08/12/22  0351   AST 24 23 27   ALT 23 20 17   BILIDIR <0.2 <0.2 <0.2   BILITOT 0.4 0.4 0.3   ALKPHOS 100 86 105     PT/INR:   Recent Labs     08/09/22 1903 08/11/22  0340   PROTIME 14.2 13.9   INR 1.10 1.08     APTT:   Recent Labs     08/09/22 1903 08/11/22  0340   APTT 28.0 30.1     UA:  Recent Labs     08/09/22  1932   COLORU Yellow   PHUR 6.0   WBCUA 3-5   RBCUA 0-2   BACTERIA 3+*   CLARITYU Clear   SPECGRAV 1.020   LEUKOCYTESUR Negative   UROBILINOGEN 0.2   BILIRUBINUR Negative   BLOODU Negative   GLUCOSEU >=1000*         Assessment/Plan:  67 y.o. male with     Acute hypoxic respiratory failure, O2 supplement at 8-12 liters, stable voer the past 24 hours. However CXR with increased bilateral airspace disease. 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 There is new bilateral GGO that was not present before. This is probably  pneumonitis related to polatuzumab or rituxan, however I can not rule out infectious process. Bronch on 8/10. Nothing is growing so far. [facility] is pending. Continue solu medrol 60mg BID   Check pro-BNP  Agree with lasix x1.       Eliana Vallecillo MD

## 2022-08-13 NOTE — PROGRESS NOTES
Pulmonary & Critical Care Medicine    Admit Date: 2022  PCP: Lizabeth Biswas MD    CC:  hypoxia    Events of Last 24 hours    Oxygen saturation ranged 97 - 100% overnight on 8  - 10 L  Does desaturate with movement  He is sitting up in a chair  Patient had net negative diuresis of 3.2 L with 40 IV Lasix yesterday  Complaining of tailbone pain today  No change in dyspnea    Vitals:  Tmax 98.4  VITALS:  /73   Pulse 98   Temp 98 °F (36.7 °C) (Oral)   Resp 28   Ht 5' 6\" (1.676 m)   Wt 116 lb 13.5 oz (53 kg)   SpO2 97%   BMI 18.86 kg/m²   24HR INTAKE/OUTPUT:    Intake/Output Summary (Last 24 hours) at 2022 0858  Last data filed at 2022 0400  Gross per 24 hour   Intake 700 ml   Output 4050 ml   Net -3350 ml       CURRENT PULSE OXIMETRY:  SpO2: 97 %  24HR PULSE OXIMETRY RANGE:  SpO2  Av.3 %  Min: 79 %  Max: 100 %    EXAM:  General: No distress. Alert. Frail   Eyes: PERRL. No sclera icterus. No conjunctival injection. ENT: No discharge. Moist oral mucosa   Neck: Trachea midline. Neck is supple   Resp: No accessory muscle use. Diminished air entry bilaterally with crackles  CV: Regular rate. Regular rhythm. No mumur or rub. No edema. GI: Non-tender. Non-distended. Normal bowel sounds. Neuro: Awake. Speech is clear. Psych: No anxiety or agitation.      Medications:    IV:   sodium chloride      sodium chloride      potassium chloride      dextrose           Scheduled Meds:   valACYclovir  500 mg Oral BID    dapsone  100 mg Oral Daily    anidulafungin  200 mg IntraVENous Once    And    [START ON 2022] anidulafungin  100 mg IntraVENous Q24H    insulin glargine  10 Units SubCUTAneous Nightly    insulin lispro  0-16 Units SubCUTAneous TID WC    insulin lispro  0-4 Units SubCUTAneous Nightly    pantoprazole  40 mg Oral QAM AC    methylPREDNISolone  60 mg IntraVENous Q12H    cefepime  2,000 mg IntraVENous Q8H    bacitracin-polymyxin b   Topical BID    docusate sodium  100 mg Oral BID    fluticasone  1 spray Each Nostril Daily    levETIRAcetam  500 mg Oral BID    sodium chloride  2 spray Nasal 4x Daily    tamsulosin  0.4 mg Oral Daily    vitamin B-12  1,000 mcg Oral BID    sodium chloride flush  5-40 mL IntraVENous 2 times per day    Saline Mouthwash  15 mL Swish & Spit 4x Daily AC & HS    enoxaparin  30 mg SubCUTAneous Daily         Diet: ADULT DIET; Regular; Low Microbial  ADULT ORAL NUTRITION SUPPLEMENT; Lunch, Dinner; Frozen Oral Supplement  ADULT ORAL NUTRITION SUPPLEMENT; Breakfast, Lunch; Diabetic Oral Supplement     Results:  CBC:   Recent Labs     08/11/22  0340 08/12/22  0351 08/13/22  0344   WBC 4.6 4.8 4.7   HGB 9.3* 9.1* 9.1*   HCT 28.8* 27.2* 27.5*   MCV 97.2 96.7 96.4   PLT 98* 81* 84*       BMP:   Recent Labs     08/11/22  0340 08/12/22  0351 08/13/22  0344    138 138   K 3.8 4.0 4.0    102 98*   CO2 26 26 31   PHOS  --  1.3* 1.5*   BUN 15 14 20   CREATININE <0.5* <0.5* <0.5*       LIVER PROFILE:   Recent Labs     08/12/22 0351   AST 27   ALT 17   BILIDIR <0.2   BILITOT 0.3   ALKPHOS 105       PT/INR:   Recent Labs     08/11/22  0340   PROTIME 13.9   INR 1.08       APTT:   Recent Labs     08/11/22  0340   APTT 30.1     proBNP 9 6    BAL 8/10  Culture, Respiratory [[id number]] Collected: 08/10/22 5415   Order Status: Completed Specimen: BAL- Bronch. Lavage Updated: 08/12/22 1059    CULTURE, RESPIRATORY 50,000-100,000 CFU/mL Normal respiratory jena    Gram Stain Result 3+ WBC's (Polymorphonuclear)   2+ Gram positive rods   1+ Gram negative rods   2+ Gram variable cocci    Narrative:       [facility]: Pending  Cytology:  FINAL DIAGNOSIS:     Bronchial Alveolar Lavage, Right Upper Lobe:   -  No malignant cells identified   -  GMS stain is negative for fungal and pneumocystis jirovecii organisms. Assessment/Plan:  67 y.o. male with     Acute hypoxic respiratory failure, O2 supplement at 8-12 liters, stable voer the past 24 hours.      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 There is new bilateral GGO that was not present before. This is probably  pneumonitis related to polatuzumab or rituxan, however I can not rule out infectious process. Bronch on 8/10. Nothing is growing so far. [facility] is pending.       Continue solu medrol 60mg BID   No improvement with large diuresis  Continue cefepime, Eraxis, and dapsone  Follow BAL cultures and await [facility]  Wean oxygen for goal O2 sat of 88%    Jay Groves MD

## 2022-08-13 NOTE — PROGRESS NOTES
Patient requiring increase in O2 demand with movement. Patient eating lunch and saturating in low 80s on 8L hi-flow. Patient requiring up to 15L hi-flow along with non-rebreather to recover sats. Patient also tachy into 130s when de-satting. Patient asymptomatic, no coughing. Once recovered, patient satting 91-92% on 10L hi-flow. Perfect serve sent to Dr. Dewey Bates to notify of patient status.  Order placed for CXR and SLP eval.

## 2022-08-13 NOTE — PLAN OF CARE
Problem: Safety - Adult  Goal: Free from fall injury  Outcome: Progressing  Flowsheets (Taken 8/13/2022 6816)  Free From Fall Injury: Instruct family/caregiver on patient safety  Note: Pt is a High fall risk. See Margy Diver Fall Score and ABCDS Injury Risk assessments. Explained fall risk precautions to pt and family and rationale behind their use to keep the patient safe. Pt bed is in low position, side rails up, call light and belongings are in reach. Fall wristband applied and present on pts wrist.  Bed alarm on. Pt encouraged to call for assistance. Will continue with hourly rounds for PO intake, pain needs, toileting and repositioning as needed. Problem: Skin/Tissue Integrity  Goal: Absence of new skin breakdown  Description: 1. Monitor for areas of redness and/or skin breakdown  2. Assess vascular access sites hourly  3. Every 4-6 hours minimum:  Change oxygen saturation probe site  4. Every 4-6 hours:  If on nasal continuous positive airway pressure, respiratory therapy assess nares and determine need for appliance change or resting period. Outcome: Progressing  Note: Patient with pressure ulcer to coccyx and skin tears. Q2h repositioning while in bed. Incontinence check q2h. Heels elevated while up in chair and off bed.

## 2022-08-13 NOTE — PROGRESS NOTES
[facility] Progress Note    2022     Zeb Amaya    MRN: [id number]    : 1945    Referring MD: Don Vuong MD  [address], [address], [address],  [address]    SUBJECTIVE:  VEry weak, no acute complaints.     ECOG PS:  (2) Ambulatory and capable of self care, unable to carry out work activity, up and about > 50% or waking hours    KPS: 70% Cares for self; unable to carry on normal activity or to do active work    Isolation: None    Medications    Scheduled Meds:   insulin glargine  10 Units SubCUTAneous Nightly    insulin lispro  0-16 Units SubCUTAneous TID WC    insulin lispro  0-4 Units SubCUTAneous Nightly    pantoprazole  40 mg Oral QAM AC    methylPREDNISolone  60 mg IntraVENous Q12H    cefepime  2,000 mg IntraVENous Q8H    bacitracin-polymyxin b   Topical BID    docusate sodium  100 mg Oral BID    fluticasone  1 spray Each Nostril Daily    levETIRAcetam  500 mg Oral BID    sodium chloride  2 spray Nasal 4x Daily    tamsulosin  0.4 mg Oral Daily    vitamin B-12  1,000 mcg Oral BID    sodium chloride flush  5-40 mL IntraVENous 2 times per day    Saline Mouthwash  15 mL Swish & Spit 4x Daily AC & HS    enoxaparin  30 mg SubCUTAneous Daily     Continuous Infusions:   sodium chloride      sodium chloride      potassium chloride      dextrose       PRN Meds:.sodium phosphate IVPB, ondansetron, prochlorperazine, sodium chloride, sodium chloride flush, sodium chloride, potassium chloride, magnesium sulfate, magnesium hydroxide, Saline Mouthwash, alteplase (CATHFLO) with sterile water injection, glucose, dextrose bolus **OR** dextrose bolus, glucagon (rDNA), dextrose    ROS:  As noted above, otherwise remainder of 10-point ROS negative    Physical Exam:     I&O:    Intake/Output Summary (Last 24 hours) at 2022 0708  Last data filed at 2022 0400  Gross per 24 hour   Intake 940 ml   Output 4050 ml   Net -3110 ml         Vital Signs:  /77   Pulse 79   Temp 98.3 °F (36.8 °C) (Oral)   Resp 22   Ht 5' 6\" (1.676 m)   Wt 121 lb 14.6 oz (55.3 kg)   SpO2 100%   BMI 19.68 kg/m²     Weight:    Wt Readings from Last 3 Encounters:   08/12/22 121 lb 14.6 oz (55.3 kg)   07/25/22 116 lb 10 oz (52.9 kg)   05/31/22 117 lb (53.1 kg)         General: Awake, alert and oriented. HEENT: normocephalic, PERRL, no scleral erythema or icterus, Oral mucosa moist and intact, throat clear  NECK: supple   BACK: Straight   SKIN: warm dry and intact without lesions rashes or masses  CHEST: Bpoor air excursion, no rhonchi  CV: Normal S1 S2, RRR, no MRG  ABD: NT ND normoactive BS, no palpable masses or hepatosplenomegaly  EXTREMITIES: without edema, denies calf tenderness  NEURO: CN II - XII grossly intact  CATHETER:  Right IJ SL PAC (1/11/22) - CDI    Data    CBC:   Recent Labs     08/11/22  0340 08/12/22  0351 08/13/22  0344   WBC 4.6 4.8 4.7   HGB 9.3* 9.1* 9.1*   HCT 28.8* 27.2* 27.5*   MCV 97.2 96.7 96.4   PLT 98* 81* 84*       BMP/Mag:  Recent Labs     08/11/22  0340 08/12/22  0351 08/13/22  0344    138 138   K 3.8 4.0 4.0    102 98*   CO2 26 26 31   PHOS  --  1.3* 1.5*   BUN 15 14 20   CREATININE <0.5* <0.5* <0.5*   MG 1.50* 1.80 1.90       LIVP:   Recent Labs     08/12/22  0351   AST 27   ALT 17   BILIDIR <0.2   BILITOT 0.3   ALKPHOS 105       Coags:   Recent Labs     08/11/22  0340   PROTIME 13.9   INR 1.08   APTT 30.1       Uric Acid   Recent Labs     08/12/22  0351   LABURIC 3.8       Diagnostics:   CT chest (8/9/22):  1. Moderate bilateral groundglass opacity new since prior chest CT consistent with infectious/inflammatory pneumonitis. 2. Background mild-to-moderate lower lobe predominant pulmonary fibrosis without change. 3. A 5 cm lesion in the anterior aspect left lobe of the liver stable to mildly decreased in size and of indeterminate etiology.     PROBLEM LIST:           DLBC  Interstitial lung disease / Pulmonary Fibrosis   Type 2 Diabetes Mellitus   BPH  Acute on Chronic Respiratory Failure (8/2022)      TREATMENT:            R-CHOP w/ Revlimid and Tafasitimab  Polatuzumab/BR (C1D1 8/1/22)       ASSESSMENT AND PLAN:          1. DLBCL: Relapsed/ refractory diffuse large B cell non-Hodgkin's lymphoma now with a large CNS mass, bx proven NHL   - S/p XRT to brain (7/13/22-7/25/22) 2400 cGy over 8 fractions    PLAN:  Jerzy-BR (C1D1 8/1/22). - S/p Decadron 2 mg daily (lowered 8/1/22)   - Cont Keppra 500 mg bid     Cycle 1, Day + 13     2. ID: afebrile w/ possible bacterial multifocal PNA  - Eraxis, Valtrex and Dapsone PPX  - Viral respiratory w/ COVID 19 (8/9/22): Negative  - CT chest (8/9/22): Moderate bilateral groundglass opacity new since prior chest CT consistent with infectious/inflammatory pneumonitis. Background mild-to-moderate lower lobe predominant pulmonary fibrosis without change  - S/p Bronchoscopy w/ BAL and biopsy (8/10/22) - Normal resp. Landon Cola, rest pending  - MRSA swab (8/11/22) - Neg  - Procalcitonin (8/10/22) - 0.8  - Cont Cefepime Day + 4 (started 8/10/22)      3. Heme: Anemia and thrombocytopenia likely r/t recent chemotherapy  - Transfuse for Hgb < 7 and Platelets < 10 K  - No transfusion today     4. Metabolic: Electrolytes and renal fxn stable; hypoPhos. Weight up, but unsure if accurate d/t being a bed weight   - NaPhos 30 mmol x 1 (8/12/22)  - Lasix 40 mg IV x 1 (8/12/22)  - No IVF  - Replace Phos, K+ & Mg per PRN orders     5. GI / Nutrition:          Nutrition:  Severe malnutrition w/ chronic illness    - Cont regular diet  - Add Glucerna shakes BID and Magic cups BID  - Recommend swabbing mouth after all meals. Must be completely awake for PO intake  - Dietary to follow closely  PPX:    - Cont PPI while on steroids   Nausea:  - Cont Zofran and Compazine as needed  Constipation:  no complaints   - Cont Colace bid     6.  Pulm:    - H/o Interstitial lung disease / pulmonary fibrosis  - F/b Dr. Gato Dooley   - S/p bronchoscopy (3/25/22) and PFTs (4/1/22)  - Home O2 - 2 l/min    Hypoxemia:  Admitted w/ acute on chronic respiratory failure  possibly from  pneumonitis from chemotherapy (Rituxan vs Jerzy), but may also have multifocal PNA. Al-Alana thinks PNA is less likely. - Pulm following, appreciate recs  - CT chest (8/9/22) - Moderate bilateral groundglass opacity new since prior chest CT consistent with infectious/inflammatory pneumonitis. Background mild-to-moderate lower lobe predominant pulmonary fibrosis without change. A 5 cm lesion in the anterior aspect left lobe of the liver stable to mildly decreased in size and of indeterminate etiology. - S/p Bronchoscopy w/ BAL and biopsy (8/10/22) - Pending   - Cont Solumedrol 60 mg IV daily (started 8/10/22) --> increased to 60mg iv BID (8/12/22) due to increased oxygen requirements   - See ID section for additional treatment and management       7. Endo:   - H/o T2DM   T2DM:  exacerbated by steroids  - Home regimen: on humalog SSI, janumet and jardiance  - Resume Lantus 10 units nightly (stopped 8/11/22, restart 8/12/22)   - Cont Humalog Med SSI AS/HS       8. MSK:  he has acute debilitation and generalized weakness d/t CNS lymphoma and hypoxemia   - Consult PT/OT - he is extremely weak and may required ARU or SNF when hypoxemia improves     9.   :  - H/o BPH  BPH:  - Cont Flomax daily        - DVT Prophylaxis: Platelets >58,311 cells/dL, - daily lovenox prophylaxis ordered  Contraindications to pharmacologic prophylaxis: None  Contraindications to mechanical prophylaxis: None    - Disposition: Unknown at this time; once hypoxemia improves     The patient was seen and examined by Dr. Elli Vang MD  [facility]  Please contact me through [facility]

## 2022-08-13 NOTE — PROGRESS NOTES
4 Eyes Skin Assessment     NAME:  Tracy Denton OF BIRTH:  1945  MEDICAL RECORD NUMBER:  [id number]    The patient is being assess for  Shift Handoff    I agree that 2 RN's have performed a thorough Head to Toe Skin Assessment on the patient. ALL assessment sites listed below have been assessed. Areas assessed by both nurses:    Head, Face, Ears, Shoulders, Back, Chest, Arms, Elbows, Hands, Sacrum. Buttock, Coccyx, Ischium, and Legs. Feet and Heels        Does the Patient have a Wound? Yes wound(s) were present on assessment.  LDA wound assessment was Initiated and completed        Giorgio Prevention initiated:  Yes   Wound Care Orders initiated:  Yes    Pressure Injury (Stage 3,4, Unstageable, DTI, NWPT, and Complex wounds) if present place referral/consult order under [de-identified] Yes    New and Established Ostomies if present place consult order under : NA      Nurse 1 eSignature: Electronically signed by Asuncion Simms RN on 8/12/22 at 10:12 PM EDT    **SHARE this note so that the co-signing nurse is able to place an eSignature**    Nurse 2 eSignature: Electronically signed by Frances Morris on 8/13/22 at 7:14 AM EDT

## 2022-08-13 NOTE — PROGRESS NOTES
Speech Language Pathology  Facility/Department: [address]   CLINICAL BEDSIDE SWALLOW EVALUATION  Treatment   NAME: Chandni Holland  : 1945  MRN: [id number]    ADMISSION DATE: 2022  ADMITTING DIAGNOSIS: has Type 2 diabetes mellitus, controlled ([address] Essential hypertension; Other hyperlipidemia; Vitamin D deficiency; Onychomycosis; History of hypercalcemia; Hand arthritis; History of thrombocytopenia; History of gout; Type 2 diabetes mellitus without complication ([address] Benign prostatic hyperplasia with weak urinary stream; Lung nodule; Hypercalcemia; Proteinuria; Mixed hyperlipidemia; Rib pain on right side; Shortness of breath; Microalbuminuria; Generalized weakness; Fatigue; Balance problems; Neck mass; Weight loss; Appetite loss; Enlarged lymph node in neck; Urinary frequency; Diarrhea; B-cell lymphoma of solid organ excluding spleen ([address] Low magnesium level; Parotid mass; Liver mass; Skin abrasion; Diffuse large B-cell lymphoma ([address] Double vision; Dizziness; Urinary urgency; Constipation; Pain with urination; ILD (interstitial lung disease) ([address] Allergic rhinitis; Diabetes mellitus type 2, controlled, without complications ([address] Type 2 diabetes mellitus, uncontrolled, with neuropathy ([address] Brain mass; Severe malnutrition ([address] and Hypoxia on their problem list.  ONSET DATE: 22    Recent Chest Xray 22     Increased bilateral airspace disease   MBS 22  Patient presents with mild-moderate oropharyngeal dysphagia in the setting of brain mass and generalized weakness. Pt demonstrated poor swallow coordination with aspiration of thin liquid x1 with straw presentation. No aspiration occurred with thin liquid via tsp/cup, nectar thick liquid, puree or soft solid. Recommend continue soft and bite sized diet with thin liquids - cup only, small sips, sit fully upright and consistent oral care. SLP to follow.  Recommend soft and bite sized, thin liquids, no straws Date of Eval: 8/13/2022  Evaluating Therapist: JASMYN Hay    Current Diet level:  Current Diet : Regular      Primary Complaint  Patient Complaint: pt is without complaints    Pain:   Denied pain     Reason for Referral  Ro Mckinnon was referred for a bedside swallow evaluation to assess the efficiency of his swallow function, identify signs and symptoms of aspiration and make recommendations regarding safe dietary consistencies, effective compensatory strategies, and safe eating environment. History of Present Illness:      Mr. Grazyna Teixeira  is a 67 y.o. male with stage IV non-Hodgkin lymphoma. He recently was admitted after a fall at home. For several days prior to hospitalization, the patient had worsening speech and altered gait. His wife also noted that he will have periods of nonresponsiveness where he would stare off into space. In the ER, he underwent a head CT that demonstrated a mass in the basal ganglia with associated vasogenic edema and midline shift. He has since then undergone an MRI which demonstrated an approximate 4 cm mass. He was started on steroids and Keppra. He underwent biopsy which suggested Involved with malignant B-cell lymphoma with aggressive morphologic   features. Patient was treated with dexamethasone and radiation therapy. He was then started on Jerzy/BR on 8/1/22. His wife called [facility] on 8/9/22 and reported that overnight his breathing was labored. He had home oxygen, which he had not needed since prior his last hospitalization. She placed home O2 on him and noted that his saturations were in the 70s. He has had a cough but has not had fevers. He presented to [facility] for further evaluation and was hypoxic at 63% upon arrival. He quickly improved after being placed on oxygen to the mid 90s. He denied fevers or chills or chest pain. He endorses shortness of breath at times. Given hypoxia, he will be admitted for further work up and treatment.  Upon admission, he will have a viral respiratory panel, a CT scan of his chest and pulmonology will be consulted. Impression  Pt known to speech therapy department from previous admission. Pt had MBS on 7/5/22 which showed aspiration of thin liquids by straw. (See details above) Referral rec'd this date due to Patient eating lunch and saturating in low 80s on 8L hi-flow. Patient requiring up to 15L hi-flow along with non-rebreather to recover stats. Pt alert and oriented. Wife arrived at the beginning of the assessment. Of concern, pt's RR fluctuated between 24-31 prior to PO trials. RR >25 carries a heightened aspiration risk. [facility]. Pt had no difficulty closing lips around spoon or drawing liquid up a straw. Pt had minor difficulty with mastication with solids. There was no anterior spillage or oral residue noted with any consistency. Pt with questionable delayed swallow response with solids. Pt would open mouth for inspection when asked pt if he had swallowed, but questionable laryngeal movement noted. Pt was then able to then dry swallow on command with movement observed. Pt was instructed to consume 3oz water continuously after which Pt noted to appear to attempt to stifle a cough. When questioned about the need to cough, pt denied need to cough and also denied sensation of aspiration. Although pt not showing obvious outward signs of aspiration such as coughing, throat clearing and change of voice, question pt's full ability to coordinate breathing with swallowing given fluctuating RR. By the end of the session, RR increased to as high as 40. Recommend downgrading diet to Soft and Bite sized to decrease fatigue with thin liquids by small sips by cup- NO STRAWS. Also recommend not allowing pt to eat if RR is above 30. Will con't to assess for need for repeat MBS.      Dysphagia Diagnosis: Mild oral stage dysphagia;Mild to moderate pharyngeal stage dysphagia  Dysphagia Outcome Severity Scale: Level 4: Mild moderate dysphagia- Intermittent supervision/cueing. One - two diet consistencies restricted     Treatment Plan  Requires SLP Intervention: Yes     D/C Recommendations: To be determined       Recommended Diet and Intervention   Diet recommendation- downgrade to Dysphagia III/soft and bite sized with thin liquids by cup/small sips- NO STRAWS  Do not allow pt to eat if RR is above 30  Will con't to assess for need for possible repeat MBS      Recommended Form of Meds: PO  Recommendations: Dysphagia treatment  Therapeutic Interventions: Pharyngeal exercises;Oral care; Patient/Family education    Compensatory Swallowing Strategies  Compensatory Swallowing Strategies : Upright as possible for all oral intake; No straws;Eat/Feed slowly; Remain upright for 30-45 minutes after meals; External pacing;Small bites/sips (stop eating if SOB, do not allow to eat when RR 30 or above)    Treatment/Goals  1-The patient will tolerate recommended diet without observed clinical signs of aspiration    2- The pt/caregiver will demonstrate understanding of swallowing recommendations and concerns. 8/13- The pt and wife were educated to purpose of the visit, anatomy and physiology of the swallow, concerns for aspiration, role breathing plays in swallowing, concern for increased risk for aspiration when respiratory rate is elevated, swallowing strategies, diet recommendations, possibility of being made NPO if s/s aspiration emerge and plan to re-assess for possible repeat MBS. The pt  and wife stated comprehension. con't goal      General  Chart Reviewed: Yes  Behavior/Cognition: Alert; Cooperative;Pleasant mood  Respiratory Status: O2 via nasual cannula  O2 Device: Nasal cannula  Communication Observation: Functional  Follows Directions: Simple  Dentition: Adequate  Patient Positioning: Upright in bed  Baseline Vocal Quality: Normal  Volitional Cough: Weak  Prior Dysphagia History: pt had MBS 7/5/22- aspiration of thin by straw was noted Vision/Hearing  Vision  Vision: Within Functional Limits  Hearing  Hearing: Within functional limits          Prognosis  Individuals consulted  Consulted and agree with results and recommendations: RN;Patient; Family member  Family member consulted: wife  RN Name: Ashia Jose Ha  Patient Education: Role of SLP  Patient Education Response: Verbalizes understanding  Safety Devices in place: Yes  Type of devices: Call light within reach       Therapy Time  SLP Individual Minutes  Time In: 8041  Time Out: 1500  Minutes: 26            Pt's goal:pt denied difficulty with swallowing so did not state goal       Plan:  Continue goals per POC 3-5x per week   Recommended diet:owngrade to Dysphagia III/soft and bite sized with thin liquids by cup/small sips- NO STRAWS  Do not allow pt to eat if RR is above 30  Will con't to assess for need for possible repeat MBS   Total treatment time:26  Pt's discharge plan:home   Discharge Plan: To be determined closer to discharge  Discussed with Sydney TELLES   Needs within reach.        Kevon Moeller [facility] [address] [facility]  Pg # [phone]  This document will serve as a discharge summary if pt discharge before next treatment   session

## 2022-08-13 NOTE — PROGRESS NOTES
4 Eyes Admission Assessment     I agree as the admission nurse that 2 RN's have performed a thorough Head to Toe Skin Assessment on the patient. ALL assessment sites listed below have been assessed on admission. Areas assessed by both nurses:   [x]   Head, Face, and Ears   [x]   Shoulders, Back, and Chest  [x]   Arms, Elbows, and Hands   [x]   Coccyx, Sacrum, and Ischium  [x]   Legs, Feet, and Heels        Does the Patient have Skin Breakdown?   Yes a wound was noted on the Admission Assessment and an LDA was Initiated documentation include the Savi-wound, Wound Assessment, Measurements, Dressing Treatment, Drainage, and Color\",         Igorgio Prevention initiated:  Yes   Wound Care Orders initiated:  Yes      WOC nurse consulted for Pressure Injury (Stage 3,4, Unstageable, DTI, NWPT, and Complex wounds) or Giorgio score 18 or lower:  Yes      Nurse 1 eSignature: Electronically signed by Abhilash Patel on 8/13/22 at 5:33 PM EDT    **SHARE this note so that the co-signing nurse is able to place an eSignature**    Nurse 2 eSignature: Electronically signed by Jermain Mg RN on 8/13/22 at 11:19 PM EDT

## 2022-08-14 NOTE — PROGRESS NOTES
[facility] Progress Note    2022     Mila Rivera    MRN: [id number]    : 1945    Referring MD: MD Mark Del Toro 1944,  [address]    SUBJECTIVE:  Weak, dyspnea with minimal exertion.     ECOG PS:  (2) Ambulatory and capable of self care, unable to carry out work activity, up and about > 50% or waking hours    KPS: 70% Cares for self; unable to carry on normal activity or to do active work    Isolation: None    Medications    Scheduled Meds:   valACYclovir  500 mg Oral BID    dapsone  100 mg Oral Daily    voriconazole  200 mg Oral 2 times per day    methylPREDNISolone  60 mg IntraVENous Daily    insulin glargine  10 Units SubCUTAneous Nightly    insulin lispro  0-16 Units SubCUTAneous TID WC    insulin lispro  0-4 Units SubCUTAneous Nightly    pantoprazole  40 mg Oral QAM AC    cefepime  2,000 mg IntraVENous Q8H    bacitracin-polymyxin b   Topical BID    docusate sodium  100 mg Oral BID    fluticasone  1 spray Each Nostril Daily    levETIRAcetam  500 mg Oral BID    sodium chloride  2 spray Nasal 4x Daily    tamsulosin  0.4 mg Oral Daily    vitamin B-12  1,000 mcg Oral BID    sodium chloride flush  5-40 mL IntraVENous 2 times per day    Saline Mouthwash  15 mL Swish & Spit 4x Daily AC & HS    enoxaparin  30 mg SubCUTAneous Daily     Continuous Infusions:   sodium chloride      sodium chloride      potassium chloride      dextrose       PRN Meds:.acetaminophen, sodium phosphate IVPB, ondansetron, prochlorperazine, sodium chloride, sodium chloride flush, sodium chloride, potassium chloride, magnesium sulfate, magnesium hydroxide, Saline Mouthwash, alteplase (CATHFLO) with sterile water injection, glucose, dextrose bolus **OR** dextrose bolus, glucagon (rDNA), dextrose    ROS:  As noted above, otherwise remainder of 10-point ROS negative    Physical Exam:     I&O:    Intake/Output Summary (Last 24 hours) at 2022 7954  Last data filed at 2022 0500  Gross per 24 hour   Intake 1400 ml   Output 1800 ml   Net -400 ml         Vital Signs:  /67   Pulse 96   Temp 98.2 °F (36.8 °C) (Oral)   Resp 28   Ht 5' 6\" (1.676 m)   Wt 116 lb 13.5 oz (53 kg)   SpO2 97%   BMI 18.86 kg/m²     Weight:    Wt Readings from Last 3 Encounters:   08/13/22 116 lb 13.5 oz (53 kg)   07/25/22 116 lb 10 oz (52.9 kg)   05/31/22 117 lb (53.1 kg)         General: Awake, alert and oriented. HEENT: normocephalic, PERRL, no scleral erythema or icterus, Oral mucosa moist and intact, throat clear  NECK: supple   BACK: Straight   SKIN: warm dry and intact without lesions rashes or masses  CHEST: poor air excursion, no rhonchi  CV: Normal S1 S2, irreg, no MRG  ABD: NT ND normoactive BS, no palpable masses or hepatosplenomegaly  EXTREMITIES: without edema, denies calf tenderness  NEURO: CN II - XII grossly intact  CATHETER:  Right IJ SL PAC (1/11/22) - CDI    Data    CBC:   Recent Labs     08/12/22 0351 08/13/22  0344 08/14/22  0454   WBC 4.8 4.7 4.7   HGB 9.1* 9.1* 9.5*   HCT 27.2* 27.5* 28.8*   MCV 96.7 96.4 95.8   PLT 81* 84* 84*       BMP/Mag:  Recent Labs     08/12/22 0351 08/13/22  0344 08/14/22  0454    138 140   K 4.0 4.0 3.8    98* 99   CO2 26 31 31   PHOS 1.3* 1.5* 1.2*   BUN 14 20 16   CREATININE <0.5* <0.5* <0.5*   MG 1.80 1.90 1.60*       LIVP:   Recent Labs     08/12/22 0351   AST 27   ALT 17   BILIDIR <0.2   BILITOT 0.3   ALKPHOS 105       Coags:   No results for input(s): PROTIME, INR, APTT in the last 72 hours. Uric Acid   Recent Labs     08/12/22 0351   LABURIC 3.8       Diagnostics:   CT chest (8/9/22):  1. Moderate bilateral groundglass opacity new since prior chest CT consistent with infectious/inflammatory pneumonitis. 2. Background mild-to-moderate lower lobe predominant pulmonary fibrosis without change. 3. A 5 cm lesion in the anterior aspect left lobe of the liver stable to mildly decreased in size and of indeterminate etiology.     PROBLEM LIST: DLBC  Interstitial lung disease / Pulmonary Fibrosis   Type 2 Diabetes Mellitus   BPH  Acute on Chronic Respiratory Failure (8/2022)      TREATMENT:            R-CHOP w/ Revlimid and Tafasitimab  Polatuzumab/BR (C1D1 8/1/22)       ASSESSMENT AND PLAN:          1. DLBCL: Relapsed/ refractory diffuse large B cell non-Hodgkin's lymphoma now with a large CNS mass, bx proven NHL   - S/p XRT to brain (7/13/22-7/25/22) 2400 cGy over 8 fractions    PLAN:  Jerzy-BR (C1D1 8/1/22). - S/p Decadron 2 mg daily (lowered 8/1/22)   - Cont Keppra 500 mg bid     Cycle 1, Day + 14     2. ID: afebrile  but on high dose steroids. Likely fungal pna  - Cont Valtrex and Dapsone PPX  - Start Vori 200 mg BID (8/13/22)  - Viral respiratory w/ COVID 19 (8/9/22): Negative  - CT chest (8/9/22): Moderate bilateral groundglass opacity new since prior chest CT consistent with infectious/inflammatory pneumonitis. Background mild-to-moderate lower lobe predominant pulmonary fibrosis without change  - S/p Bronchoscopy w/ BAL and biopsy (8/10/22) - Normal resp. Felipa, rest.  Galactomannan positive 1.33  - Send Fungitell and galactomannan 8/14/22  - MRSA swab (8/11/22) - Neg  - Procalcitonin (8/10/22) - 0.8  - Cont Cefepime Day + 5/7 (started 8/10/22)      3. Heme: Anemia and thrombocytopenia likely r/t recent chemotherapy  - Transfuse for Hgb < 7 and Platelets < 10 K  - No transfusion today     4. Metabolic: Electrolytes and renal fxn stable; hypoPhos. Steroid induced hyperglycemia. Weight up, but unsure if accurate d/t being a bed weight   - NaPhos 30 mmol daily prn  - Lasix 40 mg IV x 1 (8/12/22)  - No IVF  - Replace Phos, K+ & Mg per PRN orders     5. GI / Nutrition:          Nutrition:  Severe malnutrition w/ chronic illness    - Cont regular diet  - SLP eval 8/13/22:  Aspiration with straws, regular diet and thin liquids with cups only  - Add Glucerna shakes BID and Magic cups BID  - Recommend swabbing mouth after all meals.  Must be completely awake and upright for PO intake  - Dietary to follow closely  PPX:    - Cont PPI while on steroids   Nausea:  - Cont Zofran and Compazine as needed  Constipation:  no complaints   - Cont Colace bid     6. Pulm:    - H/o Interstitial lung disease / pulmonary fibrosis  - F/b Dr. Esquivel Alex   - S/p bronchoscopy (3/25/22) and PFTs (4/1/22)  - Home O2 - 2 l/min    Hypoxemia:  Admitted w/ acute on chronic respiratory failure  possibly from  pneumonitis from chemotherapy (Rituxan vs Jerzy), but may also have multifocal PNA. Any thinks PNA is less likely. - Pulm following, appreciate recs  - CT chest (8/9/22) - Moderate bilateral groundglass opacity new since prior chest CT consistent with infectious/inflammatory pneumonitis. Background mild-to-moderate lower lobe predominant pulmonary fibrosis without change. A 5 cm lesion in the anterior aspect left lobe of the liver stable to mildly decreased in size and of indeterminate etiology. - S/p Bronchoscopy w/ BAL and biopsy (8/10/22) - Pending, + galactomannan  - Cont Solumedrol 60 mg IV daily (started 8/10/22) --> increased to 60mg iv BID (8/12/22) due to increased oxygen requirements. Decreased to 60 mg daily 8/14/22 d/t likely fungal pneumonia. Decrease to Prednisone 20 mg 8/15, 10 mg 8/16/22  - See ID section for additional treatment and management       7. Endo:   - H/o T2DM   T2DM:  exacerbated by steroids  - Home regimen: on humalog SSI, janumet and jardiance  - Resume Lantus 10 units nightly (stopped 8/11/22, restart 8/12/22). Will continue to same dose despite high blood glucose due to 50% decrease in steroids today- last dose today  - Cont Humalog Med SSI AS/HS       8. MSK:  he has acute debilitation and generalized weakness d/t CNS lymphoma and hypoxemia   - Consult PT/OT - he is extremely weak and may required ARU or SNF when hypoxemia improves     9.   :  - H/o BPH  BPH:  - Cont Flomax daily        - DVT Prophylaxis: Platelets >10,444 cells/dL, - daily lovenox prophylaxis ordered  Contraindications to pharmacologic prophylaxis: None  Contraindications to mechanical prophylaxis: None    - Disposition: Unknown at this time; once hypoxemia improves     The patient was seen and examined by Dr. Alice Blanca MD  [facility]  Please contact me through [address]

## 2022-08-14 NOTE — PROGRESS NOTES
4 Eyes Admission Assessment     I agree as the admission nurse that 2 RN's have performed a thorough Head to Toe Skin Assessment on the patient. ALL assessment sites listed below have been assessed on admission. Areas assessed by both nurses: [facility]  and [facility]  [x]   Head, Face, and Ears   [x]   Shoulders, Back, and Chest  [x]   Arms, Elbows, and Hands   [x]   Coccyx, Sacrum, and Ischium  [x]   Legs, Feet, and Heels        Does the Patient have Skin Breakdown?   Yes a wound was noted on the Admission Assessment and an LDA was Initiated documentation include the Savi-wound, Wound Assessment, Measurements, Dressing Treatment, Drainage, and Color\",         Giorgio Prevention initiated:  Yes   Wound Care Orders initiated:  Yes      [facility] nurse consulted for Pressure Injury (Stage 3,4, Unstageable, DTI, NWPT, and Complex wounds) or Giorgio score 18 or lower:  No      Nurse 1 eSignature: Electronically signed by Won Loera RN on 8/14/22 at 7:55 AM EDT    **SHARE this note so that the co-signing nurse is able to place an eSignature**    Nurse 2 eSignature: Electronically signed by Meryle Lay, RN on 8/14/22 at 7:55 AM EDT

## 2022-08-14 NOTE — PROGRESS NOTES
Patient noted to desat into the 70's x 2 when he was urinating. Patient stated the first time that he was not having any pain. This AM he stated that he did have pain with urination. Patient noted to be holding breath at this time. O2 turned up to 15 L/HF in order for patient sats to recover. O2 was turned back down to 10L/HF when he was back at 92%. Wallace Alcocer NP and Dr. Milind Hong notified of the complaint of pain with urination. UA and culture ordered. Patient given a complete bath and linen change this AM.  All mepilex borders changed also. Patient did not desat any lower than 93% during bath this AM.  Phosphorous was low at 1.2. It was noted that Sodium phosphate and Cefepime do not have any data on compatibility. Wallace Alcocer NP notified of this and that he only has a port for infusions. Wallace Alcocer stated it was ok to infuse the Cefepime over 30 minutes this AM in order to give sodium phosphate in a timely manner. Cefepime started early and report given to Nithya TELLES to given Sodium phosphate next.

## 2022-08-14 NOTE — PROGRESS NOTES
Notified Dr. Marni Dillard via perfect serve at :    \"Patient's blood sugar 432. Repeat 408. Gave 16 units humalog per sliding scale and notifying per protocol. Would you like anything additional? Thank you. \"    Blood sugars obtained via fingerstick d/t inability to draw blood off port at this time. Spoke with Dr. Marni Dillard on phone. Per physician, no new orders at this time d/t rapidly tapering steroids; however, instructed to notify should next blood sugar >400. Will continue to monitor.

## 2022-08-14 NOTE — PROGRESS NOTES
4 Eyes Admission Assessment     I agree as the admission nurse that 2 RN's have performed a thorough Head to Toe Skin Assessment on the patient. ALL assessment sites listed below have been assessed on admission. Areas assessed by both nurses: Lorene Ida and Kitty  [x]   Head, Face, and Ears   [x]   Shoulders, Back, and Chest  [x]   Arms, Elbows, and Hands   [x]   Coccyx, Sacrum, and Ischium  [x]   Legs, Feet, and Heels        Does the Patient have Skin Breakdown?   Yes a wound was noted on the Admission Assessment and an LDA was Initiated documentation include the Savi-wound, Wound Assessment, Measurements, Dressing Treatment, Drainage, and Color\",         Giorgio Prevention initiated:  Yes   Wound Care Orders initiated:  Yes      [address]  nurse consulted for Pressure Injury (Stage 3,4, Unstageable, DTI, NWPT, and Complex wounds) or Giorgio score 18 or lower:  No      Nurse 1 eSignature: Electronically signed by Marietta Arnold RN on 8/14/22 at 7:24 PM EDT    **SHARE this note so that the co-signing nurse is able to place an eSignature**    Nurse 2 eSignature: {Esignature:[id number]}

## 2022-08-14 NOTE — PROGRESS NOTES
Pulmonary & Critical Care Medicine    Admit Date: 2022  PCP: Ute Coy MD    CC:  hypoxia    Events of Last 24 hours    Oxygen saturation ranged 97 - 100% overnight on 8  - 10 L  Does desaturate with movement  He is sitting up in a chair  Patient had net negative diuresis of 3.2 L with 40 IV Lasix yesterday  Complaining of tailbone pain today  No change in dyspnea    Vitals:  Tmax 98.4  VITALS:  /67   Pulse 96   Temp 98.2 °F (36.8 °C) (Oral)   Resp 28   Ht 5' 6\" (1.676 m)   Wt 118 lb 2.7 oz (53.6 kg)   SpO2 97%   BMI 19.07 kg/m²   24HR INTAKE/OUTPUT:    Intake/Output Summary (Last 24 hours) at 2022 09  Last data filed at 2022 0646  Gross per 24 hour   Intake 1540 ml   Output 1800 ml   Net -260 ml       CURRENT PULSE OXIMETRY:  SpO2: 97 %  24HR PULSE OXIMETRY RANGE:  SpO2  Av.9 %  Min: 87 %  Max: 98 %    EXAM:  General: No distress. Alert. Frail   Eyes: PERRL. No sclera icterus. No conjunctival injection. ENT: No discharge. Moist oral mucosa   Neck: Trachea midline. Neck is supple   Resp: No accessory muscle use. Diminished air entry bilaterally with crackles  CV: Regular rate. Regular rhythm. No mumur or rub. No edema. GI: Non-tender. Non-distended. Normal bowel sounds. Neuro: Awake. Speech is clear. Psych: No anxiety or agitation.      Medications:    IV:   sodium chloride      sodium chloride      potassium chloride      dextrose           Scheduled Meds:   valACYclovir  500 mg Oral BID    dapsone  100 mg Oral Daily    voriconazole  200 mg Oral 2 times per day    methylPREDNISolone  60 mg IntraVENous Daily    insulin glargine  10 Units SubCUTAneous Nightly    insulin lispro  0-16 Units SubCUTAneous TID WC    insulin lispro  0-4 Units SubCUTAneous Nightly    pantoprazole  40 mg Oral QAM AC    cefepime  2,000 mg IntraVENous Q8H    bacitracin-polymyxin b   Topical BID    docusate sodium  100 mg Oral BID    fluticasone  1 spray Each Nostril Daily    levETIRAcetam 500 mg Oral BID    sodium chloride  2 spray Nasal 4x Daily    tamsulosin  0.4 mg Oral Daily    vitamin B-12  1,000 mcg Oral BID    sodium chloride flush  5-40 mL IntraVENous 2 times per day    Saline Mouthwash  15 mL Swish & Spit 4x Daily AC & HS    enoxaparin  30 mg SubCUTAneous Daily         Diet: ADULT ORAL NUTRITION SUPPLEMENT; Lunch, Dinner; Frozen Oral Supplement  ADULT ORAL NUTRITION SUPPLEMENT; Breakfast, Lunch; Diabetic Oral Supplement  ADULT DIET; Dysphagia - Soft and Bite Sized; Low Microbial     Results:  CBC:   Recent Labs     08/12/22 0351 08/13/22 0344 08/14/22  0454   WBC 4.8 4.7 4.7   HGB 9.1* 9.1* 9.5*   HCT 27.2* 27.5* 28.8*   MCV 96.7 96.4 95.8   PLT 81* 84* 84*       BMP:   Recent Labs     08/12/22 0351 08/13/22 0344 08/14/22  0454    138 140   K 4.0 4.0 3.8    98* 99   CO2 26 31 31   PHOS 1.3* 1.5* 1.2*   BUN 14 20 16   CREATININE <0.5* <0.5* <0.5*       LIVER PROFILE:   Recent Labs     08/12/22 0351   AST 27   ALT 17   BILIDIR <0.2   BILITOT 0.3   ALKPHOS 105       PT/INR:   No results for input(s): PROTIME, INR in the last 72 hours. APTT:   No results for input(s): APTT in the last 72 hours. proBNP 9 6    BAL 8/10  Culture, Respiratory [[id number]] Collected: 08/10/22 4420   Order Status: Completed Specimen: BAL- Bronch. Lavage Updated: 08/12/22 1054    CULTURE, RESPIRATORY 50,000-100,000 CFU/mL Normal respiratory jena    Gram Stain Result 3+ WBC's (Polymorphonuclear)   2+ Gram positive rods   1+ Gram negative rods   2+ Gram variable cocci    Narrative:       [facility]: Pending  Cytology:  FINAL DIAGNOSIS:     Bronchial Alveolar Lavage, Right Upper Lobe:   -  No malignant cells identified   -  GMS stain is negative for fungal and pneumocystis jirovecii organisms.      Assessment/Plan:  67 y.o. 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 twice daily yesterday for possible Aspergillus pneumonia  Check serum galactomannan and beta-1 3D glucan  Follow BAL cultures and await [facility]  Titrate oxygen for goal O2 sat of 88%    Discussed with Dr. Lance Byrnes MD

## 2022-08-15 PROBLEM — J96.01 ACUTE HYPOXEMIC RESPIRATORY FAILURE (HCC): Status: ACTIVE | Noted: 2022-01-01

## 2022-08-15 PROBLEM — J98.4 PNEUMONITIS: Status: ACTIVE | Noted: 2022-01-01

## 2022-08-15 PROBLEM — J18.9 PNEUMONITIS: Status: ACTIVE | Noted: 2022-01-01

## 2022-08-15 NOTE — CARE COORDINATION
Case Management Assessment           Daily Note                 Date/ Time of Note: 8/15/2022 3:24 PM         Note completed by: RUSS Harris    Patient Name: Hayley Born  YOB: 1945    Diagnosis:Hypoxia [R09.02]  Patient Admission Status: Inpatient    Date of Admission:8/9/2022  4:44 PM Length of Stay: 6 GLOS: GMLOS: 4.8    Current Plan of Care: SNF?  ________________________________________________________________________________________  PT AM-PAC: 11 / 24 per last evaluation on: 8/15/2022    OT AM-PAC: 11 / 24 per last evaluation on: 8/15/2022    DME Needs for discharge: defer  ________________________________________________________________________________________  Discharge Plan: SNF: TBD    Tentative discharge date: tbd    Current barriers to discharge: medical clearance, precert    Referrals completed: SNF: [facility], [facility]    Resources/ information provided: Not indicated at this time  ________________________________________________________________________________________  Case Management Notes: Contacted patient's spouse to discuss SNF placement. She is interested in [facility] and [facility]. Referrals sent. Will follow-up with patient and spouse once SNFs have determined if they can accept. Moustapha Lebron and his family were provided with choice of provider; he and his family are in agreement with the discharge plan.     Care Transition Patient: Yes    Scott Martin [facility]  Case Management Department  Ph: [phone]  Fax: [phone]

## 2022-08-15 NOTE — PROGRESS NOTES
Progress Note    Admit Date: 8/9/2022  Day: 6  Diet: ADULT ORAL NUTRITION SUPPLEMENT; Lunch, Dinner; Frozen Oral Supplement  ADULT ORAL NUTRITION SUPPLEMENT; Breakfast, Lunch; Diabetic Oral Supplement  ADULT DIET; Dysphagia - Soft and Bite Sized; Low Microbial    CC: Hypoxia    Interval history:   No acute overnight events. Patient hemodynamically stable, oxygen requirement fluctuating but in the last 24 hours improved from 15 L to 10 L. Reports \"feeling good. \"  Poor p.o. intake. Output of around 1200 ml. Aspergillus Galacto AG on 08/10 positive.      Medications:     Scheduled Meds:   insulin glargine  10 Units SubCUTAneous Nightly    pantoprazole  40 mg Oral QAM AC    cefepime  2,000 mg IntraVENous Q8H    [START ON 8/16/2022] predniSONE  10 mg Oral Daily    valACYclovir  500 mg Oral BID    voriconazole  200 mg Oral 2 times per day    insulin lispro  0-16 Units SubCUTAneous TID WC    insulin lispro  0-4 Units SubCUTAneous Nightly    bacitracin-polymyxin b   Topical BID    docusate sodium  100 mg Oral BID    fluticasone  1 spray Each Nostril Daily    levETIRAcetam  500 mg Oral BID    sodium chloride  2 spray Nasal 4x Daily    tamsulosin  0.4 mg Oral Daily    vitamin B-12  1,000 mcg Oral BID    sodium chloride flush  5-40 mL IntraVENous 2 times per day    Saline Mouthwash  15 mL Swish & Spit 4x Daily AC & HS    enoxaparin  30 mg SubCUTAneous Daily     Continuous Infusions:   sodium chloride      sodium chloride      potassium chloride      dextrose       PRN Meds:sodium phosphate IVPB, acetaminophen, ondansetron, prochlorperazine, sodium chloride, sodium chloride flush, sodium chloride, potassium chloride, magnesium sulfate, magnesium hydroxide, Saline Mouthwash, alteplase (CATHFLO) with sterile water injection, glucose, dextrose bolus **OR** dextrose bolus, glucagon (rDNA), dextrose    Objective:   Vitals:   T-max:  Patient Vitals for the past 8 hrs:   BP Temp Temp src Pulse Resp SpO2 Weight   08/15/22 0818 -- -- 99  --  99   CO2 31 31  --  35*   BUN 20 16  --  17   CREATININE <0.5* <0.5*  --  <0.5*   GLUCOSE 251* 217*  --  285*   CALCIUM 8.8 8.9  --  9.1   MG 1.90 1.60*  --  1.80   PHOS 1.5* 1.2* 2.3* 2.1*   ANIONGAP 9 10  --  6     Hepatic:   Recent Labs     08/15/22  0502   AST 27   ALT 33   BILITOT 0.3   BILIDIR <0.2   PROT 5.2*   LABALBU 3.0*   ALKPHOS 146*     Troponin: No results for input(s): TROPONINI in the last 72 hours. BNP: No results for input(s): BNP in the last 72 hours. Lipids: No results for input(s): CHOL, HDL in the last 72 hours. Invalid input(s): LDLCALCU, TRIGLYCERIDE  ABGs:  No results for input(s): PHART, GPQ1OCY, PO2ART, RNZ1HWT, BEART, THGBART, M1QSXBYT, DYY9ANW in the last 72 hours. INR:   Recent Labs     08/15/22  0502   INR 1.19*     Lactate: No results for input(s): LACTATE in the last 72 hours. Cultures:  -----------------------------------------------------------------  RAD:   XR CHEST PORTABLE   Final Result      Increased bilateral airspace disease            XR CHEST PORTABLE   Final Result      Bilateral airspace disease, increased since prior exam.      CT CHEST W CONTRAST   Final Result      1. Moderate bilateral groundglass opacity new since prior chest CT consistent with infectious/inflammatory pneumonitis. 2. Background mild-to-moderate lower lobe predominant pulmonary fibrosis without change. 3. A 5 cm lesion in the anterior aspect left lobe of the liver stable to mildly decreased in size and of indeterminate etiology. XR CHEST (2 VW)   Final Result   1. Diffuse patchy bilateral airspace disease concerning for pneumonia or pulmonary edema. XR CHEST PORTABLE    (Results Pending)   FL MODIFIED BARIUM SWALLOW W VIDEO    (Results Pending)       Assessment/Plan:     43-year-old male with DLBCL, non-Hodgkin lymphoma with CNS mass and biopsy-proven non-Hodgkin lymphoma. On cycle 1 day 15 is admitted in the hospital with hypoxia.   Chemotherapy Polatuzumab /

## 2022-08-15 NOTE — PROGRESS NOTES
[address] Progress Note    8/15/2022     Marin Naranjo    MRN: [id number]    : 1945    Referring MD: Elaine Pollack MD  [facility] [address], [address],  [address]    SUBJECTIVE:  he cont to be sob     ECOG PS:(3) Capable of limited self-care, confined to bed or chair > 50% of waking hours    KPS: 50%  Requires considerable assistance and frequent medical care    Isolation: None    Medications    Scheduled Meds:   predniSONE  20 mg Oral Daily    [START ON 2022] predniSONE  10 mg Oral Daily    valACYclovir  500 mg Oral BID    dapsone  100 mg Oral Daily    voriconazole  200 mg Oral 2 times per day    insulin lispro  0-16 Units SubCUTAneous TID WC    insulin lispro  0-4 Units SubCUTAneous Nightly    pantoprazole  40 mg Oral QAM AC    cefepime  2,000 mg IntraVENous Q8H    bacitracin-polymyxin b   Topical BID    docusate sodium  100 mg Oral BID    fluticasone  1 spray Each Nostril Daily    levETIRAcetam  500 mg Oral BID    sodium chloride  2 spray Nasal 4x Daily    tamsulosin  0.4 mg Oral Daily    vitamin B-12  1,000 mcg Oral BID    sodium chloride flush  5-40 mL IntraVENous 2 times per day    Saline Mouthwash  15 mL Swish & Spit 4x Daily AC & HS    enoxaparin  30 mg SubCUTAneous Daily     Continuous Infusions:   sodium chloride      sodium chloride      potassium chloride      dextrose       PRN Meds:.sodium phosphate IVPB, acetaminophen, ondansetron, prochlorperazine, sodium chloride, sodium chloride flush, sodium chloride, potassium chloride, magnesium sulfate, magnesium hydroxide, Saline Mouthwash, alteplase (CATHFLO) with sterile water injection, glucose, dextrose bolus **OR** dextrose bolus, glucagon (rDNA), dextrose    ROS:  As noted above, otherwise remainder of 10-point ROS negative    Physical Exam:     I&O:    Intake/Output Summary (Last 24 hours) at 8/15/2022 0643  Last data filed at 2022 2139  Gross per 24 hour   Intake 395 ml   Output 1200 ml   Net -805 ml         Vital Signs: /72   Pulse 83   Temp 97.4 °F (36.3 °C) (Oral)   Resp 21   Ht 5' 6\" (1.676 m)   Wt 118 lb 2.7 oz (53.6 kg)   SpO2 99%   BMI 19.07 kg/m²     Weight:    Wt Readings from Last 3 Encounters:   08/14/22 118 lb 2.7 oz (53.6 kg)   07/25/22 116 lb 10 oz (52.9 kg)   05/31/22 117 lb (53.1 kg)       General: Awake, alert and oriented  HEENT: normocephalic, PERRL, no scleral erythema or icterus, Oral mucosa moist and intact, throat clear  NECK: supple   BACK: Straight   SKIN: warm dry and intact without lesions rashes or masses  CHEST: poor air excursion, no rhonchi  CV: Normal S1 S2, irreg, no MRG  ABD: NT ND normoactive BS, no palpable masses or hepatosplenomegaly  EXTREMITIES: without edema, denies calf tenderness  NEURO: CN II - XII grossly intact  CATHETER:  Right IJ SL PAC (1/11/22) - CDI    Data    CBC:   Recent Labs     08/13/22  0344 08/14/22  0454 08/15/22  0502   WBC 4.7 4.7 4.6   HGB 9.1* 9.5* 8.8*   HCT 27.5* 28.8* 27.7*   MCV 96.4 95.8 96.8   PLT 84* 84* 91*       BMP/Mag:  Recent Labs     08/13/22  0344 08/14/22  0454 08/14/22  1830 08/15/22  0502    140  --  140   K 4.0 3.8  --  4.1   CL 98* 99  --  99   CO2 31 31  --  35*   PHOS 1.5* 1.2* 2.3* 2.1*   BUN 20 16  --  17   CREATININE <0.5* <0.5*  --  <0.5*   MG 1.90 1.60*  --  1.80       LIVP:   Recent Labs     08/15/22  0502   AST 27   ALT 33   BILIDIR <0.2   BILITOT 0.3   ALKPHOS 146*       Coags:   Recent Labs     08/15/22  0502   PROTIME 15.0*   INR 1.19*   APTT 27.8       Uric Acid   Recent Labs     08/15/22  0502   LABURIC 2.4*       Diagnostics:   CT chest (8/9/22):  1. Moderate bilateral groundglass opacity new since prior chest CT consistent with infectious/inflammatory pneumonitis. 2. Background mild-to-moderate lower lobe predominant pulmonary fibrosis without change. 3. A 5 cm lesion in the anterior aspect left lobe of the liver stable to mildly decreased in size and of indeterminate etiology.     PROBLEM LIST: DLBC  Interstitial lung disease / Pulmonary Fibrosis   Type 2 Diabetes Mellitus   BPH  Acute on Chronic Respiratory Failure (8/2022)      TREATMENT:            R-CHOP w/ Revlimid and Tafasitimab  Polatuzumab/BR   - C1D1 - 8/1/22      ASSESSMENT AND PLAN:          1. DLBCL: Relapsed/ refractory diffuse large B cell non-Hodgkin's lymphoma now with a large CNS mass, bx proven NHL   - S/p XRT to brain (7/13/22-7/25/22) 2400 cGy over 8 fractions    PLAN:  Jerzy-BR (C1D1 8/1/22). - S/p Decadron 2 mg daily (lowered 8/1/22)   - Cont Keppra 500 mg bid     Cycle 1, Day + 15     2. ID: afebrile  but on high dose steroids. Likely fungal pna  - Cont Valtrex and Dapsone PPX  - Start Vori 200 mg BID (8/13/22)  - Viral respiratory w/ COVID 19 (8/9/22): Negative  - MRSA swab (8/11/22) - Neg  - Procalcitonin (8/10/22) - 0.8  - CT chest (8/9/22): Moderate bilateral groundglass opacity new since prior chest CT consistent with infectious/inflammatory pneumonitis. Background mild-to-moderate lower lobe predominant pulmonary fibrosis without change  - S/p Bronchoscopy w/ BAL and biopsy (8/10/22) - Galactomannan positive 1.33, [facility] - pending  - Fungitell and galactomannan (8/14/22) - Pending  - Cont Cefepime Day + 6/7 (started 8/10/22)   - Cont Vfend Day + 2 (started 8/13/22)  - Cont Dapsone (started 8/13/22) - ??     3. Heme: Anemia and thrombocytopenia likely r/t recent chemotherapy  - Transfuse for Hgb < 7 and Platelets < 10 K  - No transfusion today     4. Metabolic: Electrolytes and renal fxn stable; hypoPhos. Steroid induced hyperglycemia.   - S/p Lasix 40 mg IV x 1 (8/12/22)  - No IVF  - Replace Phos, K+ & Mg per PRN orders     5. GI / Nutrition:          Nutrition:  Severe malnutrition w/ chronic illness    - Cont regular diet  - SLP eval 8/13/22:  Aspiration with straws, regular diet and thin liquids with cups only  - Cont Glucerna shakes BID and Magic cups BID  - Recommend swabbing mouth after all meals.  Must be completely awake and upright for PO intake  - Dietary to follow closely  PPX:    - Cont PPI while on steroids (stop 8/16/22)  Nausea:  - Cont Zofran and Compazine as needed  Constipation:  no complaints   - Cont Colace bid     6. Pulm:    - H/o Interstitial lung disease / pulmonary fibrosis  - F/b Dr. Demarcus Lyman   - S/p bronchoscopy (3/25/22) and PFTs (4/1/22)  - Home O2 - 2 l/min    Hypoxemia:  Admitted w/ acute on chronic respiratory failure  possibly from  pneumonitis from chemotherapy (Rituxan vs Jerzy), but may also have multifocal fungal PNA. - Pulm following, appreciate recs  - CT chest (8/9/22) - Moderate bilateral groundglass opacity new since prior chest CT consistent with infectious/inflammatory pneumonitis. Background mild-to-moderate lower lobe predominant pulmonary fibrosis without change. A 5 cm lesion in the anterior aspect left lobe of the liver stable to mildly decreased in size and of indeterminate etiology. - S/p Bronchoscopy w/ BAL and biopsy (8/10/22) - + galactomannan, Diatherix pending   - Cont steroids:  Solumedrol 60 mg IV daily (started 8/10/22), 60 mg BID (8/12/22, d/t increased oxygen requirements), 60 mg daily (8/14/22 d/t likely fungal pneumonia), Prednisone 20 mg daily (8/15/22), 10 mg daily (8/16/22), then stop  - Cont high flow O2 @ 10 l/min; titrate for O2 Sat 88%  - See ID section for additional treatment and management       7. Endo:   - H/o T2DM   T2DM:  exacerbated by steroids  - Home regimen: on humalog SSI, janumet and jardiance  - Cont Lantus 10 units nightly (stopped 8/11/22, restart 8/12/22) - may be able to stop over next couple days w/ steroids decreasing   - Cont Humalog Med SSI AS/HS       8. MSK:  he has acute debilitation and generalized weakness d/t CNS lymphoma and hypoxemia   - Cont PT/OT - he is extremely weak and may required ARU or SNF when hypoxemia improves     9.   :  - H/o BPH  BPH:  - Cont Flomax daily     - DVT Prophylaxis: Platelets >68,794 cells/dL,

## 2022-08-15 NOTE — PROGRESS NOTES
Speech Language Pathology  Facility/Department: [address]  Dysphagia Daily Treatment Note    NAME: Chandni Holland  : 1945  MRN: [id number]    Patient Diagnosis(es):   Patient Active Problem List    Diagnosis Date Noted    Hypoxia 2022    Severe malnutrition ([address] 2022    Brain mass 2022    Type 2 diabetes mellitus, uncontrolled, with neuropathy ([address] 2022    Diabetes mellitus type 2, controlled, without complications ([address]     Allergic rhinitis 2022    ILD (interstitial lung disease) ([address]     Urinary urgency 2022    Constipation 2022    Pain with urination 2022    Double vision 2022    Dizziness 2022    Skin abrasion 2022    Diffuse large B-cell lymphoma ([address] 2022    B-cell lymphoma of solid organ excluding spleen ([address] 01/10/2022    Low magnesium level 01/10/2022    Parotid mass 01/10/2022    Liver mass 01/10/2022    Enlarged lymph node in neck 2021    Urinary frequency 2021    Diarrhea 2021    Generalized weakness 2021    Fatigue 2021    Balance problems 2021    Neck mass 2021    Weight loss 2021    Appetite loss 2021    Microalbuminuria 2021    Mixed hyperlipidemia 2021    Rib pain on right side 2021    Shortness of breath 2021    Proteinuria     Lung nodule     Hypercalcemia     Type 2 diabetes mellitus without complication ([address]     Benign prostatic hyperplasia with weak urinary stream 12/10/2015    Type 2 diabetes mellitus, controlled ([address] 2015    Essential hypertension 2015    Other hyperlipidemia 2015    Vitamin D deficiency 2015    Onychomycosis 2015    History of hypercalcemia 2015    Hand arthritis 2015    History of thrombocytopenia 2015    History of gout 2015     Allergies: No Known Allergies    Recent Chest Xray 22      Increased bilateral airspace disease   MBS 7/5/22  Patient presents with mild-moderate oropharyngeal dysphagia in the setting of brain mass and generalized weakness. Pt demonstrated poor swallow coordination with aspiration of thin liquid x1 with straw presentation. No aspiration occurred with thin liquid via tsp/cup, nectar thick liquid, puree or soft solid. Recommend continue soft and bite sized diet with thin liquids - cup only, small sips, sit fully upright and consistent oral care. SLP to follow. Recommend soft and bite sized, thin liquids, no straws           Chart reviewed. Medical Diagnosis: Hypoxia [R09.02]   Treatment Diagnosis:dysphagia     BSE Impression 8/13/22   Pt known to speech therapy department from previous admission. Pt had MBS on 7/5/22 which showed aspiration of thin liquids by straw. (See details above) Referral rec'd this date due to Patient eating lunch and saturating in low 80s on 8L hi-flow. Patient requiring up to 15L hi-flow along with non-rebreather to recover stats. Pt alert and oriented. Wife arrived at the beginning of the assessment. Of concern, pt's RR fluctuated between 24-31 prior to PO trials. RR >25 carries a heightened aspiration risk. ROM of oral structures was [facility]. Pt had no difficulty closing lips around spoon or drawing liquid up a straw. Pt had minor difficulty with mastication with solids. There was no anterior spillage or oral residue noted with any consistency. Pt with questionable delayed swallow response with solids. Pt would open mouth for inspection when asked pt if he had swallowed, but questionable laryngeal movement noted. Pt was then able to then dry swallow on command with movement observed. Pt was instructed to consume 3oz water continuously after which Pt noted to appear to attempt to stifle a cough. When questioned about the need to cough, pt denied need to cough and also denied sensation of aspiration.  Although pt not showing obvious outward signs of aspiration such as coughing, throat clearing and change of voice, question pt's full ability to coordinate breathing with swallowing given fluctuating RR. By the end of the session, RR increased to as high as 40. Recommend downgrading diet to Soft and Bite sized to decrease fatigue with thin liquids by small sips by cup- NO STRAWS. Also recommend not allowing pt to eat if RR is above 30. Will con't to assess for need for repeat MBS. Dysphagia Diagnosis: Mild oral stage dysphagia, Mild to moderate pharyngeal stage dysphagia    MBS results - will schedule repeat MBS for today     Pain: denied pain     Current Diet : ADULT ORAL NUTRITION SUPPLEMENT; Lunch, Dinner; Frozen Oral Supplement  ADULT ORAL NUTRITION SUPPLEMENT; Breakfast, Lunch; Diabetic Oral Supplement  ADULT DIET; Dysphagia - Soft and Bite Sized; Low Microbial   Recommended Form of Meds: PO  Compensatory Swallowing Strategies : Upright as possible for all oral intake, No straws, Eat/Feed slowly, Remain upright for 30-45 minutes after meals, External pacing, Small bites/sips (stop eating if SOB, do not allow to eat when RR 30 or above)     Treatment:  Pt seen bedside to address the following goals:    1-The patient will tolerate recommended diet without observed clinical signs of aspiration  8/15-  RN reported pt's O2 stats continue to drop when eating, but that pt recovers fairly quickly. RN reported encouraging pt to eat slower and would have pt stop eating when stats dropped as a precaution. Wife present. Pt analyzed with trials with yogurt and Ensure. Pt required cues to stop eating at one point when O2 stats dropped. With trials of Ensure, pt took small sips with long pauses in between drinks. Pt was able to maintain oxygen levels with this strategy. Given pt's change in lung status after incident on Saturday, pt continues continues to destat with eating, and known history of aspiration with a straw, recommend repeat MBS this date.  Con't goal      2- The pt/caregiver will demonstrate understanding of swallowing recommendations and concerns. 8/13- The pt and wife were educated to purpose of the visit, anatomy and physiology of the swallow, concerns for aspiration, role breathing plays in swallowing, concern for increased risk for aspiration when respiratory rate is elevated, swallowing strategies, diet recommendations, possibility of being made NPO if s/s aspiration emerge and plan to re-assess for possible repeat MBS. The pt  and wife stated comprehension. con't goal  8/15- pt and wife educated to the purpose of the visit, rational for recommendation for repeat MBS, a description of the procedure and review of swallowing strategies. Both pt and wife stated comprehension and agreement. Discussed at length with RN concerns for aspiration. Pt also in agreement with repeat MBS given her observation of pt's performance while eating over the past few days. Also discussed concerns with RT. Con't goal           Patient/Family/Caregiver Education:  see above     Compensatory Strategies:  Compensatory Swallowing Strategies : Upright as possible for all oral intake, No straws, Eat/Feed slowly, Remain upright for 30-45 minutes after meals, External pacing, Small bites/sips (stop eating if SOB, do not allow to eat when RR 30 or above)      Plan:  Continue dysphagia treatment with goals per plan of care. Diet recommendations: TBD after MBS  Will schedule repeat MBS this date   DC recommendation:TBD closer to discharge   Treatment: 13  D/W nursing, Nydia   Needs met prior to leaving room, call button in reach.     Elham Cohens, [place], [address]  Speech-Language Pathologist  Pager [phone]      If patient is discharged prior to next treatment, this note will serve as the discharge summary

## 2022-08-15 NOTE — PROGRESS NOTES
Physical Therapy  Facility/Department: [address]  Physical Therapy Daily Treatment Note    Name: Vane Hanks  : 1945  MRN: [id number]  Date of Service: 8/15/2022    Discharge Recommendations:   Vane Hanks scored a  on the AM-PAC short mobility form. Current research shows that an AM-PAC score of 17 or less is typically not associated with a discharge to the patient's home setting. Based on the patient's AM-PAC score and their current functional mobility deficits, it is recommended that the patient have 3-5 sessions per week of Physical Therapy at d/c to increase the patient's independence. Please see assessment section for further patient specific details. If patient discharges prior to next session this note will serve as a discharge summary. Please see below for the latest assessment towards goals. PT Equipment Recommendations  Equipment Needed: No      Patient Diagnosis(es): There were no encounter diagnoses. Past Medical History:  has a past medical history of Benign prostatic hyperplasia with weak urinary stream, Cancer ([address] Erectile dysfunction, History of gout, History of thrombocytopenia, Hypercalcemia, Hyperlipidemia, Hypertension, Lung nodule, Proteinuria, Type 2 diabetes mellitus without complication ([address] and Vitamin D deficiency. Past Surgical History:  has a past surgical history that includes Finger trigger release (Right, ); Tonsillectomy and adenoidectomy (age 2); Elbow fracture surgery (Left, age 5); Vasectomy (1971); Cataract removal with implant (Bilateral, ); Colonoscopy (3/29/2011); Colonoscopy (2016); IR PORT PLACEMENT > 5 YEARS (2022); bronchoscopy (N/A, 3/25/2022); and craniotomy (Right, 2022). Assessment   Assessment: Pt showing limited mobility due to low endurance and fatigue with mobility. Pt required increase on high flow O2 with activity due to de-satting with mobility. Pt fatigues quickly, but is cooperative. Wife present and supportive. Rec continued inpt PT at d/c. Will follow. Treatment Diagnosis: decreased functional mobility  Therapy Prognosis: Good  Decision Making: Medium Complexity  Requires PT Follow-Up: Yes  Activity Tolerance  Activity Tolerance: Patient limited by fatigue;Patient limited by endurance     Plan   Plan  Plan:  (2-5)  Current Treatment Recommendations: Strengthening, Gait training, Balance training, Functional mobility training, Transfer training, Endurance training, Wheelchair mobility training, Patient/Caregiver education & training, Safety education & training  Safety Devices  Type of Devices: Call light within reach, Nurse notified, Chair alarm in place, Left in chair  Restraints  Restraints Initially in Place: No     Restrictions  Position Activity Restriction  Other position/activity restrictions: Up as Tolerated, If platelet count equal to or less than 10 but the patient has received a platelet transfusion, physical therapy or occupational therapy may proceed with treatment. Subjective   General  Chart Reviewed: Yes  Additional Pertinent Hx: Pt is a 66 yo male that presents from home with worsening labor breathing per wife's observation. Pt was just discharged at the end of July from the hospital. CT Chest: Moderate bilateral groundglass opacity new since prior chest CT consistent with infectious/inflammatory pneumonitis. PMH: Non-Hodgkins Lymphoma, brain mass, Hypertension.   Family / Caregiver Present: Yes (Wife)  Referring Practitioner: Facundo Nunn MD  Diagnosis: Hypoxia  Subjective  Subjective: Pt sitting up in chair and agreeable to PT  Pain: Denies    Social/Functional History  Social/Functional History  Lives With: Spouse (can provide 24hr assist)  Type of Home: Condo  Home Layout: Two level, Performs ADL's on one level, Able to Live on Main level with bedroom/bathroom  Home Access: Ramped entrance  Bathroom Shower/Tub: Walk-in shower  Bathroom Toilet: Handicap height  Bathroom Equipment: Shower chair, Grab bars in shower, Grab bars around toilet  Home Equipment: [place], 4 wheeled, [place], Oxygen (transport chair, 2L when needed)  Has the patient had two or more falls in the past year or any fall with injury in the past year?: Yes  Receives Help From: Family  ADL Assistance: Needs assistance  Homemaking Responsibilities: No  Ambulation Assistance: Needs assistance  Active : No  Additional Comments: Information from wife as pt is a questionable historian. In May, he was able to walk in house with RW and was fairly independent with ADL. At beginning of July, he was experiencing functional decline, had fall, found brain mass at hospital admission. Recently, wife has been doing \"everything\" and it \"has been a challenge\". Vision/Hearing  Vision  Vision: Within Functional Limits  Hearing  Hearing: Within functional limits      Cognition   Orientation  Overall Orientation Status: Within Functional Limits     Objective   Bed mobility  Scooting: Maximal assistance (To edge of chair and max assist of 2 back in chair)    Transfers  Sit to Stand: 2 Person Assistance; Moderate Assistance;Minimal Assistance  Stand to sit: Moderate Assistance    Balance  Sitting - Static: Fair  Standing - Static: Fair;- (Min assist at walker)  Comments: Pt stood at walker 2 x 30 sec. Pt's HR increased to 122 bpm and and O2 sats decreased to 80% on 10 L high flow O2. Pt's O2 sats improved to 100% on 15 L high flow O2 and pt left on 10 L high flow O2 at 99%. Exercise Treatment: Pt performed seated B LE: hip flex x 5, hip abd/adduction x 5, LAQ x 10, AP x 10. Pt received PROM B ankles into DF 3 x 15 sec.     Goals  Short Term Goals  Time Frame for Short term goals: Discharge- all ongoing  Short term goal 1: Rolling Left and Right SBA  ONGOING  Short term goal 2: Sit<>Supine Min A  ONGOING  Short term goal 3: Sit<>Stand with RW Min A  ONGOING  Short term goal 4: Stand Pivot with RW Min A

## 2022-08-15 NOTE — PROGRESS NOTES
Occupational Therapy  Facility/Department: [facility]  Occupational Therapy Daily Treatment Note    Name: Zeb Amaya  : 1945  MRN: [id number]  Date of Service: 8/15/2022    Discharge Recommendations: Zeb Amaya scored a  on the AM-PAC ADL Inpatient form. Current research shows that an AM-PAC score of 17 or less is typically not associated with a discharge to the patient's home setting. Based on the patient's AM-PAC score and their current ADL deficits, it is recommended that the patient have 3-5 sessions per week of Occupational Therapy at d/c to increase the patient's independence. Please see assessment section for further patient specific details. If patient discharges prior to next session this note will serve as a discharge summary. Please see below for the latest assessment towards goals. OT Equipment Recommendations  Other: cont to assess       Patient Diagnosis(es): There were no encounter diagnoses. Past Medical History:  has a past medical history of Benign prostatic hyperplasia with weak urinary stream, Cancer ([address] Erectile dysfunction, History of gout, History of thrombocytopenia, Hypercalcemia, Hyperlipidemia, Hypertension, Lung nodule, Proteinuria, Type 2 diabetes mellitus without complication ([address] and Vitamin D deficiency. Past Surgical History:  has a past surgical history that includes Finger trigger release (Right, ); Tonsillectomy and adenoidectomy (age 2); Elbow fracture surgery (Left, age 5); Vasectomy (1971); Cataract removal with implant (Bilateral, ); Colonoscopy (3/29/2011); Colonoscopy (2016); IR PORT PLACEMENT > 5 YEARS (2022); bronchoscopy (N/A, 3/25/2022); and craniotomy (Right, 2022). Treatment Diagnosis: decreased functional strenght and endurance 2/2 hypoxia      Assessment   Performance deficits / Impairments: Decreased functional mobility ; Decreased ADL status; Decreased strength;Decreased safe awareness;Decreased cognition;Decreased endurance;Decreased balance;Decreased posture  Assessment: Pt participated in 2 sit<>stand transfers this date with min Ax1 + mod Ax1 assistance. Pt fatigues quickly in standing and had elevated 's and required increased O2 to 15L. Pt required total A for pericare following BM. Pt participated in BUE exercises with good tolerance. Would benefit from ongoing inpatient  OT/PT at discharge as patient continues to have decreased strength and endurance. Treatment Diagnosis: decreased functional strenght and endurance 2/2 hypoxia  Prognosis: Good  Activity Tolerance  Activity Tolerance: Patient limited by fatigue;Treatment limited secondary to medical complications (free text)  Activity Tolerance Comments: Pt on 10 L of O2 at start of session. Following sit>stand transfer pt destated to low 80's and placed on 15 L of O2 for remainder of session. Back on 10L at end of session and with rest at spO2 100%. HR flucuated between 110's-120's with transfers and exercise. RN provided PRN supervision throughout        [address] [place] [address] per Week: 2-5  Current Treatment Recommendations: ROM, Strengthening, Balance training, Functional mobility training, Endurance training, Patient/Caregiver education & training, Self-Care / ADL, Safety education & training, Positioning, Equipment evaluation, education, & procurement, Pain management     Restrictions  Position Activity Restriction  Other position/activity restrictions: Up as Tolerated, If platelet count equal to or less than 10 but the patient has received a platelet transfusion, physical therapy or occupational therapy may proceed with treatment.     Subjective   General  Chart Reviewed: Yes  Patient assessed for rehabilitation services?: Yes  Additional Pertinent Hx: DLBCL with CNS mass  Family / Caregiver Present: No  Referring Practitioner: MARCIA Patel CNP  Diagnosis: Hypoxia  Subjective  Subjective: Pt seated upright in chair upon OT arrival and agreeable to OT treatment. General Comment  Comments: Pt initially on 10 L of high flow O2. Social/Functional History  Social/Functional History  Lives With: Spouse (can provide 24hr assist)  Type of Home: Condo  Home Layout: Two level, Performs ADL's on one level, Able to Live on Main level with bedroom/bathroom  Home Access: Ramped entrance  Bathroom Shower/Tub: Walk-in shower  Bathroom Toilet: Handicap height  Bathroom Equipment: Shower chair, Grab bars in shower, Grab bars around toilet  Home Equipment: [place], 4 wheeled, [place], Oxygen (transport chair, 2L when needed)  Has the patient had two or more falls in the past year or any fall with injury in the past year?: Yes  Receives Help From: Family  ADL Assistance: Needs assistance  Homemaking Responsibilities: No  Ambulation Assistance: Needs assistance  Active : No  Additional Comments: Information from wife as pt is a questionable historian. In May, he was able to walk in house with RW and was fairly independent with ADL. At beginning of July, he was experiencing functional decline, had fall, found brain mass at hospital admission. Recently, wife has been doing \"everything\" and it \"has been a challenge\". Objective   Heart Rate: (!) 106  Heart Rate Source: Monitor  BP: 125/73  BP Location: Left upper arm  BP Method: Automatic  Patient Position: Semi fowlers  MAP (Calculated): 90.33  Resp: 26  SpO2: 97 %  O2 Device: High flow nasal cannula             Safety Devices  Type of Devices: Call light within reach;Nurse notified; Chair alarm in place; Left in chair           ADL  LE Bathing: Dependent/Total  LE Bathing Skilled Clinical Factors: to wash off legs and backside with wipes  UE Dressing: Minimal assistance  UE Dressing Skilled Clinical Factors: for changing gown  LE Dressing: Dependent/Total  LE Dressing Skilled Clinical Factors: brief change from chair level; required total A to exchange chux pad  Toileting: Dependent/Total  Toileting Skilled Clinical Factors: required total A following BM     Activity Tolerance  Activity Tolerance: Patient limited by fatigue;Patient limited by endurance  Bed mobility  Bed Mobility Comments: RN used mayela lift to get patient from bed to chair prior to OT/PT session starting  Transfers  Sit to stand: Dependent/Total (min A x 1 + mod A x 1)  Stand to sit: Maximum assistance (max A)  Transfer Comments: pt able to stand at edge of chair 2x for ~30 seconds; pt with increased HR and decreased spO2 needing increased rest breaks  Vision  Vision: Within Functional Limits  Hearing  Hearing: Within functional limits  Orientation  Overall Orientation Status: Within Functional Limits               Exercise Treatment: Pt used wash cloth to perform /release 10x with BUE  A/AROM Exercises: AROM BUE shoulder shrug 10x, shoulder flexion/ext to 90 degrees 10x, wrist flexion/extension 10x, elbow flexion/extension 10x  Dynamic Sitting Balance Exercises: pt performed 5 modified chair core crunches to increase abdominal strength  Education Given To: Patient  Education Provided: Role of Therapy;Home Exercise Program;Transfer Training  Education Method: Demonstration;Verbal;Teach Back  Education Outcome: Verbalized understanding;Continued education needed                        G-Code     OutComes Score                                                  AM-PAC Score        AM-Located within [facility] Inpatient Daily Activity Raw Score: 11 (08/15/22 1131)  AM-PAC Inpatient ADL T-Scale Score : 29.04 (08/15/22 1131)  ADL Inpatient CMS 0-100% Score: 70.42 (08/15/22 1131)  ADL Inpatient CMS G-Code Modifier : CL (08/15/22 1131)    [place] Score       Goals  Short Term Goals  Time Frame for Short term goals: 1 week  Short Term Goal 1: tolerate sitting on edge of bed, 9-12 minutes with S, with SPO2>90 in prep for ADL- not met  Short Term Goal 2: Complete 10 reps of AROM BUE therex in supported sitting position- goal met 8/15; new goal: complete 5 reps of AROM BUE therex in upsupported sitting position- not met  Short Term Goal 3: Supine to sit with min A x2-8/12 goal met, updated goal supine to sit with min A- not addressed 8/15  Patient Goals   Patient goals : \"I want to be able to sit up in the chair. \"       Therapy Time   Individual Concurrent Group Co-treatment   Time In 0915         Time Out 1003         Minutes 48         Timed Code Treatment Minutes: [address] [place], [place]

## 2022-08-15 NOTE — PROGRESS NOTES
Patient resting well overnight. Patient noted at midnight and 0400 vitals to be breathing easier and slower than he had been at beginning of shift. O2 saturations 96-99% on 10 L HF/NC this shift.

## 2022-08-15 NOTE — PROGRESS NOTES
Comprehensive Nutrition Assessment    Type and Reason for Visit:  Reassess    Nutrition Recommendations/Plan:   Continue Dysphagia soft and bite sized  Low microbial guidelines  Continue Glucerna and Magic cup with meals as tolerated  Will monitor nutritional adequacy, nutrition-related labs, weights, BMs, and clinical progress      Malnutrition Assessment:  Malnutrition Status:  Severe malnutrition (08/11/22 1044)    Context:  Chronic Illness     Findings of the 6 clinical characteristics of malnutrition:  Energy Intake:  75% or less estimated energy requirements for 1 month or longer  Weight Loss:  Unable to assess (20lbs loss in 9 months, CBW is estimated. Will order updated)     Body Fat Loss:  Severe body fat loss Orbital, Triceps   Muscle Mass Loss:  Severe muscle mass loss Temples (temporalis), Clavicles (pectoralis & deltoids)  Fluid Accumulation:  Mild Extremities   Strength:  Not Performed    Nutrition Assessment:    Follow up:  Currently on a dysphagia soft and bite sized texture, low microbial diet regimen with Glucerna and Magic cup nutrition supplements as tolerated. Po intake of meals % meals. MBS scheduled for today. Nutrition Related Findings:    lytes within normal limits Wound Type: Skin Tears (Skin tear L knee, R forearm, chest)       Current Nutrition Intake & Therapies:    Average Meal Intake: 51-75%  Average Supplements Intake: %  ADULT ORAL NUTRITION SUPPLEMENT; Lunch, Dinner; Frozen Oral Supplement  ADULT ORAL NUTRITION SUPPLEMENT; Breakfast, Lunch; Diabetic Oral Supplement  ADULT DIET; Dysphagia - Soft and Bite Sized; Low Microbial    Anthropometric Measures:  Height: 5' 6\" (167.6 cm)  Ideal Body Weight (IBW): 142 lbs (65 kg)       Current Body Weight: 119 lb 4 oz (54.1 kg),   IBW.  Weight Source: Bed Scale  Current BMI (kg/m2): 19.3        Weight Adjustment For: No Adjustment                 BMI Categories: Underweight (BMI less than 22) age over 72    Estimated Daily Nutrient Needs:  Energy Requirements Based On: Kcal/kg (30-35 kcals/kg CBW 52kg)  Weight Used for Energy Requirements: Current (52kg)  Energy (kcal/day): 5393-3193  Weight Used for Protein Requirements: Current (1.5-1.8 gm/kg CBW 52 kg)  Protein (g/day): 78-94  Method Used for Fluid Requirements: 1 ml/kcal  Fluid (ml/day): 5864-0930    Nutrition Diagnosis:   Severe malnutrition related to catabolic illness, inadequate protein-energy intake as evidenced by weight loss, severe loss of subcutaneous fat, severe muscle loss    Nutrition Interventions:   Food and/or Nutrient Delivery: Continue Current Diet, Continue Oral Nutrition Supplement  Nutrition Education/Counseling: Education not indicated  Coordination of Nutrition Care: Continue to monitor while inpatient  Plan of Care discussed with: pt and wife    Goals:     Goals: PO intake 50% or greater, prior to discharge       Nutrition Monitoring and Evaluation:   Behavioral-Environmental Outcomes: None Identified  Food/Nutrient Intake Outcomes: Food and Nutrient Intake, Supplement Intake, Diet Advancement/Tolerance  Physical Signs/Symptoms Outcomes: Biochemical Data, Weight, Nutrition Focused Physical Findings, Hemodynamic Status    Discharge Planning:     Too soon to determine     [facility], [address] [facility], [address] [place] [address],   Contact: [phone]

## 2022-08-16 NOTE — PROGRESS NOTES
4 Eyes Admission Assessment     I agree as the admission nurse that 2 RN's have performed a thorough Head to Toe Skin Assessment on the patient. ALL assessment sites listed below have been assessed on admission. Areas assessed by both nurses: Raquel Mis and   [x]   Head, Face, and Ears   [x]   Shoulders, Back, and Chest  [x]   Arms, Elbows, and Hands   [x]   Coccyx, Sacrum, and Ischium  [x]   Legs, Feet, and Heels        Does the Patient have Skin Breakdown?   Yes a wound was noted on the Admission Assessment and an LDA was Initiated documentation include the Savi-wound, Wound Assessment, Measurements, Dressing Treatment, Drainage, and Color\",         Giorgio Prevention initiated:  Yes   Wound Care Orders initiated:  Yes      [id number]  nurse consulted for Pressure Injury (Stage 3,4, Unstageable, DTI, NWPT, and Complex wounds) or Giorgio score 18 or lower:  No      Nurse 1 eSignature: Electronically signed by Keke Suarez RN on 8/15/22 at 11:01 PM EDT    **SHARE this note so that the co-signing nurse is able to place an eSignature**    Nurse 2 eSignature: {Esignature:[id number]}

## 2022-08-16 NOTE — PROGRESS NOTES
4 Eyes Admission Assessment     I agree as the admission nurse that 2 RN's have performed a thorough Head to Toe Skin Assessment on the patient. ALL assessment sites listed below have been assessed on admission. Areas assessed by both nurses: Barry Olive  [x]   Head, Face, and Ears   [x]   Shoulders, Back, and Chest  [x]   Arms, Elbows, and Hands   [x]   Coccyx, Sacrum, and Ischium  [x]   Legs, Feet, and Heels        Does the Patient have Skin Breakdown?   Yes a wound was noted on the Admission Assessment and an LDA was Initiated documentation include the Savi-wound, Wound Assessment, Measurements, Dressing Treatment, Drainage, and Color\",         Giorgio Prevention initiated:  Yes   Wound Care Orders initiated:  Yes      WOC nurse consulted for Pressure Injury (Stage 3,4, Unstageable, DTI, NWPT, and Complex wounds) or Giorgio score 18 or lower:  Yes      Nurse 1 eSignature: Electronically signed by Joseph Almonte RN on 8/16/22 at 6:38 PM EDT    **SHARE this note so that the co-signing nurse is able to place an eSignature**    Nurse 2 eSignature: {Esignature:[id number]}

## 2022-08-16 NOTE — PROGRESS NOTES
Occupational Therapy  Facility/Department: [facility]  Occupational Therapy Treatment    Name: Carmen Kunz  : 1945  MRN: [id number]  Date of Service: 2022    Discharge Recommendations: Carmen Kunz scored a  on the AM-PAC ADL Inpatient form. Current research shows that an AM-PAC score of 17 or less is typically not associated with a discharge to the patient's home setting. Based on the patient's AM-PAC score and their current ADL deficits, it is recommended that the patient have 3-5 sessions per week of Occupational Therapy at d/c to increase the patient's independence. Please see assessment section for further patient specific details. If patient discharges prior to next session this note will serve as a discharge summary. Please see below for the latest assessment towards goals. OT Equipment Recommendations  Other: cont to assess       Patient Diagnosis(es): There were no encounter diagnoses. Past Medical History:  has a past medical history of Benign prostatic hyperplasia with weak urinary stream, Cancer ([address] Erectile dysfunction, History of gout, History of thrombocytopenia, Hypercalcemia, Hyperlipidemia, Hypertension, Lung nodule, Proteinuria, Type 2 diabetes mellitus without complication ([address] and Vitamin D deficiency. Past Surgical History:  has a past surgical history that includes Finger trigger release (Right, ); Tonsillectomy and adenoidectomy (age 2); Elbow fracture surgery (Left, age 5); Vasectomy (1971); Cataract removal with implant (Bilateral, ); Colonoscopy (3/29/2011); Colonoscopy (2016); IR PORT PLACEMENT > 5 YEARS (2022); bronchoscopy (N/A, 3/25/2022); and craniotomy (Right, 2022). Treatment Diagnosis: decreased functional strenght and endurance 2/2 hypoxia      Assessment   Performance deficits / Impairments: Decreased functional mobility ; Decreased ADL status; Decreased strength;Decreased safe awareness;Decreased cognition;Decreased endurance;Decreased balance;Decreased posture  Assessment: Pt signficantly limited this date by decreased spO2 levels with all ax. Pt performed supine >sit transfer with 2 person A w/ destat to 70 despite being on 15L during mobility. Pt required increased time to recover to low 80's and had to return to supine. Pt participated in bed level strengthening exercises with destat noted throughout. RN aware. Pt with good participation just very limited by O2. Will continue to follow on acute OT plan of care. Will need continued OT services at discharge. Treatment Diagnosis: decreased functional strenght and endurance 2/2 hypoxia  Prognosis: Guarded  Activity Tolerance  Activity Tolerance: Patient limited by fatigue;Treatment limited secondary to medical complications (free text)  Activity Tolerance Comments: Pt initially on 10 L of O2 at start of session. When transfers from bed to EOB, pt destated from 92% to 70% despite 15L of O2. Pt required maximal time, ~3 min to recover back to 80% and continued to flucuate while seated on EOB. Pt was then returned to supine in bed and recovered to 90% within ~7 min. Pt returned to 10L at end of session with spO2 in low 90's. Pt also destated to low 80's participating in bed level exercises. Pt with increased fatigue and session was ended. Plan   Plan  Times per Week: 2-5  Current Treatment Recommendations: ROM, Strengthening, Balance training, Functional mobility training, Endurance training, Patient/Caregiver education & training, Self-Care / ADL, Safety education & training, Positioning, Equipment evaluation, education, & procurement, Pain management     Restrictions  Position Activity Restriction  Other position/activity restrictions: Up as Tolerated, If platelet count equal to or less than 10 but the patient has received a platelet transfusion, physical therapy or occupational therapy may proceed with treatment.     Subjective   General  Chart Reviewed: Yes  Patient assessed for rehabilitation services?: Yes  Additional Pertinent Hx: DLBCL with CNS mass  Family / Caregiver Present: No  Referring Practitioner: MARCIA Fletcher CNP  Diagnosis: Hypoxia  Subjective  Subjective: Pt lying supine in bed upon OT arrival and agreeable to OT session. General Comment  Comments: RN approved for OT session as long as O2 stats were monitoried. Pt on 10 L of O2 initially. Social/Functional History  Social/Functional History  Lives With: Spouse (can provide 24hr assist)  Type of Home: Condo  Home Layout: Two level, Performs ADL's on one level, Able to Live on Main level with bedroom/bathroom  Home Access: Ramped entrance  Bathroom Shower/Tub: Walk-in shower  Bathroom Toilet: Handicap height  Bathroom Equipment: Shower chair, Grab bars in shower, Grab bars around toilet  Home Equipment: [facility], 4 wheeled, Braulio Greener, Oxygen (transport chair, 2L when needed)  Has the patient had two or more falls in the past year or any fall with injury in the past year?: Yes  Receives Help From: Family  ADL Assistance: Needs assistance  Homemaking Responsibilities: No  Ambulation Assistance: Needs assistance  Active : No  Additional Comments: Information from wife as pt is a questionable historian. In May, he was able to walk in house with RW and was fairly independent with ADL. At beginning of July, he was experiencing functional decline, had fall, found brain mass at hospital admission. Recently, wife has been doing \"everything\" and it \"has been a challenge\". Objective   Heart Rate: 99  Heart Rate Source: Monitor  BP: 120/62  BP Location: Right upper arm  BP Method: Automatic  Patient Position: Semi fowlers  MAP (Calculated): 81.33  Resp: (!) 31  SpO2: 96 %  O2 Device: High flow nasal cannula             Safety Devices  Type of Devices: Call light within reach;Nurse notified; Left in bed;Bed alarm in place  Restraints  Restraints Initially in Place: No  Transfer Training  Transfer Training:  (unable to perform transfers this date or functional mobility 2/2 O2 destat refer to ax tolerance section.)        ADL  Additional Comments: Pt declined need for ADLs this date. Activity Tolerance  Activity Tolerance: Patient limited by fatigue;Patient limited by endurance  Activity Tolerance Comments: pt significantly limited by spo2 this session. Initially spO2 in 90-94% range on 10L, once EOB quickly desat to 70% on 15L. Required 3min to recover to 80s, unable to achieve >90% in sitting. Pt assisted back to supine and required several more minutes to recover >90%. Bed mobility  Sit to Supine: Moderate assistance;2 Person assistance (Assist at BLEs and 2nd person managing lowering trunk)  Scootin Person assistance;Dependent/Total;Minimal assistance (Dep scooting up to [facility] with use of sheet and 2 person assist. pt performs scooting to EOB with min A)     Vision  Vision Exceptions: Wears glasses at all times  Hearing  Hearing: Within functional limits  Cognition  Overall Cognitive Status: Exceptions  Arousal/Alertness: Delayed responses to stimuli  Following Commands: Follows one step commands with increased time  Attention Span: Difficulty dividing attention; Difficulty attending to directions  Memory: Decreased short term memory;Decreased recall of biographical Information;Decreased recall of recent events  Safety Judgement: Decreased awareness of need for assistance;Decreased awareness of need for safety  Problem Solving: Assistance required to implement solutions;Assistance required to generate solutions;Assistance required to identify errors made;Assistance required to correct errors made;Decreased awareness of errors  Insights: Decreased awareness of deficits  Initiation: Requires cues for some  Sequencing: Requires cues for some  Orientation  Overall Orientation Status: Within Functional Limits               Exercise Treatment: Pt performed /release 10x with BUE; pt

## 2022-08-16 NOTE — PROGRESS NOTES
1000 ml   Net -445 ml         Review of Systems   Constitutional:  Positive for appetite change. Negative for activity change and fever. Respiratory:  Positive for shortness of breath. Negative for apnea and wheezing. Cardiovascular:  Negative for chest pain, palpitations and leg swelling. Gastrointestinal:  Negative for abdominal distention and abdominal pain. Neurological:  Negative for light-headedness. Physical Exam  Constitutional:       General: He is not in acute distress. Appearance: He is ill-appearing. Eyes:      Pupils: Pupils are equal, round, and reactive to light. Cardiovascular:      Rate and Rhythm: Normal rate and regular rhythm. Pulses: Normal pulses. Heart sounds: Normal heart sounds. Pulmonary:      Effort: Respiratory distress present. Breath sounds: No wheezing. Chest:      Chest wall: No tenderness. Abdominal:      General: There is no distension. Palpations: There is no mass. Tenderness: There is no abdominal tenderness. Musculoskeletal:         General: No swelling. Right lower leg: No edema. Left lower leg: No edema. Skin:     General: Skin is warm. Neurological:      Mental Status: He is alert. Psychiatric:         Mood and Affect: Mood normal.         Thought Content:  Thought content normal.       LABS:    CBC:   Recent Labs     08/14/22  0454 08/15/22  0502 08/16/22  0314   WBC 4.7 4.6 6.0   HGB 9.5* 8.8* 9.6*   HCT 28.8* 27.7* 28.4*   PLT 84* 91* 100*   MCV 95.8 96.8 94.8       Renal:    Recent Labs     08/14/22  0454 08/14/22  1830 08/15/22  0502 08/16/22  0314     --  140 141   K 3.8  --  4.1 4.0   CL 99  --  99 99   CO2 31  --  35* 33*   BUN 16  --  17 15   CREATININE <0.5*  --  <0.5* <0.5*   GLUCOSE 217*  --  285* 97   CALCIUM 8.9  --  9.1 9.0   MG 1.60*  --  1.80 1.60*   PHOS 1.2* 2.3* 2.1* 1.4*   ANIONGAP 10  --  6 9       Hepatic:   Recent Labs     08/15/22  0502   AST 27   ALT 33   BILITOT 0.3   BILIDIR <0.2   PROT 5.2*   LABALBU 3.0*   ALKPHOS 146*       Troponin: No results for input(s): TROPONINI in the last 72 hours. BNP: No results for input(s): BNP in the last 72 hours. Lipids: No results for input(s): CHOL, HDL in the last 72 hours. Invalid input(s): LDLCALCU, TRIGLYCERIDE  ABGs:  No results for input(s): PHART, ZJI8HPO, PO2ART, BCM9HVR, BEART, THGBART, D5ECCFGK, UKG3LHU in the last 72 hours. INR:   Recent Labs     08/15/22  0502   INR 1.19*       Lactate: No results for input(s): LACTATE in the last 72 hours. Cultures:  -----------------------------------------------------------------  RAD:   FL MODIFIED BARIUM SWALLOW W VIDEO   Final Result      No laryngeal penetration or aspiration. Please refer to the detailed report of the speech therapist.            XR CHEST PORTABLE   Final Result      1. Diffuse bilateral pulmonary infiltrate and/or edema overall demonstrating question slight improvement from prior exam.      XR CHEST PORTABLE   Final Result      Increased bilateral airspace disease            XR CHEST PORTABLE   Final Result      Bilateral airspace disease, increased since prior exam.      CT CHEST W CONTRAST   Final Result      1. Moderate bilateral groundglass opacity new since prior chest CT consistent with infectious/inflammatory pneumonitis. 2. Background mild-to-moderate lower lobe predominant pulmonary fibrosis without change. 3. A 5 cm lesion in the anterior aspect left lobe of the liver stable to mildly decreased in size and of indeterminate etiology. XR CHEST (2 VW)   Final Result   1. Diffuse patchy bilateral airspace disease concerning for pneumonia or pulmonary edema. VL Extremity Venous Left    (Results Pending)       Assessment/Plan:     24-year-old male with DLBCL, non-Hodgkin lymphoma with CNS mass and biopsy-proven non-Hodgkin lymphoma. On cycle 1 day 16 is admitted in the hospital with hypoxia.   Chemotherapy Polatuzumab / Bendamustine, Decadron and

## 2022-08-16 NOTE — PLAN OF CARE
Problem: ABCDS Injury Assessment  Goal: Absence of physical injury  Outcome: Progressing   Pt on continuous oxygen support. Weaned down to 5L via high flow nasal cannula. Plan of care continued. Problem: Safety - Adult  Goal: Free from fall injury  Outcome: Progressing  Flowsheets (Taken 8/15/2022 2115)  Free From Fall Injury: Instruct family/caregiver on patient safety  Pt is a High fall risk. See Gentry Schaumann Fall Score and ABCDS Injury Risk assessments. Explained fall risk precautions to pt and family and rationale behind their use to keep the patient safe. Pt bed is in low position, side rails up, call light and belongings are in reach. Fall wristband applied and present on pts wrist.  Bed alarm on. Pt encouraged to call for assistance. Will continue with hourly rounds for PO intake, pain needs, toileting and repositioning as needed. Problem: Skin/Tissue Integrity  Goal: Absence of new skin breakdown  Description: 1. Monitor for areas of redness and/or skin breakdown  2. Assess vascular access sites hourly  3. Every 4-6 hours minimum:  Change oxygen saturation probe site  4. Every 4-6 hours:  If on nasal continuous positive airway pressure, respiratory therapy assess nares and determine need for appliance change or resting period. Outcome: Progressing     Problem: Nutrition Deficit:  Goal: Optimize nutritional status  Outcome: Progressing   Pt tolerating oral intake with no signs of aspiration. For barium swallow today. Problem: Pain  Goal: Verbalizes/displays adequate comfort level or baseline comfort level  Outcome: Progressing   Pt denies any pain within this shift. Will continue to monitor.

## 2022-08-16 NOTE — PROCEDURES
INSTRUMENTAL SWALLOW REPORT  Repeat MODIFIED BARIUM SWALLOW  Discharge     NAME: Deb Sutton   : 1945  MRN: [id number]       Date of Eval: 2022     Ordering Physician: Coni Adler  Radiologist: Yaritza Florez     Referring Diagnosis(es): Referring Diagnosis: hypoxia    Past Medical History:  has a past medical history of Benign prostatic hyperplasia with weak urinary stream, Cancer ([facility] [address] Erectile dysfunction, History of gout, History of thrombocytopenia, Hypercalcemia, Hyperlipidemia, Hypertension, Lung nodule, Proteinuria, Type 2 diabetes mellitus without complication ([facility] [address] and Vitamin D deficiency. Past Surgical History:  has a past surgical history that includes Finger trigger release (Right, ); Tonsillectomy and adenoidectomy (age 2); Elbow fracture surgery (Left, age 5); Vasectomy (); Cataract removal with implant (Bilateral, ); Colonoscopy (3/29/2011); Colonoscopy (2016); IR PORT PLACEMENT > 5 YEARS (2022); bronchoscopy (N/A, 3/25/2022); and craniotomy (Right, 2022). Date of Prior Study: 22  Type of Study: Repeat MBS       Recent Chest Xray 22      Increased bilateral airspace disease         Previous MBS 22  Patient presents with mild-moderate oropharyngeal dysphagia in the setting of brain mass and generalized weakness. Pt demonstrated poor swallow coordination with aspiration of thin liquid x1 with straw presentation. No aspiration occurred with thin liquid via tsp/cup, nectar thick liquid, puree or soft solid. Recommend continue soft and bite sized diet with thin liquids - cup only, small sips, sit fully upright and consistent oral care. SLP to follow.  Recommend soft and bite sized, thin liquids, no straws          Patient Complaints/Reason for Referral:  Deb Sutton was referred for a MBS to assess the efficiency of his/her swallow function, assess for aspiration, and to make recommendations regarding safe dietary consistencies, effective compensatory strategies, and safe eating environment. Patient complaints: pt is without complaints    Onset of problem: 8/9/22     Behavior/Cognition/Vision/Hearing:  Behavior/Cognition: Alert; Cooperative;Pleasant mood  Vision: Impaired  Vision Exceptions: Wears glasses at all times  Hearing: Within functional limits    Impressions 8/16/22:  Oral and pharyngeal phases of the swallow are essentially [facility]. Mastication with solids was functional. Pt had no difficulty with A-P transport of the bolus. There was one instance of penetration of thin liquid by cup with 100% spontaneous clearing. There were no instances of aspiration, even when challenged with successive swallows of thin by straw. Pt had no residue remaining in valleculae or pyriform after the swallow with any consistency. Recommend con't with soft and bite sized diet (for energy conservation) with thin liquids with strategies listed below. Treatment Dx and ICD 10: 13.12   Patient Position: Lateral and    Consistencies Administered: Regular;Pureed; Thin cup; Thin straw    Dysphagia Outcome Severity Scale: Level 7: Normal in all situations  Penetration-Aspiration Scale (PAS): 2 - Material enters the airway, remains above the vocal folds, and is ejected from the airway    Recommended Diet:  Solid consistency: Soft and Bite-Sized  Liquid consistency: Thin  Liquid administration via: Cup;Straw    Medication administration: PO    Safe Swallow Protocol:    Upright as possible for all oral intake;  Eat/Feed slowly; Remain upright for 30-45 minutes after meals;   External pacing;  Small bites/sips  TAKE BREAKS FROM EATING IF RR IS ABOVE 30 OR OXYGEN LEVELS DROP      Recommendations/Treatment  Requires SLP Intervention: No      D/C Recommendations: No follow up therapy recommended post discharge  Postural Changes and/or Swallow Maneuvers: Upright 90 degrees  Recommended Exercises: N/A    Therapeutic Interventions: Diet tolerance monitoring;Patient/Family education    Education: Images and recommendations were reviewed with Pt following this exam.   Patient Education: results of MBS  Patient Education Response: Verbalizes understanding    Safety Devices  Safety Devices in place: Yes (RN present)  Restraints Initially in Place: No      Goals:    Pt seen bedside to address the following goals:    1-The patient will tolerate recommended diet without observed clinical signs of aspiration  8/15-  RN reported pt's O2 stats continue to drop when eating, but that pt recovers fairly quickly. RN reported encouraging pt to eat slower and would have pt stop eating when stats dropped as a precaution. Wife present. Pt analyzed with trials with yogurt and Ensure. Pt required cues to stop eating at one point when O2 stats dropped. With trials of Ensure, pt took small sips with long pauses in between drinks. Pt was able to maintain oxygen levels with this strategy. Given pt's change in lung status after incident on Saturday, pt continues continues to destat with eating, and known history of aspiration with a straw, recommend repeat MBS this date. Con't goal  8/16- MBS scheduled for earlier today but was cancelled by RN  due to concern for pt's safety as RR increased to 40 after breakfast. RN and wife present. Pt currently consuming lunch with RR fluctuating between 22-35 with the occasional increase to 40. Pt analyzed with sherbet, ensure, jello, mashed potatoes and meatloaf. Pt requiring cues to eat at a slow rate in an attempt to maintain oxygenation and RR while pt coordinates breathing with swallowing. Pt with no coughing,  throat clearing or change in vocal quality following any trial, but RR continues to fluctuate. Given that this is how pt has looked at meals for the past few days, it would be beneficial to determine if pt is silently aspirating during these episodes. Will re-schedule MBS for this date. RN discussed with NP who was in agreement.  Con't goal  8/16- second session- goal met- not aspiration identified on MBS         2- The pt/caregiver will demonstrate understanding of swallowing recommendations and concerns. 8/13- The pt and wife were educated to purpose of the visit, anatomy and physiology of the swallow, concerns for aspiration, role breathing plays in swallowing, concern for increased risk for aspiration when respiratory rate is elevated, swallowing strategies, diet recommendations, possibility of being made NPO if s/s aspiration emerge and plan to re-assess for possible repeat MBS. The pt  and wife stated comprehension. con't goal  8/15- pt and wife educated to the purpose of the visit, rational for recommendation for repeat MBS, a description of the procedure and review of swallowing strategies. Both pt and wife stated comprehension and agreement. Discussed at length with RN concerns for aspiration. Pt also in agreement with repeat MBS given her observation of pt's performance while eating over the past few days. Also discussed concerns with RT. Con't goal   8/16- pt and wife were educated to the purpose of the visit, role breathing plays in swallowing, concern for aspiration, swallowing strategies and recommendation for MBS. All stated comprehension and agreement. Con't goal   8/16- second session- goal met- pt and RN educated to the results of MBS (video reviewed) to continue with current diet and swallowing strategies- with emphasis on eating slowly while monitoring oxygenation and RR and taking breaks when oxygen decreases and RR increases. New goal-  3-Pt will participate in MBS  8/16- goal met        Pain      Pain Level: 0    Plan:  Pt discharged from Speech Therapy services. Pt met all goals for therapy. Recommended diet:Dysphagia III/soft and bite sized (for energy conservation) thin liquids     Safe Swallow Protocol:    Upright as possible for all oral intake;  Eat/Feed slowly; Remain upright for 30-45 minutes after meals;   External pacing;  Small bites/sips  TAKE BREAKS FROM EATING IF RR IS ABOVE 30 OR OXYGEN LEVELS DROP  Total treatment time:20  Discharge Plan: No further follow up needed unless s/s aspiration emerge, make pt NPO and re-refer. Discussed with RN, Amanda Lynn  Needs within reach.        Zachariah Crump, [facility]- SLP  [phone]  Pg # [phone]  This document will serve as a discharge summary if pt discharge before next treatment   session    Therapy Time:   Individual Concurrent Group Co-treatment   Time In 1205         Time Out 1230         Minutes 25

## 2022-08-16 NOTE — PROGRESS NOTES
Speech Language Pathology      Chart reviewed. Plan for today was to repeat MBS this morning, but pt's respiratory rate increased to 40 after consuming breakfast so RN cancelled study at this time due to concern for pt's safety. Spoke with RN who will page this SLP when RR decreases and she feels pt is safe to travel to radiology for testing. Discussed with RN the possibility of pt needing supervision with meals to monitor rate of intake and to monitor RR in order to cue pt to take breaks from eating when RR becomes elevated. Will be in communication with RN today to determine if MBS can be scheduled for later today.     Yuliana Patel, [address]  Speech-Language Pathologist  Pager [phone]

## 2022-08-16 NOTE — TELEPHONE ENCOUNTER
Medication:   Requested Prescriptions     Pending Prescriptions Disp Refills    JANUMET XR  MG TB24 per extended release tablet [Pharmacy Med Name: JANUMET XR TAB ] 180 tablet 1     Sig: TAKE 1 TABLET TWICE A DAY     Last Filled: 5.4.22    Last appt: 4/18/2022   Next appt: Visit date not found    Last OARRS: No flowsheet data found.

## 2022-08-16 NOTE — PROGRESS NOTES
Physical Therapy  Facility/Department: [facility]  Physical Therapy Initial Assessment/Treatment    Name: Jamila Stoddard  : 1945  MRN: [id number]  Date of Service: 2022    Discharge Recommendations:    Jamila Stoddard scored a  on the AM-PAC short mobility form. Current research shows that an AM-PAC score of 17 or less is typically not associated with a discharge to the patient's home setting. Based on the patient's AM-PAC score and their current functional mobility deficits, it is recommended that the patient have 3-5 sessions per week of Physical Therapy at d/ to increase the patient's independence. Please see assessment section for further patient specific details. If patient discharges prior to next session this note will serve as a discharge summary. Please see below for the latest assessment towards goals. PT Equipment Recommendations  Equipment Needed: No      Patient Diagnosis(es): There were no encounter diagnoses. Past Medical History:  has a past medical history of Benign prostatic hyperplasia with weak urinary stream, Cancer ([address] Erectile dysfunction, History of gout, History of thrombocytopenia, Hypercalcemia, Hyperlipidemia, Hypertension, Lung nodule, Proteinuria, Type 2 diabetes mellitus without complication ([address] and Vitamin D deficiency. Past Surgical History:  has a past surgical history that includes Finger trigger release (Right, ); Tonsillectomy and adenoidectomy (age 2); Elbow fracture surgery (Left, age 5); Vasectomy (1971); Cataract removal with implant (Bilateral, ); Colonoscopy (3/29/2011); Colonoscopy (2016); IR PORT PLACEMENT > 5 YEARS (2022); bronchoscopy (N/A, 3/25/2022); and craniotomy (Right, 2022). Assessment   Assessment: Pt showing limited mobility due to low endurance and fatigue with mobility. Pt required increase on high flow O2 with activity due to de-satting with mobility with need for up 15L O2.   Pt fatigues quickly, but is cooperative. Rec continued inpt PT at d/c. Will follow. Treatment Diagnosis: decreased functional mobility  Therapy Prognosis: Good  Decision Making: Medium Complexity  Requires PT Follow-Up: Yes  Activity Tolerance  Activity Tolerance: Patient limited by fatigue;Patient limited by endurance  Activity Tolerance Comments: pt significantly limited by spo2 this session. Initially spO2 in 90-94% range on 10L, once EOB quickly desat to 70% on 15L. Required 3min to recover to 80s, unable to achieve >90% in sitting. Pt assisted back to supine and required several more minutes to recover >90%. Plan   Plan  Plan:  (2-5)  Current Treatment Recommendations: Strengthening, Gait training, Balance training, Functional mobility training, Transfer training, Endurance training, Wheelchair mobility training, Patient/Caregiver education & training, Safety education & training, Therapeutic activities  Safety Devices  Type of Devices: Call light within reach, Nurse notified, Left in bed, Bed alarm in place  Restraints  Restraints Initially in Place: No     Restrictions  Position Activity Restriction  Other position/activity restrictions: Up as Tolerated, If platelet count equal to or less than 10 but the patient has received a platelet transfusion, physical therapy or occupational therapy may proceed with treatment. Subjective   General Comment  Comments: NP and nursing staff just exited room stating pt has some edema to LUE, dopplers ordered, therapy okay. Subjective  Subjective: pt semisupine in bed, agreeable to PT.  Denies pain         Social/Functional History  Social/Functional History  Lives With: Spouse (can provide 24hr assist)  Type of Home: Condo  Home Layout: Two level, Performs ADL's on one level, Able to Live on Main level with bedroom/bathroom  Home Access: Ramped entrance  Bathroom Shower/Tub: Walk-in shower  Bathroom Toilet: Handicap height  Bathroom Equipment: Shower chair, Grab bars off EOB)  Sit to Supine: Moderate assistance;2 Person assistance (Assist at BLEs and 2nd person managing lowering trunk)  Scootin Person assistance;Dependent/Total;Minimal assistance (Dep scooting up to [facility] with use of sheet and 2 person assist. pt performs scooting to EOB with min A)  Transfers  Comment: unable to assess due to low SpO2%  Balance  Sitting - Static: Fair  Exercise Treatment: Pt performed supine ther ex including SAQ, shoulder raises at end session once semisupine in bed: spO2 remained in 80s, required significant rest breaks      AM-PAC Score  AM-PAC Inpatient Mobility Raw Score : 11 (22)  AM-PAC Inpatient T-Scale Score : 33.86 (22)  Mobility Inpatient CMS 0-100% Score: 72.57 (22)  Mobility Inpatient CMS G-Code Modifier : CL (22)      Goals  Short Term Goals  Time Frame for Short term goals: Discharge- all ongoing  Short term goal 1: Rolling Left and Right SBA  ONGOING  Short term goal 2: Sit<>Supine Min A  ONGOING  Short term goal 3: Sit<>Stand with RW Min A  ONGOING  Short term goal 4: Stand Pivot with RW Min A  ONGOING  Short term goal 5: Amb 5ft with RW Min A  ONGOING  Patient Goals   Patient goals : To Return Home and Feel Better       Education  Patient Education  Education Given To: Patient  Education Provided: Role of Therapy; Energy Conservation  Education Outcome: Continued education needed;Verbalized understanding      Therapy Time   Individual Concurrent Group Co-treatment   Time In 1510         Time Out 1540         Minutes 30             Timed Code Treatment Minutes: 30    Total Treatment Minutes: 30    If patient is discharged prior to next treatment, this note will serve as the discharge summary.     Mark Atkins PT, DPT, NCS, CSRS

## 2022-08-16 NOTE — PLAN OF CARE
Problem: Safety - Adult  Goal: Free from fall injury  Outcome: Progressing  Pt meets criteria for orthostasis. Pt is a High fall risk. See Lonni Easter Fall Score and ABCDS Injury Risk assessments.   + Screening for Orthostasis and/or + High Fall Risk per GARVIN/ABCDS: Explained fall risk precautions to pt and family and rationale behind their use to keep the patient safe. Pt bed is in low position, side rails up, call light and belongings are in reach. Fall wristband applied and present on pts wrist.  Bed alarm on. Pt encouraged to call for assistance. Will continue with hourly rounds for PO intake, pain needs, toileting and repositioning as needed. Problem: Skin/Tissue Integrity  Goal: Absence of new skin breakdown  Description: 1. Monitor for areas of redness and/or skin breakdown  2. Assess vascular access sites hourly  3. Every 4-6 hours minimum:  Change oxygen saturation probe site  4. Every 4-6 hours:  If on nasal continuous positive airway pressure, respiratory therapy assess nares and determine need for appliance change or resting period. Outcome: Progressing  Pt is a low destiny and is repositioned at least q2h or as needed. Pt has protective dressings on his back and sacrum. Pt educated on the importance of frequent repositioning. Problem: Nutrition Deficit:  Goal: Optimize nutritional status  Outcome: Progressing  Pt educated on importance of oral intake for adequate nutrition. Pt consumes liquid oral ensure supplements. Problem: Respiratory   Goal: Pt will achieve an O2 Saturation of 88%        Outcome: Progressing  Pt desats and experiences tachypnea with any movement. Pt is at risk for aspiration with oral intake and was educated on eating/drinking slowly. Pt is on a high flow nasal canula.

## 2022-08-16 NOTE — PROGRESS NOTES
[facility] Progress Note    2022     Angel Meier    MRN: [id number]    : 1945    Referring MD: Cara Santana MD  [address] [facility],  [facility] [address]    SUBJECTIVE:  he cont to be sob w trivial sob     ECOG PS:(3) Capable of limited self-care, confined to bed or chair > 50% of waking hours    KPS: 50%  Requires considerable assistance and frequent medical care    Isolation: None    Medications    Scheduled Meds:   phosphorus  500 mg Oral BID    cefepime  2,000 mg IntraVENous Q8H    predniSONE  10 mg Oral Daily    valACYclovir  500 mg Oral BID    voriconazole  200 mg Oral 2 times per day    insulin lispro  0-16 Units SubCUTAneous TID WC    insulin lispro  0-4 Units SubCUTAneous Nightly    bacitracin-polymyxin b   Topical BID    docusate sodium  100 mg Oral BID    fluticasone  1 spray Each Nostril Daily    levETIRAcetam  500 mg Oral BID    sodium chloride  2 spray Nasal 4x Daily    tamsulosin  0.4 mg Oral Daily    vitamin B-12  1,000 mcg Oral BID    sodium chloride flush  5-40 mL IntraVENous 2 times per day    Saline Mouthwash  15 mL Swish & Spit 4x Daily AC & HS    enoxaparin  30 mg SubCUTAneous Daily     Continuous Infusions:   sodium chloride      sodium chloride      potassium chloride      dextrose       PRN Meds:.sodium phosphate IVPB, acetaminophen, ondansetron, prochlorperazine, sodium chloride, sodium chloride flush, sodium chloride, potassium chloride, magnesium sulfate, magnesium hydroxide, Saline Mouthwash, alteplase (CATHFLO) with sterile water injection, glucose, dextrose bolus **OR** dextrose bolus, glucagon (rDNA), dextrose    ROS:  As noted above, otherwise remainder of 10-point ROS negative    Physical Exam:     I&O:    Intake/Output Summary (Last 24 hours) at 2022 0805  Last data filed at 2022 0544  Gross per 24 hour   Intake 195 ml   Output 1000 ml   Net -805 ml         Vital Signs:  /67   Pulse (!) 113   Temp 97.7 °F (36.5 °C) (Oral)   Resp 28   Ht 5' 6\" (1.676 m)   Wt 153 lb 3.5 oz (69.5 kg)   SpO2 96%   BMI 24.73 kg/m²     Weight:    Wt Readings from Last 3 Encounters:   08/16/22 153 lb 3.5 oz (69.5 kg)   07/25/22 116 lb 10 oz (52.9 kg)   05/31/22 117 lb (53.1 kg)       General: Awake, alert and oriented  HEENT: normocephalic, PERRL, no scleral erythema or icterus, Oral mucosa moist and intact, throat clear  NECK: supple   BACK: Straight   SKIN: warm dry and intact without lesions rashes or masses  CHEST: poor air excursion, no rhonchi  CV: Normal S1 S2, irreg, no MRG  ABD: NT ND normoactive BS, no palpable masses or hepatosplenomegaly  EXTREMITIES: without edema, denies calf tenderness  NEURO: CN II - XII grossly intact  CATHETER:  Right IJ SL PAC (1/11/22) - CDI    Data    CBC:   Recent Labs     08/14/22  0454 08/15/22  0502 08/16/22  0314   WBC 4.7 4.6 6.0   HGB 9.5* 8.8* 9.6*   HCT 28.8* 27.7* 28.4*   MCV 95.8 96.8 94.8   PLT 84* 91* 100*       BMP/Mag:  Recent Labs     08/14/22  0454 08/14/22  1830 08/15/22  0502 08/16/22  0314     --  140 141   K 3.8  --  4.1 4.0   CL 99  --  99 99   CO2 31  --  35* 33*   PHOS 1.2* 2.3* 2.1* 1.4*   BUN 16  --  17 15   CREATININE <0.5*  --  <0.5* <0.5*   MG 1.60*  --  1.80 1.60*       LIVP:   Recent Labs     08/15/22  0502   AST 27   ALT 33   BILIDIR <0.2   BILITOT 0.3   ALKPHOS 146*       Coags:   Recent Labs     08/15/22  0502   PROTIME 15.0*   INR 1.19*   APTT 27.8       Uric Acid   Recent Labs     08/15/22  0502   LABURIC 2.4*       Diagnostics:   CT chest (8/9/22):  1. Moderate bilateral groundglass opacity new since prior chest CT consistent with infectious/inflammatory pneumonitis. 2. Background mild-to-moderate lower lobe predominant pulmonary fibrosis without change. 3. A 5 cm lesion in the anterior aspect left lobe of the liver stable to mildly decreased in size and of indeterminate etiology.     PROBLEM LIST:           DLBC  Interstitial lung disease / Pulmonary Fibrosis   Type 2 Diabetes Mellitus   BPH  Acute on Chronic Respiratory Failure / Fungal PNA (8/2022)      TREATMENT:            R-CHOP w/ Revlimid and Tafasitimab  Polatuzumab/BR   Cycle #1 -  8/1/22      ASSESSMENT AND PLAN:          1. 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     - S/p Decadron 2 mg daily (lowered 8/1/22) --> now off   - Cont Keppra 500 mg bid     Cycle 1, Day + 16     2. ID: afebrile, Cont to treat for fungal PNA (POA)    - Cont Valtrex and Dapsone PPX  - Start Vori 200 mg BID (8/13/22)  - Viral respiratory w/ COVID 19 (8/9/22): Negative  - MRSA swab (8/11/22) - Neg  - Procalcitonin (8/10/22) - 0.8  - CT chest (8/9/22): Moderate bilateral groundglass opacity new since prior chest CT consistent with infectious/inflammatory pneumonitis. Background mild-to-moderate lower lobe predominant pulmonary fibrosis without change  - S/p Bronchoscopy w/ BAL and biopsy (8/10/22) - Galactomannan positive 1.33, Diatherix - negative  - Fungitell and galactomannan (8/14/22) - Pending  - Cont Vfend Day + 4 (started 8/13/22)    Abx history:  Cefepime x 7 days (8/10/22 - 8/16/22)      3. Heme: Anemia and thrombocytopenia likely r/t recent chemotherapy  - Transfuse for Hgb < 7 and Platelets < 10 K  - No transfusion today     4. Metabolic: Electrolytes and renal fxn stable; hypoPhos. Steroid induced hyperglycemia - improved      - S/p Lasix 40 mg IV x 1 (8/12/22)  - Start KPhos 500 mg bid (started 8/16/22)  - No IVF  - Replace Phos, K+ & Mg per PRN orders     5. GI / Nutrition:          Nutrition:  Severe malnutrition w/ chronic illness    - Cont regular diet, no straws   - SLP eval 8/13/22:  Aspiration with straws, regular diet and thin liquids with cups only  - Cont Glucerna shakes BID and Magic cups BID  - Recommend swabbing mouth after all meals.  Must be completely awake and upright for PO intake  - Dietary to follow closely  PPX:    - S/p PPI (stopped 8/16/22) d/t completing steroids   Nausea:  - Cont Zofran and Compazine as needed  Constipation:  no complaints   - Cont Colace bid     6. Pulm:    - H/o Interstitial lung disease / pulmonary fibrosis  - F/b Dr. Real Forrest   - S/p bronchoscopy (3/25/22) and PFTs (4/1/22)  - Home O2 - 2 l/min    Hypoxemia:  Admitted w/ acute on chronic respiratory failure  possibly from  pneumonitis from chemotherapy (Rituxan vs Jerzy), but may also have multifocal fungal PNA. - Pulm following, appreciate recs  - CT chest (8/9/22) - Moderate bilateral groundglass opacity new since prior chest CT consistent with infectious/inflammatory pneumonitis. Background mild-to-moderate lower lobe predominant pulmonary fibrosis without change. A 5 cm lesion in the anterior aspect left lobe of the liver stable to mildly decreased in size and of indeterminate etiology. - S/p Bronchoscopy w/ BAL and biopsy (8/10/22) - + galactomannan, Diatherix negative  - S/p steroids:  Solumedrol 60 mg IV daily (started 8/10/22), 60 mg BID (8/12/22, d/t increased oxygen requirements), 60 mg daily (8/14/22 d/t likely fungal pneumonia), Prednisone 20 mg daily (8/15/22), 10 mg daily (8/16/22)  - Cont high flow O2 @ 8 l/min; titrate for O2 Sat 88%  - See ID section for additional treatment and management       7. Endo:   - H/o T2DM   T2DM:  exacerbated by steroids  - S/p Lantus 10 units nightly (stopped 8/11/22 - 8/15/22)   - Home regimen: on humalog SSI, janumet and jardiance  - Cont Humalog Med SSI AS/HS       8. MSK:  he has acute debilitation and generalized weakness d/t CNS lymphoma and hypoxemia   - Cont PT/OT - he is extremely weak and may required ARU or SNF when hypoxemia improves     9.   :  - H/o BPH  BPH:    - Cont Flomax daily     - DVT Prophylaxis: Platelets >25,641 cells/dL, - daily lovenox prophylaxis ordered  Contraindications to pharmacologic prophylaxis: None  Contraindications to mechanical prophylaxis: None    - Disposition: Unknown at this time; once hypoxemia improves     The patient was seen and examined by Dr. Lyudmila Raymond MD

## 2022-08-16 NOTE — PROGRESS NOTES
Speech Language Pathology  Facility/Department: [address]  Dysphagia Daily Treatment Note    NAME: Stuart Pablo  : 1945  MRN: [id number]    Patient Diagnosis(es):   Patient Active Problem List    Diagnosis Date Noted    Acute hypoxemic respiratory failure ([address] 08/15/2022    Pneumonitis 08/15/2022    Hypoxia 2022    Severe malnutrition ([address] 2022    Brain mass 2022    Type 2 diabetes mellitus, uncontrolled, with neuropathy ([address] 2022    Diabetes mellitus type 2, controlled, without complications ([address]     Allergic rhinitis 2022    ILD (interstitial lung disease) ([address]     Urinary urgency 2022    Constipation 2022    Pain with urination 2022    Double vision 2022    Dizziness 2022    Skin abrasion 2022    Diffuse large B-cell lymphoma ([address] 2022    B-cell lymphoma of solid organ excluding spleen ([address] 01/10/2022    Low magnesium level 01/10/2022    Parotid mass 01/10/2022    Liver mass 01/10/2022    Enlarged lymph node in neck 2021    Urinary frequency 2021    Diarrhea 2021    Generalized weakness 2021    Fatigue 2021    Balance problems 2021    Neck mass 2021    Weight loss 2021    Appetite loss 2021    Microalbuminuria 2021    Mixed hyperlipidemia 2021    Rib pain on right side 2021    Shortness of breath 2021    Proteinuria     Lung nodule     Hypercalcemia     Type 2 diabetes mellitus without complication ([address]     Benign prostatic hyperplasia with weak urinary stream 12/10/2015    Type 2 diabetes mellitus, controlled ([address] 2015    Essential hypertension 2015    Other hyperlipidemia 2015    Vitamin D deficiency 2015    Onychomycosis 2015    History of hypercalcemia 2015    Hand arthritis 2015    History of thrombocytopenia 2015    History of gout 2015 sensation of aspiration. Although pt not showing obvious outward signs of aspiration such as coughing, throat clearing and change of voice, question pt's full ability to coordinate breathing with swallowing given fluctuating RR. By the end of the session, RR increased to as high as 40. Recommend downgrading diet to Soft and Bite sized to decrease fatigue with thin liquids by small sips by cup- NO STRAWS. Also recommend not allowing pt to eat if RR is above 30. Will con't to assess for need for repeat MBS. Dysphagia Diagnosis: Mild oral stage dysphagia, Mild to moderate pharyngeal stage dysphagia    MBS results - will schedule repeat MBS for today     Pain: denied pain     Current Diet : ADULT ORAL NUTRITION SUPPLEMENT; Lunch, Dinner; Frozen Oral Supplement  ADULT ORAL NUTRITION SUPPLEMENT; Breakfast, Lunch; Diabetic Oral Supplement  ADULT DIET; Dysphagia - Soft and Bite Sized; Low Microbial   Recommended Form of Meds: PO  Compensatory Swallowing Strategies : Upright as possible for all oral intake, No straws, Eat/Feed slowly, Remain upright for 30-45 minutes after meals, External pacing, Small bites/sips (stop eating if SOB, do not allow to eat when RR 30 or above)     Treatment:  Pt seen bedside to address the following goals:    1-The patient will tolerate recommended diet without observed clinical signs of aspiration  8/15-  RN reported pt's O2 stats continue to drop when eating, but that pt recovers fairly quickly. RN reported encouraging pt to eat slower and would have pt stop eating when stats dropped as a precaution. Wife present. Pt analyzed with trials with yogurt and Ensure. Pt required cues to stop eating at one point when O2 stats dropped. With trials of Ensure, pt took small sips with long pauses in between drinks. Pt was able to maintain oxygen levels with this strategy.   Given pt's change in lung status after incident on Saturday, pt continues continues to destat with eating, and known history of aspiration with a straw, recommend repeat MBS this date. Con't goal   8/16- MBS scheduled for earlier today but was cancelled by RN  due to concern for pt's safety as RR increased to 40 after breakfast. RN and wife present. Pt currently consuming lunch with RR fluctuating between 22-35 with the occasional increase to 40. Pt analyzed with sherbet, ensure, jello, mashed potatoes and meatloaf. Pt requiring cues to eat at a slow rate in an attempt to maintain oxygenation and RR while pt coordinates breathing with swallowing. Pt with no coughing,  throat clearing or change in vocal quality following any trial, but RR continues to fluctuate. Given that this is how pt has looked at meals for the past few days, it would be beneficial to determine if pt is silently aspirating during these episodes. Will re-schedule MBS for this date. RN discussed with NP who was in agreement. Con't goal        2- The pt/caregiver will demonstrate understanding of swallowing recommendations and concerns. 8/13- The pt and wife were educated to purpose of the visit, anatomy and physiology of the swallow, concerns for aspiration, role breathing plays in swallowing, concern for increased risk for aspiration when respiratory rate is elevated, swallowing strategies, diet recommendations, possibility of being made NPO if s/s aspiration emerge and plan to re-assess for possible repeat MBS. The pt  and wife stated comprehension. con't goal  8/15- pt and wife educated to the purpose of the visit, rational for recommendation for repeat MBS, a description of the procedure and review of swallowing strategies. Both pt and wife stated comprehension and agreement. Discussed at length with RN concerns for aspiration. Pt also in agreement with repeat MBS given her observation of pt's performance while eating over the past few days. Also discussed concerns with RT.  Con't goal   8/16- pt and wife were educated to the purpose of the visit, role breathing plays in swallowing, concern for aspiration, swallowing strategies and recommendation for MBS. All stated comprehension and agreement. Con't goal       New goal-  3-Pt will participate in MBS            Patient/Family/Caregiver Education:  see above     Compensatory Strategies:  Compensatory Swallowing Strategies : Upright as possible for all oral intake, No straws, Eat/Feed slowly, Remain upright for 30-45 minutes after meals, External pacing, Small bites/sips (stop eating if SOB, do not allow to eat when RR 30 or above)      Plan:  Continue dysphagia treatment with goals per plan of care. Diet recommendations: TBD after MBS   repeat MBS scheduled for this date - RN to accompany pt  DC recommendation:TBD closer to discharge   Treatment: 15  D/W nursing, Peewee Dilling  Needs met prior to leaving room, call button in reach.     Zachariah Peterson [address]  Speech-Language Pathologist  Pager [phone]      If patient is discharged prior to next treatment, this note will serve as the discharge summary

## 2022-08-17 NOTE — PROGRESS NOTES
Progress Note    Admit Date: 8/9/2022  Day: 8  Diet: ADULT ORAL NUTRITION SUPPLEMENT; Lunch, Dinner; Frozen Oral Supplement  ADULT ORAL NUTRITION SUPPLEMENT; Breakfast, Lunch; Diabetic Oral Supplement  ADULT DIET; Dysphagia - Soft and Bite Sized; Low Microbial    CC: Hypoxia    Interval history:   No acute overnight events. Patient remains afebrile  More tachypneic today with respiratory rate ranging from 30 - 40  No pain, anxiety, fever.   Dyspnea unchanged  Oxygen saturation 96% on 6 L, down from 10 L when I saw him yesterday afternoon    Medications:     Scheduled Meds:   potassium chloride  20 mEq Oral Daily with breakfast    phosphorus  500 mg Oral BID    valACYclovir  500 mg Oral BID    voriconazole  200 mg Oral 2 times per day    insulin lispro  0-16 Units SubCUTAneous TID WC    insulin lispro  0-4 Units SubCUTAneous Nightly    bacitracin-polymyxin b   Topical BID    docusate sodium  100 mg Oral BID    fluticasone  1 spray Each Nostril Daily    levETIRAcetam  500 mg Oral BID    sodium chloride  2 spray Nasal 4x Daily    tamsulosin  0.4 mg Oral Daily    sodium chloride flush  5-40 mL IntraVENous 2 times per day    Saline Mouthwash  15 mL Swish & Spit 4x Daily AC & HS    enoxaparin  30 mg SubCUTAneous Daily     Continuous Infusions:   sodium chloride      sodium chloride      potassium chloride      dextrose       PRN Meds:sodium phosphate IVPB, acetaminophen, ondansetron, prochlorperazine, sodium chloride, sodium chloride flush, sodium chloride, potassium chloride, magnesium sulfate, magnesium hydroxide, Saline Mouthwash, alteplase (CATHFLO) with sterile water injection, glucose, dextrose bolus **OR** dextrose bolus, glucagon (rDNA), dextrose    Objective:   Vitals:   T-max:  Patient Vitals for the past 8 hrs:   BP Temp Temp src Pulse Resp SpO2 Weight   08/17/22 0745 -- -- -- -- -- -- 118 lb 9.7 oz (53.8 kg)   08/17/22 0740 125/86 97.9 °F (36.6 °C) Oral (!) 10 26 96 % --   08/17/22 0340 115/83 98 °F (36.7 °C) Oral (!) 111 (!) 35 95 % 153 lb (69.4 kg)         Intake/Output Summary (Last 24 hours) at 8/17/2022 0926  Last data filed at 8/17/2022 0617  Gross per 24 hour   Intake 467 ml   Output 1600 ml   Net -1133 ml         Review of Systems   Constitutional:  Positive for appetite change. Negative for activity change and fever. Respiratory:  Positive for shortness of breath. Negative for apnea and wheezing. Cardiovascular:  Negative for chest pain, palpitations and leg swelling. Gastrointestinal:  Negative for abdominal distention and abdominal pain. Neurological:  Negative for light-headedness. Physical Exam  Constitutional:       General: He is not in acute distress. Appearance: He is ill-appearing. Eyes:      Pupils: Pupils are equal, round, and reactive to light. Cardiovascular:      Rate and Rhythm: Normal rate and regular rhythm. Pulses: Normal pulses. Heart sounds: Normal heart sounds. Pulmonary:      Effort: Respiratory distress present. Breath sounds: No wheezing. Chest:      Chest wall: No tenderness. Abdominal:      General: There is no distension. Palpations: There is no mass. Tenderness: There is no abdominal tenderness. Musculoskeletal:         General: No swelling. Right lower leg: No edema. Left lower leg: No edema. Skin:     General: Skin is warm. Neurological:      Mental Status: He is alert. Psychiatric:         Mood and Affect: Mood normal.         Thought Content:  Thought content normal.       LABS:    CBC:   Recent Labs     08/15/22  0502 08/16/22  0314 08/17/22  0340   WBC 4.6 6.0 5.0   HGB 8.8* 9.6* 9.4*   HCT 27.7* 28.4* 28.9*   PLT 91* 100* 95*   MCV 96.8 94.8 95.9       Renal:    Recent Labs     08/15/22  0502 08/16/22  0314 08/17/22  0340    141 138   K 4.1 4.0 3.5   CL 99 99 95*   CO2 35* 33* 37*   BUN 17 15 13   CREATININE <0.5* <0.5* <0.5*   GLUCOSE 285* 97 153*   CALCIUM 9.1 9.0 8.5   MG 1.80 1.60* 1.60*   PHOS 2.1* 1.4* 1.9*   ANIONGAP 6 9 6       Hepatic:   Recent Labs     08/15/22  0502 08/17/22  0340   AST 27 27   ALT 33 33   BILITOT 0.3 0.3   BILIDIR <0.2 <0.2   PROT 5.2* 5.0*   LABALBU 3.0* 2.9*   ALKPHOS 146* 140*       INR:   Recent Labs     08/15/22  0502   INR 1.19*       1 3 beta D glucan: + @ > 500  Serum Aspergillus galactomannan: Negative    Cultures:  BAL 8/10/2022  Respiratory culture: No growth     Value Units Date/Time    Culture, Fungus [[id number]] Collected: 08/10/22 1715   Order Status: Completed Specimen: BAL- Bronch. Lavage Updated: 08/16/22 0727    Fungus (Mycology) Culture Yeast Isolated, culture in progress    Fungus Stain No Fungal elements seen   Narrative:       Diatherix: Negative  Cytology:   FINAL DIAGNOSIS:     Bronchial Alveolar Lavage, Right Upper Lobe:   -  No malignant cells identified   -  GMS stain is negative for fungal and pneumocystis jirovecii organisms. BAL galactomannan: + @ 1.33  -----------------------------------------------------------------  RAD:   FL MODIFIED BARIUM SWALLOW W VIDEO   Final Result      No laryngeal penetration or aspiration. Please refer to the detailed report of the speech therapist.            XR CHEST PORTABLE   Final Result      1. Diffuse bilateral pulmonary infiltrate and/or edema overall demonstrating question slight improvement from prior exam.      XR CHEST PORTABLE   Final Result      Increased bilateral airspace disease            XR CHEST PORTABLE   Final Result      Bilateral airspace disease, increased since prior exam.      CT CHEST W CONTRAST   Final Result      1. Moderate bilateral groundglass opacity new since prior chest CT consistent with infectious/inflammatory pneumonitis. 2. Background mild-to-moderate lower lobe predominant pulmonary fibrosis without change. 3. A 5 cm lesion in the anterior aspect left lobe of the liver stable to mildly decreased in size and of indeterminate etiology. XR CHEST (2 VW)   Final Result   1. Diffuse patchy bilateral airspace disease concerning for pneumonia or pulmonary edema. VL Extremity Venous Left    (Results Pending)       Assessment     69-year-old male with DLBCL, non-Hodgkin lymphoma with CNS mass and biopsy-proven non-Hodgkin lymphoma. On cycle 1 day 16 is admitted in the hospital with acute hypoxemic respiratory failure. S/P  Chemotherapy Polatuzumab / Bendamustine, Decadron and Rituximab. Patient has history of ILD and is on 2 L NC at home. Initial diagnosis was pneumonitis but based on BAL results it appears that he has a fungal pneumonia given his positive BAL Aspergillus, +1 3D beta glucan, and now BAL fungal culture isolating yeast.  Furthermore he is clinically improving after initiation of voriconazole and reduction in steroids      Plan  Await speciation of fungus on BAL culture  Continue voriconazole 200 mg p.o. twice daily  Cefepime has been discontinued  He is now off prednisone  Unclear cause of tachypnea. Has alkalosis on BMP so does not appear compensating for metabolic acidemia. No signs of a new sepsis. Fungal PNA better controlled now than last week so unclear why he is more tachypneic.  Will check VBG and CXR  Wean oxygen for goal O2 sat of 88%      Esthela Uribe MD,  08/17/22  9:26 AM

## 2022-08-17 NOTE — PROGRESS NOTES
Occupational Therapy  Facility/Department: [facility]  Occupational Therapy Treatment    Name: Phill Camargo  : 1945  MRN: [id number]  Date of Service: 2022    Discharge Recommendations: Phill Camargo scored a  on the AM-PAC ADL Inpatient form. Current research shows that an AM-PAC score of 17 or less is typically not associated with a discharge to the patient's home setting. Based on the patient's AM-PAC score and their current ADL deficits, it is recommended that the patient have 3-5 sessions per week of Occupational Therapy at d/c to increase the patient's independence. Please see assessment section for further patient specific details. If patient discharges prior to next session this note will serve as a discharge summary. Please see below for the latest assessment towards goals. OT Equipment Recommendations  Other: cont to assess     Patient Diagnosis(es): There were no encounter diagnoses. Past Medical History:  has a past medical history of Benign prostatic hyperplasia with weak urinary stream, Cancer ([address] Erectile dysfunction, History of gout, History of thrombocytopenia, Hypercalcemia, Hyperlipidemia, Hypertension, Lung nodule, Proteinuria, Type 2 diabetes mellitus without complication ([address] and Vitamin D deficiency. Past Surgical History:  has a past surgical history that includes Finger trigger release (Right, ); Tonsillectomy and adenoidectomy (age 2); Elbow fracture surgery (Left, age 5); Vasectomy (1971); Cataract removal with implant (Bilateral, ); Colonoscopy (3/29/2011); Colonoscopy (2016); IR PORT PLACEMENT > 5 YEARS (2022); bronchoscopy (N/A, 3/25/2022); and craniotomy (Right, 2022). Treatment Diagnosis: decreased functional strenght and endurance 2/2 hypoxia      Assessment   Performance deficits / Impairments: Decreased functional mobility ; Decreased ADL status; Decreased strength;Decreased safe awareness;Decreased cognition;Decreased endurance;Decreased balance;Decreased posture  Assessment: Pt able to tolerate sit<>stand transfer this date and short ambulation to chair with min A x 2 throughout. Pt required 15L of O2 for transfer 2/2 destat to low 70's but then was able to recover to 90's on 10L of O2 with rest break. Pt needed mod A x 2 for supine to sit. Pt set-up with lunch at end of session. Would benefit from continued inpatient therapy services at discharge. Will cont to follow. Treatment Diagnosis: decreased functional strenght and endurance 2/2 hypoxia  Prognosis: Good  REQUIRES OT FOLLOW-UP: Yes  Activity Tolerance  Activity Tolerance: Patient limited by fatigue;Treatment limited secondary to medical complications (free text)  Activity Tolerance Comments: O2 initially on 10 L with spO2 93% and pt on 10L of O2 at end of session with spO2 97%. Pt needing 15L of spO2 during transfer and continues to be limtied by destat with all movement. Pt dropped to low 70's with return to 80's then 90's with ~5 min rest and pursed lip breathing. Pt needs cues to slow breathing. Plan   Plan  Times per Week: 2-5  Current Treatment Recommendations: ROM, Strengthening, Balance training, Functional mobility training, Endurance training, Patient/Caregiver education & training, Self-Care / ADL, Safety education & training, Positioning, Equipment evaluation, education, & procurement, Pain management     Restrictions  Position Activity Restriction  Other position/activity restrictions: Up as Tolerated, If platelet count equal to or less than 10 but the patient has received a platelet transfusion, physical therapy or occupational therapy may proceed with treatment.     Subjective   General  Chart Reviewed: Yes  Patient assessed for rehabilitation services?: Yes  Additional Pertinent Hx: DLBCL with CNS mass  Family / Caregiver Present: No  Referring Practitioner: MARCIA Soto CNP  Diagnosis: Hypoxia  Subjective  Subjective: Pt in bed beginning to eat lunch upon OT arrival. Pt agreeable to therapy session to be OOB for lunch and eat from chair level. General Comment  Comments: RN approved OT session     Social/Functional History  Social/Functional History  Lives With: Spouse (can provide 24hr assist)  Type of Home: Condo  Home Layout: Two level, Performs ADL's on one level, Able to Live on Main level with bedroom/bathroom  Home Access: Ramped entrance  Bathroom Shower/Tub: Walk-in shower  Bathroom Toilet: Handicap height  Bathroom Equipment: Shower chair, Grab bars in shower, Grab bars around toilet  Home Equipment: [address], 4 wheeled, [place], Oxygen (transport chair, 2L when needed)  Has the patient had two or more falls in the past year or any fall with injury in the past year?: Yes  Receives Help From: Family  ADL Assistance: Needs assistance  Homemaking Responsibilities: No  Ambulation Assistance: Needs assistance  Active : No  Additional Comments: Information from wife as pt is a questionable historian. In May, he was able to walk in house with RW and was fairly independent with ADL. At beginning of July, he was experiencing functional decline, had fall, found brain mass at hospital admission. Recently, wife has been doing \"everything\" and it \"has been a challenge\". Objective   Heart Rate: (!) 125  Heart Rate Source: Monitor  BP: 120/65  BP Location: Right upper arm  BP Method: Automatic  Patient Position: High fowlers  MAP (Calculated): 83.33  Resp: 21  SpO2: 97 %  O2 Device: High flow nasal cannula             Safety Devices  Type of Devices: Call light within reach;Nurse notified; Left in chair;Chair alarm in place           ADL  Feeding: Independent;Setup  Feeding Skilled Clinical Factors: pt able to feed self lunch from chair level ; set-up provided for cutting meatloaf  LE Dressing Skilled Clinical Factors: dependent to don/doff foot drop boots  Toileting Skilled Clinical Factors: pt has katie and per RN is still requiring total A for bowel mngmt     Activity Tolerance  Activity Tolerance: Patient limited by fatigue;Patient limited by endurance  Activity Tolerance Comments: pt continues to be limited by O2 and endurance. Initially spO2 in 90-94% range on 10L, once EOB desat to 75%, O2 inc to 15L. After pivoting to chair and resting spO2 recovered up to 97% on 10L. RN aware and present during session. Bed mobility  Supine to Sit: Moderate assistance;2 Person assistance (pt manages own LEs off bed with inc time and effort, use of rails and HOB elevated. Mod A x2 at trunk to achieve upright)  Sit to Supine: Moderate assistance;2 Person assistance (Assist at BLEs and 2nd person managing lowering trunk)  Scooting:  (2 person assist to scoot pt back into recliner for better positioning at end session)  Transfers  Sit to stand: Minimal assistance;2 Person assistance (min A x 2)  Stand to sit: Minimal assistance;2 Person assistance (min a x 2)     Cognition  Overall Cognitive Status: Exceptions  Arousal/Alertness: Delayed responses to stimuli  Following Commands: Follows one step commands with increased time  Attention Span: Difficulty dividing attention; Difficulty attending to directions  Memory: Decreased short term memory;Decreased recall of biographical Information;Decreased recall of recent events  Safety Judgement: Decreased awareness of need for assistance;Decreased awareness of need for safety  Problem Solving: Assistance required to implement solutions;Assistance required to generate solutions;Assistance required to identify errors made;Assistance required to correct errors made;Decreased awareness of errors  Insights: Decreased awareness of deficits  Initiation: Requires cues for some  Sequencing: Requires cues for some  Orientation  Overall Orientation Status: Within Functional Limits                  Education Given To: Patient  Education Provided: Role of Therapy;Transfer Training  Education Method: Demonstration;Verbal;Teach Back  Education Outcome: Verbalized understanding;Continued education needed                        G-Code     OutComes Score                                                  AM-PAC Score        AM-PAC Inpatient Daily Activity Raw Score: 12 (08/17/22 1236)  AM-PAC Inpatient ADL T-Scale Score : 30.6 (08/17/22 1236)  ADL Inpatient CMS 0-100% Score: 66.57 (08/17/22 1236)  ADL Inpatient CMS G-Code Modifier : CL (08/17/22 1236)    Tinneti Score       Goals  Short Term Goals  Time Frame for Short term goals: 1 week  Short Term Goal 1: tolerate sitting on edge of bed, 9-12 minutes with S, with SPO2>90 in prep for ADL- not met  Short Term Goal 2: complete 5 reps of AROM BUE therex in upsupported sitting position- not addressed  Short Term Goal 3: supine to sit with min A- not met  Patient Goals   Patient goals : \"I want to be able to sit up in the chair. \"       Therapy Time   Individual Concurrent Group Co-treatment   Time In 1140         Time Out 1205         Minutes 25         Timed Code Treatment Minutes: [id number] [facility]

## 2022-08-17 NOTE — PROGRESS NOTES
[address] [employer] Progress Note    2022     Phyllis Ocasio    MRN: [id number]    : 1945    Referring MD: Jayjay Yee MD  [facility] [address], [address],  [address]    SUBJECTIVE:   he reports ongoing sob     ECOG PS:(3) Capable of limited self-care, confined to bed or chair > 50% of waking hours    KPS: 50%  Requires considerable assistance and frequent medical care    Isolation: None    Medications    Scheduled Meds:   potassium chloride  20 mEq Oral Daily with breakfast    phosphorus  500 mg Oral BID    valACYclovir  500 mg Oral BID    voriconazole  200 mg Oral 2 times per day    insulin lispro  0-16 Units SubCUTAneous TID WC    insulin lispro  0-4 Units SubCUTAneous Nightly    bacitracin-polymyxin b   Topical BID    docusate sodium  100 mg Oral BID    fluticasone  1 spray Each Nostril Daily    levETIRAcetam  500 mg Oral BID    sodium chloride  2 spray Nasal 4x Daily    tamsulosin  0.4 mg Oral Daily    sodium chloride flush  5-40 mL IntraVENous 2 times per day    Saline Mouthwash  15 mL Swish & Spit 4x Daily AC & HS    enoxaparin  30 mg SubCUTAneous Daily     Continuous Infusions:   sodium chloride      sodium chloride      potassium chloride      dextrose       PRN Meds:.sodium phosphate IVPB, acetaminophen, ondansetron, prochlorperazine, sodium chloride, sodium chloride flush, sodium chloride, potassium chloride, magnesium sulfate, magnesium hydroxide, Saline Mouthwash, alteplase (CATHFLO) with sterile water injection, glucose, dextrose bolus **OR** dextrose bolus, glucagon (rDNA), dextrose    ROS:  As noted above, otherwise remainder of 10-point ROS negative    Physical Exam:     I&O:    Intake/Output Summary (Last 24 hours) at 2022 0625  Last data filed at 2022 0617  Gross per 24 hour   Intake 707 ml   Output 1600 ml   Net -893 ml         Vital Signs:  /83   Pulse (!) 111   Temp 98 °F (36.7 °C) (Oral)   Resp (!) 35   Ht 5' 6\" (1.676 m)   Wt 153 lb (69.4 kg)   SpO2 95%   BMI 24.69 kg/m²     Weight:    Wt Readings from Last 3 Encounters:   08/17/22 153 lb (69.4 kg)   07/25/22 116 lb 10 oz (52.9 kg)   05/31/22 117 lb (53.1 kg)       General: Awake, alert and oriented  HEENT: normocephalic, PERRL, no scleral erythema or icterus, Oral mucosa moist and intact, throat clear  NECK: supple   BACK: Straight   SKIN: warm dry and intact without lesions rashes or masses  CHEST: poor air excursion, no rhonchi  CV: Normal S1 S2, irreg, no MRG  ABD: NT ND normoactive BS, no palpable masses or hepatosplenomegaly  EXTREMITIES: without edema, denies calf tenderness  NEURO: CN II - XII grossly intact  CATHETER:  Right IJ SL PAC (1/11/22) - CDI      Data    CBC:   Recent Labs     08/15/22  0502 08/16/22  0314 08/17/22  0340   WBC 4.6 6.0 5.0   HGB 8.8* 9.6* 9.4*   HCT 27.7* 28.4* 28.9*   MCV 96.8 94.8 95.9   PLT 91* 100* 95*       BMP/Mag:  Recent Labs     08/15/22  0502 08/16/22  0314 08/17/22  0340    141 138   K 4.1 4.0 3.5   CL 99 99 95*   CO2 35* 33* 37*   PHOS 2.1* 1.4* 1.9*   BUN 17 15 13   CREATININE <0.5* <0.5* <0.5*   MG 1.80 1.60* 1.60*       LIVP:   Recent Labs     08/15/22  0502 08/17/22  0340   AST 27 27   ALT 33 33   BILIDIR <0.2 <0.2   BILITOT 0.3 0.3   ALKPHOS 146* 140*       Coags:   Recent Labs     08/15/22  0502   PROTIME 15.0*   INR 1.19*   APTT 27.8       Uric Acid   Recent Labs     08/15/22  0502 08/17/22  0340   LABURIC 2.4* 2.1*       Diagnostics:   CT chest (8/9/22):  1. Moderate bilateral groundglass opacity new since prior chest CT consistent with infectious/inflammatory pneumonitis. 2. Background mild-to-moderate lower lobe predominant pulmonary fibrosis without change. 3. A 5 cm lesion in the anterior aspect left lobe of the liver stable to mildly decreased in size and of indeterminate etiology.     PROBLEM LIST:           DLBC  Interstitial lung disease / Pulmonary Fibrosis   Type 2 Diabetes Mellitus   BPH  Acute on Chronic Respiratory Failure / Fungal PNA (8/2022)      TREATMENT:            R-CHOP w/ Revlimid and Tafasitimab  Polatuzumab/BR   Cycle #1 -  8/1/22      ASSESSMENT AND PLAN:          1. 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 ID: afebrile, Cont to treat for fungal PNA (POA)    - Cont Valtrex and Dapsone PPX  - Start Vori 200 mg BID (8/13/22)  - Viral respiratory w/ COVID 19 (8/9/22): Negative  - MRSA swab (8/11/22) - Neg  - Procalcitonin (8/10/22) - 0.8  - CT chest (8/9/22): Moderate bilateral groundglass opacity new since prior chest CT consistent with infectious/inflammatory pneumonitis. Background mild-to-moderate lower lobe predominant pulmonary fibrosis without change  - S/p Bronchoscopy w/ BAL and biopsy (8/10/22) - Galactomannan positive 1.33, Diatherix - negative  - Aspergillus (8/14/22) - negative, fungitell (8/14/22): > 500 (positive   - Cont Vfend Day + 5 (started 8/13/22)    Abx history:  Cefepime x 7 days (8/10/22 - 8/16/22)      3. Heme: Anemia and thrombocytopenia likely r/t recent chemotherapy  - Transfuse for Hgb < 7 and Platelets < 10 K  - No transfusion today     4. Metabolic: Electrolytes and renal fxn stable; hypoPhos, hypoMg. Steroid induced hyperglycemia - improved   - S/p Lasix 40 mg IV x 1 (8/12/22)  - Cont KPhos 500 mg bid (started 8/16/22)  - Start KCl 20 meq daily (started 8/17/22)  - No IVF  - Replace Phos, K+ & Mg per PRN orders     5. GI / Nutrition:          Nutrition:  Severe malnutrition w/ chronic illness    - Cont regular diet, no straws   - SLP eval (8/13/22):   Aspiration with straws, regular diet and thin liquids with cups only  - MBS (8/16/22) - No laryngeal penetration or aspiration.   - Cont Glucerna shakes BID and Magic cups BID  - Recommend swabbing mouth after all meals. Must be completely awake and upright for PO intake  - Dietary to follow closely  Nausea:  - Cont Zofran and Compazine as needed  Constipation:  no complaints   - Cont Colace bid     6. Pulm:    - H/o Interstitial lung disease / pulmonary fibrosis  - F/b Dr. Daniella Vásquez   - S/p bronchoscopy (3/25/22) and PFTs (4/1/22)  - Home O2 - 2 l/min    Hypoxemia:  Admitted w/ acute on chronic respiratory failure possibly from  pneumonitis from chemotherapy (Rituxan vs Jerzy), but more than likely from multifocal fungal PNA (POA). - Pulm following, appreciate recs  - CT chest (8/9/22) - Moderate bilateral groundglass opacity new since prior chest CT consistent with infectious/inflammatory pneumonitis. Background mild-to-moderate lower lobe predominant pulmonary fibrosis without change. A 5 cm lesion in the anterior aspect left lobe of the liver stable to mildly decreased in size and of indeterminate etiology. - S/p Bronchoscopy w/ BAL and biopsy (8/10/22) - + galactomannan, Diatherix negative  - S/p steroids:  Solumedrol 60 mg IV daily (started 8/10/22), 60 mg BID (8/12/22, d/t increased oxygen requirements), 60 mg daily (8/14/22 d/t likely fungal pneumonia), Prednisone 20 mg daily (8/15/22), 10 mg daily (8/16/22)  - Cont high flow O2 @ 5 l/min; titrate for O2 Sat 88%  - See ID section for additional treatment and management       7. Endo:   - H/o T2DM   T2DM:  exacerbated by steroids, now improving   - S/p Lantus 10 units nightly (stopped 8/11/22 - 8/15/22)   - Home regimen: on humalog SSI, janumet and jardiance  - Cont Humalog Med SSI AS/HS       8. MSK:  he has acute debilitation and generalized weakness d/t CNS lymphoma and hypoxemia   - Cont PT/OT - he is extremely weak and  will require SNF placement ([id number] [facility]) early next week     9.   :  - H/o BPH  BPH:    - Cont Flomax daily     - DVT Prophylaxis: Platelets >78,911 cells/dL, - daily lovenox prophylaxis ordered  Contraindications to pharmacologic prophylaxis: None  Contraindications to mechanical prophylaxis: None    - Disposition: Plan for SNF; once hypoxemia improves     The patient was seen and examined by Dr. Hira Turner MD

## 2022-08-17 NOTE — PLAN OF CARE
Problem: Safety - Adult  Goal: Free from fall injury  Outcome: Progressing  Pt meets criteria for orthostasis. Pt is a High fall risk. See Annia Sand Fall Score and ABCDS Injury Risk assessments.   + Screening for Orthostasis and/or + High Fall Risk per GARVIN/ABCDS: Explained fall risk precautions to pt and family and rationale behind their use to keep the patient safe. Pt bed is in low position, side rails up, call light and belongings are in reach. Fall wristband applied and present on pts wrist.  Bed alarm on. Pt encouraged to call for assistance. Will continue with hourly rounds for PO intake, pain needs, toileting and repositioning as needed. Problem: Skin/Tissue Integrity  Goal: Absence of new skin breakdown  Description: 1. Monitor for areas of redness and/or skin breakdown  2. Assess vascular access sites hourly  3. Every 4-6 hours minimum:  Change oxygen saturation probe site  4. Every 4-6 hours:  If on nasal continuous positive airway pressure, respiratory therapy assess nares and determine need for appliance change or resting period. Outcome: Progressing  Pt is a low destiny and requires q2h repositioning/turning per protocol. Wound care consulted. Pt has protective dressings on back ad sacrum. Problem: Nutrition Deficit:  Goal: Optimize nutritional status  Outcome: Progressing  Pt educated on importance of adequate oral intake and is compliant with oral liquid ensure supplements at all meals. Problem: Respiratory - Adult  Goal: Achieves optimal ventilation and oxygenation  Outcome: Progressing  Pt is on a high flow nasal cannula. Pt desats and experiences tachypnea with any movement and while eating. Pt educated on eating slowly and taking frequent breaks.

## 2022-08-17 NOTE — PROGRESS NOTES
Physical Therapy  Facility/Department: [facility]  Physical Therapy Daily Treatment    Name: Phyllis Ocasio  : 1945  MRN: [id number]  Date of Service: 2022    Discharge Recommendations:  Phyllis Ocasio scored a  on the AM-PAC short mobility form. Current research shows that an AM-PAC score of 17 or less is typically not associated with a discharge to the patient's home setting. Based on the patient's AM-PAC score and their current functional mobility deficits, it is recommended that the patient have 3-5 sessions per week of Physical Therapy at d/ to increase the patient's independence. Please see assessment section for further patient specific details. If patient discharges prior to next session this note will serve as a discharge summary. Please see below for the latest assessment towards goals. PT Equipment Recommendations  Equipment Needed: No      Patient Diagnosis(es): There were no encounter diagnoses. Past Medical History:  has a past medical history of Benign prostatic hyperplasia with weak urinary stream, Cancer ([address] Erectile dysfunction, History of gout, History of thrombocytopenia, Hypercalcemia, Hyperlipidemia, Hypertension, Lung nodule, Proteinuria, Type 2 diabetes mellitus without complication ([address] and Vitamin D deficiency. Past Surgical History:  has a past surgical history that includes Finger trigger release (Right, ); Tonsillectomy and adenoidectomy (age 2); Elbow fracture surgery (Left, age 5); Vasectomy (1971); Cataract removal with implant (Bilateral, ); Colonoscopy (3/29/2011); Colonoscopy (2016); IR PORT PLACEMENT > 5 YEARS (2022); bronchoscopy (N/A, 3/25/2022); and craniotomy (Right, 2022). Assessment   Body Structures, Functions, Activity Limitations Requiring Skilled Therapeutic Intervention: Decreased functional mobility   Assessment: Pt showing limited mobility due to low endurance and fatigue with mobility.   Pt required increase on high flow O2 up to 15L with activity due to de-satting with mobility. Pt fatigues quickly, but is cooperative. Rec continued inpt PT at d/c. Will follow. Treatment Diagnosis: decreased functional mobility  Therapy Prognosis: Good  Decision Making: Medium Complexity  Requires PT Follow-Up: Yes  Activity Tolerance  Activity Tolerance: Patient limited by fatigue;Patient limited by endurance  Activity Tolerance Comments: pt continues to be limited by O2 and endurance. Initially spO2 in 90-94% range on 10L, once EOB desat to 75%, O2 inc to 15L. After pivoting to chair and resting spO2 recovered up to 97% on 10L. RN aware and present during session. Plan   Plan  Plan:  (2-5)  Current Treatment Recommendations: Strengthening, Gait training, Balance training, Functional mobility training, Transfer training, Endurance training, Wheelchair mobility training, Patient/Caregiver education & training, Safety education & training, Therapeutic activities  Safety Devices  Type of Devices: Call light within reach, Nurse notified, Left in chair, Chair alarm in place  Restraints  Restraints Initially in Place: No     Restrictions  Position Activity Restriction  Other position/activity restrictions: Up as Tolerated, If platelet count equal to or less than 10 but the patient has received a platelet transfusion, physical therapy or occupational therapy may proceed with treatment. Subjective   Subjective  Subjective: pt eating lunch semisupine in bed. PT/OT encouraged pt to get up to chair to eat lunch, pt agreeable. Denies pain.      Social/Functional History  Social/Functional History  Lives With: Spouse (can provide 24hr assist)  Type of Home: Condo  Home Layout: Two level, Performs ADL's on one level, Able to Live on Main level with bedroom/bathroom  Home Access: Ramped entrance  Bathroom Shower/Tub: Walk-in shower  Bathroom Toilet: Handicap height  Bathroom Equipment: Shower chair, Grab bars in shower, Grab bars around toilet  Home Equipment: Walker, 4 wheeled, [place], Oxygen (transport chair, 2L when needed)  Has the patient had two or more falls in the past year or any fall with injury in the past year?: Yes  Receives Help From: Family  ADL Assistance: Needs assistance  Homemaking Responsibilities: No  Ambulation Assistance: Needs assistance  Active : No  Additional Comments: Information from wife as pt is a questionable historian. In May, he was able to walk in house with RW and was fairly independent with ADL. At beginning of July, he was experiencing functional decline, had fall, found brain mass at hospital admission. Recently, wife has been doing \"everything\" and it \"has been a challenge\". Cognition   Orientation  Overall Orientation Status: Within Functional Limits  Cognition  Overall Cognitive Status: Exceptions  Arousal/Alertness: Delayed responses to stimuli  Following Commands: Follows one step commands with increased time  Attention Span: Difficulty dividing attention; Difficulty attending to directions  Memory: Decreased short term memory;Decreased recall of biographical Information;Decreased recall of recent events  Safety Judgement: Decreased awareness of need for assistance;Decreased awareness of need for safety  Problem Solving: Assistance required to implement solutions;Assistance required to generate solutions;Assistance required to identify errors made;Assistance required to correct errors made;Decreased awareness of errors  Insights: Decreased awareness of deficits  Initiation: Requires cues for some  Sequencing: Requires cues for some     Objective   Heart Rate: (!) 125  Heart Rate Source: Monitor  BP: 120/65  BP Location: Right upper arm  BP Method: Automatic  Patient Position: High fowlers  MAP (Calculated): 83.33  Resp: 21  SpO2: (!) 88 %  O2 Device: High flow nasal cannula      Bed mobility  Supine to Sit: Moderate assistance;2 Person assistance (pt manages own LEs off bed with inc time and effort, use of rails and HOB elevated. Mod A x2 at trunk to achieve upright)  Scootin Person assistance;Dependent/Total (2 person assist to scoot pt back into recliner for better positioning at end session)  Transfers  Sit to Stand: 2 Person Assistance;Minimal Assistance (Min A x2 to stand to RW with cues for hand placement)  Stand to sit: Minimal Assistance;2 Person Assistance (Cues for hand placement and scooting back to chair fully before sitting)  Stand Pivot Transfers: Minimal Assistance;2 Person Assistance (pt completes SPT bed>recliner with min A x2, cues for pivoting with use RW)    Balance  Sitting - Static: Good (SBA to sit EOB)     AM-PAC Score  AM-PAC Inpatient Mobility Raw Score : 11 (22)  AM-PAC Inpatient T-Scale Score : 33.86 (22)  Mobility Inpatient CMS 0-100% Score: 72.57 (22)  Mobility Inpatient CMS G-Code Modifier : CL (22)     Goals  Short Term Goals  Time Frame for Short term goals: Discharge- all ongoing  Short term goal 1: Rolling Left and Right SBA  ONGOING  Short term goal 2: Sit<>Supine Min A  ONGOING  Short term goal 3: Sit<>Stand with RW Min A  ONGOING  Short term goal 4: Stand Pivot with RW Min A  ONGOING  Short term goal 5: Amb 5ft with RW Min A  ONGOING  Patient Goals   Patient goals : To Return Home and Feel Better     Education  Patient Education  Education Given To: Patient  Education Provided: Role of Therapy; Energy Conservation  Education Method: Demonstration  Barriers to Learning: None  Education Outcome: Continued education needed;Verbalized understanding      Therapy Time   Individual Concurrent Group Co-treatment   Time In 1140         Time Out 1205         Minutes 25          Timed Code Treatment Minutes: 25    Total Treatment Minutes: 25     If patient is discharged prior to next treatment, this note will serve as the discharge summary.     Yosi Cota PT, DPT, NCS, CSRS

## 2022-08-18 NOTE — PLAN OF CARE
Problem: Safety - Adult  Goal: Free from fall injury  8/18/2022 0036 by Maria G Herrera RN  Note: Orthostatic vital signs obtained at start of shift - see flowsheet for details. Pt meets criteria for orthostasis. Pt is a High fall risk. See Julia Servant Fall Score and ABCDS Injury Risk assessments.   + Screening for Orthostasis and/or + High Fall Risk per GARVIN/ABCDS: Explained fall risk precautions to pt and family and rationale behind their use to keep the patient safe. Pt bed is in low position, side rails up, call light and belongings are in reach. Fall wristband applied and present on pts wrist.  Bed alarm on. Pt encouraged to call for assistance. Will continue with hourly rounds for PO intake, pain needs, toileting and repositioning as needed. Problem: Skin/Tissue Integrity  Goal: Absence of new skin breakdown  Description: 1. Monitor for areas of redness and/or skin breakdown  2. Assess vascular access sites hourly  3. Every 4-6 hours minimum:  Change oxygen saturation probe site  4. Every 4-6 hours:  If on nasal continuous positive airway pressure, respiratory therapy assess nares and determine need for appliance change or resting period.   8/18/2022 0036 by Maria G Herrera RN  Note: Client kept dry and Q 2 turned to prevent new skin breakdown     Problem: Pain  Goal: Verbalizes/displays adequate comfort level or baseline comfort level  8/18/2022 0036 by Maria G Herrera RN  Note: Client denies pain this shift

## 2022-08-18 NOTE — PROGRESS NOTES
Progress Note    Admit Date: 8/9/2022  Day: 9  Diet: ADULT ORAL NUTRITION SUPPLEMENT; Lunch, Dinner; Frozen Oral Supplement  ADULT ORAL NUTRITION SUPPLEMENT; Breakfast, Lunch; Diabetic Oral Supplement  ADULT DIET; Dysphagia - Soft and Bite Sized; Low Microbial    CC: Hypoxia    Interval history:   Overnight, the patient reportedly choked on a pill. Afebrile, Tachycardic 106. On 9L of Oxygen. Labs stable, No leukocytosis.   Medications:     Scheduled Meds:   potassium chloride  20 mEq Oral Daily with breakfast    phosphorus  500 mg Oral BID    valACYclovir  500 mg Oral BID    voriconazole  200 mg Oral 2 times per day    insulin lispro  0-16 Units SubCUTAneous TID WC    insulin lispro  0-4 Units SubCUTAneous Nightly    bacitracin-polymyxin b   Topical BID    docusate sodium  100 mg Oral BID    fluticasone  1 spray Each Nostril Daily    levETIRAcetam  500 mg Oral BID    sodium chloride  2 spray Nasal 4x Daily    tamsulosin  0.4 mg Oral Daily    sodium chloride flush  5-40 mL IntraVENous 2 times per day    Saline Mouthwash  15 mL Swish & Spit 4x Daily AC & HS    enoxaparin  30 mg SubCUTAneous Daily     Continuous Infusions:   sodium chloride      sodium chloride      potassium chloride      dextrose       PRN Meds:sodium phosphate IVPB, acetaminophen, ondansetron, prochlorperazine, sodium chloride, sodium chloride flush, sodium chloride, potassium chloride, magnesium sulfate, magnesium hydroxide, Saline Mouthwash, alteplase (CATHFLO) with sterile water injection, glucose, dextrose bolus **OR** dextrose bolus, glucagon (rDNA), dextrose    Objective:   Vitals:   T-max:  Patient Vitals for the past 8 hrs:   BP Temp Temp src Pulse Resp SpO2 Weight   08/18/22 0814 -- 97.9 °F (36.6 °C) Oral -- -- -- --   08/18/22 0810 113/64 (!) 96.4 °F (35.8 °C) Axillary (!) 106 30 98 % --   08/18/22 0802 -- -- -- -- -- -- 116 lb 13.5 oz (53 kg)   08/18/22 0527 -- -- -- (!) 107 23 91 % --   08/18/22 0406 123/66 97.6 °F (36.4 °C) Axillary (!) 109 26 98 % --         Intake/Output Summary (Last 24 hours) at 8/18/2022 0913  Last data filed at 8/18/2022 0841  Gross per 24 hour   Intake 809 ml   Output 1400 ml   Net -591 ml         Review of Systems   Constitutional:  Negative for activity change, appetite change and fever. Respiratory:  Positive for shortness of breath. Negative for apnea and wheezing. Cardiovascular:  Negative for chest pain, palpitations and leg swelling. Gastrointestinal:  Negative for abdominal distention and abdominal pain. Neurological:  Negative for light-headedness. Physical Exam  Constitutional:       General: He is not in acute distress. Appearance: He is ill-appearing. Eyes:      Pupils: Pupils are equal, round, and reactive to light. Cardiovascular:      Rate and Rhythm: Normal rate and regular rhythm. Pulses: Normal pulses. Heart sounds: Normal heart sounds. Pulmonary:      Effort: Respiratory distress present. Breath sounds: No wheezing. Chest:      Chest wall: No tenderness. Abdominal:      General: There is no distension. Palpations: There is no mass. Tenderness: There is no abdominal tenderness. Musculoskeletal:         General: No swelling. Right lower leg: No edema. Left lower leg: No edema. Skin:     General: Skin is warm. Neurological:      Mental Status: He is alert. Psychiatric:         Mood and Affect: Mood normal.         Thought Content:  Thought content normal.       LABS:    CBC:   Recent Labs     08/16/22 0314 08/17/22 0340 08/18/22  0420   WBC 6.0 5.0 5.1   HGB 9.6* 9.4* 8.8*   HCT 28.4* 28.9* 26.5*   * 95* 102*   MCV 94.8 95.9 94.7       Renal:    Recent Labs     08/16/22 0314 08/17/22  0340 08/18/22  0420    138  --    K 4.0 3.5  --    CL 99 95*  --    CO2 33* 37*  --    BUN 15 13  --    CREATININE <0.5* <0.5*  --    GLUCOSE 97 153*  --    CALCIUM 9.0 8.5  --    MG 1.60* 1.60* 1.70*   PHOS 1.4* 1.9* 1.7*   ANIONGAP 9 6  -- Hepatic:   Recent Labs     08/17/22  0340   AST 27   ALT 33   BILITOT 0.3   BILIDIR <0.2   PROT 5.0*   LABALBU 2.9*   ALKPHOS 140*       Troponin: No results for input(s): TROPONINI in the last 72 hours. BNP: No results for input(s): BNP in the last 72 hours. Lipids: No results for input(s): CHOL, HDL in the last 72 hours. Invalid input(s): LDLCALCU, TRIGLYCERIDE  ABGs:  No results for input(s): PHART, [id number], PO2ART, BXG4INK, BEART, THGBART, [id number], LUB4XPY in the last 72 hours. INR:   Recent Labs     08/18/22  0420   INR 1.13       Lactate: No results for input(s): LACTATE in the last 72 hours. Cultures:  -----------------------------------------------------------------  RAD:   XR CHEST PORTABLE   Final Result      Extensive bilateral airspace disease. This is similar to prior study. VL Extremity Venous Left         FL MODIFIED BARIUM SWALLOW W VIDEO   Final Result      No laryngeal penetration or aspiration. Please refer to the detailed report of the speech therapist.            XR CHEST PORTABLE   Final Result      1. Diffuse bilateral pulmonary infiltrate and/or edema overall demonstrating question slight improvement from prior exam.      XR CHEST PORTABLE   Final Result      Increased bilateral airspace disease            XR CHEST PORTABLE   Final Result      Bilateral airspace disease, increased since prior exam.      CT CHEST W CONTRAST   Final Result      1. Moderate bilateral groundglass opacity new since prior chest CT consistent with infectious/inflammatory pneumonitis. 2. Background mild-to-moderate lower lobe predominant pulmonary fibrosis without change. 3. A 5 cm lesion in the anterior aspect left lobe of the liver stable to mildly decreased in size and of indeterminate etiology. XR CHEST (2 VW)   Final Result   1. Diffuse patchy bilateral airspace disease concerning for pneumonia or pulmonary edema.           Assessment/Plan:     78-year-old male with DLBCL, non-Hodgkin lymphoma with CNS mass and biopsy-proven non-Hodgkin lymphoma. On cycle 1 day 18 is admitted in the hospital with hypoxia. Chemotherapy Polatuzumab / Bendamustine, Decadron and Rituximab. Patient has history of ILD and is on 2 L NC at home. MRSA negative, no growth on fungal cultures to date, respiratory cultures with gram positive rods, gram negative rods and gram positive cocci. Aspergillus Galacto AG positive on 08/10  1,3 Beta D-Glucan 08/14 positive on 08/14    Initially patient was on Solu-Medrol 60 mg IV twice daily for suspicion of pneumonitis. Tapered and eventually stopped. S/p cefepime  Valtrex 500 mg BID. Voriconazole 200 mg BID. Improvement in clinical picture. Wean oxygen to stay above SPO2 88%. Continue current management. Sarah Duran MD, PGY-2  08/18/22  9:13 AM    This patient will be staffed and discussed with Dr. Sydney Verma MD    Pulm    Patient seen and examined. I agree with Dr. Lissette Mane history, physical, lab findings, assessment and plan. Mehdi's tachypnea is less today mid 20s to 30s down from 30s to 40s yesterday. His oxygen was turned up to is much as 12 L overnight. It is unclear how hypoxemic he was. Oxygen saturation today has been high 90s and currently is on 10 L.     Chest x-ray yesterday shows mild improvement compared to chest x-ray 4 days ago  VBG shows no acidemia  Continue voriconazole for fungal pneumonia    Sydney Verma MD

## 2022-08-18 NOTE — PROGRESS NOTES
1830: MD Banuelos notified patient current glucose is 261. Patient is not going to eat dinner, glucose at 1630 was 328. Give full dose (8 units) of insulin or hold? MD okayed to give 4 units.

## 2022-08-18 NOTE — PROGRESS NOTES
4 Eyes Admission Assessment     I agree as the admission nurse that 2 RN's have performed a thorough Head to Toe Skin Assessment on the patient. ALL assessment sites listed below have been assessed on admission. Areas assessed by both nurses: ***  [x]   Head, Face, and Ears   [x]   Shoulders, Back, and Chest  [x]   Arms, Elbows, and Hands   [x]   Coccyx, Sacrum, and Ischium  [x]   Legs, Feet, and Heels        Does the Patient have Skin Breakdown?   Yes a wound was noted on the Admission Assessment and an LDA was Initiated documentation include the Savi-wound, Wound Assessment, Measurements, Dressing Treatment, Drainage, and Color\",         Giorgio Prevention initiated:  Yes   Wound Care Orders initiated:  Yes      [id number]  nurse consulted for Pressure Injury (Stage 3,4, Unstageable, DTI, NWPT, and Complex wounds) or Giorgio score 18 or lower:  Yes      Nurse 1 eSignature: Electronically signed by Estrada Mckinney RN on 8/18/22 at 12:12 PM EDT    **SHARE this note so that the co-signing nurse is able to place an eSignature**    Nurse 2 eSignature: {Esignature:[id number]}

## 2022-08-18 NOTE — PROGRESS NOTES
4 Eyes Admission Assessment     I agree as the admission nurse that 2 RN's have performed a thorough Head to Toe Skin Assessment on the patient. ALL assessment sites listed below have been assessed on admission. Areas assessed by both nurses: Beto Brighter  [x]   Head, Face, and Ears   [x]   Shoulders, Back, and Chest  [x]   Arms, Elbows, and Hands   [x]   Coccyx, Sacrum, and Ischium  [x]   Legs, Feet, and Heels        Does the Patient have Skin Breakdown?   Yes a wound was noted on the Admission Assessment and an LDA was Initiated documentation include the Savi-wound, Wound Assessment, Measurements, Dressing Treatment, Drainage, and Color\",         Giorgio Prevention initiated:  Yes   Wound Care Orders initiated:  Yes      [id number]  nurse consulted for Pressure Injury (Stage 3,4, Unstageable, DTI, NWPT, and Complex wounds) or Giorgio score 18 or lower:  Yes      Nurse 1 eSignature: Electronically signed by Sharri Slater RN on 8/18/22 at 12:24 AM EDT    **SHARE this note so that the co-signing nurse is able to place an eSignature**    Nurse 2 eSignature: Electronically signed by Ashok Reed RN on 8/18/22 at 12:11 PM EDT

## 2022-08-18 NOTE — PROGRESS NOTES
At 1415 patient HR increased to the 130's and oxygen destatted to the mid 80's at rest. Pt was on 10L high flow nasal cannula. Non-rebreather mask applied. Blood pressures stable. Denise Lin NP notified, see new orders. At 1552 NP notified of troponin level. No new orders, will continue to monitor.

## 2022-08-18 NOTE — PROGRESS NOTES
[address] Progress Note    2022     Rin Rhodes    MRN: [id number]    : 1945    Referring MD: Sarah Sanchez MD  [facility] [address], [place] [address],  [address]    SUBJECTIVE:  he reports ongoing sob, he reports no change from yesterday, his po intake continues to be good     ECOG PS:(3) Capable of limited self-care, confined to bed or chair > 50% of waking hours    KPS: 50%  Requires considerable assistance and frequent medical care    Isolation: None    Medications    Scheduled Meds:   potassium chloride  20 mEq Oral Daily with breakfast    phosphorus  500 mg Oral BID    valACYclovir  500 mg Oral BID    voriconazole  200 mg Oral 2 times per day    insulin lispro  0-16 Units SubCUTAneous TID WC    insulin lispro  0-4 Units SubCUTAneous Nightly    bacitracin-polymyxin b   Topical BID    docusate sodium  100 mg Oral BID    fluticasone  1 spray Each Nostril Daily    levETIRAcetam  500 mg Oral BID    sodium chloride  2 spray Nasal 4x Daily    tamsulosin  0.4 mg Oral Daily    sodium chloride flush  5-40 mL IntraVENous 2 times per day    Saline Mouthwash  15 mL Swish & Spit 4x Daily AC & HS    enoxaparin  30 mg SubCUTAneous Daily     Continuous Infusions:   sodium chloride      sodium chloride      potassium chloride      dextrose       PRN Meds:.sodium phosphate IVPB, acetaminophen, ondansetron, prochlorperazine, sodium chloride, sodium chloride flush, sodium chloride, potassium chloride, magnesium sulfate, magnesium hydroxide, Saline Mouthwash, alteplase (CATHFLO) with sterile water injection, glucose, dextrose bolus **OR** dextrose bolus, glucagon (rDNA), dextrose    ROS:  As noted above, otherwise remainder of 10-point ROS negative    Physical Exam:     I&O:    Intake/Output Summary (Last 24 hours) at 2022 0756  Last data filed at 2022 1854  Gross per 24 hour   Intake 809 ml   Output 750 ml   Net 59 ml         Vital Signs:  /66   Pulse (!) 107   Temp 97.6 °F (36.4 °C) (Axillary)   Resp 23   Ht 5' 6\" (1.676 m)   Wt 118 lb 9.7 oz (53.8 kg)   SpO2 91%   BMI 19.14 kg/m²     Weight:    Wt Readings from Last 3 Encounters:   08/17/22 118 lb 9.7 oz (53.8 kg)   07/25/22 116 lb 10 oz (52.9 kg)   05/31/22 117 lb (53.1 kg)       General: Awake, alert and oriented  HEENT: normocephalic, PERRL, no scleral erythema or icterus, Oral mucosa moist and intact, throat clear  NECK: supple   BACK: Straight   SKIN: warm dry and intact without lesions rashes or masses  CHEST: poor air excursion, no rhonchi  CV: Normal S1 S2, irreg, no MRG  ABD: NT ND normoactive BS, no palpable masses or hepatosplenomegaly  EXTREMITIES: without edema, denies calf tenderness  NEURO: CN II - XII grossly intact  CATHETER:  Right IJ SL PAC (1/11/22) - CDI      Data    CBC:   Recent Labs     08/16/22 0314 08/17/22 0340 08/18/22 0420   WBC 6.0 5.0 5.1   HGB 9.6* 9.4* 8.8*   HCT 28.4* 28.9* 26.5*   MCV 94.8 95.9 94.7   * 95* 102*       BMP/Mag:  Recent Labs     08/16/22 0314 08/17/22 0340 08/18/22  0420    138  --    K 4.0 3.5  --    CL 99 95*  --    CO2 33* 37*  --    PHOS 1.4* 1.9* 1.7*   BUN 15 13  --    CREATININE <0.5* <0.5*  --    MG 1.60* 1.60* 1.70*       LIVP:   Recent Labs     08/17/22 0340   AST 27   ALT 33   BILIDIR <0.2   BILITOT 0.3   ALKPHOS 140*       Coags:   Recent Labs     08/18/22 0420   PROTIME 14.4   INR 1.13   APTT 36.0*       Uric Acid   Recent Labs     08/17/22 0340   LABURIC 2.1*       Diagnostics:   CT chest (8/9/22):  1. Moderate bilateral groundglass opacity new since prior chest CT consistent with infectious/inflammatory pneumonitis. 2. Background mild-to-moderate lower lobe predominant pulmonary fibrosis without change. 3. A 5 cm lesion in the anterior aspect left lobe of the liver stable to mildly decreased in size and of indeterminate etiology.     PROBLEM LIST:           DLBC  Interstitial lung disease / Pulmonary Fibrosis   Type 2 Diabetes Mellitus   BPH  Acute on Chronic Respiratory Failure / Fungal PNA (8/2022)      TREATMENT:            R-CHOP w/ Revlimid and Tafasitimab  Polatuzumab/BR   Cycle #1 -  8/1/22      ASSESSMENT AND PLAN:          1. 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 ID: afebrile, Cont to treat for fungal PNA (POA)    - Cont Valtrex and Dapsone PPX  - Start Vori 200 mg BID (8/13/22)  - Viral respiratory w/ COVID 19 (8/9/22): Negative  - MRSA swab (8/11/22) - Neg  - Procalcitonin (8/10/22) - 0.8  - CT chest (8/9/22): Moderate bilateral groundglass opacity new since prior chest CT consistent with infectious/inflammatory pneumonitis. Background mild-to-moderate lower lobe predominant pulmonary fibrosis without change  - S/p Bronchoscopy w/ BAL and biopsy (8/10/22) - Galactomannan positive 1.33, Diatherix - negative  - Aspergillus (8/14/22) - negative, fungitell (8/14/22): > 500 (positive)  Bld Cx 8/10/22: + Pippa krusei  - Cont Vfend Day + 6 (started 8/13/22)    Abx history:  Cefepime x 7 days (8/10/22 - 8/16/22)      3. Heme: Anemia and thrombocytopenia likely r/t recent chemotherapy  - Transfuse for Hgb < 7 and Platelets < 10 K  - No transfusion today     4. Metabolic: Electrolytes and renal fxn stable; hypoPhos, hypoMg. Steroid induced hyperglycemia - improved   - S/p Lasix 40 mg IV x 1 (8/12/22)  - Cont KPhos 500 mg bid (started 8/16/22)  - Start KCl 20 meq daily (started 8/17/22)  - No IVF  - Replace Phos, K+ & Mg per PRN orders     5. GI / Nutrition:          Nutrition:  Severe malnutrition w/ chronic illness    - Cont regular diet, no straws   - SLP eval (8/13/22):   Aspiration with straws, regular diet and thin liquids with cups only  - MBS (8/16/22) - No laryngeal penetration or aspiration.   - Cont Glucerna shakes BID and Magic cups BID  - Recommend swabbing mouth after all meals. Must be completely awake and upright for PO intake  - Dietary to follow closely  Nausea:  - Cont Zofran and Compazine as needed  Constipation:  no complaints   - Cont Colace bid     6. Pulm:    - H/o Interstitial lung disease / pulmonary fibrosis  - F/b Dr. Shanae Zamudio   - S/p bronchoscopy (3/25/22) and PFTs (4/1/22)  - Home O2 - 2 l/min    Hypoxemia:  Admitted w/ acute on chronic respiratory failure possibly from  pneumonitis from chemotherapy (Rituxan vs Jerzy), but more than likely from multifocal fungal PNA (POA). - Pulm following, appreciate recs  - CT chest (8/9/22) - Moderate bilateral groundglass opacity new since prior chest CT consistent with infectious/inflammatory pneumonitis. Background mild-to-moderate lower lobe predominant pulmonary fibrosis without change. A 5 cm lesion in the anterior aspect left lobe of the liver stable to mildly decreased in size and of indeterminate etiology. - S/p Bronchoscopy w/ BAL and biopsy (8/10/22) - + galactomannan, Diatherix negative  - S/p steroids:  Solumedrol 60 mg IV daily (started 8/10/22), 60 mg BID (8/12/22, d/t increased oxygen requirements), 60 mg daily (8/14/22 d/t likely fungal pneumonia), Prednisone 20 mg daily (8/15/22), 10 mg daily (8/16/22)  - Cont high flow O2 currently up to @ 11 l/min; titrate for O2 Sat 88%  - See ID section for additional treatment and management       7. Endo:   - H/o T2DM   T2DM:  exacerbated by steroids, now improving   - S/p Lantus 10 units nightly (stopped 8/11/22 - 8/15/22) restart at 5u q AM (8/18) and will admon in AM   - Home regimen: on humalog SSI, janumet and jardiance  - Cont Humalog Med SSI AS/HS       8.   MSK:  he has acute debilitation and generalized weakness d/t CNS lymphoma and hypoxemia   - Cont PT/OT - he is extremely weak and will require SNF placement ([id number] [facility], [facility]) early next week     9.   :  - H/o BPH  BPH:    - Cont Flomax daily     - DVT Prophylaxis: Platelets >43,551 cells/dL, - daily lovenox prophylaxis ordered  Contraindications to pharmacologic prophylaxis: None  Contraindications to mechanical prophylaxis: None    - Disposition: Plan for SNF; once hypoxemia improves     The patient was seen and examined by Dr. Pushpa Simmons APRN - NP    Twin Toscano MD

## 2022-08-18 NOTE — PLAN OF CARE
Problem: Safety - Adult  Goal: Free from fall injury  Outcome: Progressing  Pt in bed with bed alarm on, instructed to call for assistance. Verbalized understanding. All fall precautions in place. Problem: Respiratory - Adult  Goal: Achieves optimal ventilation and oxygenation  Outcome: Progressing  Patient stable on 9L high flow nasal cannula. O2 adjusted as needed. Will continue to monitor. Problem: Skin/Tissue Integrity  Goal: Absence of new skin breakdown  Description: 1. Monitor for areas of redness and/or skin breakdown  2. Assess vascular access sites hourly  3. Every 4-6 hours minimum:  Change oxygen saturation probe site  4. Every 4-6 hours:  If on nasal continuous positive airway pressure, respiratory therapy assess nares and determine need for appliance change or resting period. Outcome: Progressing  Pt turned q2 hours to avoid skin breakdown. Dressings changed on coccyx and back. Wound care consulted. 4 eyes complete.

## 2022-08-18 NOTE — PROGRESS NOTES
Occupational Therapy  Facility/Department: [facility]  Occupational Therapy Treatment    Name: Chilo Purcell  : 1945  MRN: [id number]  Date of Service: 2022    Discharge Recommendations: Chilo Purcell scored a 12/24 on the AM-PAC ADL Inpatient form. Current research shows that an AM-PAC score of 17 or less is typically not associated with a discharge to the patient's home setting. Based on the patient's AM-PAC score and their current ADL deficits, it is recommended that the patient have 3-5 sessions per week of Occupational Therapy at d/c to increase the patient's independence. Please see assessment section for further patient specific details. If patient discharges prior to next session this note will serve as a discharge summary. Please see below for the latest assessment towards goals. OT Equipment Recommendations  Other: cont to assess       Patient Diagnosis(es): There were no encounter diagnoses. Past Medical History:  has a past medical history of Benign prostatic hyperplasia with weak urinary stream, Cancer ([address] Erectile dysfunction, History of gout, History of thrombocytopenia, Hypercalcemia, Hyperlipidemia, Hypertension, Lung nodule, Proteinuria, Type 2 diabetes mellitus without complication ([address] and Vitamin D deficiency. Past Surgical History:  has a past surgical history that includes Finger trigger release (Right, ); Tonsillectomy and adenoidectomy (age 2); Elbow fracture surgery (Left, age 5); Vasectomy (1971); Cataract removal with implant (Bilateral, ); Colonoscopy (3/29/2011); Colonoscopy (2016); IR PORT PLACEMENT > 5 YEARS (2022); bronchoscopy (N/A, 3/25/2022); and craniotomy (Right, 2022). Treatment Diagnosis: decreased functional strenght and endurance 2/2 hypoxia      Assessment   Performance deficits / Impairments: Decreased functional mobility ; Decreased ADL status; Decreased strength;Decreased safe awareness;Decreased cognition;Decreased endurance;Decreased balance;Decreased posture  Assessment: Pt required total A this date for navarro care following BM. Pt required min A x 2 for bed mobility and mod A x 2 to take steps with 2WW from the bed to the chair. Pt continues to need flucuating levels of O2 to maintain spO2 levels. Pt would benefit from ongoing inpatient therapy at discharge. Will cont to follow. Treatment Diagnosis: decreased functional strenght and endurance 2/2 hypoxia  Prognosis: Good  Decision Making: Medium Complexity  Activity Tolerance  Activity Tolerance: Treatment limited secondary to medical complications (free text)  Activity Tolerance Comments: Pt intially on 11L of spO2 in high 90's. Pt destated following bed level navarro care to high 70's and quickly recovered to low 80's. Pt sat on edge of bed and destated to low 70's with several minutes needed to return to 80's on 15L. Pt transfered to the chair on 15L of O2 and destated again to high 70's. At this point the NRB was placed around patient for 5 min while he recovered to high 80's. Pt was left on 13L in high 80's at end of session. Plan   Plan  Times per Week: 2-5  Current Treatment Recommendations: ROM, Strengthening, Balance training, Functional mobility training, Endurance training, Patient/Caregiver education & training, Self-Care / ADL, Safety education & training, Positioning, Equipment evaluation, education, & procurement, Pain management     Restrictions  Position Activity Restriction  Other position/activity restrictions: Up as Tolerated, If platelet count equal to or less than 10 but the patient has received a platelet transfusion, physical therapy or occupational therapy may proceed with treatment.     Subjective   General  Chart Reviewed: Yes  Patient assessed for rehabilitation services?: Yes  Additional Pertinent Hx: DLBCL with CNS mass  Family / Caregiver Present: No  Referring Practitioner: MARCIA Germain CNP  Diagnosis: Hypoxia  Subjective  Subjective: Pt lying supine in bed upon OT arrival. Pt agreeable to OT session and feeling better today than he reported feeling yesterday. General Comment  Comments: RN approved OT session- monitor O2 stats     Social/Functional History  Social/Functional History  Lives With: Spouse (can provide 24hr assist)  Type of Home: Condo  Home Layout: Two level, Performs ADL's on one level, Able to Live on Main level with bedroom/bathroom  Home Access: Ramped entrance  Bathroom Shower/Tub: Walk-in shower  Bathroom Toilet: Handicap height  Bathroom Equipment: Shower chair, Grab bars in shower, Grab bars around toilet  Home Equipment: Sarajane Minh, 4 wheeled, [employer], Oxygen (transport chair, 2L when needed)  Has the patient had two or more falls in the past year or any fall with injury in the past year?: Yes  Receives Help From: Family  ADL Assistance: Needs assistance  Homemaking Responsibilities: No  Ambulation Assistance: Needs assistance  Active : No  Additional Comments: Information from wife as pt is a questionable historian. In May, he was able to walk in house with RW and was fairly independent with ADL. At beginning of July, he was experiencing functional decline, had fall, found brain mass at hospital admission. Recently, wife has been doing \"everything\" and it \"has been a challenge\". Objective   Heart Rate: (!) 116  Heart Rate Source: Monitor  BP: 113/69  BP Location: Right upper arm  BP Method: Automatic  Patient Position: Semi fowlers  MAP (Calculated): 83.67  Resp: 29  SpO2: 96 %  O2 Device: High flow nasal cannula             Safety Devices  Type of Devices: Call light within reach;Nurse notified; Left in chair;Chair alarm in place  Restraints  Restraints Initially in Place: No           ADL  Grooming: Setup  Grooming Skilled Clinical Factors: to wash face with wash cloth and blow nose  LE Bathing: Dependent/Total  LE Bathing Skilled Clinical Factors: to wash off legs and backside with wipes  UE Dressing: Minimal assistance  UE Dressing Skilled Clinical Factors: to exchange gowns  LE Dressing: Dependent/Total  LE Dressing Skilled Clinical Factors: to don/doff socks  Toileting: Dependent/Total  Toileting Skilled Clinical Factors: Pt has purwick for urine and had a incontinent BM this date during session. Pt required total A to complete pericare from bed level. Activity Tolerance  Activity Tolerance: Patient limited by fatigue;Patient limited by endurance  Activity Tolerance Comments: pt continues to be limited by O2 and endurance. Initially spO2 in 90-94% range on 11L, once EOB desat to high 70s, O2 inc to 15L. After pivoting to chair and resting for several minutes spO2 recovered up to 90s on 11L. RN aware and present during session. Bed mobility  Supine to Sit: 2 Person assistance;Minimal assistance (min A x2 at trunk to lift to sitting with HOB elevated, use of rails. Pt manages own LEs with inc time and effort)  Scooting:  (2 person assist to scoot back in recliner at end session)  Transfers  Sit to stand: Moderate assistance;2 Person assistance (mod A x 2 this date w/ 2WW)  Stand to sit: Moderate assistance;2 Person assistance (to sit in chair ; pt began loosing balance ; needed cues to reach back for the chair)     Cognition  Overall Cognitive Status: Exceptions  Arousal/Alertness: Delayed responses to stimuli  Following Commands: Follows one step commands with increased time  Attention Span: Difficulty dividing attention; Difficulty attending to directions  Memory: Decreased short term memory;Decreased recall of biographical Information;Decreased recall of recent events  Safety Judgement: Decreased awareness of need for assistance;Decreased awareness of need for safety  Problem Solving: Assistance required to implement solutions;Assistance required to generate solutions;Assistance required to identify errors made;Assistance required to correct errors made;Decreased awareness of errors  Insights: Decreased awareness of deficits  Initiation: Requires cues for some  Sequencing: Requires cues for some  Orientation  Overall Orientation Status: Within Functional Limits                                           G-Code     OutComes Score                                                  AM-PAC Score        AM-PAC Inpatient Daily Activity Raw Score: 12 (08/18/22 1356)  AM-PAC Inpatient ADL T-Scale Score : 30.6 (08/18/22 1356)  ADL Inpatient CMS 0-100% Score: 66.57 (08/18/22 1356)  ADL Inpatient CMS G-Code Modifier : CL (08/18/22 1356)    Tinneti Score       Goals  Short Term Goals  Time Frame for Short term goals: 1 week  Short Term Goal 1: tolerate sitting on edge of bed, 9-12 minutes with S, with SPO2>90 in prep for ADL- not met  Short Term Goal 2: complete 5 reps of AROM BUE therex in upsupported sitting position- not met  Short Term Goal 3: supine to sit with min A- not met  Patient Goals   Patient goals : Keep getting better       Therapy Time   Individual Concurrent Group Co-treatment   Time In 1223         Time Out 1301         Minutes 38         Timed Code Treatment Minutes: [address] [place], [place]

## 2022-08-18 NOTE — PROGRESS NOTES
Physical Therapy  Facility/Department: [facility]  Physical Therapy Initial Assessment    Name: Yasmeen Cardenas  : 1945  MRN: [id number]  Date of Service: 2022    Discharge Recommendations:    Yasmeen Cardenas scored a  on the AM-PAC short mobility form. Current research shows that an AM-PAC score of 17 or less is typically not associated with a discharge to the patient's home setting. Based on the patient's AM-PAC score and their current functional mobility deficits, it is recommended that the patient have 3-5 sessions per week of Physical Therapy at d/ to increase the patient's independence. Please see assessment section for further patient specific details. If patient discharges prior to next session this note will serve as a discharge summary. Please see below for the latest assessment towards goals. PT Equipment Recommendations  Equipment Needed: No      Patient Diagnosis(es): There were no encounter diagnoses. Past Medical History:  has a past medical history of Benign prostatic hyperplasia with weak urinary stream, Cancer ([address] Erectile dysfunction, History of gout, History of thrombocytopenia, Hypercalcemia, Hyperlipidemia, Hypertension, Lung nodule, Proteinuria, Type 2 diabetes mellitus without complication ([address] and Vitamin D deficiency. Past Surgical History:  has a past surgical history that includes Finger trigger release (Right, ); Tonsillectomy and adenoidectomy (age 2); Elbow fracture surgery (Left, age 5); Vasectomy (1971); Cataract removal with implant (Bilateral, ); Colonoscopy (3/29/2011); Colonoscopy (2016); IR PORT PLACEMENT > 5 YEARS (2022); bronchoscopy (N/A, 3/25/2022); and craniotomy (Right, 2022).     Assessment   Body Structures, Functions, Activity Limitations Requiring Skilled Therapeutic Intervention: Decreased functional mobility   Assessment: Pt continues to have limited mobility due to low endurance and fatigue with mobility. Pt required increase on high flow O2 up to 15L with activity due to de-satting with mobility. Pt fatigues quickly, but is cooperative. Tolerated small distance ambulation this session up 5ft with min A x2. Rec continued inpt PT at d/c. Will follow. Treatment Diagnosis: decreased functional mobility  Therapy Prognosis: Good  Decision Making: Medium Complexity  Requires PT Follow-Up: Yes  Activity Tolerance  Activity Tolerance: Patient limited by fatigue;Patient limited by endurance  Activity Tolerance Comments: pt continues to be limited by O2 and endurance. Initially spO2 in 90-94% range on 11L, once EOB desat to high 70s, O2 inc to 15L. After pivoting to chair and resting for several minutes spO2 recovered up to 90s on 11L. RN aware and present during session. Plan   Plan  Plan:  (2-5)  Current Treatment Recommendations: Strengthening, Gait training, Balance training, Functional mobility training, Transfer training, Endurance training, Wheelchair mobility training, Patient/Caregiver education & training, Safety education & training, Therapeutic activities  Safety Devices  Type of Devices: Call light within reach, Nurse notified, Left in chair, Chair alarm in place  Restraints  Restraints Initially in Place: No     Restrictions  Position Activity Restriction  Other position/activity restrictions: Up as Tolerated, If platelet count equal to or less than 10 but the patient has received a platelet transfusion, physical therapy or occupational therapy may proceed with treatment. Subjective   Subjective  Subjective: pt eating lunch semisupine in bed. PT/OT encouraged pt to get up to chair to eat lunch, pt agreeable. Denies pain.          Social/Functional History  Social/Functional History  Lives With: Spouse (can provide 24hr assist)  Type of Home: Condo  Home Layout: Two level, Performs ADL's on one level, Able to Live on Main level with bedroom/bathroom  Home Access: Ramped entrance  Bathroom Shower/Tub: Walk-in shower  Bathroom Toilet: Handicap height  Bathroom Equipment: Shower chair, Grab bars in shower, Grab bars around toilet  Home Equipment: Frederblaine Gu, 4 wheeled, Chintan Hemp, Oxygen (transport chair, 2L when needed)  Has the patient had two or more falls in the past year or any fall with injury in the past year?: Yes  Receives Help From: Family  ADL Assistance: Needs assistance  Homemaking Responsibilities: No  Ambulation Assistance: Needs assistance  Active : No  Additional Comments: Information from wife as pt is a questionable historian. In May, he was able to walk in house with RW and was fairly independent with ADL. At beginning of July, he was experiencing functional decline, had fall, found brain mass at hospital admission. Recently, wife has been doing \"everything\" and it \"has been a challenge\". Objective   Heart Rate: (!) 116  Heart Rate Source: Monitor  BP: 113/69  BP Location: Right upper arm  BP Method: Automatic  Patient Position: Semi fowlers  MAP (Calculated): 83.67  Resp: 29  SpO2: 96 %  O2 Device: High flow nasal cannula         Bed mobility  Rolling to Left: Minimal assistance (rolling to L and R completed to clean up after pt incontinent of bowel. total assist for pericare.)  Rolling to Right: Minimal assistance  Supine to Sit: 2 Person assistance;Minimal assistance (min A x2 at trunk to lift to sitting with HOB elevated, use of rails. Pt manages own LEs with inc time and effort)  Scooting:  (2 person assist to scoot back in recliner at end session)  Transfers  Sit to Stand: 2 Person Assistance; Moderate Assistance (mod A x2 to stand with cues for hand placement and upright posture to stand to RW.  Posterior lean initially, corrects with mod A)  Stand to sit: Minimal Assistance;2 Person Assistance (cues for hand placement and to back up fully to chair before sitting, posterior lean requiring mod A to correct.)  Ambulation  Surface: level tile  Device: Rolling Walker  Assistance: Minimal assistance;2 Person assistance  Gait Deviations: Slow Ashley  Distance: 5ft  Comments: pt amb 5ft with RW with turn to recliner, min A x2 for balance and cues for walker mgmt. Inc time and effort. Balance  Sitting - Static: Fair (SBA to min A for sitting balance - 1 LOB posteriorly)         AM-PAC Score  AM-PAC Inpatient Mobility Raw Score : 12 (08/18/22 1356)  AM-PAC Inpatient T-Scale Score : 35.33 (08/18/22 1356)  Mobility Inpatient CMS 0-100% Score: 68.66 (08/18/22 1356)  Mobility Inpatient CMS G-Code Modifier : CL (08/18/22 1356)      Goals  Short Term Goals  Time Frame for Short term goals: Discharge- all ongoing  Short term goal 1: Rolling Left and Right SBA  ONGOING  Short term goal 2: Sit<>Supine Min A  ONGOING  Short term goal 3: Sit<>Stand with RW Min A  ONGOING  Short term goal 4: Stand Pivot with RW Min A  ONGOING  Short term goal 5: Amb 5ft with RW Min A  ONGOING  Patient Goals   Patient goals : To Return Home and Feel Better       Education  Patient Education  Education Given To: Patient  Education Provided: Role of Therapy; Energy Conservation  Education Method: Demonstration  Barriers to Learning: None  Education Outcome: Continued education needed;Verbalized understanding      Therapy Time   Individual Concurrent Group Co-treatment   Time In 7273         Time Out 1301         Minutes 38          Timed Code Treatment Minutes: 38    Total Treatment Minutes:38  If patient is discharged prior to next treatment, this note will serve as the discharge summary.     Dana Hester PT, DPT, NCS, CSRS

## 2022-08-19 NOTE — PROGRESS NOTES
(52.6 kg)   08/19/22 0402 117/70 97.8 °F (36.6 °C) Oral (!) 110 29 95 % -- --         Intake/Output Summary (Last 24 hours) at 8/19/2022 1040  Last data filed at 8/19/2022 0402  Gross per 24 hour   Intake 650 ml   Output 650 ml   Net 0 ml         Review of Systems   Constitutional:  Negative for activity change, appetite change and fever. Respiratory:  Positive for shortness of breath. Negative for apnea and wheezing. Cardiovascular:  Negative for chest pain, palpitations and leg swelling. Gastrointestinal:  Negative for abdominal distention and abdominal pain. Neurological:  Negative for light-headedness. Physical Exam  Constitutional:       General: He is not in acute distress. Appearance: He is ill-appearing. Eyes:      Pupils: Pupils are equal, round, and reactive to light. Cardiovascular:      Rate and Rhythm: Normal rate and regular rhythm. Pulses: Normal pulses. Heart sounds: Normal heart sounds. Pulmonary:      Effort: Respiratory distress present. Breath sounds: No wheezing. Chest:      Chest wall: No tenderness. Abdominal:      General: There is no distension. Palpations: There is no mass. Tenderness: There is no abdominal tenderness. Musculoskeletal:         General: No swelling. Right lower leg: No edema. Left lower leg: No edema. Skin:     General: Skin is warm. Neurological:      Mental Status: He is alert. Psychiatric:         Mood and Affect: Mood normal.         Thought Content:  Thought content normal.       LABS:    CBC:   Recent Labs     08/17/22 0340 08/18/22 0420 08/19/22  0425   WBC 5.0 5.1 4.8   HGB 9.4* 8.8* 8.6*   HCT 28.9* 26.5* 26.8*   PLT 95* 102* 110*   MCV 95.9 94.7 95.4       Renal:    Recent Labs     08/17/22  0340 08/18/22  0420 08/19/22  0405    142 143   K 3.5 3.5 3.8   CL 95* 98* 99   CO2 37* 39* 35*   BUN 13 12 10   CREATININE <0.5* <0.5* <0.5*   GLUCOSE 153* 169* 174*   CALCIUM 8.5 8.5 8.5   MG 1.60* concerning for pneumonia or pulmonary edema. Assessment/Plan:     79-year-old male with DLBCL, non-Hodgkin lymphoma with CNS mass and biopsy-proven non-Hodgkin lymphoma. On cycle 1 day 19  is admitted in the hospital with hypoxia. Chemotherapy Polatuzumab / Bendamustine, Decadron and Rituximab. Patient has history of ILD and is on 2 L NC at home. MRSA negative, no growth on fungal cultures to date, respiratory cultures with gram positive rods, gram negative rods and gram positive cocci. Aspergillus Galacto AG positive on 08/10  1,3 Beta D-Glucan 08/14 positive on 08/14  Blood cultures 08/10/22 positive for Pippa Krusei    Initially patient was on Solu-Medrol 60 mg IV twice daily for suspicion of pneumonitis. Tapered and eventually stopped. S/p cefepime  Valtrex 500 mg BID. Voriconazole 200 mg BID. Patient's          Improvement in clinical picture. Improvement in Tachypnea, less tachycardic today. Plan to wean oxygen to stay above SPO2 88%. Currently on 8L. Continue current management. Dari Tellez MD, PGY-2  08/19/22  10:40 AM    This patient will be staffed and discussed with Dr. Anneliese Blanchard MD    Pulm     Patient seen and examined. I agree with Dr. Vielka Purdy history, physical, lab findings, assessment and plan. He states he has less dyspnea today.   Still requiring ~ 8 - 10 L O2   Very slow improvement  Chest x-ray Thursday shows mild improvement compared to chest x-ray Monday  Continue voriconazole for fungal pneumonia     Anneliese Blanchard MD

## 2022-08-19 NOTE — PROGRESS NOTES
[facility] Progress Note    2022     Phill Camargo    MRN: [id number]    : 1945    Referring MD: Mary Lou Galarza MD  [address], [address], [address],  [address]    SUBJECTIVE:  he feels and cont to be sob     ECOG PS:(3) Capable of limited self-care, confined to bed or chair > 50% of waking hours    KPS: 50%  Requires considerable assistance and frequent medical care    Isolation: None    Medications    Scheduled Meds:   insulin glargine  5 Units SubCUTAneous Daily    enoxaparin  40 mg SubCUTAneous Daily    potassium chloride  20 mEq Oral Daily with breakfast    phosphorus  500 mg Oral BID    valACYclovir  500 mg Oral BID    voriconazole  200 mg Oral 2 times per day    insulin lispro  0-16 Units SubCUTAneous TID WC    insulin lispro  0-4 Units SubCUTAneous Nightly    bacitracin-polymyxin b   Topical BID    docusate sodium  100 mg Oral BID    fluticasone  1 spray Each Nostril Daily    levETIRAcetam  500 mg Oral BID    sodium chloride  2 spray Nasal 4x Daily    tamsulosin  0.4 mg Oral Daily    sodium chloride flush  5-40 mL IntraVENous 2 times per day    Saline Mouthwash  15 mL Swish & Spit 4x Daily AC & HS     Continuous Infusions:   sodium chloride      sodium chloride      potassium chloride      dextrose       PRN Meds:.sodium phosphate IVPB, acetaminophen, ondansetron, prochlorperazine, sodium chloride, sodium chloride flush, sodium chloride, potassium chloride, magnesium sulfate, magnesium hydroxide, Saline Mouthwash, alteplase (CATHFLO) with sterile water injection, glucose, dextrose bolus **OR** dextrose bolus, glucagon (rDNA), dextrose    ROS:  As noted above, otherwise remainder of 10-point ROS negative    Physical Exam:     I&O:    Intake/Output Summary (Last 24 hours) at 2022 0807  Last data filed at 2022 0402  Gross per 24 hour   Intake 887 ml   Output 1300 ml   Net -413 ml         Vital Signs:  /69   Pulse (!) 103   Temp 98.2 °F (36.8 °C) (Axillary)   Resp 28 Ht 5' 6\" (1.676 m)   Wt 116 lb 13.5 oz (53 kg)   SpO2 100%   BMI 18.86 kg/m²     Weight:    Wt Readings from Last 3 Encounters:   08/18/22 116 lb 13.5 oz (53 kg)   07/25/22 116 lb 10 oz (52.9 kg)   05/31/22 117 lb (53.1 kg)       General: Awake, alert and oriented  HEENT: normocephalic, PERRL, no scleral erythema or icterus, Oral mucosa moist and intact, throat clear  NECK: supple   BACK: Straight   SKIN: warm dry and intact without lesions rashes or masses  CHEST: poor air excursion, no rhonchi  CV: Normal S1 S2, irreg, no MRG  ABD: NT ND normoactive BS, no palpable masses or hepatosplenomegaly  EXTREMITIES: without edema, denies calf tenderness  NEURO: CN II - XII grossly intact  CATHETER:  Right IJ SL PAC (1/11/22) - CDI      Data    CBC:   Recent Labs     08/17/22 0340 08/18/22 0420 08/19/22  0425   WBC 5.0 5.1 4.8   HGB 9.4* 8.8* 8.6*   HCT 28.9* 26.5* 26.8*   MCV 95.9 94.7 95.4   PLT 95* 102* 110*       BMP/Mag:  Recent Labs     08/17/22 0340 08/18/22  0420 08/19/22  0405    142 143   K 3.5 3.5 3.8   CL 95* 98* 99   CO2 37* 39* 35*   PHOS 1.9* 1.7* 1.6*   BUN 13 12 10   CREATININE <0.5* <0.5* <0.5*   MG 1.60* 1.70* 1.70*       LIVP:   Recent Labs     08/17/22 0340 08/19/22  0405   AST 27 26   ALT 33 25   BILIDIR <0.2 <0.2   BILITOT 0.3 0.3   ALKPHOS 140* 166*       Coags:   Recent Labs     08/18/22  0420   PROTIME 14.4   INR 1.13   APTT 36.0*       Uric Acid   Recent Labs     08/17/22 0340 08/19/22  0405   LABURIC 2.1* 1.7*       Diagnostics:   CT chest (8/9/22):  1. Moderate bilateral groundglass opacity new since prior chest CT consistent with infectious/inflammatory pneumonitis. 2. Background mild-to-moderate lower lobe predominant pulmonary fibrosis without change. 3. A 5 cm lesion in the anterior aspect left lobe of the liver stable to mildly decreased in size and of indeterminate etiology.     PROBLEM LIST:           DLBC  Interstitial lung disease / Pulmonary Fibrosis   Type 2 Diabetes Mellitus   BPH  Acute on Chronic Respiratory Failure / Fungal PNA (8/2022)      TREATMENT:            R-CHOP w/ Revlimid and Tafasitimab  Polatuzumab/BR   Cycle #1 -  8/1/22      ASSESSMENT AND PLAN:          1. DLBCL: Relapsed/ refractory diffuse large B cell non-Hodgkin's lymphoma now with a large CNS mass, bx proven NHL   - S/p XRT to brain (7/13/22-7/25/22) 2400 cGy over 8 fractions    PLAN:  Jerzy-BR   C1D1 - 0/3/46  C2 - uncertain - pending improvement in acute respiratory failure    - S/p Decadron 2 mg daily (lowered 8/1/22) --> now off   - Cont Keppra 500 mg bid     Cycle 1, Day + 19     2. ID: afebrile, Cont to treat for fungal PNA (POA)    - Cont Valtrex and Dapsone PPX  - Start Vori 200 mg BID (8/13/22)  - Viral respiratory w/ COVID 19 (8/9/22): Negative  - MRSA swab (8/11/22) - Neg  - Procalcitonin (8/10/22) - 0.8  - CT chest (8/9/22): Moderate bilateral groundglass opacity new since prior chest CT consistent with infectious/inflammatory pneumonitis. Background mild-to-moderate lower lobe predominant pulmonary fibrosis without change  - S/p Bronchoscopy w/ BAL and biopsy (8/10/22) - Galactomannan positive 1.33, Diatherix - negative  - Aspergillus (8/14/22) - negative, fungitell (8/14/22): > 500 (positive)  Bld Cx 8/10/22: + Pippa krusei  - Cont Vfend Day + 7 (started 8/13/22)    Abx history:  Cefepime x 7 days (8/10/22 - 8/16/22)      3. Heme: Anemia and thrombocytopenia likely r/t recent chemotherapy  - Transfuse for Hgb < 7 and Platelets < 10 K  - No transfusion today     4. Metabolic: Electrolytes and renal fxn stable; hypoPhos, hypoMg.  Steroid induced hyperglycemia - improved   - S/p Lasix 40 mg IV x 1 (8/12/22)  - Cont KPhos 500 mg bid (started 8/16/22)  - Start KCl 20 meq daily (started 8/17/22)  - No IVF  - Replace Phos, K+ & Mg per PRN orders     5. GI / Nutrition:          Nutrition:  Severe malnutrition w/ chronic illness    - Cont regular diet, no straws   - SLP eval (8/13/22): Aspiration with straws, regular diet and thin liquids with cups only  - MBS (8/16/22) - No laryngeal penetration or aspiration.   - Cont Glucerna shakes BID and Magic cups BID  - Recommend swabbing mouth after all meals. Must be completely awake and upright for PO intake  - Dietary to follow closely  Nausea:  - Cont Zofran and Compazine as needed  Constipation:  no complaints   - Cont Colace bid     6. Pulm:    - H/o Interstitial lung disease / pulmonary fibrosis  - F/b Dr. Nakul Sofia   - S/p bronchoscopy (3/25/22) and PFTs (4/1/22)  - Home O2 - 2 l/min    Hypoxemia:  Admitted w/ acute on chronic respiratory failure possibly from  pneumonitis from chemotherapy (Rituxan vs Jerzy), but more than likely from multifocal fungal PNA (POA). - Pulm following, appreciate recs  - CT chest (8/9/22) - Moderate bilateral groundglass opacity new since prior chest CT consistent with infectious/inflammatory pneumonitis. Background mild-to-moderate lower lobe predominant pulmonary fibrosis without change. A 5 cm lesion in the anterior aspect left lobe of the liver stable to mildly decreased in size and of indeterminate etiology. - S/p Bronchoscopy w/ BAL and biopsy (8/10/22) - + galactomannan, Diatherix negative  - S/p steroids:  Solumedrol 60 mg IV daily (started 8/10/22), 60 mg BID (8/12/22, d/t increased oxygen requirements), 60 mg daily (8/14/22 d/t likely fungal pneumonia), Prednisone 20 mg daily (8/15/22), 10 mg daily (8/16/22)  - Cont high flow O2 currently up to @ 11 l/min; titrate for O2 Sat 88%  - See ID section for additional treatment and management    7. Endo:   - H/o T2DM   T2DM:  exacerbated by steroids, now improving   - S/p Lantus 10 units nightly (stopped 8/11/22 - 8/15/22) restart at 5u q AM (8/18) and will admon in AM --> increase to 8u sc AM   - Home regimen: on humalog SSI, janumet and jardiance  - Cont Humalog Med SSI AS/HS    8.   MSK:  he has acute debilitation and generalized weakness d/t CNS lymphoma and hypoxemia   - Cont PT/OT - he is extremely weak and  [facility]     9.   :  - H/o BPH  BPH:    - Cont Flomax daily     - DVT Prophylaxis: Platelets >34,128 cells/dL, - daily lovenox prophylaxis ordered  Contraindications to pharmacologic prophylaxis: None  Contraindications to mechanical prophylaxis: None    - Disposition: Plan for SNF; once hypoxemia improves     The patient was seen and examined by MD America Banks MD

## 2022-08-19 NOTE — PROGRESS NOTES
Comprehensive Nutrition Assessment    Type and Reason for Visit:  Reassess    Nutrition Recommendations/Plan:   Add 4 carb modifier to diet order  Continue magic cup BID  Continue glucerna BID     Malnutrition Assessment:  Malnutrition Status:  Severe malnutrition (08/11/22 1044)    Context:  Chronic Illness     Findings of the 6 clinical characteristics of malnutrition:  Energy Intake:  75% or less estimated energy requirements for 1 month or longer  Weight Loss:  Unable to assess (20lbs loss in 9 months, CBW is estimated. Will order updated)     Body Fat Loss:  Severe body fat loss Orbital, Triceps   Muscle Mass Loss:  Severe muscle mass loss Temples (temporalis), Clavicles (pectoralis & deltoids)  Fluid Accumulation:  Mild Extremities   Strength:  Not Performed    Nutrition Assessment:    Follow-up. SLP following. Remains on dysphagia soft and bite sized, low microbial diet. Recorded intakes variable although mostly <50%. Magic cup and Glucerna supplement onboard. Pt with good intakes of glucerna and moderate intakes of magic cup. Pt with elevated glucose levels. Will add 4 carb modifier to diet order and continue both supplements. Nutrition Related Findings:    -5.8 liters. Glucose 218. Phos 1.6. Wound Type: Stage II, Pressure Injury       Current Nutrition Intake & Therapies:    Average Meal Intake: 1-25%, 26-50%  Average Supplements Intake: 51-75%, %  ADULT ORAL NUTRITION SUPPLEMENT; Lunch, Dinner; Frozen Oral Supplement  ADULT ORAL NUTRITION SUPPLEMENT; Breakfast, Lunch; Diabetic Oral Supplement  ADULT DIET; Dysphagia - Soft and Bite Sized; Low Microbial    Anthropometric Measures:  Height: 5' 6\" (167.6 cm)  Ideal Body Weight (IBW): 142 lbs (65 kg)    Admission Body Weight: 110 lb (49.9 kg)  Current Body Weight: 115 lb (52.2 kg), 81 % IBW.  Weight Source: Bed Scale  Current BMI (kg/m2): 18.6  Weight Adjustment For: No Adjustment  BMI Categories: Underweight (BMI less than 22) age over

## 2022-08-19 NOTE — CARE COORDINATION
Case Management Assessment           Daily Note                 Date/ Time of Note: 8/19/2022 1:38 PM         Note completed by: RUSS Aguillon    Patient Name: Phill Camargo  YOB: 1945    Diagnosis:Hypoxia [R09.02]  Patient Admission Status: Inpatient    Date of Admission:8/9/2022  4:44 PM Length of Stay: 10 GLOS: GMLOS: 4.8    Current Plan of Care: SNF  ________________________________________________________________________________________  PT AM-PAC: 12 / 24 per last evaluation on: 8/18    OT AM-PAC: 12 / 24 per last evaluation on: 8/18    DME Needs for discharge: defer  ________________________________________________________________________________________  Discharge Plan: [facility]: [facility]    Tentative discharge date: early next week    Current barriers to discharge: medical clearance    Referrals completed: SNF: [address]    Resources/ information provided: [facility] List  ________________________________________________________________________________________  Case Management Notes: [facility] unable to accept due to be unsure of bed availability. [facility] and will initiate precert this afternoon. Peggye Cabot and his family were provided with choice of provider; he and his family are in agreement with the discharge plan.     Care Transition Patient: Yes    Oelrichs Roughen, [facility]  Case Management Department  Ph: [phone]  Fax: [phone]

## 2022-08-19 NOTE — PROGRESS NOTES
4 Eyes Admission Assessment     I agree as the nurse that 2 RN's have performed a thorough Head to Toe Skin Assessment on the patient. ALL assessment sites listed below have been assessed on admission. Areas assessed by both nurses:   [x]   Head, Face, and Ears   [x]   Shoulders, Back, and Chest  [x]   Arms, Elbows, and Hands   [x]   Coccyx, Sacrum, and Ischium  [x]   Legs, Feet, and Heels        Does the Patient have Skin Breakdown?   Yes a wound was noted on the Admission Assessment and an LDA was Initiated documentation include the Savi-wound, Wound Assessment, Measurements, Dressing Treatment, Drainage, and Color\",         Giorgio Prevention initiated:  Yes   Wound Care Orders initiated:  Yes      [address]  nurse consulted for Pressure Injury (Stage 3,4, Unstageable, DTI, NWPT, and Complex wounds) or Giorgio score 18 or lower:  Yes      Nurse 1 eSignature: Electronically signed by Dez Ga on 8/19/22 at 5:41 PM EDT    **SHARE this note so that the co-signing nurse is able to place an eSignature**    Nurse 2 eSignature: {Esignature:[id number]}

## 2022-08-19 NOTE — PROGRESS NOTES
4 Eyes Admission Assessment     I agree as the admission nurse that 2 RN's have performed a thorough Head to Toe Skin Assessment on the patient. ALL assessment sites listed below have been assessed on admission. Areas assessed by both nurses: Bob Belt   [x]   Head, Face, and Ears   [x]   Shoulders, Back, and Chest  [x]   Arms, Elbows, and Hands   [x]   Coccyx, Sacrum, and Ischium  [x]   Legs, Feet, and Heels        Does the Patient have Skin Breakdown?   Yes a wound was noted on the Admission Assessment and an LDA was Initiated documentation include the Savi-wound, Wound Assessment, Measurements, Dressing Treatment, Drainage, and Color\",         Giorgio Prevention initiated:  Yes   Wound Care Orders initiated:  Yes      [address]  nurse consulted for Pressure Injury (Stage 3,4, Unstageable, DTI, NWPT, and Complex wounds) or Giorgio score 18 or lower:  Yes      Nurse 1 eSignature: Electronically signed by Delroy Brown RN on 8/19/22 at 6:03 AM EDT    **SHARE this note so that the co-signing nurse is able to place an eSignature**    Nurse 2 eSignature: Electronically signed by Abdirizak Huang on 8/19/22 at 5:42 PM EDT

## 2022-08-19 NOTE — PLAN OF CARE
Problem: ABCDS Injury Assessment  Goal: Absence of physical injury  8/18/2022 2227 by Eron Mckee RN  Outcome: Progressing   Pt bed is in lowest position, call light is within reach, and bed alarm is om  Problem: Safety - Adult  Goal: Free from fall injury  8/18/2022 2227 by Eron Mckee RN  Outcome: Progressing     Problem: Skin/Tissue Integrity  Goal: Absence of new skin breakdown  Description: 1. Monitor for areas of redness and/or skin breakdown  2. Assess vascular access sites hourly  3. Every 4-6 hours minimum:  Change oxygen saturation probe site  4. Every 4-6 hours:  If on nasal continuous positive airway pressure, respiratory therapy assess nares and determine need for appliance change or resting period. 8/18/2022 2227 by Eron Mckee RN  Outcome: Progressing  Coccyx was cleaned and new sacral heart was applied.      Problem: Respiratory - Adult  Goal: Achieves optimal ventilation and oxygenation  8/18/2022 2227 by Eron Mckee RN  Outcome: Progressing   Pt is receiving supplemental oxygen via high flow nasal cannula  Problem: Pain  Goal: Verbalizes/displays adequate comfort level or baseline comfort level  8/18/2022 2227 by Eron Mckee RN  Outcome: Progressing

## 2022-08-20 NOTE — PROGRESS NOTES
RN gagandeep luna at [address] \"FYI- patient's heart rate has been sustaining 130-140. Blood pressures stable. EKG showed sinus tach with occasional PVCs. Just wanted to make you aware of new rate, thanks. \" No new orders at this time.

## 2022-08-20 NOTE — PROGRESS NOTES
[facility] Progress Note    2022     Yannick Tompkins    MRN: [id number]    : 1945    Referring MD: Moris Pruitt MD  [facility] [address], [address] [address],  [address]    SUBJECTIVE:  he feels and cont to be sob     ECOG PS:(3) Capable of limited self-care, confined to bed or chair > 50% of waking hours    KPS: 50%  Requires considerable assistance and frequent medical care    Isolation: None    Medications    Scheduled Meds:   insulin glargine  8 Units SubCUTAneous Daily    phosphorus  500 mg Oral TID    Venelex   Topical BID    enoxaparin  40 mg SubCUTAneous Daily    potassium chloride  20 mEq Oral Daily with breakfast    valACYclovir  500 mg Oral BID    voriconazole  200 mg Oral 2 times per day    insulin lispro  0-16 Units SubCUTAneous TID WC    insulin lispro  0-4 Units SubCUTAneous Nightly    bacitracin-polymyxin b   Topical BID    docusate sodium  100 mg Oral BID    fluticasone  1 spray Each Nostril Daily    levETIRAcetam  500 mg Oral BID    sodium chloride  2 spray Nasal 4x Daily    tamsulosin  0.4 mg Oral Daily    sodium chloride flush  5-40 mL IntraVENous 2 times per day    Saline Mouthwash  15 mL Swish & Spit 4x Daily AC & HS     Continuous Infusions:   sodium chloride      sodium chloride      potassium chloride      dextrose       PRN Meds:.sodium phosphate IVPB, acetaminophen, ondansetron, prochlorperazine, sodium chloride, sodium chloride flush, sodium chloride, potassium chloride, magnesium sulfate, magnesium hydroxide, Saline Mouthwash, alteplase (CATHFLO) with sterile water injection, glucose, dextrose bolus **OR** dextrose bolus, glucagon (rDNA), dextrose    ROS:  As noted above, otherwise remainder of 10-point ROS negative    Physical Exam:     I&O:    Intake/Output Summary (Last 24 hours) at 2022 0738  Last data filed at 2022 0545  Gross per 24 hour   Intake 540 ml   Output 1200 ml   Net -660 ml         Vital Signs:  /64   Pulse 98   Temp 98.2 °F (36.8 °C) (Axillary)   Resp 27   Ht 5' 6\" (1.676 m)   Wt 115 lb 11.9 oz (52.5 kg)   SpO2 99%   BMI 18.68 kg/m²     Weight:    Wt Readings from Last 3 Encounters:   08/20/22 115 lb 11.9 oz (52.5 kg)   07/25/22 116 lb 10 oz (52.9 kg)   05/31/22 117 lb (53.1 kg)       General: Awake, alert and oriented  HEENT: normocephalic, PERRL, no scleral erythema or icterus, Oral mucosa moist and intact, throat clear  NECK: supple   BACK: Straight   SKIN: warm dry and intact without lesions rashes or masses  CHEST: poor air excursion, no rhonchi  CV: Normal S1 S2, irreg, no MRG  ABD: NT ND normoactive BS, no palpable masses or hepatosplenomegaly  EXTREMITIES: without edema, denies calf tenderness  NEURO: CN II - XII grossly intact  CATHETER:  Right IJ SL PAC (1/11/22) - CDI      Data    CBC:   Recent Labs     08/18/22 0420 08/19/22 0425   WBC 5.1 4.8   HGB 8.8* 8.6*   HCT 26.5* 26.8*   MCV 94.7 95.4   * 110*       BMP/Mag:  Recent Labs     08/18/22  0420 08/19/22  0405 08/20/22  0442 08/20/22  0443    143 142  --    K 3.5 3.8 4.2  --    CL 98* 99 97*  --    CO2 39* 35* 35*  --    PHOS 1.7* 1.6*  --  2.0*   BUN 12 10 12  --    CREATININE <0.5* <0.5* <0.5*  --    MG 1.70* 1.70*  --  1.60*       LIVP:   Recent Labs     08/19/22 0405   AST 26   ALT 25   BILIDIR <0.2   BILITOT 0.3   ALKPHOS 166*       Coags:   Recent Labs     08/18/22 0420   PROTIME 14.4   INR 1.13   APTT 36.0*       Uric Acid   Recent Labs     08/19/22 0405   LABURIC 1.7*       Diagnostics:   CT chest (8/9/22):  1. Moderate bilateral groundglass opacity new since prior chest CT consistent with infectious/inflammatory pneumonitis. 2. Background mild-to-moderate lower lobe predominant pulmonary fibrosis without change. 3. A 5 cm lesion in the anterior aspect left lobe of the liver stable to mildly decreased in size and of indeterminate etiology.     PROBLEM LIST:           DLBC  Interstitial lung disease / Pulmonary Fibrosis   Type 2 Diabetes Mellitus BPH  Acute on Chronic Respiratory Failure / Fungal PNA (8/2022)      TREATMENT:            R-CHOP w/ Revlimid and Tafasitimab  Polatuzumab/BR   Cycle #1 -  8/1/22      ASSESSMENT AND PLAN:          1. 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 ID: afebrile, Cont to treat for fungal PNA (POA)    - Cont Valtrex and Dapsone PPX  - Start Vori 200 mg BID (8/13/22)  - Viral respiratory w/ COVID 19 (8/9/22): Negative  - MRSA swab (8/11/22) - Neg  - Procalcitonin (8/10/22) - 0.8  - CT chest (8/9/22): Moderate bilateral groundglass opacity new since prior chest CT consistent with infectious/inflammatory pneumonitis. Background mild-to-moderate lower lobe predominant pulmonary fibrosis without change  - S/p Bronchoscopy w/ BAL and biopsy (8/10/22) - Galactomannan positive 1.33, Diatherix - negative  - Aspergillus (8/14/22) - negative, fungitell (8/14/22): > 500 (positive)  Bld Cx 8/10/22: + Pippa krusei  - Cont Vfend Day + 8 (started 8/13/22)    Abx history:  Cefepime x 7 days (8/10/22 - 8/16/22)      3. Heme: Anemia and thrombocytopenia likely r/t recent chemotherapy  - Transfuse for Hgb < 7 and Platelets < 10 K  - No transfusion today     4. Metabolic: Electrolytes and renal fxn stable; hypoPhos, hypoMg. Steroid induced hyperglycemia - improved   - S/p Lasix 40 mg IV x 1 (8/12/22)  - Cont KPhos 500 mg bid (started 8/16/22)  - Start KCl 20 meq daily (started 8/17/22)  - No IVF  - Replace Phos, K+ & Mg per PRN orders     5. GI / Nutrition:          Nutrition:  Severe malnutrition w/ chronic illness    - Cont regular diet, no straws   - SLP eval (8/13/22):   Aspiration with straws, regular diet and thin liquids with cups only  - MBS (8/16/22) - No laryngeal penetration or aspiration.   - Cont Glucerna shakes BID and Magic cups BID  - Recommend swabbing mouth after all meals. Must be completely awake and upright for PO intake  - Dietary to follow closely  Nausea:  - Cont Zofran and Compazine as needed  Constipation:  no complaints   - Cont Colace bid     6. Pulm:    - H/o Interstitial lung disease / pulmonary fibrosis  - F/b Dr. Gato Dooley   - S/p bronchoscopy (3/25/22) and PFTs (4/1/22)  - Home O2 - 2 l/min    Hypoxemia:  Admitted w/ acute on chronic respiratory failure possibly from  pneumonitis from chemotherapy (Rituxan vs Jerzy), but more than likely from multifocal fungal PNA (POA). - Pulm following, appreciate recs  - CT chest (8/9/22) - Moderate bilateral groundglass opacity new since prior chest CT consistent with infectious/inflammatory pneumonitis. Background mild-to-moderate lower lobe predominant pulmonary fibrosis without change. A 5 cm lesion in the anterior aspect left lobe of the liver stable to mildly decreased in size and of indeterminate etiology. - S/p Bronchoscopy w/ BAL and biopsy (8/10/22) - + galactomannan, Diatherix negative  - S/p steroids:  Solumedrol 60 mg IV daily (started 8/10/22), 60 mg BID (8/12/22, d/t increased oxygen requirements), 60 mg daily (8/14/22 d/t likely fungal pneumonia), Prednisone 20 mg daily (8/15/22), 10 mg daily (8/16/22)  - Cont high flow O2 currently up to @ 11 l/min; titrate for O2 Sat 88%  - See ID section for additional treatment and management    7. Endo:   - H/o T2DM   T2DM:  exacerbated by steroids, now improving   - S/p Lantus 10 units nightly (stopped 8/11/22 - 8/15/22) restart at 5u q AM (8/18) and will admon in AM --> increase to 8u sc AM   - Home regimen: on humalog SSI, janumet and jardiance  - Cont Humalog Med SSI AS/HS    8.   MSK:  he has acute debilitation and generalized weakness d/t CNS lymphoma and hypoxemia   - Cont PT/OT - he is extremely weak and  will require SNF placement ([facility]) early next week     9.   :  - H/o BPH  BPH:    - Cont Flomax daily     - DVT Prophylaxis: Platelets >53,777 cells/dL, - daily lovenox prophylaxis ordered  Contraindications to pharmacologic prophylaxis: None  Contraindications to mechanical prophylaxis: None    - Disposition: Plan for SNF; once hypoxemia improves     The patient was seen and examined by Dr. Elizabeth Toledo MD

## 2022-08-20 NOTE — PROGRESS NOTES
4 Eyes Admission Assessment     I agree as the admission nurse that 2 RN's have performed a thorough Head to Toe Skin Assessment on the patient. ALL assessment sites listed below have been assessed on admission. Areas assessed by both nurses: Prachi &  [x]   Head, Face, and Ears   [x]   Shoulders, Back, and Chest  [x]   Arms, Elbows, and Hands   [x]   Coccyx, Sacrum, and Ischium  [x]   Legs, Feet, and Heels        Does the Patient have Skin Breakdown?   Yes a wound was noted on the Admission Assessment and an LDA was Initiated documentation include the Savi-wound, Wound Assessment, Measurements, Dressing Treatment, Drainage, and Color\",         Giorgio Prevention initiated:  Yes   Wound Care Orders initiated:  Yes      [address]  nurse consulted for Pressure Injury (Stage 3,4, Unstageable, DTI, NWPT, and Complex wounds) or Giorgio score 18 or lower:  Yes      Nurse 1 eSignature: Electronically signed by Chalo Castelan RN on 8/20/22 at 5:45 PM EDT    **SHARE this note so that the co-signing nurse is able to place an eSignature**    Nurse 2 eSignature: {Esignature:[id number]}

## 2022-08-20 NOTE — PLAN OF CARE
Problem: Safety - Adult  Goal: Free from fall injury  8/20/2022 1525 by Dana Donahue RN  Outcome: Progressing   Pt bed is in low position, side rails up, call light and belongings are in reach. Pt instructed to call for assistance. Verbalized understanding. All fall precautions in place. Problem: Skin/Tissue Integrity  Goal: Absence of new skin breakdown  Description: 1. Monitor for areas of redness and/or skin breakdown  2. Assess vascular access sites hourly  3. Every 4-6 hours minimum:  Change oxygen saturation probe site  4. Every 4-6 hours:  If on nasal continuous positive airway pressure, respiratory therapy assess nares and determine need for appliance change or resting period. 8/20/2022 1525 by Dana Donahue RN  Outcome: Progressing  Pt turned from side to side at least every two hours. Brief checked at least every two hours, pericare provided at times of incontinence. Skin assessed, venelex applied on sacrum, and dressing changed this shift. No evidence of further skin breakdown at this time. Problem: Respiratory - Adult  Goal: Achieves optimal ventilation and oxygenation  8/20/2022 1525 by Dana Donahue RN  Outcome: Progressing  Pt O2 sats remain stable on 7L high flow nasal cannula. O2 adjusted as needed. No evidence of respiratory distress. Will continue to monitor.

## 2022-08-20 NOTE — PLAN OF CARE
Problem: Chronic Conditions and Co-morbidities  Goal: Patient's chronic conditions and co-morbidity symptoms are monitored and maintained or improved  Outcome: Progressing     Problem: ABCDS Injury Assessment  Goal: Absence of physical injury  Outcome: Progressing     Problem: Safety - Adult  Goal: Free from fall injury  Outcome: Progressing     Problem: Skin/Tissue Integrity  Goal: Absence of new skin breakdown  Description: 1. Monitor for areas of redness and/or skin breakdown  2. Assess vascular access sites hourly  3. Every 4-6 hours minimum:  Change oxygen saturation probe site  4. Every 4-6 hours:  If on nasal continuous positive airway pressure, respiratory therapy assess nares and determine need for appliance change or resting period.   Outcome: Progressing     Problem: Nutrition Deficit:  Goal: Optimize nutritional status  Outcome: Progressing     Problem: Respiratory - Adult  Goal: Achieves optimal ventilation and oxygenation  Outcome: Progressing     Problem: Pain  Goal: Verbalizes/displays adequate comfort level or baseline comfort level  Outcome: Progressing

## 2022-08-20 NOTE — PROGRESS NOTES
CC: Hypoxemia    Feeling better this morning, less short of breath. No cough, chest congestion, chest discomfort. Had a transient increase in O2 requirement around midnight, which spontaneously resolved. Afebrile  WBC 4.8  Off steroids x4 days  Voriconazole, day #8  /63. Sinus rhythm, mild tachycardia  S1, S2 normal.  Echocardiogram suboptimal quality because of lung interference, LV function appears normal.  Elevated RVSP, right heart not seen well. O2 saturation 100%, on O2 at 8 L/min  Breath sounds normal intensity bilaterally, with diffuse inspiratory fine crackles. No new chest imaging. Abdomen soft, no tenderness. Appetite fair. No peripheral edema. Creatinine unchanged <0.5. Sodium 142, potassium 4.2. Serum bicarbonate 35, unchanged. Chloride 97. Glucose 144    A&P: Fungal pneumonia, with diffuse involvement. Positive galactomannan on BAL and Pippa Krusai on blood culture. This fully explains hypoxemia. No clear reason for increased O2 requirement last evening. On the whole, O2 requirements are moderately elevated, but stable around 7-9 l/min. Continuing current treatment with voriconazole. No evidence for heart failure complicating illness with lower respiratory infection due to fungus. Patient and his wife are updated regarding progress and plan.

## 2022-08-21 NOTE — PROGRESS NOTES
4 Eyes Admission Assessment     I agree as the admission nurse that 2 RN's have performed a thorough Head to Toe Skin Assessment on the patient. ALL assessment sites listed below have been assessed on admission. Areas assessed by both nurses: Jarrett Lien and   [x]   Head, Face, and Ears   [x]   Shoulders, Back, and Chest  [x]   Arms, Elbows, and Hands   [x]   Coccyx, Sacrum, and Ischium  [x]   Legs, Feet, and Heels        Does the Patient have Skin Breakdown?   Yes a wound was noted on the Admission Assessment and an LDA was Initiated documentation include the Savi-wound, Wound Assessment, Measurements, Dressing Treatment, Drainage, and Color\",         Giorgio Prevention initiated:  Yes   Wound Care Orders initiated:  Yes      [address]  nurse consulted for Pressure Injury (Stage 3,4, Unstageable, DTI, NWPT, and Complex wounds) or Giorgio score 18 or lower:  Yes      Nurse 1 eSignature: Electronically signed by Primo Hinojosa RN on 8/21/22 at 3:24 AM EDT    **SHARE this note so that the co-signing nurse is able to place an eSignature**    Nurse 2 eSignature: Electronically signed by Мария Dalton RN on 8/21/22 at 7:20 AM EDT

## 2022-08-21 NOTE — PROGRESS NOTES
CC: Fungal pneumonia    Complains of pain in left chest with breathing, has not taken analgesic yet. Breathing is uncomfortable because of this. Afebrile  WBC unchanged 5.7  Voriconazole completed day #8 for presumed fungal pneumonia, based on positive BAL galactomannan study and positive Fungitell. /79. Sinus tachycardia  S1, S2 normal.  Respiratory rate 30, increased work of breathing. O2 saturation 90%, on O2 at 12 L/min with high flow nasal prongs  Extensive crackles in mid to lower lung fields. Chest x-ray today shows some increase in bilateral alveolar opacities. No new areas of involvement. Diaphragms remain elevated. No pneumothorax  Abdomen soft, no tenderness. No peripheral edema. Creatinine unchanged 0.5. Electrolytes unchanged, with serum bicarbonate 36, chloride 98    A&P: Diffuse fungal pneumonia, species not identified but galactomannan and Fungitell studies are quite positive. Antifungal therapy for 1 week may not yet be sufficient to start bringing this under control. He has increased hypoxemia, and in general has increased work of breathing. There is very little concern that he may become too fatigued to continue without ventilator support. Continue very close monitoring.

## 2022-08-21 NOTE — PLAN OF CARE
Problem: ABCDS Injury Assessment  Goal: Absence of physical injury  8/21/2022 1458 by José Antonio Burt, RN  Outcome: Progressing   Bed alarm on, call light within reach, floor clear. Patient instructed to call when needing assistance. Problem: Skin/Tissue Integrity  Goal: Absence of new skin breakdown  Description: 1. Monitor for areas of redness and/or skin breakdown  2. Assess vascular access sites hourly  3. Every 4-6 hours minimum:  Change oxygen saturation probe site  4. Every 4-6 hours:  If on nasal continuous positive airway pressure, respiratory therapy assess nares and determine need for appliance change or resting period. 8/21/2022 1458 by José Antonio Burt, RN  Outcome: Progressing   Wound care consulted, wound orders followed, patient being turned Q2, speciality bed in place. Problem: Respiratory - Adult  Goal: Achieves optimal ventilation and oxygenation  8/21/2022 1458 by José Antonio Burt, RN  Outcome: Progressing  Patient is currently requiring 8-10L O2. When eating/turning patient O2 sats drop into 70-80s. Nonrebreather applied to bring O2 back up. Patient educated on eating slowly and ways to help increase O2 status.

## 2022-08-21 NOTE — PROGRESS NOTES
[address] [employer] Progress Note    2022     Miroslava Leonard    MRN: [id number]    : 1945    Referring MD: Maryan Lee MD  [address], [address], [address],  [address]    SUBJECTIVE:  he feels and cont to be sob, unchanged from yest. His po intake is fair.      ECOG PS:(3) Capable of limited self-care, confined to bed or chair > 50% of waking hours    KPS: 50%  Requires considerable assistance and frequent medical care    Isolation: None    Medications    Scheduled Meds:   insulin glargine  8 Units SubCUTAneous Daily    phosphorus  500 mg Oral TID    Venelex   Topical BID    enoxaparin  40 mg SubCUTAneous Daily    potassium chloride  20 mEq Oral Daily with breakfast    valACYclovir  500 mg Oral BID    voriconazole  200 mg Oral 2 times per day    insulin lispro  0-16 Units SubCUTAneous TID WC    insulin lispro  0-4 Units SubCUTAneous Nightly    bacitracin-polymyxin b   Topical BID    fluticasone  1 spray Each Nostril Daily    levETIRAcetam  500 mg Oral BID    sodium chloride  2 spray Nasal 4x Daily    tamsulosin  0.4 mg Oral Daily    sodium chloride flush  5-40 mL IntraVENous 2 times per day    Saline Mouthwash  15 mL Swish & Spit 4x Daily AC & HS     Continuous Infusions:   sodium chloride      sodium chloride      potassium chloride      dextrose       PRN Meds:.sodium phosphate IVPB, acetaminophen, ondansetron, prochlorperazine, sodium chloride, sodium chloride flush, sodium chloride, potassium chloride, magnesium sulfate, magnesium hydroxide, Saline Mouthwash, alteplase (CATHFLO) with sterile water injection, glucose, dextrose bolus **OR** dextrose bolus, glucagon (rDNA), dextrose    ROS:  As noted above, otherwise remainder of 10-point ROS negative    Physical Exam:     I&O:    Intake/Output Summary (Last 24 hours) at 2022 0859  Last data filed at 2022 0852  Gross per 24 hour   Intake 360 ml   Output 750 ml   Net -390 ml         Vital Signs:  /72   Pulse (!) 102   Temp 97.4 °F (36.3 °C) (Oral)   Resp 25   Ht 5' 6\" (1.676 m)   Wt 115 lb 11.9 oz (52.5 kg)   SpO2 99%   BMI 18.68 kg/m²     Weight:    Wt Readings from Last 3 Encounters:   08/20/22 115 lb 11.9 oz (52.5 kg)   07/25/22 116 lb 10 oz (52.9 kg)   05/31/22 117 lb (53.1 kg)       General: Awake, alert and oriented  HEENT: normocephalic, PERRL, no scleral erythema or icterus, Oral mucosa moist and intact, throat clear  NECK: supple   BACK: Straight   SKIN: warm dry and intact without lesions rashes or masses  CHEST: poor air excursion, no rhonchi  CV: Normal S1 S2, irreg, no MRG  ABD: NT ND normoactive BS, no palpable masses or hepatosplenomegaly  EXTREMITIES: without edema, denies calf tenderness  NEURO: CN II - XII grossly intact  CATHETER:  Right IJ SL PAC (1/11/22) - CDI      Data    CBC:   Recent Labs     08/19/22  0425   WBC 4.8   HGB 8.6*   HCT 26.8*   MCV 95.4   *       BMP/Mag:  Recent Labs     08/19/22  0405 08/20/22  0442 08/20/22  0443 08/21/22  0250    142  --  145   K 3.8 4.2  --  3.6   CL 99 97*  --  98*   CO2 35* 35*  --  36*   PHOS 1.6*  --  2.0* 2.5   BUN 10 12  --  14   CREATININE <0.5* <0.5*  --  <0.5*   MG 1.70*  --  1.60* 1.60*       LIVP:   Recent Labs     08/19/22  0405   AST 26   ALT 25   BILIDIR <0.2   BILITOT 0.3   ALKPHOS 166*       Coags:   No results for input(s): PROTIME, INR, APTT in the last 72 hours. Uric Acid   Recent Labs     08/19/22  0405   LABURIC 1.7*       Diagnostics:   CT chest (8/9/22):  1. Moderate bilateral groundglass opacity new since prior chest CT consistent with infectious/inflammatory pneumonitis. 2. Background mild-to-moderate lower lobe predominant pulmonary fibrosis without change. 3. A 5 cm lesion in the anterior aspect left lobe of the liver stable to mildly decreased in size and of indeterminate etiology.     PROBLEM LIST:           DLBC  Interstitial lung disease / Pulmonary Fibrosis   Type 2 Diabetes Mellitus   BPH  Acute on Chronic Respiratory Failure / Fungal PNA (8/2022)      TREATMENT:            R-CHOP w/ Revlimid and Tafasitimab  Polatuzumab/BR   Cycle #1 -  8/1/22      ASSESSMENT AND PLAN:          1. 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 ID: afebrile, Cont to treat for fungal PNA (POA)    - Cont Valtrex and Dapsone PPX  - cont Vori 200 mg BID (8/13/22)  - Viral respiratory w/ COVID 19 (8/9/22): Negative  - MRSA swab (8/11/22) - Neg  - Procalcitonin (8/10/22) - 0.8  - CT chest (8/9/22): Moderate bilateral groundglass opacity new since prior chest CT consistent with infectious/inflammatory pneumonitis. Background mild-to-moderate lower lobe predominant pulmonary fibrosis without change  - S/p Bronchoscopy w/ BAL and biopsy (8/10/22) - Galactomannan positive 1.33, Diatherix - negative  - Aspergillus (8/14/22) - negative, fungitell (8/14/22): > 500 (positive)  Bronch lavage - 8/10/22: + Candida krusei  - Cont Vfend Day + 9 (started 8/13/22)    Abx history:  Cefepime x 7 days (8/10/22 - 8/16/22)      3. Heme: Anemia and thrombocytopenia likely r/t recent chemotherapy  - Transfuse for Hgb < 7 and Platelets < 10 K  - No transfusion today     4. Metabolic: Electrolytes and renal fxn stable; hypoPhos, hypoMg. Steroid induced hyperglycemia - improved   - S/p Lasix 40 mg IV x 1 (8/12/22)  - Cont KPhos 500 mg bid (started 8/16/22)  - Start KCl 20 meq daily (started 8/17/22)  - No IVF  - Replace Phos, K+ & Mg per PRN orders     5. GI / Nutrition:          Nutrition:  Severe malnutrition w/ chronic illness    - Cont regular diet, no straws   - SLP eval (8/13/22):   Aspiration with straws, regular diet and thin liquids with cups only  - MBS (8/16/22) - No laryngeal penetration or aspiration.   - Cont Glucerna shakes BID and Magic cups BID  - Recommend swabbing mouth after all meals. Must be completely awake and upright for PO intake  - Dietary to follow closely  Nausea:  - Cont Zofran and Compazine as needed  Constipation:  no complaints   - Cont Colace bid     6. Pulm:    - H/o Interstitial lung disease / pulmonary fibrosis  - F/b Dr. Mónica Yun   - S/p bronchoscopy (3/25/22) and PFTs (4/1/22)  - Home O2 - 2 l/min    Hypoxemia:  Admitted w/ acute on chronic respiratory failure possibly from  pneumonitis from chemotherapy (Rituxan vs Jerzy), but more than likely from multifocal fungal PNA (POA). - Pulm following, appreciate recs  - CT chest (8/9/22) - Moderate bilateral groundglass opacity new since prior chest CT consistent with infectious/inflammatory pneumonitis. Background mild-to-moderate lower lobe predominant pulmonary fibrosis without change. A 5 cm lesion in the anterior aspect left lobe of the liver stable to mildly decreased in size and of indeterminate etiology. - S/p Bronchoscopy w/ BAL and biopsy (8/10/22) - + galactomannan, Diatherix negative  - S/p steroids:  Solumedrol 60 mg IV daily (started 8/10/22), 60 mg BID (8/12/22, d/t increased oxygen requirements), 60 mg daily (8/14/22 d/t likely fungal pneumonia), Prednisone 20 mg daily (8/15/22), 10 mg daily (8/16/22)  - Cont high flow O2 currently up to @ 11 l/min; titrate for O2 Sat 88%  - See ID section for additional treatment and management    7. Endo:   - H/o T2DM   T2DM:  exacerbated by steroids, now improving   - S/p Lantus 10 units nightly (stopped 8/11/22 - 8/15/22) restart at 5u q AM (8/18) and will admon in AM --> increase to 8u sc AM   - Home regimen: on humalog SSI, janumet and jardiance  - Cont Humalog Med SSI AS/HS    8.   MSK:  he has acute debilitation and generalized weakness d/t CNS lymphoma and hypoxemia   - Cont PT/OT - he is extremely weak and  will require SNF placement ([facility]) early next week     9.   :  - H/o BPH  BPH:    - Cont Flomax daily     - DVT Prophylaxis: Platelets >74,538 cells/dL, - daily lovenox prophylaxis ordered  Contraindications to pharmacologic prophylaxis: None  Contraindications to mechanical prophylaxis: None    - Disposition: Plan for SNF; once hypoxemia improves     Keyona Ley MD

## 2022-08-21 NOTE — PROGRESS NOTES
4 Eyes Admission Assessment     I agree as the admission nurse that 2 RN's have performed a thorough Head to Toe Skin Assessment on the patient. ALL assessment sites listed below have been assessed on admission. Areas assessed by both nurses: Peyton Jordan and james    [x]   Head, Face, and Ears   [x]   Shoulders, Back, and Chest  [x]   Arms, Elbows, and Hands   [x]   Coccyx, Sacrum, and Ischium  [x]   Legs, Feet, and Heels        Does the Patient have Skin Breakdown?   Yes a wound was noted on the Admission Assessment and an LDA was Initiated documentation include the Savi-wound, Wound Assessment, Measurements, Dressing Treatment, Drainage, and Color\",         Giorgio Prevention initiated:  Yes   Wound Care Orders initiated:  Yes      [address]  nurse consulted for Pressure Injury (Stage 3,4, Unstageable, DTI, NWPT, and Complex wounds) or Giorgio score 18 or lower:  Yes      Nurse 1 eSignature: Electronically signed by Asha Page RN on 8/21/22 at 6:46 PM EDT    **SHARE this note so that the co-signing nurse is able to place an eSignature**    Nurse 2 eSignature: Electronically signed by Coral Koch RN on 8/22/22 at 7:42 AM EDT

## 2022-08-22 NOTE — PLAN OF CARE
Problem: ABCDS Injury Assessment  Goal: Absence of physical injury  8/22/2022 0143 by Juwan Huddleston RN  Outcome: Progressing   Pt remained free of injury this shift. Will continue to monitor and assess. Problem: Safety - Adult  Goal: Free from fall injury  8/22/2022 0143 by Juwan Huddleston RN  Outcome: Progressing   Orthostatic vital signs obtained at start of shift - see flowsheet for details. Pt does not meet criteria for orthostasis. Pt is a High fall risk. See Kaitlynn Welshn Fall Score and ABCDS Injury Risk assessments.   + Screening for Orthostasis and/or + High Fall Risk per GARVIN/ABCDS: Explained fall risk precautions to pt and family and rationale behind their use to keep the patient safe. Pt bed is in low position, side rails up, call light and belongings are in reach. Fall wristband applied and present on pts wrist.  Bed alarm on. Pt encouraged to call for assistance. Will continue with hourly rounds for PO intake, pain needs, toileting and repositioning as needed. Problem: Skin/Tissue Integrity  Goal: Absence of new skin breakdown  Description: 1. Monitor for areas of redness and/or skin breakdown  2. Assess vascular access sites hourly  3. Every 4-6 hours minimum:  Change oxygen saturation probe site  4. Every 4-6 hours:  If on nasal continuous positive airway pressure, respiratory therapy assess nares and determine need for appliance change or resting period. 8/22/2022 0143 by Juwan Huddleston RN  Outcome: Progressing   Pt turned q 2 hours and as needed. Skin care performed with each incontinence episode. Pt tolerated well, will continue to monitor and assess. Problem: Respiratory - Adult  Goal: Achieves optimal ventilation and oxygenation  8/22/2022 0143 by Juwan Huddleston RN  Outcome: Progressing   Pt remained on 10-12L HF NC this shift. Tolerated fairly well. Will continue to monitor and assess.     Problem: Pain  Goal: Verbalizes/displays adequate comfort level or baseline comfort level  8/22/2022 0143 by Addi Crawley RN  Outcome: Progressing   Pt had no complaints of pain this shift.

## 2022-08-22 NOTE — PROGRESS NOTES
4 Eyes Admission Assessment     I agree as the admission nurse that 2 RN's have performed a thorough Head to Toe Skin Assessment on the patient. ALL assessment sites listed below have been assessed on admission. Areas assessed by both nurses: Alejandra Keller/ Stefano  [x]   Head, Face, and Ears   [x]   Shoulders, Back, and Chest  [x]   Arms, Elbows, and Hands   [x]   Coccyx, Sacrum, and Ischium  [x]   Legs, Feet, and Heels        Does the Patient have Skin Breakdown?   Yes a wound was noted on the Admission Assessment and an LDA was Initiated documentation include the Savi-wound, Wound Assessment, Measurements, Dressing Treatment, Drainage, and Color\",         Giorgio Prevention initiated:  Yes   Wound Care Orders initiated:  Yes      WOC nurse consulted for Pressure Injury (Stage 3,4, Unstageable, DTI, NWPT, and Complex wounds) or Giorgio score 18 or lower:  Yes      Nurse 1 eSignature: Electronically signed by Mariajose Bolanos RN on 8/22/22 at 6:01 PM EDT    **SHARE this note so that the co-signing nurse is able to place an eSignature**    Nurse 2 eSignature: Electronically signed by ELFEGO Agarwal on 8/22/22 at 7:27 PM EDT

## 2022-08-22 NOTE — PLAN OF CARE
Problem: Safety - Adult  Goal: Free from fall injury  Outcome: Progressing  Note: Pt is a high fall risk (see Tony Fall Risk assessment). Oriented pt to room and call light. Instructed pt to call for help when needed. Call light and personal items within reach. Bed in lowest position, brakes on, and 2/4 side rails up. Non-skid footwear on. Falls risk stop sign on door; hourly visual checks implemented. Falls risk arm band on pt. Bed alarm is on. Pt using call light appropriately. Will continue to monitor. Problem: Skin/Tissue Integrity  Goal: Absence of new skin breakdown  Description: 1. Monitor for areas of redness and/or skin breakdown  2. Assess vascular access sites hourly  3. Every 4-6 hours minimum:  Change oxygen saturation probe site  4. Every 4-6 hours:  If on nasal continuous positive airway pressure, respiratory therapy assess nares and determine need for appliance change or resting period. Outcome: Progressing  Note: Pt continues with a stage 2 PI to his coccyx. Sacral heart and venelex cream in place. Pt is on a specialty bed and is being turned q2h by nursing. Will continue to monitor. Problem: Respiratory - Adult  Goal: Achieves optimal ventilation and oxygenation  Outcome: Progressing  Note: Pt continues with QUESADA and remains on 8-15L HFNC. Pulm following. Will continue to monitor. Patient's hemoglobin this AM:   Recent Labs     08/22/22  0230   HGB 8.7*     Patient's platelet count this AM:   Recent Labs     08/22/22  0230       Thrombocytopenia not present at this time. Patient showing no signs or symptoms of active bleeding. Transfusion not indicated at this time. Patient verbalizes understanding of all instructions. Will continue to assess and implement POC. Call light within reach and hourly rounding in place.

## 2022-08-22 NOTE — PROGRESS NOTES
Occupational Therapy  Facility/Department: [facility]  Occupational Therapy Treatment  Name: Janette Arredondo  : 1945  MRN: [id number]  Date of Service: 2022    Discharge Recommendations:   Janette Arredondo scored a 12/24 on the AM-PAC ADL Inpatient form. Current research shows that an AM-PAC score of 17 or less is typically not associated with a discharge to the patient's home setting. Based on the patient's AM-PAC score and their current ADL deficits, it is recommended that the patient have 3-5 sessions per week of Occupational Therapy at d/c to increase the patient's independence. Please see assessment section for further patient specific details. If patient discharges prior to next session this note will serve as a discharge summary. Please see below for the latest assessment towards goals. OT Equipment Recommendations  Other: cont to assess       Patient Diagnosis(es): There were no encounter diagnoses. Past Medical History:  has a past medical history of Benign prostatic hyperplasia with weak urinary stream, Cancer ([facility] [address] Erectile dysfunction, History of gout, History of thrombocytopenia, Hypercalcemia, Hyperlipidemia, Hypertension, Lung nodule, Proteinuria, Type 2 diabetes mellitus without complication ([address] and Vitamin D deficiency. Past Surgical History:  has a past surgical history that includes Finger trigger release (Right, 2007); Tonsillectomy and adenoidectomy (age 2); Elbow fracture surgery (Left, age 5); Vasectomy (1971); Cataract removal with implant (Bilateral, ); Colonoscopy (3/29/2011); Colonoscopy (2016); IR PORT PLACEMENT > 5 YEARS (2022); bronchoscopy (N/A, 3/25/2022); and craniotomy (Right, 2022). Treatment Diagnosis: decreased functional strenght and endurance 2/2 hypoxia      Assessment   Performance deficits / Impairments: Decreased functional mobility ; Decreased ADL status; Decreased strength;Decreased safe awareness;Decreased cognition;Decreased endurance;Decreased balance;Decreased posture  Assessment: Attempted edge of bed sittig, but pt's O2 sats dropped as low as 75% and did not recover in sitting position. Pt was assisted back to supine and tx discontinued. Will follow up per plan of care. Treatment Diagnosis: decreased functional strenght and endurance 2/2 hypoxia  Prognosis: Good  REQUIRES OT FOLLOW-UP: Yes  Activity Tolerance  Activity Tolerance: Patient limited by fatigue  Activity Tolerance Comments: O2 sats decreased as low as 75% and HR increasd to 133 with sitting edge of bed. Pt assisted back to supine. (nurse aware)        Plan   Plan  Times per Week: 2-5  Current Treatment Recommendations: ROM, Strengthening, Balance training, Functional mobility training, Endurance training, Patient/Caregiver education & training, Self-Care / ADL, Safety education & training, Positioning, Equipment evaluation, education, & procurement, Pain management     Restrictions  Position Activity Restriction  Other position/activity restrictions: Up as Tolerated, If platelet count equal to or less than 10 but the patient has received a platelet transfusion, physical therapy or occupational therapy may proceed with treatment. Subjective   General  Chart Reviewed: Yes  Patient assessed for rehabilitation services?: Yes  Additional Pertinent Hx: DLBCL with CNS mass  Family / Caregiver Present: Yes (Wife)  Referring Practitioner: MARCIA Germain CNP  Diagnosis: Hypoxia  Subjective  Subjective: Pt lying supine in bed upon OT arrival. Pt agreeable to OT session. General Comment  Comments: .      Social/Functional History  Social/Functional History  Lives With: Spouse (can provide 24hr assist)  Type of Home: Condo  Home Layout: Two level, Performs ADL's on one level, Able to Live on Main level with bedroom/bathroom  Home Access: Ramped entrance  Bathroom Shower/Tub: Walk-in shower  Bathroom Toilet: Handicap height  Bathroom Equipment: Shower chair, Grab bars in shower, Grab bars around toilet  Home Equipment: Walker, 4 wheeled, Skippy Jest, Oxygen (transport chair, 2L when needed)  Has the patient had two or more falls in the past year or any fall with injury in the past year?: Yes  Receives Help From: Family  ADL Assistance: Needs assistance  Homemaking Responsibilities: No  Ambulation Assistance: Needs assistance  Active : No  Additional Comments: Information from wife as pt is a questionable historian. In May, he was able to walk in house with RW and was fairly independent with ADL. At beginning of July, he was experiencing functional decline, had fall, found brain mass at hospital admission. Recently, wife has been doing \"everything\" and it \"has been a challenge\". Objective      Safety Devices  Type of Devices: Left in bed;Bed alarm in place;Call light within reach;Nurse notified (wife present)                 Activity Tolerance  Activity Tolerance: Patient limited by fatigue;Patient limited by endurance  Bed mobility  Supine to Sit: Minimal assistance  Sit to Supine: Minimal assistance  Scooting: Stand by assistance  Bed Mobility Comments: Labored breathing at EOB on 11 L high flow O2. Pt's O2 sats decrease to 75% at EOB and recovers in supine with time to 90%. Pt's HR increases to 133 bpm in sitting. Nursing present and therapy assists pt BTB. Vision  Vision: Impaired  Vision Exceptions: Wears glasses at all times  Hearing  Hearing: Within functional limits  Cognition  Following Commands:  Follows one step commands consistently  Orientation  Overall Orientation Status: Within Functional Limits                  Education Given To: Patient  Education Provided: Role of Therapy;Plan of Care  Education Method: Verbal  Barriers to Learning: None  Education Outcome: Verbalized understanding                 AM-PAC Score        AM-PAC Inpatient Daily Activity Raw Score: 12 (08/22/22 1016)  AM-PAC Inpatient ADL T-Scale Score : 30.6 (08/22/22 1016)  ADL Inpatient CMS 0-100% Score: 66.57 (08/22/22 1016)  ADL Inpatient CMS G-Code Modifier : CL (08/22/22 1016)    Goals  Short Term Goals  Time Frame for Short term goals: 1 week  Short Term Goal 1: tolerate sitting on edge of bed, 9-12 minutes with S, with SPO2>90 in prep for ADL- not met  Short Term Goal 2: complete 5 reps of AROM BUE therex in upsupported sitting position- not met  Short Term Goal 3: supine to sit with min A- not met  Patient Goals   Patient goals : Keep getting better       Therapy Time   Individual Concurrent Group Co-treatment   Time In 0940         Time Out 0956         Minutes 16         Timed Code Treatment Minutes: 12 Minutes   Total Treatment [address] [facility], OTR/L [id number]

## 2022-08-22 NOTE — PROGRESS NOTES
Physical Therapy  Facility/Department: [address]  Physical Therapy Daily Treatment Note    Name: Jamila Stoddard  : 1945  MRN: [id number]  Date of Service: 2022    Discharge Recommendations:   Jamila Stoddard scored a  on the AM-PAC short mobility form. Current research shows that an AM-PAC score of 17 or less is typically not associated with a discharge to the patient's home setting. Based on the patient's AM-PAC score and their current functional mobility deficits, it is recommended that the patient have 3-5 sessions per week of Physical Therapy at d/c to increase the patient's independence. Please see assessment section for further patient specific details. If patient discharges prior to next session this note will serve as a discharge summary. Please see below for the latest assessment towards goals. PT Equipment Recommendations  Equipment Needed: No      Patient Diagnosis(es): There were no encounter diagnoses. Past Medical History:  has a past medical history of Benign prostatic hyperplasia with weak urinary stream, Cancer ([address] Erectile dysfunction, History of gout, History of thrombocytopenia, Hypercalcemia, Hyperlipidemia, Hypertension, Lung nodule, Proteinuria, Type 2 diabetes mellitus without complication ([address] and Vitamin D deficiency. Past Surgical History:  has a past surgical history that includes Finger trigger release (Right, ); Tonsillectomy and adenoidectomy (age 2); Elbow fracture surgery (Left, age 5); Vasectomy (1971); Cataract removal with implant (Bilateral, ); Colonoscopy (3/29/2011); Colonoscopy (2016); IR PORT PLACEMENT > 5 YEARS (2022); bronchoscopy (N/A, 3/25/2022); and craniotomy (Right, 2022). Assessment   Assessment: Pt with limited tolerance to therapy this date due to pt de-satting to 75% in sitting on 11 L high flow O2 and with labored breathing. Nursing present and pt assisted into bed.   Rec continued inpt PT at d/c. Will continue and progress as able. Treatment Diagnosis: decreased functional mobility  Therapy Prognosis: Good  Decision Making: Medium Complexity  Requires PT Follow-Up: Yes  Activity Tolerance  Activity Tolerance: Patient limited by fatigue;Patient limited by endurance     Plan   Plan  Plan:  (2-5)  Current Treatment Recommendations: Strengthening, Gait training, Balance training, Functional mobility training, Transfer training, Endurance training, Wheelchair mobility training, Patient/Caregiver education & training, Safety education & training, Therapeutic activities  Safety Devices  Type of Devices: Call light within reach, Nurse notified, Bed alarm in place, Left in bed  Restraints  Restraints Initially in Place: No     Restrictions  Position Activity Restriction  Other position/activity restrictions: Up as Tolerated, If platelet count equal to or less than 10 but the patient has received a platelet transfusion, physical therapy or occupational therapy may proceed with treatment. Subjective   General  Chart Reviewed: Yes  Additional Pertinent Hx: Pt is a 66 yo male that presents from home with worsening labor breathing per wife's observation. Pt was just discharged at the end of July from the hospital. CT Chest: Moderate bilateral groundglass opacity new since prior chest CT consistent with infectious/inflammatory pneumonitis. PMH: Non-Hodgkins Lymphoma, brain mass, Hypertension. Family / Caregiver Present: Yes (Wife)  Referring Practitioner: Luis Carroll MD  Diagnosis: Hypoxia  Subjective  Subjective: Pt supine in bed and agreeable to PT. Pt recently bathed by nursing.    Pain: None    Social/Functional History  Social/Functional History  Lives With: Spouse (can provide 24hr assist)  Type of Home: Condo  Home Layout: Two level, Performs ADL's on one level, Able to Live on Main level with bedroom/bathroom  Home Access: Ramped entrance  Bathroom Shower/Tub: Walk-in shower  Bathroom Toilet: Handicap height  Bathroom Equipment: Shower chair, Grab bars in shower, Grab bars around toilet  Home Equipment: [place], 4 wheeled, [place], Oxygen (transport chair, 2L when needed)  Has the patient had two or more falls in the past year or any fall with injury in the past year?: Yes  Receives Help From: Family  ADL Assistance: Needs assistance  Homemaking Responsibilities: No  Ambulation Assistance: Needs assistance  Active : No  Additional Comments: Information from wife as pt is a questionable historian. In May, he was able to walk in house with RW and was fairly independent with ADL. At beginning of July, he was experiencing functional decline, had fall, found brain mass at hospital admission. Recently, wife has been doing \"everything\" and it \"has been a challenge\". Vision/Hearing  Vision  Vision: Impaired  Vision Exceptions: Wears glasses at all times  Hearing  Hearing: Within functional limits      Cognition   Orientation  Overall Orientation Status: Within Functional Limits  Cognition  Following Commands: Follows one step commands consistently     Objective   Bed mobility  Supine to Sit: Minimal assistance  Sit to Supine: Minimal assistance  Scooting: Stand by assistance  Bed Mobility Comments: Labored breathing at EOB on 11 L high flow O2. Pt's O2 sats decrease to 75% at EOB and recovers in supine with time to 90%. Pt's HR increases to 133 bpm in sitting. Nursing present and therapy assists pt BTB. Balance  Sitting - Static: Good (SBA)  Comments: Pt sits EOB x 5 mins in flexed trunk posture. Pt with labored breathing.     AM-PAC Score  AM-PAC Inpatient Mobility Raw Score : 11 (08/15/22 1059)  AM-PAC Inpatient T-Scale Score : 33.86 (08/15/22 1059)  Mobility Inpatient CMS 0-100% Score: 72.57 (08/15/22 1059)  Mobility Inpatient CMS G-Code Modifier : CL (08/15/22 1059)    Goals  Short Term Goals  Time Frame for Short term goals: Discharge- all ongoing  Short term goal 1: Rolling Left and Right SBA  ONGOING  Short term goal 2: Sit<>Supine Min A  MET 8/22  Short term goal 3: Sit<>Stand with RW Min A  ONGOING  Short term goal 4: Stand Pivot with RW Min A  ONGOING  Short term goal 5: Amb 5ft with RW Min A  ONGOING  Patient Goals   Patient goals : To Return Home and Feel Better    Education  Patient Education  Education Given To: Patient  Education Provided: Role of Therapy; Energy Conservation  Education Outcome: Verbalized understanding    Therapy Time   Individual Concurrent Group Co-treatment   Time In 0940         Time Out 3755         Minutes 15             Timed Code Treatment Minutes:   15    Total Treatment Minutes:   [address] Thurman, PT

## 2022-08-22 NOTE — CARE COORDINATION
Case Management Assessment           Daily Note                 Date/ Time of Note: 8/22/2022 11:22 AM         Note completed by: RUSS Baker    Patient Name: Patsey Habermann  YOB: 1945    Diagnosis:Hypoxia [R09.02]  Patient Admission Status: Inpatient    Date of Admission:8/9/2022  4:44 PM Length of Stay: 13 GLOS: GMLOS: 4.8    Current Plan of Care:   ________________________________________________________________________________________  PT AM-PAC: 12 / 24 per last evaluation on: 8/22/2022    OT AM-PAC: 12 / 24 per last evaluation on: 8/22/2022    DME Needs for discharge: defer  ________________________________________________________________________________________  Discharge Plan: SNF: [facility]    Tentative discharge date: end of week? Current barriers to discharge: precertification, oxygen requirements    Referrals completed: [facility]: [address]    Resources/ information provided: Not indicated at this time  ________________________________________________________________________________________  Case Management Notes: Precert pending at [employer] [facility]. Patient's oxygen requirements continue to fluctuate. Hopeful for discharge by the end of the week. Reymundo Smith and his family were provided with choice of provider; he and his family are in agreement with the discharge plan.     Care Transition Patient: Yes    Jose A Razo [facility]  Case Management Department  Ph: [phone]  Fax: [phone]

## 2022-08-22 NOTE — PROGRESS NOTES
4 Eyes Admission Assessment     I agree as the admission nurse that 2 RN's have performed a thorough Head to Toe Skin Assessment on the patient. ALL assessment sites listed below have been assessed on admission. Areas assessed by both nurses: Marium Grady and Chace Number  [x]   Head, Face, and Ears   [x]   Shoulders, Back, and Chest  [x]   Arms, Elbows, and Hands   [x]   Coccyx, Sacrum, and Ischium  [x]   Legs, Feet, and Heels        Does the Patient have Skin Breakdown?   Yes a wound was noted on the Admission Assessment and an LDA was Initiated documentation include the Savi-wound, Wound Assessment, Measurements, Dressing Treatment, Drainage, and Color\",       Sacrum and back  Giorgio Prevention initiated:  Yes   Wound Care Orders initiated:  Yes      [address]  nurse consulted for Pressure Injury (Stage 3,4, Unstageable, DTI, NWPT, and Complex wounds) or Giorgio score 18 or lower:  Yes      Nurse 1 eSignature: Electronically signed by Payton Lizarraga RN on 8/22/22 at 7:43 AM EDT    **SHARE this note so that the co-signing nurse is able to place an eSignature**    Nurse 2 eSignature: Electronically signed by Surjit Brown RN on 8/22/22 at 7:44 AM EDT

## 2022-08-22 NOTE — PROGRESS NOTES
CC: Pneumonia    No chest complaints today. Feels tired. States his appetite is good, although his wife disputes that. No cough, sputum. Afebrile  WBC 5.8  Day #9 voriconazole for positive Fungitell and galactomannan studies from BAL. No other cultures positive. /63  Sinus rhythm, mild tachycardia  S1, S2 normal.  O2 saturation 100% on O2 at 10 L/min, 96% on O2 at 8 L/min  Medium inspiratory crackles in the mid and lower lung fields. Chest x-ray shows bilateral alveolar opacities, unchanged from the prior day, but increased from 8/17/2022. Abdomen benign. No peripheral edema. Urine output adequate. Creatinine unchanged 0.5. Sodium 147,, bicarbonate 37, stable. Electrolytes otherwise normal.  Glucose 120    A&P: Diffuse fungal pneumonia, diagnosed on BAL studies. On treatment with voriconazole now 9 days out without significant response. Chest imaging shows some increased infiltrate, but oxygen requirement has not increased. Diagnosis appears to be fairly solid. His slow response may reflect his overall weakened state, and the difficulty clearing fungal infection that is very extensive. Discussed with Dr. Darek Kaplan.   Patient and wife updated regarding progress and plan

## 2022-08-22 NOTE — PROGRESS NOTES
[address] Progress Note    2022     Yasmeen Cardenas    MRN: [id number]    : 1945    Referring MD: Adriana Medina MD  [address] [place] [address],  [address] [place] [address] Ave    SUBJECTIVE:  he feels and cont to be sob, unchanged from yest. His po intake cont to be fair.      ECOG PS:(3) Capable of limited self-care, confined to bed or chair > 50% of waking hours    KPS: 50%  Requires considerable assistance and frequent medical care    Isolation: None    Medications    Scheduled Meds:   insulin glargine  8 Units SubCUTAneous Daily    phosphorus  500 mg Oral TID    Venelex   Topical BID    enoxaparin  40 mg SubCUTAneous Daily    potassium chloride  20 mEq Oral Daily with breakfast    valACYclovir  500 mg Oral BID    voriconazole  200 mg Oral 2 times per day    insulin lispro  0-16 Units SubCUTAneous TID WC    insulin lispro  0-4 Units SubCUTAneous Nightly    bacitracin-polymyxin b   Topical BID    fluticasone  1 spray Each Nostril Daily    levETIRAcetam  500 mg Oral BID    sodium chloride  2 spray Nasal 4x Daily    tamsulosin  0.4 mg Oral Daily    sodium chloride flush  5-40 mL IntraVENous 2 times per day    Saline Mouthwash  15 mL Swish & Spit 4x Daily AC & HS     Continuous Infusions:   sodium chloride      sodium chloride      potassium chloride      dextrose       PRN Meds:.sodium phosphate IVPB, acetaminophen, ondansetron, prochlorperazine, sodium chloride, sodium chloride flush, sodium chloride, potassium chloride, magnesium sulfate, magnesium hydroxide, Saline Mouthwash, alteplase (CATHFLO) with sterile water injection, glucose, dextrose bolus **OR** dextrose bolus, glucagon (rDNA), dextrose    ROS:  As noted above, otherwise remainder of 10-point ROS negative    Physical Exam:     I&O:    Intake/Output Summary (Last 24 hours) at 2022 0907  Last data filed at 2022 0830  Gross per 24 hour   Intake 300 ml   Output 1550 ml   Net -1250 ml         Vital Signs:  /63   Pulse (!) 104 Temp 97.5 °F (36.4 °C) (Oral)   Resp 28   Ht 5' 6\" (1.676 m)   Wt 115 lb 11.9 oz (52.5 kg)   SpO2 97%   BMI 18.68 kg/m²     Weight:    Wt Readings from Last 3 Encounters:   08/21/22 115 lb 11.9 oz (52.5 kg)   07/25/22 116 lb 10 oz (52.9 kg)   05/31/22 117 lb (53.1 kg)       General: Awake, alert and oriented  HEENT: normocephalic, PERRL, no scleral erythema or icterus, Oral mucosa moist and intact, throat clear  NECK: supple   BACK: Straight   SKIN: warm dry and intact without lesions rashes or masses  CHEST: poor air excursion, no rhonchi  CV: Normal S1 S2, irreg, no MRG  ABD: NT ND normoactive BS, no palpable masses or hepatosplenomegaly  EXTREMITIES: without edema, denies calf tenderness  NEURO: CN II - XII grossly intact  CATHETER:  Right IJ SL PAC (1/11/22) - CDI      Data    CBC:   Recent Labs     08/21/22  1004 08/22/22  0230   WBC 5.7 5.8   HGB 8.5* 8.7*   HCT 26.2* 26.8*   MCV 95.1 95.0   * 138       BMP/Mag:  Recent Labs     08/20/22  0442 08/20/22  0443 08/21/22  0250 08/22/22  0230     --  145 147*   K 4.2  --  3.6 3.8   CL 97*  --  98* 101   CO2 35*  --  36* 37*   PHOS  --  2.0* 2.5 2.8   BUN 12  --  14 11   CREATININE <0.5*  --  <0.5* <0.5*   MG  --  1.60* 1.60* 1.60*       LIVP:   Recent Labs     08/22/22  0230   AST 33   ALT 24   BILIDIR <0.2   BILITOT 0.3   ALKPHOS 218*       Coags:   Recent Labs     08/22/22  0230   PROTIME 13.6   INR 1.05   APTT 40.2*       Uric Acid   Recent Labs     08/22/22  0230   LABURIC 1.9*       Diagnostics:   CT chest (8/9/22):  1. Moderate bilateral groundglass opacity new since prior chest CT consistent with infectious/inflammatory pneumonitis. 2. Background mild-to-moderate lower lobe predominant pulmonary fibrosis without change. 3. A 5 cm lesion in the anterior aspect left lobe of the liver stable to mildly decreased in size and of indeterminate etiology.     PROBLEM LIST:           DLBC  Interstitial lung disease / Pulmonary Fibrosis   Type 2 Diabetes Mellitus   BPH  Acute on Chronic Respiratory Failure / Fungal PNA (8/2022)      TREATMENT:            R-CHOP w/ Revlimid and Tafasitimab  Polatuzumab/BR   Cycle #1 -  8/1/22      ASSESSMENT AND PLAN:          1. 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 ID: afebrile, Cont to treat for fungal PNA (POA)    - Cont Valtrex and Dapsone PPX  - cont Vori 200 mg BID (8/13/22)  - Viral respiratory w/ COVID 19 (8/9/22): Negative  - MRSA swab (8/11/22) - Neg  - Procalcitonin (8/10/22) - 0.8  - CT chest (8/9/22): Moderate bilateral groundglass opacity new since prior chest CT consistent with infectious/inflammatory pneumonitis. Background mild-to-moderate lower lobe predominant pulmonary fibrosis without change  - S/p Bronchoscopy w/ BAL and biopsy (8/10/22) - Galactomannan positive 1.33, Diatherix - negative  - Aspergillus (8/14/22) - negative, fungitell (8/14/22): > 500 (positive)  Bronch lavage - 8/10/22: + Candida krusei  - Cont Vfend Day + 10 (started 8/13/22)    Abx history:  Cefepime x 7 days (8/10/22 - 8/16/22)      3. Heme: Anemia and thrombocytopenia likely r/t recent chemotherapy  - Transfuse for Hgb < 7 and Platelets < 10 K  - No transfusion today     4. Metabolic: hyperNa + Steroid induced hyperglycemia - improved   - S/p Lasix 40 mg IV x 1 (8/12/22)  - Cont KPhos 500 mg bid (started 8/16/22)  - Start KCl 20 meq daily (started 8/17/22)  - No IVF  - Replace Phos, K+ & Mg per PRN orders     5. GI / Nutrition:          Nutrition:  Severe malnutrition w/ chronic illness    - Cont regular diet, no straws   - SLP eval (8/13/22):   Aspiration with straws, regular diet and thin liquids with cups only  - MBS (8/16/22) - No laryngeal penetration or aspiration.   - Cont Glucerna shakes BID and Magic cups BID  - Recommend swabbing mouth after all meals. Must be completely awake and upright for PO intake  - Dietary to follow closely  Nausea:  - Cont Zofran and Compazine as needed  Constipation:  no complaints   - Cont Colace bid     6. Pulm:    - H/o Interstitial lung disease / pulmonary fibrosis  - F/b Dr. Mila Quan   - S/p bronchoscopy (3/25/22) and PFTs (4/1/22)  - Home O2 - 2 l/min    Hypoxemia:  Admitted w/ acute on chronic respiratory failure possibly from  pneumonitis from chemotherapy (Rituxan vs Jerzy), but more than likely from multifocal fungal PNA (POA). - Pulm following, appreciate recs  - CT chest (8/9/22) - Moderate bilateral groundglass opacity new since prior chest CT consistent with infectious/inflammatory pneumonitis. Background mild-to-moderate lower lobe predominant pulmonary fibrosis without change. A 5 cm lesion in the anterior aspect left lobe of the liver stable to mildly decreased in size and of indeterminate etiology. - S/p Bronchoscopy w/ BAL and biopsy (8/10/22) - + galactomannan, Diatherix negative  - S/p steroids:  Solumedrol 60 mg IV daily (started 8/10/22), 60 mg BID (8/12/22, d/t increased oxygen requirements), 60 mg daily (8/14/22 d/t likely fungal pneumonia), Prednisone 20 mg daily (8/15/22), 10 mg daily (8/16/22)  - Cont high flow O2 currently up to @ 8-10 l/min; titrate for O2 Sat 88%  - See ID section for additional treatment and management    7. Endo:   - H/o T2DM   T2DM:  exacerbated by steroids, now improving   - S/p Lantus 10 units nightly (stopped 8/11/22 - 8/15/22) restart at 5u q AM (8/18) and will admon in AM --> increase to 8u sc AM   - Home regimen: on humalog SSI, janumet and jardiance  - Cont Humalog Med SSI AS/HS    8.   MSK:  he has acute debilitation and generalized weakness d/t CNS lymphoma and hypoxemia   - Cont PT/OT - he is extremely weak and  will require SNF placement ([facility]) early next week     9.   :  - H/o BPH  BPH:    - Cont Flomax daily     - DVT Prophylaxis: Platelets >24,030 cells/dL, - daily lovenox prophylaxis ordered  Contraindications to pharmacologic prophylaxis: None  Contraindications to mechanical prophylaxis: None    - Disposition: Plan for SNF; once hypoxemia improves       Dell Gifford MD

## 2022-08-23 NOTE — PROGRESS NOTES
Physician Progress Note      PATIENT:               Mallory Esqueda  CSN #:                  [id number]  :                       1945  ADMIT DATE:       2022 4:44 PM  DISCH DATE:  RESPONDING  PROVIDER #:        Angel Flanagan CNP          QUERY TEXT:    Pt admitted with \"Fungal Pneumonia\". Pt noted to have (+) Candida krusei in   BAL culture. If possible, please document in progress notes and discharge   summary the relationship, if any, between Pnemonia and Candidiasis. The medical record reflects the following:  Risk Factors: Fungal pneumonia  Clinical Indicators: \"BAL fungal cx (8/10/22) - Candida krusei, sensitivities   pending\" per progress notes ; Pulmonary notes\"  Hypoxemia:  Admitted w/ acute on chronic respiratory failure possibly from   pneumonitis from chemotherapy (Rituxan vs Jerzy),  but more than likely from multifocal fungal PNA (POA)\"  Treatment: CBC, BAL w/ cultures; Pulmonology consult; CXR; Voriconazole  Options provided:  -- Pneumonia due to Candidiasis  -- Penumonia unrelated to candidiasis  -- Other - I will add my own diagnosis  -- Disagree - Not applicable / Not valid  -- Disagree - Clinically unable to determine / Unknown  -- Refer to Clinical Documentation Reviewer    PROVIDER RESPONSE TEXT:    This patient has Pneumonia due to candidiasis.     Query created by: Nas Monsivais on 2022 10:11 AM      Electronically signed by:  Angel Flanagan CNP 2022 10:27 AM

## 2022-08-23 NOTE — CONSULTS
Infectious Diseases Inpatient Consult Note    Reason for Consult:   Candida pneumonia  Requesting Physician:   Dr Julio César Thorpe  Primary Care Physician:  Gopi Golden MD  History Obtained From:   Pt, EPIC    Admit Date: 8/9/2022  Hospital Day: 13    CHIEF COMPLAINT:     Pneumonia     HISTORY OF PRESENT ILLNESS:      66 yo man with hx DM, interstitial lung d (3 LO2 at home), BPH, HTN, HL  Dx NHL with CNS involvement, rx R CHOP, relapse - rx Polatuzumab/[place]     Presented to [facility] with increase SOB and weakness  No fever / chills. No sick contact  Admit 8/9 - afeb  COVID-19 neg  Seen by Pul, started on Solumedrol. On 8/10, Bronch / BAL, had 'minimal thin secrtions'. BAL galactomannan + (1.33). Started on Voriconazole    Pt continues to have SOB, fatigue, non-productive cough  Today 8/23, afeb, on 15L high flow      Past Medical History:    Past Medical History:   Diagnosis Date    Benign prostatic hyperplasia with weak urinary stream 12/10/2015     Updating Deprecated Diagnoses    Cancer ([address] 12/21/2021    Non Earl Lymphoma    Erectile dysfunction     History of gout 09/19/2015    History of thrombocytopenia 09/19/2015    Hypercalcemia     Hyperlipidemia     Hypertension     Lung nodule     Proteinuria     Type 2 diabetes mellitus without complication ([address] 7686    Vitamin D deficiency 09/19/2015       Past Surgical History:    Past Surgical History:   Procedure Laterality Date    BRONCHOSCOPY N/A 3/25/2022    BRONCHOSCOPY WITH BRONCHOALVEOLAR LAVAGE performed by Hansel Fitzpatrick MD at [facility] Bilateral 2011    COLONOSCOPY  3/29/2011    repeat in 5 yrs: Rita Cohn MD    COLONOSCOPY  03/14/2016    repeat in 7-8 years: Rita Cohn MD    CRANIOTOMY Right 7/6/2022    RIGHT FRONTAL NEEDLE BIOPSY OF BRAIN MASS performed by Alla Garvey.  Cele Perales MD at [address] Left age 5    FINGER TRIGGER RELEASE Right 2007    index    IR PORT PLACEMENT EQUAL OR GREATER THAN 5 YEARS  1/11/2022    IR PORT PLACEMENT EQUAL OR GREATER THAN 5 YEARS 1/11/2022 [facility] PROCED[facility]    TONSILLECTOMY AND ADENOIDECTOMY  age 2    VASECTOMY  25       Current Medications:     insulin glargine  12 Units SubCUTAneous Daily    phosphorus  500 mg Oral TID    Venelex   Topical BID    enoxaparin  40 mg SubCUTAneous Daily    potassium chloride  20 mEq Oral Daily with breakfast    valACYclovir  500 mg Oral BID    voriconazole  200 mg Oral 2 times per day    insulin lispro  0-16 Units SubCUTAneous TID WC    insulin lispro  0-4 Units SubCUTAneous Nightly    bacitracin-polymyxin b   Topical BID    fluticasone  1 spray Each Nostril Daily    levETIRAcetam  500 mg Oral BID    sodium chloride  2 spray Nasal 4x Daily    tamsulosin  0.4 mg Oral Daily    sodium chloride flush  5-40 mL IntraVENous 2 times per day    Saline Mouthwash  15 mL Swish & Spit 4x Daily AC & HS       Allergies:  Patient has no known allergies. Social History:    TOBACCO:    None   ETOH:    None   DRUGS:   None   MARITAL STATUS:      OCCUPATION:   Retired     Family History:   No immunodeficiency    REVIEW OF SYSTEMS:    No fever / chills / sweats. No weight loss. No visual change, eye pain, eye discharge. No oral lesion, sore throat, dysphagia. Denies cough / sputum. Denies chest pain, palpitations. Denies n / v / abd pain. No diarrhea. Denies dysuria or change in urinary function. Denies joint swelling or pain. No myalgia, arthralgia. Denies skin changes, itching  Denies focal weakness, sensory change or other neurologic symptom    Denies new / worse depression, psychiatric symptoms    PHYSICAL EXAM:      Vitals:    /78   Pulse (!) 128   Temp 98 °F (36.7 °C) (Oral)   Resp (!) 39   Ht 5' 6\" (1.676 m)   Wt 115 lb 8.3 oz (52.4 kg)   SpO2 100%   BMI 18.65 kg/m²     GENERAL: No apparent distress.   15L  HEENT: Membranes moist, no oral lesion, PERRL  NECK:  Supple, no lymphadenopathy  LUNGS: Expiratory fixed rales bilaterally  CARDIAC: RRR, no murmur appreciated  ABD:  + BS, soft / NT  EXT:  No rash, no edema, no lesions  NEURO: No focal neurologic findings  PSYCH: Orientation, sensorium, mood normal  LINES:  R chest port    DATA:    Lab Results   Component Value Date    WBC 5.7 2022    HGB 8.1 (L) 2022    HCT 25.5 (L) 2022    MCV 95.8 2022     (L) 2022     Lab Results   Component Value Date    CREATININE <0.5 (L) 2022    BUN 12 2022     2022    K 3.9 2022    CL 98 (L) 2022    CO2 38 (H) 2022       Hepatic Function Panel:   Lab Results   Component Value Date/Time    ALKPHOS 218 2022 02:30 AM    ALT 24 2022 02:30 AM    AST 33 2022 02:30 AM    PROT 5.2 2022 02:30 AM    BILITOT 0.3 2022 02:30 AM    BILIDIR <0.2 2022 02:30 AM    IBILI see below 2022 02:30 AM    LABALBU 2.8 2022 02:30 AM       Micro:     Resp PCR with SARS CoV-2 negative  8/10 BAL cult - 50-100k normal resp jena  8/10 BAL fungal cult - light Candida krusei,   8/10 BAL Galactomannan positive,   MRSA nasal PCR neg    Imagin/9 CT chest   1. Moderate bilateral groundglass opacity new since prior chest CT consistent with infectious/inflammatory pneumonitis. 2. Background mild-to-moderate lower lobe predominant pulmonary fibrosis without change. 3. A 5 cm lesion in the anterior aspect left lobe of the liver stable to mildly decreased in size and of indeterminate etiology.  CXR  Extensive multifocal consolidation without change. Stable cardiomediastinal silhouette.    Right jugular chest port again noted      IMPRESSION:      Hx DM, interstitial lung d (O2 at home), BPH, HTN, HL  Dx NHL with CNS involvement, rx R CHOP, relapse - rx Polatuzumab/[place]    Pneumonia in immunocompromised host  Aspergillus based on BAL galactmannan    C krusei in BAL cult   - unknown significance  - C krusei often R fluconazole though more likely sensitive to Voriconazole     Worse hypoxia / O2 requirement       RECOMMENDATIONS:    Cont Voriconazole  Will ask Micro if isolate can be sent for tesing  Will review Chest CT  Will confer with Pul    Medical Decision Making: The following items were considered in medical decision making:  Discussion of patient care with other providers  Reviewed clinical lab tests  Reviewed radiology tests  Reviewed other diagnostic tests/interventions  Independent review of radiologic images - will review  Microbiology cultures and other micro tests reviewed      Risk of Complications/Morbidity: High   Illness(es)/ Infection present that pose threat to bodily function. There is potential for severe exacerbation of infection/side effects of treatment.   Therapy requires intensive monitoring for antimicrobial agent toxicity    Discussed with pt, RN  Cyril Smith MD

## 2022-08-23 NOTE — PLAN OF CARE
Problem: Chronic Conditions and Co-morbidities  Goal: Patient's chronic conditions and co-morbidity symptoms are monitored and maintained or improved  8/23/2022 0351 by Nani Montiel RN  Outcome: Progressing    Problem: ABCDS Injury Assessment  Goal: Absence of physical injury  8/23/2022 0351 by Nani Montiel RN  Outcome: Progressing  Flowsheets (Taken 8/22/2022 2026)  Absence of Physical Injury: Implement safety measures based on patient assessment     Problem: Safety - Adult  Goal: Free from fall injury  8/23/2022 0351 by Nani Montiel RN  Outcome: Progressing  Flowsheets (Taken 8/22/2022 2026)  Free From Fall Injury: Instruct family/caregiver on patient safety     Problem: Nutrition Deficit:  Goal: Optimize nutritional status  8/23/2022 0351 by Nani Montiel RN  Outcome: Progressing  Flowsheets (Taken 8/23/2022 0351)  Nutrient intake appropriate for improving, restoring, or maintaining nutritional needs:   Assess nutritional status and recommend course of action   Monitor oral intake, labs, and treatment plans     Problem: Pain  Goal: Verbalizes/displays adequate comfort level or baseline comfort level  8/23/2022 0351 by Nani Montiel RN  Outcome: Progressing  Flowsheets (Taken 8/23/2022 0351)  Verbalizes/displays adequate comfort level or baseline comfort level:   Assess pain using appropriate pain scale   Encourage patient to monitor pain and request assistance     Problem: Skin/Tissue Integrity  Goal: Absence of new skin breakdown  Description: 1. Monitor for areas of redness and/or skin breakdown  2. Assess vascular access sites hourly  3. Every 4-6 hours minimum:  Change oxygen saturation probe site  4. Every 4-6 hours:  If on nasal continuous positive airway pressure, respiratory therapy assess nares and determine need for appliance change or resting period.   8/23/2022 0353 by Nani Montiel RN  Outcome: Progressing

## 2022-08-23 NOTE — PROGRESS NOTES
4 Eyes Admission Assessment     I agree as the admission nurse that 2 RN's have performed a thorough Head to Toe Skin Assessment on the patient. ALL assessment sites listed below have been assessed on admission. Areas assessed by both nurses: Opal Fan and Karla   [x]   Head, Face, and Ears   [x]   Shoulders, Back, and Chest  [x]   Arms, Elbows, and Hands   [x]   Coccyx, Sacrum, and Ischium  [x]   Legs, Feet, and Heels        Does the Patient have Skin Breakdown?   Yes a wound was noted on the Admission Assessment and an LDA was Initiated documentation include the Savi-wound, Wound Assessment, Measurements, Dressing Treatment, Drainage, and Color\",         Giorgio Prevention initiated:  Yes   Wound Care Orders initiated:  Yes      [id number]  nurse consulted for Pressure Injury (Stage 3,4, Unstageable, DTI, NWPT, and Complex wounds) or Giorgio score 18 or lower:  Yes      Nurse 1 eSignature: Electronically signed by Bina Salinas RN on 8/23/22 at 9:44 AM EDT    **SHARE this note so that the co-signing nurse is able to place an eSignature**    Nurse 2 eSignature: Electronically signed by Jone Her RN on 8/24/22 at 3:52 AM EDT

## 2022-08-23 NOTE — CARE COORDINATION
40:66 AM  Precert approved for SNF at [facility]. Continue to await stabilization of his respiratory status.

## 2022-08-23 NOTE — PROGRESS NOTES
Pulmonary Followup Note    Indication for visit: hypoxia    Subjective:  NAEO. Afebrile. Tachypneic to 35, trend is 26-28 over the last 24 hours. Tachycardic to 100s-110s. Pt is on 9L HFNC satting 90%. He was placed on NRB over NC for some time d/t desaturations to 87% on 15L O2. Continues on voriconazole (started 8/13). Pt has no complaints today. He has poor PO intake. No abd pain, N/V. No CP.       insulin glargine  12 Units SubCUTAneous Daily    phosphorus  500 mg Oral TID    Venelex   Topical BID    enoxaparin  40 mg SubCUTAneous Daily    potassium chloride  20 mEq Oral Daily with breakfast    valACYclovir  500 mg Oral BID    voriconazole  200 mg Oral 2 times per day    insulin lispro  0-16 Units SubCUTAneous TID WC    insulin lispro  0-4 Units SubCUTAneous Nightly    bacitracin-polymyxin b   Topical BID    fluticasone  1 spray Each Nostril Daily    levETIRAcetam  500 mg Oral BID    sodium chloride  2 spray Nasal 4x Daily    tamsulosin  0.4 mg Oral Daily    sodium chloride flush  5-40 mL IntraVENous 2 times per day    Saline Mouthwash  15 mL Swish & Spit 4x Daily AC & HS       Continuous Infusions:   sodium chloride      sodium chloride      potassium chloride      dextrose         ROS:  Negative for HA, dizziness, vision changes, sore throat, CP, SOB, abd pain, N/V, constipation. No fevers, chills, sweats. PHYSICAL EXAMINATION:  /66   Pulse (!) 108   Temp 97.4 °F (36.3 °C) (Oral)   Resp (!) 35   Ht 5' 6\" (1.676 m)   Wt 115 lb 8.3 oz (52.4 kg)   SpO2 90%   BMI 18.65 kg/m²     Gen: Thin male in no acute distress. Speaking in full sentences without using accessory resp muscles  Eyes: PERRL, EOMI, normal conjunctivae  Ears, Nose, Mouth and Throat: Hearing is normal. Oropharynx is normal  Neck: No lymphadenopathy  Respiratory: mild crackles at bilateral bases. Normal WOB  CV: Tachycardic. Regular rhythm  Abd: +BS, soft, NT/ND  Musculoskeletal: No cyanosis, clubbing, or edema.   Skin: No rashes, ulcers, or subcutaneous nodules  Psychiatric: Alert and oriented to time place and person    DATA  CBC:   Recent Labs     08/21/22  1004 08/22/22  0230 08/23/22  0348   WBC 5.7 5.8 5.7   HGB 8.5* 8.7* 8.1*   HCT 26.2* 26.8* 25.5*   MCV 95.1 95.0 95.8   * 138 129*     BMP:   Recent Labs     08/21/22  0250 08/22/22  0230 08/23/22  0348    147* 145   K 3.6 3.8 3.9   CL 98* 101 98*   CO2 36* 37* 38*   PHOS 2.5 2.8 2.4*   BUN 14 11 12   CREATININE <0.5* <0.5* <0.5*     No results for input(s): PHART, ADA5BJT, PO2ART in the last 72 hours. LIVER PROFILE:   Recent Labs     08/22/22  0230   AST 33   ALT 24   BILIDIR <0.2   BILITOT 0.3   ALKPHOS 218*       XR CHEST PORTABLE   Final Result      Extensive multifocal consolidation without change. Stable cardiomediastinal silhouette. Right jugular chest port again noted. XR CHEST 1 VIEW   Final Result      Mild progression of bilateral airspace disease      XR CHEST PORTABLE   Final Result      Extensive bilateral airspace disease. This is similar to prior study. VL Extremity Venous Left   Final Result      FL MODIFIED BARIUM SWALLOW W VIDEO   Final Result      No laryngeal penetration or aspiration. Please refer to the detailed report of the speech therapist.            XR CHEST PORTABLE   Final Result      1. Diffuse bilateral pulmonary infiltrate and/or edema overall demonstrating question slight improvement from prior exam.      XR CHEST PORTABLE   Final Result      Increased bilateral airspace disease            XR CHEST PORTABLE   Final Result      Bilateral airspace disease, increased since prior exam.      CT CHEST W CONTRAST   Final Result      1. Moderate bilateral groundglass opacity new since prior chest CT consistent with infectious/inflammatory pneumonitis. 2. Background mild-to-moderate lower lobe predominant pulmonary fibrosis without change.    3. A 5 cm lesion in the anterior aspect left lobe of the liver stable to mildly decreased in size and of indeterminate etiology. XR CHEST (2 VW)   Final Result   1. Diffuse patchy bilateral airspace disease concerning for pneumonia or pulmonary edema. ASSESSMENT/PLAN:  79-year-old male with DLBCL, non-Hodgkin lymphoma with CNS mass and biopsy-proven non-Hodgkin lymphoma. Fungal PNA  Hypoxemia  On cycle 1 day 19  is admitted in the hospital with hypoxia. Chemotherapy Polatuzumab / Bendamustine, Decadron and Rituximab. Patient has history of ILD and is on 2 L NC at home. Aspergillus Galacto AG positive on 08/10. 1,3 Beta D-Glucan 08/14 positive on 08/14. BAL culture 08/10/22 positive for Pippa Krusei  - cont voriconazole 200mg BID  - cont valacyclovir 500mg BID  - pt currently on 9L HFNC -adjusting flow as required. Note significant desaturation with activity. Goal SpO2 >88%  - s/p cefepime    Bradley Mcdonnell MD PGY1  Internal Medicine  Patient examined, findings as discussed with Dr. Garret Goodwin. Agree with assessment and plan. Response to antifungal therapy is suboptimal.  Treatment with voriconazole should be effective. Hypoxemia remains compensated, O2 requirement is stable.   Discussed with oncology service

## 2022-08-23 NOTE — PROGRESS NOTES
08/23/22 1120   Encounter Summary   Encounter Overview/Reason  Spiritual/Emotional Needs   Service Provided For: Patient and family together   Referral/Consult From: [facility]   Support System Spouse   Last Encounter    (es 8/23)   Complexity of Encounter Moderate   Begin Time 1103   End Time  1123   Total Time Calculated 20 min   Assessment/Intervention/Outcome   Assessment Coping;Tearful  (wife struggling. was hiding tears from her . but pt and wife are coping. I told wife that if she ever takes a break from the room, we can spend time together.)   Intervention Active listening;Discussed illness injury and its impact; Explored/Affirmed feelings, thoughts, concerns;Prayer (assurance of)/[place]   Outcome Engaged in conversation;Expressed feelings, needs, and concerns;Receptive;Venting emotion   Plan and Referrals   Plan/Referrals Other (Comment)  (as needed)

## 2022-08-23 NOTE — PLAN OF CARE
Problem: Chronic Conditions and Co-morbidities  Goal: Patient's chronic conditions and co-morbidity symptoms are monitored and maintained or improved  8/23/2022 1229 by Lavell Carreon RN  Outcome: Progressing  Flowsheets (Taken 8/23/2022 1229)  Care Plan - Patient's Chronic Conditions and Co-Morbidity Symptoms are Monitored and Maintained or Improved: Monitor and assess patient's chronic conditions and comorbid symptoms for stability, deterioration, or improvement     Problem: ABCDS Injury Assessment  Goal: Absence of physical injury  8/23/2022 1229 by Lavell Carreon RN  Outcome: Progressing  Flowsheets (Taken 8/23/2022 0933)  Absence of Physical Injury: Implement safety measures based on patient assessment     Problem: Safety - Adult  Goal: Free from fall injury  8/23/2022 1229 by Lavell Carreon RN  Outcome: Progressing  Flowsheets (Taken 8/23/2022 0933)  Free From Fall Injury: Instruct family/caregiver on patient safety  Note:   Pt is a High fall risk. See Shilpi Erickson Fall Score and ABCDS Injury Risk assessments.   + Screening for Orthostasis and/or + High Fall Risk per GARVIN/ABCDS: Explained fall risk precautions to pt and family and rationale behind their use to keep the patient safe. Pt bed is in low position, side rails up, call light and belongings are in reach. Fall wristband applied and present on pts wrist.  Bed alarm on. Pt encouraged to call for assistance. Will continue with hourly rounds for PO intake, pain needs, toileting and repositioning as needed. Problem: Skin/Tissue Integrity  Goal: Absence of new skin breakdown  Description: 1. Monitor for areas of redness and/or skin breakdown  2. Assess vascular access sites hourly  3. Every 4-6 hours minimum:  Change oxygen saturation probe site  4. Every 4-6 hours:  If on nasal continuous positive airway pressure, respiratory therapy assess nares and determine need for appliance change or resting period.   8/23/2022 1229 by Lavell Carreon RN  Outcome: Progressing  Note: Skin breakdown prevention in place. Pt repositioned and assessed for incontinence Q2hrs and prn. See flowsheets for skin assessment. Offloading, pillows, and wedge support utilized. Sacral heart on coccyx and bony prominences- C/D/I. Mepilex on coccyx wound changed during shift and venelex applied per orders- see MAR. Pt on specialty bed. Problem: Nutrition Deficit:  Goal: Optimize nutritional status  8/23/2022 1229 by Darian Topete RN  Outcome: Progressing  Flowsheets (Taken 8/23/2022 1229)  Nutrient intake appropriate for improving, restoring, or maintaining nutritional needs: Monitor oral intake, labs, and treatment plans     Problem: Respiratory - Adult  Goal: Achieves optimal ventilation and oxygenation  8/23/2022 1229 by Darian Topete RN  Outcome: Progressing  Flowsheets  Taken 8/23/2022 1229  Achieves optimal ventilation and oxygenation:   Assess for changes in respiratory status   Assess for changes in mentation and behavior   Position to facilitate oxygenation and minimize respiratory effort   Assess and instruct to report shortness of breath or any respiratory difficulty   Respiratory therapy support as indicated  Taken 8/23/2022 0840  Achieves optimal ventilation and oxygenation: Assess for changes in respiratory status     Problem: Pain  Goal: Verbalizes/displays adequate comfort level or baseline comfort level  8/23/2022 1229 by Darian Topete RN  Outcome: Progressing  Flowsheets (Taken 8/23/2022 1229)  Verbalizes/displays adequate comfort level or baseline comfort level:   Encourage patient to monitor pain and request assistance   Assess pain using appropriate pain scale  Note: No complaints of pain at this time.

## 2022-08-23 NOTE — PROGRESS NOTES
[address] [facility] Progress Note    2022     Rolando Mccormick    MRN: [id number]    : 1945    Referring MD: Reuben Win MD  [address] [facility] [address], [place] [address],  [address]    SUBJECTIVE:  he reports no change from yesterday. He cont to be sob.      ECOG PS:(3) Capable of limited self-care, confined to bed or chair > 50% of waking hours    KPS: 50%  Requires considerable assistance and frequent medical care    Isolation: None    Medications    Scheduled Meds:   insulin glargine  12 Units SubCUTAneous Daily    phosphorus  500 mg Oral TID    Venelex   Topical BID    enoxaparin  40 mg SubCUTAneous Daily    potassium chloride  20 mEq Oral Daily with breakfast    valACYclovir  500 mg Oral BID    voriconazole  200 mg Oral 2 times per day    insulin lispro  0-16 Units SubCUTAneous TID WC    insulin lispro  0-4 Units SubCUTAneous Nightly    bacitracin-polymyxin b   Topical BID    fluticasone  1 spray Each Nostril Daily    levETIRAcetam  500 mg Oral BID    sodium chloride  2 spray Nasal 4x Daily    tamsulosin  0.4 mg Oral Daily    sodium chloride flush  5-40 mL IntraVENous 2 times per day    Saline Mouthwash  15 mL Swish & Spit 4x Daily AC & HS     Continuous Infusions:   sodium chloride      sodium chloride      potassium chloride      dextrose       PRN Meds:.sodium phosphate IVPB, acetaminophen, ondansetron, prochlorperazine, sodium chloride, sodium chloride flush, sodium chloride, potassium chloride, magnesium sulfate, magnesium hydroxide, Saline Mouthwash, alteplase (CATHFLO) with sterile water injection, glucose, dextrose bolus **OR** dextrose bolus, glucagon (rDNA), dextrose    ROS:  As noted above, otherwise remainder of 10-point ROS negative    Physical Exam:     I&O:    Intake/Output Summary (Last 24 hours) at 2022 0656  Last data filed at 2022 0330  Gross per 24 hour   Intake 640 ml   Output 850 ml   Net -210 ml         Vital Signs:  /69   Pulse (!) 106   Temp 98.1 °F (36.7 °C) (Oral)   Resp 28   Ht 5' 6\" (1.676 m)   Wt 113 lb 5.1 oz (51.4 kg)   SpO2 98%   BMI 18.29 kg/m²     Weight:    Wt Readings from Last 3 Encounters:   08/22/22 113 lb 5.1 oz (51.4 kg)   07/25/22 116 lb 10 oz (52.9 kg)   05/31/22 117 lb (53.1 kg)       General: Awake, alert and oriented  HEENT: normocephalic, PERRL, no scleral erythema or icterus, Oral mucosa moist and intact, throat clear  NECK: supple   BACK: Straight   SKIN: warm dry and intact without lesions rashes or masses  CHEST: poor air excursion, no rhonchi  CV: Normal S1 S2, irreg, no MRG  ABD: NT ND normoactive BS, no palpable masses or hepatosplenomegaly  EXTREMITIES: without edema, denies calf tenderness  NEURO: CN II - XII grossly intact  CATHETER:  Right IJ SL PAC (1/11/22) - CDI      Data    CBC:   Recent Labs     08/21/22  1004 08/22/22  0230 08/23/22  0348   WBC 5.7 5.8 5.7   HGB 8.5* 8.7* 8.1*   HCT 26.2* 26.8* 25.5*   MCV 95.1 95.0 95.8   * 138 129*       BMP/Mag:  Recent Labs     08/21/22  0250 08/22/22  0230 08/23/22  0348    147* 145   K 3.6 3.8 3.9   CL 98* 101 98*   CO2 36* 37* 38*   PHOS 2.5 2.8 2.4*   BUN 14 11 12   CREATININE <0.5* <0.5* <0.5*   MG 1.60* 1.60* 1.50*       LIVP:   Recent Labs     08/22/22  0230   AST 33   ALT 24   BILIDIR <0.2   BILITOT 0.3   ALKPHOS 218*       Coags:   Recent Labs     08/22/22  0230   PROTIME 13.6   INR 1.05   APTT 40.2*       Uric Acid   Recent Labs     08/22/22  0230   LABURIC 1.9*       Diagnostics:   CT chest (8/9/22):  1. Moderate bilateral groundglass opacity new since prior chest CT consistent with infectious/inflammatory pneumonitis. 2. Background mild-to-moderate lower lobe predominant pulmonary fibrosis without change. 3. A 5 cm lesion in the anterior aspect left lobe of the liver stable to mildly decreased in size and of indeterminate etiology.     PROBLEM LIST:           DLBC  Interstitial lung disease / Pulmonary Fibrosis   Type 2 Diabetes Mellitus   BPH  Acute on Chronic Respiratory Failure / Fungal PNA (8/2022)      TREATMENT:            R-CHOP w/ Revlimid and Tafasitimab  Polatuzumab/BR   Cycle #1 -  8/1/22      ASSESSMENT AND PLAN:          1. 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 ID: afebrile, Cont to treat for fungal PNA (POA)  - Viral respiratory w/ COVID 19 (8/9/22): Negative  - MRSA swab (8/11/22) - Negative & procalcitonin (8/10/22) - 0.8  - CT chest (8/9/22): Moderate bilateral groundglass opacity new since prior chest CT consistent with infectious/inflammatory pneumonitis. Background mild-to-moderate lower lobe predominant pulmonary fibrosis without change  - S/p Bronchoscopy w/ BAL and biopsy (8/10/22) - Galactomannan positive 1.33, Diatherix - negative  - BAL fungal cx (8/10/22) - Candida krusei, sensitivities pending   - Aspergillus (8/14/22) - negative, fungitell (8/14/22): > 500 (positive)  - Cont Vfend Day + 11 (started 8/13/22)     - consult ID to review current infx and tx and make recs     Abx history:  Cefepime x 7 days (8/10/22 - 8/16/22)      3. Heme: Anemia and thrombocytopenia likely r/t recent chemotherapy  - Transfuse for Hgb < 7 and Platelets < 10 K  - No transfusion today     4. Metabolic:  ongoing hyperglycemia w/ hypoPhos  - S/p Lasix 40 mg IV x 1 (8/12/22)  - Cont KPhos 500 mg bid (started 8/16/22)  - Cont KCl 20 meq daily (started 8/17/22)  - No IVF  - Replace Phos, K+ & Mg per PRN orders     5. GI / Nutrition:          Nutrition:  Severe malnutrition w/ chronic illness    - Cont regular diet, no straws   - SLP eval (8/13/22):   Aspiration with straws, regular diet and thin liquids with cups only  - MBS (8/16/22) - No laryngeal penetration or aspiration.   - Cont Glucerna shakes BID and Magic cups BID  - Recommend swabbing mouth after all meals. Must be completely awake and upright for PO intake  - Dietary to follow closely  Nausea:  - Cont Zofran and Compazine as needed  Constipation:  no complaints   - S/p Colace bid     6. Pulm:  no change cont sob   - H/o Interstitial lung disease / pulmonary fibrosis  - F/b Dr. Mónica Yun   - S/p bronchoscopy (3/25/22) and PFTs (4/1/22)  - Home O2 - 2 l/min      Hypoxemia:  Admitted w/ acute on chronic respiratory failure possibly from  pneumonitis from chemotherapy (Rituxan vs Jerzy), but more than likely from multifocal fungal PNA (POA). - Pulm following, appreciate recs  - CT chest (8/9/22) - Moderate bilateral groundglass opacity new since prior chest CT consistent with infectious/inflammatory pneumonitis. Background mild-to-moderate lower lobe predominant pulmonary fibrosis without change. A 5 cm lesion in the anterior aspect left lobe of the liver stable to mildly decreased in size and of indeterminate etiology.  - BAL fungal cx (8/10/22) - Candida krusei, sensitivities pending   - S/p Bronchoscopy w/ BAL and biopsy (8/10/22): + galactomannan, Diatherix negative  - S/p steroids:  Solumedrol 60 mg IV daily (started 8/10/22), 60 mg BID (8/12/22, d/t increased oxygen requirements), 60 mg daily (8/14/22 d/t likely fungal pneumonia), Prednisone 20 mg daily (8/15/22), 10 mg daily (8/16/22)  - Cont high flow O2 currently up to @ 8-10 l/min; titrate for O2 Sat 88%  - See ID section for additional treatment and management    7. Endo:   - H/o T2DM   T2DM:  exacerbated by steroids, now improving   - Increase Lantus 12 units nightly (stopped 8/11/22, restarted 5 units 8/18/22, increased 8 units 8/19/22, increased 12 units 8/22/22)  - Home regimen: on humalog SSI, janumet and jardiance  - Cont Humalog high regimen SSI AC/HS    8.   MSK:  he has acute debilitation and generalized

## 2022-08-24 PROBLEM — B49 FUNGAL PNEUMONIA: Status: ACTIVE | Noted: 2022-01-01

## 2022-08-24 PROBLEM — J16.8 FUNGAL PNEUMONIA: Status: ACTIVE | Noted: 2022-01-01

## 2022-08-24 NOTE — PROGRESS NOTES
Patients morning labs resulted with Glucose of 67.  4 oz OJ given to patient. POC check at 0524 was 77. An additional OJ given to patient to drink at his leisure.

## 2022-08-24 NOTE — PLAN OF CARE
Problem: Chronic Conditions and Co-morbidities  Goal: Patient's chronic conditions and co-morbidity symptoms are monitored and maintained or improved  Outcome: Progressing  Flowsheets (Taken 8/23/2022 2030)  Care Plan - Patient's Chronic Conditions and Co-Morbidity Symptoms are Monitored and Maintained or Improved: Monitor and assess patient's chronic conditions and comorbid symptoms for stability, deterioration, or improvement     Problem: ABCDS Injury Assessment  Goal: Absence of physical injury  Outcome: Progressing  Flowsheets (Taken 8/23/2022 2030)  Absence of Physical Injury: Implement safety measures based on patient assessment     Problem: Safety - Adult  Goal: Free from fall injury  Outcome: Progressing  Flowsheets (Taken 8/23/2022 2030)  Free From Fall Injury: Instruct family/caregiver on patient safety     Problem: Skin/Tissue Integrity  Goal: Absence of new skin breakdown  Description: 1. Monitor for areas of redness and/or skin breakdown  2. Assess vascular access sites hourly  3. Every 4-6 hours minimum:  Change oxygen saturation probe site  4. Every 4-6 hours:  If on nasal continuous positive airway pressure, respiratory therapy assess nares and determine need for appliance change or resting period. Outcome: Progressing  Note: Skin breakdown prevention precautions in place. Turning patient every 2 hours as tolerated, ROM activities, preventative dressing in place on bony prominences/ sacrum.      Problem: Nutrition Deficit:  Goal: Optimize nutritional status  Outcome: Progressing  Flowsheets (Taken 8/23/2022 1229 by Jose West RN)  Nutrient intake appropriate for improving, restoring, or maintaining nutritional needs: Monitor oral intake, labs, and treatment plans     Problem: Respiratory - Adult  Goal: Achieves optimal ventilation and oxygenation  Outcome: Progressing  Flowsheets (Taken 8/23/2022 1224 by Jose West RN)  Achieves optimal ventilation and oxygenation:   Assess for changes in respiratory status   Assess for changes in mentation and behavior   Position to facilitate oxygenation and minimize respiratory effort   Assess and instruct to report shortness of breath or any respiratory difficulty   Respiratory therapy support as indicated     Problem: Pain  Goal: Verbalizes/displays adequate comfort level or baseline comfort level  Outcome: Progressing  Flowsheets (Taken 8/23/2022 1229 by Joelle Bolivar RN)  Verbalizes/displays adequate comfort level or baseline comfort level:   Encourage patient to monitor pain and request assistance   Assess pain using appropriate pain scale

## 2022-08-24 NOTE — PROGRESS NOTES
Occupational Therapy  Facility/Department: [facility]  Occupational Therapy Treatment    Name: Rin Rhodes  : 1945  MRN: [id number]  Date of Service: 2022    Discharge Recommendations:   Rin Rhodes scored a  on the AM-PAC ADL Inpatient form. Current research shows that an AM-PAC score of 17 or less is typically not associated with a discharge to the patient's home setting. Based on the patient's AM-PAC score and their current ADL deficits, it is recommended that the patient have 3-5 sessions per week of Occupational Therapy at d/c to increase the patient's independence. Please see assessment section for further patient specific details. If patient discharges prior to next session this note will serve as a discharge summary. Please see below for the latest assessment towards goals. OT Equipment Recommendations  Other: cont to assess       Patient Diagnosis(es): There were no encounter diagnoses. Past Medical History:  has a past medical history of Benign prostatic hyperplasia with weak urinary stream, Cancer ([address] Erectile dysfunction, History of gout, History of thrombocytopenia, Hypercalcemia, Hyperlipidemia, Hypertension, Lung nodule, Proteinuria, Type 2 diabetes mellitus without complication ([address] and Vitamin D deficiency. Past Surgical History:  has a past surgical history that includes Finger trigger release (Right, ); Tonsillectomy and adenoidectomy (age 2); Elbow fracture surgery (Left, age 5); Vasectomy (1971); Cataract removal with implant (Bilateral, ); Colonoscopy (3/29/2011); Colonoscopy (2016); IR PORT PLACEMENT > 5 YEARS (2022); bronchoscopy (N/A, 3/25/2022); and craniotomy (Right, 2022). Treatment Diagnosis: decreased functional strenght and endurance 2/2 hypoxia      Assessment   Performance deficits / Impairments: Decreased functional mobility ; Decreased ADL status; Decreased strength;Decreased safe awareness;Decreased cognition;Decreased endurance;Decreased balance;Decreased posture  Assessment: Pt is pleasant and cooperative. Pt cont to have decreased O2 sats with activity. Cont OT tx per plan of care. Treatment Diagnosis: decreased functional strenght and endurance 2/2 hypoxia  Prognosis: Good  REQUIRES OT FOLLOW-UP: Yes  Activity Tolerance  Activity Tolerance: Patient limited by fatigue  Activity Tolerance Comments: Pt on 15L high flow O2. O2 sats decreased as low as 77%, RR up to 34 and HR increasd to 137 with activity. Pt required several minutes to recover. (nurse aware)        Plan   Plan  Times per Week: 2-5  Current Treatment Recommendations: ROM, Strengthening, Balance training, Functional mobility training, Endurance training, Patient/Caregiver education & training, Self-Care / ADL, Safety education & training, Positioning, Equipment evaluation, education, & procurement, Pain management     Restrictions  Position Activity Restriction  Other position/activity restrictions: Up as Tolerated, If platelet count equal to or less than 10 but the patient has received a platelet transfusion, physical therapy or occupational therapy may proceed with treatment. Subjective   General  Chart Reviewed: Yes  Patient assessed for rehabilitation services?: Yes  Additional Pertinent Hx: DLBCL with CNS mass  Family / Caregiver Present: Yes (Wife)  Referring Practitioner: MARCIA Epperson CNP  Diagnosis: Hypoxia  Subjective  Subjective: Pt in bed upon entry. Pt agreeable to OOB to chair. General Comment  Comments: .      Social/Functional History  Social/Functional History  Lives With: Spouse (can provide 24hr assist)  Type of Home: Condo  Home Layout: Two level, Performs ADL's on one level, Able to Live on Main level with bedroom/bathroom  Home Access: Ramped entrance  Bathroom Shower/Tub: Walk-in shower  Bathroom Toilet: Handicap height  Bathroom Equipment: Shower chair, Grab bars in shower, Grab bars around toilet  Home Equipment: Walker, 4 wheeled, Cane, Oxygen (transport chair, 2L when needed)  Has the patient had two or more falls in the past year or any fall with injury in the past year?: Yes  Receives Help From: Family  ADL Assistance: Needs assistance  Homemaking Responsibilities: No  Ambulation Assistance: Needs assistance  Active : No  Additional Comments: Information from wife as pt is a questionable historian. In May, he was able to walk in house with RW and was fairly independent with ADL. At beginning of July, he was experiencing functional decline, had fall, found brain mass at hospital admission. Recently, wife has been doing \"everything\" and it \"has been a challenge\". Objective      Safety Devices  Type of Devices: Left in chair;Call light within reach; Chair alarm in place;Nurse notified (Wife presen)  Balance  Sitting:  (Pt sat edge of bed ~5 min with CG initially and changing to min assist)        ADL  Grooming: Setup (to wipe face, seated in chair, decreased thoroghness)  Toileting:  (External catheter in place)        Bed mobility  Supine to Sit: 2 Person assistance (mod assist of 2)  Transfers  Stand Step Transfers: 2 Person assistance (mod assist of 2)  Sit to stand: 2 Person assistance (mod assist of 2)  Stand to sit: 2 Person assistance (mod assist of 2)     Cognition  Overall Cognitive Status: Exceptions  Arousal/Alertness: Delayed responses to stimuli  Following Commands: Follows one step commands consistently  Attention Span: Difficulty dividing attention; Difficulty attending to directions  Memory: Decreased short term memory;Decreased recall of biographical Information;Decreased recall of recent events  Problem Solving: Assistance required to implement solutions;Assistance required to generate solutions;Assistance required to identify errors made;Assistance required to correct errors made;Decreased awareness of errors  Insights: Decreased awareness of deficits  Initiation:

## 2022-08-24 NOTE — PROGRESS NOTES
Physical Therapy  Daily Treatment Note    Discharge Recommendations: Harinder Hein scored a 9/24 on the AM-PAC short mobility form. Current research shows that an AM-PAC score of 17 or less is typically not associated with a discharge to the patient's home setting. Based on the patient's AM-PAC score and their current functional mobility deficits, it is recommended that the patient have 3-5 sessions per week of Physical Therapy at d/c to increase the patient's independence. Please see assessment section for further patient specific details. Assessment:  Pt with good effort and participation despite QUESADA with activity. Pt able to take a few steps with walker to chair with assist x 2 people. Needing recovery time for decreased oxygen saturation with movement. Pt is currently functioning below his baseline. Would benefit from continued IP PT at D/C prior to returning home with wife. Plan is for SNF. Equipment Needs: Defer to next level of care    Chart Reviewed: Yes     Other position/activity restrictions: Up as Tolerated, If platelet count equal to or less than 10 but the patient has received a platelet transfusion, physical therapy or occupational therapy may proceed with treatment. Additional Pertinent Hx: Pt is a 66 yo male that presents from home with worsening labor breathing per wife's observation. Pt was just discharged at the end of July from the hospital. CT Chest: Moderate bilateral groundglass opacity new since prior chest CT consistent with infectious/inflammatory pneumonitis. PMH: Non-Hodgkins Lymphoma, brain mass, Hypertension. WB Status: unable to assess due to SpO2% and high respiratory rate    Diagnosis: Hypoxia   Treatment Diagnosis: decreased functional mobility    Subjective: Pt in bed initially. Wife present. Pt agreeable to working with therapy. \"Let's try it. \" (Re: getting up to chair with walker)    Pain: Denies    Objective:    Bed mobility  Supine to sit: Dependent (Mod assist x 2), HOB up   Scooting: Max assist to EOB    Transfers  Sit to stand: Dependent (Mod assist x 2) from bed to walker. Cues for hand placement. Stand to sit: Dependent (Min to Mod assist x 2) into chair. Cues for hand placement. Bed > chair: Dependent (Mod assist x 2) with walker    Ambulation  Assistance Level: Dependent (Mod assist x 2)  Assistive device: Wheeled walker  Distance: 2 ft (bed to chair)  Quality of gait: Unsteady; weak; effortful; decreased step length    Balance  Sat EOB ~ 5 minutes statically. Pt needing CGA initially, regressing to Min assist for posterior lean. Static stance with wheeled walker Min assist x 2  Taking steps with wheeled walker Mod assist x 2    Vitals  Pt on 15L high flow throughout session. Oxygen saturation = 100% initially in bed. 93-96% while seated EOB. Down to 78% after transfer to chair. Recovered to 91% after lengthy rest break (~5 minutes). Pt reported feeling recovered subjectively. RN updated. Patient Education  Role of PT. Calling for assist with needs. Expressed understanding. Safety Devices  Pt left with needs in reach. In chair (reclined) with chair alarm on. RN updated. Wife present.      AM-PAC score  AM-PAC Inpatient Mobility Raw Score : 9  AM-PAC Inpatient T-Scale Score : 30.55  Mobility Inpatient CMS 0-100% Score: 81.38  Mobility Inpatient CMS G-Code Modifier : CM    Goals: (as determined and assessed by primary PT)  Time Frame for Short term goals: Discharge- all ongoing  Short term goal 1: Rolling Left and Right SBA  ONGOING   Short term goal 2: Sit<>Supine Min A  MET 8/22   Short term goal 3: Sit<>Stand with RW Min A  ONGOING  Short term goal 4: Stand Pivot with RW Min A  ONGOING  Short term goal 5: Amb 5ft with RW Min A  ONGOING    Plan:  Plan:  (2-5)  Current Treatment Recommendations: Strengthening, Gait training, Balance training, Functional mobility training, Transfer training, Endurance training, Wheelchair mobility training, Patient/Caregiver education & training, Safety education & training, Therapeutic activities    Therapy Time    Individual  Concurrent  Group  Co-treatment    Time In  851            Time Out  922            Minutes  31              Timed Code Treatment Minutes: 31  Total Treatment Minutes: 31    Will continue per plan of care, as pt tolerates. If patient is discharged prior to next treatment, this note will serve as the discharge summary.     Aditya [place] #[id number]

## 2022-08-24 NOTE — PLAN OF CARE
Problem: Safety - Adult  Goal: Free from fall injury  8/24/2022 1205 by Deepti Garcia RN  Flowsheets  Taken 8/24/2022 1205  Free From Fall Injury:   Fernanda Wolf family/caregiver on patient safety   Based on caregiver fall risk screen, instruct family/caregiver to ask for assistance with transferring infant if caregiver noted to have fall risk factors  Taken 8/24/2022 0800  Free From Fall Injury: Instruct family/caregiver on patient safety    Problem: Skin/Tissue Integrity  Goal: Absence of new skin breakdown  Description: 1. Monitor for areas of redness and/or skin breakdown  2. Assess vascular access sites hourly  3. Every 4-6 hours minimum:  Change oxygen saturation probe site  4. Every 4-6 hours:  If on nasal continuous positive airway pressure, respiratory therapy assess nares and determine need for appliance change or resting period. 8/24/2022 1205 by Deepti Garcia RN  Note: Skin breakdown present. Dressing present. Patient turned every 2 hours. Will continue to assess skin and monitor/prevent for further breakdown.       Problem: Nutrition Deficit:  Goal: Optimize nutritional status  8/24/2022 1205 by Deepti Garcia RN  Flowsheets (Taken 8/24/2022 1205)  Nutrient intake appropriate for improving, restoring, or maintaining nutritional needs:   Monitor oral intake, labs, and treatment plans   Recommend appropriate diets, oral nutritional supplements, and vitamin/mineral supplements   Assess nutritional status and recommend course of action   Provide specific nutrition education to patient or family as appropriate     Problem: Respiratory - Adult  Goal: Achieves optimal ventilation and oxygenation  8/24/2022 1205 by Deepti Garcia RN  Flowsheets  Taken 8/24/2022 1205  Achieves optimal ventilation and oxygenation:   Assess for changes in respiratory status   Assess for changes in mentation and behavior   Position to facilitate oxygenation and minimize respiratory effort   Oxygen supplementation based on oxygen saturation or arterial blood gases  Taken 8/24/2022 0803  Achieves optimal ventilation and oxygenation:   Assess for changes in respiratory status   Assess for changes in mentation and behavior   Position to facilitate oxygenation and minimize respiratory effort   Oxygen supplementation based on oxygen saturation or arterial blood gases   Assess and instruct to report shortness of breath or any respiratory difficulty   Respiratory therapy support as indicated     Problem: Pain  Goal: Verbalizes/displays adequate comfort level or baseline comfort level  8/24/2022 1205 by Luis F Sainz RN  Flowsheets (Taken 8/24/2022 1205)  Verbalizes/displays adequate comfort level or baseline comfort level:   Assess pain using appropriate pain scale   Encourage patient to monitor pain and request assistance

## 2022-08-24 NOTE — PROGRESS NOTES
4 Eyes Admission Assessment     I agree as the admission nurse that 2 RN's have performed a thorough Head to Toe Skin Assessment on the patient. ALL assessment sites listed below have been assessed on admission. Areas assessed by both nurses: Karla and   [x]   Head, Face, and Ears   [x]   Shoulders, Back, and Chest  [x]   Arms, Elbows, and Hands   [x]   Coccyx, Sacrum, and Ischium  [x]   Legs, Feet, and Heels        Does the Patient have Skin Breakdown?   Yes a wound was noted on the Admission Assessment and an LDA was Initiated documentation include the Savi-wound, Wound Assessment, Measurements, Dressing Treatment, Drainage, and Color\",         Giorgio Prevention initiated:  Yes   Wound Care Orders initiated:  Yes      [id number] nurse consulted for Pressure Injury (Stage 3,4, Unstageable, DTI, NWPT, and Complex wounds) or Giorgio score 18 or lower:  Yes, consulted 8/12    Nurse 1 eSignature: Electronically signed by Shanae Peters RN on 8/24/22 at 3:53 AM EDT    **SHARE this note so that the co-signing nurse is able to place an eSignature**    Nurse 2 eSignature: Electronically signed by Ez Guardado RN on 8/24/22 at 6:15 PM EDT

## 2022-08-24 NOTE — PROGRESS NOTES
[facility] Progress Note    2022     Julio César Romo    MRN: [id number]    : 1945    Referring MD: MD NOE Arthurma [address],  [address] [place] [address]    SUBJECTIVE:  He reports no change from yesterday. He cont to be sob.      ECOG PS:(3) Capable of limited self-care, confined to bed or chair > 50% of waking hours    KPS: 50%  Requires considerable assistance and frequent medical care    Isolation: None    Medications    Scheduled Meds:   [START ON 2022] insulin glargine  8 Units SubCUTAneous Daily    phosphorus  500 mg Oral TID    Venelex   Topical BID    enoxaparin  40 mg SubCUTAneous Daily    potassium chloride  20 mEq Oral Daily with breakfast    valACYclovir  500 mg Oral BID    voriconazole  200 mg Oral 2 times per day    insulin lispro  0-16 Units SubCUTAneous TID WC    insulin lispro  0-4 Units SubCUTAneous Nightly    bacitracin-polymyxin b   Topical BID    fluticasone  1 spray Each Nostril Daily    levETIRAcetam  500 mg Oral BID    sodium chloride  2 spray Nasal 4x Daily    tamsulosin  0.4 mg Oral Daily    sodium chloride flush  5-40 mL IntraVENous 2 times per day    Saline Mouthwash  15 mL Swish & Spit 4x Daily AC & HS     Continuous Infusions:   sodium chloride      sodium chloride      potassium chloride      dextrose       PRN Meds:.sodium phosphate IVPB, acetaminophen, ondansetron, prochlorperazine, sodium chloride, sodium chloride flush, sodium chloride, potassium chloride, magnesium sulfate, magnesium hydroxide, Saline Mouthwash, alteplase (CATHFLO) with sterile water injection, glucose, dextrose bolus **OR** dextrose bolus, glucagon (rDNA), dextrose    ROS:  As noted above, otherwise remainder of 10-point ROS negative    Physical Exam:     I&O:    Intake/Output Summary (Last 24 hours) at 2022 0919  Last data filed at 2022 0700  Gross per 24 hour   Intake 120 ml   Output 800 ml   Net -680 ml       Vital Signs:  /77   Pulse (!) 109   Temp 97.5 °F (36.4 °C) (Oral)   Resp 26   Ht 5' 6\" (1.676 m)   Wt 115 lb 8.3 oz (52.4 kg)   SpO2 100%   BMI 18.65 kg/m²     Weight:    Wt Readings from Last 3 Encounters:   08/23/22 115 lb 8.3 oz (52.4 kg)   07/25/22 116 lb 10 oz (52.9 kg)   05/31/22 117 lb (53.1 kg)       General: Awake, alert and oriented  HEENT: normocephalic, PERRL, no scleral erythema or icterus, Oral mucosa moist and intact, throat clear  NECK: supple   BACK: Straight   SKIN: warm dry and intact without lesions rashes or masses  CHEST: poor air excursion, no rhonchi  CV: Normal S1 S2, irreg, no MRG  ABD: NT ND normoactive BS, no palpable masses or hepatosplenomegaly  EXTREMITIES: without edema, denies calf tenderness  NEURO: CN II - XII grossly intact  CATHETER:  Right IJ SL PAC (1/11/22) - CDI      Data    CBC:   Recent Labs     08/22/22 0230 08/23/22 0348 08/24/22 0337   WBC 5.8 5.7 6.4   HGB 8.7* 8.1* 7.9*   HCT 26.8* 25.5* 25.5*   MCV 95.0 95.8 95.9    129* 140     BMP/Mag:  Recent Labs     08/22/22 0230 08/23/22 0348 08/24/22  0337   * 145 144   K 3.8 3.9 3.8    98* 99   CO2 37* 38* 37*   PHOS 2.8 2.4* 2.7   BUN 11 12 11   CREATININE <0.5* <0.5* <0.5*   MG 1.60* 1.50* 1.50*     LIVP:   Recent Labs     08/22/22 0230 08/24/22  0337   AST 33 40*   ALT 24 26   BILIDIR <0.2 <0.2   BILITOT 0.3 <0.2   ALKPHOS 218* 246*     Coags:   Recent Labs     08/22/22 0230   PROTIME 13.6   INR 1.05   APTT 40.2*     Uric Acid   Recent Labs     08/22/22  0230 08/24/22  0337   LABURIC 1.9* 2.0*     Diagnostics:   CT chest (8/9/22):  1. Moderate bilateral groundglass opacity new since prior chest CT consistent with infectious/inflammatory pneumonitis. 2. Background mild-to-moderate lower lobe predominant pulmonary fibrosis without change. 3. A 5 cm lesion in the anterior aspect left lobe of the liver stable to mildly decreased in size and of indeterminate etiology.     PROBLEM LIST:           DLBC  Interstitial lung disease / Pulmonary Fibrosis   Type 2 Diabetes Mellitus   BPH  Acute on Chronic Respiratory Failure / Fungal PNA (8/2022)      TREATMENT:            R-CHOP w/ Revlimid and Tafasitimab  Polatuzumab/BR   Cycle #1 -  8/1/22      ASSESSMENT AND PLAN:          1. 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 ID: afebrile, Cont to treat for fungal PNA (POA)  - Viral respiratory w/ COVID 19 (8/9/22): Negative  - MRSA swab (8/11/22) - Negative & procalcitonin (8/10/22) - 0.8  - CT chest (8/9/22): Moderate bilateral groundglass opacity new since prior chest CT consistent with infectious/inflammatory pneumonitis. Background mild-to-moderate lower lobe predominant pulmonary fibrosis without change  - S/p Bronchoscopy w/ BAL and biopsy (8/10/22) - Galactomannan positive 1.33, Diatherix - negative  - BAL fungal cx (8/10/22) - Candida krusei, sensitivities pending   - Aspergillus (8/14/22) - negative, fungitell (8/14/22): > 500 (positive)  - Cont Vfend Day + 12 (started 8/13/22)  - Consult ID to review current infx and tx and make recs   -Cont vori, send isolate for testing    Abx history:  Cefepime x 7 days (8/10/22 - 8/16/22)      3. Heme: Anemia and thrombocytopenia likely r/t recent chemotherapy  - Transfuse for Hgb < 7 and Platelets < 10 K  - No transfusion today     4.  Metabolic:  ongoing hyperglycemia with hypoglycemia in am, contraction alkalosis    - S/p Lasix 40 mg IV x 1 (8/12/22)  - Cont KPhos 500 mg bid (started 8/16/22)  - Cont KCl 20 meq daily (started 8/17/22)  - No IVF  - Replace Phos, K+ & Mg per PRN orders       5. GI / Nutrition:          Nutrition:  Severe malnutrition w/ chronic illness    - Cont regular diet, no straws   - SLP eval (8/13/22): Aspiration with straws, regular diet and thin liquids with cups only  - MBS (8/16/22) - No laryngeal penetration or aspiration.   - Cont Glucerna shakes BID and Magic cups BID  - Recommend swabbing mouth after all meals. Must be completely awake and upright for PO intake  - Dietary to follow closely  Nausea:  - Cont Zofran and Compazine as needed  Constipation:  no complaints   - S/p Colace bid     6. Pulm:  no change cont sob   - H/o Interstitial lung disease / pulmonary fibrosis  - F/b Dr. Bella Kruger   - S/p bronchoscopy (3/25/22) and PFTs (4/1/22)  - Home O2 - 2 l/min      Hypoxemia:  Admitted w/ acute on chronic respiratory failure possibly from  pneumonitis from chemotherapy (Rituxan vs Jerzy), but more than likely from multifocal fungal PNA (POA). - Pulm following, appreciate recs  - CT chest (8/9/22) - Moderate bilateral groundglass opacity new since prior chest CT consistent with infectious/inflammatory pneumonitis. Background mild-to-moderate lower lobe predominant pulmonary fibrosis without change. A 5 cm lesion in the anterior aspect left lobe of the liver stable to mildly decreased in size and of indeterminate etiology.  - BAL fungal cx (8/10/22) - Candida krusei, sensitivities pending   - S/p Bronchoscopy w/ BAL and biopsy (8/10/22): + galactomannan, Diatherix negative  - S/p steroids:  Solumedrol 60 mg IV daily (started 8/10/22), 60 mg BID (8/12/22, d/t increased oxygen requirements), 60 mg daily (8/14/22 d/t likely fungal pneumonia), Prednisone 20 mg daily (8/15/22), 10 mg daily (8/16/22)  - Cont high flow O2 currently up to @ 8-10 l/min; titrate for O2 Sat 88%  - See ID section for additional treatment and management    7.  Endo:   - H/o T2DM   T2DM:  exacerbated by steroids, now improving   - Increase Lantus 12 units nightly (stopped 8/11/22, restarted 5 units 8/18/22, increased 8 units 8/19/22, increased 12 units 8/22/22, decrease 10 unit 8/24/22)  - Home regimen: on humalog SSI, janumet and jardiance  - Cont Humalog high regimen SSI AC/HS      8. MSK:  he has acute debilitation and generalized weakness d/t CNS lymphoma and hypoxemia from fungal PNA   - Cont PT/OT - he is extremely weak and  will require SNF placement ([facility] approved) when hypoxemia improves     9. :  - H/o BPH  BPH:    - Cont Flomax daily     10.   LUE swelling:  Improved   - Doppler US (8/17/22) - no evidence of DVT  - Cont to elevate     - DVT Prophylaxis: Platelets >43,011 cells/dL, - daily lovenox prophylaxis ordered  Contraindications to pharmacologic prophylaxis: None  Contraindications to mechanical prophylaxis: None    - Disposition: Plan for SNF; once hypoxemia improves     Sylvia Dominguez MD

## 2022-08-24 NOTE — PROGRESS NOTES
CC: Hypoxemia    Up in a chair, feeling okay, no complaint of dyspnea. He appears tired, awakens easily. Sleep was interrupted frequently last night. Afebrile  WBC 6.4. Hemoglobin stable 25.5. Platelets 776  Pippa Krusei cultured from BAL. Continues on voriconazole. /80. Sinus tachycardia  S1, S2 normal.  O2 saturation 100% on high flow nasal and mask oxygen. Note he had O2 desaturation when brushing his teeth, while on high flow nasal O2 at 15 L. Bilateral inspiratory crackles  Abdomen benign. Oral intake decreased  No peripheral edema. Mildly negative fluid balance  Creatinine <0.5. Electrolytes normal, except elevated bicarbonate 37. Albumin 2.8. A&P: Pneumonia, secondary to Candida. As yet, we have not seen a significant response to treatment, now on voriconazole day #12. Immune compromise likely is limiting his response. Hypoxemia is compensated with moderately high flow O2. While he has desaturation with activity, his overall oxygen requirement has not continued to rise.

## 2022-08-24 NOTE — PROGRESS NOTES
4 Eyes Admission Assessment     I agree as the admission nurse that 2 RN's have performed a thorough Head to Toe Skin Assessment on the patient. ALL assessment sites listed below have been assessed on admission. Areas assessed by both nurses: ***  [x]   Head, Face, and Ears   [x]   Shoulders, Back, and Chest  [x]   Arms, Elbows, and Hands   [x]   Coccyx, Sacrum, and Ischium  [x]   Legs, Feet, and Heels        Does the Patient have Skin Breakdown?   Yes a wound was noted on the Admission Assessment and an LDA was Initiated documentation include the Savi-wound, Wound Assessment, Measurements, Dressing Treatment, Drainage, and Color\",         Giorgio Prevention initiated:  Yes   Wound Care Orders initiated:  Yes      [address]  nurse consulted for Pressure Injury (Stage 3,4, Unstageable, DTI, NWPT, and Complex wounds) or Giorgio score 18 or lower:  Yes      Nurse 1 eSignature: Electronically signed by Melani Walls RN on 8/24/22 at 6:17 PM EDT    **SHARE this note so that the co-signing nurse is able to place an eSignature**    Nurse 2 eSignature: {Esignature:[id number]}

## 2022-08-25 NOTE — PROGRESS NOTES
ID Follow-up NOTE    CC:   Pneumonia in immunocompromised pt, Aspergillus galactomannan +, Pushpa resp cult +  Antibiotics: Voriconazole, Zosyn    Admit Date: 2022  Hospital Day: 17    Subjective:     Patient is SOB, weak  No fever  No cough    Objective:     Patient Vitals for the past 8 hrs:   Pulse Resp SpO2 Weight   22 1053 (!) 139 (!) 43 98 % --   22 1052 (!) 135 (!) 36 99 % --   22 0800 -- -- -- 98 lb 12.3 oz (44.8 kg)     I/O last 3 completed shifts: In: 240 [P.O.:240]  Out: 750 [Urine:750]  No intake/output data recorded. EXAM:  GENERAL: No apparent distress. 15L / NRB - 95%  HEENT: Membranes moist, no oral lesion  NECK:  Supple, no lymphadenopathy  LUNGS: Clear b/l, no rales, no dullness  CARDIAC: RRR, no murmur appreciated  ABD:  + BS, soft / NT  EXT:  No rash, no edema, no lesions  NEURO: No focal neurologic findings  PSYCH: Orientation, sensorium, mood normal  LINES:  R chest port accessed        Data Review:  Lab Results   Component Value Date    WBC 7.7 2022    HGB 8.0 (L) 2022    HCT 24.6 (L) 2022    MCV 95.1 2022     2022     Lab Results   Component Value Date    CREATININE <0.5 (L) 2022    BUN 10 2022     2022    K 3.8 2022    CL 97 (L) 2022    CO2 38 (H) 2022       Hepatic Function Panel:   Lab Results   Component Value Date/Time    ALKPHOS 246 2022 03:37 AM    ALT 26 2022 03:37 AM    AST 40 2022 03:37 AM    PROT 4.5 2022 03:37 AM    BILITOT <0.2 2022 03:37 AM    BILIDIR <0.2 2022 03:37 AM    IBILI see below 2022 03:37 AM    LABALBU 2.8 2022 03:37 AM       Micro:     Resp PCR with SARS CoV-2 negative  8/10 BAL cult - 50-100k normal resp jena  8/10 BAL fungal cult - light Candida krusei  8/10 BAL Galactomannan positive  8/10 BAL Diatherix neg   MRSA nasal PCR neg     Imagin/9 CT chest   1.  Moderate bilateral groundglass opacity new since prior chest CT consistent with infectious/inflammatory pneumonitis. 2. Background mild-to-moderate lower lobe predominant pulmonary fibrosis without change. 3. A 5 cm lesion in the anterior aspect left lobe of the liver stable to mildly decreased in size and of indeterminate etiology. 8/22 CXR  Extensive multifocal consolidation without change. Stable cardiomediastinal silhouette.    Right jugular chest port again noted    Scheduled Meds:   enoxaparin  30 mg SubCUTAneous Daily    piperacillin-tazobactam  3,375 mg IntraVENous Q8H    anidulafungin  200 mg IntraVENous Once    And    [START ON 8/26/2022] anidulafungin  100 mg IntraVENous Q24H    phosphorus  500 mg Oral TID    Venelex   Topical BID    potassium chloride  20 mEq Oral Daily with breakfast    valACYclovir  500 mg Oral BID    insulin lispro  0-16 Units SubCUTAneous TID WC    insulin lispro  0-4 Units SubCUTAneous Nightly    bacitracin-polymyxin b   Topical BID    fluticasone  1 spray Each Nostril Daily    levETIRAcetam  500 mg Oral BID    sodium chloride  2 spray Nasal 4x Daily    tamsulosin  0.4 mg Oral Daily    sodium chloride flush  5-40 mL IntraVENous 2 times per day    Saline Mouthwash  15 mL Swish & Spit 4x Daily AC & HS       Continuous Infusions:   sodium chloride      sodium chloride      potassium chloride      dextrose         PRN Meds:  sodium phosphate IVPB, acetaminophen, ondansetron, prochlorperazine, sodium chloride, sodium chloride flush, sodium chloride, potassium chloride, magnesium sulfate, magnesium hydroxide, Saline Mouthwash, alteplase (CATHFLO) with sterile water injection, glucose, dextrose bolus **OR** dextrose bolus, glucagon (rDNA), dextrose      Assessment:     Hx DM, interstitial lung d (O2 at home), BPH, HTN, HL  Dx NHL with CNS involvement, rx R CHOP, relapse - rx Polatuzumab/CA     Pneumonia in immunocompromised host  Aspergillus based on BAL galactmannan     C krusei in BAL cult   - unknown significance  - C krusei often R fluconazole though more likely sensitive to Voriconazole      Worse hypoxia / O2 requirement        Plan:     Started on Zosyn and Anidulafungin by Heme  Would restart Voriconazole for activity against Aspergillus    Await C krusei sensitivity testing    Medical Decision Making:   The following items were considered in medical decision making:  Discussion of patient care with other providers  Reviewed clinical lab tests  Reviewed radiology tests  Reviewed other diagnostic tests/interventions  Independent review of radiologic images - have reviewed CXR / Chest CT   Microbiology cultures and other micro tests reviewed      Discussed with pt's wife, Karen Isak / Juliana Rankin - Dr Klaus Bhagat MD

## 2022-08-25 NOTE — PROGRESS NOTES
Pulmonary Followup Note    Indication for visit: hypoxia    Subjective:  Patient afebrile but with increasing oxygen requirements. Currently on 15L HFNC and NRB. Tachycardic up to 140's today. Saturating 100%. Reports \" feeling well\" and that he does not feel SOB. ABG this AM 7.414/53.0/108.0/33. 9.      enoxaparin  30 mg SubCUTAneous Daily    piperacillin-tazobactam  3,375 mg IntraVENous Q8H    anidulafungin  200 mg IntraVENous Once    And    [START ON 8/26/2022] anidulafungin  100 mg IntraVENous Q24H    sodium chloride  500 mL IntraVENous Once    phosphorus  500 mg Oral TID    Venelex   Topical BID    potassium chloride  20 mEq Oral Daily with breakfast    valACYclovir  500 mg Oral BID    insulin lispro  0-16 Units SubCUTAneous TID WC    insulin lispro  0-4 Units SubCUTAneous Nightly    bacitracin-polymyxin b   Topical BID    fluticasone  1 spray Each Nostril Daily    levETIRAcetam  500 mg Oral BID    sodium chloride  2 spray Nasal 4x Daily    tamsulosin  0.4 mg Oral Daily    sodium chloride flush  5-40 mL IntraVENous 2 times per day    Saline Mouthwash  15 mL Swish & Spit 4x Daily AC & HS       Continuous Infusions:   sodium chloride      sodium chloride      potassium chloride      dextrose         ROS:  Negative for HA, dizziness, vision changes, sore throat, CP, SOB, abd pain, N/V, constipation. No fevers, chills, sweats. PHYSICAL EXAMINATION:  /60   Pulse (!) 139   Temp 98.6 °F (37 °C) (Axillary)   Resp (!) 43   Ht 5' 6\" (1.676 m)   Wt 98 lb 12.3 oz (44.8 kg)   SpO2 98%   BMI 15.94 kg/m²     Gen: Thin male in no acute distress. Speaking in full sentences without using accessory resp muscles  Eyes: PERRL, EOMI, normal conjunctivae  Ears, Nose, Mouth and Throat: Hearing is normal. Oropharynx is normal  Neck: No lymphadenopathy  Respiratory: mild crackles at bilateral bases. Normal WOB  CV: Tachycardic. Regular rhythm  Abd: +BS, soft, NT/ND  Musculoskeletal: No cyanosis, clubbing, or edema.   Skin: No rashes, ulcers, or subcutaneous nodules  Psychiatric: Alert and oriented to time place and person    DATA  CBC:   Recent Labs     08/23/22  0348 08/24/22  0337 08/25/22  0427   WBC 5.7 6.4 7.7   HGB 8.1* 7.9* 8.0*   HCT 25.5* 25.5* 24.6*   MCV 95.8 95.9 95.1   * 140 145     BMP:   Recent Labs     08/23/22  0348 08/24/22  0337 08/25/22  0427    144 143   K 3.9 3.8 3.8   CL 98* 99 97*   CO2 38* 37* 38*   PHOS 2.4* 2.7 2.6   BUN 12 11 10   CREATININE <0.5* <0.5* <0.5*     Recent Labs     08/25/22  1222   PHART 7.414   [id number] 53.0*   PO2ART 108.0     LIVER PROFILE:   Recent Labs     08/24/22 0337   AST 40*   ALT 26   BILIDIR <0.2   BILITOT <0.2   ALKPHOS 246*       XR CHEST PORTABLE   Final Result   Impression: Stable appearance of the chest including multifocal bilateral pulmonary opacities. XR CHEST PORTABLE   Final Result      Extensive multifocal consolidation without change. Stable cardiomediastinal silhouette. Right jugular chest port again noted. XR CHEST 1 VIEW   Final Result      Mild progression of bilateral airspace disease      XR CHEST PORTABLE   Final Result      Extensive bilateral airspace disease. This is similar to prior study. VL Extremity Venous Left   Final Result      FL MODIFIED BARIUM SWALLOW W VIDEO   Final Result      No laryngeal penetration or aspiration. Please refer to the detailed report of the speech therapist.            XR CHEST PORTABLE   Final Result      1. Diffuse bilateral pulmonary infiltrate and/or edema overall demonstrating question slight improvement from prior exam.      XR CHEST PORTABLE   Final Result      Increased bilateral airspace disease            XR CHEST PORTABLE   Final Result      Bilateral airspace disease, increased since prior exam.      CT CHEST W CONTRAST   Final Result      1. Moderate bilateral groundglass opacity new since prior chest CT consistent with infectious/inflammatory pneumonitis.    2. Background mild-to-moderate lower lobe predominant pulmonary fibrosis without change. 3. A 5 cm lesion in the anterior aspect left lobe of the liver stable to mildly decreased in size and of indeterminate etiology. XR CHEST (2 VW)   Final Result   1. Diffuse patchy bilateral airspace disease concerning for pneumonia or pulmonary edema. ASSESSMENT/PLAN:  69-year-old male with DLBCL, non-Hodgkin lymphoma with CNS mass and biopsy-proven non-Hodgkin lymphoma. Fungal PNA  Hypoxemia  S/p Cycle 1 08/01/2022  C2 to be started post improvement in the respiratory status. Chemotherapy Polatuzumab / Bendamustine, Decadron and Rituximab. Patient has history of ILD and is on 2 L NC at home. Aspergillus Galacto AG positive on 08/10. 1,3 Beta D-Glucan 08/14 positive on 08/14. BAL culture 08/10/22 positive for Pippa Krusei    Increase in oxygen requirements. Patient currently on 15L HFNC and NRB. Antibiotic coverage broadened by Heme/Onc, patient switched to Zosyn. Antifungal treatment escalated to Eraxis today due to no improvement in the clinic picture with voriconazole. cont valacyclovir 500mg BID. ABG this AM reassuring. Patient is a DNI. The patient has been staffed and discussed with Dr. Marcelina Carter at bedside. Sarah Duran MD   PGY-2  Internal Medicine  Patient examined, findings as discussed with Dr. Shaka Villa. Agree with assessment and plan. Continued severe hypoxemia, associated with diffuse pneumonia. Evidence of fungal infection found on culture, and in Fungitell and galactomannan levels. Most response may account for his lack of improvement. Agree with i changing antifungal therapy to Eraxis. He is maintaining adequate ventilation, with compensated acid-base balance. Oxygenation is compensated with high flow O2. He is very clear about not being intubated. Whether he would be resuscitated is a moot point.   Case discussed with Dr. Elmira Whalen, infectious disease, and Nino Stafford, APRN-CNP oncology service

## 2022-08-25 NOTE — PROGRESS NOTES
Physical Therapy/Occupational Therapy  HOLD  RN requested holding PT/OT for now due to tachycardia & on non-rebreather mask. Will continue as appropriate.   Kylah Dos Santos, [facility]

## 2022-08-25 NOTE — PROGRESS NOTES
[facility] Progress Note    2022     Ro Mckinnon    MRN: [id number]    : 1945    Referring MD: Fredy Singleton MD  [address], [address],  [facility] [address]    SUBJECTIVE:  he feels well overall but cont to be sob     ECOG PS:(3) Capable of limited self-care, confined to bed or chair > 50% of waking hours    KPS: 50%  Requires considerable assistance and frequent medical care    Isolation: None    Medications    Scheduled Meds:   insulin glargine  8 Units SubCUTAneous Daily    phosphorus  500 mg Oral TID    Venelex   Topical BID    enoxaparin  40 mg SubCUTAneous Daily    potassium chloride  20 mEq Oral Daily with breakfast    valACYclovir  500 mg Oral BID    voriconazole  200 mg Oral 2 times per day    insulin lispro  0-16 Units SubCUTAneous TID WC    insulin lispro  0-4 Units SubCUTAneous Nightly    bacitracin-polymyxin b   Topical BID    fluticasone  1 spray Each Nostril Daily    levETIRAcetam  500 mg Oral BID    sodium chloride  2 spray Nasal 4x Daily    tamsulosin  0.4 mg Oral Daily    sodium chloride flush  5-40 mL IntraVENous 2 times per day    Saline Mouthwash  15 mL Swish & Spit 4x Daily AC & HS     Continuous Infusions:   sodium chloride      sodium chloride      potassium chloride      dextrose       PRN Meds:.sodium phosphate IVPB, acetaminophen, ondansetron, prochlorperazine, sodium chloride, sodium chloride flush, sodium chloride, potassium chloride, magnesium sulfate, magnesium hydroxide, Saline Mouthwash, alteplase (CATHFLO) with sterile water injection, glucose, dextrose bolus **OR** dextrose bolus, glucagon (rDNA), dextrose    ROS:  As noted above, otherwise remainder of 10-point ROS negative    Physical Exam:     I&O:    Intake/Output Summary (Last 24 hours) at 2022 0616  Last data filed at 2022 0040  Gross per 24 hour   Intake 240 ml   Output 750 ml   Net -510 ml         Vital Signs:  /60   Pulse (!) 110   Temp 98.6 °F (37 °C) (Axillary)   Resp 24 Ht 5' 6\" (1.676 m)   Wt 115 lb 8.3 oz (52.4 kg)   SpO2 100%   BMI 18.65 kg/m²     Weight:    Wt Readings from Last 3 Encounters:   08/23/22 115 lb 8.3 oz (52.4 kg)   07/25/22 116 lb 10 oz (52.9 kg)   05/31/22 117 lb (53.1 kg)       General: Awake, alert and oriented  HEENT: normocephalic, PERRL, no scleral erythema or icterus, Oral mucosa moist and intact, throat clear  NECK: supple   BACK: Straight   SKIN: warm dry and intact without lesions rashes or masses  CHEST: poor air excursion, no rhonchi  CV: Normal S1 S2, irreg, no MRG  ABD: NT ND normoactive BS, no palpable masses or hepatosplenomegaly  EXTREMITIES: without edema, denies calf tenderness  NEURO: CN II - XII grossly intact  CATHETER:  Right IJ SL PAC (1/11/22) - CDI      Data    CBC:   Recent Labs     08/23/22 0348 08/24/22 0337 08/25/22 0427   WBC 5.7 6.4 7.7   HGB 8.1* 7.9* 8.0*   HCT 25.5* 25.5* 24.6*   MCV 95.8 95.9 95.1   * 140 145       BMP/Mag:  Recent Labs     08/23/22 0348 08/24/22 0337 08/25/22 0427    144 143   K 3.9 3.8 3.8   CL 98* 99 97*   CO2 38* 37* 38*   PHOS 2.4* 2.7 2.6   BUN 12 11 10   CREATININE <0.5* <0.5* <0.5*   MG 1.50* 1.50* 1.60*       LIVP:   Recent Labs     08/24/22 0337   AST 40*   ALT 26   BILIDIR <0.2   BILITOT <0.2   ALKPHOS 246*       Coags:   Recent Labs     08/25/22 0427   PROTIME 13.3   INR 1.02   APTT 38.8*       Uric Acid   Recent Labs     08/24/22 0337   LABURIC 2.0*       Diagnostics:   CT chest (8/9/22):  1. Moderate bilateral groundglass opacity new since prior chest CT consistent with infectious/inflammatory pneumonitis. 2. Background mild-to-moderate lower lobe predominant pulmonary fibrosis without change. 3. A 5 cm lesion in the anterior aspect left lobe of the liver stable to mildly decreased in size and of indeterminate etiology.     PROBLEM LIST:           DLBC  Interstitial lung disease / Pulmonary Fibrosis   Type 2 Diabetes Mellitus   BPH  Acute on Chronic Respiratory Failure Aspiration with straws, regular diet and thin liquids with cups only  - MBS (8/16/22) - No laryngeal penetration or aspiration.   - Cont Glucerna shakes BID and Magic cups BID  - Recommend swabbing mouth after all meals. Must be completely awake and upright for PO intake  - Dietary to follow closely  Nausea:  - Cont Zofran and Compazine as needed  Constipation:  no complaints   - S/p Colace bid     6. Pulm:  no change cont sob - not improving     - H/o Interstitial lung disease / pulmonary fibrosis  - F/b Dr. Mónica Yun   - S/p bronchoscopy (3/25/22) and PFTs (4/1/22)  - Home O2 - 2 l/min    Hypoxemia:  Admitted w/ acute on chronic respiratory failure possibly from  pneumonitis from chemotherapy (Rituxan vs Jerzy), but more than likely from multifocal fungal/candida PNA (POA). - Pulm following, appreciate recs  - CT chest (8/9/22) - Moderate bilateral groundglass opacity new since prior chest CT consistent with infectious/inflammatory pneumonitis. Background mild-to-moderate lower lobe predominant pulmonary fibrosis without change. A 5 cm lesion in the anterior aspect left lobe of the liver stable to mildly decreased in size and of indeterminate etiology.  - BAL fungal cx (8/10/22) - Candida krusei, sensitivities pending   - S/p Bronchoscopy w/ BAL and biopsy (8/10/22): + galactomannan, Diatherix negative  - S/p steroids:  Solumedrol 60 mg IV daily (started 8/10/22), 60 mg BID (8/12/22, d/t increased oxygen requirements), 60 mg daily (8/14/22 d/t likely fungal pneumonia), Prednisone 20 mg daily (8/15/22), 10 mg daily (8/16/22)  - Cont high flow O2; back up to @ 15 l/min; titrate for O2 Sat 88%  - See ID section for additional treatment and management    7.  Endo:   - H/o T2DM   T2DM:  exacerbated by steroids, now improving   - Cont Lantus 8 units nightly (stopped 8/11/22, restarted 5 units 8/18/22, increased 8 units 8/19/22, increased 12 units 8/22/22, decrease 8 units 8/24/22) --> STOP Lantus 8/25   - Home regimen: on humalog SSI, janumet and jardiance  - Cont Humalog high regimen SSI AC/HS      8. 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 :  - H/o BPH  BPH:    - Cont Flomax daily     10.   LUE swelling:  Improved   - Doppler US (8/17/22) - no evidence of DVT  - Cont to elevate     - CODE Status:  limited code (CPR BUT NO mech vent)     - DVT Prophylaxis: Platelets >08,038 cells/dL, - daily lovenox prophylaxis ordered  Contraindications to pharmacologic prophylaxis: None  Contraindications to mechanical prophylaxis: None    - Disposition: Plan for SNF; once hypoxemia improves     MARCIA Matt - CNP

## 2022-08-25 NOTE — PROGRESS NOTES
Notified Michael Mercedes of patient's HR and increased work of breathing. HR 140s, RR 35, dyspneic on 15L HFNC and 15L nonrebreather. spO2 100%, BP 110s. Afebrile. Obtained ABG, lactic, and blood cultures at 1230.  Notified Dr. Miguel Alvarado

## 2022-08-26 PROBLEM — Z71.89 ADVANCE CARE PLANNING: Status: ACTIVE | Noted: 2022-01-01

## 2022-08-26 PROBLEM — Z51.5 ENCOUNTER FOR PALLIATIVE CARE: Status: ACTIVE | Noted: 2022-01-01

## 2022-08-26 NOTE — CONSULTS
[facility] [place]  [place]      Date Of Admission:8/9/2022  Date of consult: 08/26/22  Seen by [facility] in the past:  Yes    Recommendations:        Mr. Aleyda Srinivasan is lethargic and weak. He denies sob even while appearing dyspneic. He repeated a couple times \"I'm fine\" and denied any needs. I let him know that we could talk about giving him some medication for comfort if he needs it, which he declined right now. I called his wife. Introduced palliative care and had a voluntary discussion about advance care planning. She confirmed that he has adamantly been opposed to a ventilator or any \"tubes. \" He had told one doctor that he wanted CPR, but he had previously expressed that he wouldn't want CPR or intubation. She's not sure he understood the decision fully when he said he wanted CPR. Mrs. Aleyda Srinivasan believes that her  does want to continue aggressive treatment right now, in hopes that he can recover to an acceptable quality of life, but that he would not want to be intubated or resuscitated. She is worried he will not improve, but will honor his wishes to continue aggressive care as long as he wants. Offered emotional support. D/w Regina Chisholm, APRN. 1. Goals of Care/Advanced Care planning/Code status: changed to DNR/DNI (Limited- no to all interventions) per pt/wife's wishes. They want to continue aggressive care in hopes that pt can recover to an acceptable quality of life. 2. Pain: pt denies any pain. 3. SOB: pt denies sob even as he appears dyspneic. He repeats a couple times \"I'm fine. \" He is on 10 L oxygen and eraxis, voriconazole, zosyn. Hx of interstitial lung disease/pulmonary fibrosis, admitted with acute on chronic respiratory failure likely due to multifocal fungal pneumonia.   4. Disposition: TBD    Reason for Consult:         [x]  Goals of Care  []  Code Status Discussion/Advanced Care Planning   [x]  Psychosocial/Family Support  [x]  Symptom Management  [] sounds are normal.      Palpations: Abdomen is soft. Musculoskeletal:         General: No swelling. Skin:     General: Skin is warm and dry. Neurological:      Comments: Lethargic        Palliative Performance Scale:  [] 60% Ambulation reduced; Significant disease; Can't do hobbies/housework; intake normal or reduced; occasional assist; LOC full/confusion  [] 50% Mainly sit/lie; Extensive disease; Can't do any work; Considerable assist; intake normal  Or reduced; LOC full/confusion  [] 40% Mainly in bed; Extensive disease; Mainly assist; intake normal or reduced; occasional assist; LOC full/confusion  [x] 30% Bed Bound; Extensive disease; Total care; intake reduced; LOC full/confusion  [] 20% Bed Bound; Extensive disease; Total care; intake minimal; Drowsy/coma  [] 10% Bed Bound; Extensive disease; Total care; Mouth care only; Drowsy/coma  [] 0% Death    Vitals:    /65   Pulse (!) 128   Temp 97.4 °F (36.3 °C) (Axillary)   Resp 25   Ht 5' 6\" (1.676 m)   Wt 98 lb 12.3 oz (44.8 kg)   SpO2 95%   BMI 15.94 kg/m²     Labs:    BMP:   Recent Labs     08/24/22 0337 08/25/22 0427 08/26/22  0326    143 144   K 3.8 3.8 3.7   CL 99 97* 98*   CO2 37* 38* 35*   BUN 11 10 11   CREATININE <0.5* <0.5* <0.5*   GLUCOSE 67* 110* 175*     CBC:   Recent Labs     08/24/22 0337 08/25/22 0427 08/26/22  0326   WBC 6.4 7.7 9.5   HGB 7.9* 8.0* 7.5*   HCT 25.5* 24.6* 23.3*    145 140       LFT's:   Recent Labs     08/24/22 0337 08/26/22  0326   AST 40* 28   ALT 26 20   BILITOT <0.2 0.5   ALKPHOS 246* 267*     Troponin: No results for input(s): TROPONINI in the last 72 hours. BNP: No results for input(s): BNP in the last 72 hours.   ABGs:   Recent Labs     08/25/22  1222   PHART 7.414   URC3RZR 53.0*   PO2ART 108.0     INR:   Recent Labs     08/25/22  0427   INR 1.02       U/A:No results for input(s): NITRITE, COLORU, PHUR, LABCAST, WBCUA, RBCUA, MUCUS, TRICHOMONAS, YEAST, BACTERIA, CLARITYU, SPECGRAV, LEUKOCYTESUR, UROBILINOGEN, BILIRUBINUR, BLOODU, GLUCOSEU, AMORPHOUS in the last 72 hours. Invalid input(s): KETONESU    XR CHEST PORTABLE   Final Result   Impression: Stable appearance of the chest including multifocal bilateral pulmonary opacities. XR CHEST PORTABLE   Final Result      Extensive multifocal consolidation without change. Stable cardiomediastinal silhouette. Right jugular chest port again noted. XR CHEST 1 VIEW   Final Result      Mild progression of bilateral airspace disease      XR CHEST PORTABLE   Final Result      Extensive bilateral airspace disease. This is similar to prior study. VL Extremity Venous Left   Final Result      FL MODIFIED BARIUM SWALLOW W VIDEO   Final Result      No laryngeal penetration or aspiration. Please refer to the detailed report of the speech therapist.            XR CHEST PORTABLE   Final Result      1. Diffuse bilateral pulmonary infiltrate and/or edema overall demonstrating question slight improvement from prior exam.      XR CHEST PORTABLE   Final Result      Increased bilateral airspace disease            XR CHEST PORTABLE   Final Result      Bilateral airspace disease, increased since prior exam.      CT CHEST W CONTRAST   Final Result      1. Moderate bilateral groundglass opacity new since prior chest CT consistent with infectious/inflammatory pneumonitis. 2. Background mild-to-moderate lower lobe predominant pulmonary fibrosis without change. 3. A 5 cm lesion in the anterior aspect left lobe of the liver stable to mildly decreased in size and of indeterminate etiology. XR CHEST (2 VW)   Final Result   1. Diffuse patchy bilateral airspace disease concerning for pneumonia or pulmonary edema.             Conclusion/Time spent:         Recommendations see above    Pt [id number], Family [phone]  Time spent with patient and/or family: 30 min  Time reviewing records: 10 min   Time communicating with staff: 5 min     A total

## 2022-08-26 NOTE — PROGRESS NOTES
Patient has been switched over to a non-rebreather at 15L. Patient seems to mouth breathe and benefits better from 15L non-rebrether rather than 15L high flow cannula at night.

## 2022-08-26 NOTE — PROGRESS NOTES
ID Follow-up NOTE    CC:   Pneumonia in immunocompromised pt, Aspergillus galactomannan +, Pushpa resp cult +  Antibiotics: Voriconazole, Zosyn    Admit Date: 2022  Hospital Day: 18    Subjective:     Patient is SOB, weak  No fever  No cough    Objective:     Patient Vitals for the past 8 hrs:   BP Temp Temp src Pulse Resp SpO2   22 1213 125/65 97.4 °F (36.3 °C) Axillary (!) 128 25 95 %   22 0823 115/70 97 °F (36.1 °C) Axillary (!) 120 22 --   22 0755 -- -- -- -- -- 100 %       I/O last 3 completed shifts: In: 5726 [I.V.:1277]  Out: 900 [Urine:900]  No intake/output data recorded. EXAM:  GENERAL: No apparent distress.   15L / NRB - 95%  HEENT: Membranes moist, no oral lesion  NECK:  Supple, no lymphadenopathy  LUNGS: Clear b/l, no rales, no dullness  CARDIAC: RRR, no murmur appreciated  ABD:  + BS, soft / NT  EXT:  No rash, no edema, no lesions  NEURO: No focal neurologic findings  PSYCH: Orientation, sensorium, mood normal  LINES:  R chest port accessed        Data Review:  Lab Results   Component Value Date    WBC 9.5 2022    HGB 7.5 (L) 2022    HCT 23.3 (L) 2022    MCV 96.3 2022     2022     Lab Results   Component Value Date    CREATININE <0.5 (L) 2022    BUN 11 2022     2022    K 3.7 2022    CL 98 (L) 2022    CO2 35 (H) 2022       Hepatic Function Panel:   Lab Results   Component Value Date/Time    ALKPHOS 267 2022 03:26 AM    ALT 20 2022 03:26 AM    AST 28 2022 03:26 AM    PROT 4.5 2022 03:26 AM    BILITOT 0.5 2022 03:26 AM    BILIDIR <0.2 2022 03:26 AM    IBILI see below 2022 03:26 AM    LABALBU 2.8 2022 03:26 AM       Micro:     Resp PCR with SARS CoV-2 negative  8/10 BAL cult - 50-100k normal resp jena  8/10 BAL fungal cult - light Candida krusei  8/10 BAL Galactomannan positive  8/10 BAL Diatherix neg   MRSA nasal PCR neg     Imagin/9 CT chest 1. Moderate bilateral groundglass opacity new since prior chest CT consistent with infectious/inflammatory pneumonitis. 2. Background mild-to-moderate lower lobe predominant pulmonary fibrosis without change. 3. A 5 cm lesion in the anterior aspect left lobe of the liver stable to mildly decreased in size and of indeterminate etiology. 8/22 CXR  Extensive multifocal consolidation without change. Stable cardiomediastinal silhouette.    Right jugular chest port again noted    8/25 CXR   Impression: Stable appearance of the chest including multifocal bilateral pulmonary opacities        Scheduled Meds:   voriconazole  200 mg Oral 2 times per day    enoxaparin  30 mg SubCUTAneous Daily    piperacillin-tazobactam  3,375 mg IntraVENous Q8H    anidulafungin  100 mg IntraVENous Q24H    phosphorus  500 mg Oral TID    Venelex   Topical BID    potassium chloride  20 mEq Oral Daily with breakfast    valACYclovir  500 mg Oral BID    insulin lispro  0-16 Units SubCUTAneous TID WC    insulin lispro  0-4 Units SubCUTAneous Nightly    bacitracin-polymyxin b   Topical BID    fluticasone  1 spray Each Nostril Daily    levETIRAcetam  500 mg Oral BID    sodium chloride  2 spray Nasal 4x Daily    tamsulosin  0.4 mg Oral Daily    sodium chloride flush  5-40 mL IntraVENous 2 times per day    Saline Mouthwash  15 mL Swish & Spit 4x Daily AC & HS       Continuous Infusions:   0.9% NaCl with KCl 20 mEq 50 mL/hr at 08/26/22 1107    sodium chloride      sodium chloride      potassium chloride      dextrose         PRN Meds:  sodium phosphate IVPB, acetaminophen, ondansetron, prochlorperazine, sodium chloride, sodium chloride flush, sodium chloride, potassium chloride, magnesium sulfate, magnesium hydroxide, Saline Mouthwash, alteplase (CATHFLO) with sterile water injection, glucose, dextrose bolus **OR** dextrose bolus, glucagon (rDNA), dextrose      Assessment:     Hx DM, interstitial lung d (O2 at home), BPH, HTN, HL  Dx NHL with CNS involvement, rx R CHOP, relapse - rx Polatuzumab/NV     Pneumonia in immunocompromised host  - Hypoxia / O2 requirement   - diffuse CXR desn  Aspergillus based on BAL galactmannan     C krusei in BAL cult   - unknown significance  - C krusei often R fluconazole though more likely sensitive to Voriconazole     Plan:     in by Heme  Cont Voriconazole   Cont Zosyn and Anidulafungin - started 8/25    Await C krusei sensitivity testing    Medical Decision Making:   The following items were considered in medical decision making:  Discussion of patient care with other providers  Reviewed clinical lab tests  Reviewed radiology tests  Reviewed other diagnostic tests/interventions  Independent review of radiologic images - have reviewed CXR / Chest CT   Microbiology cultures and other micro tests reviewed      Discussed with pt, pt's wife  Kiana Nicholson MD

## 2022-08-26 NOTE — PROGRESS NOTES
Pulmonary Followup Note    Indication for visit: hypoxia    Subjective:  No acute overnight events. Patient stayed afebrile with heart rate in the 120s. He was hemodynamically stable. Work of breathing remains elevated and patient seems to be tiring out. No new complaints this time. voriconazole  200 mg Oral 2 times per day    enoxaparin  30 mg SubCUTAneous Daily    piperacillin-tazobactam  3,375 mg IntraVENous Q8H    anidulafungin  100 mg IntraVENous Q24H    phosphorus  500 mg Oral TID    Venelex   Topical BID    potassium chloride  20 mEq Oral Daily with breakfast    valACYclovir  500 mg Oral BID    insulin lispro  0-16 Units SubCUTAneous TID WC    insulin lispro  0-4 Units SubCUTAneous Nightly    bacitracin-polymyxin b   Topical BID    fluticasone  1 spray Each Nostril Daily    levETIRAcetam  500 mg Oral BID    sodium chloride  2 spray Nasal 4x Daily    tamsulosin  0.4 mg Oral Daily    sodium chloride flush  5-40 mL IntraVENous 2 times per day    Saline Mouthwash  15 mL Swish & Spit 4x Daily AC & HS       Continuous Infusions:   0.9% NaCl with KCl 20 mEq      sodium chloride      sodium chloride      potassium chloride      dextrose         ROS:  Negative for HA, dizziness, vision changes, sore throat, CP, SOB, abd pain, N/V, constipation. No fevers, chills, sweats. PHYSICAL EXAMINATION:  /70   Pulse (!) 120   Temp 97 °F (36.1 °C) (Axillary)   Resp 22   Ht 5' 6\" (1.676 m)   Wt 98 lb 12.3 oz (44.8 kg)   SpO2 100%   BMI 15.94 kg/m²     Gen: Thin male in no acute distress. Speaking in full sentences without using accessory resp muscles  Eyes: PERRL, EOMI, normal conjunctivae  Ears, Nose, Mouth and Throat: Hearing is normal. Oropharynx is normal  Neck: No lymphadenopathy  Respiratory: mild crackles at bilateral bases. Normal WOB  CV: Tachycardic. Regular rhythm  Abd: +BS, soft, NT/ND  Musculoskeletal: No cyanosis, clubbing, or edema.   Skin: No rashes, ulcers, or subcutaneous nodules  Psychiatric: Alert and oriented to time place and person    DATA  CBC:   Recent Labs     08/24/22  0337 08/25/22  0427 08/26/22  0326   WBC 6.4 7.7 9.5   HGB 7.9* 8.0* 7.5*   HCT 25.5* 24.6* 23.3*   MCV 95.9 95.1 96.3    145 140     BMP:   Recent Labs     08/24/22  0337 08/25/22  0427 08/26/22  0326    143 144   K 3.8 3.8 3.7   CL 99 97* 98*   CO2 37* 38* 35*   PHOS 2.7 2.6 2.0*   BUN 11 10 11   CREATININE <0.5* <0.5* <0.5*     Recent Labs     08/25/22  1222   PHART 7.414   NTU1ILX 53.0*   PO2ART 108.0     LIVER PROFILE:   Recent Labs     08/24/22 0337 08/26/22  0326   AST 40* 28   ALT 26 20   BILIDIR <0.2 <0.2   BILITOT <0.2 0.5   ALKPHOS 246* 267*       XR CHEST PORTABLE   Final Result   Impression: Stable appearance of the chest including multifocal bilateral pulmonary opacities. XR CHEST PORTABLE   Final Result      Extensive multifocal consolidation without change. Stable cardiomediastinal silhouette. Right jugular chest port again noted. XR CHEST 1 VIEW   Final Result      Mild progression of bilateral airspace disease      XR CHEST PORTABLE   Final Result      Extensive bilateral airspace disease. This is similar to prior study. VL Extremity Venous Left   Final Result      FL MODIFIED BARIUM SWALLOW W VIDEO   Final Result      No laryngeal penetration or aspiration. Please refer to the detailed report of the speech therapist.            XR CHEST PORTABLE   Final Result      1. Diffuse bilateral pulmonary infiltrate and/or edema overall demonstrating question slight improvement from prior exam.      XR CHEST PORTABLE   Final Result      Increased bilateral airspace disease            XR CHEST PORTABLE   Final Result      Bilateral airspace disease, increased since prior exam.      CT CHEST W CONTRAST   Final Result      1. Moderate bilateral groundglass opacity new since prior chest CT consistent with infectious/inflammatory pneumonitis.    2. Background mild-to-moderate lower lobe predominant pulmonary fibrosis without change. 3. A 5 cm lesion in the anterior aspect left lobe of the liver stable to mildly decreased in size and of indeterminate etiology. XR CHEST (2 VW)   Final Result   1. Diffuse patchy bilateral airspace disease concerning for pneumonia or pulmonary edema. ASSESSMENT/PLAN:  78-year-old male with DLBCL, non-Hodgkin lymphoma with CNS mass and biopsy-proven non-Hodgkin lymphoma. Fungal PNA  Hypoxemia  S/p Cycle 1 08/01/2022  C2 to be started post improvement in the respiratory status. Chemotherapy Polatuzumab / Bendamustine, Decadron and Rituximab. Patient has history of ILD and is on 2 L NC at home. Aspergillus Galacto AG positive on 08/10. 1,3 Beta D-Glucan 08/14 positive on 08/14. BAL culture 08/10/22 positive for Pippa Krusei    Increase in oxygen requirements. Patient currently on 15L HFNC and NRB. Antibiotic coverage broadened by Heme/Onc, patient switched to Zosyn. Antifungal treatment escalated to Eraxis, voriconazole added. cont valacyclovir 500mg BID. Palliative care consulted for discussion of goals of care. The patient has been staffed and discussed with Dr. Desi Hendrix at bedside. Zainab Martinez MD   PGY-2  Internal Medicine  Patient examined, findings as discussed with Dr. Keny Madrigal. Agree with assessment and plan. Extensive pneumonia, apparently fungal in origin, is not showing any sign of improvement. He has severe hypoxemia, although he does not complain of being short of breath. He is, though extremely tired. He does not have the wherewithal to get through this illness. Our conversations with Mr. Will Burk and his wife reinforced his wish to not pursue aggressive interventions.   Discussed with oncology service and palliative care

## 2022-08-26 NOTE — CONSULTS
Comprehensive Nutrition Assessment    Type and Reason for Visit:  Reassess    Nutrition Recommendations/Plan:   Continue Dysphagia Soft and Bite Sized textures with Carb control, low microbial  Offer Glucerna as tolerated  Calorie count starting 8/26, RD will follow as needed  If tube feeding initiated, recommend meeting at least 50% nutritional needs based on po intake trends so far (Glucerna 1.5 at 50 ml/hr for 12 hours) with water flush 60 ml before and after administration      Malnutrition Assessment:  Malnutrition Status:  Severe malnutrition (08/11/22 1044)    Context:  Chronic Illness     Findings of the 6 clinical characteristics of malnutrition:  Energy Intake:  75% or less estimated energy requirements for 1 month or longer  Weight Loss:  Unable to assess (20lbs loss in 9 months, CBW is estimated. Will order updated)     Body Fat Loss:  Severe body fat loss Orbital, Triceps   Muscle Mass Loss:  Severe muscle mass loss Temples (temporalis), Clavicles (pectoralis & deltoids)  Fluid Accumulation:  Mild Extremities   Strength:  Not Performed    Nutrition Assessment:    Follow up:  Continues on dysphagia soft and bite sized, carb control, low microbial diet. Meal and nutrition supplement intake variable % meals. Dietitian consult ordered for calorie count and tube feeding recommendations. Nutrition Related Findings:    BG fluctuating 70's to over 200's; BM x 1 on 8/26/22 Wound Type: Stage II, Pressure Injury       Current Nutrition Intake & Therapies:    Average Meal Intake: 26-50%, 51-75%  Average Supplements Intake: 51-75%, %  ADULT ORAL NUTRITION SUPPLEMENT; Lunch, Dinner; Frozen Oral Supplement  ADULT ORAL NUTRITION SUPPLEMENT; Breakfast, Lunch; Diabetic Oral Supplement  ADULT DIET; Dysphagia - Soft and Bite Sized; 4 carb choices (60 gm/meal);  Low Microbial    Anthropometric Measures:  Height: 5' 6\" (167.6 cm)  Ideal Body Weight (IBW): 142 lbs (65 kg)    Admission Body Weight: 110 lb (49.9 kg)  Current Body Weight: 98 lb 12 oz (44.8 kg), 81 % IBW. Weight Source: Bed Scale  Current BMI (kg/m2): 15.9        Weight Adjustment For: No Adjustment                 BMI Categories: Underweight (BMI less than 22) age over 72    Estimated Daily Nutrient Needs:  Energy Requirements Based On: Kcal/kg (30-35 kcals/kg CBW 52kg)  Weight Used for Energy Requirements: Current (52kg)  Energy (kcal/day): 6349-2855  Weight Used for Protein Requirements: Current (1.5-1.8 gm/kg CBW 52 kg)  Protein (g/day): 78-94  Method Used for Fluid Requirements: 1 ml/kcal  Fluid (ml/day): 4392-3448    Nutrition Diagnosis:   Severe malnutrition related to catabolic illness, inadequate protein-energy intake as evidenced by weight loss, severe loss of subcutaneous fat, severe muscle loss    Nutrition Interventions:   Food and/or Nutrient Delivery: Continue Current Diet, Continue Oral Nutrition Supplement, Start Calorie Count  Nutrition Education/Counseling: Education not indicated  Coordination of Nutrition Care: Continue to monitor while inpatient  Plan of Care discussed with: pt and wife    Goals:  Previous Goal Met: Progressing toward Goal(s)  Goals: PO intake 50% or greater, prior to discharge       Nutrition Monitoring and Evaluation:   Behavioral-Environmental Outcomes: None Identified  Food/Nutrient Intake Outcomes: Supplement Intake, Food and Nutrient Intake  Physical Signs/Symptoms Outcomes: Nutrition Focused Physical Findings, Biochemical Data    Discharge Planning:     Too soon to determine     CURLY, [address] [facility], [address] [place] [address],   Contact: [phone]

## 2022-08-26 NOTE — PROGRESS NOTES
Physical Therapy/Occupational Therapy  HOLD  Will hold PT/OT today per RN- reports pt with increased O2 demand now on 15L on NC & non-rebreather. Will follow up per POC as appropriate. Dorothea Echols, PT [phone], [address] [facility]

## 2022-08-26 NOTE — PROGRESS NOTES
[address] [place] [facility] Progress Note    2022     Frutoso Masker    MRN: [id number]    : 1945    Referring MD: Adelaide Givens MD  [facility] [address] [facility],  [address]    SUBJECTIVE:  he cont sob relatively unchanged from yesterday (actually all wk). His po intake is fair. His activity level is poor mostly to being sob.      ECOG PS:(3) Capable of limited self-care, confined to bed or chair > 50% of waking hours    KPS: 50%  Requires considerable assistance and frequent medical care    Isolation: None    Medications    Scheduled Meds:   enoxaparin  30 mg SubCUTAneous Daily    piperacillin-tazobactam  3,375 mg IntraVENous Q8H    anidulafungin  100 mg IntraVENous Q24H    phosphorus  500 mg Oral TID    Venelex   Topical BID    potassium chloride  20 mEq Oral Daily with breakfast    valACYclovir  500 mg Oral BID    insulin lispro  0-16 Units SubCUTAneous TID WC    insulin lispro  0-4 Units SubCUTAneous Nightly    bacitracin-polymyxin b   Topical BID    fluticasone  1 spray Each Nostril Daily    levETIRAcetam  500 mg Oral BID    sodium chloride  2 spray Nasal 4x Daily    tamsulosin  0.4 mg Oral Daily    sodium chloride flush  5-40 mL IntraVENous 2 times per day    Saline Mouthwash  15 mL Swish & Spit 4x Daily AC & HS     Continuous Infusions:   sodium chloride      sodium chloride      potassium chloride      dextrose       PRN Meds:.sodium phosphate IVPB, acetaminophen, ondansetron, prochlorperazine, sodium chloride, sodium chloride flush, sodium chloride, potassium chloride, magnesium sulfate, magnesium hydroxide, Saline Mouthwash, alteplase (CATHFLO) with sterile water injection, glucose, dextrose bolus **OR** dextrose bolus, glucagon (rDNA), dextrose    ROS:  As noted above, otherwise remainder of 10-point ROS negative    Physical Exam:     I&O:    Intake/Output Summary (Last 24 hours) at 2022 0613  Last data filed at 2022 0012  Gross per 24 hour   Intake --   Output 350 ml   Net -350 ml Vital Signs:  /73   Pulse (!) 123   Temp 98.1 °F (36.7 °C) (Oral)   Resp 28   Ht 5' 6\" (1.676 m)   Wt 98 lb 12.3 oz (44.8 kg)   SpO2 94%   BMI 15.94 kg/m²     Weight:    Wt Readings from Last 3 Encounters:   08/25/22 98 lb 12.3 oz (44.8 kg)   07/25/22 116 lb 10 oz (52.9 kg)   05/31/22 117 lb (53.1 kg)       General: Awake, alert and oriented  HEENT: normocephalic, PERRL, no scleral erythema or icterus, Oral mucosa moist and intact, throat clear  NECK: supple   BACK: Straight   SKIN: warm dry and intact without lesions rashes or masses  CHEST: poor air excursion, no rhonchi  CV: Normal S1 S2, irreg, no MRG  ABD: NT ND normoactive BS, no palpable masses or hepatosplenomegaly  EXTREMITIES: without edema, denies calf tenderness  NEURO: CN II - XII grossly intact  CATHETER:  Right IJ SL PAC (1/11/22) - CDI      Data    CBC:   Recent Labs     08/24/22 0337 08/25/22 0427 08/26/22  0326   WBC 6.4 7.7 9.5   HGB 7.9* 8.0* 7.5*   HCT 25.5* 24.6* 23.3*   MCV 95.9 95.1 96.3    145 140       BMP/Mag:  Recent Labs     08/24/22 0337 08/25/22 0427 08/26/22  0326    143 144   K 3.8 3.8 3.7   CL 99 97* 98*   CO2 37* 38* 35*   PHOS 2.7 2.6 2.0*   BUN 11 10 11   CREATININE <0.5* <0.5* <0.5*   MG 1.50* 1.60* 1.90       LIVP:   Recent Labs     08/24/22 0337 08/26/22  0326   AST 40* 28   ALT 26 20   BILIDIR <0.2 <0.2   BILITOT <0.2 0.5   ALKPHOS 246* 267*       Coags:   Recent Labs     08/25/22 0427   PROTIME 13.3   INR 1.02   APTT 38.8*       Uric Acid   Recent Labs     08/24/22  0337 08/26/22  0326   LABURIC 2.0* 1.5*       Diagnostics:   CT chest (8/9/22):  1. Moderate bilateral groundglass opacity new since prior chest CT consistent with infectious/inflammatory pneumonitis. 2. Background mild-to-moderate lower lobe predominant pulmonary fibrosis without change.    3. A 5 cm lesion in the anterior aspect left lobe of the liver stable to mildly decreased in size and of indeterminate etiology. PROBLEM LIST:           DLBC  Interstitial lung disease / Pulmonary Fibrosis   Type 2 Diabetes Mellitus   BPH  Acute on Chronic Respiratory Failure / Candida PNA (8/2022)      TREATMENT:            R-CHOP w/ Revlimid and Tafasitimab  Polatuzumab/BR   Cycle #1 -  8/1/22      ASSESSMENT AND PLAN:          1. 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 ID: afebrile, Cont to treat for fungal / Candida PNA (POA)  - Viral respiratory w/ COVID 19 (8/9/22): Negative  - MRSA swab (8/11/22) - Negative & procalcitonin (8/10/22) - 0.8  - CT chest (8/9/22): Moderate bilateral groundglass opacity new since prior chest CT consistent with infectious/inflammatory pneumonitis. Background mild-to-moderate lower lobe predominant pulmonary fibrosis without change  - S/p Bronchoscopy w/ BAL and biopsy (8/10/22) - Galactomannan positive 1.33, Diatherix - negative  - BAL fungal cx (8/10/22) - Candida krusei, sensitivities pending (send isolate for testing)  - Aspergillus (8/14/22) - negative, fungitell (8/14/22): > 500 (positive)  - Blood cx (8/25/22) - NGTD  - ID following, cont current treatment and await isolate testing on Candida   - Cont Vfend Day + 14 (8/13/22) - ID rec continuing for possible Aspergillus PNA  - Cont Eraxis Day + 2 (started 8/25/22) - started for Pippa PNA (not improving on Vfend)  - Cont Zoysn Day + 2 (started 8/25/22) - started w/ tachycardia and concerns for underlying bacterial/gram negative PNA     Abx history:  Cefepime x 7 days (8/10/22 - 8/16/22)      3.  Heme: Anemia and thrombocytopenia likely r/t recent chemotherapy  - Transfuse for Hgb < 7 and Platelets < 10 K  - No transfusion today     4. Metabolic:  hypoPhos, hyperglycemia w/ met. Alkalosis   - S/p Lasix 40 mg IV x 1 (8/12/22)  - Cont KPhos 500 mg bid (started 8/16/22)  - Cont KCl 20 meq daily (started 8/17/22)  - S/p IVF bolus (8/25/22) for tachycardia and possible dehydration - may need to start gentle hydration d/t poor PO intake   - No IVF  - Replace Phos, K+ & Mg per PRN orders       5. GI / Nutrition:          Nutrition:  Severe malnutrition w/ chronic illness; consult Dietary (8/26/22) for calorie counts and tube feeding recs. May need corepak and enteral nutrition started over next 1 - 2 days   - Cont regular diet, no straws   - SLP eval (8/13/22): Aspiration with straws, regular diet and thin liquids with cups only  - MBS (8/16/22) - No laryngeal penetration or aspiration.   - Cont Glucerna shakes BID and Magic cups BID  - Recommend swabbing mouth after all meals. Must be completely awake and upright for PO intake  - Dietary to follow closely  Nausea:  - Cont Zofran and Compazine as needed  Constipation:  no complaints   - S/p Colace bid     6. Pulm:   - H/o Interstitial lung disease / pulmonary fibrosis  - F/b Dr. Miguel Hsieh   - S/p bronchoscopy (3/25/22) and PFTs (4/1/22)  - Home O2 - 2 l/min    Hypoxemia:  Admitted w/ acute on chronic respiratory failure possibly from  pneumonitis from chemotherapy (Rituxan vs Jerzy), but more than likely from multifocal fungal/candida PNA (POA). no change cont sob - not improving   - Pulm following, appreciate recs  - CT chest (8/9/22) - Moderate bilateral groundglass opacity new since prior chest CT consistent with infectious/inflammatory pneumonitis. Background mild-to-moderate lower lobe predominant pulmonary fibrosis without change.  A 5 cm lesion in the anterior aspect left lobe of the liver stable to mildly decreased in size and of indeterminate etiology.  - BAL fungal cx (8/10/22) - Candida krusei, sensitivities pending   - S/p Bronchoscopy w/ BAL and biopsy (8/10/22): + galactomannan, Diatherix negative  - S/p steroids:  Solumedrol 60 mg IV daily (started 8/10/22), 60 mg BID (8/12/22, d/t increased oxygen requirements), 60 mg daily (8/14/22 d/t likely fungal pneumonia), Prednisone 20 mg daily (8/15/22), 10 mg daily (8/16/22)  - Cont high flow O2; back up to @ 15 l/min; titrate for O2 Sat 88%  - See ID section for additional treatment and management    7. Endo:   - H/o T2DM   T2DM:  exacerbated by steroids, now improving   - S/p Lantus nightly (stopped 8/11/22, restarted 5 units 8/18/22, increased 8 units 8/19/22, increased 12 units 8/22/22, stopped 8/24/22)    - Home regimen: on humalog SSI, janumet and jardiance  - Cont Humalog high regimen SSI AC/HS      8. MSK:  he has acute debilitation and generalized weakness d/t CNS lymphoma and hypoxemia from fungal/candida PNA   - Cont PT/OT - he is extremely weak and  [facility]     9. :  - H/o BPH  BPH:    - Cont Flomax daily     10.   LUE swelling:  Improved   - Doppler US (8/17/22) - no evidence of DVT  - Cont to elevate     - CODE Status:  limited code (CPR BUT NO mech vent)     - DVT Prophylaxis: Platelets >98,535 cells/dL, - daily lovenox prophylaxis ordered  Contraindications to pharmacologic prophylaxis: None  Contraindications to mechanical prophylaxis: None    - Disposition: Plan for SNF; once hypoxemia improves     MARCIA Valle - CNP    Sylvia Dominguez MD

## 2022-08-27 NOTE — PLAN OF CARE
Problem: Respiratory - Adult  Goal: Achieves optimal ventilation and oxygenation  Outcome: Progressing- Pt remains on nonrebreather for comfort. Will continue to monitor. Problem: Pain  Goal: Verbalizes/displays adequate comfort level or baseline comfort level  Outcome: Progressing- Morphine given as needed for air hunger and respiratory distress. Will continue to monitor. Family at bedside.

## 2022-08-27 NOTE — PROGRESS NOTES
[facility]    2022     Ro Mckinnon    MRN: [id number]    : 1945    Referring MD: Akin Moon MD  [facility] [address] [facility],  [address]    SUBJECTIVE:  he cont sob relatively unchanged from yesterday (actually all wk). Cont on 15L NRB. He appears to be actively dying.     ECOG PS:  4    KPS: 50%  Requires considerable assistance and frequent medical care    Isolation: None    Medications    Scheduled Meds:   voriconazole  200 mg Oral 2 times per day    enoxaparin  30 mg SubCUTAneous Daily    piperacillin-tazobactam  3,375 mg IntraVENous Q8H    anidulafungin  100 mg IntraVENous Q24H    phosphorus  500 mg Oral TID    Venelex   Topical BID    potassium chloride  20 mEq Oral Daily with breakfast    valACYclovir  500 mg Oral BID    insulin lispro  0-16 Units SubCUTAneous TID WC    insulin lispro  0-4 Units SubCUTAneous Nightly    bacitracin-polymyxin b   Topical BID    fluticasone  1 spray Each Nostril Daily    levETIRAcetam  500 mg Oral BID    sodium chloride  2 spray Nasal 4x Daily    tamsulosin  0.4 mg Oral Daily    sodium chloride flush  5-40 mL IntraVENous 2 times per day    Saline Mouthwash  15 mL Swish & Spit 4x Daily AC & HS     Continuous Infusions:   0.9% NaCl with KCl 20 mEq 50 mL/hr at 22 1107    sodium chloride      sodium chloride      potassium chloride      dextrose       PRN Meds:.morphine **OR** morphine, sodium phosphate IVPB, acetaminophen, ondansetron, prochlorperazine, sodium chloride, sodium chloride flush, sodium chloride, potassium chloride, magnesium sulfate, magnesium hydroxide, Saline Mouthwash, alteplase (CATHFLO) with sterile water injection, glucose, dextrose bolus **OR** dextrose bolus, glucagon (rDNA), dextrose    ROS:  As noted above, otherwise remainder of 10-point ROS negative    Physical Exam:     I&O:    Intake/Output Summary (Last 24 hours) at 2022 0634  Last data filed at 2022 0051  Gross per 24 hour   Intake 2306.68 ml   Output 1000 ml   Net 1306.68 ml         Vital Signs:  BP 99/70   Pulse (!) 117   Temp 97 °F (36.1 °C) (Axillary)   Resp 24   Ht 5' 6\" (1.676 m)   Wt 98 lb 12.3 oz (44.8 kg)   SpO2 100%   BMI 15.94 kg/m²     Weight:    Wt Readings from Last 3 Encounters:   08/25/22 98 lb 12.3 oz (44.8 kg)   07/25/22 116 lb 10 oz (52.9 kg)   05/31/22 117 lb (53.1 kg)       General: Awake, alert and oriented  HEENT: normocephalic, PERRL, no scleral erythema or icterus, Oral mucosa moist and intact, throat clear  NECK: supple   BACK: Straight   SKIN: warm dry and intact without lesions rashes or masses  CHEST: poor air excursion, no rhonchi  CV: Normal S1 S2, irreg, no MRG  ABD: NT ND normoactive BS, no palpable masses or hepatosplenomegaly  EXTREMITIES: without edema, denies calf tenderness  NEURO: CN II - XII grossly intact  CATHETER:  Right IJ SL PAC (1/11/22) - CDI      Data    CBC:   Recent Labs     08/25/22 0427 08/26/22 0326 08/27/22  0340   WBC 7.7 9.5 11.3*   HGB 8.0* 7.5* 7.9*   HCT 24.6* 23.3* 26.4*   MCV 95.1 96.3 99.7    140 181       BMP/Mag:  Recent Labs     08/25/22 0427 08/26/22  0326 08/27/22  0340    144 150*   K 3.8 3.7 4.3   CL 97* 98* 104   CO2 38* 35* 32   PHOS 2.6 2.0* 2.4*   BUN 10 11 16   CREATININE <0.5* <0.5* <0.5*   MG 1.60* 1.90 1.70*       LIVP:   Recent Labs     08/26/22  0326   AST 28   ALT 20   BILIDIR <0.2   BILITOT 0.5   ALKPHOS 267*       Coags:   Recent Labs     08/25/22 0427   PROTIME 13.3   INR 1.02   APTT 38.8*       Uric Acid   Recent Labs     08/26/22  0326   LABURIC 1.5*       Diagnostics:   CT chest (8/9/22):  1. Moderate bilateral groundglass opacity new since prior chest CT consistent with infectious/inflammatory pneumonitis. 2. Background mild-to-moderate lower lobe predominant pulmonary fibrosis without change. 3. A 5 cm lesion in the anterior aspect left lobe of the liver stable to mildly decreased in size and of indeterminate etiology.     PROBLEM transfusion today     4. Metabolic:  hypoPhos, hyperglycemia w/ met. Alkalosis, hyperNa   - S/p Lasix 40 mg IV x 1 (8/12/22)  - Cont KPhos 500 mg bid (started 8/16/22)  - Cont KCl 20 meq daily (started 8/17/22)  - S/p IVF bolus (8/25/22) for tachycardia and possible dehydration - may need to start gentle hydration d/t poor PO intake   - No IVF  - Replace Phos, K+ & Mg per PRN orders       5. GI / Nutrition:          Nutrition:  Severe malnutrition w/ chronic illness; consult Dietary (8/26/22) for calorie counts and tube feeding recs. May need corepak and enteral nutrition started over next 1 - 2 days   - Cont regular diet, no straws   - SLP eval (8/13/22): Aspiration with straws, regular diet and thin liquids with cups only  - MBS (8/16/22) - No laryngeal penetration or aspiration.   - Cont Glucerna shakes BID and Magic cups BID  - Recommend swabbing mouth after all meals. Must be completely awake and upright for PO intake  - Dietary to follow closely  Nausea:  - Cont Zofran and Compazine as needed  Constipation:  no complaints   - S/p Colace bid     6. Pulm:   - H/o Interstitial lung disease / pulmonary fibrosis  - F/b Dr. Gato Dooley   - S/p bronchoscopy (3/25/22) and PFTs (4/1/22)  - Home O2 - 2 l/min    Hypoxemia:  Admitted w/ acute on chronic respiratory failure possibly from  pneumonitis from chemotherapy (Rituxan vs Jerzy), but more than likely from multifocal fungal/candida PNA (POA). no change cont sob - not improving   - Pulm following, appreciate recs  - CT chest (8/9/22) - Moderate bilateral groundglass opacity new since prior chest CT consistent with infectious/inflammatory pneumonitis. Background mild-to-moderate lower lobe predominant pulmonary fibrosis without change.  A 5 cm lesion in the anterior aspect left lobe of the liver stable to mildly decreased in size and of indeterminate etiology.  - BAL fungal cx (8/10/22) - Candida krusei, sensitivities pending   - S/p Bronchoscopy w/ BAL and biopsy (8/10/22): + galactomannan, Diatherix negative  - S/p steroids:  Solumedrol 60 mg IV daily (started 8/10/22), 60 mg BID (8/12/22, d/t increased oxygen requirements), 60 mg daily (8/14/22 d/t likely fungal pneumonia), Prednisone 20 mg daily (8/15/22), 10 mg daily (8/16/22)  - Cont high flow O2; back up to @ 15 l/min; titrate for O2 Sat 88%  - See ID section for additional treatment and management    7. Endo:   - H/o T2DM   T2DM:  exacerbated by steroids, now improving   - S/p Lantus nightly (stopped 8/11/22, restarted 5 units 8/18/22, increased 8 units 8/19/22, increased 12 units 8/22/22, stopped 8/24/22)    - Home regimen: on humalog SSI, janumet and jardiance  - Cont Humalog high regimen SSI AC/HS      8. MSK:  he has acute debilitation and generalized weakness d/t CNS lymphoma and hypoxemia from fungal/candida PNA       9. :  - H/o BPH  BPH:    - Cont Flomax daily     10. LUE swelling:  Improved   - Doppler US (8/17/22) - no evidence of DVT  - Cont to elevate     - CODE Status:  DNR/DNI (Limited - no to all interventions)    - DVT Prophylaxis: Platelets >11,933 cells/dL, - daily lovenox prophylaxis ordered  Contraindications to pharmacologic prophylaxis: None  Contraindications to mechanical prophylaxis: None    - Disposition: Palliative/terminal care. Discussed with wife. Addendum [id number]:  Discussed situation with wife and family. He appears to be actively dying and they are aware. Will pursue comfort care only. Chele Wade PA-C    Anton [place]ico Presser.  Neel Posadas, [address]

## 2022-08-27 NOTE — PROGRESS NOTES
Page to [facility] regarding patient's O2 status and Code status. Was made DNR/ DNI this afternoon with no comfort care measures. Patient on 15L non-rebreather and 15L HF nasal cannula. Cannot tolerate the mask being off at any time. Got a nurse communication order to HOLD po meds for tonight and also PRN order for morphine 2-4 mg IV for manage his work of breathing. Page to [facility] regarding his O2 and code status regarding what intervention to do next as he was saturating at 85-88% on the 15LHF and 15L non-rebreather. OK to start vapotherm if needed. Going to give morphine 2mg first to see if the work of breathing improves the O2 saturation.

## 2022-08-28 NOTE — PROGRESS NOTES
[facility] Progress Note    2022     Hayley Born    MRN: [id number]    : 1945    Referring MD: MD Mark Adams 1944,  [address]    SUBJECTIVE: Agonal respirations Cont on 15L NRB. He appears to be actively dying.     ECOG PS:  4    KPS: 50%  Requires considerable assistance and frequent medical care    Isolation: None    Medications    Scheduled Meds:   levETIRAcetam  500 mg IntraVENous Q12H     Continuous Infusions:   0.9% NaCl with KCl 20 mEq 50 mL/hr at 22 0705    sodium chloride      sodium chloride      dextrose       PRN Meds:.atropine, morphine **OR** morphine, acetaminophen, ondansetron, sodium chloride, sodium chloride, alteplase (CATHFLO) with sterile water injection, dextrose bolus **OR** dextrose bolus, glucagon (rDNA), dextrose    ROS:  As noted above, otherwise remainder of 10-point ROS negative    Physical Exam:     I&O:    Intake/Output Summary (Last 24 hours) at 2022 0634  Last data filed at 2022 2004  Gross per 24 hour   Intake 0 ml   Output 1200 ml   Net -1200 ml         Vital Signs:  BP (!) 82/46   Pulse 80   Temp (!) 95.8 °F (35.4 °C) (Axillary)   Resp 11   Ht 5' 6\" (1.676 m)   Wt 115 lb 15.4 oz (52.6 kg)   SpO2 100%   BMI 18.72 kg/m²     Weight:    Wt Readings from Last 3 Encounters:   22 115 lb 15.4 oz (52.6 kg)   22 116 lb 10 oz (52.9 kg)   22 117 lb (53.1 kg)       General: Awake, alert and oriented  HEENT: normocephalic, PERRL, no scleral erythema or icterus, Oral mucosa moist and intact, throat clear  NECK: supple   BACK: Straight   SKIN: warm dry and intact without lesions rashes or masses  CHEST: poor air excursion, no rhonchi  CV: Normal S1 S2, irreg, no MRG  ABD: NT ND normoactive BS, no palpable masses or hepatosplenomegaly  EXTREMITIES: without edema, denies calf tenderness  NEURO: CN II - XII grossly intact  CATHETER:  Right IJ SL PAC (22) - CDI      Data    CBC:   Recent Labs 08/26/22  0326 08/27/22  0340   WBC 9.5 11.3*   HGB 7.5* 7.9*   HCT 23.3* 26.4*   MCV 96.3 99.7    181       BMP/Mag:  Recent Labs     08/26/22  0326 08/27/22  0340    150*   K 3.7 4.3   CL 98* 104   CO2 35* 32   PHOS 2.0* 2.4*   BUN 11 16   CREATININE <0.5* <0.5*   MG 1.90 1.70*       LIVP:   Recent Labs     08/26/22  0326   AST 28   ALT 20   BILIDIR <0.2   BILITOT 0.5   ALKPHOS 267*       Coags:   No results for input(s): PROTIME, INR, APTT in the last 72 hours. Uric Acid   Recent Labs     08/26/22  0326   LABURIC 1.5*       Diagnostics:   CT chest (8/9/22):  1. Moderate bilateral groundglass opacity new since prior chest CT consistent with infectious/inflammatory pneumonitis. 2. Background mild-to-moderate lower lobe predominant pulmonary fibrosis without change. 3. A 5 cm lesion in the anterior aspect left lobe of the liver stable to mildly decreased in size and of indeterminate etiology. PROBLEM LIST:           DLBC  Interstitial lung disease / Pulmonary Fibrosis   Type 2 Diabetes Mellitus   BPH  Acute on Chronic Respiratory Failure / Candida PNA (8/2022)      TREATMENT:            R-CHOP w/ Revlimid and Tafasitimab  Polatuzumab/BR   Cycle #1 -  8/1/22      ASSESSMENT AND PLAN:          1. 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 ID: afebrile, Cont to treat for fungal / Candida PNA (POA)  - Viral respiratory w/ COVID 19 (8/9/22): Negative  - MRSA swab (8/11/22) - Negative & procalcitonin (8/10/22) - 0.8  - CT chest (8/9/22): Moderate bilateral groundglass opacity new since prior chest CT consistent with infectious/inflammatory pneumonitis. Background mild-to-moderate lower lobe predominant pulmonary fibrosis without change  - S/p Bronchoscopy w/ BAL and biopsy (8/10/22) - Galactomannan positive 1.33, Diatherix - negative  - BAL fungal cx (8/10/22) - Candida krusei, sensitivities pending (send isolate for testing)  - Aspergillus (8/14/22) - negative, fungitell (8/14/22): > 500 (positive)  - Blood cx (8/25/22) - NGTD  - ID following, cont current treatment and await isolate testing on Candida     - Cont Eraxis Day + 4 (started 8/25/22) - started for Pippa PNA (not improving on Vfend)  - Cont Zoysn Day + 4 (started 8/25/22) - started w/ tachycardia and concerns for underlying bacterial/gram negative PNA     Abx history:  Cefepime x 7 days (8/10/22 - 8/16/22)   Vfend Day x 15 days (8/13/22-8/27/22) - ID rec continuing for possible Aspergillus PNA - stopped with inability to swallow pills     3. Heme: Anemia and thrombocytopenia likely r/t recent chemotherapy  - Transfuse for Hgb < 7 and Platelets < 10 K  - No transfusion today     4. Metabolic:  hypoPhos, hyperglycemia w/ met. Alkalosis, hyperNa   - S/p Lasix 40 mg IV x 1 (8/12/22)  - S/p KPhos 500 mg bid (8/16/22-8/27/22)  - S/p KCl 20 meq daily (8/17/22-8/27/22)  - IVFs: NS w/ 20 mEq K @ 50 ml/hr   - Replace Phos, K+ & Mg per PRN orders    5. GI / Nutrition:          Nutrition:  Severe malnutrition w/ chronic illness; consult Dietary (8/26/22)   - Cont regular diet, no straws   - SLP eval (8/13/22): Aspiration with straws, regular diet and thin liquids with cups only  - MBS (8/16/22) - No laryngeal penetration or aspiration.   - Cont Glucerna shakes BID and Magic cups BID  - Recommend swabbing mouth after all meals. Must be completely awake and upright for PO intake  - Dietary to follow closely  Nausea:  - Cont Zofran and Compazine as needed  Constipation:  no complaints   - S/p Colace bid     6.  Pulm:   - H/o Interstitial lung disease / pulmonary fibrosis  - F/b Dr. Андрей Carpenter   - S/p bronchoscopy (3/25/22) and PFTs (4/1/22)  - Home O2 - 2 l/min    Hypoxemia:  Admitted w/ acute on chronic respiratory failure possibly from  pneumonitis from chemotherapy (Rituxan vs Jerzy), but more than likely from multifocal fungal/candida PNA (POA). no change cont sob - not improving   - Pulm following, appreciate recs  - CT chest (8/9/22) - Moderate bilateral groundglass opacity new since prior chest CT consistent with infectious/inflammatory pneumonitis. Background mild-to-moderate lower lobe predominant pulmonary fibrosis without change. A 5 cm lesion in the anterior aspect left lobe of the liver stable to mildly decreased in size and of indeterminate etiology.  - BAL fungal cx (8/10/22) - Candida krusei, sensitivities pending   - S/p Bronchoscopy w/ BAL and biopsy (8/10/22): + galactomannan, Diatherix negative  - S/p steroids:  Solumedrol 60 mg IV daily (started 8/10/22), 60 mg BID (8/12/22, d/t increased oxygen requirements), 60 mg daily (8/14/22 d/t likely fungal pneumonia), Prednisone 20 mg daily (8/15/22), 10 mg daily (8/16/22)  - Cont high flow O2; back up to @ 15 l/min; titrate for O2 Sat 88%  - See ID section for additional treatment and management    7. Endo:   - H/o T2DM   T2DM:  exacerbated by steroids, now improving   - S/p Lantus nightly (stopped 8/11/22, restarted 5 units 8/18/22, increased 8 units 8/19/22, increased 12 units 8/22/22, stopped 8/24/22)    - Home regimen: on humalog SSI, janumet and jardiance  - Cont Humalog low regimen SSI AC/HS    8. MSK:  he has acute debilitation and generalized weakness d/t CNS lymphoma and hypoxemia from fungal/candida PNA     9. :  - H/o BPH  BPH:    - S/p Flomax daily     10.   LUE swelling:  Improved   - Doppler US (8/17/22) - no evidence of DVT  - Cont to elevate     - CODE Status:  DNR/DNI (Limited - no to all interventions); pursing comfort care only    - DVT Prophylaxis: Platelets >45,522 cells/dL, - daily lovenox prophylaxis ordered  Contraindications to pharmacologic prophylaxis: None  Contraindications to mechanical prophylaxis: None    - Disposition: Palliative/terminal care. Discussed situation with wife and family. He appears to be actively dying and they are aware. Will pursue comfort care only. GARLAND Newman.  Christianne Sparrow, [address] [place] [address]

## 2022-08-28 NOTE — PROGRESS NOTES
08/28/22 1109   Encounter Summary   Encounter Overview/Reason  Grief, Loss, and Adjustments   Service Provided For: Patient and family together   Referral/Consult From: Family   Support System Spouse; Children   Last Encounter    (es 8/28)   Complexity of Encounter High   Begin Time 1045   End Time  1110   Total Time Calculated 25 min   Grief, Loss, and Adjustments   Type End of Life   Assessment/Intervention/Outcome   Assessment Coping; Sad   Intervention Active listening;End of Life Care;Explored/Affirmed feelings, thoughts, concerns;Prayer (assurance of)/Alicia   Outcome Acceptance; Coping;Engaged in conversation;Expressed feelings, needs, and concerns;Expressed Gratitude;Receptive  (extremely grateful for Mehdi's life and for care of multi-disciplinary team)

## 2022-08-29 LAB
BLOOD CULTURE, ROUTINE: NORMAL
CULTURE, BLOOD 2: NORMAL

## 2022-08-30 LAB
Lab: NORMAL
REPORT: NORMAL
THIS TEST SENT TO: NORMAL

## 2022-09-12 LAB
FUNGUS (MYCOLOGY) CULTURE: ABNORMAL
FUNGUS STAIN: ABNORMAL
ORGANISM: ABNORMAL

## 2022-09-26 LAB
CULTURE, FUNGUS BLOOD: NORMAL
CULTURE, FUNGUS BLOOD: NORMAL

## 2022-09-27 LAB
AFB CULTURE (MYCOBACTERIA): NORMAL
AFB SMEAR: NORMAL

## 2022-10-26 NOTE — DISCHARGE SUMMARY
[facility] Discharge Summary             Attending Physician: No att. providers found    Referring MD: Maryan Lee MD  [address] [place] [address]    Name: Miroslava Leonard :  1945  MRN:  [id number]    Admission: 2022   Discharge: 2022      Date: 10/26/2022    Diagnosis on admit: Hypoxia    Procedures: Routine chest x-ray, laboratories, EKG, IV fluid hydration, Panculture for fevers, IV antimicrobial therapy, Respiratory therapy, Oxygen therapy, Blood Product Infusions    Consultations: Pulmonology, Infectious Disease, Palliative Care     Medications:      Medication List        CONTINUE taking these medications      B-D UF III MINI PEN NEEDLES 31G X 5 MM Misc  Generic drug: Insulin Pen Needle  1 each by Does not apply route 4 times daily     Dexcom G6  Carole  Use for glucose monitoring     Dexcom G6 Sensor Misc  Use for glucose monitoring     Dexcom G6 Transmitter Misc  Use for glucose monitoring     ONE TOUCH ULTRA 2 w/Device Kit  1 kit by Does not apply route daily     * ONE TOUCH ULTRASOFT LANCETS Misc  1 each by Does not apply route daily     * Lancets Misc  Use to test blood sugar once daily           * This list has 2 medication(s) that are the same as other medications prescribed for you. Read the directions carefully, and ask your doctor or other care provider to review them with you. ASK your doctor about these medications      aspirin 81 MG EC tablet     bacitracin-polymyxin b 500-55823 UNIT/GM ointment  Commonly known as: POLYSPORIN  Apply topically twice daily to inside of both nostrils for 3 weeks then as needed for dry nose.      CENTRUM ADULTS PO     docusate sodium 100 MG capsule  Commonly known as: Colace  Take 1 capsule by mouth 2 times daily     fluticasone 50 MCG/ACT nasal spray  Commonly known as: FLONASE  SPRAY ONCE IN EACH NOSTRIL EVERY DAY     insulin lispro (1 Unit Dial) 100 UNIT/ML Sopn  Commonly known as: HumaLOG KwikPen  Up to 5-10 units before meals 3 times a day     ipratropium 0.06 % nasal spray  Commonly known as: ATROVENT  2 sprays by Each Nostril route 4 times daily     Janumet XR  MG Tb24 per extended release tablet  Generic drug: SITagliptin-metFORMIN  TAKE 1 TABLET TWICE A DAY  Ask about: Which instructions should I use? Jardiance 25 MG tablet  Generic drug: empagliflozin  TAKE 1 TABLET BY MOUTH DAILY     levETIRAcetam 500 MG tablet  Commonly known as: KEPPRA  Take 1 tablet by mouth in the morning and 1 tablet before bedtime. magnesium oxide 400 MG tablet  Commonly known as: MAG-OX  Take 1 tablet by mouth daily     OMEGA 3 PO     ondansetron 4 MG disintegrating tablet  Commonly known as: ZOFRAN-ODT  Take 1 tablet by mouth every 8 hours as needed for Nausea or Vomiting     ondansetron 8 MG tablet  Commonly known as: ZOFRAN     * OneTouch Ultra strip  Generic drug: blood glucose test strips  1 each by Does not apply route daily     * OneTouch Ultra strip  Generic drug: blood glucose test strips  TEST ONCE DAILY     prochlorperazine 10 MG tablet  Commonly known as: COMPAZINE     sodium chloride 0.65 % nasal spray  Commonly known as: OCEAN  2 sprays by Nasal route 4 times daily     tamsulosin 0.4 MG capsule  Commonly known as: FLOMAX  TAKE 1 CAPSULE DAILY     vitamin B-12 1000 MCG tablet  Commonly known as: CYANOCOBALAMIN     vitamin D 50 MCG (2000 UT) Caps capsule           * This list has 2 medication(s) that are the same as other medications prescribed for you. Read the directions carefully, and ask your doctor or other care provider to review them with you.                    Where to Get Your Medications        These medications were sent to [facility] f [phone]  [address]      Phone: [phone]   Janumet XR  MG Tb24 per extended release tablet         Reason for Admission: hypoxia     DANNY REYNAGA JEAN - HUMACAO Course:   Mr. Heidi Dee  is a 67 y.o. male with stage IV non-Hodgkin lymphoma. He recently was admitted after a fall at home. For several days prior to hospitalization, the patient had worsening speech and altered gait. His wife also noted that he will have periods of nonresponsiveness where he would stare off into space. In the ER, he underwent a head CT that demonstrated a mass in the basal ganglia with associated vasogenic edema and midline shift. He has since then undergone an MRI which demonstrated an approximate 4 cm mass. He was started on steroids and Keppra. He underwent biopsy which suggested Involved with malignant B-cell lymphoma with aggressive morphologic   features. Patient was treated with dexamethasone and radiation therapy. He was then started on Jerzy/BR on 22. His was readmitted with hypoxia on 22. Upon admission, he will have a viral respiratory panel was drawn, which was negative. He underwent a CT scan of his chest, which showed Moderate bilateral groundglass opacity new since prior chest CT consistent with infectious/inflammatory pneumonitis. Background mild-to-moderate lower lobe predominant pulmonary fibrosis without change. A bronchoscopy was preformed on 9/10/22, which showed a positive BAL Aspergillus, +1 3D beta glucan, and now BAL fungal culture isolating Candida krusei. He was started on Voriconazole and initially had improvement but then continued to develop increasing oxygen requirement. Palliative care was consulted and his code status was changed to a DNR/DNI (limited - no to all interventions) per the patient and wife's wishes. The patient's condition continued to deteriorate and he  on 22.      Physical Exam:     Vital Signs:  BP (!) 82/46   Pulse 80   Temp (!) 95.8 °F (35.4 °C) (Axillary)   Resp 14   Ht 5' 6\" (1.676 m)   Wt 115 lb 15.4 oz (52.6 kg)   SpO2 100%   BMI 18.72 kg/m²     Weight:    Wt Readings from Last 3 Encounters:   22 115 lb 15.4 oz (52.6 kg)   07/25/22 116 lb 10 oz (52.9 kg)   05/31/22 117 lb (53.1 kg)       KPS: 0% Dead    General: agonal breathing   HEENT: normocephalic, PERRL, no scleral erythema or icterus, Oral mucosa moist and intact, throat clear  NECK: supple   BACK: Straight   SKIN: warm dry and intact without lesions rashes or masses  CHEST: poor air excursion, no rhonchi  CV: Normal S1 S2, irreg, no MRG  ABD: NT ND normoactive BS, no palpable masses or hepatosplenomegaly  EXTREMITIES: without edema, denies calf tenderness  NEURO: CN II - XII grossly intact  CATHETER:  Right IJ SL PAC (1/11/22) - CDI      Discharge Laboratory Data:  CBC:   No results for input(s): WBC, HGB, HCT, MCV, PLT in the last 72 hours. BMP/Mag:  No results for input(s): NA, K, CL, CO2, PHOS, BUN, CREATININE, CA, MG in the last 72 hours. LIVP:   No results for input(s): AST, ALT, LIPASE, BILIDIR, BILITOT, ALKPHOS in the last 72 hours. Invalid input(s): AMYLASE,  ALB    Coags:   No results for input(s): PROTIME, INR, APTT in the last 72 hours. Uric Acid   No results for input(s): LABURIC in the last 72 hours. [place]:  No results for input(s): TACROLEV in the last 72 hours. CMV Quant DNA by PCR: No results found for: CMVDNAQNT  IgG: No results for input(s): IGG in the last 72 hours. Diagnostics:   CT chest (8/9/22):  1. Moderate bilateral groundglass opacity new since prior chest CT consistent with infectious/inflammatory pneumonitis. 2. Background mild-to-moderate lower lobe predominant pulmonary fibrosis without change. 3. A 5 cm lesion in the anterior aspect left lobe of the liver stable to mildly decreased in size and of indeterminate etiology.       PROBLEM LIST:            DLBC  Interstitial lung disease / Pulmonary Fibrosis   Type 2 Diabetes Mellitus   BPH  Acute on Chronic Respiratory Failure / Candida PNA (8/2022)      TREATMENT:            R-CHOP w/ Revlimid and Tafasitimab  Polatuzumab/BR   Cycle #1 -  8/1/22 ASSESSMENT AND PLAN:       1. 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 ID: afebrile, Cont to treat for fungal / Candida PNA (POA)  - Viral respiratory w/ COVID 19 (8/9/22): Negative  - MRSA swab (8/11/22) - Negative & procalcitonin (8/10/22) - 0.8  - CT chest (8/9/22): Moderate bilateral groundglass opacity new since prior chest CT consistent with infectious/inflammatory pneumonitis. Background mild-to-moderate lower lobe predominant pulmonary fibrosis without change  - S/p Bronchoscopy w/ BAL and biopsy (8/10/22) - Galactomannan positive 1.33, Diatherix - negative  - BAL fungal cx (8/10/22) - Candida krusei, sensitivities pending (send isolate for testing)  - Aspergillus (8/14/22) - negative, fungitell (8/14/22): > 500 (positive)  - ID following, cont current treatment and await isolate testing on Candida        Abx history:  Cefepime x 7 days (8/10/22 - 8/16/22)   Vfend Day x 15 days (8/13/22-8/27/22) - ID rec continuing for possible Aspergillus PNA - stopped with inability to swallow pills  Eraxis x 4 days (started 8/25/22) - started for Pippa PNA (not improving on Vfend)  Zoysn x 4 days (started 8/25/22) - started w/ tachycardia and concerns for underlying bacterial/gram negative PNA      3. Heme: Anemia and thrombocytopenia likely r/t recent chemotherapy  - Transfuse for Hgb < 7 and Platelets < 10 K  - No transfusion today     4. Metabolic:  hypoPhos, hyperglycemia w/ met.  Alkalosis, hyperNa   - S/p Lasix 40 mg IV x 1 (8/12/22)  - S/p KPhos 500 mg bid (8/16/22-8/27/22)  - S/p KCl 20 meq daily (8/17/22-8/27/22)     5. GI / Nutrition:          Nutrition:  Severe malnutrition w/ chronic illness; consult Dietary (8/26/22)   - Cont regular diet, no straws   - SLP eval (8/13/22): Aspiration with straws, regular diet and thin liquids with cups only  - MBS (8/16/22) - No laryngeal penetration or aspiration.   - Cont Glucerna shakes BID and Magic cups BID  - Recommend swabbing mouth after all meals. Must be completely awake and upright for PO intake  - Dietary to follow closely  Nausea:  - Cont Zofran and Compazine as needed  Constipation:  no complaints   - S/p Colace bid     6. Pulm:   - H/o Interstitial lung disease / pulmonary fibrosis  - F/b Dr. Theodore Aid   - S/p bronchoscopy (3/25/22) and PFTs (4/1/22)  - Home O2 - 2 l/min    Hypoxemia:  Admitted w/ acute on chronic respiratory failure possibly from  pneumonitis from chemotherapy (Rituxan vs Jerzy), but more than likely from multifocal fungal/candida PNA (POA). no change cont sob - not improving   - Pulm following, appreciate recs  - CT chest (8/9/22) - Moderate bilateral groundglass opacity new since prior chest CT consistent with infectious/inflammatory pneumonitis. Background mild-to-moderate lower lobe predominant pulmonary fibrosis without change. A 5 cm lesion in the anterior aspect left lobe of the liver stable to mildly decreased in size and of indeterminate etiology.  - BAL fungal cx (8/10/22) - Candida krusei, sensitivities pending   - S/p Bronchoscopy w/ BAL and biopsy (8/10/22): + galactomannan, Diatherix negative  - S/p steroids:  Solumedrol 60 mg IV daily (started 8/10/22), 60 mg BID (8/12/22, d/t increased oxygen requirements), 60 mg daily (8/14/22 d/t likely fungal pneumonia), Prednisone 20 mg daily (8/15/22), 10 mg daily (8/16/22)  - Cont high flow O2; back up to @ 15 l/min; titrate for O2 Sat 88%  - See ID section for additional treatment and management     7.  Endo:   - H/o T2DM   T2DM:  exacerbated by steroids, now improving   - S/p Lantus nightly (stopped 8/11/22, restarted 5 units 8/18/22, increased 8 units 8/19/22, increased 12 units 8/22/22, stopped 8/24/22)    - Home

## 2023-01-24 NOTE — TELEPHONE ENCOUNTER
Patient has been set up for a Bronchoscopy BAL/ EBUS/Thoracentesis     Where: [facility]   Day: Fri 3-  Arrive: 12:30am  Procedure Time: 2pm    1. Nothing by mouth midnight before procedure   2. Arrive 1.5 hour before Bronch   3. Or if EBUS arrive 2 hours before   4. Arrive 1 Hour before if Thoracentesis   5. Sign in with Admitting / Registration at main Entrance   6. Please have transportation arrangements from hospital after you procedure. Written instructions given during office visit  Called patient, who gave the phone to his wife to get instructions over the phone.   Verbalized understanding
,DirectAddress_Unknown

## 2023-12-23 NOTE — DISCHARGE INSTRUCTIONS
Shelly 51 Discharge Instructions    Call for Questions/Concerns:  [phone] ([phone]) [facility] office  The phone number listed above is available 24 hrs/7 days per week  [facility] is open M-F 8am-4:30pm; Sat-Sun/Holidays 8am-1pm    Symptoms to Report Immediately:    Fever of 100.5 or greater  Vomiting without relief after use of anti-nausea medication  Severe abdominal cramping  Diarrhea: More than 3 loose, watery bowel movements in a 24 hour period  Unusual or excessive bleeding from your mouth, nose, rectum, bladder or vagina  Sudden onset of shortness of breath or chest pain  Signs/symptoms of infection: redness, warmth, swelling-particularly to central line site    Report to Physician's office within 24 hours:    Pain not relieved by pain medication  Change in urination-odor, cloudiness, frequency, or pain with urination  Flu-like symptoms  Skin changes-rash, hives, redness or peeling of skin    Additional Instructions:    Avoid people with colds, flu-like symptoms, or any sign of infection  Drink plenty of fluids-attempt to consume 2-3 liters ( ounces) of fluids/24 hour period  Continue low microbial diet until instructed by physician to resume normal diet  Bring all of your medications with you to your doctor's appointments  Bring your current medicine list to each hospital and office visit    [address] [place] [address]: [address] [place] [address] [address] [phone]    You are being discharged with IV access due to need for ongoing therapy. Below is pertinent information regarding your IV that your next provider may need to know:  Type:  R PAC Deaccessed on discharge                             [facility] care and maintenance was reviewed with patient.       7/26/2022 10:30 AM  Brittany Beverly            My Discharge Checklist    Here at the [facility], we want to make sure you have the help you will need once you leave the hospital.  We are going to go over your discharge instructions with you. We give these to you in writing so you will have a reference if you have questions about symptoms or problems to look for after you leave the hospital.     We know you want to feel better and get home soon. Please answer these questions so we can be sure you have what you need, your questions are answered, and you feel prepared for discharge. Yes No Do you understand your diagnosis? Yes No Do you know when and who you need to follow up with? Yes No Do you feel ready to go home & take care of your daily needs? Yes No Do you have the help you need at home? Yes No Do you understand what medications your are taking? Yes No Do you understand what your medications are for? Yes No Do you understand what medication side effects to watch for? Yes No Do you know what symptoms or health problems that require an immediate call to your physician? Yes No Do you feel ready for discharge? Yes No Are there any questions that you have re: how to care for yourself at home? Yes No [facility] you [facility]? If you have any questions after you get home, feel free to call the unit and ask to speak with your nurse. In about 7-10 days you will receive a survey. We value your opinion and hope that you have received care that will enable you to choose the best scores when completing the survey.     It was our pleasure to take care of you,  [address] [facility]           [phone]                                                     Outpatient Infusion [phone]    [facility] Physician Office [address] or Procedural Scheduling [phone] ([address] [place] [address]) Admission Reconciliation is Completed  Discharge Reconciliation is Completed

## (undated) DEVICE — DRESSING GERM DIA1IN CNTR H DIA7MM BLU CHG ANTIMIC PROTCT

## (undated) DEVICE — SYRINGE MED 10ML TRNSLUC BRL PLUNG BLK MRK POLYPR CTRL

## (undated) DEVICE — Z DISCONTINUED USE 2749457 TUBING SAMP AD W12.5XH8.4IN D9.1IN NSL ORAL SMRT CAPNOLINE

## (undated) DEVICE — MASK POM PROCEDURE OXY W/ HI CONCENTRATION CO2 MONITOR

## (undated) DEVICE — TOWEL,OR,DSP,ST,BLUE,DLX,8/PK,10PK/CS: Brand: MEDLINE

## (undated) DEVICE — SOLUTION IV 500ML 0.9% SOD CHL PH 5 INJ USP VIAFLX PLAS

## (undated) DEVICE — BLADE CLIPPER SURG SENSICLIP

## (undated) DEVICE — TOOL 10BA50-MN LEGEND 10CM 5MM BA: Brand: MIDAS REX™

## (undated) DEVICE — 3M™ IOBAN™ 2 ANTIMICROBIAL INCISE DRAPE 6640EZ: Brand: IOBAN™ 2

## (undated) DEVICE — GLOVE SURG SZ 6 L12IN FNGR THK75MIL WHT LTX POLYMER BEAD

## (undated) DEVICE — SUTURE MCRYL SZ 4-0 L27IN ABSRB UD L19MM PS-2 1/2 CIR PRIM Y426H

## (undated) DEVICE — TOWEL,STOP FLAG GOLD N-W: Brand: MEDLINE

## (undated) DEVICE — CRANI: Brand: MEDLINE INDUSTRIES, INC.

## (undated) DEVICE — UNDERGLOVE SURG SZ 8 BLU LTX FREE SYN POLYISOPRENE POLYMER

## (undated) DEVICE — GLOVE SURG SZ 75 L12IN FNGR THK94MIL TRNSLUC YEL LTX

## (undated) DEVICE — ADAPTER LAB INTMED LUER 17GA

## (undated) DEVICE — SOLUTION IV 1000ML 0.9% SOD CHL

## (undated) DEVICE — COVER LT HNDL CAM BLU DISP W/ SURG CTRL

## (undated) DEVICE — NEEDLE BX OD1.8MM L150MM CUT WIND L10MM MTL RUL SYR SYR

## (undated) DEVICE — SUTURE VCRL SZ 3-0 L18IN ABSRB UD L26MM SH 1/2 CIR J864D

## (undated) DEVICE — SUTURE NRLN SZ 4-0 L18IN NONABSORBABLE BLK L13MM TF 1/2 CIR C584D

## (undated) DEVICE — SYRINGE MED 3ML CLR PLAS STD N CTRL LUERLOCK TIP DISP

## (undated) DEVICE — PRECISION SPECIMEN CONTAINER 4 OZ (118 ML) • POSITIVE SEAL INDICATOR OR PACKAGED: Brand: PRECISION

## (undated) DEVICE — TOOL F2/8TA23 LEGEND 8CM 2.3MM TAPER: Brand: MIDAS REX™

## (undated) DEVICE — GLOVE SURG SZ 65 L12IN FNGR THK79MIL GRN LTX FREE

## (undated) DEVICE — SUTURE ETHLN SZ 3-0 L18IN NONABSORBABLE BLK PS-2 L19MM 3/8 1669H

## (undated) DEVICE — SUTURE VCRL SZ 2-0 L27IN ABSRB UD L26MM SH 1/2 CIR J417H

## (undated) DEVICE — GLOVE SURG SZ 65 CRM LTX FREE POLYISOPRENE POLYMER BEAD ANTI